# Patient Record
Sex: MALE | Race: WHITE | NOT HISPANIC OR LATINO | Employment: UNEMPLOYED | URBAN - METROPOLITAN AREA
[De-identification: names, ages, dates, MRNs, and addresses within clinical notes are randomized per-mention and may not be internally consistent; named-entity substitution may affect disease eponyms.]

---

## 2017-12-27 ENCOUNTER — RECORDS - HEALTHEAST (OUTPATIENT)
Dept: LAB | Facility: CLINIC | Age: 37
End: 2017-12-27

## 2017-12-27 LAB
CHOLEST SERPL-MCNC: 292 MG/DL
FASTING STATUS PATIENT QL REPORTED: NO
HDLC SERPL-MCNC: 48 MG/DL
LDLC SERPL CALC-MCNC: 207 MG/DL
TRIGL SERPL-MCNC: 187 MG/DL

## 2020-06-25 ENCOUNTER — RECORDS - HEALTHEAST (OUTPATIENT)
Dept: LAB | Facility: CLINIC | Age: 40
End: 2020-06-25

## 2020-06-25 LAB
ALBUMIN SERPL-MCNC: 4.2 G/DL (ref 3.5–5)
ALP SERPL-CCNC: 59 U/L (ref 45–120)
ALT SERPL W P-5'-P-CCNC: 133 U/L (ref 0–45)
ANION GAP SERPL CALCULATED.3IONS-SCNC: 12 MMOL/L (ref 5–18)
AST SERPL W P-5'-P-CCNC: 91 U/L (ref 0–40)
BILIRUB SERPL-MCNC: 0.4 MG/DL (ref 0–1)
BUN SERPL-MCNC: 12 MG/DL (ref 8–22)
CALCIUM SERPL-MCNC: 10.1 MG/DL (ref 8.5–10.5)
CHLORIDE BLD-SCNC: 102 MMOL/L (ref 98–107)
CHOLEST SERPL-MCNC: 281 MG/DL
CO2 SERPL-SCNC: 24 MMOL/L (ref 22–31)
CREAT SERPL-MCNC: 1.23 MG/DL (ref 0.7–1.3)
FASTING STATUS PATIENT QL REPORTED: ABNORMAL
GFR SERPL CREATININE-BSD FRML MDRD: >60 ML/MIN/1.73M2
GLUCOSE BLD-MCNC: 104 MG/DL (ref 70–125)
HDLC SERPL-MCNC: 54 MG/DL
LDLC SERPL CALC-MCNC: 164 MG/DL
POTASSIUM BLD-SCNC: 4.4 MMOL/L (ref 3.5–5)
PROT SERPL-MCNC: 7.6 G/DL (ref 6–8)
SODIUM SERPL-SCNC: 138 MMOL/L (ref 136–145)
TRIGL SERPL-MCNC: 313 MG/DL

## 2020-08-27 ENCOUNTER — RECORDS - HEALTHEAST (OUTPATIENT)
Dept: LAB | Facility: CLINIC | Age: 40
End: 2020-08-27

## 2020-08-27 LAB
ALBUMIN SERPL-MCNC: 4.4 G/DL (ref 3.5–5)
ALP SERPL-CCNC: 54 U/L (ref 45–120)
ALT SERPL W P-5'-P-CCNC: 122 U/L (ref 0–45)
ANION GAP SERPL CALCULATED.3IONS-SCNC: 14 MMOL/L (ref 5–18)
AST SERPL W P-5'-P-CCNC: 83 U/L (ref 0–40)
BILIRUB SERPL-MCNC: 0.6 MG/DL (ref 0–1)
BUN SERPL-MCNC: 10 MG/DL (ref 8–22)
CALCIUM SERPL-MCNC: 9.6 MG/DL (ref 8.5–10.5)
CHLORIDE BLD-SCNC: 103 MMOL/L (ref 98–107)
CO2 SERPL-SCNC: 23 MMOL/L (ref 22–31)
CREAT SERPL-MCNC: 1.11 MG/DL (ref 0.7–1.3)
GFR SERPL CREATININE-BSD FRML MDRD: >60 ML/MIN/1.73M2
GLUCOSE BLD-MCNC: 89 MG/DL (ref 70–125)
POTASSIUM BLD-SCNC: 4.5 MMOL/L (ref 3.5–5)
PROT SERPL-MCNC: 7.7 G/DL (ref 6–8)
SODIUM SERPL-SCNC: 140 MMOL/L (ref 136–145)

## 2020-08-28 ENCOUNTER — RECORDS - HEALTHEAST (OUTPATIENT)
Dept: ADMINISTRATIVE | Facility: OTHER | Age: 40
End: 2020-08-28

## 2020-09-08 ENCOUNTER — HOSPITAL ENCOUNTER (OUTPATIENT)
Dept: CARDIOLOGY | Facility: HOSPITAL | Age: 40
Discharge: HOME | End: 2020-09-08
Attending: FAMILY MEDICINE

## 2020-09-08 DIAGNOSIS — R06.02 SOB (SHORTNESS OF BREATH): ICD-10-CM

## 2020-09-08 LAB
CV STRESS CURRENT BP HE: NORMAL
CV STRESS CURRENT HR HE: 103
CV STRESS CURRENT HR HE: 106
CV STRESS CURRENT HR HE: 114
CV STRESS CURRENT HR HE: 119
CV STRESS CURRENT HR HE: 120
CV STRESS CURRENT HR HE: 121
CV STRESS CURRENT HR HE: 121
CV STRESS CURRENT HR HE: 122
CV STRESS CURRENT HR HE: 123
CV STRESS CURRENT HR HE: 124
CV STRESS CURRENT HR HE: 128
CV STRESS CURRENT HR HE: 128
CV STRESS CURRENT HR HE: 129
CV STRESS CURRENT HR HE: 134
CV STRESS CURRENT HR HE: 135
CV STRESS CURRENT HR HE: 137
CV STRESS CURRENT HR HE: 139
CV STRESS CURRENT HR HE: 143
CV STRESS CURRENT HR HE: 147
CV STRESS CURRENT HR HE: 152
CV STRESS DEVIATION TIME HE: NORMAL
CV STRESS ECHO PERCENT HR HE: NORMAL
CV STRESS EXERCISE STAGE HE: NORMAL
CV STRESS FINAL RESTING BP HE: NORMAL
CV STRESS FINAL RESTING HR HE: 121
CV STRESS MAX HR HE: 153
CV STRESS MAX TREADMILL GRADE HE: 14
CV STRESS MAX TREADMILL SPEED HE: 3.4
CV STRESS PEAK DIA BP HE: NORMAL
CV STRESS PEAK SYS BP HE: NORMAL
CV STRESS PHASE HE: NORMAL
CV STRESS PROTOCOL HE: NORMAL
CV STRESS RESTING PT POSITION HE: NORMAL
CV STRESS RESTING PT POSITION HE: NORMAL
CV STRESS ST DEVIATION AMOUNT HE: NORMAL
CV STRESS ST DEVIATION ELEVATION HE: NORMAL
CV STRESS ST EVELATION AMOUNT HE: NORMAL
CV STRESS TEST TYPE HE: NORMAL
CV STRESS TOTAL STAGE TIME MIN 1 HE: NORMAL
RATE PRESSURE PRODUCT: NORMAL
STRESS ECHO BASELINE DIASTOLIC HE: 94
STRESS ECHO BASELINE HR: 104
STRESS ECHO BASELINE SYSTOLIC BP: 142
STRESS ECHO CALCULATED PERCENT HR: 85 %
STRESS ECHO LAST STRESS DIASTOLIC BP: 96
STRESS ECHO LAST STRESS HR: 152
STRESS ECHO LAST STRESS SYSTOLIC BP: 190
STRESS ECHO POST ESTIMATED WORKLOAD: 9.1
STRESS ECHO POST EXERCISE DUR MIN: 7
STRESS ECHO POST EXERCISE DUR SEC: 30
STRESS ECHO TARGET HR: 180

## 2020-09-08 RX ORDER — PANTOPRAZOLE SODIUM 40 MG/1
40 TABLET, DELAYED RELEASE ORAL DAILY
Status: ON HOLD | COMMUNITY
Start: 2020-09-08 | End: 2023-08-28

## 2020-09-08 RX ORDER — ATORVASTATIN CALCIUM 20 MG/1
20 TABLET, FILM COATED ORAL AT BEDTIME
Status: SHIPPED | COMMUNITY
Start: 2020-09-08 | End: 2023-08-21

## 2021-09-15 ENCOUNTER — LAB REQUISITION (OUTPATIENT)
Dept: LAB | Facility: CLINIC | Age: 41
End: 2021-09-15
Payer: COMMERCIAL

## 2021-09-15 DIAGNOSIS — E78.5 HYPERLIPIDEMIA, UNSPECIFIED: ICD-10-CM

## 2021-09-15 DIAGNOSIS — I10 ESSENTIAL (PRIMARY) HYPERTENSION: ICD-10-CM

## 2021-09-15 LAB
ALBUMIN SERPL-MCNC: 4.2 G/DL (ref 3.5–5)
ALP SERPL-CCNC: 58 U/L (ref 45–120)
ALT SERPL W P-5'-P-CCNC: 128 U/L (ref 0–45)
ANION GAP SERPL CALCULATED.3IONS-SCNC: 14 MMOL/L (ref 5–18)
AST SERPL W P-5'-P-CCNC: 90 U/L (ref 0–40)
BILIRUB SERPL-MCNC: 1.2 MG/DL (ref 0–1)
BUN SERPL-MCNC: 11 MG/DL (ref 8–22)
CALCIUM SERPL-MCNC: 9.8 MG/DL (ref 8.5–10.5)
CHLORIDE BLD-SCNC: 102 MMOL/L (ref 98–107)
CHOLEST SERPL-MCNC: 287 MG/DL
CO2 SERPL-SCNC: 21 MMOL/L (ref 22–31)
CREAT SERPL-MCNC: 1.02 MG/DL (ref 0.7–1.3)
GFR SERPL CREATININE-BSD FRML MDRD: >90 ML/MIN/1.73M2
GLUCOSE BLD-MCNC: 104 MG/DL (ref 70–125)
HDLC SERPL-MCNC: 61 MG/DL
LDLC SERPL CALC-MCNC: 206 MG/DL
POTASSIUM BLD-SCNC: 4.3 MMOL/L (ref 3.5–5)
PROT SERPL-MCNC: 7.6 G/DL (ref 6–8)
SODIUM SERPL-SCNC: 137 MMOL/L (ref 136–145)
TRIGL SERPL-MCNC: 99 MG/DL

## 2021-09-15 PROCEDURE — 80053 COMPREHEN METABOLIC PANEL: CPT | Mod: ORL | Performed by: PHYSICIAN ASSISTANT

## 2021-09-15 PROCEDURE — 80061 LIPID PANEL: CPT | Mod: ORL | Performed by: PHYSICIAN ASSISTANT

## 2022-06-07 ENCOUNTER — LAB REQUISITION (OUTPATIENT)
Dept: LAB | Facility: CLINIC | Age: 42
End: 2022-06-07
Payer: COMMERCIAL

## 2022-06-07 DIAGNOSIS — I10 ESSENTIAL (PRIMARY) HYPERTENSION: ICD-10-CM

## 2022-06-07 DIAGNOSIS — R53.83 OTHER FATIGUE: ICD-10-CM

## 2022-06-07 DIAGNOSIS — R10.84 GENERALIZED ABDOMINAL PAIN: ICD-10-CM

## 2022-06-07 DIAGNOSIS — E78.49 OTHER HYPERLIPIDEMIA: ICD-10-CM

## 2022-06-07 LAB
ALBUMIN SERPL-MCNC: 4.3 G/DL (ref 3.5–5)
ALP SERPL-CCNC: 76 U/L (ref 45–120)
ALT SERPL W P-5'-P-CCNC: 126 U/L (ref 0–45)
ANION GAP SERPL CALCULATED.3IONS-SCNC: 13 MMOL/L (ref 5–18)
AST SERPL W P-5'-P-CCNC: 111 U/L (ref 0–40)
BILIRUB SERPL-MCNC: 1.8 MG/DL (ref 0–1)
BUN SERPL-MCNC: 11 MG/DL (ref 8–22)
CALCIUM SERPL-MCNC: 10.5 MG/DL (ref 8.5–10.5)
CHLORIDE BLD-SCNC: 97 MMOL/L (ref 98–107)
CHOLEST SERPL-MCNC: 251 MG/DL
CO2 SERPL-SCNC: 24 MMOL/L (ref 22–31)
CREAT SERPL-MCNC: 1.28 MG/DL (ref 0.7–1.3)
GFR SERPL CREATININE-BSD FRML MDRD: 72 ML/MIN/1.73M2
GLUCOSE BLD-MCNC: 97 MG/DL (ref 70–125)
HDLC SERPL-MCNC: 79 MG/DL
LDLC SERPL CALC-MCNC: 151 MG/DL
LIPASE SERPL-CCNC: 51 U/L (ref 0–52)
POTASSIUM BLD-SCNC: 4.1 MMOL/L (ref 3.5–5)
PROT SERPL-MCNC: 8 G/DL (ref 6–8)
SODIUM SERPL-SCNC: 134 MMOL/L (ref 136–145)
TRIGL SERPL-MCNC: 106 MG/DL
TSH SERPL DL<=0.005 MIU/L-ACNC: 1.31 UIU/ML (ref 0.3–5)

## 2022-06-07 PROCEDURE — 80061 LIPID PANEL: CPT | Mod: ORL | Performed by: PHYSICIAN ASSISTANT

## 2022-06-07 PROCEDURE — 84443 ASSAY THYROID STIM HORMONE: CPT | Mod: ORL | Performed by: PHYSICIAN ASSISTANT

## 2022-06-07 PROCEDURE — 83690 ASSAY OF LIPASE: CPT | Mod: ORL | Performed by: PHYSICIAN ASSISTANT

## 2022-06-07 PROCEDURE — 80053 COMPREHEN METABOLIC PANEL: CPT | Mod: ORL | Performed by: PHYSICIAN ASSISTANT

## 2022-06-20 ENCOUNTER — HOSPITAL ENCOUNTER (OUTPATIENT)
Dept: NUCLEAR MEDICINE | Facility: HOSPITAL | Age: 42
Discharge: HOME | End: 2022-06-20
Attending: PHYSICIAN ASSISTANT | Admitting: PHYSICIAN ASSISTANT
Payer: COMMERCIAL

## 2022-06-20 VITALS — WEIGHT: 245 LBS | BODY MASS INDEX: 36.18 KG/M2

## 2022-06-20 DIAGNOSIS — R10.84 GENERALIZED ABDOMINAL PAIN: ICD-10-CM

## 2022-06-20 PROCEDURE — A9537 TC99M MEBROFENIN: HCPCS | Performed by: PHYSICIAN ASSISTANT

## 2022-06-20 PROCEDURE — 78227 HEPATOBIL SYST IMAGE W/DRUG: CPT

## 2022-06-20 PROCEDURE — 250N000011 HC RX IP 250 OP 636: Performed by: PHYSICIAN ASSISTANT

## 2022-06-20 PROCEDURE — 343N000001 HC RX 343: Performed by: PHYSICIAN ASSISTANT

## 2022-06-20 RX ORDER — KIT FOR THE PREPARATION OF TECHNETIUM TC 99M MEBROFENIN 45 MG/10ML
3-10 INJECTION, POWDER, LYOPHILIZED, FOR SOLUTION INTRAVENOUS ONCE
Status: COMPLETED | OUTPATIENT
Start: 2022-06-20 | End: 2022-06-20

## 2022-06-20 RX ORDER — SINCALIDE 5 UG/5ML
0.02 INJECTION, POWDER, LYOPHILIZED, FOR SOLUTION INTRAVENOUS ONCE
Status: COMPLETED | OUTPATIENT
Start: 2022-06-20 | End: 2022-06-20

## 2022-06-20 RX ADMIN — SINCALIDE 2.2 MCG: 5 INJECTION, POWDER, LYOPHILIZED, FOR SOLUTION INTRAVENOUS at 11:59

## 2022-06-20 RX ADMIN — MEBROFENIN 8.6 MILLICURIE: 45 INJECTION, POWDER, LYOPHILIZED, FOR SOLUTION INTRAVENOUS at 11:07

## 2022-09-01 ENCOUNTER — LAB REQUISITION (OUTPATIENT)
Dept: LAB | Facility: CLINIC | Age: 42
End: 2022-09-01
Payer: COMMERCIAL

## 2022-09-01 DIAGNOSIS — R11.2 NAUSEA WITH VOMITING, UNSPECIFIED: ICD-10-CM

## 2022-09-01 LAB
ALBUMIN SERPL BCG-MCNC: 4.6 G/DL (ref 3.5–5.2)
ALP SERPL-CCNC: 126 U/L (ref 40–129)
ALT SERPL W P-5'-P-CCNC: 185 U/L (ref 10–50)
ANION GAP SERPL CALCULATED.3IONS-SCNC: 19 MMOL/L (ref 7–15)
AST SERPL W P-5'-P-CCNC: 250 U/L (ref 10–50)
BILIRUB SERPL-MCNC: 3.2 MG/DL
BUN SERPL-MCNC: 10.2 MG/DL (ref 6–20)
CALCIUM SERPL-MCNC: 9.9 MG/DL (ref 8.6–10)
CHLORIDE SERPL-SCNC: 88 MMOL/L (ref 98–107)
CREAT SERPL-MCNC: 0.98 MG/DL (ref 0.67–1.17)
DEPRECATED HCO3 PLAS-SCNC: 24 MMOL/L (ref 22–29)
GFR SERPL CREATININE-BSD FRML MDRD: >90 ML/MIN/1.73M2
GLUCOSE SERPL-MCNC: 108 MG/DL (ref 70–99)
LIPASE SERPL-CCNC: 145 U/L (ref 13–60)
POTASSIUM SERPL-SCNC: 4.3 MMOL/L (ref 3.4–5.3)
PROT SERPL-MCNC: 7.6 G/DL (ref 6.4–8.3)
SODIUM SERPL-SCNC: 131 MMOL/L (ref 136–145)

## 2022-09-01 PROCEDURE — 80053 COMPREHEN METABOLIC PANEL: CPT | Mod: ORL | Performed by: PHYSICIAN ASSISTANT

## 2022-09-01 PROCEDURE — 83690 ASSAY OF LIPASE: CPT | Mod: ORL | Performed by: PHYSICIAN ASSISTANT

## 2022-10-06 ENCOUNTER — LAB REQUISITION (OUTPATIENT)
Dept: LAB | Facility: CLINIC | Age: 42
End: 2022-10-06
Payer: COMMERCIAL

## 2022-10-06 DIAGNOSIS — D75.89 OTHER SPECIFIED DISEASES OF BLOOD AND BLOOD-FORMING ORGANS: ICD-10-CM

## 2022-10-06 DIAGNOSIS — R74.8 ABNORMAL LEVELS OF OTHER SERUM ENZYMES: ICD-10-CM

## 2022-10-06 DIAGNOSIS — K76.0 FATTY (CHANGE OF) LIVER, NOT ELSEWHERE CLASSIFIED: ICD-10-CM

## 2022-10-06 LAB
ALBUMIN SERPL BCG-MCNC: 4 G/DL (ref 3.5–5.2)
ALP SERPL-CCNC: 198 U/L (ref 40–129)
ALT SERPL W P-5'-P-CCNC: 111 U/L (ref 10–50)
ANION GAP SERPL CALCULATED.3IONS-SCNC: 16 MMOL/L (ref 7–15)
AST SERPL W P-5'-P-CCNC: 250 U/L (ref 10–50)
BILIRUB SERPL-MCNC: 3.7 MG/DL
BUN SERPL-MCNC: 5.6 MG/DL (ref 6–20)
CALCIUM SERPL-MCNC: 9.2 MG/DL (ref 8.6–10)
CHLORIDE SERPL-SCNC: 96 MMOL/L (ref 98–107)
CREAT SERPL-MCNC: 0.77 MG/DL (ref 0.67–1.17)
DEPRECATED HCO3 PLAS-SCNC: 25 MMOL/L (ref 22–29)
GFR SERPL CREATININE-BSD FRML MDRD: >90 ML/MIN/1.73M2
GLUCOSE SERPL-MCNC: 144 MG/DL (ref 70–99)
LIPASE SERPL-CCNC: 273 U/L (ref 13–60)
POTASSIUM SERPL-SCNC: 3.1 MMOL/L (ref 3.4–5.3)
PROT SERPL-MCNC: 7.2 G/DL (ref 6.4–8.3)
SODIUM SERPL-SCNC: 137 MMOL/L (ref 136–145)
VIT B12 SERPL-MCNC: 1781 PG/ML (ref 232–1245)

## 2022-10-06 PROCEDURE — 82607 VITAMIN B-12: CPT | Mod: ORL | Performed by: PHYSICIAN ASSISTANT

## 2022-10-06 PROCEDURE — 80053 COMPREHEN METABOLIC PANEL: CPT | Mod: ORL | Performed by: PHYSICIAN ASSISTANT

## 2022-10-06 PROCEDURE — 83690 ASSAY OF LIPASE: CPT | Mod: ORL | Performed by: PHYSICIAN ASSISTANT

## 2022-10-14 ENCOUNTER — LAB REQUISITION (OUTPATIENT)
Dept: LAB | Facility: CLINIC | Age: 42
End: 2022-10-14
Payer: COMMERCIAL

## 2022-10-14 DIAGNOSIS — K70.10 ALCOHOLIC HEPATITIS WITHOUT ASCITES (H): ICD-10-CM

## 2022-10-14 LAB
ALBUMIN SERPL BCG-MCNC: 3.6 G/DL (ref 3.5–5.2)
ALP SERPL-CCNC: 207 U/L (ref 40–129)
ALT SERPL W P-5'-P-CCNC: 85 U/L (ref 10–50)
ANION GAP SERPL CALCULATED.3IONS-SCNC: 13 MMOL/L (ref 7–15)
AST SERPL W P-5'-P-CCNC: 207 U/L (ref 10–50)
BILIRUB SERPL-MCNC: 9.8 MG/DL
BUN SERPL-MCNC: 7.2 MG/DL (ref 6–20)
CALCIUM SERPL-MCNC: 9.7 MG/DL (ref 8.6–10)
CHLORIDE SERPL-SCNC: 91 MMOL/L (ref 98–107)
CREAT SERPL-MCNC: 0.8 MG/DL (ref 0.67–1.17)
DEPRECATED HCO3 PLAS-SCNC: 26 MMOL/L (ref 22–29)
GFR SERPL CREATININE-BSD FRML MDRD: >90 ML/MIN/1.73M2
GLUCOSE SERPL-MCNC: 95 MG/DL (ref 70–99)
POTASSIUM SERPL-SCNC: 4 MMOL/L (ref 3.4–5.3)
PROT SERPL-MCNC: 6.8 G/DL (ref 6.4–8.3)
SODIUM SERPL-SCNC: 130 MMOL/L (ref 136–145)

## 2022-10-14 PROCEDURE — 80053 COMPREHEN METABOLIC PANEL: CPT | Mod: ORL | Performed by: PHYSICIAN ASSISTANT

## 2022-10-20 ENCOUNTER — HOSPITAL ENCOUNTER (OUTPATIENT)
Dept: ULTRASOUND IMAGING | Facility: HOSPITAL | Age: 42
Discharge: HOME | End: 2022-10-20
Attending: PHYSICIAN ASSISTANT | Admitting: PHYSICIAN ASSISTANT
Payer: COMMERCIAL

## 2022-10-20 DIAGNOSIS — R17 ELEVATED BILIRUBIN: ICD-10-CM

## 2022-10-20 PROCEDURE — 76705 ECHO EXAM OF ABDOMEN: CPT

## 2023-03-15 ENCOUNTER — LAB REQUISITION (OUTPATIENT)
Dept: LAB | Facility: CLINIC | Age: 43
End: 2023-03-15
Payer: COMMERCIAL

## 2023-03-15 DIAGNOSIS — I10 ESSENTIAL (PRIMARY) HYPERTENSION: ICD-10-CM

## 2023-03-15 DIAGNOSIS — E78.5 HYPERLIPIDEMIA, UNSPECIFIED: ICD-10-CM

## 2023-03-15 LAB
ALBUMIN SERPL BCG-MCNC: 3.6 G/DL (ref 3.5–5.2)
ALP SERPL-CCNC: 93 U/L (ref 40–129)
ALT SERPL W P-5'-P-CCNC: 30 U/L (ref 10–50)
ANION GAP SERPL CALCULATED.3IONS-SCNC: 10 MMOL/L (ref 7–15)
AST SERPL W P-5'-P-CCNC: 62 U/L (ref 10–50)
BILIRUB SERPL-MCNC: 1 MG/DL
BUN SERPL-MCNC: 6.2 MG/DL (ref 6–20)
CALCIUM SERPL-MCNC: 9.2 MG/DL (ref 8.6–10)
CHLORIDE SERPL-SCNC: 100 MMOL/L (ref 98–107)
CHOLEST SERPL-MCNC: 238 MG/DL
CREAT SERPL-MCNC: 0.96 MG/DL (ref 0.67–1.17)
DEPRECATED HCO3 PLAS-SCNC: 26 MMOL/L (ref 22–29)
GFR SERPL CREATININE-BSD FRML MDRD: >90 ML/MIN/1.73M2
GLUCOSE SERPL-MCNC: 139 MG/DL (ref 70–99)
HDLC SERPL-MCNC: 50 MG/DL
LDLC SERPL CALC-MCNC: 142 MG/DL
NONHDLC SERPL-MCNC: 188 MG/DL
POTASSIUM SERPL-SCNC: 4.6 MMOL/L (ref 3.4–5.3)
PROT SERPL-MCNC: 7.3 G/DL (ref 6.4–8.3)
SODIUM SERPL-SCNC: 136 MMOL/L (ref 136–145)
TRIGL SERPL-MCNC: 232 MG/DL

## 2023-03-15 PROCEDURE — 80061 LIPID PANEL: CPT | Mod: ORL | Performed by: PHYSICIAN ASSISTANT

## 2023-03-15 PROCEDURE — 80053 COMPREHEN METABOLIC PANEL: CPT | Mod: ORL | Performed by: PHYSICIAN ASSISTANT

## 2023-03-20 ENCOUNTER — LAB REQUISITION (OUTPATIENT)
Dept: LAB | Facility: CLINIC | Age: 43
End: 2023-03-20
Payer: COMMERCIAL

## 2023-03-20 DIAGNOSIS — R73.09 OTHER ABNORMAL GLUCOSE: ICD-10-CM

## 2023-03-20 LAB — HBA1C MFR BLD: 6.1 %

## 2023-03-20 PROCEDURE — 83036 HEMOGLOBIN GLYCOSYLATED A1C: CPT | Mod: ORL | Performed by: PHYSICIAN ASSISTANT

## 2023-06-23 ENCOUNTER — LAB REQUISITION (OUTPATIENT)
Dept: LAB | Facility: CLINIC | Age: 43
End: 2023-06-23
Payer: COMMERCIAL

## 2023-06-23 DIAGNOSIS — I10 ESSENTIAL (PRIMARY) HYPERTENSION: ICD-10-CM

## 2023-06-23 DIAGNOSIS — E78.5 HYPERLIPIDEMIA, UNSPECIFIED: ICD-10-CM

## 2023-06-23 DIAGNOSIS — R73.09 OTHER ABNORMAL GLUCOSE: ICD-10-CM

## 2023-06-23 LAB
ALBUMIN SERPL BCG-MCNC: 3.5 G/DL (ref 3.5–5.2)
ALP SERPL-CCNC: 142 U/L (ref 40–129)
ALT SERPL W P-5'-P-CCNC: 64 U/L (ref 0–70)
ANION GAP SERPL CALCULATED.3IONS-SCNC: 11 MMOL/L (ref 7–15)
AST SERPL W P-5'-P-CCNC: 166 U/L (ref 0–45)
BILIRUB SERPL-MCNC: 1.9 MG/DL
BUN SERPL-MCNC: 9.1 MG/DL (ref 6–20)
CALCIUM SERPL-MCNC: 9 MG/DL (ref 8.6–10)
CHLORIDE SERPL-SCNC: 101 MMOL/L (ref 98–107)
CHOLEST SERPL-MCNC: 252 MG/DL
CREAT SERPL-MCNC: 1.04 MG/DL (ref 0.67–1.17)
DEPRECATED HCO3 PLAS-SCNC: 26 MMOL/L (ref 22–29)
GFR SERPL CREATININE-BSD FRML MDRD: >90 ML/MIN/1.73M2
GLUCOSE SERPL-MCNC: 133 MG/DL (ref 70–99)
HBA1C MFR BLD: 5.7 %
HDLC SERPL-MCNC: 55 MG/DL
LDLC SERPL CALC-MCNC: 169 MG/DL
NONHDLC SERPL-MCNC: 197 MG/DL
POTASSIUM SERPL-SCNC: 5.1 MMOL/L (ref 3.4–5.3)
PROT SERPL-MCNC: 8.1 G/DL (ref 6.4–8.3)
SODIUM SERPL-SCNC: 138 MMOL/L (ref 136–145)
TRIGL SERPL-MCNC: 138 MG/DL

## 2023-06-23 PROCEDURE — 83036 HEMOGLOBIN GLYCOSYLATED A1C: CPT | Mod: ORL | Performed by: PHYSICIAN ASSISTANT

## 2023-06-23 PROCEDURE — 80061 LIPID PANEL: CPT | Mod: ORL | Performed by: PHYSICIAN ASSISTANT

## 2023-06-23 PROCEDURE — 80053 COMPREHEN METABOLIC PANEL: CPT | Mod: ORL | Performed by: PHYSICIAN ASSISTANT

## 2023-08-11 ENCOUNTER — LAB REQUISITION (OUTPATIENT)
Dept: LAB | Facility: CLINIC | Age: 43
End: 2023-08-11
Payer: COMMERCIAL

## 2023-08-11 DIAGNOSIS — I10 ESSENTIAL (PRIMARY) HYPERTENSION: ICD-10-CM

## 2023-08-11 DIAGNOSIS — R19.7 DIARRHEA, UNSPECIFIED: ICD-10-CM

## 2023-08-11 LAB
ALBUMIN SERPL BCG-MCNC: 3.1 G/DL (ref 3.5–5.2)
ALP SERPL-CCNC: 213 U/L (ref 40–129)
ALT SERPL W P-5'-P-CCNC: 109 U/L (ref 0–70)
ANION GAP SERPL CALCULATED.3IONS-SCNC: 13 MMOL/L (ref 7–15)
AST SERPL W P-5'-P-CCNC: 428 U/L (ref 0–45)
BILIRUB SERPL-MCNC: 12.6 MG/DL
BUN SERPL-MCNC: 7.8 MG/DL (ref 6–20)
C DIFF GDH STL QL IA: POSITIVE
C DIFF TOX A+B STL QL IA: NEGATIVE
C DIFF TOX B STL QL: POSITIVE
CALCIUM SERPL-MCNC: 8.5 MG/DL (ref 8.6–10)
CHLORIDE SERPL-SCNC: 93 MMOL/L (ref 98–107)
CREAT SERPL-MCNC: 0.91 MG/DL (ref 0.67–1.17)
CRP SERPL-MCNC: 40.9 MG/L
DEPRECATED HCO3 PLAS-SCNC: 26 MMOL/L (ref 22–29)
ERYTHROCYTE [SEDIMENTATION RATE] IN BLOOD BY WESTERGREN METHOD: 39 MM/HR (ref 0–15)
GFR SERPL CREATININE-BSD FRML MDRD: >90 ML/MIN/1.73M2
GLUCOSE SERPL-MCNC: 108 MG/DL (ref 70–99)
POTASSIUM SERPL-SCNC: 3.9 MMOL/L (ref 3.4–5.3)
PROT SERPL-MCNC: 7.6 G/DL (ref 6.4–8.3)
SODIUM SERPL-SCNC: 132 MMOL/L (ref 136–145)

## 2023-08-11 PROCEDURE — 87209 SMEAR COMPLEX STAIN: CPT | Mod: ORL | Performed by: PHYSICIAN ASSISTANT

## 2023-08-11 PROCEDURE — 87329 GIARDIA AG IA: CPT | Mod: ORL | Performed by: PHYSICIAN ASSISTANT

## 2023-08-11 PROCEDURE — 87507 IADNA-DNA/RNA PROBE TQ 12-25: CPT | Mod: ORL | Performed by: PHYSICIAN ASSISTANT

## 2023-08-11 PROCEDURE — 87324 CLOSTRIDIUM AG IA: CPT | Mod: ORL,XU | Performed by: PHYSICIAN ASSISTANT

## 2023-08-11 PROCEDURE — 80053 COMPREHEN METABOLIC PANEL: CPT | Mod: ORL | Performed by: PHYSICIAN ASSISTANT

## 2023-08-11 PROCEDURE — 87328 CRYPTOSPORIDIUM AG IA: CPT | Mod: ORL | Performed by: PHYSICIAN ASSISTANT

## 2023-08-11 PROCEDURE — 85652 RBC SED RATE AUTOMATED: CPT | Mod: ORL | Performed by: PHYSICIAN ASSISTANT

## 2023-08-11 PROCEDURE — 86140 C-REACTIVE PROTEIN: CPT | Mod: ORL | Performed by: PHYSICIAN ASSISTANT

## 2023-08-11 PROCEDURE — 87493 C DIFF AMPLIFIED PROBE: CPT | Mod: ORL | Performed by: PHYSICIAN ASSISTANT

## 2023-08-12 LAB

## 2023-08-14 LAB
C PARVUM AG STL QL IA: NEGATIVE
G LAMBLIA AG STL QL IA: NEGATIVE
O+P STL MICRO: NEGATIVE

## 2023-08-21 ENCOUNTER — APPOINTMENT (OUTPATIENT)
Dept: GENERAL RADIOLOGY | Facility: CLINIC | Age: 43
DRG: 432 | End: 2023-08-21
Attending: INTERNAL MEDICINE
Payer: COMMERCIAL

## 2023-08-21 ENCOUNTER — HOSPITAL ENCOUNTER (INPATIENT)
Facility: CLINIC | Age: 43
LOS: 9 days | Discharge: NURSING, REHAB OR LONG-TERM CARE | DRG: 432 | End: 2023-08-30
Attending: EMERGENCY MEDICINE | Admitting: INTERNAL MEDICINE
Payer: COMMERCIAL

## 2023-08-21 ENCOUNTER — APPOINTMENT (OUTPATIENT)
Dept: ULTRASOUND IMAGING | Facility: CLINIC | Age: 43
DRG: 432 | End: 2023-08-21
Attending: EMERGENCY MEDICINE
Payer: COMMERCIAL

## 2023-08-21 DIAGNOSIS — K70.31 ALCOHOLIC CIRRHOSIS OF LIVER WITH ASCITES (H): Primary | ICD-10-CM

## 2023-08-21 DIAGNOSIS — N17.9 AKI (ACUTE KIDNEY INJURY) (H): ICD-10-CM

## 2023-08-21 DIAGNOSIS — F10.20 ALCOHOL USE DISORDER, SEVERE, DEPENDENCE (H): ICD-10-CM

## 2023-08-21 PROBLEM — K74.60 CIRRHOSIS (H): Status: ACTIVE | Noted: 2023-08-21

## 2023-08-21 LAB
ALBUMIN SERPL BCG-MCNC: 2.7 G/DL (ref 3.5–5.2)
ALBUMIN UR-MCNC: 100 MG/DL
ALCOHOL BREATH TEST: 0 (ref 0–0.01)
ALP SERPL-CCNC: 161 U/L (ref 40–129)
ALT SERPL W P-5'-P-CCNC: 81 U/L (ref 0–70)
AMPHETAMINES UR QL SCN: ABNORMAL
ANION GAP SERPL CALCULATED.3IONS-SCNC: 15 MMOL/L (ref 7–15)
APPEARANCE UR: ABNORMAL
AST SERPL W P-5'-P-CCNC: 240 U/L (ref 0–45)
BACTERIA #/AREA URNS HPF: ABNORMAL /HPF
BARBITURATES UR QL SCN: ABNORMAL
BASOPHILS # BLD AUTO: 0.1 10E3/UL (ref 0–0.2)
BASOPHILS NFR BLD AUTO: 1 %
BENZODIAZ UR QL SCN: ABNORMAL
BILIRUB DIRECT SERPL-MCNC: 30.2 MG/DL (ref 0–0.3)
BILIRUB SERPL-MCNC: 30.2 MG/DL
BILIRUB UR QL STRIP: ABNORMAL
BUN SERPL-MCNC: 26.8 MG/DL (ref 6–20)
BZE UR QL SCN: ABNORMAL
CALCIUM SERPL-MCNC: 9.4 MG/DL (ref 8.6–10)
CANNABINOIDS UR QL SCN: ABNORMAL
CHLORIDE SERPL-SCNC: 95 MMOL/L (ref 98–107)
CK SERPL-CCNC: 159 U/L (ref 39–308)
COLOR UR AUTO: ABNORMAL
CREAT SERPL-MCNC: 4.76 MG/DL (ref 0.67–1.17)
DEPRECATED HCO3 PLAS-SCNC: 20 MMOL/L (ref 22–29)
EOSINOPHIL # BLD AUTO: 0 10E3/UL (ref 0–0.7)
EOSINOPHIL NFR BLD AUTO: 0 %
ERYTHROCYTE [DISTWIDTH] IN BLOOD BY AUTOMATED COUNT: 21.2 % (ref 10–15)
GFR SERPL CREATININE-BSD FRML MDRD: 15 ML/MIN/1.73M2
GLUCOSE SERPL-MCNC: 98 MG/DL (ref 70–99)
GLUCOSE UR STRIP-MCNC: ABNORMAL MG/DL
HCT VFR BLD AUTO: 33.4 % (ref 40–53)
HGB BLD-MCNC: 12.3 G/DL (ref 13.3–17.7)
HGB UR QL STRIP: ABNORMAL
HOLD SPECIMEN: NORMAL
HOLD SPECIMEN: NORMAL
HYALINE CASTS: 12 /LPF
IMM GRANULOCYTES # BLD: 0.3 10E3/UL
IMM GRANULOCYTES NFR BLD: 4 %
INR PPP: 1.43 (ref 0.85–1.15)
IRON BINDING CAPACITY (ROCHE): 130 UG/DL (ref 240–430)
IRON SATN MFR SERPL: 55 % (ref 15–46)
IRON SERPL-MCNC: 71 UG/DL (ref 61–157)
KETONES UR STRIP-MCNC: 15 MG/DL
LEUKOCYTE ESTERASE UR QL STRIP: ABNORMAL
LIPASE SERPL-CCNC: 398 U/L (ref 13–60)
LYMPHOCYTES # BLD AUTO: 0.7 10E3/UL (ref 0.8–5.3)
LYMPHOCYTES NFR BLD AUTO: 8 %
MCH RBC QN AUTO: 33.9 PG (ref 26.5–33)
MCHC RBC AUTO-ENTMCNC: 36.8 G/DL (ref 31.5–36.5)
MCV RBC AUTO: 92 FL (ref 78–100)
MONOCYTES # BLD AUTO: 1.3 10E3/UL (ref 0–1.3)
MONOCYTES NFR BLD AUTO: 15 %
MUCOUS THREADS #/AREA URNS LPF: PRESENT /LPF
NEUTROPHILS # BLD AUTO: 6.5 10E3/UL (ref 1.6–8.3)
NEUTROPHILS NFR BLD AUTO: 72 %
NITRATE UR QL: ABNORMAL
NRBC # BLD AUTO: 0 10E3/UL
NRBC BLD AUTO-RTO: 0 /100
OPIATES UR QL SCN: ABNORMAL
OSMOLALITY UR: 310 MMOL/KG (ref 100–1200)
PH UR STRIP: ABNORMAL [PH]
PLATELET # BLD AUTO: 193 10E3/UL (ref 150–450)
POTASSIUM SERPL-SCNC: 4 MMOL/L (ref 3.4–5.3)
PROT SERPL-MCNC: ABNORMAL G/DL
RBC # BLD AUTO: 3.63 10E6/UL (ref 4.4–5.9)
RBC CAST: 39 /LPF
RBC URINE: 2 /HPF
RETICS # AUTO: 0.14 10E6/UL (ref 0.03–0.1)
RETICS/RBC NFR AUTO: 3.8 % (ref 0.5–2)
SODIUM SERPL-SCNC: 130 MMOL/L (ref 136–145)
SODIUM UR-SCNC: 27 MMOL/L
SP GR UR STRIP: 1.01 (ref 1–1.03)
SQUAMOUS EPITHELIAL: 2 /HPF
UROBILINOGEN UR STRIP-MCNC: ABNORMAL MG/DL
WBC # BLD AUTO: 9 10E3/UL (ref 4–11)
WBC URINE: 13 /HPF

## 2023-08-21 PROCEDURE — 99285 EMERGENCY DEPT VISIT HI MDM: CPT | Performed by: EMERGENCY MEDICINE

## 2023-08-21 PROCEDURE — 84156 ASSAY OF PROTEIN URINE: CPT | Performed by: CLINICAL NURSE SPECIALIST

## 2023-08-21 PROCEDURE — 258N000003 HC RX IP 258 OP 636: Performed by: INTERNAL MEDICINE

## 2023-08-21 PROCEDURE — 99223 1ST HOSP IP/OBS HIGH 75: CPT | Performed by: INTERNAL MEDICINE

## 2023-08-21 PROCEDURE — 84460 ALANINE AMINO (ALT) (SGPT): CPT | Performed by: EMERGENCY MEDICINE

## 2023-08-21 PROCEDURE — 82248 BILIRUBIN DIRECT: CPT | Performed by: INTERNAL MEDICINE

## 2023-08-21 PROCEDURE — 76705 ECHO EXAM OF ABDOMEN: CPT

## 2023-08-21 PROCEDURE — 83935 ASSAY OF URINE OSMOLALITY: CPT | Performed by: INTERNAL MEDICINE

## 2023-08-21 PROCEDURE — 86803 HEPATITIS C AB TEST: CPT | Performed by: INTERNAL MEDICINE

## 2023-08-21 PROCEDURE — 96360 HYDRATION IV INFUSION INIT: CPT | Performed by: EMERGENCY MEDICINE

## 2023-08-21 PROCEDURE — 82550 ASSAY OF CK (CPK): CPT | Performed by: INTERNAL MEDICINE

## 2023-08-21 PROCEDURE — 86709 HEPATITIS A IGM ANTIBODY: CPT | Performed by: INTERNAL MEDICINE

## 2023-08-21 PROCEDURE — 87086 URINE CULTURE/COLONY COUNT: CPT | Performed by: EMERGENCY MEDICINE

## 2023-08-21 PROCEDURE — 86708 HEPATITIS A ANTIBODY: CPT | Performed by: INTERNAL MEDICINE

## 2023-08-21 PROCEDURE — 86704 HEP B CORE ANTIBODY TOTAL: CPT | Performed by: INTERNAL MEDICINE

## 2023-08-21 PROCEDURE — 36415 COLL VENOUS BLD VENIPUNCTURE: CPT | Performed by: INTERNAL MEDICINE

## 2023-08-21 PROCEDURE — 85045 AUTOMATED RETICULOCYTE COUNT: CPT | Performed by: INTERNAL MEDICINE

## 2023-08-21 PROCEDURE — 83550 IRON BINDING TEST: CPT | Performed by: INTERNAL MEDICINE

## 2023-08-21 PROCEDURE — 99418 PROLNG IP/OBS E/M EA 15 MIN: CPT | Performed by: INTERNAL MEDICINE

## 2023-08-21 PROCEDURE — 258N000003 HC RX IP 258 OP 636: Performed by: EMERGENCY MEDICINE

## 2023-08-21 PROCEDURE — 250N000011 HC RX IP 250 OP 636: Performed by: INTERNAL MEDICINE

## 2023-08-21 PROCEDURE — 83690 ASSAY OF LIPASE: CPT | Performed by: EMERGENCY MEDICINE

## 2023-08-21 PROCEDURE — 36415 COLL VENOUS BLD VENIPUNCTURE: CPT | Performed by: EMERGENCY MEDICINE

## 2023-08-21 PROCEDURE — 84300 ASSAY OF URINE SODIUM: CPT | Performed by: INTERNAL MEDICINE

## 2023-08-21 PROCEDURE — 82075 ASSAY OF BREATH ETHANOL: CPT | Performed by: EMERGENCY MEDICINE

## 2023-08-21 PROCEDURE — 71046 X-RAY EXAM CHEST 2 VIEWS: CPT

## 2023-08-21 PROCEDURE — 87040 BLOOD CULTURE FOR BACTERIA: CPT | Performed by: INTERNAL MEDICINE

## 2023-08-21 PROCEDURE — 81003 URINALYSIS AUTO W/O SCOPE: CPT | Performed by: EMERGENCY MEDICINE

## 2023-08-21 PROCEDURE — 80307 DRUG TEST PRSMV CHEM ANLYZR: CPT | Performed by: EMERGENCY MEDICINE

## 2023-08-21 PROCEDURE — 120N000002 HC R&B MED SURG/OB UMMC

## 2023-08-21 PROCEDURE — P9047 ALBUMIN (HUMAN), 25%, 50ML: HCPCS | Performed by: INTERNAL MEDICINE

## 2023-08-21 PROCEDURE — 85610 PROTHROMBIN TIME: CPT | Performed by: INTERNAL MEDICINE

## 2023-08-21 PROCEDURE — 86706 HEP B SURFACE ANTIBODY: CPT | Performed by: INTERNAL MEDICINE

## 2023-08-21 PROCEDURE — 85025 COMPLETE CBC W/AUTO DIFF WBC: CPT | Performed by: EMERGENCY MEDICINE

## 2023-08-21 PROCEDURE — 99285 EMERGENCY DEPT VISIT HI MDM: CPT | Mod: 25 | Performed by: EMERGENCY MEDICINE

## 2023-08-21 PROCEDURE — 87340 HEPATITIS B SURFACE AG IA: CPT | Performed by: INTERNAL MEDICINE

## 2023-08-21 PROCEDURE — 83735 ASSAY OF MAGNESIUM: CPT | Performed by: INTERNAL MEDICINE

## 2023-08-21 PROCEDURE — 250N000013 HC RX MED GY IP 250 OP 250 PS 637: Performed by: EMERGENCY MEDICINE

## 2023-08-21 RX ORDER — FOLIC ACID 1 MG/1
1 TABLET ORAL DAILY
Status: DISCONTINUED | OUTPATIENT
Start: 2023-08-21 | End: 2023-08-21

## 2023-08-21 RX ORDER — LISINOPRIL 40 MG/1
40 TABLET ORAL DAILY
Status: ON HOLD | COMMUNITY
End: 2023-08-28

## 2023-08-21 RX ORDER — LANOLIN ALCOHOL/MO/W.PET/CERES
400 CREAM (GRAM) TOPICAL DAILY
Status: ON HOLD | COMMUNITY
End: 2023-08-28

## 2023-08-21 RX ORDER — ONDANSETRON 2 MG/ML
4 INJECTION INTRAMUSCULAR; INTRAVENOUS EVERY 6 HOURS PRN
Status: DISCONTINUED | OUTPATIENT
Start: 2023-08-21 | End: 2023-08-30 | Stop reason: HOSPADM

## 2023-08-21 RX ORDER — LORAZEPAM 1 MG/1
1-2 TABLET ORAL EVERY 30 MIN PRN
Status: DISCONTINUED | OUTPATIENT
Start: 2023-08-21 | End: 2023-08-24

## 2023-08-21 RX ORDER — ALBUMIN (HUMAN) 12.5 G/50ML
100 SOLUTION INTRAVENOUS EVERY 24 HOURS
Status: COMPLETED | OUTPATIENT
Start: 2023-08-21 | End: 2023-08-23

## 2023-08-21 RX ORDER — MULTIPLE VITAMINS W/ MINERALS TAB 9MG-400MCG
1 TAB ORAL ONCE
Status: COMPLETED | OUTPATIENT
Start: 2023-08-21 | End: 2023-08-21

## 2023-08-21 RX ORDER — CALCIUM CARBONATE 500 MG/1
500 TABLET, CHEWABLE ORAL 4 TIMES DAILY PRN
Status: DISCONTINUED | OUTPATIENT
Start: 2023-08-21 | End: 2023-08-30 | Stop reason: HOSPADM

## 2023-08-21 RX ORDER — FLUMAZENIL 0.1 MG/ML
0.2 INJECTION, SOLUTION INTRAVENOUS
Status: DISCONTINUED | OUTPATIENT
Start: 2023-08-21 | End: 2023-08-24

## 2023-08-21 RX ORDER — LIDOCAINE 40 MG/G
CREAM TOPICAL
Status: DISCONTINUED | OUTPATIENT
Start: 2023-08-21 | End: 2023-08-30 | Stop reason: HOSPADM

## 2023-08-21 RX ORDER — ROSUVASTATIN CALCIUM 10 MG/1
10 TABLET, COATED ORAL DAILY
Status: ON HOLD | COMMUNITY
End: 2023-08-30

## 2023-08-21 RX ORDER — PANTOPRAZOLE SODIUM 40 MG/1
40 TABLET, DELAYED RELEASE ORAL DAILY
Status: DISCONTINUED | OUTPATIENT
Start: 2023-08-22 | End: 2023-08-22

## 2023-08-21 RX ORDER — LORAZEPAM 2 MG/ML
1-2 INJECTION INTRAMUSCULAR EVERY 30 MIN PRN
Status: DISCONTINUED | OUTPATIENT
Start: 2023-08-21 | End: 2023-08-24

## 2023-08-21 RX ORDER — ONDANSETRON 4 MG/1
4 TABLET, ORALLY DISINTEGRATING ORAL EVERY 6 HOURS PRN
Status: DISCONTINUED | OUTPATIENT
Start: 2023-08-21 | End: 2023-08-30 | Stop reason: HOSPADM

## 2023-08-21 RX ORDER — MULTIPLE VITAMINS W/ MINERALS TAB 9MG-400MCG
1 TAB ORAL DAILY
Status: DISCONTINUED | OUTPATIENT
Start: 2023-08-22 | End: 2023-08-30 | Stop reason: HOSPADM

## 2023-08-21 RX ORDER — POLYETHYLENE GLYCOL 3350 17 G/17G
17 POWDER, FOR SOLUTION ORAL DAILY PRN
Status: DISCONTINUED | OUTPATIENT
Start: 2023-08-21 | End: 2023-08-27

## 2023-08-21 RX ORDER — FOLIC ACID 1 MG/1
1 TABLET ORAL DAILY
Status: DISCONTINUED | OUTPATIENT
Start: 2023-08-22 | End: 2023-08-30 | Stop reason: HOSPADM

## 2023-08-21 RX ORDER — SODIUM CHLORIDE, SODIUM LACTATE, POTASSIUM CHLORIDE, CALCIUM CHLORIDE 600; 310; 30; 20 MG/100ML; MG/100ML; MG/100ML; MG/100ML
INJECTION, SOLUTION INTRAVENOUS CONTINUOUS
Status: DISCONTINUED | OUTPATIENT
Start: 2023-08-21 | End: 2023-08-22

## 2023-08-21 RX ORDER — LANOLIN ALCOHOL/MO/W.PET/CERES
3 CREAM (GRAM) TOPICAL
Status: DISCONTINUED | OUTPATIENT
Start: 2023-08-21 | End: 2023-08-30 | Stop reason: HOSPADM

## 2023-08-21 RX ADMIN — MULTIPLE VITAMINS W/ MINERALS TAB 1 TABLET: TAB at 11:37

## 2023-08-21 RX ADMIN — SODIUM CHLORIDE 1000 ML: 9 INJECTION, SOLUTION INTRAVENOUS at 11:37

## 2023-08-21 RX ADMIN — FOLIC ACID 1 MG: 1 TABLET ORAL at 11:38

## 2023-08-21 RX ADMIN — ALBUMIN HUMAN 100 G: 0.25 SOLUTION INTRAVENOUS at 23:12

## 2023-08-21 RX ADMIN — THIAMINE HCL TAB 100 MG 100 MG: 100 TAB at 11:38

## 2023-08-21 RX ADMIN — SODIUM CHLORIDE, POTASSIUM CHLORIDE, SODIUM LACTATE AND CALCIUM CHLORIDE: 600; 310; 30; 20 INJECTION, SOLUTION INTRAVENOUS at 23:11

## 2023-08-21 ASSESSMENT — ACTIVITIES OF DAILY LIVING (ADL)
ADLS_ACUITY_SCORE: 35

## 2023-08-21 NOTE — PHARMACY-ADMISSION MEDICATION HISTORY
Pharmacy Intern Admission Medication History    Admission medication history is complete. The information provided in this note is only as accurate as the sources available at the time of the update.    Medication reconciliation/reorder completed by provider prior to medication history? Yes    Information Source(s): Patient and CareEverywhere/SureScripts via in-person    Pertinent Information: Patient was an accurate historian of his medications. Per patient, the only medications that he has taken recently are Folic Acid, Lisinopril, and Pantoprazole. The patient stated that he thinks he was given Vancomycin during his last hospital stay but is not currently taking it at home. The last fill date for his Vancomycin was 7/17/23 for a 10 day supply.          Changes made to PTA medication list:  Added:  Lisinopril 40 mg tablets  Rosuvastatin 10 mg tablets   Deleted:   Atorvastatin 20 mg tablets: the patient's most recent fill was for Rosuvastatin.  Lisinopril 10 mg tablets: the patient's most recent fill was for Lisinopril 40 mg tablets.   Omeprazole 20 mg capsules: the patient is taking pantoprazole instead.    Changed: None    Allergies reviewed with patient and updates made in EHR: yes    Medication History Completed By: Isiah Page, Pharmacy Intern 8/21/2023 6:15 PM      Prior to Admission medications    Medication Sig Last Dose Taking? Auth Provider Long Term End Date   aspirin 81 MG EC tablet [ASPIRIN 81 MG EC TABLET] Take 81 mg by mouth daily. More than a month Yes Provider, Historical     folic acid (FOLVITE) 400 MCG tablet Take 400 mcg by mouth daily 8/21/2023 at am Yes Reported, Patient     lisinopril (ZESTRIL) 40 MG tablet Take 40 mg by mouth daily 8/21/2023 at am Yes Unknown, Entered By History     pantoprazole (PROTONIX) 40 MG tablet [PANTOPRAZOLE (PROTONIX) 40 MG TABLET] Take 40 mg by mouth daily. 8/21/2023 at am Yes Provider, Historical     rosuvastatin (CRESTOR) 10 MG tablet Take 10 mg by mouth daily  More than a month Yes Unknown, Entered By History Yes

## 2023-08-21 NOTE — ED PROVIDER NOTES
"    [facility] - [place], [place] EMERGENCY DEPARTMENT ([facility])    8/21/23      ED PROVIDER NOTE   Bimal FAHEEM 10:44 AM   History     Chief Complaint   Patient presents with    Alcohol Problem     Detox from alcohol. Last drink last night at 10 pm, \"I am heading to treatment and  need to be observed,\"  has a bed in [facility] today.     The history is provided by the patient and medical records.     Elroy Morel Sr. is a 43 year old male with history of alcohol use disorder, alcohol withdrawal seizures, alcoholic liver cirrhosis who presents to the emergency department who had initially presented for entry to [facility].  Per care everywhere records, he has had a fairly recent course of illness, this is summarized in a couple of paragraphs below.  In short, he was hospitalized for 5 days at [facility] after presenting with complaints of blood in his emesis and stools as well as nausea and jaundice.  He underwent EGD and underwent banding for esophageal varices.  He was also detoxed when he was on the unit, and plan was made for patient to enter [facility].  He was discharged to home 3 days ago.    The patient did drink again while he was at home, last drank last night at 10 PM.  He presented to triage for entry to [facility] and behavioral intake was expecting his arrival.  He presented to triage reporting increased abdominal distention over the past day.  He also has had marked jaundice with yellow sclera.  He states that he started developing scleral icterus 2-3 weeks ago, has had it in the past but has never been this jaundiced before.  He has some shortness of breath when he bends over because his abdomen is so distended.  He was diagnosed with ascites during his last hospitalization but the pocket of fluid was too small to tap. No history of paracentesis. No abdominal pain, abdominal tenderness, fevers, chills, chest pain or other symptoms. He wants to go to [facility]. Is on folic acid, pantoprazole, lisinopril. " "     CAREEVERYWHERE RECENT MEDICAL TIMELINE:   7/17/2023 - [place] ED for vomiting, diarrhea, with epistaxis after vomiting episodes.  Felt to be in early withdrawal. Multiple metabolic abnormalities on labs.  Found to have lipase of 120, sodium of 127, magnesium of 1.7.  Treated with Valium, lisinopril, IV fluids, Zofran and IV magnesium.  He was discharged home and encouraged to quit drinking.   After he was discharged C. difficile labs returned noting presence of C. difficile without toxin.  Discharged on 10-day course of vancomycin (07/17/2023 - 07/27/2023)     8/8/2023 - [facility] for multiple complaints including diarrhea for 5 weeks despite recent vancomycin course.  During that visit he reporting increased jaundice with scleral icterus, tremors.  He was noted to have mild icterus to his eyes bilaterally.    Repeat C. difficile testing showed no active C. difficile disease though positive C. difficile CDH antigen.  Despite patient report of increased jaundice and physical exam noted scleral icterus, they did not perform any metabolic labs.     8/13/2023 - [facility] for increased jaundice, royer blood per rectum, hematemesis, bloody diarrhea, bloody emesis, nausea, jaundice, abdominal distention and fatigue.  He reported worsening jaundice.  This was in setting of attempting  \"olive oil and lemon juice cleanse.\" He was admitted from 8/13-8/18/2023 alcoholic hepatitis with ascites, abdominal distention, hyponatremia, hypomagnesemia and thrombocytopenia.  Underwent upper GI with grade 2 varices noted, had banding x3 with deflation of the varices.     Component      Latest Ref Rng 3/15/2023  8:10 AM 6/23/2023  1:35 PM 8/11/2023  2:12 PM   Sodium      136 - 145 mmol/L 136  138  132 (L)    Anion Gap      7 - 15 mmol/L 10  11  13    Creatinine      0.67 - 1.17 mg/dL 0.96  1.04  0.91    GFR Estimate      >60 mL/min/1.73m2 >90  >90  >90    Bilirubin Total      <=1.2 mg/dL 1.0  1.9 (H)  12.6 (H)    Calcium      8.6 - " 10.0 mg/dL 9.2  9.0  8.5 (L)    Protein Total 7.3  8.1  7.6    Alkaline Phosphatase      40 - 129 U/L 93  142 (H)  213 (H)    AST      0 - 45 U/L 62 (H)  166 (H)  428 (H)    ALT      0 - 70 U/L 30  64  109 (H)    Potassium      3.4 - 5.3 mmol/L 4.6  5.1  3.9    Urea Nitrogen      6.0 - 20.0 mg/dL 6.2  9.1  7.8    Chloride      98 - 107 mmol/L 100  101  93 (L)    Carbon Dioxide (CO2)      22 - 29 mmol/L 26  26  26    Glucose      70 - 99 mg/dL 139 (H)  133 (H)  108 (H)    Albumin      3.5 - 5.2 g/dL 3.6  3.5  3.1 (L)        UPPER GI ENDOSCOPY 8/14/2023   Findings:       Grade II varices were found in the lower third of the esophagus. Three        bands were successfully placed with complete eradication, resulting in        deflation of varices. There was no bleeding during the procedure.        Moderate portal hypertensive gastropathy was found in the stomach.        The examined duodenum was normal.     ABDOMINAL ULTRASOUND 8/15/2023  Impression  1.  Coarsened hepatic echotexture with increased hepatic parenchymal echogenicity suggesting a combination of fibrosis and steatosis.  2.  Splenomegaly and small volume ascites suggesting portal hypertension.  3.  Cholelithiasis or gallbladder sludge without sonographic evidence of acute cholecystitis.      Past Medical History  History reviewed. No pertinent past medical history.  History reviewed. No pertinent surgical history.  aspirin 81 MG EC tablet  atorvastatin (LIPITOR) 20 MG tablet  folic acid (FOLVITE) 400 MCG tablet  lisinopril (PRINIVIL,ZESTRIL) 10 MG tablet  omeprazole (PRILOSEC) 20 MG capsule  pantoprazole (PROTONIX) 40 MG tablet      No Known Allergies  Family History  No family history on file.  Social History          A medically appropriate review of systems was performed with pertinent positives and negatives noted in the HPI, and all other systems negative.    Physical Exam   BP: 98/65  Pulse: 99  Temp: 98.7  F (37.1  C)  Resp: 20  SpO2: 98  %      Physical Exam  Constitutional:       General: He is not in acute distress.     Appearance: Normal appearance. He is not toxic-appearing.   HENT:      Head: Atraumatic.   Eyes:      General: Scleral icterus present.      Extraocular Movements: Extraocular movements intact.      Conjunctiva/sclera: Conjunctivae normal.      Pupils: Pupils are equal, round, and reactive to light.   Cardiovascular:      Rate and Rhythm: Normal rate and regular rhythm.      Heart sounds: Normal heart sounds.   Pulmonary:      Effort: Pulmonary effort is normal. No respiratory distress.      Breath sounds: Normal breath sounds.   Abdominal:      General: Bowel sounds are normal. There is distension.      Palpations: Abdomen is soft.      Tenderness: There is no abdominal tenderness. There is no guarding or rebound.   Musculoskeletal:         General: No deformity.      Cervical back: Neck supple.      Right lower leg: No edema.      Left lower leg: No edema.   Skin:     General: Skin is warm.      Findings: No erythema or rash.   Neurological:      General: No focal deficit present.      Mental Status: He is alert and oriented to person, place, and time.      Sensory: No sensory deficit.      Motor: No weakness.   Psychiatric:         Mood and Affect: Mood normal.         Behavior: Behavior normal.       ED Course, Procedures, & Data      Procedures             Results for orders placed or performed during the hospital encounter of 08/21/23   US Abdomen Limited w Abd/Pelvis Duplex Complete     Status: None    Narrative    US ABDOMEN LIMITED WITH DOPPLER COMPLETE 8/21/2023 1:47 PM    CLINICAL HISTORY: Total bili 30.2, r/o gall bladder disease  TECHNIQUE: Limited abdominal ultrasound. Color flow with spectral  Doppler and waveform analysis performed.    COMPARISON: Ultrasound 10/20/2022, CT 11/23/2015     FINDINGS:    GALLBLADDER: Internal sludge without pathologic luminal distention or  pericholecystic fluid. Mild wall thickening  noted. Negative  sonographic Domingo's sign.     BILE DUCTS: No intrahepatic biliary dilatation. The common duct  measures 2 mm.    LIVER: Increased echogenicity with heterogeneous echotexture and  nodular contour.     RIGHT KIDNEY: No hydronephrosis.     PANCREAS: The pancreas is largely obscured by overlying gas.    All to moderate volume ascites.    ABDOMINAL DUPLEX: The visualized portions of the main and intrahepatic  portal veins are patent but reversed. The hepatic artery, hepatic  veins, IVC, and splenic vein are patent with flow in the normal  direction.  Patent paraumbilical vein noted.      Impression    IMPRESSION:  1.  Cirrhosis with evidence of portal hypertension and small to  moderate volume ascites.  2.  Gallbladder sludge without definite evidence of acute  cholecystitis or biliary obstruction.  3.  Reversal of flow within the portal venous system, likely related  to #1.    FATOUMATA LLAMAS MD         SYSTEM ID:  [id number]   Comprehensive metabolic panel     Status: Abnormal   Result Value Ref Range    Sodium 130 (L) 136 - 145 mmol/L    Potassium 4.0 3.4 - 5.3 mmol/L    Chloride 95 (L) 98 - 107 mmol/L    Carbon Dioxide (CO2) 20 (L) 22 - 29 mmol/L    Anion Gap 15 7 - 15 mmol/L    Urea Nitrogen 26.8 (H) 6.0 - 20.0 mg/dL    Creatinine 4.76 (H) 0.67 - 1.17 mg/dL    Calcium 9.4 8.6 - 10.0 mg/dL    Glucose 98 70 - 99 mg/dL    Alkaline Phosphatase 161 (H) 40 - 129 U/L     (H) 0 - 45 U/L    ALT 81 (H) 0 - 70 U/L    Protein Total      Albumin 2.7 (L) 3.5 - 5.2 g/dL    Bilirubin Total 30.2 (HH) <=1.2 mg/dL    GFR Estimate 15 (L) >60 mL/min/1.73m2   Lipase     Status: Abnormal   Result Value Ref Range    Lipase 398 (H) 13 - 60 U/L   CBC with platelets and differential     Status: Abnormal   Result Value Ref Range    WBC Count 9.0 4.0 - 11.0 10e3/uL    RBC Count 3.63 (L) 4.40 - 5.90 10e6/uL    Hemoglobin 12.3 (L) 13.3 - 17.7 g/dL    Hematocrit 33.4 (L) 40.0 - 53.0 %    MCV 92 78 - 100 fL    MCH 33.9 (H)  26.5 - 33.0 pg    MCHC 36.8 (H) 31.5 - 36.5 g/dL    RDW 21.2 (H) 10.0 - 15.0 %    Platelet Count 193 150 - 450 10e3/uL    % Neutrophils 72 %    % Lymphocytes 8 %    % Monocytes 15 %    % Eosinophils 0 %    % Basophils 1 %    % Immature Granulocytes 4 %    NRBCs per 100 WBC 0 <1 /100    Absolute Neutrophils 6.5 1.6 - 8.3 10e3/uL    Absolute Lymphocytes 0.7 (L) 0.8 - 5.3 10e3/uL    Absolute Monocytes 1.3 0.0 - 1.3 10e3/uL    Absolute Eosinophils 0.0 0.0 - 0.7 10e3/uL    Absolute Basophils 0.1 0.0 - 0.2 10e3/uL    Absolute Immature Granulocytes 0.3 <=0.4 10e3/uL    Absolute NRBCs 0.0 10e3/uL   Drug abuse screen 1 urine (ED)     Status: Abnormal   Result Value Ref Range    Amphetamines Urine Screen Negative Screen Negative    Barbituates Urine Screen Negative Screen Negative    Benzodiazepine Urine Screen Positive (A) Screen Negative    Cannabinoids Urine Screen Negative Screen Negative    Cocaine Urine Screen Negative Screen Negative    Opiates Urine Screen Negative Screen Negative   UA with Microscopic reflex to Culture     Status: Abnormal    Specimen: Urine, Clean Catch   Result Value Ref Range    Color Urine Alanna (A) Colorless, Straw, Light Yellow, Yellow    Appearance Urine Cloudy (A) Clear    Glucose Urine      Bilirubin Urine      Ketones Urine 15 (A) Negative mg/dL    Specific Gravity Urine 1.015 1.003 - 1.035    Blood Urine      pH Urine      Protein Albumin Urine 100 (A) Negative mg/dL    Urobilinogen Urine      Nitrite Urine      Leukocyte Esterase Urine      Bacteria Urine Few (A) None Seen /HPF    Mucus Urine Present (A) None Seen /LPF    RBC Urine 2 <=2 /HPF    WBC Urine 13 (H) <=5 /HPF    Squamous Epithelials Urine 2 (H) <=1 /HPF    Hyaline Casts Urine 12 (H) <=2 /LPF    RBC Casts Urine 39 (H) None Seen /LPF    Narrative    Urine Culture ordered based on laboratory criteria   Iron and iron binding capacity     Status: Abnormal   Result Value Ref Range    Iron 71 61 - 157 ug/dL    Iron Binding Capacity  130 (L) 240 - 430 ug/dL    Iron Sat Index 55 (H) 15 - 46 %   Extra Tube     Status: None (In process)    Narrative    The following orders were created for panel order Extra Tube.  Procedure                               Abnormality         Status                     ---------                               -----------         ------                     Extra Purple Top Tube[[id number]]                            In process                   Please view results for these tests on the individual orders.   Alcohol breath test POCT     Status: Normal   Result Value Ref Range    Alcohol Breath Test 0.00 0.00 - 0.01   CBC with platelets differential     Status: Abnormal    Narrative    The following orders were created for panel order CBC with platelets differential.  Procedure                               Abnormality         Status                     ---------                               -----------         ------                     CBC with platelets and d...[[id number]]  Abnormal            Final result                 Please view results for these tests on the individual orders.   Urine Drugs of Abuse Screen     Status: Abnormal    Narrative    The following orders were created for panel order Urine Drugs of Abuse Screen.  Procedure                               Abnormality         Status                     ---------                               -----------         ------                     Drug abuse screen 1 urin...[[id number]]  Abnormal            Final result                 Please view results for these tests on the individual orders.     Medications   folic acid (FOLVITE) tablet 1 mg (1 mg Oral $Given 8/21/23 1138)   0.9% sodium chloride BOLUS (0 mLs Intravenous Stopped 8/21/23 1237)   multivitamin w/minerals (THERA-VIT-M) tablet 1 tablet (1 tablet Oral $Given 8/21/23 1137)   thiamine (B-1) tablet 100 mg (100 mg Oral $Given 8/21/23 1138)     Labs Ordered and Resulted from Time of ED Arrival to Time of ED  Departure   COMPREHENSIVE METABOLIC PANEL - Abnormal       Result Value    Sodium 130 (*)     Potassium 4.0      Chloride 95 (*)     Carbon Dioxide (CO2) 20 (*)     Anion Gap 15      Urea Nitrogen 26.8 (*)     Creatinine 4.76 (*)     Calcium 9.4      Glucose 98      Alkaline Phosphatase 161 (*)      (*)     ALT 81 (*)     Protein Total        Albumin 2.7 (*)     Bilirubin Total 30.2 (*)     GFR Estimate 15 (*)    LIPASE - Abnormal    Lipase 398 (*)    CBC WITH PLATELETS AND DIFFERENTIAL - Abnormal    WBC Count 9.0      RBC Count 3.63 (*)     Hemoglobin 12.3 (*)     Hematocrit 33.4 (*)     MCV 92      MCH 33.9 (*)     MCHC 36.8 (*)     RDW 21.2 (*)     Platelet Count 193      % Neutrophils 72      % Lymphocytes 8      % Monocytes 15      % Eosinophils 0      % Basophils 1      % Immature Granulocytes 4      NRBCs per 100 WBC 0      Absolute Neutrophils 6.5      Absolute Lymphocytes 0.7 (*)     Absolute Monocytes 1.3      Absolute Eosinophils 0.0      Absolute Basophils 0.1      Absolute Immature Granulocytes 0.3      Absolute NRBCs 0.0     DRUG ABUSE SCREEN 1 URINE (ED) - Abnormal    Amphetamines Urine Screen Negative      Barbituates Urine Screen Negative      Benzodiazepine Urine Screen Positive (*)     Cannabinoids Urine Screen Negative      Cocaine Urine Screen Negative      Opiates Urine Screen Negative     ROUTINE UA WITH MICROSCOPIC REFLEX TO CULTURE - Abnormal    Color Urine Alanna (*)     Appearance Urine Cloudy (*)     Glucose Urine        Bilirubin Urine        Ketones Urine 15 (*)     Specific Gravity Urine 1.015      Blood Urine        pH Urine        Protein Albumin Urine 100 (*)     Urobilinogen Urine        Nitrite Urine        Leukocyte Esterase Urine        Bacteria Urine Few (*)     Mucus Urine Present (*)     RBC Urine 2      WBC Urine 13 (*)     Squamous Epithelials Urine 2 (*)     Hyaline Casts Urine 12 (*)     RBC Casts Urine 39 (*)    IRON AND IRON BINDING CAPACITY - Abnormal    Iron 71       Iron Binding Capacity 130 (*)     Iron Sat Index 55 (*)    ALCOHOL BREATH TEST POCT - Normal    Alcohol Breath Test 0.00     PROTEIN FLUID   ALBUMIN FLUID   HEPATITIS C SCREEN REFLEX TO HCV RNA QUANT AND GENOTYPE   HEPATITIS B CORE ANTIBODY   HEPATITIS B SURFACE ANTIGEN   HEPATITIS B SURFACE ANTIBODY   HEPATITIS A ANTIBODY IGG   SODIUM RANDOM URINE   BILIRUBIN DIRECT   RETICULOCYTE COUNT   INR   CK TOTAL   HEPATITIS A ANTIBODY IGM   OSMOLALITY, RANDOM URINE   NON-GYNECOLOGIC CYTOLOGY   URINE CULTURE   AEROBIC BACTERIAL CULTURE ROUTINE   GRAM STAIN   BLOOD CULTURE   BLOOD CULTURE   CELL COUNT WITH DIFFERENTIAL FLUID     US Abdomen Limited w Abd/Pelvis Duplex Complete   Final Result   IMPRESSION:   1.  Cirrhosis with evidence of portal hypertension and small to   moderate volume ascites.   2.  Gallbladder sludge without definite evidence of acute   cholecystitis or biliary obstruction.   3.  Reversal of flow within the portal venous system, likely related   to #1.      FATOUMATA LLAMAS MD            SYSTEM ID:  [id number]      XR Chest 2 Views    (Results Pending)          Critical care was not performed.     Medical Decision Making  The patient's presentation was of high complexity (a chronic illness severe exacerbation, progression, or side effect of treatment).    The patient's evaluation involved:  ordering and/or review of 3+ test(s) in this encounter (see separate area of note for details)  review of 3+ test result(s) ordered prior to this encounter (see separate area of note for details)    The patient's management necessitated high risk (a decision regarding hospitalization).    Assessment & Plan    44 yo male here seeking detox from alcohol. Upon arrival he has scleral icterus so a workup was ordered.    Labs are consistent with worsening cirrhosis and new renal failure. I discussed the case with GI and they evaluated the pt and made recommendations. I also discussed the case with nephrology with the  concern for the possible need for dialysis. He will be admitted to the [facility] due to the possible need for dialysis.    I have reviewed the nursing notes. I have reviewed the findings, diagnosis, plan and need for follow up with the patient.    New Prescriptions    No medications on file       Final diagnoses:   Alcoholic cirrhosis of liver with ascites (H)     I, Alise Fernandez, am serving as a trained medical scribe to document services personally performed by Gilbert Claudio MD based on the provider's statements to me on August 21, 2023.  This document has been checked and approved by the attending provider.    I, Gilbert Claudio MD, was physically present and have reviewed and verified the accuracy of this note documented by Alise Fernandez, medical scribe.      Gilbert Claudio MD     [facility] [place] EMERGENCY DEPARTMENT  8/21/2023     Gilbert Claudio MD  08/21/23 3722

## 2023-08-21 NOTE — CONSULTS
HEPATOLOGY CONSULTATION      Date of Admission:  8/21/2023          ASSESSMENT AND RECOMMENDATIONS:     43 year old male with a history of alcohol use disorder, decompensated cirrhosis (variceal bleeding, ascites), alcohol withdrawal seizures who presents to [facility] ER due to significantly elevated bilirubin, referred from [facility]. GI Hepatology consulted for evaluation of abnormal LFTs.    #Decompensated cirrhosis  #Severe alcohol-related hepatitis  #Ascites  #History of variceal bleeding  #JOCELYN  #Alcohol use disorder    Primary Hepatologist: Follows at [facility] -> transitioned to [facility] with Dr. Hernandez.   Etiology: Alcohol  MELD-Na: 37  Maddrey's DF: 50  Ascites: Present. No previous paracentesis performed  SBP history: No   Hepatic encephalopathy: None   EV: EGD 08/13/2023 -> Grade II esophageal varices banded, PHG.   TIPS: Not a candidate due to significantly elevated MELD.     At this time, presents with a clinical presentation of severe alcohol-related hepatitis in the setting of decompensated cirrhosis. Reports ongoing alcohol use. Would require SBP rule out (along with rest of infectious work-up) given the significant infectious risk associated with alcohol related hepatitis and cirrhosis. No evidence of encephalopathy or overt signs of symptoms of bleeding. Not a candidate for liver transplant at this time given ongoing alcohol use. Will require admission for management of JOCELYN and severe alcohol hepatitis. Unclear etiology of his JOCELYN - although patient was on ACEi + NSAIDs prior to admission; would benefit from a complete workup. Would also possibly benefit from steroid treatment for severe alcoholic hepatitis, which could be considered after infectious work-up is unremarkable and there are no other contraindications.    RECOMMENDATIONS    - Recommend diagnostic paracentesis (limit to < 2L given JOCELYN) with the following ascites fluid studies - cell count w/differential, culture, total  protein, albumin, and cytology (labs ordered for you).   - Nephrology consult for evaluation of JOCELYN.    * Urine studies + urine Na   * Albumin challenge x 3 days (1g/kg aka 100g daily)   * Hold NSAIDs + lisinopril    - Recommend Nutrition consult for high protein diet.   - Recommend Albumin challenge with 1 g/kg -> 100 g daily for 3 days.   - Limit tylenol use to < 2g per 24 hours. Recommend against using NSAIDs PRN pain.   - Recommend infectious work-up with CXR, UA/urine culture, blood cultures to rule out occult infection.   - Recommend infectious hepatitis panel -> HbsAg, HbsAb, HCV RNA, Hepatitis A IgG (ordered for you).   - Recommend CIWA protocol.   - Given significant elevation of Maddrey DF and MELD score, patient would be indicated for steroid treatment for severe alcoholic hepatitis. Consider tomorrow, if infectious work-up by that time is negative.      Gastroenterology follow up recommendations: TBD    Thank you for involving us in this patient's care. Please do not hesitate to contact the GI service with any questions or concerns.     Patient care plan discussed with Dr. Diego, GI staff physician.    Sage Ayers MD  Gastroenterology Fellow  Pager             Chief Complaint:   We were asked by [facility] ER service to evaluate this patient with severe alcohol-related hepatitis.     History is obtained from the patient and the medical record.          History of Present Illness:   Elroy Morel Sr. is a 43 year old male with a history of alcohol use disorder, decompensated cirrhosis (variceal bleeding, ascites), alcohol withdrawal seizures who presents to [facility] ER due to significantly elevated bilirubin, referred from [facility]. GI Hepatology consulted for evaluation of abnormal LFTs.    Patient presented earlier today to [facility] for entry for alcohol detoxification. Was found to have significant jaundice and advised to go to the ER instead for further  evaluation.    Recently, patient was hospitalized at [facility] for hematemesis, admitted to the ICU, underwent urgent EGD and found to have grade II varices. These were banded successfully and eradicated. Patient was then managed supportively for management of alcoholic hepatitis, was encouraged to quit drinking and eventually discharged home, although his bilirubin continued to rise.     Patient recently also hospitalized for c.diff infection (treated with 10-day course of Vancomycin), but also Norovirus infection.     Patient seen this afternoon in the ER. Reports again having a drink yesterday, last drink around 10 PM. Also reports increased abdominal distention the past few days. Has never had paracentesis previously. Denies fever, chills, abdominal pain, nausea/vomiting. Denies taking diuretics at home, reports decreased urine output lately.             Past Medical History:   Reviewed and edited as appropriate  History reviewed. No pertinent past medical history.         Past Surgical History:   Reviewed and edited as appropriate   History reviewed. No pertinent surgical history.         Previous Endoscopy:   No results found for this or any previous visit.         Social History:   Reviewed and edited as appropriate  Social History     Socioeconomic History     Marital status:      Spouse name: Not on file     Number of children: Not on file     Years of education: Not on file     Highest education level: Not on file   Occupational History     Not on file   Tobacco Use     Smoking status: Not on file     Smokeless tobacco: Not on file   Substance and Sexual Activity     Alcohol use: Not on file     Drug use: Not on file     Sexual activity: Not on file   Other Topics Concern     Not on file   Social History Narrative     Not on file     Social Determinants of Health     Financial Resource Strain: Not on file   Food Insecurity: Not on file   Transportation Needs: Not on file   Physical Activity:  Not on file   Stress: Not on file   Social Connections: Not on file   Intimate Partner Violence: Not on file   Housing Stability: Not on file            Family History:   Reviewed and edited as appropriate  No known history of gastrointestinal/liver disease or  gastrointestinal malignancies       Allergies:   Reviewed and edited as appropriate   No Known Allergies         Medications:     Current Facility-Administered Medications   Medication     folic acid (FOLVITE) tablet 1 mg     Current Outpatient Medications   Medication Sig     aspirin 81 MG EC tablet [ASPIRIN 81 MG EC TABLET] Take 81 mg by mouth daily.     atorvastatin (LIPITOR) 20 MG tablet [ATORVASTATIN (LIPITOR) 20 MG TABLET] Take 20 mg by mouth at bedtime.     folic acid (FOLVITE) 400 MCG tablet Take 400 mcg by mouth daily     lisinopril (PRINIVIL,ZESTRIL) 10 MG tablet [LISINOPRIL (PRINIVIL,ZESTRIL) 10 MG TABLET]      omeprazole (PRILOSEC) 20 MG capsule [OMEPRAZOLE (PRILOSEC) 20 MG CAPSULE]      pantoprazole (PROTONIX) 40 MG tablet [PANTOPRAZOLE (PROTONIX) 40 MG TABLET] Take 40 mg by mouth daily.             Review of Systems:     A complete review of systems was performed and is negative except as noted in the HPI           Physical Exam:   BP 98/65   Pulse 99   Temp 98.7  F (37.1  C) (Oral)   Resp 20   SpO2 98%   Wt:   Wt Readings from Last 2 Encounters:   06/20/22 111.1 kg (245 lb)      Constitutional: cooperative, pleasant, not dyspneic/diaphoretic, no acute distress  Eyes: Sclera icteric  Ears/nose/mouth/throat: Normal oropharynx without ulcers or exudate, mucus membranes moist  Neck: supple  CV: RRR, No edema  Respiratory: Unlabored breathing  Abdomen: Distended, +bs, nontender, no peritoneal signs  Skin: warm, perfused, + jaundice  Neuro: AAO x 3, No asterixis  Psych: Normal affect  MSK: Normal gait         Data:   Labs and imaging below were independently reviewed and interpreted    BMP  Recent Labs   Lab 08/21/23  1130   *   POTASSIUM  4.0   CHLORIDE 95*   ENEDINA 9.4   CO2 20*   BUN 26.8*   CR 4.76*   GLC 98     CBC  Recent Labs   Lab 08/21/23  1130   WBC 9.0   RBC 3.63*   HGB 12.3*   HCT 33.4*   MCV 92   MCH 33.9*   MCHC 36.8*   RDW 21.2*        INRNo lab results found in last 7 days.  LFTs  Recent Labs   Lab 08/21/23  1130   ALKPHOS 161*   *   ALT 81*   BILITOTAL 30.2*   ALBUMIN 2.7*      PANC  Recent Labs   Lab 08/21/23  1130   LIPASE 398*       Imaging:    US abdomen with Doppler 08/21/2023    IMPRESSION:  1.  Cirrhosis with evidence of portal hypertension and small to  moderate volume ascites.  2.  Gallbladder sludge without definite evidence of acute  cholecystitis or biliary obstruction.  3.  Reversal of flow within the portal venous system, likely related  to #1.     ----   The plan as above was formulated in conjunction with the general gastroenterology fellow. I did not see the patient in person, but consulted with the fellow over the phone and formulated a plan in conjunction with him. No billing.     Nimco Diego MD  Transplant Hepatology Attending

## 2023-08-22 ENCOUNTER — APPOINTMENT (OUTPATIENT)
Dept: ULTRASOUND IMAGING | Facility: CLINIC | Age: 43
DRG: 432 | End: 2023-08-22
Attending: STUDENT IN AN ORGANIZED HEALTH CARE EDUCATION/TRAINING PROGRAM
Payer: COMMERCIAL

## 2023-08-22 LAB
ALBUMIN MFR UR ELPH: 104.5 MG/DL
ALBUMIN SERPL BCG-MCNC: 3.2 G/DL (ref 3.5–5.2)
ALP SERPL-CCNC: 123 U/L (ref 40–129)
ALT SERPL W P-5'-P-CCNC: 62 U/L (ref 0–70)
ANION GAP SERPL CALCULATED.3IONS-SCNC: 11 MMOL/L (ref 7–15)
ANION GAP SERPL CALCULATED.3IONS-SCNC: 15 MMOL/L (ref 7–15)
AST SERPL W P-5'-P-CCNC: 176 U/L (ref 0–45)
B-OH-BUTYR SERPL-SCNC: NORMAL MMOL/L
BASOPHILS # BLD AUTO: 0.1 10E3/UL (ref 0–0.2)
BASOPHILS NFR BLD AUTO: 1 %
BILIRUB SERPL-MCNC: 29.4 MG/DL
BUN SERPL-MCNC: 33.4 MG/DL (ref 6–20)
BUN SERPL-MCNC: 34.3 MG/DL (ref 6–20)
CALCIUM SERPL-MCNC: 8.6 MG/DL (ref 8.6–10)
CALCIUM SERPL-MCNC: 9 MG/DL (ref 8.6–10)
CHLORIDE SERPL-SCNC: 97 MMOL/L (ref 98–107)
CHLORIDE SERPL-SCNC: 97 MMOL/L (ref 98–107)
CREAT SERPL-MCNC: 5.59 MG/DL (ref 0.67–1.17)
CREAT SERPL-MCNC: 5.89 MG/DL (ref 0.67–1.17)
CREAT UR-MCNC: 196 MG/DL
CREAT UR-MCNC: 295.8 MG/DL
CREAT UR-MCNC: 479.7 MG/DL
DEPRECATED HCO3 PLAS-SCNC: 19 MMOL/L (ref 22–29)
DEPRECATED HCO3 PLAS-SCNC: 20 MMOL/L (ref 22–29)
EOSINOPHIL # BLD AUTO: 0.1 10E3/UL (ref 0–0.7)
EOSINOPHIL NFR BLD AUTO: 1 %
ERYTHROCYTE [DISTWIDTH] IN BLOOD BY AUTOMATED COUNT: 21.1 % (ref 10–15)
FRACT EXCRET NA UR+SERPL-RTO: 0.6 %
GFR SERPL CREATININE-BSD FRML MDRD: 11 ML/MIN/1.73M2
GFR SERPL CREATININE-BSD FRML MDRD: 12 ML/MIN/1.73M2
GLUCOSE SERPL-MCNC: 107 MG/DL (ref 70–99)
GLUCOSE SERPL-MCNC: 92 MG/DL (ref 70–99)
HAV IGG SER QL IA: REACTIVE
HAV IGM SERPL QL IA: NONREACTIVE
HBV CORE AB SERPL QL IA: NONREACTIVE
HBV SURFACE AB SERPL IA-ACNC: 162.85 M[IU]/ML
HBV SURFACE AB SERPL IA-ACNC: REACTIVE M[IU]/ML
HBV SURFACE AG SERPL QL IA: NONREACTIVE
HCT VFR BLD AUTO: 28.1 % (ref 40–53)
HCV AB SERPL QL IA: NONREACTIVE
HGB BLD-MCNC: 10.4 G/DL (ref 13.3–17.7)
HOLD SPECIMEN: NORMAL
IMM GRANULOCYTES # BLD: 0.2 10E3/UL
IMM GRANULOCYTES NFR BLD: 2 %
INR PPP: 1.52 (ref 0.85–1.15)
LYMPHOCYTES # BLD AUTO: 0.8 10E3/UL (ref 0.8–5.3)
LYMPHOCYTES NFR BLD AUTO: 10 %
MAGNESIUM SERPL-MCNC: 2 MG/DL (ref 1.7–2.3)
MCH RBC QN AUTO: 34 PG (ref 26.5–33)
MCHC RBC AUTO-ENTMCNC: 37 G/DL (ref 31.5–36.5)
MCV RBC AUTO: 92 FL (ref 78–100)
MONOCYTES # BLD AUTO: 1.3 10E3/UL (ref 0–1.3)
MONOCYTES NFR BLD AUTO: 17 %
NEUTROPHILS # BLD AUTO: 5.5 10E3/UL (ref 1.6–8.3)
NEUTROPHILS NFR BLD AUTO: 69 %
NRBC # BLD AUTO: 0.1 10E3/UL
NRBC BLD AUTO-RTO: 1 /100
PHOSPHATE SERPL-MCNC: 4.8 MG/DL (ref 2.5–4.5)
PLATELET # BLD AUTO: 162 10E3/UL (ref 150–450)
POTASSIUM SERPL-SCNC: 3.8 MMOL/L (ref 3.4–5.3)
POTASSIUM SERPL-SCNC: 3.8 MMOL/L (ref 3.4–5.3)
PROT SERPL-MCNC: ABNORMAL G/DL
PROT/CREAT 24H UR: 0.22 MG/MG CR (ref 0–0.2)
RBC # BLD AUTO: 3.06 10E6/UL (ref 4.4–5.9)
SODIUM SERPL-SCNC: 128 MMOL/L (ref 136–145)
SODIUM SERPL-SCNC: 131 MMOL/L (ref 136–145)
SODIUM UR-SCNC: 37 MMOL/L
UUN UR-MCNC: 250 MG/DL (ref 801–1666)
WBC # BLD AUTO: 7.9 10E3/UL (ref 4–11)

## 2023-08-22 PROCEDURE — 36415 COLL VENOUS BLD VENIPUNCTURE: CPT | Performed by: INTERNAL MEDICINE

## 2023-08-22 PROCEDURE — 84300 ASSAY OF URINE SODIUM: CPT | Performed by: INTERNAL MEDICINE

## 2023-08-22 PROCEDURE — 84450 TRANSFERASE (AST) (SGOT): CPT | Performed by: INTERNAL MEDICINE

## 2023-08-22 PROCEDURE — 84540 ASSAY OF URINE/UREA-N: CPT | Performed by: STUDENT IN AN ORGANIZED HEALTH CARE EDUCATION/TRAINING PROGRAM

## 2023-08-22 PROCEDURE — 99223 1ST HOSP IP/OBS HIGH 75: CPT | Performed by: CLINICAL NURSE SPECIALIST

## 2023-08-22 PROCEDURE — 82010 KETONE BODYS QUAN: CPT | Performed by: CLINICAL NURSE SPECIALIST

## 2023-08-22 PROCEDURE — 250N000011 HC RX IP 250 OP 636: Performed by: INTERNAL MEDICINE

## 2023-08-22 PROCEDURE — 250N000013 HC RX MED GY IP 250 OP 250 PS 637: Performed by: INTERNAL MEDICINE

## 2023-08-22 PROCEDURE — 85004 AUTOMATED DIFF WBC COUNT: CPT | Performed by: INTERNAL MEDICINE

## 2023-08-22 PROCEDURE — 99233 SBSQ HOSP IP/OBS HIGH 50: CPT | Performed by: INTERNAL MEDICINE

## 2023-08-22 PROCEDURE — 99233 SBSQ HOSP IP/OBS HIGH 50: CPT | Mod: GC | Performed by: INTERNAL MEDICINE

## 2023-08-22 PROCEDURE — 76770 US EXAM ABDO BACK WALL COMP: CPT

## 2023-08-22 PROCEDURE — P9047 ALBUMIN (HUMAN), 25%, 50ML: HCPCS | Performed by: INTERNAL MEDICINE

## 2023-08-22 PROCEDURE — 120N000002 HC R&B MED SURG/OB UMMC

## 2023-08-22 PROCEDURE — 85610 PROTHROMBIN TIME: CPT | Performed by: INTERNAL MEDICINE

## 2023-08-22 PROCEDURE — 82570 ASSAY OF URINE CREATININE: CPT | Performed by: STUDENT IN AN ORGANIZED HEALTH CARE EDUCATION/TRAINING PROGRAM

## 2023-08-22 PROCEDURE — 76770 US EXAM ABDO BACK WALL COMP: CPT | Mod: 26 | Performed by: RADIOLOGY

## 2023-08-22 RX ORDER — PANTOPRAZOLE SODIUM 40 MG/1
40 TABLET, DELAYED RELEASE ORAL
Status: DISCONTINUED | OUTPATIENT
Start: 2023-08-22 | End: 2023-08-27

## 2023-08-22 RX ORDER — POLYETHYLENE GLYCOL 3350 17 G/17G
17 POWDER, FOR SOLUTION ORAL DAILY
Status: DISCONTINUED | OUTPATIENT
Start: 2023-08-22 | End: 2023-08-30 | Stop reason: HOSPADM

## 2023-08-22 RX ORDER — ALBUMIN (HUMAN) 12.5 G/50ML
100 SOLUTION INTRAVENOUS DAILY
Status: DISCONTINUED | OUTPATIENT
Start: 2023-08-22 | End: 2023-08-22

## 2023-08-22 RX ADMIN — THIAMINE HCL TAB 100 MG 100 MG: 100 TAB at 07:39

## 2023-08-22 RX ADMIN — PANTOPRAZOLE SODIUM 40 MG: 40 TABLET, DELAYED RELEASE ORAL at 18:30

## 2023-08-22 RX ADMIN — PANTOPRAZOLE SODIUM 40 MG: 40 TABLET, DELAYED RELEASE ORAL at 07:39

## 2023-08-22 RX ADMIN — ALBUMIN HUMAN 100 G: 0.25 SOLUTION INTRAVENOUS at 21:31

## 2023-08-22 RX ADMIN — FOLIC ACID 1 MG: 1 TABLET ORAL at 07:39

## 2023-08-22 RX ADMIN — Medication 1 TABLET: at 07:39

## 2023-08-22 ASSESSMENT — ACTIVITIES OF DAILY LIVING (ADL)
ADLS_ACUITY_SCORE: 35
ADLS_ACUITY_SCORE: 35
ADLS_ACUITY_SCORE: 18
ADLS_ACUITY_SCORE: 18
ADLS_ACUITY_SCORE: 35
ADLS_ACUITY_SCORE: 18
ADLS_ACUITY_SCORE: 35
ADLS_ACUITY_SCORE: 18
ADLS_ACUITY_SCORE: 35
ADLS_ACUITY_SCORE: 35
ADLS_ACUITY_SCORE: 18
ADLS_ACUITY_SCORE: 35
DEPENDENT_IADLS:: INDEPENDENT

## 2023-08-22 NOTE — CONSULTS
Care Management Initial Consult    General Information  Assessment completed with: Patient, Family, Spouse or significant other,    Type of CM/SW Visit: Initial Assessment  Primary Care Provider verified and updated as needed: Yes   Readmission within the last 30 days: no previous admission in last 30 days   Reason for Consult: discharge planning, substance use concerns  Advance Care Planning, Reviewed: no concerns identified        Communication Assessment  Patient's communication style: spoken language (English or Bilingual)    Hearing Difficulty or Deaf: no   Wear Glasses or Blind: no    Cognitive  Cognitive/Neuro/Behavioral: WDL    Level of Consciousness: alert    Arousal Level: opens eyes spontaneously    Orientation: oriented x 4       Best Language: 0 - No aphasia  Speech: clear, spontaneous, logical    Living Environment:   People in home: child(gabriela), adult, spouse     Current living Arrangements: house      Able to return to prior arrangements: direct admit to/from [facility]     Family/Social Support:  Care provided by: self  Provides care for: no one  Marital Status:   Support System: Wife, Sibling(s)          Description of Support System: Supportive, Involved       Current Resources:   Patient receiving home care services: No  Community Resources: Other (see comment) (admit from [facility] intake eval)  Equipment currently used at home:  n/a  Supplies currently used at home: None    Employment/Financial:  Employment Status: employed full-time     Financial Concerns: No concerns identified   Referral to Financial Worker: No  Does the patient's insurance plan have a 3 day qualifying hospital stay waiver?  No    Lifestyle & Psychosocial Needs:  Social Determinants of Health     Tobacco Use: Not on file   Alcohol Use: Not on file   Financial Resource Strain: Not on file   Food Insecurity: Not on file   Transportation Needs: Not on file   Physical Activity: Not on file   Stress: Not on file   Social  Connections: Not on file   Intimate Partner Violence: Not on file   Depression: Not on file   Housing Stability: Not on file     Functional Status:  Prior to admission patient needed assistance:   Dependent ADLs:: Independent  Dependent IADLs:: Independent     Mental Health Status:  Mental Health Status: Current Concern    Mental Health Management: PTA, in-process for [facility] intake    Chemical Dependency Status:  Chemical Dependency Status: Current Concern   Chemical Dependency Management: PTA, in-process for [facility] intake; bed hold at [facility]        Values/Beliefs:  Spiritual, Cultural Beliefs, Gnosticist Practices, Values that affect care: no             Additional Information:  CMA completed at bedside with pt, spouse, and sister; Listed address and payer source confirmed. PCP updated as Latosha Beach PA-C @ [facility] Pt had no PTA home services or DME, reported independence in all ADLs. Confirmed BH and [facility] concerns, as seen in admission Dx, POC, and pertinent Hx. Pt reportedly was admitted directly from [facility] facility, where he was pursuing an IP program for EtOH; pt reports a 5-day bed-hold and a recent Chem-Dep assessment. Pt's spouse confirmed having active and current ACP docs, including HC POA and Living Will, RNCC requested copy when convenient for family. RNCC explained likely involvement of Addiction Medicine MD and SW team,SW given brief verbal report. CM team to continue to follow and support safe discharge planning.       Cal Fitzgerald RN BSN  7D RNCC  Phone: [phone]  Pager: [phone]    For Weekend & Holiday on call RN Care Coordinator:  (Tasks: Home care, home infusion, medical equipment, transportation, IMM & MICHAEL forms, etc.)  [place] (0800 - 1630) Saturday and Sunday    Units: 5A, 5B, & 5C: [phone]    Units: 6B, 6C, 6D: [phone]    Units: 7A, 7B, 7C, 7D: [phone]    Units: 6A/ICU : [phone]    [facility] [place][phone]) Saturday and  Sunday    Units: 6 Med/Surg, 8A, 10 ICU, & Pediatric Units: [phone]    Units: 5 Ortho, 5Med/Surg & WB ED: [phone]

## 2023-08-22 NOTE — PROGRESS NOTES
Discussed paracentesis with transfer hospitalist and team would like to do diagnostic and therapeutic paracentesis in the AM. Will get fibrinogen in the AM and follow platelets. Plan for early morning paracentesis.

## 2023-08-22 NOTE — ED NOTES
Report given to EMS. Pt stable on RA. A&Ox x4. All belongings returned to the pt for transfer. Pt transferred off the unit with EMS.

## 2023-08-22 NOTE — PROGRESS NOTES
HEPATOLOGY Follow up Note         Assessment and Plan:   Elroy Morel Sr. is a 43 year old male with a history of alcohol use disorder, decompensated cirrhosis (variceal bleeding, ascites), alcohol withdrawal seizures who presented with abdominal distension + jaundice referred from [facility]. GI Hepatology consulted for evaluation of abnormal LFTs.     #Decompensated alcohol related cirrhosis  #Severe alcohol-related hepatitis  #Ascites  #History of variceal bleeding  #JOCELYN  #Alcohol use disorder  MELD 3.0: 37    Presents with a clinical presentation of severe alcohol-related hepatitis in the setting of decompensated cirrhosis. Reports ongoing alcohol use with last use prior to admission.     Blood cultures and urine cultures since admission without any growth. CXR without evidence of infection. Pending diagnostic paracentesis to rule out SBP. Patient with recent history of norovirus infection with improving form of stools without hematochezia. Recent C. Diff infection s/p treatment with vancomycin. No evidence of encephalopathy or overt signs of symptoms of bleeding.     Not a candidate for liver transplant at this time given ongoing alcohol use.     Patient with severe acute kidney injury - uptrending. Nephrology consulted. Urine studies with hyaline casts + 0.2g/g UP/Cr ratio with concern for bilirubin cast nephropathy + prior to admission was on lisinopril + aspirin. Renal US: Bilateral kidneys with mildly increased echogenicity suggestive of renal parenchymal disease. No hydronephrosis. Currently receiving albumin challenge.          Recommendations:     - Recommend diagnostic paracentesis with the following ascites fluid studies - cell count w/differential, culture, total protein, albumin, and cytology (labs ordered for you).    - Appreciate Nephrology input.   - Continue albumin challenge for total of 3 days (1g/kg aka 100g daily)   - Continue CIWA protocol.   - Nutrition consult for high protein + low  sodium diet  - Would benefit from ongoing addiction support given AUD.     It has been a pleasure to participate in the care of this patient.  Patient discussed with GI staff, Dr. Diego.  Please do not hesitate to contact the GI service with any questions or concerns.     Sage Ayers MD  Gastroenterology Fellow  Pager [phone]         Subjective/Objective:   - No acute events overnight  Patient seen this morning in the ER, primary team and Nephrology were also on bedside. No signs of alcohol withdrawal at this time. Continues to complain about his abdominal distention. Otherwise denies other symptoms.        Medications:     Current Facility-Administered Medications   Medication     albumin human 25 % injection 100 g     calcium carbonate (TUMS) chewable tablet 500 mg     flumazenil (ROMAZICON) injection 0.2 mg     folic acid (FOLVITE) tablet 1 mg     lidocaine (LMX4) cream     lidocaine 1 % 0.1-1 mL     LORazepam (ATIVAN) tablet 1-2 mg    Or     LORazepam (ATIVAN) injection 1-2 mg     melatonin tablet 3 mg     multivitamin w/minerals (THERA-VIT-M) tablet 1 tablet     ondansetron (ZOFRAN ODT) ODT tab 4 mg    Or     ondansetron (ZOFRAN) injection 4 mg     pantoprazole (PROTONIX) EC tablet 40 mg     polyethylene glycol (MIRALAX) Packet 17 g     polyethylene glycol (MIRALAX) Packet 17 g     sodium chloride (PF) 0.9% PF flush 3 mL     sodium chloride (PF) 0.9% PF flush 3 mL     thiamine (B-1) tablet 100 mg            Physical Exam:   VS:  /66   Pulse 70   Temp 98.2  F (36.8  C) (Oral)   Resp 18   SpO2 100%     Wt:   Wt Readings from Last 2 Encounters:   06/20/22 111.1 kg (245 lb)      Constitutional: cooperative, pleasant, not dyspneic/diaphoretic, no acute distress  Eyes: Sclera icteric  Ears/nose/mouth/throat: Normal oropharynx without ulcers or exudate, mucus membranes moist  Neck: supple  CV: RRR, No edema  Respiratory: Unlabored breathing  Abdomen: Distended, +bs, nontender, no peritoneal  signs  Skin: warm, perfused, + jaundice  Neuro: AAO x 3, No asterixis  Psych: Normal affect  MSK: Normal gait         Laboratory:   BMP  Recent Labs   Lab 08/22/23  0559 08/21/23  2316 08/21/23  1130   * 128* 130*   POTASSIUM 3.8 3.8 4.0   CHLORIDE 97* 97* 95*   ENEDINA 9.0 8.6 9.4   CO2 19* 20* 20*   BUN 34.3* 33.4* 26.8*   CR 5.89* 5.59* 4.76*   GLC 92 107* 98     CBC  Recent Labs   Lab 08/22/23  0559 08/21/23  1130   WBC 7.9 9.0   RBC 3.06* 3.63*   HGB 10.4* 12.3*   HCT 28.1* 33.4*   MCV 92 92   MCH 34.0* 33.9*   MCHC 37.0* 36.8*   RDW 21.1* 21.2*    193     INR  Recent Labs   Lab 08/22/23  0559 08/21/23  1630   INR 1.52* 1.43*     LFTs  Recent Labs   Lab 08/22/23  0559 08/21/23  1130   ALKPHOS 123 161*   * 240*   ALT 62 81*   BILITOTAL 29.4* 30.2*   ALBUMIN 3.2* 2.7*      PANC  Recent Labs   Lab 08/21/23  1130   LIPASE 398*            Imaging/Procedures/Studies:    No new imaging or procedures in the last 24 hours      The patient was seen and examined with the resident/fellow physician or CHANDRIKA.  We have discussed the patient in detail and I agree with the findings, assessment, and plan as documented when this note was cosigned on this day. I have evaluated all laboratory values and imaging studies for the past 24 hours    Nimco Diego M.D.   of Medicine  [facility]  Advanced/Transplant Hepatology

## 2023-08-22 NOTE — CONSULTS
Interventional Radiology Consult Note    IR consult placed for therapeutic and diagnostic paracentesis.  Patient added to the IR schedule and when called for the nursing staff in the emergency department informed us that the patient was imminently being picked up for transfer to the [facility].    ED staff did not want to delay transport to the [facility] and were not willing to send the patient to IR for paracentesis.    When patient arrives to the [facility] please consult the inpatient procedural service for bedside paracentesis.      Interventional radiology is not the primary service for paracentesis at the [facility].        Stefan Vines PA-C  Interventional Radiology  Phone: [phone]  Pager: [phone]

## 2023-08-22 NOTE — ED NOTES
"Pt gave urine sample, dark brown in color. This urine color \"started this Sunday\" Pt denies any dysuria.  Pt denied any nausea/vomiting, denies any diarrhea \" nothing like that but just a lot of stool\" Did not verbalize any blood in stool  Pt continues to deny any CP, SOB. Abdomen distended due to ascites.  Pt denies any LH/dizziness when ambulating. Independent with ADL's and ambulation.  PIV LR infusing at 125 ml/hr L AC.  CMS intact. Neuro's WDL. Continues to be A/O x 4 very pleasant and cooperative.  MSSA 3. VSS. Denies any pain  "

## 2023-08-22 NOTE — H&P
[facility]    History and Physical - Hospitalist Service, GOLD TEAM        Date of Admission:  8/21/2023    Assessment & Plan      Elroy Morel Sr. is a 43 year old male admitted on 8/21/2023.     43 year old male with a history of severe alcohol use disorder, decompensated cirrhosis (variceal bleeding, ascites), alcohol withdrawal seizures who presents to [facility] ER due to significantly elevated bilirubin total bili 20.2, referred from [facility]. GI Hepatology consulted in ED for evaluation of abnormal LFTs.  Also found to have JOCELYN with creatinine 4.76, sodium 130, CO2 20.     #Decompensated cirrhosis  #Severe alcohol-related hepatitis  #Ascites  #Rule out SBP.  #History of variceal bleeding  #JOCELYN-suspect hypovolemia, versus hepatorenal.   #Alcohol use disorder, severe    - Appreciate GI input.  - Primary Hepatologist: [facility] -> transitioned to [facility] with Dr. Hernandez.     MELD 3.0: 37 at 8/21/2023  4:30 PM  MELD-Na: 37 at 8/21/2023  4:30 PM  Calculated from:  Serum Creatinine: 4.76 mg/dL (Using max of 3 mg/dL) at 8/21/2023 11:30 AM  Serum Sodium: 130 mmol/L at 8/21/2023 11:30 AM  Total Bilirubin: 30.2 mg/dL at 8/21/2023 11:30 AM  Serum Albumin: 2.7 g/dL at 8/21/2023 11:30 AM  INR(ratio): 1.43 at 8/21/2023  4:30 PM  Age at listing (hypothetical): 43 years  Sex: Male at 8/21/2023  4:30 PM     Maddrey's DF: 50  Ascites: Present. No previous paracentesis performed, no window to tap. Patient recently reports decreased urine output.   SBP history: No   Hepatic encephalopathy: None   EV: EGD 08/13/2023 -> Grade II esophageal varices banded, PHG.   TIPS: Not a candidate due to significantly elevated MELD.   HCC: No liver lesions on US 08/2023      Ultrasound abdomen: 8/21:Cirrhosis with evidence of portal hypertension and small to moderate volume ascites. Gallbladder sludge without definite evidence of acute cholecystitis or biliary obstruction      Per GI:   At this time, presents with a clinical presentation of severe alcohol-related hepatitis in the setting of decompensated cirrhosis. Reports ongoing alcohol use. Would require SBP rule out (along with rest of infectious work-up) given the significant JOCELYN. No evidence of liver failure. Not a candidate for liver transplant at this time. Will require admission for management of JOCELYN and severe alcohol hepatitis. Unclear if JOCELYN is 2/2 to hepatorenal syndrome, would require work-up to exclude other etiologies first. Would also possibly benefit from steroid treatment for alcoholic hepatitis, which could be considered after infectious work-up is unremarkable.  GI recommendations reviewed.     Plan:     - Diagnostic paracentesis (limit to < 2L given JOCELYN) with the following ascites fluid studies - cell count w/differential, culture, total protein, albumin, and cytology (labs ordered)- will need IR consultation if [place] [facility] [place]  -IV albumin challenge with 1 g/kg -> 100 g daily for 3 days.   -UA, urine sodium, fena for JOCELYN  -IV fluid challenge: Status post normal saline bolus, 2 L in ED.  Continue lactated Ringer's 125 mL/h.  Follow-up BMP now and in AM.  -Strict  monitor of urine output   -Nephrology consultation  - Avoid NSAIDs.  - Hold home Lisinopril.   - Continue PTA pantoprazole 40 mg daily.  - Nutrition consult for high protein diet.   - Limit/avoid tylenol use to < 2g per 24 hours. Recommend against using NSAIDs PRN pain.   - Trend MELD labs daily.   - Infectious work-up with CXR, UA/urine culture, blood cultures to rule out occult infection.   - infectious hepatitis panel -> HbsAg, HbsAb, HCV RNA, Hepatitis A IgG (ordered by GI).   - Given significant elevation of Maddrey DF and MELD score, patient would be indicated for steroid treatment for severe alcoholic hepatitis. Consider tomorrow, if infectious work-up by that time is negative.  Follow-up with GI.       #Alcohol use  disorder, severe  #Alcohol withdrawal  #History of alcohol withdrawal seizure  -Monitor and treat alcohol withdrawal using CIWA with lorazepam.  -Multivitamin, thiamine, folic acid daily  -Alcohol abstinence  - consultation  -Fall, seizure precautions    #History of hypertension: Hold PTA lisinopril.         Diet: Renal Diet (non-dialysis)  DVT Prophylaxis: Pneumatic Compression Devices  Carroll Catheter: Not present  Lines: None     Cardiac Monitoring: None  Code Status: Full Code    Clinically Significant Risk Factors Present on Admission           # Hypercalcemia: corrected calcium is >10.1, will monitor as appropriate    # Hypoalbuminemia: Lowest albumin = 2.7 g/dL at 8/21/2023 11:30 AM, will monitor as appropriate    # Coagulation Defect: INR = 1.43 (Ref range: 0.85 - 1.15) and/or PTT = N/A, will monitor for bleeding    # Drug Induced Platelet Defect: home medication list includes an antiplatelet medication    # Acute Kidney Injury, unspecified: based on a >150% or 0.3 mg/dL increase in last creatinine compared to past 90 day average, will monitor renal function    # Hypertension: Home medication list includes antihypertensive(s)                 Disposition Plan      Expected Discharge Date:           To be determined.  Anticipate more than 5 to 7 days of hospital stay,   Pending clinical course, hepatology, nephrology evaluation, medical optimization safe discharge planning finalization.     Paul Hernandez MD  Hospitalist Service, [facility]  Securely message with [employer] (more info)  Text page via [facility] Paging/Directory   See signed in provider for up to date coverage information    ______________________________________________________________________    Chief Complaint     Abdominal distention  Generalized weakness  Jaundice      History is obtained from the patient, electronic health record, and emergency department physician    History  of Present Illness       Elroy Morel Sr. is a 43 year old male with history of severe alcohol use disorder, alcohol withdrawal seizures, decompensated alcoholic cirrhosis (variceal bleeding, ascites) , who presents to [facility] due to significantly elevated bilirubin, referred from [facility].     Patient presented earlier today to [facility] for entry for alcohol detoxification. Was found to have significant jaundice and advised to go to the ER instead for further evaluation.     Recently, patient was hospitalized at [facility] for hematemesis, admitted to the ICU, underwent urgent EGD and found to have grade II varices. These were banded successfully and eradicated. Patient was then managed supportively for management of alcoholic hepatitis, was encouraged to quit drinking and eventually discharged home, although his bilirubin continued to rise.      Patient recently also hospitalized for c.diff infection (treated with 10-day course of Vancomycin), but also Norovirus infection.     During my evaluation, endorses generalized weakness, increased abdominal distention, cramps, jaundice.  Reports having a small drink on Saturday, 2 days ago.  Denies active alcohol withdrawal symptoms.  Poor p.o. intake.  Reduced urine output, dark yellow , with 3-4 times/24 hours.  Denies dysuria.  Denies any fever, chills.  No cough or chest pain or shortness of breath.  No pain abdomen.  No nausea vomiting diarrhea.  Endorses taking his high lisinopril, last dose this morning.  Denies taking Tylenol or any other illicit drugs.  Denies any fall or trauma.  No focal neurological deficit.  Patient tired and bit annoyed as he had to sit up for long hours as no bed available in ED.  No other concerns reported.      UPPER GI ENDOSCOPY 8/14/2023   Findings:       Grade II varices were found in the lower third of the esophagus. Three        bands were successfully placed with complete eradication, resulting in        deflation  of varices. There was no bleeding during the procedure.        Moderate portal hypertensive gastropathy was found in the stomach.        The examined duodenum was normal.      ABDOMINAL ULTRASOUND 8/15/2023  Impression  1.  Coarsened hepatic echotexture with increased hepatic parenchymal echogenicity suggesting a combination of fibrosis and steatosis.  2.  Splenomegaly and small volume ascites suggesting portal hypertension.  3.  Cholelithiasis or gallbladder sludge without sonographic evidence of acute cholecystitis.        Past Medical History      Hematemesis and hematochezia in setting of known alcohol hepatis with cirrhosis, esophageal varices and ascites   Alcohol Use Disorder Severe   Alcohol hepatitis  History of alcohol withdrawal seizure.   Chronic hyponatremia   Hypertension.    Past Surgical History   History reviewed. No pertinent surgical history.    Prior to Admission Medications   Prior to Admission Medications   Prescriptions Last Dose Informant Patient Reported? Taking?   aspirin 81 MG EC tablet More than a month  Yes Yes   Sig: [ASPIRIN 81 MG EC TABLET] Take 81 mg by mouth daily.   folic acid (FOLVITE) 400 MCG tablet 8/21/2023 at am  Yes Yes   Sig: Take 400 mcg by mouth daily   lisinopril (ZESTRIL) 40 MG tablet 8/21/2023 at am  Yes Yes   Sig: Take 40 mg by mouth daily   pantoprazole (PROTONIX) 40 MG tablet 8/21/2023 at am  Yes Yes   Sig: [PANTOPRAZOLE (PROTONIX) 40 MG TABLET] Take 40 mg by mouth daily.   rosuvastatin (CRESTOR) 10 MG tablet More than a month  Yes Yes   Sig: Take 10 mg by mouth daily      Facility-Administered Medications: None        Review of Systems    The 10 point Review of Systems is negative other than noted in the HPI or here.  Limited as patient not very cooperative.    Social History   I have reviewed this patient's social history and updated it with pertinent information if needed.       Family History   Reviewed.  No pertinent family history reported.    Allergies   No  Known Allergies     Physical Exam   Vital Signs: Temp: 98.2  F (36.8  C) Temp src: Oral BP: 112/64 Pulse: 87   Resp: 16 SpO2: 98 % O2 Device: None (Room air)    Weight: 0 lbs 0 oz    General Appearance: Awake, interactive, lying on bed, tired, jaundiced, NAD  HEENT: AT/NC, icteric, Moist MM  Neck: Supple.   Respiratory: Normal work of breathing. CTA BL.   Cardiovascular: S1 S2 Regular.  GI/Abd: Soft. NT.  Diffuse distention.  No guarding or rigidity.  No peritoneal signs.  Extremities: Distally wwp. No pedal edema.  Neuro: AO x 4, Grossly non focal.  No asterixis.  Skin: No acute rash on exposed areas.   Psychiatry: Stable mood.     Medical Decision Making       90 MINUTES SPENT BY ME on the date of service doing chart review, history, exam, documentation & further activities per the note.      Data     I have personally reviewed the following data over the past 24 hrs:    9.0  \   12.3 (L)   / 193     130 (L) 95 (L) 26.8 (H) /  98   4.0 20 (L) 4.76 (H) \     ALT: 81 (H) AST: 240 (H) AP: 161 (H) TBILI: 30.2 (HH)   ALB: 2.7 (L) TOT PROTEIN: N/A LIPASE: 398 (H)     INR:  1.43 (H) PTT:  N/A   D-dimer:  N/A Fibrinogen:  N/A     Imaging results reviewed over the past 24 hrs:   Recent Results (from the past 24 hour(s))   US Abdomen Limited w Abd/Pelvis Duplex Complete    Narrative    US ABDOMEN LIMITED WITH DOPPLER COMPLETE 8/21/2023 1:47 PM    CLINICAL HISTORY: Total bili 30.2, r/o gall bladder disease  TECHNIQUE: Limited abdominal ultrasound. Color flow with spectral  Doppler and waveform analysis performed.    COMPARISON: Ultrasound 10/20/2022, CT 11/23/2015     FINDINGS:    GALLBLADDER: Internal sludge without pathologic luminal distention or  pericholecystic fluid. Mild wall thickening noted. Negative  sonographic Domingo's sign.     BILE DUCTS: No intrahepatic biliary dilatation. The common duct  measures 2 mm.    LIVER: Increased echogenicity with heterogeneous echotexture and  nodular contour.     RIGHT KIDNEY: No  hydronephrosis.     PANCREAS: The pancreas is largely obscured by overlying gas.    All to moderate volume ascites.    ABDOMINAL DUPLEX: The visualized portions of the main and intrahepatic  portal veins are patent but reversed. The hepatic artery, hepatic  veins, IVC, and splenic vein are patent with flow in the normal  direction.  Patent paraumbilical vein noted.      Impression    IMPRESSION:  1.  Cirrhosis with evidence of portal hypertension and small to  moderate volume ascites.  2.  Gallbladder sludge without definite evidence of acute  cholecystitis or biliary obstruction.  3.  Reversal of flow within the portal venous system, likely related  to #1.    FATOUMATA LLAMAS MD         SYSTEM ID:  [id number]   XR Chest 2 Views    Narrative    EXAM: XR CHEST 2 VIEWS  LOCATION: [facility]  DATE: 8/21/2023    INDICATION: Infectious workup in the setting of alcohol related hepatitis  COMPARISON: 12/14/2020      Impression    IMPRESSION: Shallow inspiration. Lungs are clear. No pleural effusion. Heart size and pulmonary vascularity within normal limits.     Recent Labs   Lab 08/21/23  1630 08/21/23  1130   WBC  --  9.0   HGB  --  12.3*   MCV  --  92   PLT  --  193   INR 1.43*  --    NA  --  130*   POTASSIUM  --  4.0   CHLORIDE  --  95*   CO2  --  20*   BUN  --  26.8*   CR  --  4.76*   ANIONGAP  --  15   ENEDINA  --  9.4   GLC  --  98   ALBUMIN  --  2.7*   BILITOTAL  --  30.2*   ALKPHOS  --  161*   ALT  --  81*   AST  --  240*   LIPASE  --  398*

## 2023-08-22 NOTE — CONSULTS
Nephrology Initial Consult  August 22, 2023      Elroy Morel Sr. MRN:[id number] YOB: 1980  Date of Admission:8/21/2023  Primary care provider: Attila Hernandez  Requesting physician: Paul Hernandez MD    ASSESSMENT AND RECOMMENDATIONS:   JOCELYN-Dramatic rise in Cr the past 11 days from 0.9=>5.9 correlating with bili rising from 12 to 30.  Bette borderline at 27, UA with hyaline casts, UPCR 0.2g/g consistent with tubular range.  DDx included bili cast nephropathy, congestion with severe ascites and Type I HRS although BP's are better than one would expect for HRS typically.  Giving 100g albumin and working on para to treat possible congestive nephropathy. No NSAID's but is on lisinopril at baseline which is being held currently.  I did discuss that he is likely to need dialysis in the coming days and that the mortality in his current situation is very high.  He appears to understand, is a bit withdrawn on my interview.    -JOCELYN, DDx Bili cast, congestion, HRS.    -Agree with albumin 100g daily x3 days (2nd day today)  -Planning para to try to treat congestion.       Volume-No peripheral edema but massive ascites with taut abdomen, consulting IR for possible tap.  Giving 100g albumin daily x3 days although BP's are reasonable (136/87 on my visit) so unclear how much albumin will help.      Electrolytes-K 3.8, bicarb 19, Na 131.     Hepatology-ETOH cirrhosis, MELD 41 but not a candidate for liver as he is actively drinking.      BMD-Ca 9.0, Mg 2.0, Phos 4.8, no acute issues.     Anemia-Hgb 10.4, down from 12.3 on admission.      Nutrition-Appetite ok per his report.     Time spent: 40 minutes on this date of encounter for chart review, physical exam, medical decision making and co-ordination of care.     Recommendations were communicated to primary team via verbal communication.       Ronald Bloom, TAHMINA CNS  Clinical Nurse Specialist  [phone]    REASON FOR CONSULT: Requested to evaluate 43 yom for  management of JOCELYN in setting of acute on chronic advanced liver disease.      HISTORY OF PRESENT ILLNESS:  Elroy Morel Sr. is a 43 yom with hx of ETOH cirrhosis c/b EV admitted with worsening jaundice and JOCELYN.  Baseline Cr ~1.0 as recently as 8/11/2023 now up suddenly to 4.8 on admission 8/21/2023 correlating with bili rising from 1.9 (6/2023)=>12.6 (8/11/2023) to 29 on admission.  Still actively drinking.  Nephrology consulted for management of JOCELYN.      PAST MEDICAL HISTORY:  ETOH Cirrhosis   -EV   -Ascites  Hx of withdrawal seizures.        MEDICATIONS:  PTA Meds  Prior to Admission medications    Medication Sig Last Dose Taking? Auth Provider Long Term End Date   aspirin 81 MG EC tablet [ASPIRIN 81 MG EC TABLET] Take 81 mg by mouth daily. More than a month Yes Provider, Historical     folic acid (FOLVITE) 400 MCG tablet Take 400 mcg by mouth daily 8/21/2023 at am Yes Reported, Patient     lisinopril (ZESTRIL) 40 MG tablet Take 40 mg by mouth daily 8/21/2023 at am Yes Unknown, Entered By History     pantoprazole (PROTONIX) 40 MG tablet [PANTOPRAZOLE (PROTONIX) 40 MG TABLET] Take 40 mg by mouth daily. 8/21/2023 at am Yes Provider, Historical     rosuvastatin (CRESTOR) 10 MG tablet Take 10 mg by mouth daily More than a month Yes Unknown, Entered By History Yes       Current Meds   albumin human  100 g Intravenous Q24H    folic acid  1 mg Oral Daily    multivitamin w/minerals  1 tablet Oral Daily    pantoprazole  40 mg Oral BID AC    polyethylene glycol  17 g Oral Daily    sodium chloride (PF)  3 mL Intracatheter Q8H    thiamine  100 mg Oral Daily     Infusion Meds      ALLERGIES:    Allergies   Allergen Reactions    Metronidazole Other (See Comments), Dizziness and Unknown    Omeprazole Other (See Comments) and Unknown     Irritability (tolerates Protonix well)       REVIEW OF SYSTEMS:  A 10 point review of systems was negative except as noted above.    SOCIAL HISTORY:   Social History     Socioeconomic History     Marital status:      Spouse name: Not on file    Number of children: Not on file    Years of education: Not on file    Highest education level: Not on file   Occupational History    Not on file   Tobacco Use    Smoking status: Not on file    Smokeless tobacco: Not on file   Substance and Sexual Activity    Alcohol use: Not on file    Drug use: Not on file    Sexual activity: Not on file   Other Topics Concern    Not on file   Social History Narrative    Not on file     Social Determinants of Health     Financial Resource Strain: Not on file   Food Insecurity: Not on file   Transportation Needs: Not on file   Physical Activity: Not on file   Stress: Not on file   Social Connections: Not on file   Intimate Partner Violence: Not on file   Housing Stability: Not on file     Reviewed with patient, noncontributory.       FAMILY MEDICAL HISTORY:   Reviewed with pt, noncontributory.      PHYSICAL EXAM:   Temp  Av.4  F (36.9  C)  Min: 98.1  F (36.7  C)  Max: 98.7  F (37.1  C)      Pulse  Av.3  Min: 70  Max: 99 Resp  Av.3  Min: 16  Max: 20  SpO2  Av.8 %  Min: 98 %  Max: 100 %       /66   Pulse 70   Temp 98.2  F (36.8  C) (Oral)   Resp 18   SpO2 100%       Admit       GENERAL APPEARANCE: alert and no distress, jaundiced  EYES: + scleral icterus  Pulmonary: lungs Breathing comfortably on RA  CV: Regular rhythm, normal rate, no rub   - Edema None in LE, +++abd distension.    GI: taut, nontender, no fluid wave on percussion.    MS: no evidence of inflammation in joints, no muscle tenderness  : No Carroll  SKIN: no rash, warm, dry  NEURO: mentation intact and speech normal    LABS:   CMP  Recent Labs   Lab 23  0559 23  2316 23  1130   * 128* 130*   POTASSIUM 3.8 3.8 4.0   CHLORIDE 97* 97* 95*   CO2 19* 20* 20*   ANIONGAP 15 11 15   GLC 92 107* 98   BUN 34.3* 33.4* 26.8*   CR 5.89* 5.59* 4.76*   GFRESTIMATED 11* 12* 15*   ENEDINA 9.0 8.6 9.4   MAG  --  2.0  --    PHOS  --   4.8*  --    ALBUMIN 3.2*  --  2.7*   BILITOTAL 29.4*  --  30.2*   ALKPHOS 123  --  161*   *  --  240*   ALT 62  --  81*     CBC  Recent Labs   Lab 08/22/23  0559 08/21/23  1130   HGB 10.4* 12.3*   WBC 7.9 9.0   RBC 3.06* 3.63*   HCT 28.1* 33.4*   MCV 92 92   MCH 34.0* 33.9*   MCHC 37.0* 36.8*   RDW 21.1* 21.2*    193     INR  Recent Labs   Lab 08/22/23  0559 08/21/23  1630   INR 1.52* 1.43*     ABGNo lab results found in last 7 days.   URINE STUDIES  Recent Labs   Lab Test 08/21/23  1253   COLOR Alanna*   APPEARANCE Cloudy*   URINEKETONE 15*   SG 1.015   PROTEIN 100*   RBCU 2   WBCU 13*     No lab results found.  PTH  No lab results found.  IRON STUDIES  Recent Labs   Lab Test 08/21/23  1130   IRON 71   *   IRONSAT 55*

## 2023-08-22 NOTE — ED NOTES
Pt continues to be A/ x 4, very pleasant.  MSSA 4. VSS. Denies any pain.  Pt continues to be independent with ADL's and ambulation.  Voiding spontaneously.   PIV infusing at 125 ml/hr.   Pt is jaundiced, abdomen distended due to ascites, but pt has not c/o of any SOB. Denies any CP either.  Educated on use of incentive spirometer.  Albumin given as ordered.  Continues to be placed on enteric precaution.  Denies any SI/HI.

## 2023-08-22 NOTE — PROGRESS NOTES
Admit to 7D from [facility] ER at 1100 with cirrhosis, worsening kidney function. A&Ox4, denies pain. Complains of abdominal discomfort due to ascites. Paracentesis planned for tomorrow. Vitally stable. Having loose stool, (x2 so far), minimal dark brown urine output. Appetite is fair. CIWA score 0. Family at bedside. Continue with I/O, monitor for any changes.

## 2023-08-22 NOTE — SIGNIFICANT EVENT
Significant Event Note    Time of event: 12:31 PM August 22, 2023    Description of event:  Pt just arrived from [facility], been admitted for etoh related cirrhosis and now with worsening jocelyn concerning for hepatorenal syndrome       Plan:  JOCELYN   Ordered albumin challenge daily 100 gram for 3 days  Ordered renal usd and urine lytes with new consult for renal team on the [facility]     [facility]   Consulted hepatology regarding patients care     Patient was added to gold night list with plans for distribution tomorrow morning   Discussed with: on-call team,     Alberto Wyatt MD

## 2023-08-22 NOTE — SIGNIFICANT EVENT
"Significant Event Note    Time of event: 7:38 PM August 21, 2023    Description of event: Transfer from [facility] ER to [facility] when bed available for alcoholic cirrhosis and hepatorenal syndrome    Etoh cirrhosis and just at [facility] discharged few days ago; kept drinking and on way to [facility] but \"needed to detox again\" in [facility] ER and awaits transfer to [facility] for nephrology and hepatology   Creat 4.76 now was 1 last week at [facility] and bilirubin now 30  Discussed with Dr Hernandez who will see patient on [facility] prior to transfer    Plan:      Discussed with: supervising physician, Dr. Noland in the emergency room by Dr. Rebolledo and briefly with the swing doctor on the [facility]   Likely transfer tomorrow Tue 8/22    Attila Torres MD    "

## 2023-08-22 NOTE — PROGRESS NOTES
[facility]    Medicine Progress Note - Hospitalist Service, GOLD TEAM 18    Date of Admission:  8/21/2023    Assessment & Plan     Elroy Morel Sr. is a 43 year old male admitted on 8/21/2023.      43 year old male with a history of severe alcohol use disorder, decompensated cirrhosis (variceal bleeding, ascites), alcohol withdrawal seizures who presents to [facility] ER due to significantly elevated bilirubin total bili 20.2, referred from [facility]. GI Hepatology consulted in ED for evaluation of abnormal LFTs.  Also found to have JOCELYN with creatinine 4.76, sodium 130, CO2 20.    Today's changes: 8/22/2023  Overall doing well. Per history oliguric. Increased abd distention. Vss. Hgb dropped- suspect dilutional 2.2 vol overload, jocelyn. No other concern. No obvious bleeding.   Dw GI and renal team bedside.   Changes:   - discontinue ivf  - plan to do diag and therapeutic paracentesis this am (consult procedure team in the [place]). Studies ordered  - Ct to monitor renal function, lytes, hgb closely  - Increase protonix to 40 mg bid  - hold steroid per GI for now  - HD per nephrology, no urgent indication at current.   - clear liquid diet for now  - Tf to [facility] when bed becomes available    Dw rn, patient.      #Decompensated cirrhosis  #Severe alcohol-related hepatitis  #Ascites  #Rule out SBP.  #History of variceal bleeding  #JOCELYN-suspect hypovolemia, versus hepatorenal.   #Alcohol use disorder, severe     - Appreciate GI input.  - Primary Hepatologist: Follows at [facility] -> transitioned to [facility] with Dr. Hernandez.      MELD 3.0: 37 at 8/22/2023  5:59 AM  MELD-Na: 38 at 8/22/2023  5:59 AM  Calculated from:  Serum Creatinine: 5.89 mg/dL (Using max of 3 mg/dL) at 8/22/2023  5:59 AM  Serum Sodium: 131 mmol/L at 8/22/2023  5:59 AM  Total Bilirubin: 29.4 mg/dL at 8/22/2023  5:59 AM  Serum Albumin: 3.2 g/dL at 8/22/2023  5:59 AM  INR(ratio): 1.52 at 8/22/2023  5:59  AM  Age at listing (hypothetical): 43 years  Sex: Male at 8/22/2023  5:59 AM      Maddrey's DF: 50  Ascites: Present. No previous paracentesis performed, no window to tap. Patient recently reports decreased urine output.   SBP history: No   Hepatic encephalopathy: None   EV: EGD 08/13/2023 -> Grade II esophageal varices banded, PHG.   TIPS: Not a candidate due to significantly elevated MELD.   HCC: No liver lesions on US 08/2023        Ultrasound abdomen: 8/21:Cirrhosis with evidence of portal hypertension and small to moderate volume ascites. Gallbladder sludge without definite evidence of acute cholecystitis or biliary obstruction      Per GI:   At this time, presents with a clinical presentation of severe alcohol-related hepatitis in the setting of decompensated cirrhosis. Reports ongoing alcohol use. Would require SBP rule out (along with rest of infectious work-up) given the significant JOCELYN. No evidence of liver failure. Not a candidate for liver transplant at this time. Will require admission for management of JOCELYN and severe alcohol hepatitis. Unclear if JOCELYN is 2/2 to hepatorenal syndrome, would require work-up to exclude other etiologies first. Would also possibly benefit from steroid treatment for alcoholic hepatitis, which could be considered after infectious work-up is unremarkable.  GI recommendations reviewed.     Plan:     - Diagnostic paracentesis (limit to < 2L given JOCELYN) with the following ascites fluid studies - cell count w/differential, culture, total protein, albumin, and cytology (labs ordered)- will need IR consultation if still here in [facility] in the morning  -IV albumin challenge with 1 g/kg -> 100 g daily for 3 days.   -UA, urine sodium, fena for JOCELYN  -IV fluid challenge: Status post normal saline bolus, 2 L in ED.  Continue lactated Ringer's 125 mL/h.  discontinue 8/22/2023  -Strict monitor of urine output   -Nephrology consultation- appreciate input. Recs reviewed.   - Avoid NSAIDs.  -  Hold home Lisinopril.   - Continue PTA pantoprazole 40 mg daily.  - Nutrition consult for high protein diet.   - Limit/avoid tylenol use to < 2g per 24 hours. Recommend against using NSAIDs PRN pain.   - Trend MELD labs daily.   - Infectious work-up with CXR, UA/urine culture, blood cultures to rule out occult infection.   - infectious hepatitis panel -> HbsAg, HbsAb, HCV RNA, Hepatitis A IgG (ordered by GI).   - Given significant elevation of Maddrey DF and MELD score, patient would be indicated for steroid treatment for severe alcoholic hepatitis. Consider tomorrow, if infectious work-up by that time is negative.  Follow-up with GI.        #Alcohol use disorder, severe  #Alcohol withdrawal  #History of alcohol withdrawal seizure  -Monitor and treat alcohol withdrawal using CIWA with lorazepam.  -Multivitamin, thiamine, folic acid daily  -Alcohol abstinence  - consultation  -Fall, seizure precautions  - addiction med consult.     No e.o active withdrawal at current.      #History of hypertension: Hold PTA lisinopril.              Diet: Advance Diet as Tolerated: Clear Liquid Diet; Renal Diet (non-dialysis)    DVT Prophylaxis: Pneumatic Compression Devices  Carroll Catheter: Not present  Lines: None     Cardiac Monitoring: None  Code Status: Full Code      Clinically Significant Risk Factors Present on Admission         # Hyponatremia: Lowest Na = 128 mmol/L in last 2 days, will monitor as appropriate   # Hypercalcemia: corrected calcium is >10.1, will monitor as appropriate    # Hypoalbuminemia: Lowest albumin = 2.7 g/dL at 8/21/2023 11:30 AM, will monitor as appropriate  # Coagulation Defect: INR = 1.52 (Ref range: 0.85 - 1.15) and/or PTT = N/A, will monitor for bleeding  # Drug Induced Platelet Defect: home medication list includes an antiplatelet medication  # Acute Kidney Injury, unspecified: based on a >150% or 0.3 mg/dL increase in last creatinine compared to past 90 day average, will monitor renal  function  # Hypertension: Home medication list includes antihypertensive(s)                 Disposition Plan      Expected Discharge Date: 08/23/2023                  Paul Hernandez MD  Hospitalist Service, GOLD TEAM 18  [facility]  Securely message with [employer] (more info)  Text page via [employer] Paging/Directory   See signed in provider for up to date coverage information  ______________________________________________________________________    Interval History   See above.   Interval events reviewed.   Doing okay  Oliguric  Denies fever or chills.   No cough or cp or sob.   No LH or dizziness.   No NV or pain abdomen.   Abd distention. Flatus+  No new sensory or motor complaint.   Mood stable.     No other new or acute medical concern     Physical Exam   Vital Signs: Temp: 98.1  F (36.7  C) Temp src: Oral BP: 136/87 Pulse: 90   Resp: 18 SpO2: 98 % O2 Device: None (Room air)    Weight: 0 lbs 0 oz      General Appearance:  Awake, interactive, lying on bed, tired, jaundiced, NAD  HEENT: AT/NC, icteric, Moist MM  Neck: Supple.   Respiratory: Normal work of breathing. CTA BL.   Cardiovascular: S1 S2 Regular.  GI/Abd: Soft. NT.  Diffuse distention.  No guarding or rigidity.  No peritoneal signs.  Extremities: Distally wwp. No pedal edema.  Neuro: AO x 4, Grossly non focal.  No asterixis.  Skin: No acute rash on exposed areas.   Psychiatry: Stable mood.      Medical Decision Making       55 MINUTES SPENT BY ME on the date of service doing chart review, history, exam, documentation & further activities per the note.      Data     I have personally reviewed the following data over the past 24 hrs:    7.9  \   10.4 (L)   / 162     131 (L) 97 (L) 34.3 (H) /  92   3.8 19 (L) 5.89 (H) \     ALT: 62 AST: 176 (H) AP: 123 TBILI: 29.4 (HH)   ALB: 3.2 (L) TOT PROTEIN: N/A LIPASE: N/A     INR:  1.52 (H) PTT:  N/A   D-dimer:  N/A Fibrinogen:  N/A     Ferritin:  N/A % Retic:  N/A LDH:  N/A        Imaging results reviewed over the past 24 hrs:   Recent Results (from the past 24 hour(s))   XR Chest 2 Views    Narrative    EXAM: XR CHEST 2 VIEWS  LOCATION: [facility]  DATE: 8/21/2023    INDICATION: Infectious workup in the setting of alcohol related hepatitis  COMPARISON: 12/14/2020      Impression    IMPRESSION: Shallow inspiration. Lungs are clear. No pleural effusion. Heart size and pulmonary vascularity within normal limits.   US Renal Complete Non-Vascular    Narrative    EXAMINATION: US RENAL COMPLETE NON-VASCULAR 8/22/2023 2:18 PM     COMPARISON: Limited abdominal ultrasound 8/21/2023.    HISTORY: New JOCELYN in cirrhotic patient.    TECHNIQUE: The kidneys and bladder were scanned in the standard  fashion with specialized ultrasound transducer(s) using both gray  scale and limited color/spectral Doppler techniques.    FINDINGS:    Right kidney: Measures 12.3 cm in length. Parenchyma is of normal  thickness with mildly increased echogenicity. No focal mass. No  hydronephrosis.    Left kidney: Measures 12.2 cm in length. Parenchyma is of normal  thickness with mildly increased echogenicity. No focal mass. No  hydronephrosis.     Bladder: Unremarkable.    Increased echogenicity, coarse echotexture, and nodular borders of the  liver. Large volume ascites.        Impression    IMPRESSION:  1.  Bilateral kidneys with mildly increased echogenicity suggestive of  renal parenchymal disease. No hydronephrosis.  2.  Increased echogenicity of the liver with nodular borders  consistent with known history of cirrhosis.  3.  Large volume ascites.    I have personally reviewed the examination and initial interpretation  and I agree with the findings.    BELIA HEARD MD         SYSTEM ID:  [id number]     Recent Labs   Lab 08/22/23  0559 08/21/23  2316 08/21/23  1630 08/21/23  1130   WBC 7.9  --   --  9.0   HGB 10.4*  --   --  12.3*   MCV 92  --   --  92     --   --   193   INR 1.52*  --  1.43*  --    * 128*  --  130*   POTASSIUM 3.8 3.8  --  4.0   CHLORIDE 97* 97*  --  95*   CO2 19* 20*  --  20*   BUN 34.3* 33.4*  --  26.8*   CR 5.89* 5.59*  --  4.76*   ANIONGAP 15 11  --  15   ENEDINA 9.0 8.6  --  9.4   GLC 92 107*  --  98   ALBUMIN 3.2*  --   --  2.7*   BILITOTAL 29.4*  --   --  30.2*   ALKPHOS 123  --   --  161*   ALT 62  --   --  81*   *  --   --  240*   LIPASE  --   --   --  398*

## 2023-08-23 ENCOUNTER — ANCILLARY PROCEDURE (OUTPATIENT)
Dept: ULTRASOUND IMAGING | Facility: CLINIC | Age: 43
End: 2023-08-23
Attending: STUDENT IN AN ORGANIZED HEALTH CARE EDUCATION/TRAINING PROGRAM
Payer: COMMERCIAL

## 2023-08-23 LAB
ABSOLUTE NEUTROPHILS, BODY FLUID: 93.7 /UL
ALBUMIN BODY FLUID SOURCE: NORMAL
ALBUMIN FLD-MCNC: 0.7 G/DL
ALBUMIN SERPL BCG-MCNC: 3.6 G/DL (ref 3.5–5.2)
ALP SERPL-CCNC: 120 U/L (ref 40–129)
ALT SERPL W P-5'-P-CCNC: 46 U/L (ref 0–70)
ANION GAP SERPL CALCULATED.3IONS-SCNC: 18 MMOL/L (ref 7–15)
AST SERPL W P-5'-P-CCNC: 149 U/L (ref 0–45)
BACTERIA UR CULT: NO GROWTH
BILIRUB SERPL-MCNC: 28.6 MG/DL
BUN SERPL-MCNC: 43.1 MG/DL (ref 6–20)
CALCIUM SERPL-MCNC: 9 MG/DL (ref 8.6–10)
CHLORIDE SERPL-SCNC: 98 MMOL/L (ref 98–107)
CREAT SERPL-MCNC: 6.24 MG/DL (ref 0.67–1.17)
DEPRECATED HCO3 PLAS-SCNC: 15 MMOL/L (ref 22–29)
FIBRINOGEN PPP-MCNC: 187 MG/DL (ref 170–490)
GFR SERPL CREATININE-BSD FRML MDRD: 11 ML/MIN/1.73M2
GLUCOSE SERPL-MCNC: 84 MG/DL (ref 70–99)
GRAM STAIN RESULT: NORMAL
GRAM STAIN RESULT: NORMAL
INR PPP: 1.66 (ref 0.85–1.15)
LYMPHOCYTES NFR FLD MANUAL: 5 %
MONOS+MACROS NFR FLD MANUAL: 59 %
NEUTS BAND NFR FLD MANUAL: 33 %
OTHER CELLS FLD MANUAL: 3 %
POTASSIUM SERPL-SCNC: 3.7 MMOL/L (ref 3.4–5.3)
PROT FLD-MCNC: 1.5 G/DL
PROT SERPL-MCNC: 6.2 G/DL (ref 6.4–8.3)
PROTEIN BODY FLUID SOURCE: NORMAL
SODIUM SERPL-SCNC: 131 MMOL/L (ref 136–145)

## 2023-08-23 PROCEDURE — 250N000011 HC RX IP 250 OP 636: Performed by: INTERNAL MEDICINE

## 2023-08-23 PROCEDURE — 99233 SBSQ HOSP IP/OBS HIGH 50: CPT | Mod: FS | Performed by: CLINICAL NURSE SPECIALIST

## 2023-08-23 PROCEDURE — 85610 PROTHROMBIN TIME: CPT | Performed by: INTERNAL MEDICINE

## 2023-08-23 PROCEDURE — 99232 SBSQ HOSP IP/OBS MODERATE 35: CPT | Mod: GC | Performed by: INTERNAL MEDICINE

## 2023-08-23 PROCEDURE — 87015 SPECIMEN INFECT AGNT CONCNTJ: CPT | Performed by: INTERNAL MEDICINE

## 2023-08-23 PROCEDURE — 250N000013 HC RX MED GY IP 250 OP 250 PS 637: Performed by: INTERNAL MEDICINE

## 2023-08-23 PROCEDURE — 82042 OTHER SOURCE ALBUMIN QUAN EA: CPT | Performed by: INTERNAL MEDICINE

## 2023-08-23 PROCEDURE — 0W9G3ZX DRAINAGE OF PERITONEAL CAVITY, PERCUTANEOUS APPROACH, DIAGNOSTIC: ICD-10-PCS | Performed by: STUDENT IN AN ORGANIZED HEALTH CARE EDUCATION/TRAINING PROGRAM

## 2023-08-23 PROCEDURE — 87205 SMEAR GRAM STAIN: CPT | Performed by: INTERNAL MEDICINE

## 2023-08-23 PROCEDURE — 87070 CULTURE OTHR SPECIMN AEROBIC: CPT | Performed by: INTERNAL MEDICINE

## 2023-08-23 PROCEDURE — P9047 ALBUMIN (HUMAN), 25%, 50ML: HCPCS | Performed by: INTERNAL MEDICINE

## 2023-08-23 PROCEDURE — 49083 ABD PARACENTESIS W/IMAGING: CPT | Mod: GC | Performed by: STUDENT IN AN ORGANIZED HEALTH CARE EDUCATION/TRAINING PROGRAM

## 2023-08-23 PROCEDURE — 999N000128 HC STATISTIC PERIPHERAL IV START W/O US GUIDANCE

## 2023-08-23 PROCEDURE — 87075 CULTR BACTERIA EXCEPT BLOOD: CPT

## 2023-08-23 PROCEDURE — 99207 PR APP CREDIT; MD BILLING SHARED VISIT: CPT | Performed by: ORTHOPAEDIC SURGERY

## 2023-08-23 PROCEDURE — 89051 BODY FLUID CELL COUNT: CPT | Performed by: INTERNAL MEDICINE

## 2023-08-23 PROCEDURE — 84157 ASSAY OF PROTEIN OTHER: CPT | Performed by: INTERNAL MEDICINE

## 2023-08-23 PROCEDURE — 85384 FIBRINOGEN ACTIVITY: CPT

## 2023-08-23 PROCEDURE — 89050 BODY FLUID CELL COUNT: CPT | Performed by: INTERNAL MEDICINE

## 2023-08-23 PROCEDURE — 87389 HIV-1 AG W/HIV-1&-2 AB AG IA: CPT | Performed by: ORTHOPAEDIC SURGERY

## 2023-08-23 PROCEDURE — 80053 COMPREHEN METABOLIC PANEL: CPT | Performed by: INTERNAL MEDICINE

## 2023-08-23 PROCEDURE — 120N000002 HC R&B MED SURG/OB UMMC

## 2023-08-23 PROCEDURE — 99233 SBSQ HOSP IP/OBS HIGH 50: CPT | Mod: 25 | Performed by: INTERNAL MEDICINE

## 2023-08-23 PROCEDURE — 36415 COLL VENOUS BLD VENIPUNCTURE: CPT | Performed by: INTERNAL MEDICINE

## 2023-08-23 RX ORDER — SIMETHICONE 80 MG
80 TABLET,CHEWABLE ORAL EVERY 6 HOURS PRN
Status: DISCONTINUED | OUTPATIENT
Start: 2023-08-23 | End: 2023-08-30 | Stop reason: HOSPADM

## 2023-08-23 RX ORDER — CEFTRIAXONE 2 G/1
2 INJECTION, POWDER, FOR SOLUTION INTRAMUSCULAR; INTRAVENOUS EVERY 24 HOURS
Status: DISCONTINUED | OUTPATIENT
Start: 2023-08-23 | End: 2023-08-23

## 2023-08-23 RX ADMIN — PANTOPRAZOLE SODIUM 40 MG: 40 TABLET, DELAYED RELEASE ORAL at 15:46

## 2023-08-23 RX ADMIN — Medication 1 TABLET: at 08:19

## 2023-08-23 RX ADMIN — PANTOPRAZOLE SODIUM 40 MG: 40 TABLET, DELAYED RELEASE ORAL at 08:19

## 2023-08-23 RX ADMIN — FOLIC ACID 1 MG: 1 TABLET ORAL at 08:19

## 2023-08-23 RX ADMIN — THIAMINE HCL TAB 100 MG 100 MG: 100 TAB at 08:19

## 2023-08-23 RX ADMIN — ALBUMIN HUMAN 100 G: 0.25 SOLUTION INTRAVENOUS at 21:14

## 2023-08-23 ASSESSMENT — ACTIVITIES OF DAILY LIVING (ADL)
ADLS_ACUITY_SCORE: 18
CHANGE_IN_FUNCTIONAL_STATUS_SINCE_ONSET_OF_CURRENT_ILLNESS/INJURY: NO
ADLS_ACUITY_SCORE: 18

## 2023-08-23 NOTE — PROCEDURES
[facility]  CAPS PROCEDURE NOTE  Date of Admission:  8/20/2023  Consult Requested by: Medicine  Reason for Consult: diagnostic evaluation of ascites and management of symptomatic ascites    Indication/HPI: decompensated cirrhosis    Pre-Procedure Diagnosis: Ascites    Post-Procedure Diagnosis: Ascites    Risk Assessment: Average risk, Technically straightforward; patient's anticoagulation has been held according to guidelines based on the agent and platelets and coags are within guidelines    Procedure Outcome:  Diagnostic paracentesis performed and Therapeutic paracentesis performed with 4L liters of ascites removed.  See additional procedure details below.    The primary covering service should follow up and address any lab results as appropriate.    Attending, Dr. Rodriguez was present for the full procedure. Primary team updated post-procedure.     Indira Garcias MD  [facility]  Securely message with Voc[employer] (more info)  Text page via [employer] Paging/Directory   See signed in provider for up to date coverage information      [facility]    Paracentesis    Date/Time: 8/23/2023 12:07 PM    Performed by: Indira Garcias MD  Authorized by: Indira Garcias MD    PRE-PROCEDURE DETAILS  Initial or subsequent exam: initial  Procedure purpose: diagnostic and therapeutic  Indications: abdominal discomfort secondary to ascites        UNIVERSAL PROTOCOL   Site Marked: Yes  Prior Images Obtained and Reviewed:  Yes  Required items: Required blood products, implants, devices and special equipment available    Patient identity confirmed:  Verbally with patient, arm band, provided demographic data and hospital-assigned identification number  Patient was reevaluated immediately before administering moderate or deep sedation or anesthesia  Confirmation Checklist:  Patient's identity using two  indicators, relevant allergies, procedure was appropriate and matched the consent or emergent situation and correct equipment/implants were available  Time out: Immediately prior to the procedure a time out was called    Universal Protocol: the Joint Commission Universal Protocol was followed    Preparation: Patient was prepped and draped in usual sterile fashion    ESBL (mL):  1     ANESTHESIA    Local Anesthetic:  Lidocaine 1% without epinephrine  Anesthetic Total (mL):  10      SEDATION    Patient Sedated: No    POST PROCEDURE DETAILS  Needle gauge: 19.  Ultrasound guidance: yes  Puncture site: right lower quadrant  Fluid removed: 4000(ml)  Fluid appearance: serous  Dressing: Sterile Bandage.        PROCEDURE    Patient Tolerance:  Patient tolerated the procedure well with no immediate complications  Length of time physician/provider present for 1:1 monitoring during sedation: 0        POC US GUIDE FOR PARACENTESIS     Impression  US Indication: decompensated liver disease and abdominal distension    Limited abdominal ultrasound was performed and demonstrated an adequate fluid collection on the right side of the abdomen.    Doppler of the skin demonstrated an area at this site without significant vasculature.  A paracentesis at this site was subsequently performed.    Inidra Garcias MD

## 2023-08-23 NOTE — PROGRESS NOTES
Care Management Follow Up    Length of Stay (days): 2    Expected Discharge Date: 08/25/23     Concerns to be Addressed: substance/tobacco abuse/use, Discharge Planning     Patient plan of care discussed at interdisciplinary rounds: Yes    Anticipated Discharge Disposition: Inpatient Chemical Dependency Treatment (IP CD TX)     Anticipated Discharge Services: IP CD TX    Anticipated Discharge DME: None    Patient/family educated on Medicare website which has current facility and service quality ratings: N/A     Education Provided on the Discharge Plan: Yes     Patient/Family in Agreement with the Plan: Yes    Referrals Placed by CM/SW: None     Private pay costs discussed: Not applicable    Additional Information:    Writer spoke with MD and [facility] to touch base on patient's status and update on plan. Per MD, patient is not going to be medically stable for discharge until Friday or Saturday. [facility] would be able to admit patient either Friday or Monday, but not over the weekend. Writer to continue to follow for support and discharge planning needs that arise.    [facility]  [address] [place] [address]   [phone]    ADDENDUM: Was informed by MD that patient is interested in discharging to [facility] Inpatient [facility] Dependency Treatment instead of [facility]. Writer called [facility] and left message. Writer to work on getting patient into his preferred treatment facility, but if not an option, he will discharge to [facility], which he is open to.    KATE Bueno, MSW  Adult Acute Care Float   Pager [phone]

## 2023-08-23 NOTE — PROGRESS NOTES
"CLINICAL NUTRITION SERVICES - ASSESSMENT NOTE     Nutrition Prescription    RECOMMENDATIONS FOR MDs/PROVIDERS TO ORDER:  -  Liberalize diet order, recommend high kcal high protein w/ 2g sodium and fluid restriction   - If phos labs continues to be elevated, consider phos binders TID with meals   - If unable to meet at least 50% of estimated nutrition needs (1215 kcal daily, 50 g pro daily), recommend consideration of short term nutrition support.     Malnutrition Status:    Patient does not meet two of the established criteria necessary for diagnosing malnutrition but is at risk for malnutrition    Recommendations already ordered by Registered Dietitian (RD):  - Calorie count to quantify PO intake (8/24 - 8/26)   - Ensure Enlive daily (chocolate)      Future/Additional Recommendations:  If patient requires FT placement for enteral nutrition support, see recs below:  Use dosing weight 81 kg  Regimen: TwoCal HN @ 35 mL/hr (840 mL/day) to provide 1680 kcals, 588 mL H2O, 184 g CHO and 4 g Fiber daily. Would provide ~70% of estimated kcal and protein needs.   - Initiate @ 10 ml/hr and advance by 10 ml q8hr pending pt's tolerance.  - Do not advance TF rate unless Mg++ >1.5, K+ is >3, and phos >1.9  - Recommend 30 ml q4hr fluid flushes for tube patency. Additional fluids and/or adjustments per MD.    - Order multivitamin/minerals to help ensure micronutrient needs being met with suspected hypermetabolic demands and potential interruptions to TF infusions.   - Continue additional 100 mg Thiamine x 5-7 days to prevent lyte depletion if at risk for refeeding syndrome   - If gastric enteral access: HOB > 30 degrees or Reverse Trendelenburg >10-25 degrees.              -Send a nutrition consult for \"Registered Dietitian to Order TF per Medical Nutrition Therapy Guidelines\" if desire RD to order TFs.      Monitor nutrition-related findings and follow pt per protocol     REASON FOR ASSESSMENT  Elroy Morel Sr. is a/an 43 year " old male assessed by the dietitian for Provider Order - Cirrhosis; high protein low Na diet     Patient admitted for decompensated cirrhosis, r/o SBP, JOCELYN    MEDICAL HISTORY  PMH of severe alcohol use disorder, decompensated cirrhosis (variceal bleeding, ascites), alcohol withdrawal seizures, chronic hyponatremia, HTN.      NUTRITION HISTORY  Pt reports decreased intakes over the past several weeks, feels he was eating about 50% typical intake (full meals TID at baseline, smaller portion with reduced appetite). Is feeling very hungry now, looking forward to diet change from Renal diet order. Agreeable to ONS for increased protein needs.     CURRENT NUTRITION ORDERS  Diet:  Renal (non - dialysis)  Intake/Tolerance:No intakes recorded since admission. Did order lunch and dinner yesterday.      GI  Distended abdomen, + ascites. Pt reports abdominal discomfort.    S/p paracentesis this AM, ~4L removed     LABS  Electrolytes  Potassium (mmol/L)   Date Value   08/23/2023 3.7   08/22/2023 3.8   08/21/2023 3.8   06/07/2022 4.1   09/15/2021 4.3   08/27/2020 4.5     Phosphorus (mg/dL)   Date Value   08/21/2023 4.8 (H)    Blood Glucose  Glucose (mg/dL)   Date Value   08/23/2023 84   08/22/2023 92   08/21/2023 107 (H)   08/21/2023 98   08/11/2023 108 (H)   06/07/2022 97   09/15/2021 104   08/27/2020 89   06/25/2020 104     Hemoglobin A1C (%)   Date Value   06/23/2023 5.7 (H)   03/20/2023 6.1 (H)    Inflammatory Markers  WBC Count (10e3/uL)   Date Value   08/22/2023 7.9   08/21/2023 9.0     Albumin (g/dL)   Date Value   08/23/2023 3.6   08/22/2023 3.2 (L)   08/21/2023 2.7 (L)   06/07/2022 4.3   09/15/2021 4.2   08/27/2020 4.4      Magnesium (mg/dL)   Date Value   08/21/2023 2.0     Sodium (mmol/L)   Date Value   08/23/2023 131 (L)   08/22/2023 131 (L)   08/21/2023 128 (L)    Renal  Urea Nitrogen (mg/dL)   Date Value   08/23/2023 43.1 (H)   08/22/2023 34.3 (H)   08/21/2023 33.4 (H)   06/07/2022 11   09/15/2021 11   08/27/2020 10  "    Creatinine (mg/dL)   Date Value   08/23/2023 6.24 (H)   08/22/2023 5.89 (H)   08/21/2023 5.59 (H)     Additional  Triglycerides (mg/dL)   Date Value   06/23/2023 138   03/15/2023 232 (H)   06/07/2022 106     Ketones Urine (mg/dL)   Date Value   08/21/2023 15 (A)        MEDICATIONS  Folic acid 1 mg/d, MVI-M, protonix, miralax, vit B1 100 mg daily     SKIN  No documented pressure injuries at this time.      ANTHROPOMETRICS  Height: 172.7 cm (5' 8\")   Most Recent Weight:   (care everywhere)   115.8 kg (255 lb 4.7 oz) 08/14/2023      IBW: 70 kg   166%IBW   There is no height or weight on file to calculate BMI. 38.9 BMI Category: Obesity Grade II BMI 35-39.9    Weight History:   Wt Readings from Last 15 Encounters:   06/20/22 111.1 kg (245 lb)   Care everywhere:   115.8 kg (255 lb 4.7 oz) 08/14/2023     99.8 kg (220 lb) 12/28/2022       ASSESSED NUTRITION NEEDS  Dosing Weight: 81 kg (Adjusted BW)   Estimated Energy Needs: 2430 - 2835 kcals/day (30 - 35 kcals/kg )  Justification: Increased needs  Estimated Protein Needs: 97 - 122 grams protein/day (1.2 - 1.5 grams of pro/kg)  Justification: Increased needs  Estimated Fluid Needs: 2025 - 2430 mL/day (25 - 30 mL/kg)   Justification: Maintenance     PHYSICAL FINDINGS  See malnutrition section below.  Ascites, jaundice      MALNUTRITION  % Intake: < 75% for > 7 days (moderate)  % Weight Loss: None noted -- likely confounded by fluid status  Subcutaneous Fat Loss: None observed   Muscle Loss: None observed  Fluid Accumulation/Edema: None noted   Malnutrition Diagnosis: Patient does not meet two of the established criteria necessary for diagnosing malnutrition but is at risk for malnutrition    NUTRITION DIAGNOSIS  Inadequate protein intake related to reduced appetite, increased needs as evidenced by pt diet history      INTERVENTIONS  Implementation  Nutrition Education: will be provided if nutrition education needs arise.    Medical food supplement therapy "     Goals  Patient to consume % of nutritionally adequate meal trays TID, or the equivalent with supplements/snacks.        Monitoring/Evaluation  Progress toward goals will be monitored and evaluated per protocol.    Dipti Smith, MPH, RDN, LD  7D/7C [phone])  Pager [phone]

## 2023-08-23 NOTE — PROGRESS NOTES
[facility]    Medicine Progress Note - Hospitalist Service, GOLD TEAM 9    Date of Admission:  8/21/2023    Assessment & Plan     Elroy Morel Sr. is a 43 year old male admitted on 8/21/2023.      43 year old male with a history of severe alcohol use disorder, decompensated cirrhosis (variceal bleeding, ascites), alcohol withdrawal seizures who presents to [facility] ER due to significantly elevated bilirubin total bili 20.2, referred from [facility]. GI Hepatology consulted in ED for evaluation of abnormal LFTs.  Also found to have JOCELYN with creatinine 4.76, sodium 130, CO2 20.     #Decompensated ETOH-related cirrhosis  #Severe alcohol-related hepatitis w/o evidence of liver failure, improving LFTs  # Direct Hyperbilirubinemia  # Coagulopathy w/ elevated INR  #Ascites  #Rule out SBP  #History of variceal bleeding  #Alcohol use disorder, severe  Primary Hepatologist: Follows at [facility] -> transitioned to [facility] with Dr. Hernandez.  On admission MELD 37, Maddrey DF 50. Abd US 8/21: Cirrhosis with evidence of portal hypertension and small to moderate volume ascites. Gallbladder sludge without definite evidence of acute cholecystitis or biliary obstruction   - GI consulted  - trend daily MELD score  - hepatitis labs: HbsAg negative, HbsAb negative, HCV RNA negative, Hepatitis A IgG+ w/ IgM negative  - Infxn w/up: BCX NGTD, UCX negative. CXR w/ clear lungs, diagnostic paracentesis 8/23 not consistent for SBP  - Ascites: + ascites on exam, no hx of SBP. S/p CAP team paracentesis 8/23  - Given significant elevation of Maddrey DF and MELD score, GI considered steroid treatment however held give severity of JOCELYN  - HE: no  hx of HE, no clinical concerns at this time  - EV: EGD 08/13/2023 -> Grade II esophageal varices banded, PHG.    - HCC: No liver lesions on US 08/2023  - Not a candidate for liver transplant at this time  - Limit/avoid tylenol use to < 2g per 24  hours.     Bloody diarrhea  Recent Hx of C.diff  - continue PO vanc ppx  - repeat C.diff testing for repeat infxn  - per GI, no need to repeat EGD at this time      #JOCELYN, uptrending  # Mild Hyponatremia  - Nephrology consulted, DDx included bili cast nephropathy, congestion with severe ascites and Type I HRS although BP's are better than one would expect for HRS typically. FeNa suggestive of prerenal etiology   - trend chem daily  - s/p fluid challenge in ED (end 8/22)  - IV albumin challenge with 1 g/kg -> 100 g daily for 3 days.   - Renal US: mild renal parenchymal disease w/o hydronephrosis  - Infxn w/up as above  - hold PTA Lisinopril  - monitor I/Os  - renally dose medications, avoid nephrotoxic medications (including NSAIDS)    #GERD  - Continue PTA pantoprazole 40 mg daily.     #Alcohol use disorder, severe  # Monitor for Alcohol withdrawal  # History of alcohol withdrawal seizure  Last ETOH consumption 8/19 per H&P  - Monitor and treat alcohol withdrawal using CIWA with lorazepam.  - Nutrition consulted, Multivitamin, thiamine, folic acid daily  -  consultation, encouraged alcohol abstinence  - addiction med consult, appreciate assistance  [ ] Dispo: patient interested in long-term inpatient detox program if able     Normocytic Anemia w/ elevated retic count  BRBPR as above  - trend CBC    #History of hypertension:   - Hold PTA lisinopril given JOCELYN and normotensive BPs          Diet: Renal Diet (non-dialysis)    DVT Prophylaxis: Pneumatic Compression Devices  Carroll Catheter: Not present  Lines: None     Cardiac Monitoring: None  Code Status: Full Code      Disposition Plan     Expected Discharge Date: 08/23/2023      Destination: [facility]  Discharge Comments: Dispo: TBD  Plan: diagnostic paracentesis; potential steroid Tx r/t severe EtOH hepatitis  Progress: albumin challenge (Day2/3); CIWA protocol; MELD 41, active drinker  - Transfer from [facility] r/t JOCELYN, concerning for hepatorenal syndrome           Jennifer Fletcher MD  Hospitalist Service, GOLD TEAM 9  [facility]  Securely message with [employer] (more info)  Text page via [employer] Paging/Directory   See signed in provider for up to date coverage information  ______________________________________________________________________    Interval History   No acute events overnight. No Ativan use for CIWA scoring/alcohol withdrawal. Renal function continues to increase, appreciated nephrology assistance. FENA prerenal. Renal US w/o obstruction. Continue albumin challenge and trending labs. Patient reporting bloody stools, discussed w/ GI and will repeat C.diff testing but no plan for scope at this time. Trend CBC. Paracentesis today w/o concerns for SBP on cell count. GI recommending against steroid use at this time for tx of ETOH hepatitis given elevated Cr. Viral hepatitis panel reassuring. Patient met w/ addiction medicine, appreciate their assistance w/ patient. Patient would like to discharge to inpatient ETOH rehab facility, update CM/SW for assistance when medically ready.      Physical Exam   Vital Signs: Temp: 98.6  F (37  C) Temp src: Oral BP: 91/52 Pulse: 87   Resp: 18 SpO2: 97 % O2 Device: None (Room air)    Weight: 0 lbs 0 oz    General Appearance:  Awake, interactive, lying on bed, severely jaundiced w/ +scleral icterus, NAD, pleasant  Respiratory: Normal work of breathing. CTA BL. On RA   Cardiovascular: S1 S2 Regular, no murmur appreciated.  GI/Abd: Soft. NT.  Diffuse distention (even after paracentesis).  No guarding or rigidity.  No peritoneal signs.  Extremities: Distally wwp. No pedal edema.  Neuro: AO x 4, Grossly non focal.  No asterixis.  Skin: No acute rash on exposed areas.       Medical Decision Making       50 MINUTES SPENT BY ME on the date of service doing chart review, history, exam, documentation & further activities per the note.  MANAGEMENT DISCUSSED with the following over the past 24  hours: GI, Nephrology, CAP       Data     I have personally reviewed the following data over the past 24 hrs:    N/A  \   N/A   / N/A     131 (L) 98 43.1 (H) /  84   3.7 15 (L) 6.24 (H) \     ALT: 46 AST: 149 (H) AP: 120 TBILI: 28.6 (HH)   ALB: 3.6 TOT PROTEIN: 6.2 (L) LIPASE: N/A     INR:  1.66 (H) PTT:  N/A   D-dimer:  N/A Fibrinogen:  187         MELD 3.0: 38 at 8/23/2023  5:42 AM  MELD-Na: 38 at 8/23/2023  5:42 AM  Calculated from:  Serum Creatinine: 6.24 mg/dL (Using max of 3 mg/dL) at 8/23/2023  5:42 AM  Serum Sodium: 131 mmol/L at 8/23/2023  5:42 AM  Total Bilirubin: 28.6 mg/dL at 8/23/2023  5:42 AM  Serum Albumin: 3.6 g/dL (Using max of 3.5 g/dL) at 8/23/2023  5:42 AM  INR(ratio): 1.66 at 8/23/2023  5:42 AM  Age at listing (hypothetical): 43 years  Sex: Male at 8/23/2023  5:42 AM    Imaging results reviewed over the past 24 hrs:   Recent Results (from the past 24 hour(s))   US Renal Complete Non-Vascular    Narrative    EXAMINATION: US RENAL COMPLETE NON-VASCULAR 8/22/2023 2:18 PM     COMPARISON: Limited abdominal ultrasound 8/21/2023.    HISTORY: New JOCELYN in cirrhotic patient.    TECHNIQUE: The kidneys and bladder were scanned in the standard  fashion with specialized ultrasound transducer(s) using both gray  scale and limited color/spectral Doppler techniques.    FINDINGS:    Right kidney: Measures 12.3 cm in length. Parenchyma is of normal  thickness with mildly increased echogenicity. No focal mass. No  hydronephrosis.    Left kidney: Measures 12.2 cm in length. Parenchyma is of normal  thickness with mildly increased echogenicity. No focal mass. No  hydronephrosis.     Bladder: Unremarkable.    Increased echogenicity, coarse echotexture, and nodular borders of the  liver. Large volume ascites.        Impression    IMPRESSION:  1.  Bilateral kidneys with mildly increased echogenicity suggestive of  renal parenchymal disease. No hydronephrosis.  2.  Increased echogenicity of the liver with nodular  borders  consistent with known history of cirrhosis.  3.  Large volume ascites.    I have personally reviewed the examination and initial interpretation  and I agree with the findings.    BELIA HEARD MD         SYSTEM ID:  [id number]     Recent Labs   Lab 08/23/23  0542 08/22/23  0559 08/21/23  2316 08/21/23  1630 08/21/23  1130   WBC  --  7.9  --   --  9.0   HGB  --  10.4*  --   --  12.3*   MCV  --  92  --   --  92   PLT  --  162  --   --  193   INR 1.66* 1.52*  --  1.43*  --    * 131* 128*  --  130*   POTASSIUM 3.7 3.8 3.8  --  4.0   CHLORIDE 98 97* 97*  --  95*   CO2 15* 19* 20*  --  20*   BUN 43.1* 34.3* 33.4*  --  26.8*   CR 6.24* 5.89* 5.59*  --  4.76*   ANIONGAP 18* 15 11  --  15   ENEDINA 9.0 9.0 8.6  --  9.4   GLC 84 92 107*  --  98   ALBUMIN 3.6 3.2*  --   --  2.7*   PROTTOTAL 6.2*  --   --   --   --    BILITOTAL 28.6* 29.4*  --   --  30.2*   ALKPHOS 120 123  --   --  161*   ALT 46 62  --   --  81*   * 176*  --   --  240*   LIPASE  --   --   --   --  398*

## 2023-08-23 NOTE — PLAN OF CARE
Hospitalist follow up note    Patient seen and examined. Reports subjective dyspnea secondary to abdominal distention  Denies abdominal pain or significant withdrawal  Continue to monitor renal parameters. Differential includes Biliary Cast Nephropathy, HRS  Continue CIWA for AUD    Emil Moser MD

## 2023-08-23 NOTE — PLAN OF CARE
Goal Outcome Evaluation:      Plan of Care Reviewed With: patient    Overall Patient Progress: no changeOverall Patient Progress: no change    Outcome Evaluation: Starting ONS, diet liberalized, navdeep counts to start. Watch phos labs for need to add phos binders. See RD note.    Dipti Smith, MPH, RDN, LD  7D/7C ([phone])  Pager [phone]

## 2023-08-23 NOTE — PLAN OF CARE
Assumed Cares: [id number]  Vitals: VSS on RA  Pain: Pt did not endorse pain but did c/o abd discomfort  Neuro: A&Ox4  Cardiac: WDL  Respiratory: WDL  GI/: Voiding spontaneously. No BM this shift   Skin: Jaundiced otherwise WDL  IV/Drains: L PIV SL  Activity: Ind  Events: Paracentesis today     Plan of Care: Continue with POC. Call light within reach. Able to make needs known.

## 2023-08-23 NOTE — CONSULTS
[facility]   Consult Note - Addiction Service     Date of Admission:  8/21/2023    Consult Requested by: Internal Medicine  Reason for Consult: Alcohol use disorder in the context of decompensated alcohol-related cirrhosis with JOCELYN    Assessment & Plan   Mr Morel is a 44 yo gentleman with decompensated alcoholhic cirrhosis admitted for worsening alcoholic hepatitis and  severe acute kidney injury    # Alcohol Use Disorder, severe  # Decompensated alcoholic cirrhosis  # Alcoholic hepatitis, severe  # Hx of variceal hemmorhage  # Acute kidney injury  Patient reports last drink was on Saturday 8/19/23. He has no current symptoms of withdrawal. Prev episodes of WD seizure x 1. He has expressed interest in treatment and was planning for chalo but became to medically complex. He was planing for treatment at [facility] but was sent to ED due to severe jaundice. His declining kidney function is concerning and dialysis may be needed soon. His alcohol use has been ongoing and quite recent. Hepatology has recommended against an expedited eval for liver transplantation. He is certainly a good candidate for medication assisted treatment for alcohol use disorder but gven the concurrent severity of his medical condition and guarded prognosis, would recommend against initiating anything today. We will often start with a trial of low dose naltrexone even in advanced liver disease and continue if well tolerated.  - Will consider starting naltrexone in the comine days pending clinical course and/or development of cravings and impulses to drink  - Depending on clinical course, we may be able to consider certain outpatient treatment options. There are some limitations if medical complexity is very high, especially if he ends up transitioning onto dialysis  - We will continue to follow  - We may consider chem dep for completion of comprehensive assessment    # Methamphetamine use  disorder, severe, in sustained remission  He was inovled with chem dep treatment for meth use. Was quite a heavy user (smoking) for a number of years but without use since 2003 after successful treatment     # Peer Support:   -Our peer  will meet the patient if agreeable and still hospitalized on Thursday, to provide additional outpatient resources  -To contact Patricia Santos  from [facility]): call or text: [phone]    # Addiction Social Worker:   Our addiction social worker Ben Rubio can be contacted on her pager [phone] or texted/called at [phone]    The patient's care was discussed with the Patient and Primary team.    I spent 90 minutes on the unit/floor managing the care of Elroy Morel . Over 50% of my time was spent on the following:   Significant education and counseling spent on: how substance use disorders and dependence occur, and how it can become a chronic relapsing and remitting medical condition.  In addition, the pharmacology of medical treatments including naltrexone, acamprosate and the importance of follow up, and the principles of Harm Reduction/pt centered goal-oriented treatment      Nba Patel MD  [facility]   Contact information available via [facility] Paging/Directory  Please see sign in/sign out for up to date coverage information    ChAT team (Addiction Consult Team): Coverage Monday-Friday 8-4pm     ______________________________________________________________________    Chief Complaint   Alcohol use disorder    History is obtained from the patient    History of Present Illness   Elroy Morel SrJuan José is a 43 year old male with history of severe alcohol use disorder, alcohol withdrawal seizures, decompensated alcoholic cirrhosis (variceal bleeding, ascites) who presented to the [facility] ER on 8/21 due to increasing jaundice noted by staff at [facility]. He had presented to [facility] for entry for alcohol detoxification.      He was recently hospitalized at [facility] variceal bleeding. He underwent urgent EGD and was noted to have grade II varices. These were banded successfully and eradicated. Patient was then managed supportively for concurrent alcoholic hepatitis and eventally discharged home. On review with patient and family, he felt very vulnerable following this discharge and despite his intentions not to drink, he was unable to maintain sobriety. His last drink was 4 days ago.      He also has a recently treated for c.diff infection in July (treated with 10-day course of Vancomycin), but also Norovirus infection.      In regards to his alcohol use --> He has been a chronic daily dirnking for at least 18 years. His use was heavy but much more 'out of control' over the last 5 years. Reports that during this time, he was aware of alcohoplic damage to liver but was unsussful in attempts to cut back. Drink of choice was vodka and the transitioning to [employer]. He has not specifcally looked for any alcohol treatment prior to his health issues starting in July. He is currently quite anxious and worried    Drug/Substance of Choice:  Alcohol    Withdrawal history: Yes, shales and anxiety. One episode of alc withdrawal with sezire in Oct 2022      Other substances used: remote hx of heavy methamphetamine use now in sustained remission since 2003    Employed: Yes  Housing status/insecurity: No  Where patient lives/what town:[facility]  Transportation status/insecurity: NO  Telephone status/insecurity: No  HIV status: Unknown --> 4th gen ag/ab ordered  Hep C status: Ab negative Aug 2023  Hep A status per [facility] or Hep A IgG:Immune by titer  Hep B status per [facility] or serologies: Immune by titer  Sexually active: Yes, with wife      Review of Systems   The 10 point Review of Systems is negative other than noted in the HPI or here.    Past Medical History:   Diagnosis Date    Alcohol use  disorder, severe, dependence (H)     Alcohol withdrawal seizure (H)     Alcoholic cirrhosis of liver with ascites (H)     Esophageal varices (H)     Essential hypertension     Gastroesophageal reflux disease without esophagitis     History of methamphetamine use     Sustained remission since 2003    Hypercholesterolemia     Tobacco abuse        Patient Active Problem List   Diagnosis    Cirrhosis (H)        History reviewed. No pertinent surgical history.    Social History   Social History     Socioeconomic History    Marital status:      Spouse name: Not on file    Number of children: Not on file    Years of education: Not on file    Highest education level: Not on file   Occupational History    Not on file   Tobacco Use    Smoking status: Former     Types: Cigarettes    Smokeless tobacco: Former   Substance and Sexual Activity    Alcohol use: Yes     Comment: Heavy daily use p to 1 liter vodka daily    Drug use: Not Currently     Types: Amphetamines     Comment: Sustained remission    Sexual activity: Not on file   Other Topics Concern    Not on file   Social History Narrative    Pt is , 2 children. Employed.      Social Determinants of Health     Financial Resource Strain: Not on file   Food Insecurity: Not on file   Transportation Needs: Not on file   Physical Activity: Not on file   Stress: Not on file   Social Connections: Not on file   Intimate Partner Violence: Not on file   Housing Stability: Not on file       Family History     No significant family history, including no history      Medications   Prior to Admission medications    Medication Sig Start Date End Date Taking? Authorizing Provider   aspirin 81 MG EC tablet [ASPIRIN 81 MG EC TABLET] Take 81 mg by mouth daily. 9/8/20  Yes Provider, Historical   folic acid (FOLVITE) 400 MCG tablet Take 400 mcg by mouth daily   Yes Reported, Patient   lisinopril (ZESTRIL) 40 MG tablet Take 40 mg by mouth daily   Yes Unknown, Entered By History    pantoprazole (PROTONIX) 40 MG tablet [PANTOPRAZOLE (PROTONIX) 40 MG TABLET] Take 40 mg by mouth daily. 9/8/20  Yes Provider, Historical   rosuvastatin (CRESTOR) 10 MG tablet Take 10 mg by mouth daily   Yes Unknown, Entered By History        Allergies   No Known Allergies    Physical Exam   BP 91/52 (BP Location: Left arm)   Pulse 87   Temp 98.6  F (37  C) (Oral)   Resp 18   SpO2 97%    Physical Exam  Constitutional:       General: He is not in acute distress.     Appearance: He is ill-appearing.   HENT:      Head: Normocephalic.   Eyes:      General: Scleral icterus present.   Cardiovascular:      Rate and Rhythm: Normal rate.      Pulses: Normal pulses.      Heart sounds: No murmur heard.  Pulmonary:      Effort: Pulmonary effort is normal. No respiratory distress.   Abdominal:      General: There is distension.      Palpations: There is no mass.      Tenderness: There is no abdominal tenderness. There is no rebound.   Musculoskeletal:         General: No swelling. Normal range of motion.      Cervical back: Normal range of motion.   Skin:     Capillary Refill: Capillary refill takes less than 2 seconds.      Coloration: Skin is jaundiced.   Neurological:      General: No focal deficit present.      Mental Status: He is alert.          Due to regulation of Title 42 of the Code of Federal Regulations (CFR) Part 2: Confidentiality laws apply to this note and the information wherein.  Thus, this note cannot be copy and pasted into any other health care staff's note nor can it be included in general medical records sent to ANY outside agency without the patient's written consent.

## 2023-08-23 NOTE — PLAN OF CARE
BP 90/51 (BP Location: Right arm)   Pulse 88   Temp 98.6  F (37  C) (Oral)   Resp 18   SpO2 97%    Time: 8445-5995   Reason for admission: Cirrhosis.  Activity: Independent.   Pain: denied pain or discomfort.   Neuro: alert and oriented.   Cardiac: WDL  Resp: denied SOB, lung sounds clear bilaterally.   GI/: renal diet, bowel sounds present x four quadrants.   Lines: L PIV.   Skin: WDL X jaundiced.   Labs/Imaging: no lab or imaging this shift.   New changes to shift: no change.   Plan: Paracentesis today.

## 2023-08-23 NOTE — PROGRESS NOTES
Nephrology Progress Note  08/23/2023       Elroy Morel Sr. is a 43 yom with hx of ETOH cirrhosis c/b EV admitted with worsening jaundice and JOCELYN.  Baseline Cr ~1.0 as recently as 8/11/2023 now up suddenly to 4.8 on admission 8/21/2023 correlating with bili rising from 1.9 (6/2023)=>12.6 (8/11/2023) to 29 on admission.  Still actively drinking.  Nephrology consulted for management of JOCELYN.       Interval History :   Mr Morel's Cr is a bit up today but with increasing UOP his renal fx may be stabilizing.  No issue requiring consideration of RRT in chemistries and volume is not an issue with regards to edema or pulm status.  Will continue to monitor, if renal fx does not show encouraging signs tomorrow we would consider terlipressin after discussion with GI.  Of note, urine microscopy showed no casts which is most consistent with HRS (particularly given his reasonable BP's with liver disease).      Assessment & Recommendations:   JOCELYN-Dramatic rise in Cr the past 11 days from 0.9=>5.9 correlating with bili rising from 12 to 30.  Bette borderline at 27, UA with hyaline casts, UPCR 0.2g/g consistent with tubular range.  DDx included bili cast nephropathy, congestion with severe ascites and Type I HRS although BP's are better than one would expect for HRS typically.  Giving 100g albumin and working on para to treat possible congestive nephropathy. No NSAID's but is on lisinopril at baseline which is being held currently.  I did discuss that it is possible he will need dialysis in the coming days and that the mortality in his current situation is very high.  He appears to understand, is a bit withdrawn on my interview.  Dr Gomez and I spun his urine today which showed no granular or hyaline casts.    -JOCELYN, DDx Bili cast, congestion, HRS.    -Agree with albumin 100g daily x3 days (3rd day today)  -Para 4L today, will follow renal response.    -No issues in chemistries or volume requiring RRT, hopeful that renal fx is starting  to recover with increased UOP.       Volume-No peripheral edema but massive ascites with taut abdomen, improved a bit after 4L para.  Making more UOP with ~600cc this am, resp status stable.      Electrolytes-K 3.7, bicarb 15, Na 131.      Hepatology-ETOH cirrhosis, MELD 41 but not a candidate for liver as he is actively drinking.       BMD-Ca 9.0, Mg 2.0, Phos 4.8 last check, no acute issues.      Anemia-Hgb 10.4, down from 12.3 on admission.       Nutrition-Appetite ok per his report.      Time spent: 40 minutes on this date of encounter for chart review, physical exam, medical decision making and co-ordination of care.      Recommendations were communicated to primary team via verbal communication.      TAHMINA Xiong CNS  Clinical Nurse Specialist  [phone]      Review of Systems:   I reviewed the following systems:  Gen: No fevers or chills  CV: No CP at rest  Resp: No SOB at rest  GI: No N/V    Physical Exam:   I/O last 3 completed shifts:  In: -   Out: 75 [Urine:75]   /58 (BP Location: Right arm)   Pulse 83   Temp 98.2  F (36.8  C) (Oral)   Resp 16   Wt 119.9 kg (264 lb 6.4 oz)   SpO2 99%   BMI 39.05 kg/m       GENERAL APPEARANCE: alert and no distress, jaundiced  EYES: + scleral icterus  Pulmonary: lungs Breathing comfortably on RA  CV: Regular rhythm, normal rate, no rub   - Edema None in LE, +++abd distension.    GI: taut, nontender, no fluid wave on percussion.    MS: no evidence of inflammation in joints, no muscle tenderness  : No Carroll  SKIN: no rash, warm, dry  NEURO: mentation intact and speech normal    Labs:   All labs reviewed by me  Electrolytes/Renal -   Recent Labs   Lab Test 08/23/23  0542 08/22/23  0559 08/21/23  2316   * 131* 128*   POTASSIUM 3.7 3.8 3.8   CHLORIDE 98 97* 97*   CO2 15* 19* 20*   BUN 43.1* 34.3* 33.4*   CR 6.24* 5.89* 5.59*   GLC 84 92 107*   ENEDINA 9.0 9.0 8.6   MAG  --   --  2.0   PHOS  --   --  4.8*       CBC -   Recent Labs   Lab Test  08/22/23  0559 08/21/23  1130   WBC 7.9 9.0   HGB 10.4* 12.3*    193       LFTs -   Recent Labs   Lab Test 08/23/23  0542 08/22/23  0559 08/21/23  1130 08/11/23  1412 06/23/23  1335   ALKPHOS 120 123 161* 213* 142*   BILITOTAL 28.6* 29.4* 30.2* 12.6* 1.9*   ALT 46 62 81* 109* 64   * 176* 240* 428* 166*   PROTTOTAL 6.2*  --   --  7.6 8.1   ALBUMIN 3.6 3.2* 2.7* 3.1* 3.5       Iron Panel -   Recent Labs   Lab Test 08/21/23  1130   IRON 71   IRONSAT 55*           Current Medications:   albumin human  100 g Intravenous Q24H    folic acid  1 mg Oral Daily    multivitamin w/minerals  1 tablet Oral Daily    pantoprazole  40 mg Oral BID AC    polyethylene glycol  17 g Oral Daily    sodium chloride (PF)  3 mL Intracatheter Q8H    thiamine  100 mg Oral Daily

## 2023-08-23 NOTE — PROGRESS NOTES
HEPATOLOGY PROGRESS NOTE    ASSESSMENT:  Elroy Morel Sr. is a 43 year old male with a history of alcohol use disorder, decompensated cirrhosis (variceal bleeding, ascites), alcohol withdrawal seizures who presented with abdominal distension + jaundice referred from [facility]. GI Hepatology consulted for evaluation of abnormal LFTs.     #Decompensated alcohol related cirrhosis  #Severe alcohol-related hepatitis  #Ascites  #History of variceal bleeding  #JOCELYN  #Alcohol use disorder  MELD 3.0: 38    He is not a liver transplant candidate at this time do to ongoing alcohol use with his last EtOH use just prior to admission.     Blood cultures and urine cultures since admission without any growth. CXR without evidence of infection. Diagnostic para was negative for SBP. Patient with norovirus and worsening diarrhea. Recent C. Diff infection s/p treatment with vancomycin, and current colonization. No evidence of encephalopathy or overt signs of symptoms of bleeding.      Patient with severe acute kidney injury - uptrending. Nephrology consulted. Urine studies with hyaline casts + 0.2g/g UP/Cr ratio with concern for bilirubin cast nephropathy + prior to admission was on lisinopril + aspirin. Renal US: Bilateral kidneys with mildly increased echogenicity suggestive of renal parenchymal disease. No hydronephrosis. Currently receiving albumin challenge.     RECOMMENDATIONS:  - Given ongoing diarrhea and creatinine > 2.5, we cannot treat his alcoholic hepatitis with steroids  - No indication for procedure at this time with lower GI bleeding, but will re-test C diff (ordered for  you)  - Noted that tonight will be the 3rd day of his albumin challenge  - Daily LFTs and CBC  - Appreciate nephrology input  - Continue [facility] protocol  - High protein low sodium (< 2 g) diet  - Monitor stools for blood  - Transfuse for hemoglobin < 7     The patient was discussed and plan agreed upon with GI staff.    Adry Martin MD PhD    Gastroenterology Fellow    _______________________________________________________________  S: Pt noting some bloody stools again. Diarrhea is worsening. Afebrile overnight. Denies fevers or chills.     O:  Blood pressure 104/54, pulse 85, temperature 98.1  F (36.7  C), temperature source Oral, resp. rate 16, SpO2 99 %.    Gen: Lying in bed, NAD  HEENT: MMM, icteric conjunctivae  Lungs: breathing comfortably on RA  Abd: soft, nontender, distended with ascites  Skin: no visible rashes, + jaundice  Card: no LE swelling  Neuro: no focal deficits, no asterixis, A&O x3  Psych: normal affect      LABS:  BMP  Recent Labs   Lab 08/23/23  0542 08/22/23  0559 08/21/23  2316 08/21/23  1130   * 131* 128* 130*   POTASSIUM 3.7 3.8 3.8 4.0   CHLORIDE 98 97* 97* 95*   ENEDINA 9.0 9.0 8.6 9.4   CO2 15* 19* 20* 20*   BUN 43.1* 34.3* 33.4* 26.8*   CR 6.24* 5.89* 5.59* 4.76*   GLC 84 92 107* 98     CBC  Recent Labs   Lab 08/22/23  0559 08/21/23  1130   WBC 7.9 9.0   RBC 3.06* 3.63*   HGB 10.4* 12.3*   HCT 28.1* 33.4*   MCV 92 92   MCH 34.0* 33.9*   MCHC 37.0* 36.8*   RDW 21.1* 21.2*    193     INR  Recent Labs   Lab 08/23/23  0542 08/22/23  0559 08/21/23  1630   INR 1.66* 1.52* 1.43*     LFTs  Recent Labs   Lab 08/23/23  0542 08/22/23  0559 08/21/23  1130   ALKPHOS 120 123 161*   * 176* 240*   ALT 46 62 81*   BILITOTAL 28.6* 29.4* 30.2*   PROTTOTAL 6.2*  --   --    ALBUMIN 3.6 3.2* 2.7*      PANC  Recent Labs   Lab 08/21/23  1130   LIPASE 398*

## 2023-08-23 NOTE — PLAN OF CARE
Goal Outcome Evaluation:  /58 (BP Location: Right arm)   Pulse 83   Temp 98.2  F (36.8  C) (Oral)   Resp 16   Wt 119.9 kg (264 lb 6.4 oz)   SpO2 99%   BMI 39.05 kg/m       [id number]    Neuro: A&Ox4.   Cardiac: SR. VSS.   Respiratory: Sating 99% on RA.  GI/: No bm. Adequate urine output.   Diet/appetite: Tolerating current diet. Eating well.  Activity: Stand by assist  up to chair.  Pain: At acceptable level on current regimen.   Skin: No new deficits noted.  LDA's:PIV saline locked.  Plan: Continue with POC. Notify primary team with changes.

## 2023-08-24 LAB
ALBUMIN SERPL BCG-MCNC: 3.6 G/DL (ref 3.5–5.2)
ALP SERPL-CCNC: 91 U/L (ref 40–129)
ALT SERPL W P-5'-P-CCNC: 38 U/L (ref 0–70)
ANION GAP SERPL CALCULATED.3IONS-SCNC: 16 MMOL/L (ref 7–15)
APTT PPP: 37 SECONDS (ref 22–38)
AST SERPL W P-5'-P-CCNC: 133 U/L (ref 0–45)
BASOPHILS # BLD AUTO: 0 10E3/UL (ref 0–0.2)
BASOPHILS NFR BLD AUTO: 1 %
BILIRUB SERPL-MCNC: 27.1 MG/DL
BUN SERPL-MCNC: 46.4 MG/DL (ref 6–20)
C DIFF GDH STL QL IA: POSITIVE
C DIFF TOX A+B STL QL IA: NEGATIVE
C DIFF TOX B STL QL: POSITIVE
CALCIUM SERPL-MCNC: 9 MG/DL (ref 8.6–10)
CHLORIDE SERPL-SCNC: 100 MMOL/L (ref 98–107)
CREAT SERPL-MCNC: 6.2 MG/DL (ref 0.67–1.17)
DEPRECATED HCO3 PLAS-SCNC: 16 MMOL/L (ref 22–29)
EOSINOPHIL # BLD AUTO: 0 10E3/UL (ref 0–0.7)
EOSINOPHIL NFR BLD AUTO: 1 %
ERYTHROCYTE [DISTWIDTH] IN BLOOD BY AUTOMATED COUNT: 21.1 % (ref 10–15)
GFR SERPL CREATININE-BSD FRML MDRD: 11 ML/MIN/1.73M2
GLUCOSE SERPL-MCNC: 81 MG/DL (ref 70–99)
HCT VFR BLD AUTO: 26.4 % (ref 40–53)
HGB BLD-MCNC: 9.6 G/DL (ref 13.3–17.7)
HIV 1+2 AB+HIV1 P24 AG SERPL QL IA: NONREACTIVE
IMM GRANULOCYTES # BLD: 0.1 10E3/UL
IMM GRANULOCYTES NFR BLD: 1 %
INR PPP: 1.72 (ref 0.85–1.15)
LYMPHOCYTES # BLD AUTO: 0.8 10E3/UL (ref 0.8–5.3)
LYMPHOCYTES NFR BLD AUTO: 12 %
MAGNESIUM SERPL-MCNC: 1.9 MG/DL (ref 1.7–2.3)
MCH RBC QN AUTO: 34.2 PG (ref 26.5–33)
MCHC RBC AUTO-ENTMCNC: 36.4 G/DL (ref 31.5–36.5)
MCV RBC AUTO: 94 FL (ref 78–100)
MONOCYTES # BLD AUTO: 1 10E3/UL (ref 0–1.3)
MONOCYTES NFR BLD AUTO: 15 %
NEUTROPHILS # BLD AUTO: 4.4 10E3/UL (ref 1.6–8.3)
NEUTROPHILS NFR BLD AUTO: 70 %
NRBC # BLD AUTO: 0 10E3/UL
NRBC BLD AUTO-RTO: 0 /100
PHOSPHATE SERPL-MCNC: 4.1 MG/DL (ref 2.5–4.5)
PLATELET # BLD AUTO: 129 10E3/UL (ref 150–450)
POTASSIUM SERPL-SCNC: 3.7 MMOL/L (ref 3.4–5.3)
PROT SERPL-MCNC: ABNORMAL G/DL
RBC # BLD AUTO: 2.81 10E6/UL (ref 4.4–5.9)
SODIUM SERPL-SCNC: 132 MMOL/L (ref 136–145)
WBC # BLD AUTO: 6.3 10E3/UL (ref 4–11)

## 2023-08-24 PROCEDURE — 87324 CLOSTRIDIUM AG IA: CPT | Performed by: STUDENT IN AN ORGANIZED HEALTH CARE EDUCATION/TRAINING PROGRAM

## 2023-08-24 PROCEDURE — 84450 TRANSFERASE (AST) (SGOT): CPT | Performed by: INTERNAL MEDICINE

## 2023-08-24 PROCEDURE — 250N000013 HC RX MED GY IP 250 OP 250 PS 637: Performed by: INTERNAL MEDICINE

## 2023-08-24 PROCEDURE — 87493 C DIFF AMPLIFIED PROBE: CPT | Performed by: STUDENT IN AN ORGANIZED HEALTH CARE EDUCATION/TRAINING PROGRAM

## 2023-08-24 PROCEDURE — 99207 PR APP CREDIT; MD BILLING SHARED VISIT: CPT | Performed by: ORTHOPAEDIC SURGERY

## 2023-08-24 PROCEDURE — 85610 PROTHROMBIN TIME: CPT | Performed by: INTERNAL MEDICINE

## 2023-08-24 PROCEDURE — 99232 SBSQ HOSP IP/OBS MODERATE 35: CPT | Mod: FS | Performed by: CLINICAL NURSE SPECIALIST

## 2023-08-24 PROCEDURE — 99233 SBSQ HOSP IP/OBS HIGH 50: CPT | Mod: GC | Performed by: INTERNAL MEDICINE

## 2023-08-24 PROCEDURE — 83735 ASSAY OF MAGNESIUM: CPT | Performed by: INTERNAL MEDICINE

## 2023-08-24 PROCEDURE — 36415 COLL VENOUS BLD VENIPUNCTURE: CPT | Performed by: INTERNAL MEDICINE

## 2023-08-24 PROCEDURE — 99233 SBSQ HOSP IP/OBS HIGH 50: CPT | Performed by: INTERNAL MEDICINE

## 2023-08-24 PROCEDURE — 85730 THROMBOPLASTIN TIME PARTIAL: CPT | Performed by: INTERNAL MEDICINE

## 2023-08-24 PROCEDURE — 85004 AUTOMATED DIFF WBC COUNT: CPT | Performed by: INTERNAL MEDICINE

## 2023-08-24 PROCEDURE — 84100 ASSAY OF PHOSPHORUS: CPT | Performed by: INTERNAL MEDICINE

## 2023-08-24 PROCEDURE — 120N000002 HC R&B MED SURG/OB UMMC

## 2023-08-24 RX ADMIN — Medication 1 TABLET: at 09:33

## 2023-08-24 RX ADMIN — PANTOPRAZOLE SODIUM 40 MG: 40 TABLET, DELAYED RELEASE ORAL at 09:33

## 2023-08-24 RX ADMIN — PANTOPRAZOLE SODIUM 40 MG: 40 TABLET, DELAYED RELEASE ORAL at 17:55

## 2023-08-24 RX ADMIN — THIAMINE HCL TAB 100 MG 100 MG: 100 TAB at 09:33

## 2023-08-24 RX ADMIN — FOLIC ACID 1 MG: 1 TABLET ORAL at 09:33

## 2023-08-24 ASSESSMENT — ACTIVITIES OF DAILY LIVING (ADL)
ADLS_ACUITY_SCORE: 18

## 2023-08-24 NOTE — PROGRESS NOTES
GASTROENTEROLOGY PROGRESS NOTE    ASSESSMENT:  Elroy Morel Sr. is a 43 year old male with a history of alcohol use disorder, decompensated cirrhosis (variceal bleeding, ascites), alcohol withdrawal seizures who presented with abdominal distension + jaundice referred from [facility]. GI Hepatology consulted for evaluation of abnormal LFTs.     #Decompensated alcohol related cirrhosis  #Severe alcohol-related hepatitis  #Ascites  #History of variceal bleeding  #JOCELYN, unresponsive to albumin challenge  #Alcohol use disorder  MELD 3.0: 38     He is not a liver transplant candidate at this time do to ongoing alcohol use with his last EtOH use just prior to admission.      Blood cultures and urine cultures since admission without any growth. CXR without evidence of infection. Diagnostic para was negative for SBP. Patient with norovirus and worsening diarrhea. Recent C. Diff infection s/p treatment with vancomycin, and current colonization. No evidence of encephalopathy or overt signs of symptoms of bleeding.      Patient with severe acute kidney injury - uptrending. Nephrology consulted. Urine studies with hyaline casts + 0.2g/g UP/Cr ratio with concern for bilirubin cast nephropathy + prior to admission was on lisinopril + aspirin. Renal US: Bilateral kidneys with mildly increased echogenicity suggestive of renal parenchymal disease. No hydronephrosis. Received 3 days of albumin challenge without improvement in his Cr.      RECOMMENDATIONS:  - Pt has had 3 days of albumin challenge without improvement and low blood pressures, consistent with HRS. We would support terlipressin use if nephrology also supports this.   - Given ongoing diarrhea and creatinine > 2.5, we cannot treat his alcoholic hepatitis with steroids  - No indication for procedure at this time with lower GI bleeding, but will re-test C diff (ordered for  you)  - Daily LFTs and CBC  - Appreciate nephrology input  - Continue CIWA protocol  - High protein  low sodium (< 2 g) diet  - Monitor stools for blood  - Transfuse for hemoglobin < 7     The patient was discussed and plan agreed upon with GI staff.    Adry Martin MD PhD   Gastroenterology Fellow    ATTENDING NOTE HEPATOLOGY    I saw and discussed this patient with the fellow and participated in the decision making. I agree with the fellow's note. Alyssa Tsai MD      _______________________________________________________________  S: NAEON, afebrile, albumin challenge did not help Cr. Pt still having some blood with BM. Bright red. Having more solid stools.     O:  Blood pressure 101/60, pulse 86, temperature 98.6  F (37  C), temperature source Oral, resp. rate 20, weight 119.9 kg (264 lb 6.4 oz), SpO2 97 %.    Gen: Lying in bed, NAD  HEENT: MMM, icteric conjunctivae  Lungs: breathing comfortably on RA  Abd: soft, nontender, distended with ascites  Skin: no visible rashes, + jaundice  Card: no LE swelling  Neuro: no focal deficits, no asterixis, A&O x3  Psych: normal affect      LABS:  BMP  Recent Labs   Lab 08/24/23  0618 08/23/23  0542 08/22/23  0559 08/21/23  2316   * 131* 131* 128*   POTASSIUM 3.7 3.7 3.8 3.8   CHLORIDE 100 98 97* 97*   ENEDINA 9.0 9.0 9.0 8.6   CO2 16* 15* 19* 20*   BUN 46.4* 43.1* 34.3* 33.4*   CR 6.20* 6.24* 5.89* 5.59*   GLC 81 84 92 107*     CBC  Recent Labs   Lab 08/24/23  0618 08/22/23  0559 08/21/23  1130   WBC 6.3 7.9 9.0   RBC 2.81* 3.06* 3.63*   HGB 9.6* 10.4* 12.3*   HCT 26.4* 28.1* 33.4*   MCV 94 92 92   MCH 34.2* 34.0* 33.9*   MCHC 36.4 37.0* 36.8*   RDW 21.1* 21.1* 21.2*   * 162 193     INR  Recent Labs   Lab 08/24/23 0618 08/23/23  0542 08/22/23  0559 08/21/23  1630   INR 1.72* 1.66* 1.52* 1.43*     LFTs  Recent Labs   Lab 08/24/23 0618 08/23/23 0542 08/22/23  0559 08/21/23  1130   ALKPHOS 91 120 123 161*   * 149* 176* 240*   ALT 38 46 62 81*   BILITOTAL 27.1* 28.6* 29.4* 30.2*   PROTTOTAL  --  6.2*  --   --    ALBUMIN 3.6 3.6 3.2* 2.7*       PANC  Recent Labs   Lab 08/21/23  1130   LIPASE 398*       No new imaging.

## 2023-08-24 NOTE — PROGRESS NOTES
[facility]    Medicine Progress Note - Hospitalist Service, GOLD TEAM 9    Date of Admission:  8/21/2023    Assessment & Plan     Elroy Morel Sr. is a 43 year old male with a history of severe alcohol use disorder, decompensated cirrhosis (variceal bleeding, ascites), alcohol withdrawal seizures who presents to [facility] ER due to significantly elevated bilirubin total bili 20.2, referred from [facility]. GI Hepatology consulted in ED for evaluation of abnormal LFTs.  Also found to have JOCELYN with creatinine 4.76, sodium 130, CO2 20. JOCELYN suspected to be HRS based on albumin challenge. LFTs slowly downtrending.     #Decompensated ETOH-related cirrhosis  #Severe alcohol-related hepatitis w/o evidence of liver failure (improving LFTs)  # Direct Hyperbilirubinemia (improving)  # Coagulopathy w/ elevated INR  #Ascites  #Rule out SBP  #History of variceal bleeding  #Alcohol use disorder, severe  Primary Hepatologist: Follows at [facility] -> transitioned to [facility] with Dr. Hernandez.  On admission MELD 37, Maddrey DF 50. Abd US 8/21: Cirrhosis with evidence of portal hypertension and small to moderate volume ascites. Gallbladder sludge without definite evidence of acute cholecystitis or biliary obstruction   - GI consulted  - trend daily MELD score  - hepatitis labs: HbsAg negative, HbsAb negative, HCV RNA negative, Hepatitis A IgG+ w/ IgM negative, HIV negative  - Infxn w/up: BCX NGTD, UCX negative. CXR w/ clear lungs, diagnostic paracentesis 8/23 not consistent for SBP  - Ascites: + ascites on exam, no hx of SBP. S/p CAP team paracentesis 8/23 w/ 4L removal  - Given significant elevation of Maddrey DF and MELD score, GI considered steroid treatment however held give severity of JOCELYN  - HE: no hx of HE, no clinical concerns at this time  - EV: EGD 08/13/2023 -> Grade II esophageal varices banded, PHG.    - HCC: No liver lesions on US 08/2023  - Not a candidate for  liver transplant at this time  - Limit/avoid tylenol use to < 2g per 24 hours.     Bloody diarrhea  Recent Hx of C.diff  Normocytic Anemia  - continue PO vanc ppx  - trend CBC  - repeat C.diff testing for eval repeat infxn  - per GI, no need to repeat EGD at this time      #JOCELYN suspect 2/2 HRS, plateauing  # Mild Hyponatremia  - Nephrology consulted, DDx included bili cast nephropathy, congestion with severe ascites and Type I HRS although BP's are better than one would expect for HRS typically. FeNa suggestive of prerenal etiology   - trend chem daily  - s/p fluid challenge in ED (end 8/22)  - s/p IV albumin challenge with 1 g/kg -> 100 g daily for 3 days (end 8/23) w/o improvement in CR consistent w/ HRS    - will consider possible trial terlipressin if JOCELYN worsens  - Renal US: mild renal parenchymal disease w/o hydronephrosis  - Infxn w/up as above  - hold PTA Lisinopril  - monitor I/Os  - renally dose medications, avoid nephrotoxic medications (including NSAIDS)    #GERD  - Continue PTA pantoprazole 40 mg daily.     #Alcohol use disorder, severe  # Monitor for Alcohol withdrawal  # History of alcohol withdrawal seizure  Last ETOH consumption 8/19 per H&P  - discontinue CIWA monitoring and prn lorazepam as patient has shown no signs of withdrawal since admission (8/23)  - Nutrition consulted, Multivitamin, thiamine, folic acid daily  -  consultation, encouraged alcohol abstinence  - addiction med consult, appreciate assistance  [ ] Dispo: patient interested in long-term inpatient detox program if able. Plan to discharge to [facility] when medically ready (do not accept patients on weekend)     Normocytic Anemia w/ elevated retic count  BRBPR as above  - trend CBC    #History of hypertension:   - Hold PTA lisinopril given JOCELYN and normotensive BPs          Diet: Fluid restriction 2000 ML FLUID  Combination Diet Regular Diet; High Kcal/High Protein Diet, ADULT; 2 gm NA Diet  Calorie  Counts  Snacks/Supplements Adult: Ensure Enlive; With Meals    DVT Prophylaxis: Pneumatic Compression Devices  Carroll Catheter: Not present  Lines: None     Cardiac Monitoring: None  Code Status: Full Code      Disposition Plan     Expected Discharge Date: 08/25/2023      Destination: [facility]  Discharge Comments: Dispo: TBD  Plan: diagnostic paracentesis; potential steroid Tx r/t severe EtOH hepatitis  Progress: albumin challenge (Day2/3); CIWA protocol; MELD 41, active drinker  - Transfer from [facility] r/t JOCELYN, concerning for hepatorenal syndrome          Jennifer Fletcher MD  Hospitalist Service, GOLD TEAM 9  [facility]  Securely message with [address] (more info)  Text page via [facility] Paging/Directory   See signed in provider for up to date coverage information  ______________________________________________________________________    Interval History   No acute events overnight. No signs of withdrawal, patient denies symptoms of withdrawal and all CIWA checks scored 0 x3d, will discontinue monitoring. Cr appears to have stabilized, will discuss results w/ Nephrology/GI team. Patient reporting UOP has increased a lot over the past 1-2d which is reassuring. LFTs/Bili labs additionally improving. Reports good appetite. Continued blood stools (reports intermittently), will monitor H/H and send C.diff panel when able to collect sample. CM/SW reporting that [facility] but would be able to discharge to [facility] when medically stable.     Physical Exam   Vital Signs: Temp: 98.7  F (37.1  C) Temp src: Oral BP: 120/67 Pulse: 89   Resp: 20 SpO2: 98 % O2 Device: None (Room air)    Weight: 264 lbs 6.4 oz    General Appearance:  Awake, interactive, lying on bed, severely jaundiced w/ +scleral icterus, NAD, pleasant  Respiratory: Normal work of breathing. CTA BL. On RA   Cardiovascular: S1 S2 Regular, no murmur appreciated.  GI/Abd: Soft. NT.  Diffuse  distention (even after paracentesis).  No guarding or rigidity.  No peritoneal signs.  Extremities: Distally wwp. No pedal edema.  Neuro: AO x 4, Grossly non focal.  No asterixis.  Skin: No acute rash on exposed areas.       Medical Decision Making       50 MINUTES SPENT BY ME on the date of service doing chart review, history, exam, documentation & further activities per the note.  MANAGEMENT DISCUSSED with the following over the past 24 hours: GI, Nephrology       Data     I have personally reviewed the following data over the past 24 hrs:    6.3  \   9.6 (L)   / 129 (L)     N/A N/A N/A /  N/A   N/A N/A N/A \     INR:  1.72 (H) PTT:  37   D-dimer:  N/A Fibrinogen:  N/A         MELD 3.0: 39 at 8/24/2023  6:18 AM  MELD-Na: 38 at 8/24/2023  6:18 AM  Calculated from:  Serum Creatinine: 6.24 mg/dL (Using max of 3 mg/dL) at 8/23/2023  5:42 AM  Serum Sodium: 131 mmol/L at 8/23/2023  5:42 AM  Total Bilirubin: 28.6 mg/dL at 8/23/2023  5:42 AM  Serum Albumin: 3.6 g/dL (Using max of 3.5 g/dL) at 8/23/2023  5:42 AM  INR(ratio): 1.72 at 8/24/2023  6:18 AM  Age at listing (hypothetical): 43 years  Sex: Male at 8/24/2023  6:18 AM    Imaging results reviewed over the past 24 hrs:   Recent Results (from the past 24 hour(s))   POC US GUIDE FOR PARACENTESIS    Impression    US Indication: decompensated liver disease and abdominal distension    Limited abdominal ultrasound was performed and demonstrated an adequate fluid collection on the right side of the abdomen.     Doppler of the skin demonstrated an area at this site without significant vasculature.  A paracentesis at this site was subsequently performed.    Indira Garcias MD       Recent Labs   Lab 08/24/23  0618 08/23/23  0542 08/22/23  0559 08/21/23  2316 08/21/23  1630 08/21/23  1130   WBC 6.3  --  7.9  --   --  9.0   HGB 9.6*  --  10.4*  --   --  12.3*   MCV 94  --  92  --   --  92   *  --  162  --   --  193   INR 1.72* 1.66* 1.52*  --    < >  --    NA  --  131*  131* 128*  --  130*   POTASSIUM  --  3.7 3.8 3.8  --  4.0   CHLORIDE  --  98 97* 97*  --  95*   CO2  --  15* 19* 20*  --  20*   BUN  --  43.1* 34.3* 33.4*  --  26.8*   CR  --  6.24* 5.89* 5.59*  --  4.76*   ANIONGAP  --  18* 15 11  --  15   ENEDINA  --  9.0 9.0 8.6  --  9.4   GLC  --  84 92 107*  --  98   ALBUMIN  --  3.6 3.2*  --   --  2.7*   PROTTOTAL  --  6.2*  --   --   --   --    BILITOTAL  --  28.6* 29.4*  --   --  30.2*   ALKPHOS  --  120 123  --   --  161*   ALT  --  46 62  --   --  81*   AST  --  149* 176*  --   --  240*   LIPASE  --   --   --   --   --  398*    < > = values in this interval not displayed.

## 2023-08-24 NOTE — PLAN OF CARE
/67 (BP Location: Right arm)   Pulse 82   Temp 98.5  F (36.9  C) (Oral)   Resp 16   Wt 119.9 kg (264 lb 6.4 oz)   SpO2 98%   BMI 39.05 kg/m     Time: 1790-5950  Reason for admission: Cirrhosis  Activity: independent.   Pain: denied pain or discomfort.   Neuro: alert and oriented.   Cardiac: WDL  Resp: denied SOB.  GI/: regular high Kcal/protein, voids without difficulties, bowel sounds present x four quadrants.   Lines: PIV, albumin infusing   Skin: WDL X jaundiced  Labs/Imaging: no lab or imaging this shift.   New changes to shift: no change.   Plan: continue with current plan of cares.

## 2023-08-24 NOTE — PROGRESS NOTES
[facility]     Addiction Progress Note - Addiction Service        Date of Admission:  8/21/2023  Assessment & Plan       Mr Morel is a 42 yo gentleman with decompensated alcoholhic cirrhosis admitted for worsening alcoholic hepatitis and  severe acute kidney injury     # Alcohol Use Disorder, severe  # Decompensated alcoholic cirrhosis  # Alcoholic hepatitis, severe  # Hx of variceal hemmorhage  # Acute kidney injury  Patient reports last drink was on Saturday 8/19/23. He has no current symptoms of withdrawal. Prev episodes of WD seizure x 1. He has expressed interest in treatment and was planning for chalo but became to medically complex. He was planing for treatment at [facility] but was sent to ED due to severe jaundice. His declining kidney function is concerning and dialysis may be needed soon. His alcohol use has been ongoing and quite recent. Hepatology has recommended against an expedited eval for liver transplantation. He is certainly a good candidate for medication assisted treatment for alcohol use disorder but gven the concurrent severity of his medical condition and guarded prognosis, would recommend against initiating anything today. We will often start with a trial of low dose naltrexone even in advanced liver disease and continue if well tolerated.   - A comprehensive assessment was completed by [facility]. Hopefully, he avoids dialysis and we can network to discharge back to this location for treatment.   - Will consider starting naltrexone in the comine days pending clinical course and/or development of cravings and impulses to drink. Will continue to hold for now  - We will continue to follow     # Methamphetamine use disorder, severe, in sustained remission  He was inovled with chem dep treatment for meth use. Was quite a heavy user (smoking) for a number of years but without use since 2003 after successful treatment         # Peer Support:  "  -Our peer  will meet the patient if agreeable and still hospitalized on Thursday, to provide additional outpatient resources  -To contact Danielle Peer  from [facility] ([facility]): call or text: [phone]    # Addiction Social Worker:   Our addiction social worker Ben Ramiro can be contacted on her pager [phone] or texted/called at [phone]    The patient's care was discussed with the Patient.    Time Spent on this Encounter   I spent more than 40 minutes on the unit/floor managing the care of Elroy Morel . Over 50% of my time was spent on the following:   - Counseling the patient and/or family regarding: diagnostic results, prognosis, and risks and benefits of treatment options  - Coordination of care with the: care coordinator/    Nba Patel MD on 8/24/2023 at 8:41 AM   Addiction Service   [facility]     Contact information available via [facility] Paging/Directory under \"addiction medicine\"        ______________________________________________________________________    Interval History   Feeling about the same today. Ma demore urine overnight. No confusion or sleepines. He is happy to see plateau in renal function and his bili is slighty down. Remains hopeful, no clear plans for dialysis at this time    ROS:  CV, Pulm, GI and  assessed. Pertinent positives as above, otherwise negative.     Data reviewed today: I reviewed all medications, new labs and imaging results over the last 24 hours.     Physical Exam   /67 (BP Location: Right arm)   Pulse 89   Temp 98.7  F (37.1  C) (Oral)   Resp 20   Wt 119.9 kg (264 lb 6.4 oz)   SpO2 98%   BMI 39.05 kg/m    Weight: 185 lbs 6.51 oz  Physical Exam  Constitutional:       Comments: Very jaundiced, NAD. No confusion. Speech clear and coherant   Cardiovascular:      Rate and Rhythm: Tachycardia present.      Pulses: Normal pulses.      Heart sounds: No " murmur heard.  Pulmonary:      Effort: Pulmonary effort is normal. No respiratory distress.   Abdominal:      Comments: Very protuberant, NT to palpation. Remains soft   Musculoskeletal:         General: No swelling. Normal range of motion.   Skin:     General: Skin is warm.      Capillary Refill: Capillary refill takes less than 2 seconds.      Coloration: Skin is jaundiced.          Due to regulation of Title 42 of the Code of Federal Regulations (CFR) Part 2: Confidentiality laws apply to this note and the information wherein.  Thus, this note cannot be copy and pasted into any other health care staff's note nor can it be included in general medical records sent to ANY outside agency without the patient's written consent.

## 2023-08-24 NOTE — PLAN OF CARE
Goal Outcome Evaluation:      Plan of Care Reviewed With: patient    Overall Patient Progress: no changeOverall Patient Progress: no change    Outcome Evaluation: encourage PO intake, monitor bloody stools    Assumed cares [id number]. A&O and able to make needs known. VSS on RA. Denies having any pain. CIWA score 0 and discontinued by provider. Pt had 2x very small royer red bloody stools. C.diff resulted positive. On calorie counts. Continue with plan of care.

## 2023-08-24 NOTE — PLAN OF CARE
Time of care: 2300-0730    43 y.o. male admitted with cirrhosis and JOCELYN. Full code, gold 9 is primary. VS q8, no BG, high navdeep high protein diet. Denies pain, VSS on RA, LPIV. Pt is alert and oriented and able to make needs known. Continue to monitor for changes.       Goal Outcome Evaluation: Ongoing, progressing    Plan of Care Reviewed With: patient    Overall Patient Progress: no change

## 2023-08-24 NOTE — PROGRESS NOTES
"Care Management Follow Up    Length of Stay (days): 3    Expected Discharge Date: 08/25/2023     Concerns to be Addressed: Discharge Planning    Patient plan of care discussed at interdisciplinary rounds: Yes    Anticipated Discharge Disposition: [facility] vs [facility]     Anticipated Discharge Services: IP [facility] [place]    Anticipated Discharge DME: None    Patient/family educated on Medicare website which has current facility and service quality ratings: N/A    Education Provided on the Discharge Plan: Yes     Patient/Family in Agreement with the Plan: Yes    Referrals Placed by CM/SW: [facility]     Private pay costs discussed: Not applicable    Additional Information:    Patient prefers to discharge to LT[facility] as opposed to [facility] if able. Writer sent requested information to [facility] for review. If [facility] not [facility] or doesn't have availability, patient is open to discharge to [facility] who are able to admit on Monday 8/28.      ADDENDUM: Patient was declined by [facility], stating \"He is not medically appropriate for our facility.\" Plan will be for patient to discharge to [facility] when medically stable.    KATE Bueno, MSW  Adult Acute Care Float   Pager [phone]      "

## 2023-08-24 NOTE — PROGRESS NOTES
Nephrology Progress Note  08/24/2023       Elroy Morel Sr. is a 43 yom with hx of ETOH cirrhosis c/b EV admitted with worsening jaundice and JOCELYN.  Baseline Cr ~1.0 as recently as 8/11/2023 now up suddenly to 4.8 on admission 8/21/2023 correlating with bili rising from 1.9 (6/2023)=>12.6 (8/11/2023) to 29 on admission.  Still actively drinking.  Nephrology consulted for management of JOCELYN.       Interval History :   Mr Morel's Cr is stable today, UOP not being entirely captured but is on the rise and he is feeling well.  I suspect he is in early recovery from JOCELYN, has done well with albumin x3 days which is now completed.  If Cr down tomorrow we can likely discharge with close follow up of labs.      Assessment & Recommendations:   JOCELYN-Dramatic rise in Cr the past 11 days from 0.9=>5.9 correlating with bili rising from 12 to 30.  Bette borderline at 27, UA with hyaline casts, UPCR 0.2g/g consistent with tubular range.  DDx included bili cast nephropathy, congestion with severe ascites and Type I HRS although BP's are better than one would expect for HRS typically.  Giving 100g albumin and working on para to treat possible congestive nephropathy. No NSAID's but is on lisinopril at baseline which is being held currently.  I did discuss that it is possible he will need dialysis in the coming days and that the mortality in his current situation is very high.  He appears to understand, is a bit withdrawn on my interview.  Dr Gomez and I spun his urine 8/23 which showed no granular or hyaline casts.    -JOCELYN, DDx Bili cast, congestion, HRS.    -Albumin loading x3 days completed  -Para 4L 8/23, Cr stable.     -No issues in chemistries or volume requiring RRT, hopeful that renal fx is starting to recover with increased UOP.       Volume-No peripheral edema but massive ascites with taut abdomen, improved a bit after 4L para.  Making more UOP with ~600cc this am, resp status stable.      Electrolytes-K 3.7, bicarb 16, Na 132.       Hepatology-ETOH cirrhosis, MELD 41 but not a candidate for liver as he is actively drinking.       BMD-Ca 9.0, Mg 1.9, Phos 4.1 last check, no acute issues.      Anemia-Hgb 9.6, down from 12.3 on admission.       Nutrition-Appetite ok per his report.      Time spent: 40 minutes on this date of encounter for chart review, physical exam, medical decision making and co-ordination of care.     Seen and discussed with Dr Gomez     Recommendations were communicated to primary team via verbal communication.      TAHMINA Xiong CNS  Clinical Nurse Specialist  [phone]      Review of Systems:   I reviewed the following systems:  Gen: No fevers or chills  CV: No CP at rest  Resp: No SOB at rest  GI: No N/V    Physical Exam:   I/O last 3 completed shifts:  In: 300 [P.O.:300]  Out: 1550 [Urine:1550]   BP 95/50 (BP Location: Right arm)   Pulse 87   Temp 98.7  F (37.1  C) (Oral)   Resp 18   Wt 119.9 kg (264 lb 6.4 oz)   SpO2 100%   BMI 39.05 kg/m       GENERAL APPEARANCE: alert and no distress, jaundiced  EYES: + scleral icterus  Pulmonary: lungs Breathing comfortably on RA  CV: Regular rhythm, normal rate, no rub   - Edema None in LE, +++abd distension.    GI: taut, nontender, no fluid wave on percussion.    MS: no evidence of inflammation in joints, no muscle tenderness  : No Carroll  SKIN: no rash, warm, dry  NEURO: mentation intact and speech normal    Labs:   All labs reviewed by me  Electrolytes/Renal -   Recent Labs   Lab Test 08/24/23  0618 08/23/23  0542 08/22/23  0559 08/21/23  2316   * 131* 131* 128*   POTASSIUM 3.7 3.7 3.8 3.8   CHLORIDE 100 98 97* 97*   CO2 16* 15* 19* 20*   BUN 46.4* 43.1* 34.3* 33.4*   CR 6.20* 6.24* 5.89* 5.59*   GLC 81 84 92 107*   ENEDINA 9.0 9.0 9.0 8.6   MAG 1.9  --   --  2.0   PHOS 4.1  --   --  4.8*         CBC -   Recent Labs   Lab Test 08/24/23  0618 08/22/23  0559 08/21/23  1130   WBC 6.3 7.9 9.0   HGB 9.6* 10.4* 12.3*   * 162 193         LFTs -   Recent Labs    Lab Test 08/24/23  0618 08/23/23  0542 08/22/23  0559 08/21/23  1130 08/11/23  1412 06/23/23  1335   ALKPHOS 91 120 123   < > 213* 142*   BILITOTAL 27.1* 28.6* 29.4*   < > 12.6* 1.9*   ALT 38 46 62   < > 109* 64   * 149* 176*   < > 428* 166*   PROTTOTAL  --  6.2*  --   --  7.6 8.1   ALBUMIN 3.6 3.6 3.2*   < > 3.1* 3.5    < > = values in this interval not displayed.         Iron Panel -   Recent Labs   Lab Test 08/21/23  1130   IRON 71   IRONSAT 55*             Current Medications:   folic acid  1 mg Oral Daily    multivitamin w/minerals  1 tablet Oral Daily    pantoprazole  40 mg Oral BID AC    polyethylene glycol  17 g Oral Daily    sodium chloride (PF)  3 mL Intracatheter Q8H    thiamine  100 mg Oral Daily

## 2023-08-24 NOTE — PLAN OF CARE
Assumed Cares: [id number]  Vitals: VSS on RA  Pain: Denies  Neuro: A&Ox4  Cardiac: WDL  Respiratory: WDL  GI/: Pt having intermittent bloody stools, MD aware. Voiding spontaneously  Skin: Jaundiced otherwise WDL  IV/Drains: L PIV SL  Activity: Ind  Events: Stool sample sent for cdiff rule out. Pt rested between cares.    Plan of Care: Continue with POC. Call light within reach. Able to make needs known.

## 2023-08-25 ENCOUNTER — TELEPHONE (OUTPATIENT)
Dept: GASTROENTEROLOGY | Facility: CLINIC | Age: 43
End: 2023-08-25
Payer: COMMERCIAL

## 2023-08-25 DIAGNOSIS — K70.31 ALCOHOLIC CIRRHOSIS OF LIVER WITH ASCITES (H): Primary | ICD-10-CM

## 2023-08-25 LAB
ALBUMIN SERPL BCG-MCNC: 3.6 G/DL (ref 3.5–5.2)
ALP SERPL-CCNC: 103 U/L (ref 40–129)
ALT SERPL W P-5'-P-CCNC: 44 U/L (ref 0–70)
ANION GAP SERPL CALCULATED.3IONS-SCNC: 13 MMOL/L (ref 7–15)
AST SERPL W P-5'-P-CCNC: 155 U/L (ref 0–45)
BASOPHILS # BLD AUTO: 0.1 10E3/UL (ref 0–0.2)
BASOPHILS NFR BLD AUTO: 1 %
BILIRUB SERPL-MCNC: 30.3 MG/DL
BUN SERPL-MCNC: 47.5 MG/DL (ref 6–20)
CALCIUM SERPL-MCNC: 9.2 MG/DL (ref 8.6–10)
CHLORIDE SERPL-SCNC: 101 MMOL/L (ref 98–107)
CREAT SERPL-MCNC: 5.4 MG/DL (ref 0.67–1.17)
DEPRECATED HCO3 PLAS-SCNC: 17 MMOL/L (ref 22–29)
EOSINOPHIL # BLD AUTO: 0 10E3/UL (ref 0–0.7)
EOSINOPHIL NFR BLD AUTO: 1 %
ERYTHROCYTE [DISTWIDTH] IN BLOOD BY AUTOMATED COUNT: 21.3 % (ref 10–15)
GFR SERPL CREATININE-BSD FRML MDRD: 13 ML/MIN/1.73M2
GLUCOSE SERPL-MCNC: 89 MG/DL (ref 70–99)
HCT VFR BLD AUTO: 28.3 % (ref 40–53)
HGB BLD-MCNC: 10.3 G/DL (ref 13.3–17.7)
IMM GRANULOCYTES # BLD: 0.1 10E3/UL
IMM GRANULOCYTES NFR BLD: 2 %
LYMPHOCYTES # BLD AUTO: 0.8 10E3/UL (ref 0.8–5.3)
LYMPHOCYTES NFR BLD AUTO: 11 %
MAGNESIUM SERPL-MCNC: 2 MG/DL (ref 1.7–2.3)
MCH RBC QN AUTO: 33.9 PG (ref 26.5–33)
MCHC RBC AUTO-ENTMCNC: 36.4 G/DL (ref 31.5–36.5)
MCV RBC AUTO: 93 FL (ref 78–100)
MONOCYTES # BLD AUTO: 1 10E3/UL (ref 0–1.3)
MONOCYTES NFR BLD AUTO: 14 %
NEUTROPHILS # BLD AUTO: 5.2 10E3/UL (ref 1.6–8.3)
NEUTROPHILS NFR BLD AUTO: 71 %
NRBC # BLD AUTO: 0 10E3/UL
NRBC BLD AUTO-RTO: 0 /100
PHOSPHATE SERPL-MCNC: 3.9 MG/DL (ref 2.5–4.5)
PLATELET # BLD AUTO: 144 10E3/UL (ref 150–450)
POTASSIUM SERPL-SCNC: 3.8 MMOL/L (ref 3.4–5.3)
PROT SERPL-MCNC: ABNORMAL G/DL
RBC # BLD AUTO: 3.04 10E6/UL (ref 4.4–5.9)
SODIUM SERPL-SCNC: 131 MMOL/L (ref 136–145)
WBC # BLD AUTO: 7.2 10E3/UL (ref 4–11)

## 2023-08-25 PROCEDURE — 85025 COMPLETE CBC W/AUTO DIFF WBC: CPT | Performed by: INTERNAL MEDICINE

## 2023-08-25 PROCEDURE — 83735 ASSAY OF MAGNESIUM: CPT | Performed by: INTERNAL MEDICINE

## 2023-08-25 PROCEDURE — 120N000002 HC R&B MED SURG/OB UMMC

## 2023-08-25 PROCEDURE — 99232 SBSQ HOSP IP/OBS MODERATE 35: CPT | Mod: FS | Performed by: CLINICAL NURSE SPECIALIST

## 2023-08-25 PROCEDURE — 250N000013 HC RX MED GY IP 250 OP 250 PS 637: Performed by: INTERNAL MEDICINE

## 2023-08-25 PROCEDURE — 99233 SBSQ HOSP IP/OBS HIGH 50: CPT | Performed by: INTERNAL MEDICINE

## 2023-08-25 PROCEDURE — 84100 ASSAY OF PHOSPHORUS: CPT | Performed by: INTERNAL MEDICINE

## 2023-08-25 PROCEDURE — 84450 TRANSFERASE (AST) (SGOT): CPT | Performed by: INTERNAL MEDICINE

## 2023-08-25 PROCEDURE — 36415 COLL VENOUS BLD VENIPUNCTURE: CPT | Performed by: INTERNAL MEDICINE

## 2023-08-25 RX ADMIN — THIAMINE HCL TAB 100 MG 100 MG: 100 TAB at 07:49

## 2023-08-25 RX ADMIN — PANTOPRAZOLE SODIUM 40 MG: 40 TABLET, DELAYED RELEASE ORAL at 16:41

## 2023-08-25 RX ADMIN — FOLIC ACID 1 MG: 1 TABLET ORAL at 07:49

## 2023-08-25 RX ADMIN — PANTOPRAZOLE SODIUM 40 MG: 40 TABLET, DELAYED RELEASE ORAL at 07:49

## 2023-08-25 RX ADMIN — Medication 1 TABLET: at 07:49

## 2023-08-25 ASSESSMENT — ACTIVITIES OF DAILY LIVING (ADL)
ADLS_ACUITY_SCORE: 18

## 2023-08-25 NOTE — PROGRESS NOTES
Calorie Count  Intake recorded for: 8/22  Total Kcals: 305 Total Protein: 9g  Kcals from Hospital Food: 305   Protein: 9g  Kcals from Outside Food (average):0 Protein: 0g  # Meals Ordered from Kitchen: 2  # Meals Recorded: 1 (Meal 1: 100% 1 8oz orange juice, 25% cheese pizza w/ pepperoni)  # Supplements Recorded: 0

## 2023-08-25 NOTE — PROGRESS NOTES
Goal Outcome Evaluation:       Plan of Care Reviewed With: patient     Overall Patient Progress: no changeOverall Patient Progress: no change     Outcome Evaluation: Patient had 3 small soft Bms, no blood in the stool, voiding adequately, denies any pain or discomfort. Bps soft, OVSS, denies chest pain or SOB. Tolerating regular diet. Plan for D/c to [facility] once medically stable

## 2023-08-25 NOTE — TELEPHONE ENCOUNTER
Lab orders entered. Will send to clinic scheduler for appointment with Dr. Diego.    Mandy DURÁN LPN  Hepatology Clinic       ----- Message from Adry Martin MD sent at 8/25/2023  3:41 PM CDT -----  Regarding: set up follow up and outpatient labs  Dear Team,    Can we please schedule hepatic panel, INR, BMP, and CBC for Mr Morel in 1 week and also follow up with Terri Diego in 3 weeks in her clinic, or next soonest as able?    Thank you,  Adry

## 2023-08-25 NOTE — PROGRESS NOTES
Nephrology Progress Note  08/25/2023       Elryo Morel Sr. is a 43 yom with hx of ETOH cirrhosis c/b EV admitted with worsening jaundice and JOCELYN.  Baseline Cr ~1.0 as recently as 8/11/2023 now up suddenly to 4.8 on admission 8/21/2023 correlating with bili rising from 1.9 (6/2023)=>12.6 (8/11/2023) to 29 on admission.  Still actively drinking.  Nephrology consulted for management of JOCELYN.       Interval History :   Mr Morel's Cr is much improved today from 6.2=>5.4 along with increase in UOP to 2L yesterday.  In renal recovery, trending away from needing RRT.  OK to discharge from neph standpoint, will schedule in CKD clinic.  Would hold lisinopril at least until we see where his baseline settles out.      Assessment & Recommendations:   JOCELYN-Dramatic rise in Cr the past 11 days from 0.9=>5.9 correlating with bili rising from 12 to 30.  Bette borderline at 27, UA with hyaline casts, UPCR 0.2g/g consistent with tubular range.  DDx included bili cast nephropathy, congestion with severe ascites and Type I HRS although BP's are better than one would expect for HRS typically.  Giving 100g albumin and working on para to treat possible congestive nephropathy. No NSAID's but is on lisinopril at baseline which is being held currently.  He appears to understand, is a bit withdrawn on my interview.  Dr Gomez and I spun his urine 8/23 which showed no granular or hyaline casts.    -JOCELYN, DDx Bili cast, congestion, HRS.    -Albumin loading x3 days completed  -Para 4L 8/23, Cr on the downtrend.     -No issues in chemistries or volume requiring RRT, hopeful that renal fx is starting to recover with increased UOP.       Volume-No peripheral edema but massive ascites with taut abdomen, improved a bit after 4L para 8/23.  UOP picking up with ~2L yesterday.       Electrolytes-K 3.8, bicarb 17, Na 131.      Hepatology-ETOH cirrhosis, MELD 41 but not a candidate for liver as he is actively drinking.       BMD-Ca 9.2, Mg 2.0, Phos 3.9 last  check, no acute issues.      Anemia-Hgb 10.3, down from 12.3 on admission.       Nutrition-Appetite ok per his report.      Time spent: 40 minutes on this date of encounter for chart review, physical exam, medical decision making and co-ordination of care.     Seen and discussed with Dr Gomez     Recommendations were communicated to primary team via verbal communication.      TAHMINA Xiong CNS  Clinical Nurse Specialist  [phone]      Review of Systems:   I reviewed the following systems:  Gen: No fevers or chills  CV: No CP at rest  Resp: No SOB at rest  GI: No N/V    Physical Exam:   I/O last 3 completed shifts:  In: 240 [P.O.:240]  Out: 1500 [Urine:1500]   BP 92/55 (BP Location: Right arm)   Pulse 86   Temp 98.5  F (36.9  C) (Oral)   Resp 19   Wt 119.9 kg (264 lb 6.4 oz)   SpO2 99%   BMI 39.05 kg/m       GENERAL APPEARANCE: alert and no distress, jaundiced  EYES: + scleral icterus  Pulmonary: lungs Breathing comfortably on RA  CV: Regular rhythm, normal rate, no rub   - Edema None in LE, +++abd distension.    GI: taut, nontender, no fluid wave on percussion.    MS: no evidence of inflammation in joints, no muscle tenderness  : No Carroll  SKIN: no rash, warm, dry  NEURO: mentation intact and speech normal    Labs:   All labs reviewed by me  Electrolytes/Renal -   Recent Labs   Lab Test 08/25/23  0620 08/24/23  0618 08/23/23  0542 08/22/23  0559 08/21/23  2316   * 132* 131*   < > 128*   POTASSIUM 3.8 3.7 3.7   < > 3.8   CHLORIDE 101 100 98   < > 97*   CO2 17* 16* 15*   < > 20*   BUN 47.5* 46.4* 43.1*   < > 33.4*   CR 5.40* 6.20* 6.24*   < > 5.59*   GLC 89 81 84   < > 107*   ENEDINA 9.2 9.0 9.0   < > 8.6   MAG 2.0 1.9  --   --  2.0   PHOS 3.9 4.1  --   --  4.8*    < > = values in this interval not displayed.         CBC -   Recent Labs   Lab Test 08/25/23  0620 08/24/23  0618 08/22/23  0559   WBC 7.2 6.3 7.9   HGB 10.3* 9.6* 10.4*   * 129* 162         LFTs -   Recent Labs   Lab Test  08/25/23  0620 08/24/23  0618 08/23/23  0542 08/21/23  1130 08/11/23  1412 06/23/23  1335   ALKPHOS 103 91 120   < > 213* 142*   BILITOTAL 30.3* 27.1* 28.6*   < > 12.6* 1.9*   ALT 44 38 46   < > 109* 64   * 133* 149*   < > 428* 166*   PROTTOTAL  --   --  6.2*  --  7.6 8.1   ALBUMIN 3.6 3.6 3.6   < > 3.1* 3.5    < > = values in this interval not displayed.         Iron Panel -   Recent Labs   Lab Test 08/21/23  1130   IRON 71   IRONSAT 55*             Current Medications:   folic acid  1 mg Oral Daily    multivitamin w/minerals  1 tablet Oral Daily    pantoprazole  40 mg Oral BID AC    polyethylene glycol  17 g Oral Daily    sodium chloride (PF)  3 mL Intracatheter Q8H    thiamine  100 mg Oral Daily

## 2023-08-25 NOTE — PROGRESS NOTES
[facility]    Medicine Progress Note - Hospitalist Service, GOLD TEAM 9    Date of Admission:  8/21/2023    Assessment & Plan     Elroy Morel Sr. is a 43 year old male with a history of severe alcohol use disorder, decompensated cirrhosis (variceal bleeding, ascites), alcohol withdrawal seizures who presents to [facility] ER due to significantly elevated bilirubin total bili 20.2, referred from [facility]. GI Hepatology consulted in ED for evaluation of abnormal LFTs.  Also found to have JOCELYN with creatinine 4.76, sodium 130, CO2 20. Hepatology and Nephrology teams consulted while inpatient. Broad infxn w/up unremarkable, including repeat C.diff testing (+ab but neg toxin). Acute hepatitis panel negative. Patient not a candidate for steroids for ETOH hepatitis due to JOCELYN and diarrhea symptoms. JOCELYN suspected to be HRS based on albumin challenge. LFTs and bilirubin slowly downtrending. During hospital stay, patient showed no signs of alcohol withdrawal, no prn Ativan administered. On 8/25, Cr began downtrending.      #Decompensated ETOH-related cirrhosis  #Severe alcohol-related hepatitis w/o evidence of liver failure (improving LFTs)  # Direct Hyperbilirubinemia (improving)  # Coagulopathy w/ elevated INR  #Ascites  #Rule out SBP  #History of variceal bleeding  #Alcohol use disorder, severe  Primary Hepatologist: [facility] -> transitioned to [facility] with Dr. Hernandez.  On admission MELD 37, Maddrey DF 50. Abd US 8/21: Cirrhosis with evidence of portal hypertension and small to moderate volume ascites. Gallbladder sludge without definite evidence of acute cholecystitis or biliary obstruction   - GI consulted  - trend daily MELD score  - hepatitis labs: HbsAg negative, HbsAb negative, HCV RNA negative, Hepatitis A IgG+ w/ IgM negative, HIV negative  - Infxn w/up: BCX NGTD, UCX negative. CXR w/ clear lungs, diagnostic paracentesis 8/23 not consistent for  SBP  - Ascites: + ascites on exam, no hx of SBP. S/p CAP team paracentesis 8/23 w/ 4L removal   - possible need for paracentesis in next few days prior to discharge  - Given significant elevation of Maddrey DF and MELD score, GI considered steroid treatment however held give severity of JOCELYN and diarrhea  - HE: no hx of HE, no clinical concerns at this time  - EV: EGD 08/13/2023 -> Grade II esophageal varices banded, PHG.    - HCC: No liver lesions on US 08/2023  - Not a candidate for liver transplant at this time  - Limit/avoid tylenol use to < 2g per 24 hours.   [ ] Dispo: Follows w/ GI team Dr. Muller outpatient. GI team arranging outpatient paracenteses    Bloody diarrhea (improving)  Recent Hx of C.diff  Normocytic Anemia  - continue PO vanc ppx  - trend CBC  - repeat C.diff testing negative (+ab, negative toxin)  - per GI, no need to repeat EGD at this time      #JOCELYN suspect 2/2 HRS, downtrending  # Mild Hyponatremia  - Nephrology consulted, no indication for dialysis (signed off)  - trend chem daily  - s/p fluid challenge in ED (end 8/22)  - s/p IV albumin challenge with 1 g/kg -> 100 g daily for 3 days (end 8/23) w/o improvement in CR consistent w/ HRS   - Renal US: mild renal parenchymal disease w/o hydronephrosis  - Infxn w/up as above  - hold PTA Lisinopril  - monitor I/Os, renally dose medications, avoid nephrotoxic medications (including NSAIDS)    #GERD  - Continue PTA pantoprazole 40 mg daily.     #Alcohol use disorder, severe  # Monitor for Alcohol withdrawal  # History of alcohol withdrawal seizure  Last ETOH consumption 8/19 per H&P  - discontinue CIWA monitoring and prn lorazepam as patient has shown no signs of withdrawal since admission (8/23)  - Nutrition consulted, Multivitamin, thiamine, folic acid daily  -  consultation, encouraged alcohol abstinence  - addiction med consult, appreciate assistance  [ ] Dispo: patient interested in long-term inpatient detox program if able. Plan  to discharge to [facility] when medically ready (do not accept patients on weekend)     Normocytic Anemia w/ elevated retic count  BRBPR as above  - trend CBC    #History of hypertension:   - Hold PTA lisinopril given JOCELYN and normotensive BPs          Diet: Combination Diet Regular Diet; High Kcal/High Protein Diet, ADULT; 2 gm NA Diet  Calorie Counts  Snacks/Supplements Adult: Ensure Enlive; With Meals    DVT Prophylaxis: Pneumatic Compression Devices  Carroll Catheter: Not present  Lines: None     Cardiac Monitoring: None  Code Status: Full Code      Disposition Plan     Expected Discharge Date: 08/25/2023      Destination: [facility]  Discharge Comments: Dispo: TBD  Plan: diagnostic paracentesis; potential steroid Tx r/t severe EtOH hepatitis  Progress: albumin challenge (Day2/3); CIWA protocol; MELD 41, active drinker  - Transfer from [facility] r/t JOCELYN, concerning for hepatorenal syndrome          Jennifer Fletcher MD  Hospitalist Service, GOLD TEAM 9  [facility]  Securely message with Swirl (more info)  Text page via [employer] Paging/Directory   See signed in provider for up to date coverage information  ______________________________________________________________________    Interval History   No acute events overnight. Patient reports doing well. Cr downtrending today, patient reporting continued good UOP. LFTs/Bili slightly uptrended. CBC improved, patient reporting that his bloody diarrhea is improving. Does endorse continued loose stools. Patient reports abdomen does appear to be becoming more distended again, feels like he may need another para tmr. Discussing w/ CM/SW regarding placement/discharge, expect patient to be ready for discharge likely Monday ([facility], do not accept patients over the weekend). Will also try to arrange outpatient paracenteses, appreciate GI teams help in arranging.     Physical Exam   Vital Signs: Temp: 98.4  F (36.9  C) Temp src: Oral  BP: 92/52 Pulse: 83   Resp: 20 SpO2: 98 % O2 Device: None (Room air)    Weight: 264 lbs 6.4 oz    General Appearance:  Awake, interactive, lying on bed, severely jaundiced (slightly improving) w/ +scleral icterus, NAD, pleasant  Respiratory: Normal work of breathing. CTA BL. On RA   Cardiovascular: S1 S2 Regular, no murmur appreciated.  GI/Abd: Soft. NT.  Diffuse distention (slightly worsening). No guarding or rigidity.  No peritoneal signs.  Extremities: Distally wwp. No pedal edema.  Neuro: AO x 4, Grossly non focal. No asterixis.  Skin: severe jaundice, No acute rash on exposed areas, no reported pruritis/excoriations.       Medical Decision Making       50 MINUTES SPENT BY ME on the date of service doing chart review, history, exam, documentation & further activities per the note.  MANAGEMENT DISCUSSED with the following over the past 24 hours: GI, Nephrology, addiction medicine, CM/SW       Data     I have personally reviewed the following data over the past 24 hrs:    7.2  \   10.3 (L)   / 144 (L)     N/A N/A N/A /  N/A   N/A N/A N/A \     ALT: N/A AST: N/A AP: N/A TBILI: N/A   ALB: N/A TOT PROTEIN: N/A LIPASE: N/A     INR:  N/A PTT:  N/A   D-dimer:  N/A Fibrinogen:  N/A         MELD 3.0: 38 at 8/24/2023  6:18 AM  MELD-Na: 38 at 8/24/2023  6:18 AM  Calculated from:  Serum Creatinine: 6.20 mg/dL (Using max of 3 mg/dL) at 8/24/2023  6:18 AM  Serum Sodium: 132 mmol/L at 8/24/2023  6:18 AM  Total Bilirubin: 27.1 mg/dL at 8/24/2023  6:18 AM  Serum Albumin: 3.6 g/dL (Using max of 3.5 g/dL) at 8/24/2023  6:18 AM  INR(ratio): 1.72 at 8/24/2023  6:18 AM  Age at listing (hypothetical): 43 years  Sex: Male at 8/24/2023  6:18 AM    Imaging results reviewed over the past 24 hrs:   No results found for this or any previous visit (from the past 24 hour(s)).    Recent Labs   Lab 08/25/23  0620 08/24/23  0618 08/23/23  0542 08/22/23  0559 08/21/23  1630 08/21/23  1130   WBC 7.2 6.3  --  7.9  --  9.0   HGB 10.3* 9.6*  --  10.4*   --  12.3*   MCV 93 94  --  92  --  92   * 129*  --  162  --  193   INR  --  1.72* 1.66* 1.52*   < >  --    NA  --  132* 131* 131*   < > 130*   POTASSIUM  --  3.7 3.7 3.8   < > 4.0   CHLORIDE  --  100 98 97*   < > 95*   CO2  --  16* 15* 19*   < > 20*   BUN  --  46.4* 43.1* 34.3*   < > 26.8*   CR  --  6.20* 6.24* 5.89*   < > 4.76*   ANIONGAP  --  16* 18* 15   < > 15   ENEDINA  --  9.0 9.0 9.0   < > 9.4   GLC  --  81 84 92   < > 98   ALBUMIN  --  3.6 3.6 3.2*  --  2.7*   PROTTOTAL  --   --  6.2*  --   --   --    BILITOTAL  --  27.1* 28.6* 29.4*  --  30.2*   ALKPHOS  --  91 120 123  --  161*   ALT  --  38 46 62  --  81*   AST  --  133* 149* 176*  --  240*   LIPASE  --   --   --   --   --  398*    < > = values in this interval not displayed.

## 2023-08-25 NOTE — PROGRESS NOTES
**Brief Hepatology Note**    Chart Reviewed  Vitals and Labs reviewed    Plan:  -hepatology is ok with discharge once primary team is  -will need outpatient labs when discharging-please let us know when this is so we can help set them up  -otherwise we will continue to follow  -no new recommendations for today    Discussed with staff.    Adry Martin MD PhD   Gastroenterology Fellow    ATTENDING NOTE HEPATOLOGY    I discussed this patient with the fellow and participated in the decision making. I agree with the fellow's note. Alyssa Tsai MD

## 2023-08-25 NOTE — PROGRESS NOTES
Care Management Discharge Note    Discharge Date: 08/28/2023     Discharge Disposition: Inpatient Chemical Dependency Treatment (IP CD TX)    [facility]  [address] [place] [address]    Discharge Services: IP CD TX    Discharge DME: None    Discharge Transportation: 10 am (either family or cab)     Private pay costs discussed: transportation costs    Does the patient's insurance plan have a 3 day qualifying hospital stay waiver?  No    PAS Confirmation Code: N/A     Patient/family educated on Medicare website which has current facility and service quality ratings: N/A     Education Provided on the Discharge Plan: Yes     Persons Notified of Discharge Plans: Charge Nurse, Bedside Nurse, MD, Patient, LP Staff    Patient/Family in Agreement with the Plan: Yes    Handoff Referral Completed: Will be completed on day of discharge    Additional Information:    IMM not needed. Patient to transfer to [facility] IP CD TX on Monday, 8/28 at 10 am. Patient to take cab or have family to provide transportation. SW to check in with patient over the weekend to confirm. Hand off to patient's primary care physician to be completed on day of discharge.    KATE Bueno, MSW  Adult Acute Care Float   Pager [phone]

## 2023-08-25 NOTE — CONSULTS
**Brief Hepatology Note**    Pt may discharge over the weekend.    I have set up outpatient labs and follow up with his hepatologist Dr Diego.    Adry Martin MD PhD   Gastroenterology Fellow    ATTENDING NOTE HEPATOLOGY    I discussed this patient with the fellow and participated in the decision making. I agree with the fellow's note. Alyssa Tsai MD

## 2023-08-25 NOTE — PLAN OF CARE
Goal Outcome Evaluation:      Plan of Care Reviewed With: patient    Overall Patient Progress: no changeOverall Patient Progress: no change    Outcome Evaluation: Monitring I&O's. Ino pain. Monitoring Bloody stools. Monitoring labs. Plan for D[facility] once medically stable.    ASSUMED CARES: [id number]  STATUS: Pt admitted 8/21 for ETOH Cirrhosis. +Esoph varices. Distended Abd. +Cdiff. Meth use. W/d seizures. Last ETOH use 8/19.   NEURO: A/o x 4- Flat Affect.   VS: VSS on RA.   ACTIVITY: Up ad grazyna.   PAIN: Denies  CARDIAC: Denies Cp  RESP: Denies SOB  GI/: Voiding spontaneously- Tea colored urine. No BM this shift- Pt states previous BM- bloody.   DIET: Regular. High Abdelrahman/protein. 2L FR.   SKIN: No adjustments made.   LDA'S: R PIV SL.   LABS: Hgb 9.6. Bili 27.1. Creat 6.20  CHANGES THIS SHIFT: No changes noted.   POC: Cont with POC. Plan for discharge to [facility] once medically stable. Call light within reach.       Does pt still need Q4H VS? Pt upset about getting woke up at night.

## 2023-08-26 ENCOUNTER — ANCILLARY PROCEDURE (OUTPATIENT)
Dept: ULTRASOUND IMAGING | Facility: CLINIC | Age: 43
End: 2023-08-26
Attending: INTERNAL MEDICINE
Payer: COMMERCIAL

## 2023-08-26 LAB
ALBUMIN SERPL BCG-MCNC: 3.5 G/DL (ref 3.5–5.2)
ALP SERPL-CCNC: 115 U/L (ref 40–129)
ALT SERPL W P-5'-P-CCNC: 53 U/L (ref 0–70)
ANION GAP SERPL CALCULATED.3IONS-SCNC: 15 MMOL/L (ref 7–15)
AST SERPL W P-5'-P-CCNC: 186 U/L (ref 0–45)
BACTERIA BLD CULT: NO GROWTH
BACTERIA BLD CULT: NO GROWTH
BASOPHILS # BLD AUTO: 0.1 10E3/UL (ref 0–0.2)
BASOPHILS NFR BLD AUTO: 1 %
BILIRUB SERPL-MCNC: 33.7 MG/DL
BUN SERPL-MCNC: 47.7 MG/DL (ref 6–20)
CALCIUM SERPL-MCNC: 9.4 MG/DL (ref 8.6–10)
CHLORIDE SERPL-SCNC: 100 MMOL/L (ref 98–107)
CREAT SERPL-MCNC: 4.63 MG/DL (ref 0.67–1.17)
DEPRECATED HCO3 PLAS-SCNC: 17 MMOL/L (ref 22–29)
EOSINOPHIL # BLD AUTO: 0 10E3/UL (ref 0–0.7)
EOSINOPHIL NFR BLD AUTO: 0 %
ERYTHROCYTE [DISTWIDTH] IN BLOOD BY AUTOMATED COUNT: 21.2 % (ref 10–15)
GFR SERPL CREATININE-BSD FRML MDRD: 15 ML/MIN/1.73M2
GLUCOSE SERPL-MCNC: 92 MG/DL (ref 70–99)
HCT VFR BLD AUTO: 29.2 % (ref 40–53)
HGB BLD-MCNC: 10.5 G/DL (ref 13.3–17.7)
IMM GRANULOCYTES # BLD: 0.2 10E3/UL
IMM GRANULOCYTES NFR BLD: 2 %
LYMPHOCYTES # BLD AUTO: 0.8 10E3/UL (ref 0.8–5.3)
LYMPHOCYTES NFR BLD AUTO: 9 %
MAGNESIUM SERPL-MCNC: 1.9 MG/DL (ref 1.7–2.3)
MCH RBC QN AUTO: 33.5 PG (ref 26.5–33)
MCHC RBC AUTO-ENTMCNC: 36 G/DL (ref 31.5–36.5)
MCV RBC AUTO: 93 FL (ref 78–100)
MONOCYTES # BLD AUTO: 1.3 10E3/UL (ref 0–1.3)
MONOCYTES NFR BLD AUTO: 14 %
NEUTROPHILS # BLD AUTO: 7.1 10E3/UL (ref 1.6–8.3)
NEUTROPHILS NFR BLD AUTO: 74 %
NRBC # BLD AUTO: 0 10E3/UL
NRBC BLD AUTO-RTO: 0 /100
PHOSPHATE SERPL-MCNC: NORMAL MG/DL
PLATELET # BLD AUTO: 176 10E3/UL (ref 150–450)
POTASSIUM SERPL-SCNC: 4.2 MMOL/L (ref 3.4–5.3)
PROT SERPL-MCNC: ABNORMAL G/DL
RBC # BLD AUTO: 3.13 10E6/UL (ref 4.4–5.9)
SODIUM SERPL-SCNC: 132 MMOL/L (ref 136–145)
WBC # BLD AUTO: 9.6 10E3/UL (ref 4–11)

## 2023-08-26 PROCEDURE — 36415 COLL VENOUS BLD VENIPUNCTURE: CPT | Performed by: INTERNAL MEDICINE

## 2023-08-26 PROCEDURE — 85025 COMPLETE CBC W/AUTO DIFF WBC: CPT | Performed by: INTERNAL MEDICINE

## 2023-08-26 PROCEDURE — 83735 ASSAY OF MAGNESIUM: CPT | Performed by: INTERNAL MEDICINE

## 2023-08-26 PROCEDURE — 82040 ASSAY OF SERUM ALBUMIN: CPT | Performed by: INTERNAL MEDICINE

## 2023-08-26 PROCEDURE — 99207 PR APP CREDIT; MD BILLING SHARED VISIT: CPT | Performed by: ORTHOPAEDIC SURGERY

## 2023-08-26 PROCEDURE — 84100 ASSAY OF PHOSPHORUS: CPT | Performed by: INTERNAL MEDICINE

## 2023-08-26 PROCEDURE — 99233 SBSQ HOSP IP/OBS HIGH 50: CPT | Performed by: INTERNAL MEDICINE

## 2023-08-26 PROCEDURE — 250N000013 HC RX MED GY IP 250 OP 250 PS 637: Performed by: INTERNAL MEDICINE

## 2023-08-26 PROCEDURE — 120N000002 HC R&B MED SURG/OB UMMC

## 2023-08-26 RX ORDER — GABAPENTIN 100 MG/1
100 CAPSULE ORAL AT BEDTIME
Status: DISCONTINUED | OUTPATIENT
Start: 2023-08-26 | End: 2023-08-30 | Stop reason: HOSPADM

## 2023-08-26 RX ORDER — ALBUMIN (HUMAN) 12.5 G/50ML
25-50 SOLUTION INTRAVENOUS ONCE
Status: DISCONTINUED | OUTPATIENT
Start: 2023-08-26 | End: 2023-08-27

## 2023-08-26 RX ADMIN — PANTOPRAZOLE SODIUM 40 MG: 40 TABLET, DELAYED RELEASE ORAL at 15:42

## 2023-08-26 RX ADMIN — Medication 1 TABLET: at 07:57

## 2023-08-26 RX ADMIN — THIAMINE HCL TAB 100 MG 100 MG: 100 TAB at 07:57

## 2023-08-26 RX ADMIN — FOLIC ACID 1 MG: 1 TABLET ORAL at 07:57

## 2023-08-26 RX ADMIN — PANTOPRAZOLE SODIUM 40 MG: 40 TABLET, DELAYED RELEASE ORAL at 07:57

## 2023-08-26 ASSESSMENT — ACTIVITIES OF DAILY LIVING (ADL)
ADLS_ACUITY_SCORE: 18

## 2023-08-26 NOTE — PLAN OF CARE
Goal Outcome Evaluation:      Plan of Care Reviewed With: patient    Overall Patient Progress: no changeOverall Patient Progress: no change    Outcome Evaluation: Still jaundiced, scratches on abdominal area noted, no pain no nausea.    Plan for Paracentesis tomorrow.

## 2023-08-26 NOTE — PLAN OF CARE
/71 (BP Location: Left arm)   Pulse 89   Temp 98  F (36.7  C) (Oral)   Resp 18   Wt 118.6 kg (261 lb 6.4 oz)   SpO2 100%   BMI 38.60 kg/m       43 year old male I with history of severe alcohol use disorder, decompensated cirrhosis (variceal bleeding, ascites), portal hyperension, alcohol withdrawal seizures ,severe  alcoholic hepatitis,methamphetamine use disorder, severe, in sustained remission who was admitted with increased jaundice and JOCELYN.   Patient currently actively drinking. With last drink 8-19. Not a current liver transplant candidate due to continued drinking.     Alert/ox4.  No signs of  HE.  CIWA 0. Tachycardic. Flat affect.  Abdomen taught, distended. Jaundice.  Independent in room.  Endorses  no nausea or dyspnea with activity.  Combination Diet Regular Diet; High Kcal/High Protein Diet, ADULT; 2 gm NA Dietr diet with navdeep counts. Patient encouraged that he has not needed dialysis with his JOCELYN.  Large volume tea colored urine.  Recent history of C dif.       Goal Outcome Evaluation:  Overall Patient Progress: no change    Outcome Evaluation: large volume tea colored urine 1100 ml .  Endorses feeling more comfortable since paracentesis. Reports pruritis decreased when 4L  fluid taken off.      Plan:  NPO  overnight. GI consult orderered and labs.  Paracentesis for today as well. Anticipate discharge to [facility] when  medically  stable..

## 2023-08-26 NOTE — CARE PLAN
AVSS on RA.  Alert and oriented x4.  CIWA score 0.  Denies pain and nausea.  No BM this shift.  Voiding adequate amount.  Tolerating regular diet.  PIV SL.  Up independently.  Continue plan of care.

## 2023-08-26 NOTE — PROGRESS NOTES
Calorie Count  Intake recorded for: 08/25  Total Kcals: 446 Total Protein: 28g  Kcals from Hospital Food: 446   Protein: 28g  Kcals from Outside Food (average): 0  Protein: 0g  # Meals Ordered from Kitchen: 3 meals  # Meals Recorded: 1 meal (100% scrambled eggs with cheddar cheese, strawberry greek yogurt, 8 oz orange juice)  # Supplements Recorded: No intake recorded

## 2023-08-26 NOTE — PROGRESS NOTES
[facility]     Addiction Progress Note - Addiction Service        Date of Admission:  8/21/2023  Assessment & Plan       Mr Morel is a 44 yo gentleman with decompensated alcoholhic cirrhosis admitted for worsening alcoholic hepatitis and severe acute kidney injury that now appears to be improving with stable and nml UOP for the last 48 hours     # Alcohol Use Disorder, severe  # Decompensated alcoholic cirrhosis  # Alcoholic hepatitis, severe  # Hx of variceal hemmorhage  # Acute kidney injury, improving  Patient reports last drink was on Saturday 8/19/23. He has no current symptoms of withdrawal. Prev episodes of WD seizure x 1. He has expressed interest in treatment and was planning for chalo but became to medically complex. He was planing for treatment at [facility] but was sent to ED due to severe jaundice. His severe JOCELYN appears to be improving with good UOP and improving renal function daily for 2 days now.  Hepatology has recommended against an expedited eval for liver transplantation. He is certainly a good candidate for medication assisted treatment for alcohol use disorder. While we Will sometimes consider starting naltrexone in some patients with severe hepatic dysfunction, his combined severe JOCELYN preclude this presently (even more build-up of metabolites can occur with higher risk of adverse effects). Acamprosate requires GFR of > 30. Dicsussed gabapentin with patient. We can dose adjust for his renal function and target dose would be 300 mg daily. I would recommend starting at bedtime today but at lower doses  - Wourl recommend starting gabapentin at 100 mg at bedtime   - If remains medically stable, support return to [facility] for treatment of AUD  - We will continue to follow     # Methamphetamine use disorder, severe, in sustained remission  He was inovled with chem dep treatment for meth use. Was quite a heavy user (smoking) for a number of years  "but without use since 2003 after successful treatment         # Peer Support:   -Our peer  will meet the patient if agreeable and still hospitalized on Thursday, to provide additional outpatient resources  -To contact Danielle Peer  from [facility] ([facility]): call or text: [phone]    # Addiction Social Worker:   Our addiction social worker Ben Ramiro can be contacted on her pager [phone] or texted/called at [phone]    The patient's care was discussed with the Patient.    Time Spent on this Encounter   I spent more than 50 minutes on the unit/floor managing the care of Elroy Morel Sr.. Over 50% of my time was spent on the following:   - Counseling the patient and/or family regarding: diagnostic results and risks and benefits of treatment options  - Coordination of care with the: coordination of care not performed today    Nba Patel MD   Addiction Service   [facility]     Contact information available via [facility] Paging/Directory under \"addiction medicine\"        ______________________________________________________________________    Interval History   Lying in bed, NAD. Some increasing abn distension and mild pain today, planing fo rrepeat paracentesis in AM. Anxious about discharge, already anticipating he will need more than the planned one mos treatment at [facility]. He is agreable tomeds today. Reivewed horace and he is agreeable to starting.     ROS:  CV, Pulm, GI and  assessed. Pertinent positives as above, otherwise negative.     Data reviewed today: I reviewed all medications, new labs and imaging results over the last 24 hours.     Physical Exam   /71 (BP Location: Left arm)   Pulse 89   Temp 98  F (36.7  C) (Oral)   Resp 18   Wt 118.6 kg (261 lb 6.4 oz)   SpO2 100%   BMI 38.60 kg/m    Weight: 185 lbs 6.51 oz  Physical Exam  Constitutional:       Comments: Very jaundiced, NAD. No " confusion. Speech clear and coherant   Cardiovascular:      Rate and Rhythm: Tachycardia present.      Pulses: Normal pulses.      Heart sounds: No murmur heard.  Pulmonary:      Effort: Pulmonary effort is normal. No respiratory distress.   Abdominal:      Comments: Very protuberant, NT to palpation. Remains soft   Musculoskeletal:         General: No swelling. Normal range of motion.   Skin:     General: Skin is warm.      Capillary Refill: Capillary refill takes less than 2 seconds.      Coloration: Skin is jaundiced.          Due to regulation of Title 42 of the Code of Federal Regulations (CFR) Part 2: Confidentiality laws apply to this note and the information wherein.  Thus, this note cannot be copy and pasted into any other health care staff's note nor can it be included in general medical records sent to ANY outside agency without the patient's written consent.

## 2023-08-26 NOTE — PROGRESS NOTES
[facility]    Medicine Progress Note - Hospitalist Service, GOLD TEAM 9    Date of Admission:  8/21/2023    Assessment & Plan     Elroy Morel Sr. is a 43 year old male with a history of severe alcohol use disorder, decompensated cirrhosis (variceal bleeding, ascites), alcohol withdrawal seizures who presents to [facility] due to significantly elevated bilirubin total bili 20.2, referred from [facility]. GI Hepatology consulted in ED for evaluation of abnormal LFTs.  Also found to have JOCELYN with creatinine 4.76, sodium 130, CO2 20. Hepatology and Nephrology teams consulted while inpatient. Broad infxn w/up unremarkable, including repeat C.diff testing (+ab but neg toxin). Acute hepatitis panel negative. Patient not a candidate for steroids for ETOH hepatitis due to JOCELYN and diarrhea symptoms. JOCELYN suspected to be HRS based on albumin challenge. LFTs and bilirubin slowly downtrending. During hospital stay, patient showed no signs of alcohol withdrawal, no prn Ativan administered. On 8/25, Cr began downtrending.      #Decompensated ETOH-related cirrhosis  #Severe alcohol-related hepatitis w/o evidence of liver failure (improving LFTs)  # Direct Hyperbilirubinemia (increasing, to be expected per GI)  # Coagulopathy w/ elevated INR  #Ascites  #Rule out SBP  #History of variceal bleeding  #Alcohol use disorder, severe  Primary Hepatologist: Follows at [facility] -> transitioned to [facility] with Dr. Hernandez.  On admission MELD 37, Maddrey DF 50. Abd US 8/21: Cirrhosis with evidence of portal hypertension and small to moderate volume ascites. Gallbladder sludge without definite evidence of acute cholecystitis or biliary obstruction   - GI consulted  - trend daily MELD score  - hepatitis labs: HbsAg negative, HbsAb negative, HCV RNA negative, Hepatitis A IgG+ w/ IgM negative, HIV negative  - Infxn w/up: BCX NGTD, UCX negative. CXR w/ clear lungs, diagnostic paracentesis  8/23 not consistent for SBP  - Ascites: + ascites on exam, no hx of SBP. S/p CAP team paracentesis 8/23 w/ 4L removal   - Plan for paracentesis tmr prior to discharge planned for Monday AM  - Given significant elevation of Maddrey DF and MELD score, GI considered steroid treatment however held give severity of JOCELYN and diarrhea  - HE: no hx of HE, no clinical concerns at this time  - EV: EGD 08/13/2023 -> Grade II esophageal varices banded, PHG    - HCC: No liver lesions on US 08/2023  - Not a candidate for liver transplant at this time  - Limit/avoid tylenol use to < 2g per 24 hours.   [ ] Dispo: Follows w/ GI team Dr. Muller outpatient. GI team arranging outpatient paracenteses    Bloody diarrhea (improving)  Recent Hx of C.diff  Normocytic Anemia  - continue PO vanc ppx  - trend CBC  - repeat C.diff testing negative (+ab, negative toxin)  - per GI, no need to repeat EGD at this time      #JOCELYN suspect 2/2 HRS, downtrending  # Mild Hyponatremia  - Nephrology consulted, no indication for dialysis (signed off)  - trend chem daily  - s/p fluid challenge in ED (end 8/22)  - s/p IV albumin challenge with 1 g/kg -> 100 g daily for 3 days (end 8/23) w/o improvement in CR consistent w/ HRS   - Renal US: mild renal parenchymal disease w/o hydronephrosis  - Infxn w/up as above  - hold PTA Lisinopril  - monitor I/Os, renally dose medications, avoid nephrotoxic medications (including NSAIDS)  [ ] Dispo: Referral placed for CKD clinic follow up. Hold lisinopril til Cr baseline settles    #GERD  - Continue PTA pantoprazole 40 mg daily.     #Alcohol use disorder, severe  # Monitor for Alcohol withdrawal  # History of alcohol withdrawal seizure  Last ETOH consumption 8/19 per H&P  - discontinue CIWA monitoring and prn lorazepam as patient has shown no signs of withdrawal since admission (8/23)  - Nutrition consulted, Multivitamin, thiamine, folic acid daily  -  consultation, encouraged alcohol abstinence  - addiction  med consult, appreciate assistance   - start gabapentin 100mg qHS  [ ] Dispo: patient interested in long-term inpatient detox program if able. Plan to discharge to [facility] when medically ready (do not accept patients on weekend)     Normocytic Anemia w/ elevated retic count  BRBPR as above  - trend CBC    #History of hypertension:   - Hold PTA lisinopril given JOCELYN and normotensive Bps  [ ] Dispo: plan to continue to hold til follow up outpatient labs show Cr settles, to be f/up by outpatient GI/PCP/Nephrology teams.           Diet: Combination Diet Regular Diet; High Kcal/High Protein Diet, ADULT; 2 gm NA Diet  Calorie Counts  Snacks/Supplements Adult: Ensure Enlive; With Meals    DVT Prophylaxis: Pneumatic Compression Devices  Carroll Catheter: Not present  Lines: None     Cardiac Monitoring: None  Code Status: Full Code      Disposition Plan      Expected Discharge Date: 08/28/2023      Destination: [facility]  Discharge Comments: Dispo: Inpatient Detox          Jennifer Fletcher MD  Hospitalist Service, GOLD TEAM 9  [facility]  Securely message with [employer] (more info)  Text page via [facility] Paging/Directory   See signed in provider for up to date coverage information  ______________________________________________________________________    Interval History   No acute events overnight. Patient reports doing well. Cr downtrending today, patient reporting continued good UOP. LFTs/Bili continued uptrending, discussed w/ GI team and unfortunately this is to be expected- the only thing that may have helped this would have been the steroids but unfortunately patient didn't qualify for this treatment based on his JOCELYN and diarrhea. CBC continues to improve, patient reporting that his bloody diarrhea almost gone. Patient reports abdomen distention increasing but wanting to wait for paracentesis til tmr to be closer to discharge date on Monday. CAPS team aware of need for  paracentesis tmr. Discussed w/ addiction medicine, recommend start gabapentin 100mg at bedtime, due to renal function patient doesn't qualify for any other medications.     Physical Exam   Vital Signs: Temp: 98  F (36.7  C) Temp src: Oral BP: 128/71 Pulse: 89   Resp: 18 SpO2: 100 % O2 Device: None (Room air)    Weight: 261 lbs 6.4 oz    General Appearance:  Awake, interactive, lying on bed, severely jaundiced (slightly improving) w/ +scleral icterus, NAD, pleasant  Respiratory: Normal work of breathing. CTA BL. On RA   Cardiovascular: S1 S2 Regular, no murmur appreciated.  GI/Abd: Soft. NT.  Diffuse distention (slightly worsening). No guarding or rigidity.  No peritoneal signs.  Extremities: Distally wwp. No pedal edema.  Neuro: AO x 4, Grossly non focal. No asterixis.  Skin: severe jaundice, No acute rash on exposed areas, no reported pruritis/excoriations.       Medical Decision Making       50 MINUTES SPENT BY ME on the date of service doing chart review, history, exam, documentation & further activities per the note.  MANAGEMENT DISCUSSED with the following over the past 24 hours: GI, addiction medicine, CAPS       Data     I have personally reviewed the following data over the past 24 hrs:    9.6  \   10.5 (L)   / 176     N/A N/A N/A /  N/A   N/A N/A N/A \     ALT: N/A AST: N/A AP: N/A TBILI: N/A   ALB: N/A TOT PROTEIN: N/A LIPASE: N/A         MELD 3.0: 39 at 8/25/2023  6:20 AM  MELD-Na: 39 at 8/25/2023  6:20 AM  Calculated from:  Serum Creatinine: 5.40 mg/dL (Using max of 3 mg/dL) at 8/25/2023  6:20 AM  Serum Sodium: 131 mmol/L at 8/25/2023  6:20 AM  Total Bilirubin: 30.3 mg/dL at 8/25/2023  6:20 AM  Serum Albumin: 3.6 g/dL (Using max of 3.5 g/dL) at 8/25/2023  6:20 AM  INR(ratio): 1.72 at 8/24/2023  6:18 AM  Age at listing (hypothetical): 43 years  Sex: Male at 8/25/2023  6:20 AM    Imaging results reviewed over the past 24 hrs:   No results found for this or any previous visit (from the past 24  hour(s)).    Recent Labs   Lab 08/26/23  0653 08/25/23  0620 08/24/23  0618 08/23/23  0542 08/22/23  0559 08/21/23  1630 08/21/23  1130   WBC 9.6 7.2 6.3  --  7.9  --  9.0   HGB 10.5* 10.3* 9.6*  --  10.4*  --  12.3*   MCV 93 93 94  --  92  --  92    144* 129*  --  162  --  193   INR  --   --  1.72* 1.66* 1.52*   < >  --    NA  --  131* 132* 131* 131*   < > 130*   POTASSIUM  --  3.8 3.7 3.7 3.8   < > 4.0   CHLORIDE  --  101 100 98 97*   < > 95*   CO2  --  17* 16* 15* 19*   < > 20*   BUN  --  47.5* 46.4* 43.1* 34.3*   < > 26.8*   CR  --  5.40* 6.20* 6.24* 5.89*   < > 4.76*   ANIONGAP  --  13 16* 18* 15   < > 15   ENEDINA  --  9.2 9.0 9.0 9.0   < > 9.4   GLC  --  89 81 84 92   < > 98   ALBUMIN  --  3.6 3.6 3.6 3.2*  --  2.7*   PROTTOTAL  --   --   --  6.2*  --   --   --    BILITOTAL  --  30.3* 27.1* 28.6* 29.4*  --  30.2*   ALKPHOS  --  103 91 120 123  --  161*   ALT  --  44 38 46 62  --  81*   AST  --  155* 133* 149* 176*  --  240*   LIPASE  --   --   --   --   --   --  398*    < > = values in this interval not displayed.

## 2023-08-27 ENCOUNTER — ANCILLARY PROCEDURE (OUTPATIENT)
Dept: ULTRASOUND IMAGING | Facility: CLINIC | Age: 43
End: 2023-08-27
Attending: STUDENT IN AN ORGANIZED HEALTH CARE EDUCATION/TRAINING PROGRAM
Payer: COMMERCIAL

## 2023-08-27 LAB
ALBUMIN SERPL BCG-MCNC: 3.4 G/DL (ref 3.5–5.2)
ALP SERPL-CCNC: 118 U/L (ref 40–129)
ALT SERPL W P-5'-P-CCNC: 55 U/L (ref 0–70)
ANION GAP SERPL CALCULATED.3IONS-SCNC: 15 MMOL/L (ref 7–15)
AST SERPL W P-5'-P-CCNC: 186 U/L (ref 0–45)
BASOPHILS # BLD AUTO: 0.1 10E3/UL (ref 0–0.2)
BASOPHILS NFR BLD AUTO: 1 %
BILIRUB SERPL-MCNC: 33.9 MG/DL
BUN SERPL-MCNC: 47.5 MG/DL (ref 6–20)
CALCIUM SERPL-MCNC: 9.5 MG/DL (ref 8.6–10)
CHLORIDE SERPL-SCNC: 98 MMOL/L (ref 98–107)
CREAT SERPL-MCNC: 4.49 MG/DL (ref 0.67–1.17)
DEPRECATED HCO3 PLAS-SCNC: 16 MMOL/L (ref 22–29)
EOSINOPHIL # BLD AUTO: 0.1 10E3/UL (ref 0–0.7)
EOSINOPHIL NFR BLD AUTO: 1 %
ERYTHROCYTE [DISTWIDTH] IN BLOOD BY AUTOMATED COUNT: 21.5 % (ref 10–15)
FIBRINOGEN PPP-MCNC: 209 MG/DL (ref 170–490)
GFR SERPL CREATININE-BSD FRML MDRD: 16 ML/MIN/1.73M2
GLUCOSE SERPL-MCNC: 112 MG/DL (ref 70–99)
HCT VFR BLD AUTO: 29.9 % (ref 40–53)
HGB BLD-MCNC: 10.2 G/DL (ref 13.3–17.7)
HGB BLD-MCNC: 10.4 G/DL (ref 13.3–17.7)
HGB BLD-MCNC: 10.4 G/DL (ref 13.3–17.7)
HOLD SPECIMEN: NORMAL
HOLD SPECIMEN: NORMAL
IMM GRANULOCYTES # BLD: 0.3 10E3/UL
IMM GRANULOCYTES NFR BLD: 3 %
INR PPP: 1.49 (ref 0.85–1.15)
LYMPHOCYTES # BLD AUTO: 0.8 10E3/UL (ref 0.8–5.3)
LYMPHOCYTES NFR BLD AUTO: 9 %
MAGNESIUM SERPL-MCNC: 1.9 MG/DL (ref 1.7–2.3)
MCH RBC QN AUTO: 33.1 PG (ref 26.5–33)
MCHC RBC AUTO-ENTMCNC: 34.8 G/DL (ref 31.5–36.5)
MCV RBC AUTO: 95 FL (ref 78–100)
MONOCYTES # BLD AUTO: 1.2 10E3/UL (ref 0–1.3)
MONOCYTES NFR BLD AUTO: 13 %
NEUTROPHILS # BLD AUTO: 6.9 10E3/UL (ref 1.6–8.3)
NEUTROPHILS NFR BLD AUTO: 73 %
NRBC # BLD AUTO: 0 10E3/UL
NRBC BLD AUTO-RTO: 0 /100
PHOSPHATE SERPL-MCNC: NORMAL MG/DL
PLATELET # BLD AUTO: 160 10E3/UL (ref 150–450)
POTASSIUM SERPL-SCNC: 4.3 MMOL/L (ref 3.4–5.3)
PROT SERPL-MCNC: ABNORMAL G/DL
RBC # BLD AUTO: 3.14 10E6/UL (ref 4.4–5.9)
SODIUM SERPL-SCNC: 129 MMOL/L (ref 136–145)
WBC # BLD AUTO: 9.3 10E3/UL (ref 4–11)

## 2023-08-27 PROCEDURE — 0W9G3ZZ DRAINAGE OF PERITONEAL CAVITY, PERCUTANEOUS APPROACH: ICD-10-PCS | Performed by: STUDENT IN AN ORGANIZED HEALTH CARE EDUCATION/TRAINING PROGRAM

## 2023-08-27 PROCEDURE — 36415 COLL VENOUS BLD VENIPUNCTURE: CPT | Performed by: HOSPITALIST

## 2023-08-27 PROCEDURE — 83735 ASSAY OF MAGNESIUM: CPT | Performed by: INTERNAL MEDICINE

## 2023-08-27 PROCEDURE — 250N000011 HC RX IP 250 OP 636: Performed by: INTERNAL MEDICINE

## 2023-08-27 PROCEDURE — 36415 COLL VENOUS BLD VENIPUNCTURE: CPT | Performed by: STUDENT IN AN ORGANIZED HEALTH CARE EDUCATION/TRAINING PROGRAM

## 2023-08-27 PROCEDURE — 120N000002 HC R&B MED SURG/OB UMMC

## 2023-08-27 PROCEDURE — 36415 COLL VENOUS BLD VENIPUNCTURE: CPT | Performed by: INTERNAL MEDICINE

## 2023-08-27 PROCEDURE — 85610 PROTHROMBIN TIME: CPT | Performed by: INTERNAL MEDICINE

## 2023-08-27 PROCEDURE — 80069 RENAL FUNCTION PANEL: CPT | Performed by: INTERNAL MEDICINE

## 2023-08-27 PROCEDURE — 99233 SBSQ HOSP IP/OBS HIGH 50: CPT | Mod: 25 | Performed by: INTERNAL MEDICINE

## 2023-08-27 PROCEDURE — 250N000011 HC RX IP 250 OP 636: Mod: JA | Performed by: HOSPITALIST

## 2023-08-27 PROCEDURE — 85025 COMPLETE CBC W/AUTO DIFF WBC: CPT | Performed by: HOSPITALIST

## 2023-08-27 PROCEDURE — 85018 HEMOGLOBIN: CPT | Performed by: HOSPITALIST

## 2023-08-27 PROCEDURE — 250N000013 HC RX MED GY IP 250 OP 250 PS 637: Performed by: INTERNAL MEDICINE

## 2023-08-27 PROCEDURE — 49083 ABD PARACENTESIS W/IMAGING: CPT | Performed by: STUDENT IN AN ORGANIZED HEALTH CARE EDUCATION/TRAINING PROGRAM

## 2023-08-27 PROCEDURE — 999N000248 HC STATISTIC IV INSERT WITH US BY RN

## 2023-08-27 PROCEDURE — 258N000003 HC RX IP 258 OP 636: Performed by: HOSPITALIST

## 2023-08-27 PROCEDURE — 84075 ASSAY ALKALINE PHOSPHATASE: CPT | Performed by: INTERNAL MEDICINE

## 2023-08-27 PROCEDURE — C9113 INJ PANTOPRAZOLE SODIUM, VIA: HCPCS | Mod: JZ | Performed by: HOSPITALIST

## 2023-08-27 PROCEDURE — 85384 FIBRINOGEN ACTIVITY: CPT | Performed by: STUDENT IN AN ORGANIZED HEALTH CARE EDUCATION/TRAINING PROGRAM

## 2023-08-27 PROCEDURE — P9047 ALBUMIN (HUMAN), 25%, 50ML: HCPCS | Performed by: INTERNAL MEDICINE

## 2023-08-27 PROCEDURE — 99207 PR APP CREDIT; MD BILLING SHARED VISIT: CPT | Performed by: ORTHOPAEDIC SURGERY

## 2023-08-27 RX ORDER — POLYETHYLENE GLYCOL 3350 17 G/17G
238 POWDER, FOR SOLUTION ORAL ONCE
Status: COMPLETED | OUTPATIENT
Start: 2023-08-27 | End: 2023-08-27

## 2023-08-27 RX ORDER — CEFTRIAXONE 1 G/1
1 INJECTION, POWDER, FOR SOLUTION INTRAMUSCULAR; INTRAVENOUS EVERY 24 HOURS
Status: DISCONTINUED | OUTPATIENT
Start: 2023-08-27 | End: 2023-08-27

## 2023-08-27 RX ORDER — OCTREOTIDE ACETATE 50 UG/ML
50 INJECTION, SOLUTION INTRAVENOUS; SUBCUTANEOUS ONCE
Status: COMPLETED | OUTPATIENT
Start: 2023-08-27 | End: 2023-08-27

## 2023-08-27 RX ORDER — POLYETHYLENE GLYCOL 3350 17 G/17G
238 POWDER, FOR SOLUTION ORAL
Status: DISCONTINUED | OUTPATIENT
Start: 2023-08-28 | End: 2023-08-30 | Stop reason: HOSPADM

## 2023-08-27 RX ORDER — POLYETHYLENE GLYCOL 3350 17 G/17G
238 POWDER, FOR SOLUTION ORAL ONCE
Status: COMPLETED | OUTPATIENT
Start: 2023-08-28 | End: 2023-08-28

## 2023-08-27 RX ORDER — POLYETHYLENE GLYCOL 3350 17 G/17G
238 POWDER, FOR SOLUTION ORAL ONCE
Status: DISCONTINUED | OUTPATIENT
Start: 2023-08-27 | End: 2023-08-27

## 2023-08-27 RX ORDER — PANTOPRAZOLE SODIUM 40 MG/1
40 TABLET, DELAYED RELEASE ORAL
Status: DISCONTINUED | OUTPATIENT
Start: 2023-08-27 | End: 2023-08-30 | Stop reason: HOSPADM

## 2023-08-27 RX ORDER — ALBUMIN (HUMAN) 12.5 G/50ML
12.5 SOLUTION INTRAVENOUS ONCE
Status: COMPLETED | OUTPATIENT
Start: 2023-08-27 | End: 2023-08-27

## 2023-08-27 RX ADMIN — ALBUMIN HUMAN 12.5 G: 0.25 SOLUTION INTRAVENOUS at 11:57

## 2023-08-27 RX ADMIN — PANTOPRAZOLE SODIUM 40 MG: 40 INJECTION, POWDER, FOR SOLUTION INTRAVENOUS at 01:01

## 2023-08-27 RX ADMIN — CEFTRIAXONE SODIUM 1 G: 1 INJECTION, POWDER, FOR SOLUTION INTRAMUSCULAR; INTRAVENOUS at 02:48

## 2023-08-27 RX ADMIN — OCTREOTIDE ACETATE 50 MCG: 50 INJECTION, SOLUTION INTRAVENOUS; SUBCUTANEOUS at 01:08

## 2023-08-27 RX ADMIN — OCTREOTIDE ACETATE 50 MCG/HR: 200 INJECTION, SOLUTION INTRAVENOUS; SUBCUTANEOUS at 01:02

## 2023-08-27 RX ADMIN — Medication 1 TABLET: at 07:58

## 2023-08-27 RX ADMIN — POLYETHYLENE GLYCOL 3350 238 G: 17 POWDER, FOR SOLUTION ORAL at 18:11

## 2023-08-27 RX ADMIN — PANTOPRAZOLE SODIUM 40 MG: 40 INJECTION, POWDER, FOR SOLUTION INTRAVENOUS at 07:58

## 2023-08-27 RX ADMIN — FOLIC ACID 1 MG: 1 TABLET ORAL at 07:58

## 2023-08-27 ASSESSMENT — ACTIVITIES OF DAILY LIVING (ADL)
ADLS_ACUITY_SCORE: 18

## 2023-08-27 NOTE — PROGRESS NOTES
[facility]    Medicine Progress Note - Hospitalist Service, GOLD TEAM 9    Date of Admission:  8/21/2023    Assessment & Plan     Elroy Morel Sr. is a 43 year old male with a history of severe alcohol use disorder, decompensated cirrhosis (variceal bleeding, ascites), alcohol withdrawal seizures who presents to [facility] ER due to significantly elevated bilirubin total bili 20.2, referred from [facility]. GI Hepatology consulted in ED for evaluation of abnormal LFTs.  Also found to have JOCELYN with creatinine 4.76, sodium 130, CO2 20. Hepatology and Nephrology teams consulted while inpatient. Broad infxn w/up unremarkable, including repeat C.diff testing (+ab but neg toxin). Acute hepatitis panel negative. Patient not a candidate for steroids for ETOH hepatitis due to JOCELYN and diarrhea symptoms. JOCELYN suspected to be HRS based on albumin challenge. LFTs and bilirubin slowly downtrending. During hospital stay, patient showed no signs of alcohol withdrawal, no prn Ativan administered. On 8/25, Cr began downtrending.      #Decompensated ETOH-related cirrhosis  #Severe alcohol-related hepatitis w/o evidence of liver failure (improving LFTs)  # Direct Hyperbilirubinemia (increasing, to be expected per GI)  # Coagulopathy w/ elevated INR  #Ascites  #Rule out SBP  #History of variceal bleeding  #Alcohol use disorder, severe  Primary Hepatologist: Follows at [facility] -> transitioned to [facility] with Dr. Hernandez.  On admission MELD 37, Maddrey DF 50. Abd US 8/21: Cirrhosis with evidence of portal hypertension and small to moderate volume ascites. Gallbladder sludge without definite evidence of acute cholecystitis or biliary obstruction   - GI consulted  - trend daily MELD score  - hepatitis labs: HbsAg negative, HbsAb negative, HCV RNA negative, Hepatitis A IgG+ w/ IgM negative, HIV negative  - Infxn w/up: BCX NGTD, UCX negative. CXR w/ clear lungs, diagnostic paracentesis  8/23 not consistent for SBP  - Ascites: + ascites on exam, no hx of SBP. S/p CAP team paracentesis 8/23 w/ 4L removal   - S/p paracentesis 8/27 4L w/ albumin replacement  - Given significant elevation of Maddrey DF and MELD score, GI considered steroid treatment however held give severity of JOCELYN and diarrhea  - HE: no hx of HE, no clinical concerns at this time  - EV: EGD 08/13/2023 -> Grade II esophageal varices banded, PHG    - HCC: No liver lesions on US 08/2023  - Not a candidate for liver transplant at this time  - Limit/avoid tylenol use to < 2g per 24 hours.   [ ] Dispo: Follows w/ GI team Dr. Muller outpatient. GI team arranging outpatient paracenteses    Bloody diarrhea (worsening)  Recent Hx of C.diff  Normocytic Anemia  - GI consult   - plan for colonoscopy 8/28 (bowel prep ordered), deferring endoscopy as pt HDS   - clear liquid diet, NPO md   - overnight bowel prep per GI note: Golytely 4p, Golytely 3AM, Golytely 5A (prn if not clear)  - continue PO vanc ppx (repeat C.diff testing negative for active infxn w/ neg toxin)  - H/H q8h      #JOCELYN suspect 2/2 HRS, downtrending  # Mild Hyponatremia  - Nephrology consulted, no indication for dialysis (signed off)  - trend chem daily  - s/p fluid challenge in ED (end 8/22)  - s/p IV albumin challenge with 1 g/kg -> 100 g daily for 3 days (end 8/23) w/o improvement in CR consistent w/ HRS   - Renal US: mild renal parenchymal disease w/o hydronephrosis  - Infxn w/up as above  - hold PTA Lisinopril  - monitor I/Os, renally dose medications, avoid nephrotoxic medications (including NSAIDS)  [ ] Dispo: Referral placed for CKD clinic follow up. Hold lisinopril til Cr baseline settles    #GERD  - Continue PTA pantoprazole 40 mg daily.     #Alcohol use disorder, severe  # Monitor for Alcohol withdrawal  # History of alcohol withdrawal seizure  # Methamphetamine use disorder, severe, in sustained remission   Last ETOH consumption 8/19 per H&P  - discontinue CIWA  monitoring and prn lorazepam as patient has shown no signs of withdrawal since admission (8/23)  - Nutrition consulted, Multivitamin, thiamine, folic acid daily  -  consultation, encouraged alcohol abstinence  - addiction med consult, appreciate assistance   - start gabapentin 100mg qHS  [ ] Dispo: patient interested in long-term inpatient detox program if able. Plan to discharge to [facility] when medically ready (do not accept patients on weekend)    #History of hypertension:   - Hold PTA lisinopril given JOCELYN and normotensive Bps  [ ] Dispo: plan to continue to hold til follow up outpatient labs show Cr settles, to be f/up by outpatient GI/PCP/Nephrology teams.           Diet: NPO per Anesthesia Guidelines for Procedure/Surgery Except for: Meds  Clear Liquid Diet    DVT Prophylaxis: Pneumatic Compression Devices  Carroll Catheter: Not present  Lines: None     Cardiac Monitoring: None  Code Status: Full Code      Disposition Plan      Expected Discharge Date: 08/28/2023      Destination: [facility]  Discharge Comments: Dispo: Inpatient Detox          Jennifer Fletcher MD  Hospitalist Service, GOLD TEAM [address] [place] [address]  Securely message with [employer] (more info)  Text page via [facility] Paging/Directory   See signed in provider for up to date coverage information  ______________________________________________________________________    Interval History   Overnight, patient w/ increased bloody stools. GI re-eval, will plan for bowel prep tonight and colonoscopy in AM. Holding on EGD as patient HDS and Hgb stable. Trending H/H closely. Patient NPO midnight. CAPs team performed paracentesis today, 4L removed w/ albumin after. Cr downtrending, continues to have good UOP. Hold on dispo plans tmr given colonoscopy plan.     Physical Exam   Vital Signs: Temp: 98.2  F (36.8  C) Temp src: Axillary BP: (!) 147/84 Pulse: 85   Resp: 16 SpO2: 100 % O2 Device: None (Room air)     Weight: 262 lbs 9.6 oz    General Appearance:  Awake, interactive, lying on bed, severely jaundiced (slightly improving) w/ +scleral icterus, NAD, pleasant  Respiratory: Normal work of breathing. CTA BL. On RA   Cardiovascular: S1 S2 Regular, no murmur appreciated. 1+ pitting edema blt  GI/Abd: Soft. NT.  Diffuse distention (increased/tight prior to paracentesis). No guarding or rigidity.  No peritoneal signs.  Extremities: Distally wwp. No pedal edema.  Neuro: AO x 4, Grossly non focal. No asterixis.  Skin: severe jaundice, No acute rash on exposed areas, no reported pruritis/excoriations.     Medical Decision Making       50 MINUTES SPENT BY ME on the date of service doing chart review, history, exam, documentation & further activities per the note.  MANAGEMENT DISCUSSED with the following over the past 24 hours: GI, addiction medicine, CAPS       Data     I have personally reviewed the following data over the past 24 hrs:    9.3  \   10.4 (L)   / 160     129 (L) 98 47.5 (H) /  112 (H)   4.3 16 (L) 4.49 (H) \     ALT: 55 AST: 186 (H) AP: 118 TBILI: 33.9 (HH)   ALB: 3.4 (L) TOT PROTEIN: N/A LIPASE: N/A     INR:  1.49 (H) PTT:  N/A   D-dimer:  N/A Fibrinogen:  209         MELD 3.0: 38 at 8/27/2023  7:45 AM  MELD-Na: 39 at 8/27/2023  7:45 AM  Calculated from:  Serum Creatinine: 4.49 mg/dL (Using max of 3 mg/dL) at 8/27/2023  2:15 AM  Serum Sodium: 129 mmol/L at 8/27/2023  2:15 AM  Total Bilirubin: 33.9 mg/dL at 8/27/2023  2:15 AM  Serum Albumin: 3.4 g/dL at 8/27/2023  2:15 AM  INR(ratio): 1.49 at 8/27/2023  7:45 AM  Age at listing (hypothetical): 43 years  Sex: Male at 8/27/2023  7:45 AM    Imaging results reviewed over the past 24 hrs:   Recent Results (from the past 24 hour(s))   POC US Guide for Paracentesis    Impression    US Indication: abdominal distension    Limited abdominal ultrasound was performed and demonstrated an adequate fluid collection on the right side of the abdomen.     Doppler of the skin  demonstrated an area at this site without significant vasculature.  A paracentesis at this site was subsequently performed.    Edmar Rodriguez MD      Recent Labs   Lab 08/27/23  1408 08/27/23  0745 08/27/23  0215 08/26/23  0653 08/25/23  0620 08/24/23  0618 08/23/23  0542 08/21/23  1630 08/21/23  1130   WBC  --   --  9.3 9.6 7.2 6.3  --    < > 9.0   HGB 10.4*  --  10.4* 10.5* 10.3* 9.6*  --    < > 12.3*   MCV  --   --  95 93 93 94  --    < > 92   PLT  --   --  160 176 144* 129*  --    < > 193   INR  --  1.49*  --   --   --  1.72* 1.66*   < >  --    NA  --   --  129* 132* 131* 132* 131*   < > 130*   POTASSIUM  --   --  4.3 4.2 3.8 3.7 3.7   < > 4.0   CHLORIDE  --   --  98 100 101 100 98   < > 95*   CO2  --   --  16* 17* 17* 16* 15*   < > 20*   BUN  --   --  47.5* 47.7* 47.5* 46.4* 43.1*   < > 26.8*   CR  --   --  4.49* 4.63* 5.40* 6.20* 6.24*   < > 4.76*   ANIONGAP  --   --  15 15 13 16* 18*   < > 15   ENEDINA  --   --  9.5 9.4 9.2 9.0 9.0   < > 9.4   GLC  --   --  112* 92 89 81 84   < > 98   ALBUMIN  --   --  3.4* 3.5 3.6 3.6 3.6   < > 2.7*   PROTTOTAL  --   --   --   --   --   --  6.2*  --   --    BILITOTAL  --   --  33.9* 33.7* 30.3* 27.1* 28.6*   < > 30.2*   ALKPHOS  --   --  118 115 103 91 120   < > 161*   ALT  --   --  55 53 44 38 46   < > 81*   AST  --   --  186* 186* 155* 133* 149*   < > 240*   LIPASE  --   --   --   --   --   --   --   --  398*    < > = values in this interval not displayed.

## 2023-08-27 NOTE — PLAN OF CARE
Goal Outcome Evaluation:      Plan of Care Reviewed With: patient    Overall Patient Progress: no changeOverall Patient Progress: no change    Outcome Evaluation: Had a couple of episodes of bloody bright red stools and providers are aware of it, but aside from that he feels the same as yesterday.    Will be NPO @ midnight. On clear liquids for now. IV octreotide discontinued. GI witnessed the bloody stool. Continue with POC.   Consent (Near Eyelid Margin)/Introductory Paragraph: The rationale for Mohs was explained to the patient and consent was obtained. The risks, benefits and alternatives to therapy were discussed in detail. Specifically, the risks of ectropion or eyelid deformity, infection, scarring, bleeding, prolonged wound healing, incomplete removal, allergy to anesthesia, nerve injury and recurrence were addressed. Prior to the procedure, the treatment site was clearly identified and confirmed by the patient. All components of Universal Protocol/PAUSE Rule completed.

## 2023-08-27 NOTE — PLAN OF CARE
"    43 year old male I with history of severe alcohol use disorder, decompensated cirrhosis (variceal bleeding, ascites), portal hyperension, alcohol withdrawal seizures ,severe  alcoholic hepatitis,methamphetamine use disorder, severe, in sustained remission who was admitted with increased jaundice and JOCELYN.   Patient currently actively drinking. With last drink 8-19. Not a current liver transplant candidate due to continued drinking.       Alert/ox4.  Tachycardic. Flat affect.  Abdomen taught, distended. Jaundice.  Independent in room.  Endorses  no nausea or dyspnea with activity.   Combination Diet Regular Diet; High Kcal/High Protein Diet, ADULT; 2 gm NA Dietr diet with navdeep counts. Patient encouraged that he has not needed dialysis with his JOCELYN.  Large volume tea colored urine.  Recent history of C dif.      Goal Outcome Evaluation:  Educated patient on gabapentin  use with alcohol withdrawal  as well as sleep hygiene; however,  patient declined medication stating  he \" just wanted to lay down and sleep\" after having had several  episodes of hematochezia overnight.  Initially patient had stated that he was certain \" the blood was from his 2 internal hemorrhoids..Provider notified and orders initiated  and patient started on Octreotide gtt  (sandoSTATIN) 1,250 mcg in D5W 250 mL  @ 10ml/hr.  Patient received IV protonix 40 mg, IV Ceftriaxone 1g, 50 mcg  Octreotide   subcutaneous.  Hgb  10.5    Patient  got a second IV for administration of continuous gtt.  Patient slept between cares and  feeling poorly.  Updated day shift RN.     Problem: Gastrointestinal Bleeding  Goal: Optimal Coping with Acute Illness  Outcome: Not Progressing       Plan:  NPO overnight since 0100.  Continue to follow POC and update provider with changes.  "

## 2023-08-27 NOTE — PROCEDURES
[facility]  CAPS PROCEDURE NOTE  Date of Admission:  8/21/2023  Consult Requested by: Medicine  Reason for Consult: management of symptomatic ascites    Indication/HPI: Ascites    Pre-Procedure Diagnosis: Ascites    Post-Procedure Diagnosis: Ascites    Risk Assessment: Average risk, Technically straightforward; patient's anticoagulation has been held according to guidelines based on the agent and platelets and coags are within guidelines    Procedure Outcome:  Therapeutic paracentesis performed with 4 liters of ascites removed. Discussed with primary prior to procedure. Notified primary post procedure.  See additional procedure details below.    The primary covering service should follow up and address any lab results as appropriate.    Edmar Rodriguez MD  [facility]  Securely message with Vocera (more info)  Text page via [employer] Paging/Directory   See signed in provider for up to date coverage information      [facility]    Paracentesis    Date/Time: 8/27/2023 1:12 PM    Performed by: Edmar Rodriguez MD  Authorized by: Edmar Rodriguez MD    PRE-PROCEDURE DETAILS  Initial or subsequent exam: subsequent  Procedure purpose: therapeutic  Indications: abdominal discomfort secondary to ascites        UNIVERSAL PROTOCOL   Site Marked: Yes  Prior Images Obtained and Reviewed:  Yes  Required items: Required blood products, implants, devices and special equipment available    Patient identity confirmed:  Verbally with patient, arm band, provided demographic data and hospital-assigned identification number  Patient was reevaluated immediately before administering moderate or deep sedation or anesthesia  Confirmation Checklist:  Patient's identity using two indicators, relevant allergies, procedure was appropriate and matched the consent or emergent situation and correct equipment/implants  were available  Time out: Immediately prior to the procedure a time out was called    Universal Protocol: the Joint Commission Universal Protocol was followed    Preparation: Patient was prepped and draped in usual sterile fashion    ESBL (mL):  1     ANESTHESIA    Anesthesia:  Local infiltration  Local Anesthetic:  Lidocaine 1% without epinephrine  Anesthetic Total (mL):  9      SEDATION    Patient Sedated: No    POST PROCEDURE DETAILS  Needle gauge: 19.  Ultrasound guidance: yes  Puncture site: right lower quadrant  Fluid removed: 4000(ml)  Fluid appearance: serous  Dressing: Sterile Surgical Glue and Sterile Bandage.        PROCEDURE    Patient Tolerance:  Patient tolerated the procedure well with no immediate complications  Length of time physician/provider present for 1:1 monitoring during sedation: 0        POC US Guide for Paracentesis     Impression  US Indication: abdominal distension    Limited abdominal ultrasound was performed and demonstrated an adequate fluid collection on the right side of the abdomen.    Doppler of the skin demonstrated an area at this site without significant vasculature.  A paracentesis at this site was subsequently performed.    Edmar Rodriguez MD

## 2023-08-27 NOTE — PROGRESS NOTES
"Care Management Follow Up    Length of Stay (days): 6    Expected Discharge Date: 08/28/2023     Concerns to be Addressed: mental health, substance/tobacco abuse/use     Patient plan of care discussed at interdisciplinary rounds: No    Anticipated Discharge Disposition: Inpatient Mental Health, Inpatient Chemical Dependency     Anticipated Discharge Services: Chemical Dependency Resources  Anticipated Discharge DME: None    Patient/family educated on Medicare website which has current facility and service quality ratings:    Education Provided on the Discharge Plan:    Patient/Family in Agreement with the Plan: yes    Referrals Placed by CM/SW:    Private pay costs discussed: Not applicable    Additional Information:     Checked in with patient on discharge planning and his ride for tomorrow. Patient said \" honestly I don't know because I'm going to have a colonoscopy tomorrow morning, so I don't know. Let's see what happens tomorrow.\".    Writer mentioned we will follow up with him tomorrow morning , he said ok.     CC will continue to follow up with patient.    Donna Kitchen RN, PHN, BSN   Float Nurse Care Coordinator  Covering for Unit 7C/7D  Phone [phone]  "

## 2023-08-27 NOTE — PROGRESS NOTES
Calorie Count  Intake recorded for: 8/26  Total Kcals: 0 Total Protein: 0g  Kcals from Hospital Food: 0   Protein: 0g  Kcals from Outside Food (average):0 Protein: 0g  # Meals Ordered from Kitchen: 2 meals ordered   # Meals Recorded: no food intake recorded   # Supplements Recorded: no intake recorded

## 2023-08-27 NOTE — PROVIDER NOTIFICATION
"Paged Gold 9, re:  \"High, 7D, Gigi CC, J.O. Rm [phone] Hematochezia with hx decom cirrhosis w/ hx variceal  bleed. Please advise?  Taya khan\"      Provider returned call with GI bleed orders:  IV protonix, IV octreotide gtt, subcutaneous  octreotide, and ceftriaxone IV.  NPO,  AM labs: hgb q8, ,CMP, Mg, Phos,,CBC with platelets and differentials  and GI consult  "

## 2023-08-27 NOTE — CONSULTS
GASTROENTEROLOGY CONSULTATION      Date of Admission:  8/21/2023          ASSESSMENT AND RECOMMENDATIONS:     43 year old male with a history of alcohol use disorder, decompensated cirrhosis (variceal bleeding, ascites), alcohol withdrawal seizures who initially presented to [facility] with significant elevation of bilirubin, then transferred to [place] in setting of worsening JOCELYN. Now GI Luminal service consulted for hematochezia.     #Hematochezia  #History of variceal bleeding    Has had intermittent episodes of hematochezia that was also witnessed by me this morning. Reports colonoscopies years ago (unclear why in a young age), says they found some polyps but doesn't recall other findings. Had grade II varices that were banded a few weeks ago at OSH.     At this time, lower GI bleeding seems more likely than UGIB/variceal bleeding given stable vital signs and overall stable hemoglobin. Differential includes diverticular bleeding, internal hemorrhoids, AVMs, rectal ulcer, less likely malignancy or ischemic/infectious colitis.     Off note, patient with norovirus and worsening diarrhea. Recent C. Diff infection s/p treatment with vancomycin, and current colonization.     Would proceed with colonoscopy to assess LGIB, also pair with EGD to assess varices post-banding.     #Decompensated cirrhosis  #Severe alcohol-related hepatitis  #Ascites  #Hepatorenal syndrome     Primary Hepatologist: Follows at [facility] -> transitioned to [facility] with Dr. Hernandez.   Etiology: Alcohol  MELD-Na: 39  Ascites: Present.   SBP history: No   Hepatic encephalopathy: None   EV: EGD 08/13/2023 -> Grade II esophageal varices banded, PHG.   TIPS: Not a candidate due to significantly elevated MELD.     Not a liver transplant candidate at this time due to ongoing EtOH use. Did not receive steroids due to worsening JOCELYN and diarrhea.       RECOMMENDATIONS    - Plan for EGD/Colonoscopy tomorrow 08/28 with Hepatology service.   -  NPO after midnight, clear liquids today  Colonoscopy prep  - Give 2L Go-Lytely today at 1600  - Give 2L Go-Lytely tomorrow at 0300  - If not clear by 0500, please give an additional 2L Go-Lytely  - Transfuse if Hg<7  - Hold anticoagulants, NSAIDs      Gastroenterology follow up recommendations: Follows with [facility]     Thank you for involving us in this patient's care. Please do not hesitate to contact the GI service with any questions or concerns.     Patient care plan discussed with Dr. Horner, GI staff physician.    Sage Ayers MD  Gastroenterology Fellow  Pager             Chief Complaint:   We were asked by Medicine service to evaluate this patient with hematochezia    History is obtained from the patient and the medical record.          History of Present Illness:   Elroy Morel Sr. is a 43 year old male with a history of alcohol use disorder, decompensated cirrhosis (variceal bleeding, ascites), alcohol withdrawal seizures who initially presented to [facility] with significant elevation of bilirubin, then transferred to [place] in setting of worsening JOCELYN. Now GI Luminal service consulted for hematochezia.     Hepatology was taking care of patient's liver disease, but now has been having hematochezia the past few days. Says he noticed a lot of blood around 2 days ago, but yesterday was brown, then since last night back to being bloody again. Says he has had these episodes before. Reports multiple colonoscopies in the past, but has been years since that, says they were done at [facility] (?), no records found. Doesn't recall why he had them. Says he thinks he has hemorrhoids. Feels well at this time, no hematemesis.       Patient seen this morning, had an episode of hematochezia that I was able to witness with bright red blood on the toilet bowl, no clots. Otherwise feels well, denies any symptoms. Denies NSAIDs use. Says this bleeding is not new, has been happening intermittently for a  while. Denies melena. Denies hematemesis.         Past Medical History:   Reviewed and edited as appropriate  Past Medical History:   Diagnosis Date    Alcohol use disorder, severe, dependence (H)     Alcohol withdrawal seizure (H)     Alcoholic cirrhosis of liver with ascites (H)     Esophageal varices (H)     Essential hypertension     Gastroesophageal reflux disease without esophagitis     History of methamphetamine use     Sustained remission since 2003    Hypercholesterolemia     Tobacco abuse             Past Surgical History:   Reviewed and edited as appropriate   History reviewed. No pertinent surgical history.         Previous Endoscopy:   No results found for this or any previous visit.         Social History:   Reviewed and edited as appropriate  Social History     Socioeconomic History    Marital status:      Spouse name: Not on file    Number of children: Not on file    Years of education: Not on file    Highest education level: Not on file   Occupational History    Not on file   Tobacco Use    Smoking status: Former     Types: Cigarettes    Smokeless tobacco: Former   Substance and Sexual Activity    Alcohol use: Yes     Comment: Heavy daily use p to 1 liter vodka daily    Drug use: Not Currently     Types: Amphetamines     Comment: Sustained remission    Sexual activity: Not on file   Other Topics Concern    Not on file   Social History Narrative    Pt is , 2 children. Employed.      Social Determinants of Health     Financial Resource Strain: Not on file   Food Insecurity: Not on file   Transportation Needs: Not on file   Physical Activity: Not on file   Stress: Not on file   Social Connections: Not on file   Intimate Partner Violence: Not on file   Housing Stability: Not on file            Family History:   Reviewed and edited as appropriate  No known history of gastrointestinal/liver disease or  gastrointestinal malignancies       Allergies:   Reviewed and edited as appropriate      Allergies   Allergen Reactions    Metronidazole Other (See Comments), Dizziness and Unknown    Omeprazole Other (See Comments) and Unknown     Irritability (tolerates Protonix well)            Medications:     Current Facility-Administered Medications   Medication    albumin human 25 % injection 25-50 g    calcium carbonate (TUMS) chewable tablet 500 mg    cefTRIAXone (ROCEPHIN) 1 g vial to attach to  mL bag for ADULTS or NS 50 mL bag for PEDS    folic acid (FOLVITE) tablet 1 mg    gabapentin (NEURONTIN) capsule 100 mg    lidocaine (LMX4) cream    lidocaine 1 % 0.1-1 mL    melatonin tablet 3 mg    multivitamin w/minerals (THERA-VIT-M) tablet 1 tablet    octreotide (sandoSTATIN) 1,250 mcg in D5W 250 mL    ondansetron (ZOFRAN ODT) ODT tab 4 mg    Or    ondansetron (ZOFRAN) injection 4 mg    pantoprazole (PROTONIX) IV push injection 40 mg    polyethylene glycol (MIRALAX) Packet 17 g    polyethylene glycol (MIRALAX) Packet 17 g    simethicone (MYLICON) chewable tablet 80 mg    sodium chloride (PF) 0.9% PF flush 3 mL    sodium chloride (PF) 0.9% PF flush 3 mL             Review of Systems:     A complete review of systems was performed and is negative except as noted in the HPI           Physical Exam:   BP 97/49 (BP Location: Left arm)   Pulse 89   Temp 98.3  F (36.8  C) (Oral)   Resp 16   Wt 119.1 kg (262 lb 9.6 oz)   SpO2 98%   BMI 38.78 kg/m    Wt:   Wt Readings from Last 2 Encounters:   08/26/23 119.1 kg (262 lb 9.6 oz)   06/20/22 111.1 kg (245 lb)      Constitutional: cooperative, pleasant, not dyspneic/diaphoretic, no acute distress  Eyes: Sclera icteric  Ears/nose/mouth/throat: Normal oropharynx without ulcers or exudate, mucus membranes moist  Neck: supple  CV: RRR, No edema  Respiratory: Unlabored breathing  Abdomen: No scars, + distended, +bs, no hepatosplenomegaly, nontender, no peritoneal signs  Skin: warm, perfused, no jaundice  Neuro: AAO x 3, No asterixis  Psych: Normal affect  MSK: Normal  gait         Data:   Labs and imaging below were independently reviewed and interpreted    BMP  Recent Labs   Lab 08/27/23  0215 08/26/23  0653 08/25/23  0620 08/24/23  0618   * 132* 131* 132*   POTASSIUM 4.3 4.2 3.8 3.7   CHLORIDE 98 100 101 100   ENEDINA 9.5 9.4 9.2 9.0   CO2 16* 17* 17* 16*   BUN 47.5* 47.7* 47.5* 46.4*   CR 4.49* 4.63* 5.40* 6.20*   * 92 89 81     CBC  Recent Labs   Lab 08/27/23 0215 08/26/23  0653 08/25/23  0620 08/24/23  0618   WBC 9.3 9.6 7.2 6.3   RBC 3.14* 3.13* 3.04* 2.81*   HGB 10.4* 10.5* 10.3* 9.6*   HCT 29.9* 29.2* 28.3* 26.4*   MCV 95 93 93 94   MCH 33.1* 33.5* 33.9* 34.2*   MCHC 34.8 36.0 36.4 36.4   RDW 21.5* 21.2* 21.3* 21.1*    176 144* 129*     INR  Recent Labs   Lab 08/27/23  0745 08/24/23  0618 08/23/23  0542 08/22/23  0559   INR 1.49* 1.72* 1.66* 1.52*     LFTs  Recent Labs   Lab 08/27/23 0215 08/26/23  0653 08/25/23  0620 08/24/23  0618 08/23/23  0542   ALKPHOS 118 115 103 91 120   * 186* 155* 133* 149*   ALT 55 53 44 38 46   BILITOTAL 33.9* 33.7* 30.3* 27.1* 28.6*   PROTTOTAL  --   --   --   --  6.2*   ALBUMIN 3.4* 3.5 3.6 3.6 3.6      PANC  Recent Labs   Lab 08/21/23  1130   LIPASE 398*       Imaging:

## 2023-08-28 LAB
ALBUMIN SERPL BCG-MCNC: 3.4 G/DL (ref 3.5–5.2)
ALP SERPL-CCNC: 110 U/L (ref 40–129)
ALT SERPL W P-5'-P-CCNC: 57 U/L (ref 0–70)
ANION GAP SERPL CALCULATED.3IONS-SCNC: 14 MMOL/L (ref 7–15)
AST SERPL W P-5'-P-CCNC: 179 U/L (ref 0–45)
BACTERIA FLD CULT: NO GROWTH
BASOPHILS # BLD AUTO: 0.1 10E3/UL (ref 0–0.2)
BASOPHILS NFR BLD AUTO: 1 %
BILIRUB SERPL-MCNC: 34.9 MG/DL
BUN SERPL-MCNC: 41.4 MG/DL (ref 6–20)
CALCIUM SERPL-MCNC: 9.1 MG/DL (ref 8.6–10)
CHLORIDE SERPL-SCNC: 100 MMOL/L (ref 98–107)
COLONOSCOPY: NORMAL
CREAT SERPL-MCNC: 3.72 MG/DL (ref 0.67–1.17)
DEPRECATED HCO3 PLAS-SCNC: 16 MMOL/L (ref 22–29)
EOSINOPHIL # BLD AUTO: 0.1 10E3/UL (ref 0–0.7)
EOSINOPHIL NFR BLD AUTO: 1 %
ERYTHROCYTE [DISTWIDTH] IN BLOOD BY AUTOMATED COUNT: 21.7 % (ref 10–15)
GFR SERPL CREATININE-BSD FRML MDRD: 20 ML/MIN/1.73M2
GLUCOSE SERPL-MCNC: 115 MG/DL (ref 70–99)
HCT VFR BLD AUTO: 28.1 % (ref 40–53)
HGB BLD-MCNC: 10.1 G/DL (ref 13.3–17.7)
IMM GRANULOCYTES # BLD: 0.4 10E3/UL
IMM GRANULOCYTES NFR BLD: 4 %
LYMPHOCYTES # BLD AUTO: 0.8 10E3/UL (ref 0.8–5.3)
LYMPHOCYTES NFR BLD AUTO: 8 %
MAGNESIUM SERPL-MCNC: 1.9 MG/DL (ref 1.7–2.3)
MCH RBC QN AUTO: 34.2 PG (ref 26.5–33)
MCHC RBC AUTO-ENTMCNC: 35.9 G/DL (ref 31.5–36.5)
MCV RBC AUTO: 95 FL (ref 78–100)
MONOCYTES # BLD AUTO: 1.3 10E3/UL (ref 0–1.3)
MONOCYTES NFR BLD AUTO: 12 %
NEUTROPHILS # BLD AUTO: 7.6 10E3/UL (ref 1.6–8.3)
NEUTROPHILS NFR BLD AUTO: 74 %
NRBC # BLD AUTO: 0 10E3/UL
NRBC BLD AUTO-RTO: 0 /100
PHOSPHATE SERPL-MCNC: 3.4 MG/DL (ref 2.5–4.5)
PLATELET # BLD AUTO: 156 10E3/UL (ref 150–450)
POTASSIUM SERPL-SCNC: 4.1 MMOL/L (ref 3.4–5.3)
PROT SERPL-MCNC: ABNORMAL G/DL
RBC # BLD AUTO: 2.95 10E6/UL (ref 4.4–5.9)
SODIUM SERPL-SCNC: 130 MMOL/L (ref 136–145)
UPPER GI ENDOSCOPY: NORMAL
WBC # BLD AUTO: 10.3 10E3/UL (ref 4–11)

## 2023-08-28 PROCEDURE — 99153 MOD SED SAME PHYS/QHP EA: CPT | Performed by: INTERNAL MEDICINE

## 2023-08-28 PROCEDURE — 80069 RENAL FUNCTION PANEL: CPT | Performed by: INTERNAL MEDICINE

## 2023-08-28 PROCEDURE — 99233 SBSQ HOSP IP/OBS HIGH 50: CPT | Performed by: INTERNAL MEDICINE

## 2023-08-28 PROCEDURE — 88305 TISSUE EXAM BY PATHOLOGIST: CPT | Mod: 26 | Performed by: PATHOLOGY

## 2023-08-28 PROCEDURE — 88305 TISSUE EXAM BY PATHOLOGIST: CPT | Mod: TC | Performed by: INTERNAL MEDICINE

## 2023-08-28 PROCEDURE — 84075 ASSAY ALKALINE PHOSPHATASE: CPT | Performed by: INTERNAL MEDICINE

## 2023-08-28 PROCEDURE — 06L38CZ OCCLUSION OF ESOPHAGEAL VEIN WITH EXTRALUMINAL DEVICE, VIA NATURAL OR ARTIFICIAL OPENING ENDOSCOPIC: ICD-10-PCS | Performed by: INTERNAL MEDICINE

## 2023-08-28 PROCEDURE — 45380 COLONOSCOPY AND BIOPSY: CPT | Performed by: INTERNAL MEDICINE

## 2023-08-28 PROCEDURE — 250N000013 HC RX MED GY IP 250 OP 250 PS 637: Performed by: INTERNAL MEDICINE

## 2023-08-28 PROCEDURE — 43244 EGD VARICES LIGATION: CPT | Performed by: INTERNAL MEDICINE

## 2023-08-28 PROCEDURE — 85025 COMPLETE CBC W/AUTO DIFF WBC: CPT | Performed by: INTERNAL MEDICINE

## 2023-08-28 PROCEDURE — 83735 ASSAY OF MAGNESIUM: CPT | Performed by: INTERNAL MEDICINE

## 2023-08-28 PROCEDURE — 120N000002 HC R&B MED SURG/OB UMMC

## 2023-08-28 PROCEDURE — 36415 COLL VENOUS BLD VENIPUNCTURE: CPT | Performed by: INTERNAL MEDICINE

## 2023-08-28 PROCEDURE — 250N000011 HC RX IP 250 OP 636: Mod: JZ | Performed by: INTERNAL MEDICINE

## 2023-08-28 PROCEDURE — G0500 MOD SEDAT ENDO SERVICE >5YRS: HCPCS | Performed by: INTERNAL MEDICINE

## 2023-08-28 PROCEDURE — 0DBN8ZX EXCISION OF SIGMOID COLON, VIA NATURAL OR ARTIFICIAL OPENING ENDOSCOPIC, DIAGNOSTIC: ICD-10-PCS | Performed by: INTERNAL MEDICINE

## 2023-08-28 PROCEDURE — 250N000011 HC RX IP 250 OP 636: Performed by: INTERNAL MEDICINE

## 2023-08-28 PROCEDURE — C9113 INJ PANTOPRAZOLE SODIUM, VIA: HCPCS | Mod: JZ | Performed by: INTERNAL MEDICINE

## 2023-08-28 RX ORDER — MULTIPLE VITAMINS W/ MINERALS TAB 9MG-400MCG
1 TAB ORAL DAILY
Qty: 90 TABLET | Refills: 0 | Status: SHIPPED | OUTPATIENT
Start: 2023-08-28 | End: 2023-08-30

## 2023-08-28 RX ORDER — FOLIC ACID 1 MG/1
1 TABLET ORAL DAILY
Qty: 90 TABLET | Refills: 0 | Status: SHIPPED | OUTPATIENT
Start: 2023-08-28 | End: 2023-08-30

## 2023-08-28 RX ORDER — CALCIUM CARBONATE 500 MG/1
2 TABLET, CHEWABLE ORAL 4 TIMES DAILY PRN
COMMUNITY
Start: 2023-08-28 | End: 2023-08-30

## 2023-08-28 RX ORDER — FENTANYL CITRATE 50 UG/ML
INJECTION, SOLUTION INTRAMUSCULAR; INTRAVENOUS PRN
Status: DISCONTINUED | OUTPATIENT
Start: 2023-08-28 | End: 2023-08-30 | Stop reason: HOSPADM

## 2023-08-28 RX ORDER — ONDANSETRON 4 MG/1
4 TABLET, ORALLY DISINTEGRATING ORAL EVERY 8 HOURS PRN
Qty: 20 TABLET | Refills: 0 | Status: SHIPPED | OUTPATIENT
Start: 2023-08-28 | End: 2023-08-30

## 2023-08-28 RX ORDER — GABAPENTIN 100 MG/1
100 CAPSULE ORAL AT BEDTIME
Qty: 90 CAPSULE | Refills: 0 | Status: SHIPPED | OUTPATIENT
Start: 2023-08-28 | End: 2023-08-30

## 2023-08-28 RX ORDER — PANTOPRAZOLE SODIUM 40 MG/1
40 TABLET, DELAYED RELEASE ORAL DAILY
Qty: 90 TABLET | Refills: 0 | Status: ON HOLD | OUTPATIENT
Start: 2023-08-28 | End: 2023-10-15

## 2023-08-28 RX ADMIN — POLYETHYLENE GLYCOL 3350 238 G: 17 POWDER, FOR SOLUTION ORAL at 03:35

## 2023-08-28 RX ADMIN — Medication 1 TABLET: at 13:21

## 2023-08-28 RX ADMIN — FOLIC ACID 1 MG: 1 TABLET ORAL at 13:21

## 2023-08-28 RX ADMIN — PANTOPRAZOLE SODIUM 40 MG: 40 INJECTION, POWDER, FOR SOLUTION INTRAVENOUS at 13:21

## 2023-08-28 ASSESSMENT — ACTIVITIES OF DAILY LIVING (ADL)
ADLS_ACUITY_SCORE: 18

## 2023-08-28 NOTE — PLAN OF CARE
[id number]: Pt alert and oriented x4. Denies pain. Up independently. Continues to be very jaundiced. First 2L of bowel prep started around 1800 (per pt request, was supposed to be at 1600). Patient able to complete prep within 30 minutes. Had a couple of soft BMs shortly after. No blood noted. Still not clear. NPO after midnight.To have another 2L of bowel prep at 0300. Pt aware and agreeable to plan. Will continue to monitor BM and administer another 2L of bowel prep if stool still not clear in the morning.       Goal Outcome Evaluation:      Plan of Care Reviewed With: patient    Overall Patient Progress: no changeOverall Patient Progress: no change    Outcome Evaluation: Denies pain, bowel prep started, NPO after midnight for Colonoscopy tomorrow

## 2023-08-28 NOTE — PLAN OF CARE
"  /57 (BP Location: Left arm)   Pulse 82   Temp 98.4  F (36.9  C) (Oral)   Resp 16   Wt 114.8 kg (253 lb 1.6 oz)   SpO2 100%   BMI 37.38 kg/m     Situation:  43 year old male I with history of severe alcohol use disorder, decompensated cirrhosis (variceal bleeding, ascites), portal hyperension, alcohol withdrawal seizures ,severe  alcoholic hepatitis,methamphetamine use disorder, severe, in sustained remission who was admitted with increased jaundice and JOCELYN.   Patient currently actively drinking. With last drink 8-19. Not a current liver transplant candidate due to continued drinking.       Neuro: Alert/ox4. CIWA 0.   Therapeutic paracentesis yesterday with 4 liters of ascites removed. Abdomen taught, distended.   Skin: Jaundice.  Reports less itching after paracentesis.   GI:  Denies nausea or emesis  Activity:  Up independently in room to BR frequently with bowel prep. Endorses  no nausea or dyspnea with activity.    Diet: NPO after 0000 otherwise on a  Combination Diet Regular Diet; High Kcal/High Protein Diet, ADULT; 2 gm NA Dietr diet with navdeep counts.   Respiratory:Denies CANAS or SOB.  VSS on RA  :  tea icteric urine output  GI:  Frequent watery stools  becoming more clear as shift went on     Goal Outcome Evaluation:  Patient more guarded this night stating he just wants to sleep.He knows the plans.  As done previous night,  continued to educated patient on gabapentin  use with alcohol withdrawal  as well as sleep hygiene; however,  patient declined medication stating  he \" just wanted to lay down and sleep\" after having had several  episodes of hematochezia overnight wile clearing bowels with bowel prep.  Initially patient had stated that he was certain \" the blood was from his 2 internal hemorrhoids.. Patient slept between cares and  feeling poorly.  Updated day shift RN.     Problem: Gastrointestinal Bleeding  Goal: Optimal Coping with Acute Illness  Outcome: Not Progressing       Plan:  NPO " overnight since 0100.  Continue to follow POC and update provider with changes.      Goal Outcome Evaluation:  Plan of Care Reviewed With: patient  Overall Patient Progress: no change    Outcome Evaluation: [place] bowel prep initiated @ 0300. NPO after 0000. Anticipate colonoscopy this afternoon.

## 2023-08-28 NOTE — PROGRESS NOTES
[facility]    Medicine Progress Note - Hospitalist Service, GOLD TEAM 9    Date of Admission:  8/21/2023    Assessment & Plan     Elroy Morel Sr. is a 43 year old male with a history of severe alcohol use disorder, decompensated cirrhosis (variceal bleeding, ascites), alcohol withdrawal seizures who presents to [facility] ER due to significantly elevated bilirubin total bili 20.2, referred from [facility]. GI Hepatology consulted in ED for evaluation of abnormal LFTs.  Also found to have JOCELYN with creatinine 4.76, sodium 130, CO2 20. Hepatology and Nephrology teams consulted while inpatient. Broad infxn w/up unremarkable, including repeat C.diff testing (+ab but neg toxin). Acute hepatitis panel negative. Patient not a candidate for steroids for ETOH hepatitis due to JOCELYN and diarrhea symptoms. JOCELYN suspected to be HRS based on albumin challenge. LFTs and bilirubin slowly downtrending. During hospital stay, patient showed no signs of alcohol withdrawal, no prn Ativan administered. On 8/25, Cr began downtrending. Initially bloody stools improving but then acutely worsened (Hgb stable, HDS), GI EGD/colonoscopy 8/28 which showed Grade II EV s/p banding, portal hypertensive gastropathy, esophageal ulcer at prior variceal treatment site, 2 polyps in sigmoid colon s/p removal, rectal varicies, erythematous mucosa in sigmoid colon/rectum c/w portal colopathy.      # Acute on Chronic Hematochezia  # New Grade II EV s/p Banding (8/28)  # Portal Hypertensive Gastropathy (8/28)  # Normocytic Anemia  # History of variceal bleeding(8/13/2023) w/ new esophageal ulcer at area (8/28)  - GI consult   - s/p EGD 8/28: Grade II esophageal varices, s/p Banding x2. Completely eradicated. Portal hypertensive gastropathy. Esophageal ulcers at site of prior variceal treatment.   - s/p colonoscopy 8/28: Two diminutive polyps in the sigmoid colon, removed with a cold biopsy forceps. Resected and  retrieved. Rectal varices. Erythematous mucosa in the sigmoid colon and rectum with some erythema, consistent with portal colopathy. This was the likely cause of hematochezia. No blood was seen.   - continue IV PPI for now, plan transition to oral in AM  - CBC in AM    #Recent Hx of C.diff  - continue PO vanc ppx (repeat C.diff testing negative for active infxn w/ neg toxin)    #Decompensated ETOH-related cirrhosis  #Severe alcohol-related hepatitis w/o evidence of liver failure (improving LFTs)  # Direct Hyperbilirubinemia (increasing, to be expected per GI)  # Coagulopathy w/ elevated INR  #Ascites  #Rule out SBP  #History of variceal bleeding  #Alcohol use disorder, severe  Primary Hepatologist: Follows at [facility] -> transitioned to [facility] with Dr. Hernandez.  On admission MELD 37, Maddrey DF 50. Abd US 8/21: Cirrhosis with evidence of portal hypertension and small to moderate volume ascites. Gallbladder sludge without definite evidence of acute cholecystitis or biliary obstruction   - GI consulted  - trend daily MELD score  - hepatitis labs: HbsAg negative, HbsAb negative, HCV RNA negative, Hepatitis A IgG+ w/ IgM negative, HIV negative  - Infxn w/up: BCX NGTD, UCX negative. CXR w/ clear lungs, diagnostic paracentesis 8/23 not consistent for SBP  - Ascites: + ascites on exam, no hx of SBP. S/p CAP team paracentesis 8/23 w/ 4L removal   - S/p paracentesis 8/27 4L w/ albumin replacement  - Given significant elevation of Maddrey DF and MELD score, GI considered steroid treatment however held give severity of JOCELYN and diarrhea  - HE: no hx of HE, no clinical concerns at this time  - EV: EGD 08/13/2023 -> Grade II esophageal varices banded, PHG     - see above for new EGD results  - HCC: No liver lesions on US 08/2023  - Not a candidate for liver transplant at this time  - Limit/avoid tylenol use to < 2g per 24 hours.   [ ] Dispo: Follows w/ GI team Dr. Muller outpatient. GI team arranging outpatient  paracenteses and outpatient labs and help w/ follow up appointments.       #JOCELYN suspect 2/2 HRS, downtrending  # Mild Hyponatremia  - Nephrology consulted, no indication for dialysis (signed off)  - trend chem daily  - s/p fluid challenge in ED (end 8/22)  - s/p IV albumin challenge with 1 g/kg -> 100 g daily for 3 days (end 8/23) w/o improvement in CR consistent w/ HRS   - Renal US: mild renal parenchymal disease w/o hydronephrosis  - Infxn w/up as above  - hold PTA Lisinopril  - monitor I/Os, renally dose medications, avoid nephrotoxic medications (including NSAIDS)  [ ] Dispo: Referral placed for CKD clinic follow up. Hold lisinopril til Cr baseline settles    #GERD  - Continue PTA pantoprazole 40 mg daily.     #Alcohol use disorder, severe  # Monitor for Alcohol withdrawal  # History of alcohol withdrawal seizure  # Methamphetamine use disorder, severe, in sustained remission   Last ETOH consumption 8/19 per H&P  - discontinue CIWA monitoring and prn lorazepam as patient has shown no signs of withdrawal since admission (8/23)  - Nutrition consulted, Multivitamin, thiamine, folic acid daily  -  consultation, encouraged alcohol abstinence  - addiction med consult, appreciate assistance   - start gabapentin 100mg qHS  [ ] Dispo: patient interested in long-term inpatient detox program if able. Plan to discharge to [facility] when medically ready (do not accept patients on weekend)    #History of hypertension:   - Hold PTA lisinopril given JOCELYN and normotensive Bps  [ ] Dispo: plan to continue to hold til follow up outpatient labs show Cr settles, to be f/up by outpatient GI/PCP/Nephrology teams.           Diet: Diet  Clear Liquid Diet    DVT Prophylaxis: Pneumatic Compression Devices  Carroll Catheter: Not present  Lines: None     Cardiac Monitoring: None  Code Status: Full Code      Disposition Plan     Expected Discharge Date: 08/28/2023      Destination: [facility]  Discharge Comments: 8/28:  plan for colonoscopy per GI for bloody stools    Dispo: Inpatient Detox          Jennifer Fletcher MD  Hospitalist Service, GOLD TEAM 9  [facility]  Securely message with [employer] (more info)  Text page via [employer] Paging/Directory   See signed in provider for up to date coverage information  ______________________________________________________________________    Interval History   No acute events overnight. Remained HDS on RA, hgb slowly downtrending 10.4-->10.2--> 10.1. Completed Bowel prep overnight. GI EGD/colonoscopy 8/28 which showed Grade II EV s/p banding, portal hypertensive gastropathy, esophageal ulcer at prior variceal treatment site, 2 polyps in sigmoid colon s/p removal, rectal varicies, erythematous mucosa in sigmoid colon/rectum c/w portal colopathy. No complications w/ procedure. Will repeat Hgb in AM. S/p procedure will keep on liquid diet, advance later today/tmr.     Physical Exam   Vital Signs: Temp: 98  F (36.7  C) Temp src: Oral BP: 104/60 Pulse: 84   Resp: 18 SpO2: 100 % O2 Device: None (Room air) Oxygen Delivery: 2 LPM  Weight: 253 lbs 1.6 oz    General Appearance:  Awake, interactive, lying on bed, severely jaundiced (slightly improving) w/ +scleral icterus, NAD, pleasant  Respiratory: Normal work of breathing. CTA BL. On RA   Cardiovascular: S1 S2 Regular, no murmur appreciated. 1+ pitting edema blt  GI/Abd: Soft. NT.  Diffuse distention (improved s/p paracentesis). No guarding or rigidity.  No peritoneal signs.  Extremities: Distally wwp. No pedal edema.  Neuro: AO x 4, Grossly non focal. No asterixis.  Skin: severe jaundice, No acute rash on exposed areas, no reported pruritis/excoriations.     Medical Decision Making       50 MINUTES SPENT BY ME on the date of service doing chart review, history, exam, documentation & further activities per the note.  MANAGEMENT DISCUSSED with the following over the past 24 hours: GI, addiction medicine, CM/SW        Data     I have personally reviewed the following data over the past 24 hrs:    10.3  \   10.1 (L)   / 156     130 (L) 100 41.4 (H) /  115 (H)   4.1 16 (L) 3.72 (H) \     ALT: 57 AST: 179 (H) AP: 110 TBILI: 34.9 (HH)   ALB: 3.4 (L) TOT PROTEIN: N/A LIPASE: N/A         MELD 3.0: 38 at 8/28/2023  6:45 AM  MELD-Na: 38 at 8/28/2023  6:45 AM  Calculated from:  Serum Creatinine: 3.72 mg/dL (Using max of 3 mg/dL) at 8/28/2023  6:45 AM  Serum Sodium: 130 mmol/L at 8/28/2023  6:45 AM  Total Bilirubin: 34.9 mg/dL at 8/28/2023  6:45 AM  Serum Albumin: 3.4 g/dL at 8/28/2023  6:45 AM  INR(ratio): 1.49 at 8/27/2023  7:45 AM  Age at listing (hypothetical): 43 years  Sex: Male at 8/28/2023  6:45 AM    Imaging results reviewed over the past 24 hrs:   No results found for this or any previous visit (from the past 24 hour(s)).    Recent Labs   Lab 08/28/23  0645 08/27/23  2202 08/27/23  1408 08/27/23  0745 08/27/23  0215 08/26/23  0653 08/25/23  0620 08/24/23  0618 08/23/23  0542   WBC 10.3  --   --   --  9.3 9.6   < > 6.3  --    HGB 10.1* 10.2* 10.4*  --  10.4* 10.5*   < > 9.6*  --    MCV 95  --   --   --  95 93   < > 94  --      --   --   --  160 176   < > 129*  --    INR  --   --   --  1.49*  --   --   --  1.72* 1.66*   *  --   --   --  129* 132*   < > 132* 131*   POTASSIUM 4.1  --   --   --  4.3 4.2   < > 3.7 3.7   CHLORIDE 100  --   --   --  98 100   < > 100 98   CO2 16*  --   --   --  16* 17*   < > 16* 15*   BUN 41.4*  --   --   --  47.5* 47.7*   < > 46.4* 43.1*   CR 3.72*  --   --   --  4.49* 4.63*   < > 6.20* 6.24*   ANIONGAP 14  --   --   --  15 15   < > 16* 18*   ENEDINA 9.1  --   --   --  9.5 9.4   < > 9.0 9.0   *  --   --   --  112* 92   < > 81 84   ALBUMIN 3.4*  --   --   --  3.4* 3.5   < > 3.6 3.6   PROTTOTAL  --   --   --   --   --   --   --   --  6.2*   BILITOTAL 34.9*  --   --   --  33.9* 33.7*   < > 27.1* 28.6*   ALKPHOS 110  --   --   --  118 115   < > 91 120   ALT 57  --   --   --  55 53   < >  38 46   *  --   --   --  186* 186*   < > 133* 149*    < > = values in this interval not displayed.

## 2023-08-28 NOTE — OR NURSING
EGD with 2 ligation bands placed on varices and colonoscopy with 2 polyps removed with biopsy forceps and pt tolerated well with moderate sedation and on 2L nc oxygen.  Report will be called to floor nurse and transport here to take pt back to dept.     Acitretin Pregnancy And Lactation Text: This medication is Pregnancy Category X and should not be given to women who are pregnant or may become pregnant in the future. This medication is excreted in breast milk.

## 2023-08-28 NOTE — PROGRESS NOTES
Care Management Follow Up    Length of Stay (days): 7    Expected Discharge Date: 08/28/2023     Concerns to be Addressed: discharge planning     Patient plan of care discussed at interdisciplinary rounds: Yes    Anticipated Discharge Disposition:     [facility]  [address] [place] [address]  [facility] Admissions: [phone]      Anticipated Discharge Services: [facility]  Anticipated Discharge DME: None    Patient/family educated on Medicare website which has current facility and service quality ratings:  N/A  Education Provided on the Discharge Plan:  Yes  Patient/Family in Agreement with the Plan: yes    Referrals Placed by CM/SW:    Private pay costs discussed: Not applicable    Additional Information:  Per discussion with Gold 9, pt discharge will be delayed today d/t having a colonoscopy at 1300 today. Possible discharge tomorrow vs Wednesday pending medical course.     CAMILLE updated Gabrielle with Admissions at [facility] that pt is not medically ready today. Gabrielle requested to be kept update to coordinate pt discharge once med ready.     ADDENDUM 1515: Per discussion with Gold 9, anticipating that pt may be med ready tomorrow afternoon pending medical course.     CAMILLE updated Gabrielle with [facility] and asked if they would be able to admit pt tomorrow and if there was a certain admit time that pt would need to be at [facility] by. Gabrielle noted that [facility]'s Medical Director did have some medical questions but noted that Gabrielle would touch base with SW tomorrow morning after their team reviews pt for admit.     SW to touch base with [facility] Admissions tomorrow to discuss any questions and potential discharge tomorrow.     Olga Blair, MARILUZ  [facility]   [facility]   Covering 7D   7D  Ph: [phone]

## 2023-08-28 NOTE — PROVIDER NOTIFICATION
Provider notified (name): Jennifer Fletcehr MD   Reason for notification: @0823: Pt bowel movements not et clear and the extra dose of bowel prep was missed at 0500 - Last BM was green and cloudy. Pt on the schedule for EGD/colonoscopy at 0930. Do you want me to give the extra 2L of bowel prep now?    @0830: The pt just had another BM that ws more clear than th previous one 20 minutes ago. BM was still slightly cloudy but was able to see the bottom of the hat.     Response from provider: GI recommends another miralax prep and then plan on procedure at 1p.

## 2023-08-28 NOTE — PLAN OF CARE
RN assumed cares at [phone]     Vitals: VSS on room air.   Pain: denies  IV/Drains: 2 L PIV saline locked.   Neuro: A&Ox4; calm and cooperative.   Respiratory: Lung sounds clear and equal bilaterally. Stable on room air. Pt denies SOB/CANAS. No respiratory distress noted this shift.  Cardiac: WDL.  GI/: Continent of bowel & bladder. Voiding spontaneously. Multiple BMs this shift due to bowel prep.  Skin: Skin check completed without any new concerns. Jaundiced skin.  Activity: up independently in room  Nutrition: Soft and bite sized diet  Other: takes pills via PO    Events/plan: Pt had EGD and colonoscopy today. Pt may be med ready tomorrow afternoon pending medical course to [facility].    No acute events this shift. Q1hr rounding completed. Call light within reach and is able to make needs known.     Goal Outcome Evaluation:      Plan of Care Reviewed With: patient    Overall Patient Progress: no changeOverall Patient Progress: no change    Outcome Evaluation: EGD/colonscopy completed today - now on soft and bite sized diet.

## 2023-08-28 NOTE — PROVIDER NOTIFICATION
Provider notified (name): Jennifer Fletcher MD  Reason for notification: Pt mentioned having a conversation with a MD about advancing his diet to soft and bite sized diet. Is this accurate? If so, can you modify the order?    Response from provider: order changed to soft and bite sized diet

## 2023-08-29 DIAGNOSIS — K70.31 ALCOHOLIC CIRRHOSIS OF LIVER WITH ASCITES (H): Primary | ICD-10-CM

## 2023-08-29 LAB
ALBUMIN SERPL BCG-MCNC: 3.3 G/DL (ref 3.5–5.2)
ALP SERPL-CCNC: 122 U/L (ref 40–129)
ALT SERPL W P-5'-P-CCNC: 57 U/L (ref 0–70)
ANION GAP SERPL CALCULATED.3IONS-SCNC: 13 MMOL/L (ref 7–15)
APPEARANCE FLD: ABNORMAL
AST SERPL W P-5'-P-CCNC: 196 U/L (ref 0–45)
BASOPHILS # BLD AUTO: 0.1 10E3/UL (ref 0–0.2)
BASOPHILS NFR BLD AUTO: 1 %
BILIRUB SERPL-MCNC: 34.3 MG/DL
BUN SERPL-MCNC: 40 MG/DL (ref 6–20)
CALCIUM SERPL-MCNC: 8.9 MG/DL (ref 8.6–10)
CELL COUNT BODY FLUID SOURCE: ABNORMAL
CHLORIDE SERPL-SCNC: 98 MMOL/L (ref 98–107)
COLOR FLD: YELLOW
CREAT SERPL-MCNC: 3.44 MG/DL (ref 0.67–1.17)
DEPRECATED HCO3 PLAS-SCNC: 17 MMOL/L (ref 22–29)
EOSINOPHIL # BLD AUTO: 0.1 10E3/UL (ref 0–0.7)
EOSINOPHIL NFR BLD AUTO: 1 %
ERYTHROCYTE [DISTWIDTH] IN BLOOD BY AUTOMATED COUNT: 21.2 % (ref 10–15)
GFR SERPL CREATININE-BSD FRML MDRD: 22 ML/MIN/1.73M2
GLUCOSE SERPL-MCNC: 87 MG/DL (ref 70–99)
HCT VFR BLD AUTO: 29.2 % (ref 40–53)
HGB BLD-MCNC: 10.3 G/DL (ref 13.3–17.7)
IMM GRANULOCYTES # BLD: 0.4 10E3/UL
IMM GRANULOCYTES NFR BLD: 3 %
LYMPHOCYTES # BLD AUTO: 0.9 10E3/UL (ref 0.8–5.3)
LYMPHOCYTES NFR BLD AUTO: 7 %
MAGNESIUM SERPL-MCNC: 1.8 MG/DL (ref 1.7–2.3)
MCH RBC QN AUTO: 33.4 PG (ref 26.5–33)
MCHC RBC AUTO-ENTMCNC: 35.3 G/DL (ref 31.5–36.5)
MCV RBC AUTO: 95 FL (ref 78–100)
MONOCYTES # BLD AUTO: 1.4 10E3/UL (ref 0–1.3)
MONOCYTES NFR BLD AUTO: 12 %
NEUTROPHILS # BLD AUTO: 9.1 10E3/UL (ref 1.6–8.3)
NEUTROPHILS NFR BLD AUTO: 76 %
NRBC # BLD AUTO: 0 10E3/UL
NRBC BLD AUTO-RTO: 0 /100
PATH REPORT.COMMENTS IMP SPEC: NORMAL
PATH REPORT.COMMENTS IMP SPEC: NORMAL
PATH REPORT.FINAL DX SPEC: NORMAL
PATH REPORT.GROSS SPEC: NORMAL
PATH REPORT.MICROSCOPIC SPEC OTHER STN: NORMAL
PATH REPORT.RELEVANT HX SPEC: NORMAL
PHOSPHATE SERPL-MCNC: 3.5 MG/DL (ref 2.5–4.5)
PHOTO IMAGE: NORMAL
PLATELET # BLD AUTO: 170 10E3/UL (ref 150–450)
POTASSIUM SERPL-SCNC: 4.2 MMOL/L (ref 3.4–5.3)
PROT SERPL-MCNC: ABNORMAL G/DL
RBC # BLD AUTO: 3.08 10E6/UL (ref 4.4–5.9)
SODIUM SERPL-SCNC: 128 MMOL/L (ref 136–145)
WBC # BLD AUTO: 11.9 10E3/UL (ref 4–11)
WBC # FLD AUTO: 284 /UL

## 2023-08-29 PROCEDURE — 84450 TRANSFERASE (AST) (SGOT): CPT | Performed by: INTERNAL MEDICINE

## 2023-08-29 PROCEDURE — 120N000002 HC R&B MED SURG/OB UMMC

## 2023-08-29 PROCEDURE — 250N000011 HC RX IP 250 OP 636: Mod: JZ | Performed by: INTERNAL MEDICINE

## 2023-08-29 PROCEDURE — 99232 SBSQ HOSP IP/OBS MODERATE 35: CPT | Performed by: HOSPITALIST

## 2023-08-29 PROCEDURE — C9113 INJ PANTOPRAZOLE SODIUM, VIA: HCPCS | Mod: JZ | Performed by: INTERNAL MEDICINE

## 2023-08-29 PROCEDURE — 250N000013 HC RX MED GY IP 250 OP 250 PS 637: Performed by: INTERNAL MEDICINE

## 2023-08-29 PROCEDURE — 85025 COMPLETE CBC W/AUTO DIFF WBC: CPT | Performed by: INTERNAL MEDICINE

## 2023-08-29 PROCEDURE — 83735 ASSAY OF MAGNESIUM: CPT | Performed by: INTERNAL MEDICINE

## 2023-08-29 PROCEDURE — 36415 COLL VENOUS BLD VENIPUNCTURE: CPT | Performed by: INTERNAL MEDICINE

## 2023-08-29 RX ADMIN — SIMETHICONE 80 MG: 80 TABLET, CHEWABLE ORAL at 13:40

## 2023-08-29 RX ADMIN — FOLIC ACID 1 MG: 1 TABLET ORAL at 08:31

## 2023-08-29 RX ADMIN — SIMETHICONE 80 MG: 80 TABLET, CHEWABLE ORAL at 21:32

## 2023-08-29 RX ADMIN — CALCIUM CARBONATE (ANTACID) CHEW TAB 500 MG 500 MG: 500 CHEW TAB at 21:32

## 2023-08-29 RX ADMIN — CALCIUM CARBONATE (ANTACID) CHEW TAB 500 MG 500 MG: 500 CHEW TAB at 13:40

## 2023-08-29 RX ADMIN — PANTOPRAZOLE SODIUM 40 MG: 40 INJECTION, POWDER, FOR SOLUTION INTRAVENOUS at 08:32

## 2023-08-29 RX ADMIN — CALCIUM CARBONATE (ANTACID) CHEW TAB 500 MG 500 MG: 500 CHEW TAB at 16:22

## 2023-08-29 RX ADMIN — Medication 1 TABLET: at 08:31

## 2023-08-29 ASSESSMENT — ACTIVITIES OF DAILY LIVING (ADL)
ADLS_ACUITY_SCORE: 18

## 2023-08-29 NOTE — PLAN OF CARE
RN [id number]:    Vital signs: /73 (BP Location: Left arm)   Pulse 84   Temp 98.3  F (36.8  C) (Oral)   Resp 17   Wt 114.8 kg (253 lb 1.6 oz)   SpO2 99%   BMI 37.38 kg/m      Status: 8/21 admit for increased bilirubin and JOCELYN  Neuro: AOX4  Pain/Nausea: denies pain and nausea  Cardiac: WNL  Respiratory: WNL  Mobility: independent  Diet: soft and bite sized diet  Labs: reviewed, bilirubin still critically elevated, team aware  LDAs: PIV x 2 SL  Skin/incisions: no new deficits found  GI/: continent of bowel and bladder  Plan: continue with plan of care

## 2023-08-29 NOTE — PROGRESS NOTES
Care Management Follow Up    Length of Stay (days): 8    Expected Discharge Date: 08/28/2023     Concerns to be Addressed: discharge planning     Patient plan of care discussed at interdisciplinary rounds: Yes    Anticipated Discharge Disposition:     [facility]  [address] [place] [address]  [facility] Admissions: [phone]      Anticipated Discharge Services: [facility]; transportation resources?  Anticipated Discharge DME: None    Patient/family educated on Medicare website which has current facility and service quality ratings:    Education Provided on the Discharge Plan:    Patient/Family in Agreement with the Plan: yes    Referrals Placed by CM/SW:    Private pay costs discussed: Not applicable    Additional Information:  CAMILLE reached out to [facility]missions (Gabrielle) who reported that pt has not yet been medically cleared by their RN team. Gabrielle noted that their team was working on reviewing pt. CAMILLE relayed information to Gold 9 who reported that pt is medically ready for discharge. CAMILLE relayed pt med readiness to Gabrielle and requested that she let SW know if there were any questions re: pt plan of care or tasks SW could do to facilitate discharge.     ADDENDUM 1537: CAMILLE still had not heard back from Gabrielle in [facility] Admissions. SW to continue to follow up with [facility] tomorrow to coordinate discharge.     MARILUZ Parson  [facility]   [facility]   7D  Ph: [phone]

## 2023-08-29 NOTE — PROGRESS NOTES
Care Management Discharge Note    Discharge Date: 08/30/2023 at 1130       Discharge Disposition:     [facility]  [address] [place] [address]  [facility] Admissions: [phone]   RN to RN Ph: [phone]  *Pt has intake meeting at  at 1200 and will need to discharge with 30 days of medications.     Discharge Services: [facility]    Discharge DME: None    Discharge Transportation: Pt to check with family and friends this evening and reported that he could order an [employer]. SW to check in with pt tomorrow morning to confirm that he has ride scheduled and does not need assistance    Private pay costs discussed: Not applicable    Does the patient's insurance plan have a 3 day qualifying hospital stay waiver?  No    PAS Confirmation Code:  N/A  Patient/family educated on Medicare website which has current facility and service quality ratings:  N/A    Education Provided on the Discharge Plan:  Yes  Persons Notified of Discharge Plans: Pt, Bedside RN, Gold 9  Patient/Family in Agreement with the Plan: yes    Handoff Referral Completed: No    Additional Information:  SW received phone call from  at [facility] reporting that they could take pt tomorrow with intake meeting at 1200. SW heard from Gabrielle in [facility]missions who requested that pt be sent with 30 day supply of medications and RN to RN report needed an hour prior to pt admit.     SW met with pt at bedside to update him on [facility] admission tomorrow. Pt agreeable to discharge plan. Pt reported that he would call family or friends to see if they could provide transport. Pt noted that he would also see if Uber could take him too. SW noted that SW team can assist with transport if needed. SW to check in with pt in the morning to see if pt needs assistance with transportation. Pt Bedside RN also present at bedside and SW updated her as well.     Olga Blair, MARILUZ Ann   [facility]   7D CAMILLE Ph;  [phone]

## 2023-08-29 NOTE — PLAN OF CARE
Time of care: 1900-0730    43 y.o. female admitted with increased bilirubin and JOCELYN. Full code, gold 9 is primary. VS q4, no BG, soft-bite sized diet, denies pain, VSS on RA, independent, 2 L PIV's saline locked. Patient is alert and oriented and able to make needs known. Continue to monitor for changes.       Goal Outcome Evaluation: Ongoing, progressing    Plan of Care Reviewed With: patient

## 2023-08-29 NOTE — PROGRESS NOTES
[facility]    Medicine Progress Note - Hospitalist Service, GOLD TEAM 9    Date of Admission:  8/21/2023    Assessment & Plan     Elroy Morel Sr. is a 43 year old male with a history of severe alcohol use disorder, decompensated cirrhosis (variceal bleeding, ascites), alcohol withdrawal seizures who presents to [facility] ER due to significantly elevated bilirubin total bili 20.2, referred from [facility]. GI Hepatology consulted in ED for evaluation of abnormal LFTs.  Also found to have JOCELYN with creatinine 4.76, sodium 130, CO2 20. Hepatology and Nephrology teams consulted while inpatient. Broad infxn w/up unremarkable, including repeat C.diff testing (+ab but neg toxin). Acute hepatitis panel negative. Patient not a candidate for steroids for ETOH hepatitis due to JOCELYN and diarrhea symptoms. JOCELYN suspected to be HRS based on albumin challenge. LFTs and bilirubin slowly downtrending. During hospital stay, patient showed no signs of alcohol withdrawal, no prn Ativan administered. On 8/25, Cr began downtrending. Initially bloody stools improving but then acutely worsened (Hgb stable, HDS), GI EGD/colonoscopy 8/28 which showed Grade II EV s/p banding, portal hypertensive gastropathy, esophageal ulcer at prior variceal treatment site, 2 polyps in sigmoid colon s/p removal, rectal varicies, erythematous mucosa in sigmoid colon/rectum c/w portal colopathy.      # Acute on Chronic Hematochezia  # New Grade II EV s/p Banding (8/28)  # Portal Hypertensive Gastropathy (8/28)  # Normocytic Anemia, acute on chronic anemia secondary to GI bleed  # History of variceal bleeding(8/13/2023) w/ new esophageal ulcer at area (8/28)  - GI consult   - s/p EGD 8/28: Grade II esophageal varices, s/p Banding x2. Completely eradicated. Portal hypertensive gastropathy. Esophageal ulcers at site of prior variceal treatment.   - s/p colonoscopy 8/28: Two diminutive polyps in the sigmoid colon,  removed with a cold biopsy forceps. Resected and retrieved. Rectal varices. Erythematous mucosa in the sigmoid colon and rectum with some erythema, consistent with portal colopathy. This was the likely cause of hematochezia. No blood was seen.   - continue IV PPI for now, plan transition to oral in AM  - CBC in AM    #Recent Hx of C.diff  - continue PO vanc ppx (repeat C.diff testing negative for active infxn w/ neg toxin)    #Decompensated ETOH-related cirrhosis with ascites  #Severe alcohol-related hepatitis w/o evidence of liver failure (improving LFTs)  # Coagulopathy w/ elevated INR  #History of variceal bleeding  #Alcohol use disorder, severe  Primary Hepatologist: Follows at [facility] -> transitioned to [facility] with Dr. Hernandez.  On admission MELD 37, Maddrey DF 50. Abd US 8/21: Cirrhosis with evidence of portal hypertension and small to moderate volume ascites. Gallbladder sludge without definite evidence of acute cholecystitis or biliary obstruction   - GI consulted  - trend daily MELD score  - hepatitis labs: HbsAg negative, HbsAb negative, HCV RNA negative, Hepatitis A IgG+ w/ IgM negative, HIV negative  - Infxn w/up: BCX NGTD, UCX negative. CXR w/ clear lungs, diagnostic paracentesis 8/23 not consistent for SBP  - Ascites: + ascites on exam, no hx of SBP. S/p CAP team paracentesis 8/23 w/ 4L removal   - S/p paracentesis 8/27 4L w/ albumin replacement  - Given significant elevation of Maddrey DF and MELD score, GI considered steroid treatment however held give severity of JOCELYN and diarrhea  - HE: no hx of HE, no clinical concerns at this time  - EV: EGD 08/13/2023 -> Grade II esophageal varices banded, PHG     - see above for new EGD results  - HCC: No liver lesions on US 08/2023  - Not a candidate for liver transplant at this time  - Limit/avoid tylenol use to < 2g per 24 hours.   [ ] Dispo: Follows w/ GI team Dr. Muller outpatient. GI team arranging outpatient paracenteses and outpatient labs  and help w/ follow up appointments.       #JOCELYN suspect 2/2 HRS, downtrending  # Mild Hyponatremia  - Nephrology consulted, no indication for dialysis (signed off)  - trend chem daily  - s/p fluid challenge in ED (end 8/22)  - s/p IV albumin challenge with 1 g/kg -> 100 g daily for 3 days (end 8/23) w/o improvement in CR consistent w/ HRS   - Renal US: mild renal parenchymal disease w/o hydronephrosis  - Infxn w/up as above  - hold PTA Lisinopril  - monitor I/Os, renally dose medications, avoid nephrotoxic medications (including NSAIDS)  [ ] Dispo: Referral placed for CKD clinic follow up. Hold lisinopril til Cr baseline settles    #GERD  - Continue PTA pantoprazole 40 mg daily.     #Alcohol use disorder, severe  # Monitor for Alcohol withdrawal  # History of alcohol withdrawal seizure  # Methamphetamine use disorder, severe, in sustained remission   Last ETOH consumption 8/19 per H&P  - discontinue CIWA monitoring and prn lorazepam as patient has shown no signs of withdrawal since admission (8/23)  - Nutrition consulted, Multivitamin, thiamine, folic acid daily  -  consultation, encouraged alcohol abstinence  - addiction med consult, appreciate assistance   - start gabapentin 100mg qHS  [ ] Dispo: patient interested in long-term inpatient detox program if able. Plan to discharge to [facility]. PATIENT IS MEDICALLY STABLE TO BE DISCHARGED HOME/REHAB.     #History of hypertension:   - Hold PTA lisinopril given JOCELYN and normotensive Bps  [ ] Dispo: plan to continue to hold til follow up outpatient labs show Cr settles, to be f/up by outpatient GI/PCP/Nephrology teams.         Diet: Diet  Soft & Bite Sized Diet (level 6) Thin Liquids (level 0)    DVT Prophylaxis: Pneumatic Compression Devices  Carroll Catheter: Not present  Lines: None     Cardiac Monitoring: None  Code Status: Full Code      Disposition Plan      Expected Discharge Date: 08/30/2023      Destination: [facility]  Discharge Comments:  8/28: plan for colonoscopy per GI for bloody stools    Dispo: Inpatient Detox          Emil Moser MD  Hospitalist Service, GOLD TEAM 9  [facility]  Securely message with [employer] (more info)  Text page via [employer] Paging/Directory   See signed in provider for up to date coverage information  ______________________________________________________________________    Interval History   No new complaints. Eager to leave to go to the detox facility. Reports good urine output and stable abdominal distention    Physical Exam   Vital Signs: Temp: 98.3  F (36.8  C) Temp src: Oral BP: 125/73 Pulse: 84   Resp: 17 SpO2: 99 % O2 Device: None (Room air)    Weight: 253 lbs 1.6 oz    General Appearance:  Awake, interactive, lying on bed, severely jaundiced (slightly improving) w/ +scleral icterus, NAD, pleasant  Respiratory: Normal work of breathing. CTA BL. On RA   Cardiovascular: S1 S2 Regular, no murmur appreciated. 1+ pitting edema blt  GI/Abd: Soft. NT.  Diffuse distention (improved s/p paracentesis). No guarding or rigidity.  No peritoneal signs.  Extremities: Distally wwp. No pedal edema.  Neuro: AO x 4, Grossly non focal. No asterixis.  Skin: severe jaundice, No acute rash on exposed areas, no reported pruritis/excoriations.     Medical Decision Making       30 MINUTES SPENT BY ME on the date of service doing chart review, history, exam, documentation & further activities per the note.  MANAGEMENT DISCUSSED with the following over the past 24 hours: GI, addiction medicine, CM/SW       Data     I have personally reviewed the following data over the past 24 hrs:    11.9 (H)  \   10.3 (L)   / 170     128 (L) 98 40.0 (H) /  87   4.2 17 (L) 3.44 (H) \     ALT: 57 AST: 196 (H) AP: 122 TBILI: 34.3 (HH)   ALB: 3.3 (L) TOT PROTEIN: N/A LIPASE: N/A         MELD 3.0: 38 at 8/29/2023  6:06 AM  MELD-Na: 37 at 8/29/2023  6:06 AM  Calculated from:  Serum Creatinine: 3.44 mg/dL (Using max of  3 mg/dL) at 8/29/2023  6:06 AM  Serum Sodium: 128 mmol/L at 8/29/2023  6:06 AM  Total Bilirubin: 34.3 mg/dL at 8/29/2023  6:06 AM  Serum Albumin: 3.3 g/dL at 8/29/2023  6:06 AM  INR(ratio): 1.49 at 8/27/2023  7:45 AM  Age at listing (hypothetical): 43 years  Sex: Male at 8/29/2023  6:06 AM    Imaging results reviewed over the past 24 hrs:   No results found for this or any previous visit (from the past 24 hour(s)).    Recent Labs   Lab 08/29/23  0606 08/28/23  0645 08/27/23  2202 08/27/23  1408 08/27/23  0745 08/27/23  0215 08/25/23  0620 08/24/23  0618 08/23/23  0542   WBC 11.9* 10.3  --   --   --  9.3   < > 6.3  --    HGB 10.3* 10.1* 10.2*   < >  --  10.4*   < > 9.6*  --    MCV 95 95  --   --   --  95   < > 94  --     156  --   --   --  160   < > 129*  --    INR  --   --   --   --  1.49*  --   --  1.72* 1.66*   * 130*  --   --   --  129*   < > 132* 131*   POTASSIUM 4.2 4.1  --   --   --  4.3   < > 3.7 3.7   CHLORIDE 98 100  --   --   --  98   < > 100 98   CO2 17* 16*  --   --   --  16*   < > 16* 15*   BUN 40.0* 41.4*  --   --   --  47.5*   < > 46.4* 43.1*   CR 3.44* 3.72*  --   --   --  4.49*   < > 6.20* 6.24*   ANIONGAP 13 14  --   --   --  15   < > 16* 18*   ENEDINA 8.9 9.1  --   --   --  9.5   < > 9.0 9.0   GLC 87 115*  --   --   --  112*   < > 81 84   ALBUMIN 3.3* 3.4*  --   --   --  3.4*   < > 3.6 3.6   PROTTOTAL  --   --   --   --   --   --   --   --  6.2*   BILITOTAL 34.3* 34.9*  --   --   --  33.9*   < > 27.1* 28.6*   ALKPHOS 122 110  --   --   --  118   < > 91 120   ALT 57 57  --   --   --  55   < > 38 46   * 179*  --   --   --  186*   < > 133* 149*    < > = values in this interval not displayed.

## 2023-08-30 ENCOUNTER — TRANSCRIBE ORDERS (OUTPATIENT)
Dept: OTHER | Age: 43
End: 2023-08-30

## 2023-08-30 ENCOUNTER — HOSPITAL ENCOUNTER (OUTPATIENT)
Dept: BEHAVIORAL HEALTH | Facility: CLINIC | Age: 43
Discharge: HOME | End: 2023-08-30
Attending: FAMILY MEDICINE
Payer: COMMERCIAL

## 2023-08-30 ENCOUNTER — TRANSFERRED RECORDS (OUTPATIENT)
Dept: HEALTH INFORMATION MANAGEMENT | Facility: CLINIC | Age: 43
End: 2023-08-30
Payer: COMMERCIAL

## 2023-08-30 ENCOUNTER — BEH TREATMENT PLAN (OUTPATIENT)
Dept: BEHAVIORAL HEALTH | Facility: CLINIC | Age: 43
End: 2023-08-30
Attending: FAMILY MEDICINE
Payer: COMMERCIAL

## 2023-08-30 VITALS — WEIGHT: 264 LBS | HEIGHT: 69 IN | BODY MASS INDEX: 39.1 KG/M2

## 2023-08-30 VITALS
WEIGHT: 253.1 LBS | RESPIRATION RATE: 18 BRPM | TEMPERATURE: 98.6 F | OXYGEN SATURATION: 99 % | HEART RATE: 85 BPM | DIASTOLIC BLOOD PRESSURE: 66 MMHG | BODY MASS INDEX: 37.38 KG/M2 | SYSTOLIC BLOOD PRESSURE: 117 MMHG

## 2023-08-30 DIAGNOSIS — F10.20 ALCOHOL USE DISORDER, SEVERE, DEPENDENCE (H): ICD-10-CM

## 2023-08-30 DIAGNOSIS — I10 ESSENTIAL HYPERTENSION: ICD-10-CM

## 2023-08-30 DIAGNOSIS — K74.60 CIRRHOSIS OF LIVER (H): Primary | ICD-10-CM

## 2023-08-30 DIAGNOSIS — I85.00 ESOPHAGEAL VARICES (H): ICD-10-CM

## 2023-08-30 DIAGNOSIS — F10.20 ALCOHOL USE DISORDER, SEVERE, DEPENDENCE (H): Primary | ICD-10-CM

## 2023-08-30 PROBLEM — N17.9 AKI (ACUTE KIDNEY INJURY) (H): Status: ACTIVE | Noted: 2023-08-30

## 2023-08-30 PROBLEM — F19.20 CHEMICAL DEPENDENCY (H): Status: ACTIVE | Noted: 2023-08-30

## 2023-08-30 LAB
ALBUMIN SERPL BCG-MCNC: 3 G/DL (ref 3.5–5.2)
ALP SERPL-CCNC: 125 U/L (ref 40–129)
ALT SERPL W P-5'-P-CCNC: 52 U/L (ref 0–70)
ANION GAP SERPL CALCULATED.3IONS-SCNC: 13 MMOL/L (ref 7–15)
AST SERPL W P-5'-P-CCNC: 184 U/L (ref 0–45)
BACTERIA FLD CULT: NORMAL
BASOPHILS # BLD AUTO: 0.1 10E3/UL (ref 0–0.2)
BASOPHILS NFR BLD AUTO: 1 %
BILIRUB SERPL-MCNC: 33.6 MG/DL
BUN SERPL-MCNC: 45.5 MG/DL (ref 6–20)
CALCIUM SERPL-MCNC: 9 MG/DL (ref 8.6–10)
CHLORIDE SERPL-SCNC: 96 MMOL/L (ref 98–107)
CREAT SERPL-MCNC: 4.34 MG/DL (ref 0.67–1.17)
DEPRECATED HCO3 PLAS-SCNC: 16 MMOL/L (ref 22–29)
EOSINOPHIL # BLD AUTO: 0.1 10E3/UL (ref 0–0.7)
EOSINOPHIL NFR BLD AUTO: 1 %
ERYTHROCYTE [DISTWIDTH] IN BLOOD BY AUTOMATED COUNT: 21.2 % (ref 10–15)
GFR SERPL CREATININE-BSD FRML MDRD: 16 ML/MIN/1.73M2
GLUCOSE SERPL-MCNC: 85 MG/DL (ref 70–99)
HCT VFR BLD AUTO: 28.5 % (ref 40–53)
HGB BLD-MCNC: 10.5 G/DL (ref 13.3–17.7)
IMM GRANULOCYTES # BLD: 0.4 10E3/UL
IMM GRANULOCYTES NFR BLD: 4 %
LYMPHOCYTES # BLD AUTO: 0.9 10E3/UL (ref 0.8–5.3)
LYMPHOCYTES NFR BLD AUTO: 8 %
MAGNESIUM SERPL-MCNC: 1.8 MG/DL (ref 1.7–2.3)
MCH RBC QN AUTO: 34.5 PG (ref 26.5–33)
MCHC RBC AUTO-ENTMCNC: 36.8 G/DL (ref 31.5–36.5)
MCV RBC AUTO: 94 FL (ref 78–100)
MONOCYTES # BLD AUTO: 1.4 10E3/UL (ref 0–1.3)
MONOCYTES NFR BLD AUTO: 12 %
NEUTROPHILS # BLD AUTO: 8.4 10E3/UL (ref 1.6–8.3)
NEUTROPHILS NFR BLD AUTO: 74 %
NRBC # BLD AUTO: 0 10E3/UL
NRBC BLD AUTO-RTO: 0 /100
PHOSPHATE SERPL-MCNC: NORMAL MG/DL
PLATELET # BLD AUTO: 155 10E3/UL (ref 150–450)
POTASSIUM SERPL-SCNC: 4.2 MMOL/L (ref 3.4–5.3)
PROT SERPL-MCNC: ABNORMAL G/DL
RBC # BLD AUTO: 3.04 10E6/UL (ref 4.4–5.9)
SODIUM SERPL-SCNC: 125 MMOL/L (ref 136–145)
WBC # BLD AUTO: 11.2 10E3/UL (ref 4–11)

## 2023-08-30 PROCEDURE — 36415 COLL VENOUS BLD VENIPUNCTURE: CPT | Performed by: INTERNAL MEDICINE

## 2023-08-30 PROCEDURE — H2035 A/D TX PROGRAM, PER HOUR: HCPCS

## 2023-08-30 PROCEDURE — C9113 INJ PANTOPRAZOLE SODIUM, VIA: HCPCS | Mod: JZ | Performed by: INTERNAL MEDICINE

## 2023-08-30 PROCEDURE — 83735 ASSAY OF MAGNESIUM: CPT | Performed by: INTERNAL MEDICINE

## 2023-08-30 PROCEDURE — 250N000013 HC RX MED GY IP 250 OP 250 PS 637: Performed by: INTERNAL MEDICINE

## 2023-08-30 PROCEDURE — 99239 HOSP IP/OBS DSCHRG MGMT >30: CPT | Performed by: HOSPITALIST

## 2023-08-30 PROCEDURE — 84100 ASSAY OF PHOSPHORUS: CPT | Performed by: INTERNAL MEDICINE

## 2023-08-30 PROCEDURE — 85025 COMPLETE CBC W/AUTO DIFF WBC: CPT | Performed by: INTERNAL MEDICINE

## 2023-08-30 PROCEDURE — 1002N00001 HC LODGING PLUS FACILITY CHARGE ADULT

## 2023-08-30 PROCEDURE — 250N000011 HC RX IP 250 OP 636: Mod: JZ | Performed by: INTERNAL MEDICINE

## 2023-08-30 PROCEDURE — 82040 ASSAY OF SERUM ALBUMIN: CPT | Performed by: INTERNAL MEDICINE

## 2023-08-30 RX ORDER — ROSUVASTATIN CALCIUM 10 MG/1
10 TABLET, COATED ORAL DAILY
Qty: 30 TABLET | Refills: 0 | Status: SHIPPED | OUTPATIENT
Start: 2023-08-30 | End: 2023-08-30

## 2023-08-30 RX ORDER — ASPIRIN 81 MG/1
81 TABLET ORAL DAILY
Qty: 30 TABLET | Refills: 3 | Status: ON HOLD | OUTPATIENT
Start: 2023-08-30 | End: 2023-10-05

## 2023-08-30 RX ORDER — ALBUTEROL SULFATE 0.83 MG/ML
2.5 SOLUTION RESPIRATORY (INHALATION)
Status: CANCELLED | OUTPATIENT
Start: 2023-09-02

## 2023-08-30 RX ORDER — GUAIFENESIN 100 MG/5ML
200 SOLUTION ORAL EVERY 4 HOURS PRN
Status: ON HOLD | COMMUNITY
End: 2023-09-05

## 2023-08-30 RX ORDER — MULTIPLE VITAMINS W/ MINERALS TAB 9MG-400MCG
1 TAB ORAL DAILY
Qty: 30 TABLET | Refills: 0 | Status: SHIPPED | OUTPATIENT
Start: 2023-08-30 | End: 2023-09-29

## 2023-08-30 RX ORDER — IBUPROFEN 200 MG
400 TABLET ORAL EVERY 6 HOURS PRN
COMMUNITY
End: 2023-08-30

## 2023-08-30 RX ORDER — MAGNESIUM HYDROXIDE/ALUMINUM HYDROXICE/SIMETHICONE 120; 1200; 1200 MG/30ML; MG/30ML; MG/30ML
30 SUSPENSION ORAL EVERY 6 HOURS PRN
COMMUNITY
End: 2023-08-30

## 2023-08-30 RX ORDER — ALBUTEROL SULFATE 90 UG/1
1-2 AEROSOL, METERED RESPIRATORY (INHALATION)
Status: CANCELLED
Start: 2023-09-02

## 2023-08-30 RX ORDER — MEPERIDINE HYDROCHLORIDE 25 MG/ML
25 INJECTION INTRAMUSCULAR; INTRAVENOUS; SUBCUTANEOUS EVERY 30 MIN PRN
Status: CANCELLED | OUTPATIENT
Start: 2023-09-02

## 2023-08-30 RX ORDER — ACETAMINOPHEN 325 MG/1
325-650 TABLET ORAL EVERY 4 HOURS PRN
Status: ON HOLD | COMMUNITY
End: 2023-09-05

## 2023-08-30 RX ORDER — LIDOCAINE HYDROCHLORIDE 10 MG/ML
20 INJECTION, SOLUTION EPIDURAL; INFILTRATION; INTRACAUDAL; PERINEURAL ONCE
Status: CANCELLED | OUTPATIENT
Start: 2023-09-02 | End: 2023-09-02

## 2023-08-30 RX ORDER — CALCIUM CARBONATE 500 MG/1
2 TABLET, CHEWABLE ORAL 4 TIMES DAILY PRN
Qty: 120 TABLET | Refills: 0 | Status: SHIPPED | OUTPATIENT
Start: 2023-08-30 | End: 2023-09-29

## 2023-08-30 RX ORDER — LORATADINE 10 MG/1
10 TABLET ORAL DAILY PRN
Status: ON HOLD | COMMUNITY
End: 2023-09-05

## 2023-08-30 RX ORDER — AMOXICILLIN 250 MG
2 CAPSULE ORAL DAILY PRN
Status: ON HOLD | COMMUNITY
End: 2023-09-05

## 2023-08-30 RX ORDER — HEPARIN SODIUM,PORCINE 10 UNIT/ML
5-20 VIAL (ML) INTRAVENOUS DAILY PRN
Status: CANCELLED | OUTPATIENT
Start: 2023-09-02

## 2023-08-30 RX ORDER — GABAPENTIN 100 MG/1
100 CAPSULE ORAL AT BEDTIME
Qty: 30 CAPSULE | Refills: 0 | Status: SHIPPED | OUTPATIENT
Start: 2023-08-30 | End: 2023-10-13

## 2023-08-30 RX ORDER — HEPARIN SODIUM (PORCINE) LOCK FLUSH IV SOLN 100 UNIT/ML 100 UNIT/ML
5 SOLUTION INTRAVENOUS
Status: CANCELLED | OUTPATIENT
Start: 2023-09-02

## 2023-08-30 RX ORDER — FOLIC ACID 1 MG/1
1 TABLET ORAL DAILY
Qty: 30 TABLET | Refills: 0 | Status: SHIPPED | OUTPATIENT
Start: 2023-08-30 | End: 2023-09-29

## 2023-08-30 RX ORDER — ONDANSETRON 4 MG/1
4 TABLET, ORALLY DISINTEGRATING ORAL EVERY 8 HOURS PRN
Qty: 20 TABLET | Refills: 0 | Status: SHIPPED | OUTPATIENT
Start: 2023-08-30 | End: 2023-09-29

## 2023-08-30 RX ORDER — LANOLIN ALCOHOL/MO/W.PET/CERES
3 CREAM (GRAM) TOPICAL
COMMUNITY
End: 2023-11-28

## 2023-08-30 RX ORDER — METHYLPREDNISOLONE SODIUM SUCCINATE 125 MG/2ML
125 INJECTION, POWDER, LYOPHILIZED, FOR SOLUTION INTRAMUSCULAR; INTRAVENOUS
Status: CANCELLED
Start: 2023-09-02

## 2023-08-30 RX ORDER — DIPHENHYDRAMINE HYDROCHLORIDE 50 MG/ML
50 INJECTION INTRAMUSCULAR; INTRAVENOUS
Status: CANCELLED
Start: 2023-09-02

## 2023-08-30 RX ORDER — ALBUMIN (HUMAN) 12.5 G/50ML
12.5 SOLUTION INTRAVENOUS
Status: CANCELLED | OUTPATIENT
Start: 2023-09-02

## 2023-08-30 RX ORDER — EPINEPHRINE 1 MG/ML
0.3 INJECTION, SOLUTION, CONCENTRATE INTRAVENOUS EVERY 5 MIN PRN
Status: CANCELLED | OUTPATIENT
Start: 2023-09-02

## 2023-08-30 RX ADMIN — FOLIC ACID 1 MG: 1 TABLET ORAL at 08:15

## 2023-08-30 RX ADMIN — PANTOPRAZOLE SODIUM 40 MG: 40 INJECTION, POWDER, FOR SOLUTION INTRAVENOUS at 08:15

## 2023-08-30 RX ADMIN — Medication 1 TABLET: at 08:15

## 2023-08-30 ASSESSMENT — COLUMBIA-SUICIDE SEVERITY RATING SCALE - C-SSRS
2. HAVE YOU ACTUALLY HAD ANY THOUGHTS OF KILLING YOURSELF LIFETIME?: YES
1. IN THE PAST MONTH, HAVE YOU WISHED YOU WERE DEAD OR WISHED YOU COULD GO TO SLEEP AND NOT WAKE UP?: NO
4. HAVE YOU HAD THESE THOUGHTS AND HAD SOME INTENTION OF ACTING ON THEM?: NO
1. IN THE PAST MONTH, HAVE YOU WISHED YOU WERE DEAD OR WISHED YOU COULD GO TO SLEEP AND NOT WAKE UP?: NO
6. HAVE YOU EVER DONE ANYTHING, STARTED TO DO ANYTHING, OR PREPARED TO DO ANYTHING TO END YOUR LIFE?: NO
2. HAVE YOU ACTUALLY HAD ANY THOUGHTS OF KILLING YOURSELF IN THE PAST MONTH?: NO
3. HAVE YOU BEEN THINKING ABOUT HOW YOU MIGHT KILL YOURSELF?: NO
5. HAVE YOU STARTED TO WORK OUT OR WORKED OUT THE DETAILS OF HOW TO KILL YOURSELF? DO YOU INTEND TO CARRY OUT THIS PLAN?: NO

## 2023-08-30 ASSESSMENT — ANXIETY QUESTIONNAIRES
5. BEING SO RESTLESS THAT IT IS HARD TO SIT STILL: SEVERAL DAYS
3. WORRYING TOO MUCH ABOUT DIFFERENT THINGS: SEVERAL DAYS
GAD7 TOTAL SCORE: 6
7. FEELING AFRAID AS IF SOMETHING AWFUL MIGHT HAPPEN: NOT AT ALL
IF YOU CHECKED OFF ANY PROBLEMS ON THIS QUESTIONNAIRE, HOW DIFFICULT HAVE THESE PROBLEMS MADE IT FOR YOU TO DO YOUR WORK, TAKE CARE OF THINGS AT HOME, OR GET ALONG WITH OTHER PEOPLE: SOMEWHAT DIFFICULT
2. NOT BEING ABLE TO STOP OR CONTROL WORRYING: SEVERAL DAYS
1. FEELING NERVOUS, ANXIOUS, OR ON EDGE: SEVERAL DAYS
6. BECOMING EASILY ANNOYED OR IRRITABLE: SEVERAL DAYS
4. TROUBLE RELAXING: SEVERAL DAYS
GAD7 TOTAL SCORE: 6

## 2023-08-30 ASSESSMENT — PATIENT HEALTH QUESTIONNAIRE - PHQ9: SUM OF ALL RESPONSES TO PHQ QUESTIONS 1-9: 8

## 2023-08-30 ASSESSMENT — ACTIVITIES OF DAILY LIVING (ADL)
ADLS_ACUITY_SCORE: 18

## 2023-08-30 NOTE — PROGRESS NOTES
Nurse reviewed pt medications.  Rosuvastatin (Crestor ) is missing.  Writer called [facility]/Unit and asked if she could text provider and order medication to [facility].      Will await for med to be delivered.  Pt aware    12:25pm - [facility] did return call and stated that Dr Moser did not want the pt to take this medication currently and the pt should talk to GI prescriber when he goes to appt.  Crestor will be HELD and placed in pt storage    Yamile Ortega RN    [facility]  Nurse Liaison / CD [facility] ([facility])  [address]  O:[phone]  F:[phone]  RN [place] [id number]  LP After hours(UC):[phone]  LP admin counselor:[phone]  CD assess:[phone]

## 2023-08-30 NOTE — PROGRESS NOTES
"[facility] Nursing Health Assessment      Vital signs:     Ht 1.753 m (5' 9\")   Wt 119.7 kg (264 lb)   BMI 38.99 kg/m        Transfer from [address] [place] [address] Unit    Counselor: Priscila  Drug of Choice: alcohol  Last use: 7/19/21  Home clinic/MD:   Primary Care Provider     Latosha Baires  NPI: [id number]  [phone]  [address] [place] [id number]        Organization   [facility]  [phone]  [address] [id number]     Psychiatrist/therapist: none    Medical history/current conditions:      Date of Admission:  8/21/2023  Date of Discharge:  8/30/2023  Discharging Provider: Emil Moser MD  Discharge Service: Hospitalist Service, [facility] TEAM 9     Chief Complaint/Reason for admission:  Cirrhosis (H)      Present on Admission:   Cirrhosis (H)   Esophageal varices (H)   Essential hypertension   Alcohol use disorder, severe, dependence (H)   JOCELYN (acute kidney injury) (H)      Discharge Diagnoses  Principal Problem:    Cirrhosis (H)  Active Problems:    Esophageal varices (H)    Essential hypertension    Alcohol use disorder, severe, dependence (H)    JOCELYN (acute kidney injury) (H)    H&P Screen:  H&P within the last 90 days: Yes.  Date: 8/30/23 Location: [facility] diagnosis: pt denies  Medication compliant?:pt is not taking MH meds  Recent sucidal thoughts? no     When? na  Current thought of self-harm? no    Plan? na    Pain assessment:   Pt. Experiencing pain at this time?  No    LP Falls assessment    Has patient had a fall(s) in the last 6 months?  No  Does patient have gait dysfunctions? (limping, dragging of toes, shuffling feet, unsteadiness, difficulty standing /walking)  No  Does patient appear to have deconditioning/muscle loss/malnutrition/fatigue? (due to inactivity, bedrest (long hospitalization), medical condition(s) No  Does patient experience confusion, dizziness or " vertigo? No  Is patient visually impaired? No   Does patient have any adaptive equipment (hearing aids, wheelchair, walker, prosthesis, crutches, cane, etc..) No  Is patient taking 2 or more of the following medications?  anticonvulsants, anti-hypertensives, diuretics, laxatives, sedatives, and psychotropics (single-select) No  Is patient taking medication (s) that would cause urinary or bowel urgency (ex: lactulose/Furosemide)? No  Does patient have a medical condition (ex: Diabetes, Liver Disease, Respiratory Diseases, Chronic Pain, Heart Disease, Neuropathy, Seizure like Disorder, etc...) that could affect balance/gait or risk for falls? Yes.      Cirrhosis (H)  Active Problems:    Esophageal varices (H)    Essential hypertension    Alcohol use disorder, severe, dependence (H)    JOCELYN (acute kidney injury) (H)    If yes to any of the above educate patient on fall prevention while at [facility].           Floors clutter/obstacle free           Proper footwear/[place] shoes          Adjust walking speed accordingly          Recommend caution going on longer walks. Try shorter walks and see how you do first.  Use elevators when appropriate.          Notify staff if you are experiencing fatigue, weakness, drowsiness/ dizziness or have had a fall.           Use adaptive equipment as recommended          Wheelchairs must be managed and propelled independently without staff assistance           Expectation of complete independence with all cares and compliance.  Report to staff if having difficulty     LP patient who poses a potential falls risk will be advised of the following:  Please follow the recommendations as listed above or it may affect your treatment plan.  Your treatment team would have to evaluate if this level of care is appropriate for you.    Patient acknowledges and verbalizes understanding of the above criteria? Yes  Support staff / counselors notified of pt Fall Screen and plan of prevention. LPRN to  re-assess as appropriate. White board and SBAR updated Yes     Community Medical Screen for COVID-19    ______________________________________________________________________________________________________________________  Do you have any of the following NEW or worsening symptoms NOT attributed to pre-existing conditions?    No    Fever of 100.0  F (37.8 C) or over  Chills  Cough  Shortness of Breath  Loss of taste or smell  Generalized body aches  Persistent headache  Sore throat (or trouble eating or drinking in young children?)  Nausea, Vomiting, or diarrhea (loose stools)    Did you test positive for COVID-19 in the last 10 days or are you waiting on the test results due to an exposure or symptoms?  No    Has anyone told you to self-quarantine due to exposure to someone with COVID-19?  No    If pt responds   YES to any of the symptoms or  Positive COVID-19 result in the last 10 days with or without symptoms or YES to symptoms with pending results ptwill need to leave unit immediately and can return in 11 days from discharge date.    ______________________________________________________________________________________________________________________  If you are admitting directly from the community you will be required to stay in your room until COVID lab results confirm negative. If COVID results are positive, You will have to exit the LP program, quarantine as recommended per CDC and then may return for CD treatment after symptoms have resolved.  What is your exit plan should you be positive for COVID today or anytime during your stay? home    COVID-19 Test completed by LPRN ?  Negative  on 8/21/23    COVID-19 - Pt informed of the following while at LP:    1) Practice good hand washing hygiene and avoid touching face    2) If pt has any of the symptoms below, notify staff immediately.    Fever   Cough   Shortness of breath or difficulty breathing   Chills   Repeated shaking with chills  Muscle pain     3)  COVID-19 testing may be initiated more than once during your stay.  If COVID results are at anytime positive, the pt will follow exit plan as listed above,  quarantine as recommended per CDC and then may return for CD treatment after symptoms have resolved.     RN Assessment of Patient's Ability to Safely Manage and Self-Administer Respiratory Treatments    Has experience in the management of Respiratory (If NA, indicate and move to Integrative Therapies):  NA    Integrative Therapies: Essential Oils    Patient requesting essential oil inhaler to manage (Mood/Mental Health/Physical/Spiritual symptoms).     Discussed appropriate use of essential oil inhalers and instructed patient not to leave labeled product out on unit.     Patient was screened for kidney disease, asthma/reactive airway disease and rashes and wounds or 1st trimester of pregnancy    List Essential Oils requested by pt none    Patient verbalized and demonstrated understanding of how to use essential oil inhaler correctly and will notify LP RN with any concerns or side effects. Patient agrees not to share their essential oil inhaler with other clients.  Continue to support the patient in safely utilizing integrative therapies as able to manage symptoms during treatment.     Patient tobacco use:    Do you use tobacco? no     Nutritional Assessment:    Have you ever purged, binged or restricted yourself as a way to control your weight?   No     Are you on a special diet?   No     Do you have any concerns regarding your nutritional status?   No     Have you had any appetite changes in the last 3 months?   No   Have you had weight loss or weight gain of more than 10 lbs in the last 3 months?   If patient gained or lost more than 10 lbs, then refer to program RN / attending Physician for assessment.   No   Was the patient informed of BMI?    Above,  Referral to primary care physician and General nutrition education   Yes   Have you engaged in any  risk-taking behavior that would put you at risk for exposure to blood-borne or sexually transmitted diseases?   No   Do you have any dental problems?   No       LPRN reviewed with pt the following information:  Yes  Pt informed if leaving AMA, they will be directed to take medications with them.  Should pt's choose to leave medications at LP, ALL medications will be packaged and delivered to inpatient pharmacy for temporary storage.  Inpt pharmacy will follow protocol to reach out to pt.  If pharmacy unable to reach pt and/or pt does not retrieve medications, they will be destroyed per inpt pharmacy protocol.   In regards to 'medical concerns/medication refill expectations' while at LP:  LP has no rounding/managing provider to assess medical issues or to refill your medications  Please make virtual/phone appt/s with your community provider/s and notify LPRN of date and time  You may not leave LP to attend any medical appt's.   You are responsible for having a plan to refill medications if necessary.  Please allow time to complete this.    Pt verbalizes understanding of the above criteria yes      Nursing Assessment Summary:  As above +  Pt is jaundice. Has visible Ascites   Pt stated his wife is going to make GI appt with [facility] near his home within 3-4 weeks as ordered by discharge provider  Writer will set up paracentesis appts (standing order in place) while at LP and PRN.  Pt may need to go to assessment to ED as well during his stay at LP  Hepatology wants the following labs drawn in 2 weeks: (LPRN, please remind pt) standing order in EPIC. Pt to go to outpt lab on 9/13/23      All of the above passed on to LPRN colleagues in report    On-going nursing intervention required?   No    Acute care visit recommended: no

## 2023-08-30 NOTE — PROGRESS NOTES
Quick Note:     Patient reports family member is picking up and he does not need assistance with transportation.    See discharge note from Olga LOVE from 8/29 for further details.      DORIE Willard, [facility]   Covering 7D SW  Ph: [phone]

## 2023-08-30 NOTE — PLAN OF CARE
/66 (BP Location: Left arm, Patient Position: Supine, Cuff Size: Adult Regular)   Pulse 85   Temp 98.6  F (37  C) (Oral)   Resp 18   Wt 114.8 kg (253 lb 1.6 oz)   SpO2 99%   BMI 37.38 kg/m      VSS on room air. [facility], was accompanied by family, and left with personal belongings. Pt. received complete discharge paperwork and medications as filled by discharge pharmacy. Pt. was given times of last dose for all discharge medications in writing on discharge medication sheets. Discharge teaching included medication, pain management, activity restrictions, dressing changes, and signs and symptoms of infection. Dressing supplies sent home. Pt. had no further questions at the time of discharge and no unmet needs were identified.

## 2023-08-30 NOTE — DOWNTIME EVENT NOTE
The EMR was down for 2 hours on 8/30/2023.    Kellen Daniel RN was responsible for completing the paper charting during this time period.     The following information was re-entered into the system by Kellen Daniel RN: N/A    The following information will remain in the paper chart: N/A    Kellen Daniel RN  8/30/2023
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
## 2023-08-30 NOTE — PLAN OF CARE
Hours of Care: 0834-2317    Neuro: A&Ox4. Can make needs known.  Cardiac: HR 80's. -120's. VSS. Afebrile.  Respiratory: Sating >95% on RA.  GI/: Adequate urine output. Icteric colored urine.  No BM during cares. PRN simethicone & tums provided for gas and abdominal discomfort.  Diet/appetite: Tolerating soft & bite sized diet.   Activity:  Independent  Pain: At acceptable level on current regimen. Denies during cares.   Skin: No new deficits noted. Jaundiced  LDA's: PIVx2    Plan: Discharge tomorrow to [facility] and has an intake meeting at 1200. Will continue to monitor and follow POC. Notify primary team with changes.

## 2023-08-30 NOTE — PROGRESS NOTES
Initial Services Plan    Before your first treatment group, please do the following    Immediate health & safety concerns: Look for sober housing and a supportive social network.  Look for a support network (such as AA, NA, DBT group, a Synagogue group, etc.)  Other: medical concerns, paracentisis    Suggestions for client during the time between intake & completion of treatment plan:  Tour your treatment center (unit or outpatient clinic).  Introduce yourself to the treatment group.  Spend time getting to know your peers.  Complete the problem list for your treatment plan.  Start drug and alcohol use history.  Review your patient or client handbook.    Client issues to be addressed in the first treatment sessions:  Identify motivations(s) for coming to treatment, i.e. legal, family, job, self  Identify concerns about coming into treatment, i.e. fear of failing again, sharing a room in treatment  Identify concerns about going to group, i.e. fear of talking in group  Identify outside concerns that may interfere with treatment, i.e. bills not getting paid, homesick for children    Gabrielle Viera, [facility]  8/30/2023

## 2023-08-30 NOTE — DISCHARGE SUMMARY
[facility]  Hospitalist Discharge Summary      Date of Admission:  8/21/2023  Date of Discharge:  8/30/2023  Discharging Provider: Emil Moser MD  Discharge Service: Hospitalist Service, GOLD TEAM 9    Chief Complaint/Reason for admission:  Cirrhosis (H)       Present on Admission:   Cirrhosis (H)   Esophageal varices (H)   Essential hypertension   Alcohol use disorder, severe, dependence (H)   JOCELYN (acute kidney injury) (H)      Discharge Diagnoses  Principal Problem:    Cirrhosis (H)  Active Problems:    Esophageal varices (H)    Essential hypertension    Alcohol use disorder, severe, dependence (H)    JOCELYN (acute kidney injury) (H)        Clinically Significant Risk Factors          Follow-ups Needed After Discharge   Follow-up Appointments     Follow Up and recommended labs and tests      Follow up with Gastroenterology service in 3-4 weeks time            Unresulted Labs Ordered in the Past 30 Days of this Admission       No orders found from 7/22/2023 to 8/22/2023.        These results will be followed up by     Discharge Disposition   Discharged to rehabilitation facility  Condition at discharge: Stable    Hospital Course   Elroy Morel Sr. is a 43 year old male with a history of severe alcohol use disorder, decompensated cirrhosis (variceal bleeding, ascites), alcohol withdrawal seizures who presents to [facility] due to significantly elevated bilirubin total bili 20.2, referred from [facility]. GI Hepatology consulted in ED for evaluation of abnormal LFTs.  Also found to have JOCELYN with creatinine 4.76, sodium 130, CO2 20. Hepatology and Nephrology teams consulted while inpatient. Broad infxn w/up unremarkable, including repeat C.diff testing (+ab but neg toxin). Acute hepatitis panel negative. Patient not a candidate for steroids for ETOH hepatitis due to JOCELYN and diarrhea symptoms. JOCELYN suspected to be HRS based on albumin challenge. LFTs and bilirubin  slowly downtrending. During hospital stay, patient showed no signs of alcohol withdrawal, no prn Ativan administered. On 8/25, Cr began downtrending. Initially bloody stools improving but then acutely worsened (Hgb stable, HDS), GI EGD/colonoscopy 8/28 which showed Grade II EV s/p banding, portal hypertensive gastropathy, esophageal ulcer at prior variceal treatment site, 2 polyps in sigmoid colon s/p removal, rectal varicies, erythematous mucosa in sigmoid colon/rectum c/w portal colopathy.      # Acute on Chronic Hematochezia  # New Grade II EV s/p Banding (8/28)  # Portal Hypertensive Gastropathy (8/28)  # Normocytic Anemia, acute on chronic anemia secondary to GI bleed  # History of variceal bleeding(8/13/2023) w/ new esophageal ulcer at area (8/28)  - GI consulted at admission   - s/p EGD 8/28: Grade II esophageal varices, s/p Banding x2. Completely eradicated. Portal hypertensive gastropathy. Esophageal ulcers at site of prior variceal treatment.   - s/p colonoscopy 8/28: Two diminutive polyps in the sigmoid colon, removed with a cold biopsy forceps. Resected and retrieved. Rectal varices. Erythematous mucosa in the sigmoid colon and rectum with some erythema, consistent with portal colopathy. This was the likely cause of hematochezia. No blood was seen.   - continue IV PPI and switched to PO and will continue at discharge  - CBC in AM    #Recent Hx of C.diff  - continued PO vanc ppx (repeat C.diff testing negative for active infxn w/ neg toxin) and finished a course. No recurrence    #Decompensated ETOH-related cirrhosis with ascites  #Severe alcohol-related hepatitis w/o evidence of liver failure (improving LFTs)  # Coagulopathy w/ elevated INR  #History of variceal bleeding  #Alcohol use disorder, severe  Primary Hepatologist: [facility] -> transitioned to [facility] with Dr. Hernandez.  On admission MELD 37, Maddrey DF 50. Abd US 8/21: Cirrhosis with evidence of portal hypertension and small  to moderate volume ascites. Gallbladder sludge without definite evidence of acute cholecystitis or biliary obstruction   - GI consulted. trended daily MELD score  - hepatitis labs: HbsAg negative, HbsAb negative, HCV RNA negative, Hepatitis A IgG+ w/ IgM negative, HIV negative  - Infxn w/up: BCX NGTD, UCX negative. CXR w/ clear lungs, diagnostic paracentesis 8/23 not consistent for SBP  - Ascites: + ascites on exam, no hx of SBP. S/p CAP team paracentesis 8/23 w/ 4L removal   - S/p paracentesis 8/27 4L w/ albumin replacement  - Given significant elevation of Maddrey DF and MELD score, GI considered steroid treatment however held give severity of JOCELYN and diarrhea  - HE: no hx of HE, no clinical concerns at this time  - EV: EGD 08/13/2023 -> Grade II esophageal varices banded, PHG     - see above for new EGD results  - HCC: No liver lesions on US 08/2023  - Not a candidate for liver transplant at this time  - Limit/avoid tylenol use to < 2g per 24 hours.   [ ] Dispo: Follows w/ GI team Dr. Muller outpatient. GI team arranging outpatient paracenteses and outpatient labs and help w/ follow up appointments.       #JOCELYN suspect 2/2 HRS, downtrending  # Mild Hyponatremia  - Nephrology consulted, no indication for dialysis (signed off)  - trend chem daily  - s/p fluid challenge in ED (end 8/22)  - s/p IV albumin challenge with 1 g/kg -> 100 g daily for 3 days (end 8/23) w/o improvement in CR consistent w/ HRS   - Renal US: mild renal parenchymal disease w/o hydronephrosis  - Infxn w/up as above  - hold PTA Lisinopril  - monitor I/Os, renally dose medications, avoid nephrotoxic medications (including NSAIDS)  [ ] Dispo: Referral placed for CKD clinic follow up. Hold lisinopril til Cr baseline settles    #GERD  - Continued PTA pantoprazole 40 mg daily.     #Alcohol use disorder, severe  # Monitor for Alcohol withdrawal  # History of alcohol withdrawal seizure  # Methamphetamine use disorder, severe, in sustained remission    Last ETOH consumption 8/19 per H&P  - discontinue CIWA monitoring and prn lorazepam as patient has shown no signs of withdrawal since admission (8/23)  - Nutrition consulted, Multivitamin, thiamine, folic acid daily  -  consultation, encouraged alcohol abstinence  - addiction med consult, appreciate assistance   - start gabapentin 100mg qHS  [ ] Dispo: patient interested in long-term inpatient detox program if able. Plan to discharge to [facility]. PATIENT IS MEDICALLY STABLE TO BE DISCHARGED HOME/REHAB.     #History of hypertension:   - Hold PTA lisinopril given JOCELYN and normotensive Bps  [ ] Dispo: plan to continue to hold til follow up outpatient labs show Cr settles, to be f/up by outpatient GI/PCP/Nephrology teams.        Consultations This Hospital Stay   GI HEPATOLOGY ADULT IP CONSULT  NUTRITION SERVICES ADULT IP CONSULT  CARE MANAGEMENT / SOCIAL WORK IP CONSULT  NEPHROLOGY GENERAL ADULT IP CONSULT  INTERVENTIONAL RADIOLOGY ADULT/PEDS IP CONSULT  NEPHROLOGY GENERAL ADULT IP CONSULT  GI HEPATOLOGY ADULT IP CONSULT  INTERNAL MEDICINE PROCEDURE TEAM ADULT IP CONSULT [facility] - THORACENTESIS  ADDICTION SERVICE ADULT IP CONSULT FOR [facility]  NURSING TO CONSULT FOR VASCULAR ACCESS CARE IP CONSULT  INTERNAL MEDICINE PROCEDURE TEAM ADULT IP CONSULT [facility] - PARACENTESIS  GI LUMINAL ADULT IP CONSULT  NURSING TO CONSULT FOR VASCULAR ACCESS CARE IP CONSULT    Code Status   Full Code    Time Spent on this Encounter   I, Emil Moser MD, personally saw the patient today and spent greater than 30 minutes discharging this patient.       Emil Moser MD  [facility] 7D MED SURG  [address] [address]  Phone: [phone]  ______________________________________________________________________    Physical Exam   Vital Signs: Temp: 98.6  F (37  C) Temp src: Oral BP: 117/66 Pulse: 85   Resp: 18 SpO2: 99 % O2 Device: None (Room air)    Weight: 253 lbs 1.6 oz      General  Appearance:  Awake, interactive, lying on bed, severely jaundiced (slightly improving) w/ +scleral icterus, NAD, pleasant  Respiratory: Normal work of breathing. CTA BL. On RA   Cardiovascular: S1 S2 Regular, no murmur appreciated. 1+ pitting edema blt  GI/Abd: Soft. NT.  Diffuse distention (improved s/p paracentesis). No guarding or rigidity.  No peritoneal signs.  Extremities: Distally wwp. No pedal edema.  Neuro: AO x 4, Grossly non focal. No asterixis.  Skin: severe jaundice, No acute rash on exposed areas, no reported pruritis/excoriations.             Primary Care Physician   Latosha Baires    Discharge Orders      Adult GI  Referral - Consult Only      Reason for your hospital stay    #Decompensated ETOH-related cirrhosis  #Severe alcohol-related hepatitis w/o evidence of liver failure (improving LFTs)  # Direct Hyperbilirubinemia (increasing, to be expected per GI)  # Coagulopathy w/ elevated INR  #Ascites  #Rule out SBP  #History of variceal bleeding  #Alcohol use disorder, severe  #JOCELYN suspect 2/2 HRS, downtrending  # Mild Hyponatremia  # Bloody diarrhea (worsening)  # Recent Hx of C.diff  # Normocytic Anemia  #Alcohol use disorder, severe  # Monitor for Alcohol withdrawal  # History of alcohol withdrawal seizure  # Methamphetamine use disorder, severe, in sustained remission     Activity    Your activity upon discharge: activity as tolerated     Follow Up and recommended labs and tests    Follow up with Gastroenterology service in 3-4 weeks time     Reason for your hospital stay    You were admitted for decompensated cirrhosis and kidney failure     Activity - Up ad grazyna     Full Code     Diet    Follow this diet upon discharge:          Regular Diet     Diet    Follow this diet upon discharge: 2g salt       Significant Results and Procedures   Most Recent 3 CBC's:  Recent Labs   Lab Test 08/30/23  0533 08/29/23  0606 08/28/23  0645   WBC 11.2* 11.9* 10.3   HGB 10.5* 10.3* 10.1*   MCV 94 95 95     170 156     Most Recent 3 BMP's:  Recent Labs   Lab Test 08/30/23  0533 08/29/23  0606 08/28/23  0645   * 128* 130*   POTASSIUM 4.2 4.2 4.1   CHLORIDE 96* 98 100   CO2 16* 17* 16*   BUN 45.5* 40.0* 41.4*   CR 4.34* 3.44* 3.72*   ANIONGAP 13 13 14   ENEDINA 9.0 8.9 9.1   GLC 85 87 115*     Most Recent 2 LFT's:  Recent Labs   Lab Test 08/30/23  0533 08/29/23  0606   * 196*   ALT 52 57   ALKPHOS 125 122   BILITOTAL 33.6* 34.3*     Most Recent 3 INR's:  Recent Labs   Lab Test 08/27/23  0745 08/24/23  0618 08/23/23  0542   INR 1.49* 1.72* 1.66*       Discharge Medications   Current Discharge Medication List        START taking these medications    Details   calcium carbonate (TUMS) 500 MG chewable tablet Take 2 tablets (1,000 mg) by mouth 4 times daily as needed for heartburn  Qty: 120 tablet, Refills: 0    Associated Diagnoses: Alcoholic cirrhosis of liver with ascites (H)      gabapentin (NEURONTIN) 100 MG capsule Take 1 capsule (100 mg) by mouth At Bedtime for 30 days  Qty: 30 capsule, Refills: 0    Associated Diagnoses: Alcoholic cirrhosis of liver with ascites (H)      multivitamin w/minerals (THERA-VIT-M) tablet Take 1 tablet by mouth daily for 30 days  Qty: 30 tablet, Refills: 0    Associated Diagnoses: Alcoholic cirrhosis of liver with ascites (H)      ondansetron (ZOFRAN ODT) 4 MG ODT tab Take 1 tablet (4 mg) by mouth every 8 hours as needed for nausea or vomiting  Qty: 20 tablet, Refills: 0    Associated Diagnoses: Alcoholic cirrhosis of liver with ascites (H)           CONTINUE these medications which have CHANGED    Details   aspirin 81 MG EC tablet Take 1 tablet (81 mg) by mouth daily for 120 days  Qty: 30 tablet, Refills: 3    Associated Diagnoses: Alcoholic cirrhosis of liver with ascites (H)      folic acid (FOLVITE) 1 MG tablet Take 1 tablet (1 mg) by mouth daily for 30 days  Qty: 30 tablet, Refills: 0    Associated Diagnoses: Alcoholic cirrhosis of liver with ascites (H)       pantoprazole (PROTONIX) 40 MG EC tablet Take 1 tablet (40 mg) by mouth daily  Qty: 90 tablet, Refills: 0    Associated Diagnoses: Alcoholic cirrhosis of liver with ascites (H)           STOP taking these medications       lisinopril (ZESTRIL) 40 MG tablet Comments:   Reason for Stopping:         rosuvastatin (CRESTOR) 10 MG tablet Comments:   Reason for Stopping:             Allergies   Allergies   Allergen Reactions    Metronidazole Other (See Comments), Dizziness and Unknown    Omeprazole Other (See Comments) and Unknown     Irritability (tolerates Protonix well)

## 2023-08-30 NOTE — PROGRESS NOTES
Name: Elroy Morel Sr.  Date: 8/30/2023  Medical Record: [id number]    Envelope Number: [id number]    List of Contents (List each item separately in new row):   Folic acid 400 mcg/Pantoprazole 40 mg/ Lisinopril 40 mg  Admission:  I am responsible for any personal items that are not sent to the safe or pharmacy.  [facility] is not responsible for loss, theft or damage of any property in my possession.    Patient Signature:  ___________________________________________       Date/Time:__________________________    Staff Signature: __________________________________       Date/Time:__________________________    [facility] Staff person, if patient is unable/unwilling to sign:      __________________________________________________________       Date/Time: __________________________    **All medications are packaged by [facility] staff and securely stored on RNDOMN plus. Medications left by patients at discharge will be packaged by [facility] staff and transported by [facility] staff to inpatient pharmacy for storage.**    Discharge:  [facility] has returned all of my personal belongings:    Patient Signature: ________________________________________     Date/Time: ____________________________________    Staff Signature: ______________________________________     Date/Time:_____________________________________

## 2023-08-31 ENCOUNTER — HOSPITAL ENCOUNTER (OUTPATIENT)
Facility: CLINIC | Age: 43
End: 2023-08-31
Payer: COMMERCIAL

## 2023-08-31 ENCOUNTER — HOSPITAL ENCOUNTER (OUTPATIENT)
Dept: BEHAVIORAL HEALTH | Facility: CLINIC | Age: 43
Discharge: HOME | End: 2023-08-31
Attending: FAMILY MEDICINE
Payer: COMMERCIAL

## 2023-08-31 ENCOUNTER — HOSPITAL ENCOUNTER (OUTPATIENT)
Dept: INTERVENTIONAL RADIOLOGY/VASCULAR | Facility: CLINIC | Age: 43
Discharge: HOME | End: 2023-08-31
Attending: PHYSICIAN ASSISTANT
Payer: COMMERCIAL

## 2023-08-31 ENCOUNTER — TELEPHONE (OUTPATIENT)
Dept: BEHAVIORAL HEALTH | Facility: CLINIC | Age: 43
End: 2023-08-31

## 2023-08-31 ENCOUNTER — HOSPITAL ENCOUNTER (OUTPATIENT)
Facility: CLINIC | Age: 43
Discharge: HOME | End: 2023-08-31
Attending: RADIOLOGY | Admitting: RADIOLOGY
Payer: COMMERCIAL

## 2023-08-31 VITALS
RESPIRATION RATE: 18 BRPM | DIASTOLIC BLOOD PRESSURE: 62 MMHG | HEART RATE: 81 BPM | OXYGEN SATURATION: 100 % | SYSTOLIC BLOOD PRESSURE: 109 MMHG

## 2023-08-31 DIAGNOSIS — K70.31 ALCOHOLIC CIRRHOSIS OF LIVER WITH ASCITES (H): Primary | ICD-10-CM

## 2023-08-31 PROCEDURE — 272N000500 HC NEEDLE CR2

## 2023-08-31 PROCEDURE — H2035 A/D TX PROGRAM, PER HOUR: HCPCS | Mod: HQ

## 2023-08-31 PROCEDURE — 49083 ABD PARACENTESIS W/IMAGING: CPT | Performed by: PHYSICIAN ASSISTANT

## 2023-08-31 PROCEDURE — 1002N00001 HC LODGING PLUS FACILITY CHARGE ADULT

## 2023-08-31 PROCEDURE — 49083 ABD PARACENTESIS W/IMAGING: CPT

## 2023-08-31 PROCEDURE — 250N000009 HC RX 250: Performed by: PHYSICIAN ASSISTANT

## 2023-08-31 RX ADMIN — LIDOCAINE HYDROCHLORIDE 5 ML: 10 INJECTION, SOLUTION EPIDURAL; INFILTRATION; INTRACAUDAL; PERINEURAL at 10:29

## 2023-08-31 ASSESSMENT — ACTIVITIES OF DAILY LIVING (ADL)
ADLS_ACUITY_SCORE: 35

## 2023-08-31 NOTE — GROUP NOTE
Group Therapy Documentation    PATIENT'S NAME: Elroy Morel Sr.  MRN:   [id number]  :   1980  ACCT. NUMBER: [id number]  DATE OF SERVICE: 23  START TIME: 12:30 PM  END TIME:  2:30 PM  FACILITATOR(S): Deniz Hardin LADC; Naveen Diez LADC; Gilbert Ann LADC  TOPIC: BEH Group Therapy  Number of patients attending the group:  6  Group Length:  2 Hours    Group Therapy Type: Recovery strategies and Health and wellbeing     Summary of Group / Topics Discussed:    Balanced lifestyle, Co-occurring illnesses symptom management, and Emotions/expression      Group Attendance:  Attended group session    Patient's response to the group topic/interactions:  cooperative with task and listened actively    Patient appeared to be Attentive.        Client specific details:  Elroy checked in to primary group by describing current emotions, stating an affirmation and a gratitude. Several patients processed current challenges they are experiencing. Facilitator led group discussion on healthy emotional expression. Patient gained knowledge on various resources available to support their recovery goals. Patient discussed needs and resources that will benefit their recovery journey.

## 2023-08-31 NOTE — PROCEDURES
Interventional Radiology Brief Post Procedure Note    Procedure: IR PARACENTESIS    Proceduralist: Stefan Vines PA-C    Assistant: None    Time Out: Prior to the start of the procedure and with procedural staff participation, I verbally confirmed the patient s identity using two indicators, relevant allergies, that the procedure was appropriate and matched the consent or emergent situation, and that the correct equipment/implants were available. Immediately prior to starting the procedure I conducted the Time Out with the procedural staff and re-confirmed the patient s name, procedure, and site/side. (The Joint Commission universal protocol was followed.)  Yes    Medications   Medication Event Details Admin User Admin Time       Sedation: None. Local Anesthestic used    Findings: Completed image guided therapeutic paracentesis a total of 3200 ml of dark yellow fluid was removed without difficulty. Patient tolerated procedure well.     Estimated Blood Loss: Minimal    SPECIMENS: None    Complications: 1. None     Condition: Stable    Plan: Follow-up per primary team.     Comments: See dictated procedure note for full details.    Stefan Vines PA-C

## 2023-08-31 NOTE — PROGRESS NOTES
"Comprehensive Assessment Summary     Based on client interview, review of previous assessments and   comprehensive assessment interview the following diagnosis and recommendations are:     Patient: Elroy Morel Sr.  MRN; [id number]   : 1980  Age: 43 year old Sex: male       Client meets criteria for:  Alcohol Use Disorder Severe (F10.20)    Dimension One: Acute Intoxication/Withdrawal Potential     Ratin     (Consider the client's ability to cope with withdrawal symptoms and current state of intoxication)   No indication of intoxication or withdrawal. Pt was a direct transfer from [facility]. Pt reports last use as 2023.     Dimension Two: Biomedical Condition and Complications    Ratin   (Consider the degree to which any physical disorder would interfere with treatment for substance abuse, and the client's ability to tolerate any related discomfort; determine the impact of continued chemical use on the unborn child if the client is pregnant) Pt reports no physical limitations or medical conditions identified which would interfere with full program participation other than his scheduled medical appointments. Pt reports hx of hypertension, alcohol abuse, withdrawal seizure, and alcoholic cirrhosis. See chart for full medical hx.       Dimension Three: Emotional/Behavioral/Cognitive Conditions & Complications  Ratin  (Determine the degree to which any condition or complications are likely to interfere with treatment for substance abuse or with functioning in significant life areas and the likelihood of risk of harm to self or others) Pt reports no prior MH diagnoses. Pt reported no grief and loss concerns. Pt reported guilt and shame concerns as: \"everything that's going on right now. I brought this on myself. I should be at work and supporting my family.\" Pt reported that he would \"maybe\" be interested in individual therapy while in [facility] Pt reports his strengths to " "be: \"always shows up if he can, good work ethic, and caring.\"    Dimension Four: Treatment Acceptance/Resistance     Ratin  (Consider the amount of support and encouragement necessary to keep the client involved in treatment) Pt expresses verbal motivation to make lifestyle changes. However, his supportive behaviors have lacked both ambition and commitment. Pt continues to use despite serious consequences in major life areas. He presents as in the contemplative stage. Pt reports his motivation for tx is that \"there's still so much to do, see, and be.\" Pt also reports that his physical health is his main motivation for tx.       Dimension Five: Continued Use/Relaspe Prevention     Ratin   (Consider the degree to which the client's recognizes relapse issues and has the skills to prevent relapse of either substance use or mental health problems) Pt presents as a high risk for relapse. He has limited insight into his personal relapse cues and effective prevention strategies. Pt denies current legal issues. He states that he has attended and completed one previous tx in  at [facility], which he liked. At that time he admitted to tx for meth. Pt reports that he has not touched meth since. Pt reports that his longest period of sobriety was one year (7974-5585) after he and his wife purchased their house. Pt identified his triggers to use as: \"everybody around me using, being alone, boredom, and at this point I really don't need a trigger.\" Pt also stated that he \"doesn't even get drunk anymore. Drinking just let's time flow.\"       Dimension Six: Recovery Environment     Ratin    (Consider the degree to which key areas of the client's life are supportive of or antagonistic to treatment participation and recovery) Pt lacks a sober support network. Pt reports that he works at [employer] as a  and is currently on FMLA which he is grateful for. Pt reports being  to his wife " "and having two adult boys (ages 19 Eduard and 21 Elroy). Pt stated that his wife is scared for him due to his health but \"has not given up on him.\" Pt reports living with his wife and kids. Pt stated that he would ask his wife this weekend during visiting if she is interested in attending a one hour conference call with himself and counselors. Pt reports minimal 12-Step involvement. However, he states that he thinks he would enjoy AA more than NA due to his past experiences with attending those meetings. Pt reports that his brother-in-law has 30 years sobriety and \"could be his temporary sponsor until he finds his own.\" Pt and counselors have not discussed aftercare options or sober living options as of yet.        I have reviewed the information on the assessment, psychosocial and medical history and checklist:        it is current  "

## 2023-08-31 NOTE — PROGRESS NOTES
Name: Elroy Morel Sr.  Date: 8/31/2023  Medical Record: [id number]    Envelope Number: [id number]    List of Contents (List each item separately in new row):   (1) Cellphone   (1) Phone      Admission:  I am responsible for any personal items that are not sent to the safe or pharmacy.  [facility] is not responsible for loss, theft or damage of any property in my possession.      Patient Signature:  ___________________________________________       Date/Time:__________________________    Staff Signature: __________________________________       Date/Time:__________________________    [facility] Staff person, if patient is unable/unwilling to sign:      __________________________________________________________       Date/Time: __________________________      **All medications are packaged by [facility] staff and securely stored on [facility]. Medications left by patients at discharge will be packaged by [facility] staff and transported by [facility] staff to inpatient pharmacy for storage.**        Discharge:  [facility] has returned all of my personal belongings:    Patient Signature: ________________________________________     Date/Time: ____________________________________    Staff Signature: ______________________________________     Date/Time:_____________________________________

## 2023-08-31 NOTE — GROUP NOTE
"Group Therapy Documentation    PATIENT'S NAME: Elroy Morel Sr.  MRN:   [id number]  :   1980  ACCT. NUMBER: [id number]  DATE OF SERVICE: 23  START TIME:  9:00 AM  END TIME: 11:00 AM  FACILITATOR(S): Naveen Diez LADC  TOPIC: BEH Group Therapy  Number of patients attending the group:  8  Group Length:  2 Hours    Group Therapy Type: Recovery strategies and Emotion processing    Summary of Group / Topics Discussed:    Balanced lifestyle, Disease of addiction, and Emotions/expression    Group began with check-ins, followed by reflection reading regarding growth and finding one's own path. Group then included a reading regarding \"growth mindset\". Feedback was provided by participants throughout group session.        Group Attendance:  Attended group session    Patient's response to the group topic/interactions:  cooperative with task    Patient appeared to be Actively participating, Attentive, and Engaged.        Client specific details: Patient actively engaged in group discussion.    "

## 2023-08-31 NOTE — PROGRESS NOTES
Comprehensive Assessment UPDATE         Comprehensive Summary Update and Review  Counselor met with patient on 8/31/2023 and reviewed the Comprehensive Assessment.       The following updates have been made: None

## 2023-08-31 NOTE — TELEPHONE ENCOUNTER
Writer contacting the below provider to inform that pt had recent hospitalization and is currently in tx for AUD.    Secondly, pt is requesting rescue inhaler for general SOB.    Writer left VM for below provider nurse Teresa RN as well as faxed over request with JULIANNA.     Provider informed to send any meds to [facility]    Awaiting response from:  Writer contacting the below provider to inform that pt had recent hospitalization and is currently in tx for AUD.    Secondly, pt is requesting rescue inhaler for general SOB.    Writer left VM for below provider nurse Teresa RN as well as faxed over request with JULIANNA.     Provider informed to send any meds to [facility]    Awaiting response from:        Yamile Ortega RN    [facility]  Nurse Liaison / CD [facility]  [address] [place] [address]  O:[phone]  F:[phone]  RN [place] [id number]  [facility] After hours([facility]):[phone]  [facility] admin counselor:[phone]  CD assess:[phone]

## 2023-08-31 NOTE — GROUP NOTE
Psychoeducation Group Documentation    PATIENT'S NAME: Elroy Morel Sr.  MRN:   [id number]  :   1980  ACCT. NUMBER: [id number]  DATE OF SERVICE: 23  START TIME:  3:00 PM  END TIME:  4:00 PM  FACILITATOR(S): Gilbert Ann LADC; Deniz Hardin LADC  TOPIC: BEH Pyschoeducation  Number of patients attending the group:  5  Group Length:  1 Hours    Skills Group Therapy Type: Healthy behaviors development    Summary of Group / Topics Discussed:    Symptom management skills        Group Attendance:  Attended group session    Patient's response to the group topic/interactions:  cooperative with task and listened actively    Patient appeared to be Attentive.         Client specific details:  Elroy, participant in skills lecture  on  Stress and Anxiety . Patients were able to ask questions and have a discussion with this lecture.

## 2023-08-31 NOTE — DISCHARGE INSTRUCTIONS
Invasive Radiology Procedures Discharge Instructions         _____________________________________________________________________    Patient Name:  Elroy Morel Sr.  Today's Date:  August 31, 2023    The doctor who did your procedure today was Stefan Vines PA-C.    Procedure:  Paracentesis: After a Paracentesis Procedure:  Fluid may leak from the puncture site. Change the bandage and keep the site dry.   If the fluid leaks for more than 48 hours, call your doctor.    If you have not received your results after 5 days, please call the doctor who ordered your test.  Diet and medicines  Go back to your normal diet.  You may start taking your normal medicines again (including Coumadin, or warfarin), as shown on your medicine sheet.  If you take aspirin, Plavix or other anti-platelet drugs: Start taking it tomorrow.  If take Coumadin (warfarin): Ask your doctor when to have your INR checked.  For minor pain, you may take Tylenol (acetaminophen) or Advil (ibuprofen).    Activity and puncture site   You may go back to normal activity in 24 hours. Wait 48 hours before lifting,   straining, exercise or other strenuous activity.  For the next day or two, check your puncture site often while you are awake.  Change the Band-Aid or bandage tomorrow.  You may shower tomorrow morning. No bathing or swimming until your   puncture site has fully healed.    If you received IV medicine to sedate you:  You were given: No Medication  You may feel drowsy, forgetful or unsteady. For the next 24 hours, do not drive, drink alcohol or make any important decisions.    Know when to call for help  Call your doctor if you have:  A fever greater over 101 F (38.3 C), taken under the tongue.  A lot of bleeding or swelling at your puncture site.  Pain that is getting worse.   Shortness of breath.  Call 911 or go the emergency room if you have:  Severe chest pain or trouble breathing.  A tube that falls out.   Increased blood in your sputum  (phlegm).  Bleeding that you cannot control.   Important phone numbers:  [facility] at [phone]

## 2023-09-01 ENCOUNTER — HOSPITAL ENCOUNTER (OUTPATIENT)
Dept: BEHAVIORAL HEALTH | Facility: CLINIC | Age: 43
Discharge: HOME | End: 2023-09-01
Attending: FAMILY MEDICINE
Payer: COMMERCIAL

## 2023-09-01 PROCEDURE — H2035 A/D TX PROGRAM, PER HOUR: HCPCS | Mod: HQ

## 2023-09-01 PROCEDURE — 1002N00001 HC LODGING PLUS FACILITY CHARGE ADULT

## 2023-09-01 ASSESSMENT — ACTIVITIES OF DAILY LIVING (ADL)
ADLS_ACUITY_SCORE: 35

## 2023-09-01 NOTE — GROUP NOTE
"Group Therapy Documentation    PATIENT'S NAME: Elroy Morel Sr.  MRN:   [id number]  :   1980  ACCT. NUMBER: [id number]  DATE OF SERVICE: 23  START TIME: 12:30 PM  END TIME:  2:30 PM  FACILITATOR(S): Nikki Madera LADC; Naveen Diez LADC; Candelaria Jimenez LADC  TOPIC: BEH Group Therapy  Number of patients attending the group:  15  Group Length:  2 Hours    Group Therapy Type: Recovery strategies    Summary of Group / Topics Discussed:    Recovery Principles, Disease of addiction, and Leisure explorations/use of leisure time    Patients viewed an addiction related film, \"He Won't Get Far on Foot.\" This film addressed: addiction as a disease, relationships and addiction, engagement in AA, and evaluating priorities when getting sober.   Patients engaged in a thorough discussion about the film, and shared personal experiences, and how the film helped them process their own life events. Each patient had an opportunity to process, ask questions of the facilitators, and provide constructive feedback to peers who shared.      Group Attendance:  Attended group session    Patient's response to the group topic/interactions:  cooperative with task    Patient appeared to be Actively participating, Attentive, and Engaged.        Client specific details:  Patient actively engaged in group activity and discussion, providing appropriate feedback, and sharing how they related to the film's theme of addiction, consequences of use, and recovery.    "

## 2023-09-01 NOTE — GROUP NOTE
Group Therapy Documentation    PATIENT'S NAME: Elroy Morel Sr.  MRN:   [id number]  :   1980  ACCT. NUMBER: [id number]  DATE OF SERVICE: 23  START TIME:  9:00 AM  END TIME: 11:00 AM  FACILITATOR(S): Candelaria Jimenez LADC  TOPIC: BEH Group Therapy  Number of patients attending the group:  8  Group Length:  2 Hours    Group Therapy Type: Recovery strategies    Summary of Group / Topics Discussed:    Sober coping skills, Cognitive behavioral therapy skills, and Co-occurring illnesses symptom management    Patients checked in to primary group by describing current mood, and stating a positive affirmation. Facilitator led group discussion on core beliefs. Patients obtained knowledge on what core beliefs are, and how unhealthy core beliefs form. Patients also gained insight into what their unhealthy core beliefs are and how these beliefs contribute to distorted thinking and maladaptive behavior, including substance use. Patients practiced reframing distorted perceptions about themselves using cognitive behavior reframing techniques. A new patient introduced himself to his peers. A patient presented one of his treatment assignments. Each patient had an opportunity to process, and provide constructive feedback to peers who shared.      Group Attendance:  Attended group session    Patient's response to the group topic/interactions:  cooperative with task    Patient appeared to be Actively participating, Attentive, and Engaged.        Client specific details:  Patient fully engaged in group session, providing appropriate feedback, and sharing personal insights. Patient processed emotions of anxiety and fear regarding his physical health, soliciting feedback from peers.

## 2023-09-01 NOTE — PROGRESS NOTES
[facility]  Adult Substance Use Disorder Program  Initial Treatment Plan        These services are provided by the facility for each patient/client according to the individual's treatment plan:  Individual and group counseling  Education  Transition services  Services to address any co-occurring mental illness  Service coordination    Criteria for discharge:   Patients/clients are discharged from the program following completion of the entire program including all phases of treatment or acceptance of other post-treatment referrals such as sober supportive living, or aftercare at other facilities.  Patients/clients may also be discharged for inappropriate behavior or substance use.    Favorable Discharge - Patients/clients have completed agreed upon treatment goals, understand their diagnosis and appear motivated for continued recovery.  Guarded Discharge - Patients/clients have demonstrated some understanding of their diagnosis and recovery process and have completed some of their treatment goals.  This prognosis also includes patients/clients who have completed some treatment goals but have not made commitment to community support or follow through with referrals.  Unfavorable Discharge - Patients/clients have not completed agreed upon treatment goals due to their own choice, have limited understanding of their diagnosis, and have shown minimal or inconsistent behavior conducive to recovery.  Those patients/clients discharged due to behavioral problems will also be unfavorable discharges.       Adult [facility] Treatment Plan                                Patient Name: Elroy Morel .  MRN: [id number]  : 1980  43 year old   Identified Gender: [unfilled]  Admit Date: 2023  Current Treatment Phase: Primary  Last Updated: Initial  Date Assigned: 2023    SUBSTANCE USE DISORDER(S): 303.90 (F10.20) Alcohol Use Disorder Severe      Dimension 1: Acute Intoxication/Withdrawal  "Potential, Risk Level: 0    Needs identified from Comprehensive Assessment Summary: Patient was a direct transfer from [facility]. Patient reports last use as 8/19/2023. Patient have a history of severe alcohol use disorder, decompensated cirrhosis (variceal bleeding, and ascites.    Patient currently receiving medication assisted therapy (MAT): No  Current or recent withdrawal symptoms: None reported  Focus area: Patient reported drug of choice is:Alcohol. Patient reported last date of use: 08/19/2023  Clinical Goal: Develop effective strategies to maintain sobriety,manage mild to moderate withdrawal/PAWS symptoms, and confirm sobriety with random drug screenings.   Patient's Goal: \"I will remain in recovery from all mood-altering chemicals.\"  Goal needs to be completed prior to discharge? [x]    Methods/Strategies Target Date  Frequency Effective Completion Date   Advise counselors/staff of any changes in medications throughout the course of treatment.     Report any alcohol or drug use to counselor, report any increase in withdrawal symptoms to nurse.     Submit random UA/BA when requested by staff.     Ongoing      Ongoing        Ongoing     Assignments: \"Patient (I) will follow all medical recommendations\" Ongoing     NONE            Dimension 2: Biomedical Conditions/Complications, Risk Level: 1    Needs identified from Comprehensive Assessment Summary:   Focus area: Patient reports history of hypertension, alcohol abuse, withdrawal seizure, and alcoholic cirrhosis.   Clinical Goal: Patient will follow all medical recommendations and take meds as prescribed.   Patient's Goal: Patient verbalized and is agreeable to:\"I will remain in good health\" and attend all medical appointments as needed, and maintain medication compliance.\"   Goal needs to be completed prior to discharge? [x]    Methods/Strategies Target Date  Frequency Effective Completion Date     Report any changes to physical " "health to counselor/RN    Attend all scheduled doctors appts and follow up visits, and follow all recommendations.     Remain medication Compliant    Comply with all COVID-19 screening and have vitals completed daily.          Ongoing    Ongoing      Ongoing    Ongoing           Assignments:: Patient will complete \"PAWS\" assignment and present in group\".     \"I will complete all above assignments and present in group\".  09/05/23     NONE          Dimension 3: Emotional/Behavioral/Cognitive, Risk Level: 0    Needs identified from Comprehensive Assessment Summary: Patient reported guilt and shame concerns do to alcohol use.    Focus area: Patient reports wanting to address history of abuse.   Clinical Goal: Begin to stabilize mental health. Practice identifying, appropriately expressing, and regulating emotions.   Patient's Goal: \"I want to Address abuse as a child. \"Patient interested in weekly therapy while at lodging plus.   Goal needs to be completed prior to discharge? [x]  Methods/Strategies (must include amount and frequency):     Strategies Target Date  Frequency Effective Completion Date     Complete weekly assessment for Current Changes.    Complete a Patient Safety Plan and reviewed with primary counselor.    Attend weekly therapy in house or with primary MHP.     Complete  based assignments, share with peers in group.    Counselor and patient will meet each week to assess emotional/mental health and risk rating.           Ongoing      Once      Weekly      Once      Weekly           KTA         09/01/2023   Assignments:         Depression and Substance Use    Shame and Guilt    Loneliness       09/07/2023 09/11/2023 09/18/2023       Focus area: Patient participated in a suicide risk screening and was rated as being at     risk for suicide. \"Low Risk\"  Clinical Goal:  Maintain and Monitor C-SSRS Risk  Patient's Goal: \"I want to be aware of my risk for suicide and self harm.\"     Strategies    " "  Continue l be aware of warning signs for self-harm and report any new symptoms or increase in depression/anxiety ASAP.     Continue to be monitored and reassessed if change in risk rating is indicated.       Ongoing        Ongoing     Assignment: Patient (I) will meet weekly with his counselor to discuss any concerns or changes. \"I will complete all above assignments and present in group\".   None Ongoing         Dimension 4: Readiness to Change, Risk Level: 1    Needs identified from Comprehensive Assessment Summary:   Focus area: Patient continued to have consequences to self and others due to their use, and has continued to use despite these consequences health problems, and relationships.   Clinical Goal: Acknowledge negative consequences to gain an understanding of the impact of addiction and increase internal motivation for change.   Patient's Goal: \"I want to successfully complete treatment and increase my motivation to remain in recovery.\"  Goal needs to be completed prior to discharge? [x]    Methods/Strategies Target Date  Frequency Effective Completion Date      Will attend all programming         Thirteen 2-hour small group therapy sessions          Four 1-hour skills/psycho-ed groups per week.          Daily Evening Lecture/AA Meetings    Will participate in once weekly spirituality group with [facility] .     Yoga or Acupuncture when provided      Ongoing        Ongoing      Ongoing     Assignment:         First Step    Alcohol/Drug History    Identifying Relapse Triggers     09/21/2023 09/13/2023 09/25/2023           Dimension 5: Relapse/Continued Use/Continued Problem Potential, Risk Level: 4    Needs identified from Comprehensive Assessment Summary: Patient presents as a high risk for relapse. He has limited insight into his personal relapse cues and effective prevention strategies.     Focus area: Patient have limited insight into his personal relapse process " "(triggers, warning signs) & lacks coping skills. He reports guilt & shame for past use and harmful behaviors to self and others.   Clinical Goal: Gain insight about personal relapse and develop coping skills to prevent relapse. Begin to develop an understanding on how shame can contribute to chemical dependency and/or relapse, and the process of forgiving self for past. Continue to develop own meaning of spirituality/higher power and apply it to recovery.   Patient's Goal:  \"I want to identify triggers and get coping skills\"   Goal needs to be completed prior to discharge? [x]     Methods/ Strategies Target Date  Frequency Effective Completion Date   Will share during each group therapy sessions check in any urges and addictive thinking to better understanding their pattern of use and to prevent return to use.     Will attend relapse prevention workshops on alternating weekends and complete activities    Met individually with Carrie -If Desired.      Ongoing        Ongoing      TBD     Assignment:         Resentments     Relapse Prevention Plan     TBD    09/26/2023         Dimension 6: Recovery Environment, Risk Level: 4    Needs identified from Comprehensive Assessment Summary:   Focus area:  Create transition planning to home community or clinically indicated setting. Solidify plans to have become strained due to use, and he currently does not have sober support network of in recovery.   Clinical Goal: Secure safe sober housing if needed. Patient, in collaboration with treatment team, will develop plan for continued support of recovery. Gain skills to strengthen relationships with concerned persons and develop a preliminary support network in recovery.   Patient's Goal:  \"figure out a plan for after treatment, so I can stay in recovery.\"   Goal needs to be completed prior to discharge? [x]    Methods/Strategies Target Date  Frequency Effective Completion Date   Will discuss Sober Living/IOP Options with " "Counselors    Will attend workshops on healthy relationships on alternate weekends and complete all activities.     Will spend free time with same-gender peers and attend @ least 3  21-Step Meetings/week offered between the hours of 6-8pm.     Discuss in group possible barriers in home enviropnement to stay sober.    Complete \"Planning for Aftercare\"    Ongoing      Ongoing        Ongoing        Ongoing      09/26/2023         All interventions that are designated as \"current\" will need to be completed in order to transition out of treatment with a favorable prognosis. The treatment plan is a fluid document and a work in progress. Interventions and goals may be added at any time to customize the plan to each individual's needs. Patients may work with counselors to change interventions as long as they pertain to the goals stipulated in the plan and/or are clinically driven.      Treatment amount, frequency and duration:    A total of 30 hours of therapeutic programming will be completed weekly.  A total of 7 hours of peer-led recovery group attendance will be completed weekly.  Treatment schedule to be followed weekly for duration of treatment:  2 sessions of 2-hour long group therapy each day Monday through Saturday for duration of treatment  1 session of 1-hour group therapy each Sunday for duration of treatment  3 1-hour sessions of skills development Tuesday, Wednesday and Thursday weekly for duration of treatment  1 2-hour session of skills development Sunday  1 half-hour individual session with [facility] Counselor 1 time weekly for duration of treatment.  1 hour of peer-led recovery meetings each night, Monday through Sunday, for duration of treatment.       Resources  Resources to which the patient is being referred for problems when they are to be addressed concurrently by another provider: [facility]: [phone], [place] [facility]: [phone], [facility] Scheduling: " [id number], and [facility] [phone]      Vulnerable Adult Review   [x] Review of the facility Abuse Prevention plan was reviewed with the patient   [] No individual abuse plan is necessary   [] In addition to the facility Abuse Prevention plan, an Individual Abuse Plan will be put in place     Elroy millardests their date of last use as 08/19/2023. Elroy higgins they have participated in the creation of this treatment plan. Elroy has been provided a copy of this treatment plan and is in agreement with how this plan is written and will be stored in the electronic record.       Patient Signature: _________________________________ Date: _________    Clinician Signature: ______________________________    Date: _________             HPI      ROS      Physical Exam

## 2023-09-02 ENCOUNTER — HOSPITAL ENCOUNTER (OUTPATIENT)
Dept: BEHAVIORAL HEALTH | Facility: CLINIC | Age: 43
Discharge: HOME | End: 2023-09-02
Attending: FAMILY MEDICINE
Payer: COMMERCIAL

## 2023-09-02 ENCOUNTER — HOSPITAL ENCOUNTER (INPATIENT)
Facility: CLINIC | Age: 43
LOS: 9 days | Discharge: HOME | DRG: 432 | End: 2023-09-11
Attending: EMERGENCY MEDICINE | Admitting: INTERNAL MEDICINE
Payer: COMMERCIAL

## 2023-09-02 DIAGNOSIS — K72.90 DECOMPENSATED HEPATIC CIRRHOSIS (H): ICD-10-CM

## 2023-09-02 DIAGNOSIS — K70.31 ALCOHOLIC CIRRHOSIS OF LIVER WITH ASCITES (H): ICD-10-CM

## 2023-09-02 DIAGNOSIS — N17.9 ACUTE RENAL FAILURE, UNSPECIFIED ACUTE RENAL FAILURE TYPE (H): Primary | ICD-10-CM

## 2023-09-02 DIAGNOSIS — K74.60 DECOMPENSATED HEPATIC CIRRHOSIS (H): ICD-10-CM

## 2023-09-02 DIAGNOSIS — N17.9 AKI (ACUTE KIDNEY INJURY) (H): ICD-10-CM

## 2023-09-02 LAB
ABO/RH(D): NORMAL
ALBUMIN SERPL BCG-MCNC: 3 G/DL (ref 3.5–5.2)
ALBUMIN UR-MCNC: 10 MG/DL
ALP SERPL-CCNC: 154 U/L (ref 40–129)
ALT SERPL W P-5'-P-CCNC: 60 U/L (ref 0–70)
AMMONIA PLAS-SCNC: 167 UMOL/L (ref 16–60)
ANION GAP SERPL CALCULATED.3IONS-SCNC: 17 MMOL/L (ref 7–15)
ANION GAP SERPL CALCULATED.3IONS-SCNC: 17 MMOL/L (ref 7–15)
ANTIBODY SCREEN: NEGATIVE
APPEARANCE UR: ABNORMAL
APTT PPP: 42 SECONDS (ref 22–38)
AST SERPL W P-5'-P-CCNC: ABNORMAL U/L
BACTERIA #/AREA URNS HPF: ABNORMAL /HPF
BASOPHILS # BLD MANUAL: 0.4 10E3/UL (ref 0–0.2)
BASOPHILS NFR BLD MANUAL: 2 %
BILIRUB SERPL-MCNC: 38 MG/DL
BILIRUB UR QL STRIP: ABNORMAL
BUN SERPL-MCNC: 81 MG/DL (ref 6–20)
BUN SERPL-MCNC: 83.2 MG/DL (ref 6–20)
BURR CELLS BLD QL SMEAR: SLIGHT
CALCIUM SERPL-MCNC: 9.1 MG/DL (ref 8.6–10)
CALCIUM SERPL-MCNC: 9.1 MG/DL (ref 8.6–10)
CHLORIDE SERPL-SCNC: 89 MMOL/L (ref 98–107)
CHLORIDE SERPL-SCNC: 90 MMOL/L (ref 98–107)
COLOR UR AUTO: ABNORMAL
CREAT SERPL-MCNC: 7.99 MG/DL (ref 0.67–1.17)
CREAT SERPL-MCNC: 8.17 MG/DL (ref 0.67–1.17)
DEPRECATED HCO3 PLAS-SCNC: 14 MMOL/L (ref 22–29)
DEPRECATED HCO3 PLAS-SCNC: 14 MMOL/L (ref 22–29)
EOSINOPHIL # BLD MANUAL: 0 10E3/UL (ref 0–0.7)
EOSINOPHIL NFR BLD MANUAL: 0 %
ERYTHROCYTE [DISTWIDTH] IN BLOOD BY AUTOMATED COUNT: 20.2 % (ref 10–15)
FRAGMENTS BLD QL SMEAR: SLIGHT
GFR SERPL CREATININE-BSD FRML MDRD: 8 ML/MIN/1.73M2
GFR SERPL CREATININE-BSD FRML MDRD: 8 ML/MIN/1.73M2
GLUCOSE SERPL-MCNC: 107 MG/DL (ref 70–99)
GLUCOSE SERPL-MCNC: 112 MG/DL (ref 70–99)
GLUCOSE UR STRIP-MCNC: NEGATIVE MG/DL
GRANULAR CAST: 1 /LPF
HCT VFR BLD AUTO: 27.5 % (ref 40–53)
HGB BLD-MCNC: 10.4 G/DL (ref 13.3–17.7)
HGB UR QL STRIP: NEGATIVE
HYALINE CASTS: 1 /LPF
INR PPP: 1.46 (ref 0.85–1.15)
KETONES UR STRIP-MCNC: NEGATIVE MG/DL
LACTATE SERPL-SCNC: 1.1 MMOL/L (ref 0.7–2)
LEUKOCYTE ESTERASE UR QL STRIP: NEGATIVE
LIPASE SERPL-CCNC: 435 U/L (ref 13–60)
LYMPHOCYTES # BLD MANUAL: 0.7 10E3/UL (ref 0.8–5.3)
LYMPHOCYTES NFR BLD MANUAL: 4 %
MCH RBC QN AUTO: 34.4 PG (ref 26.5–33)
MCHC RBC AUTO-ENTMCNC: 37.8 G/DL (ref 31.5–36.5)
MCV RBC AUTO: 91 FL (ref 78–100)
METAMYELOCYTES # BLD MANUAL: 0.4 10E3/UL
METAMYELOCYTES NFR BLD MANUAL: 2 %
MONOCYTES # BLD MANUAL: 1.3 10E3/UL (ref 0–1.3)
MONOCYTES NFR BLD MANUAL: 7 %
MUCOUS THREADS #/AREA URNS LPF: PRESENT /LPF
MYELOCYTES # BLD MANUAL: 0.5 10E3/UL
MYELOCYTES NFR BLD MANUAL: 3 %
NEUTROPHILS # BLD MANUAL: 15 10E3/UL (ref 1.6–8.3)
NEUTROPHILS NFR BLD MANUAL: 82 %
NITRATE UR QL: NEGATIVE
PH UR STRIP: 5.5 [PH] (ref 5–7)
PLAT MORPH BLD: ABNORMAL
PLATELET # BLD AUTO: 203 10E3/UL (ref 150–450)
POTASSIUM SERPL-SCNC: 4.7 MMOL/L (ref 3.4–5.3)
POTASSIUM SERPL-SCNC: 6 MMOL/L (ref 3.4–5.3)
PROT SERPL-MCNC: ABNORMAL G/DL
RBC # BLD AUTO: 3.02 10E6/UL (ref 4.4–5.9)
RBC MORPH BLD: ABNORMAL
RBC URINE: 1 /HPF
SODIUM SERPL-SCNC: 120 MMOL/L (ref 136–145)
SODIUM SERPL-SCNC: 121 MMOL/L (ref 136–145)
SP GR UR STRIP: 1.01 (ref 1–1.03)
SPECIMEN EXPIRATION DATE: NORMAL
SQUAMOUS EPITHELIAL: <1 /HPF
TARGETS BLD QL SMEAR: SLIGHT
TRANSITIONAL EPI: <1 /HPF
UROBILINOGEN UR STRIP-MCNC: NORMAL MG/DL
WBC # BLD AUTO: 18.7 10E3/UL (ref 4–11)
WBC URINE: 3 /HPF

## 2023-09-02 PROCEDURE — 86850 RBC ANTIBODY SCREEN: CPT | Performed by: EMERGENCY MEDICINE

## 2023-09-02 PROCEDURE — 85730 THROMBOPLASTIN TIME PARTIAL: CPT | Performed by: EMERGENCY MEDICINE

## 2023-09-02 PROCEDURE — 82140 ASSAY OF AMMONIA: CPT | Performed by: EMERGENCY MEDICINE

## 2023-09-02 PROCEDURE — 83605 ASSAY OF LACTIC ACID: CPT | Performed by: EMERGENCY MEDICINE

## 2023-09-02 PROCEDURE — 80053 COMPREHEN METABOLIC PANEL: CPT | Performed by: EMERGENCY MEDICINE

## 2023-09-02 PROCEDURE — 214N000001 HC R&B CCU UMMC

## 2023-09-02 PROCEDURE — 82570 ASSAY OF URINE CREATININE: CPT | Performed by: INTERNAL MEDICINE

## 2023-09-02 PROCEDURE — 81001 URINALYSIS AUTO W/SCOPE: CPT | Performed by: EMERGENCY MEDICINE

## 2023-09-02 PROCEDURE — 84075 ASSAY ALKALINE PHOSPHATASE: CPT | Performed by: EMERGENCY MEDICINE

## 2023-09-02 PROCEDURE — 258N000003 HC RX IP 258 OP 636: Performed by: EMERGENCY MEDICINE

## 2023-09-02 PROCEDURE — 84300 ASSAY OF URINE SODIUM: CPT | Performed by: INTERNAL MEDICINE

## 2023-09-02 PROCEDURE — H2035 A/D TX PROGRAM, PER HOUR: HCPCS | Mod: HQ

## 2023-09-02 PROCEDURE — 80048 BASIC METABOLIC PNL TOTAL CA: CPT | Performed by: EMERGENCY MEDICINE

## 2023-09-02 PROCEDURE — 85610 PROTHROMBIN TIME: CPT | Performed by: EMERGENCY MEDICINE

## 2023-09-02 PROCEDURE — 49083 ABD PARACENTESIS W/IMAGING: CPT | Performed by: EMERGENCY MEDICINE

## 2023-09-02 PROCEDURE — 83935 ASSAY OF URINE OSMOLALITY: CPT | Performed by: INTERNAL MEDICINE

## 2023-09-02 PROCEDURE — 83690 ASSAY OF LIPASE: CPT | Performed by: EMERGENCY MEDICINE

## 2023-09-02 PROCEDURE — 99284 EMERGENCY DEPT VISIT MOD MDM: CPT | Mod: 25 | Performed by: EMERGENCY MEDICINE

## 2023-09-02 PROCEDURE — 36415 COLL VENOUS BLD VENIPUNCTURE: CPT | Performed by: EMERGENCY MEDICINE

## 2023-09-02 PROCEDURE — 86901 BLOOD TYPING SEROLOGIC RH(D): CPT | Performed by: EMERGENCY MEDICINE

## 2023-09-02 PROCEDURE — 85027 COMPLETE CBC AUTOMATED: CPT | Performed by: EMERGENCY MEDICINE

## 2023-09-02 PROCEDURE — 0W9G3ZZ DRAINAGE OF PERITONEAL CAVITY, PERCUTANEOUS APPROACH: ICD-10-PCS | Performed by: EMERGENCY MEDICINE

## 2023-09-02 PROCEDURE — 85007 BL SMEAR W/DIFF WBC COUNT: CPT | Performed by: EMERGENCY MEDICINE

## 2023-09-02 PROCEDURE — 82040 ASSAY OF SERUM ALBUMIN: CPT | Performed by: EMERGENCY MEDICINE

## 2023-09-02 PROCEDURE — 99285 EMERGENCY DEPT VISIT HI MDM: CPT | Mod: 25 | Performed by: EMERGENCY MEDICINE

## 2023-09-02 RX ADMIN — SODIUM CHLORIDE 1000 ML: 9 INJECTION, SOLUTION INTRAVENOUS at 23:13

## 2023-09-02 ASSESSMENT — ACTIVITIES OF DAILY LIVING (ADL)
ADLS_ACUITY_SCORE: 35

## 2023-09-02 NOTE — GROUP NOTE
"Psychoeducation Group Documentation    PATIENT'S NAME: Elroy Morel Sr.  MRN:   [id number]  :   1980  ACCT. NUMBER: [id number]  DATE OF SERVICE: 23  START TIME: 12:30 PM  END TIME:  2:30 PM  FACILITATOR(S): Gilbert Ann LADC; Deniz Hardin LADC  TOPIC: BEH Pyschoeducation  Number of patients attending the group:16  Group Length:  2 Hours    Skills Group Therapy Type: Recovery skills and Relationship skills development    Summary of Group / Topics Discussed:    Relationship/social skills        Group Attendance:  Attended group session    Patient's response to the group topic/interactions:  cooperative with task and listened actively    Patient appeared to be Attentive.         Client specific details:  Elroy, attended Lecture on  Relationships  and \"Communications Styles\".This lecture was consistent with building skills and a foundation to help sustain recovery strategies, and goals. Patient engaged in Q&A after the lecture was presented.      "

## 2023-09-02 NOTE — PROGRESS NOTES
Acknowledgement of Current Treatment Plan - Initial Treatment Plan     INITIAL TREATMENT PLAN:     1. I have participated in creating my treatment plan with my therapist / counselor on 09/01/2023.  I agree with the plan as it is written in the electronic health record.    Name Signature/Date   Patient     Name of Therapist / Counselor   Signature/Date   Counselor      2. I have completed and reviewed my Safety Plan with my counselor and signed this on 09/01/2023. I have been given the hard copy of this plan.    Patient signature/date:      _________________________________________________________________________    3. Last Use Date: 08/19/2023    Patient signature/date:

## 2023-09-02 NOTE — GROUP NOTE
Psychoeducation Group Documentation    PATIENT'S NAME: Elroy Morel Sr.  MRN:   [id number]  :   1980  ACCT. NUMBER: [id number]  DATE OF SERVICE: 23  START TIME:  9:30 AM  END TIME: 11:00 AM  FACILITATOR(S): Deniz Hardin LADC; Gilbert Ann LADC; Tasha Balderrama LADC  TOPIC: BEH Pyschoeducation  Number of patients attending the group:  16  Group Length:  2 Hours    Skills Group Therapy Type: Relationship skills development    Summary of Group / Topics Discussed:    Relationship/social skills        Group Attendance:  Attended group session    Patient's response to the group topic/interactions:  cooperative with task    Patient appeared to be Actively participating.         Client specific details: Patient was appropriate and actively engaged during relationship group. Group content was focused on Individual Personal strength and how these strengths could be applied to accomplish recovery goals.

## 2023-09-03 ENCOUNTER — APPOINTMENT (OUTPATIENT)
Dept: GENERAL RADIOLOGY | Facility: CLINIC | Age: 43
DRG: 432 | End: 2023-09-03
Attending: INTERNAL MEDICINE
Payer: COMMERCIAL

## 2023-09-03 ENCOUNTER — APPOINTMENT (OUTPATIENT)
Dept: ULTRASOUND IMAGING | Facility: CLINIC | Age: 43
DRG: 432 | End: 2023-09-03
Attending: INTERNAL MEDICINE
Payer: COMMERCIAL

## 2023-09-03 LAB
% LINING CELLS, BODY FLUID: 12 %
ABSOLUTE NEUTROPHILS, BODY FLUID: 24.2 /UL
ADV 40+41 DNA STL QL NAA+NON-PROBE: NEGATIVE
ALBUMIN SERPL BCG-MCNC: 2.9 G/DL (ref 3.5–5.2)
ALBUMIN SERPL BCG-MCNC: 2.9 G/DL (ref 3.5–5.2)
ALBUMIN SERPL BCG-MCNC: 3 G/DL (ref 3.5–5.2)
ALP SERPL-CCNC: 144 U/L (ref 40–129)
ALP SERPL-CCNC: 151 U/L (ref 40–129)
ALT SERPL W P-5'-P-CCNC: 55 U/L (ref 0–70)
ALT SERPL W P-5'-P-CCNC: 59 U/L (ref 0–70)
AMYLASE BODY FLUID SOURCE: NORMAL
AMYLASE FLD-CCNC: 33 U/L
ANION GAP SERPL CALCULATED.3IONS-SCNC: 20 MMOL/L (ref 7–15)
ANION GAP SERPL CALCULATED.3IONS-SCNC: 21 MMOL/L (ref 7–15)
ANION GAP SERPL CALCULATED.3IONS-SCNC: 21 MMOL/L (ref 7–15)
APPEARANCE FLD: ABNORMAL
AST SERPL W P-5'-P-CCNC: 161 U/L (ref 0–45)
AST SERPL W P-5'-P-CCNC: 173 U/L (ref 0–45)
ASTRO TYP 1-8 RNA STL QL NAA+NON-PROBE: NEGATIVE
BASOPHILS NFR FLD MANUAL: 0 %
BILIRUB DIRECT SERPL-MCNC: 22.16 MG/DL (ref 0–0.3)
BILIRUB SERPL-MCNC: 36.7 MG/DL
BILIRUB SERPL-MCNC: 37.6 MG/DL
BUN SERPL-MCNC: 83.9 MG/DL (ref 6–20)
BUN SERPL-MCNC: 85.8 MG/DL (ref 6–20)
BUN SERPL-MCNC: 86.7 MG/DL (ref 6–20)
C CAYETANENSIS DNA STL QL NAA+NON-PROBE: NEGATIVE
C DIFF GDH STL QL IA: POSITIVE
C DIFF TOX A+B STL QL IA: NEGATIVE
C DIFF TOX B STL QL: POSITIVE
CALCIUM SERPL-MCNC: 9.2 MG/DL (ref 8.6–10)
CALCIUM SERPL-MCNC: 9.3 MG/DL (ref 8.6–10)
CALCIUM SERPL-MCNC: 9.4 MG/DL (ref 8.6–10)
CAMPYLOBACTER DNA SPEC NAA+PROBE: NEGATIVE
CELL COUNT BODY FLUID SOURCE: ABNORMAL
CHLORIDE SERPL-SCNC: 91 MMOL/L (ref 98–107)
CHLORIDE SERPL-SCNC: 92 MMOL/L (ref 98–107)
CHLORIDE SERPL-SCNC: 93 MMOL/L (ref 98–107)
COLOR FLD: YELLOW
CREAT SERPL-MCNC: 7.19 MG/DL (ref 0.67–1.17)
CREAT SERPL-MCNC: 7.21 MG/DL (ref 0.67–1.17)
CREAT SERPL-MCNC: 7.24 MG/DL (ref 0.67–1.17)
CREAT UR-MCNC: 187.9 MG/DL
CRYPTOSP DNA STL QL NAA+NON-PROBE: NEGATIVE
DEPRECATED HCO3 PLAS-SCNC: 12 MMOL/L (ref 22–29)
E COLI O157 DNA STL QL NAA+NON-PROBE: NORMAL
E HISTOLYT DNA STL QL NAA+NON-PROBE: NEGATIVE
EAEC ASTA GENE ISLT QL NAA+PROBE: NEGATIVE
EC STX1+STX2 GENES STL QL NAA+NON-PROBE: NEGATIVE
EOSINOPHIL NFR FLD MANUAL: 0 %
EPEC EAE GENE STL QL NAA+NON-PROBE: NEGATIVE
ERYTHROCYTE [DISTWIDTH] IN BLOOD BY AUTOMATED COUNT: 19.8 % (ref 10–15)
ETEC LTA+ST1A+ST1B TOX ST NAA+NON-PROBE: NEGATIVE
G LAMBLIA DNA STL QL NAA+NON-PROBE: NEGATIVE
GFR SERPL CREATININE-BSD FRML MDRD: 9 ML/MIN/1.73M2
GLUCOSE BODY FLUID SOURCE: NORMAL
GLUCOSE FLD-MCNC: 111 MG/DL
GLUCOSE SERPL-MCNC: 100 MG/DL (ref 70–99)
GLUCOSE SERPL-MCNC: 95 MG/DL (ref 70–99)
GLUCOSE SERPL-MCNC: 95 MG/DL (ref 70–99)
GRAM STAIN RESULT: NORMAL
GRAM STAIN RESULT: NORMAL
HCT VFR BLD AUTO: 26.6 % (ref 40–53)
HGB BLD-MCNC: 10 G/DL (ref 13.3–17.7)
HGB BLD-MCNC: 9.1 G/DL (ref 13.3–17.7)
HGB BLD-MCNC: 9.5 G/DL (ref 13.3–17.7)
HGB BLD-MCNC: 9.9 G/DL (ref 13.3–17.7)
HOLD SPECIMEN: NORMAL
INR PPP: 1.47 (ref 0.85–1.15)
LDH SERPL L TO P-CCNC: 274 U/L (ref 0–250)
LYMPHOCYTES NFR FLD MANUAL: 17 %
MCH RBC QN AUTO: 33.8 PG (ref 26.5–33)
MCHC RBC AUTO-ENTMCNC: 37.2 G/DL (ref 31.5–36.5)
MCV RBC AUTO: 91 FL (ref 78–100)
MONOS+MACROS NFR FLD MANUAL: 56 %
NEUTS BAND NFR FLD MANUAL: 15 %
NOROVIRUS GI+II RNA STL QL NAA+NON-PROBE: NEGATIVE
OSMOLALITY UR: 338 MMOL/KG (ref 100–1200)
P SHIGELLOIDES DNA STL QL NAA+NON-PROBE: NEGATIVE
PLATELET # BLD AUTO: 191 10E3/UL (ref 150–450)
POTASSIUM SERPL-SCNC: 5 MMOL/L (ref 3.4–5.3)
POTASSIUM SERPL-SCNC: 5.1 MMOL/L (ref 3.4–5.3)
POTASSIUM SERPL-SCNC: 5.1 MMOL/L (ref 3.4–5.3)
PROT SERPL-MCNC: 6.1 G/DL (ref 6.4–8.3)
PROT SERPL-MCNC: ABNORMAL G/DL
RBC # BLD AUTO: 2.93 10E6/UL (ref 4.4–5.9)
RVA RNA STL QL NAA+NON-PROBE: NEGATIVE
SALMONELLA SP RPOD STL QL NAA+PROBE: NEGATIVE
SAPO I+II+IV+V RNA STL QL NAA+NON-PROBE: NEGATIVE
SHIGELLA SP+EIEC IPAH ST NAA+NON-PROBE: NEGATIVE
SODIUM SERPL-SCNC: 124 MMOL/L (ref 136–145)
SODIUM SERPL-SCNC: 125 MMOL/L (ref 136–145)
SODIUM SERPL-SCNC: 125 MMOL/L (ref 136–145)
SODIUM SERPL-SCNC: 126 MMOL/L (ref 136–145)
SODIUM UR-SCNC: 20 MMOL/L
V CHOLERAE DNA SPEC QL NAA+PROBE: NEGATIVE
VIBRIO DNA SPEC NAA+PROBE: NEGATIVE
WBC # BLD AUTO: 20.3 10E3/UL (ref 4–11)
WBC # FLD AUTO: 161 /UL
Y ENTEROCOL DNA STL QL NAA+PROBE: NEGATIVE

## 2023-09-03 PROCEDURE — 87507 IADNA-DNA/RNA PROBE TQ 12-25: CPT | Performed by: INTERNAL MEDICINE

## 2023-09-03 PROCEDURE — 250N000013 HC RX MED GY IP 250 OP 250 PS 637: Performed by: INTERNAL MEDICINE

## 2023-09-03 PROCEDURE — 36415 COLL VENOUS BLD VENIPUNCTURE: CPT | Performed by: INTERNAL MEDICINE

## 2023-09-03 PROCEDURE — 999N000127 HC STATISTIC PERIPHERAL IV START W US GUIDANCE

## 2023-09-03 PROCEDURE — 87070 CULTURE OTHR SPECIMN AEROBIC: CPT | Performed by: EMERGENCY MEDICINE

## 2023-09-03 PROCEDURE — 214N000001 HC R&B CCU UMMC

## 2023-09-03 PROCEDURE — 250N000013 HC RX MED GY IP 250 OP 250 PS 637: Performed by: PHYSICIAN ASSISTANT

## 2023-09-03 PROCEDURE — 82945 GLUCOSE OTHER FLUID: CPT | Performed by: EMERGENCY MEDICINE

## 2023-09-03 PROCEDURE — 76770 US EXAM ABDO BACK WALL COMP: CPT

## 2023-09-03 PROCEDURE — 99223 1ST HOSP IP/OBS HIGH 75: CPT | Mod: GC | Performed by: INTERNAL MEDICINE

## 2023-09-03 PROCEDURE — 71045 X-RAY EXAM CHEST 1 VIEW: CPT

## 2023-09-03 PROCEDURE — 84075 ASSAY ALKALINE PHOSPHATASE: CPT | Performed by: INTERNAL MEDICINE

## 2023-09-03 PROCEDURE — 85018 HEMOGLOBIN: CPT | Performed by: INTERNAL MEDICINE

## 2023-09-03 PROCEDURE — 250N000009 HC RX 250: Performed by: EMERGENCY MEDICINE

## 2023-09-03 PROCEDURE — 80048 BASIC METABOLIC PNL TOTAL CA: CPT | Performed by: INTERNAL MEDICINE

## 2023-09-03 PROCEDURE — 250N000011 HC RX IP 250 OP 636: Mod: JA | Performed by: INTERNAL MEDICINE

## 2023-09-03 PROCEDURE — P9047 ALBUMIN (HUMAN), 25%, 50ML: HCPCS | Performed by: INTERNAL MEDICINE

## 2023-09-03 PROCEDURE — 83615 LACTATE (LD) (LDH) ENZYME: CPT | Performed by: EMERGENCY MEDICINE

## 2023-09-03 PROCEDURE — 89051 BODY FLUID CELL COUNT: CPT | Performed by: EMERGENCY MEDICINE

## 2023-09-03 PROCEDURE — C9113 INJ PANTOPRAZOLE SODIUM, VIA: HCPCS | Mod: JZ | Performed by: INTERNAL MEDICINE

## 2023-09-03 PROCEDURE — 999N000040 HC STATISTIC CONSULT NO CHARGE VASC ACCESS

## 2023-09-03 PROCEDURE — 82150 ASSAY OF AMYLASE: CPT | Performed by: EMERGENCY MEDICINE

## 2023-09-03 PROCEDURE — 99418 PROLNG IP/OBS E/M EA 15 MIN: CPT | Performed by: INTERNAL MEDICINE

## 2023-09-03 PROCEDURE — 82042 OTHER SOURCE ALBUMIN QUAN EA: CPT | Performed by: EMERGENCY MEDICINE

## 2023-09-03 PROCEDURE — 99207 PR NO BILLABLE SERVICE THIS VISIT: CPT | Performed by: PHYSICIAN ASSISTANT

## 2023-09-03 PROCEDURE — 84295 ASSAY OF SERUM SODIUM: CPT | Performed by: INTERNAL MEDICINE

## 2023-09-03 PROCEDURE — 99223 1ST HOSP IP/OBS HIGH 75: CPT | Mod: AI | Performed by: INTERNAL MEDICINE

## 2023-09-03 PROCEDURE — 85610 PROTHROMBIN TIME: CPT | Performed by: INTERNAL MEDICINE

## 2023-09-03 PROCEDURE — 99222 1ST HOSP IP/OBS MODERATE 55: CPT | Mod: GC | Performed by: STUDENT IN AN ORGANIZED HEALTH CARE EDUCATION/TRAINING PROGRAM

## 2023-09-03 PROCEDURE — 87205 SMEAR GRAM STAIN: CPT | Performed by: EMERGENCY MEDICINE

## 2023-09-03 PROCEDURE — 84157 ASSAY OF PROTEIN OTHER: CPT | Performed by: EMERGENCY MEDICINE

## 2023-09-03 PROCEDURE — 85027 COMPLETE CBC AUTOMATED: CPT | Performed by: INTERNAL MEDICINE

## 2023-09-03 PROCEDURE — 87040 BLOOD CULTURE FOR BACTERIA: CPT | Performed by: INTERNAL MEDICINE

## 2023-09-03 PROCEDURE — 87493 C DIFF AMPLIFIED PROBE: CPT | Performed by: INTERNAL MEDICINE

## 2023-09-03 PROCEDURE — 999N000007 HC SITE CHECK

## 2023-09-03 PROCEDURE — 250N000011 HC RX IP 250 OP 636: Performed by: INTERNAL MEDICINE

## 2023-09-03 PROCEDURE — 87324 CLOSTRIDIUM AG IA: CPT | Performed by: INTERNAL MEDICINE

## 2023-09-03 PROCEDURE — 258N000003 HC RX IP 258 OP 636: Performed by: INTERNAL MEDICINE

## 2023-09-03 RX ORDER — LIDOCAINE HYDROCHLORIDE AND EPINEPHRINE 10; 10 MG/ML; UG/ML
3 INJECTION, SOLUTION INFILTRATION; PERINEURAL ONCE
Status: COMPLETED | OUTPATIENT
Start: 2023-09-03 | End: 2023-09-03

## 2023-09-03 RX ORDER — ONDANSETRON 4 MG/1
4 TABLET, ORALLY DISINTEGRATING ORAL EVERY 6 HOURS PRN
Status: DISCONTINUED | OUTPATIENT
Start: 2023-09-03 | End: 2023-09-11 | Stop reason: HOSPADM

## 2023-09-03 RX ORDER — LACTULOSE 10 G/15ML
20 SOLUTION ORAL 3 TIMES DAILY
Status: DISCONTINUED | OUTPATIENT
Start: 2023-09-03 | End: 2023-09-06

## 2023-09-03 RX ORDER — LIDOCAINE 40 MG/G
CREAM TOPICAL
Status: DISCONTINUED | OUTPATIENT
Start: 2023-09-03 | End: 2023-09-11 | Stop reason: HOSPADM

## 2023-09-03 RX ORDER — CEFTRIAXONE 2 G/1
2 INJECTION, POWDER, FOR SOLUTION INTRAMUSCULAR; INTRAVENOUS EVERY 24 HOURS
Status: DISCONTINUED | OUTPATIENT
Start: 2023-09-03 | End: 2023-09-03

## 2023-09-03 RX ORDER — ALBUMIN (HUMAN) 12.5 G/50ML
100 SOLUTION INTRAVENOUS DAILY
Status: COMPLETED | OUTPATIENT
Start: 2023-09-04 | End: 2023-09-05

## 2023-09-03 RX ORDER — POLYETHYLENE GLYCOL 3350 17 G/17G
17 POWDER, FOR SOLUTION ORAL DAILY
Status: DISCONTINUED | OUTPATIENT
Start: 2023-09-03 | End: 2023-09-11 | Stop reason: HOSPADM

## 2023-09-03 RX ORDER — MIDODRINE HYDROCHLORIDE 5 MG/1
5 TABLET ORAL
Status: DISCONTINUED | OUTPATIENT
Start: 2023-09-03 | End: 2023-09-10

## 2023-09-03 RX ORDER — ONDANSETRON 4 MG/1
4 TABLET, ORALLY DISINTEGRATING ORAL EVERY 6 HOURS PRN
Status: DISCONTINUED | OUTPATIENT
Start: 2023-09-03 | End: 2023-09-03

## 2023-09-03 RX ORDER — FOLIC ACID 1 MG/1
1 TABLET ORAL DAILY
Status: DISCONTINUED | OUTPATIENT
Start: 2023-09-03 | End: 2023-09-11 | Stop reason: HOSPADM

## 2023-09-03 RX ORDER — ONDANSETRON 2 MG/ML
4 INJECTION INTRAMUSCULAR; INTRAVENOUS EVERY 6 HOURS PRN
Status: DISCONTINUED | OUTPATIENT
Start: 2023-09-03 | End: 2023-09-11 | Stop reason: HOSPADM

## 2023-09-03 RX ORDER — LACTULOSE 10 G/15ML
20 SOLUTION ORAL
Status: DISCONTINUED | OUTPATIENT
Start: 2023-09-03 | End: 2023-09-11 | Stop reason: HOSPADM

## 2023-09-03 RX ORDER — MULTIPLE VITAMINS W/ MINERALS TAB 9MG-400MCG
1 TAB ORAL DAILY
Status: DISCONTINUED | OUTPATIENT
Start: 2023-09-03 | End: 2023-09-11 | Stop reason: HOSPADM

## 2023-09-03 RX ORDER — ALBUMIN (HUMAN) 12.5 G/50ML
100 SOLUTION INTRAVENOUS ONCE
Status: COMPLETED | OUTPATIENT
Start: 2023-09-03 | End: 2023-09-03

## 2023-09-03 RX ORDER — CALCIUM CARBONATE 500 MG/1
1000 TABLET, CHEWABLE ORAL 4 TIMES DAILY PRN
Status: DISCONTINUED | OUTPATIENT
Start: 2023-09-03 | End: 2023-09-11 | Stop reason: HOSPADM

## 2023-09-03 RX ORDER — OCTREOTIDE ACETATE 200 UG/ML
50 INJECTION INTRAVENOUS ONCE
Status: COMPLETED | OUTPATIENT
Start: 2023-09-03 | End: 2023-09-03

## 2023-09-03 RX ORDER — ONDANSETRON 2 MG/ML
4 INJECTION INTRAMUSCULAR; INTRAVENOUS EVERY 6 HOURS PRN
Status: DISCONTINUED | OUTPATIENT
Start: 2023-09-03 | End: 2023-09-03

## 2023-09-03 RX ORDER — LACTULOSE 10 G/15ML
100 SOLUTION ORAL
Status: DISCONTINUED | OUTPATIENT
Start: 2023-09-03 | End: 2023-09-11 | Stop reason: HOSPADM

## 2023-09-03 RX ORDER — LIDOCAINE 40 MG/G
CREAM TOPICAL
Status: DISCONTINUED | OUTPATIENT
Start: 2023-09-03 | End: 2023-09-06

## 2023-09-03 RX ADMIN — Medication 1 TABLET: at 09:26

## 2023-09-03 RX ADMIN — LIDOCAINE HYDROCHLORIDE AND EPINEPHRINE 3 ML: 10; 10 INJECTION, SOLUTION INFILTRATION; PERINEURAL at 02:29

## 2023-09-03 RX ADMIN — RIFAXIMIN 550 MG: 550 TABLET ORAL at 22:07

## 2023-09-03 RX ADMIN — CEFTRIAXONE SODIUM 2 G: 2 INJECTION, POWDER, FOR SOLUTION INTRAMUSCULAR; INTRAVENOUS at 02:33

## 2023-09-03 RX ADMIN — MIDODRINE HYDROCHLORIDE 5 MG: 5 TABLET ORAL at 14:09

## 2023-09-03 RX ADMIN — LACTULOSE 20 G: 20 SOLUTION ORAL at 17:43

## 2023-09-03 RX ADMIN — PANTOPRAZOLE SODIUM 40 MG: 40 INJECTION, POWDER, FOR SOLUTION INTRAVENOUS at 13:00

## 2023-09-03 RX ADMIN — FOLIC ACID 1 MG: 1 TABLET ORAL at 09:25

## 2023-09-03 RX ADMIN — OCTREOTIDE ACETATE 50 MCG/HR: 200 INJECTION, SOLUTION INTRAVENOUS; SUBCUTANEOUS at 03:45

## 2023-09-03 RX ADMIN — PANTOPRAZOLE SODIUM 40 MG: 40 INJECTION, POWDER, FOR SOLUTION INTRAVENOUS at 01:27

## 2023-09-03 RX ADMIN — MIDODRINE HYDROCHLORIDE 5 MG: 5 TABLET ORAL at 17:58

## 2023-09-03 RX ADMIN — LACTULOSE 20 G: 20 SOLUTION ORAL at 22:08

## 2023-09-03 RX ADMIN — MIDODRINE HYDROCHLORIDE 5 MG: 5 TABLET ORAL at 09:45

## 2023-09-03 RX ADMIN — ALBUMIN HUMAN 100 G: 0.25 SOLUTION INTRAVENOUS at 10:31

## 2023-09-03 RX ADMIN — OCTREOTIDE ACETATE 50 MCG: 200 INJECTION, SOLUTION INTRAVENOUS; SUBCUTANEOUS at 03:44

## 2023-09-03 ASSESSMENT — ACTIVITIES OF DAILY LIVING (ADL)
ADLS_ACUITY_SCORE: 35

## 2023-09-03 NOTE — PROGRESS NOTES
Transfer Type: [facility]  Transfer Triage Note    [facility]    [facility] intended location for transfer: [facility] surg or [facility]     What services or anticipated services require transfer to the [place]? Hepatology (albumin challenge vs terlipressin challenge), Nephrology    Tele required:  No    Time of admission request: 1230 am    Anticipated to be boarding in ED for >4 hours? Yes    Was Hospitalist Team notified to complete H&P, admission orders, and assume care (sign into chart, collaborate with ED nurse, etc)? Yes    Patient added to Interhospital transfer list?  Yes    Brief case description: Hx of cirrhosis, now with elevated WBC, JOCELYN, increased bili. Will need to transfer to  for albumin vs terlipressin challenge. Neph says no dialysis for now. No diagnostic para done, so will start CTX.     TAQUERIA PELAYO MD

## 2023-09-03 NOTE — ED PROVIDER NOTES
ED Provider Note  [facility]      History     Chief Complaint   Patient presents with    Rectal Bleeding    Abdominal Pain     HPI  Elroy Morel Sr. is a 43 year old male with past medical history of cirrhosis, esophageal varices, essential hypertension, alcohol use disorder, JOCELYN presents to the emergency department for chief complaint of abdominal pain and bright red blood per rectum.  He states that he has been having bright red blood per rectum for greater than 1 month.  However it waxes and wanes.  It began again after he had his paracentesis on 8/31/2023.  He states that it is bright red blood streaked around his stool, with blood in the toilet bowl.  Unsure if there are large blood clots.  He denies being on any anticoagulation.  He denies any fevers, chills, abdominal pain.  He has chills.  He does have a history of alcohol withdrawal seizures.  However states that he has not had any alcohol in weeks.    He was recently admitted from 8/21 to 8/30/2023 for decompensated cirrhosis.  He had elevated bilirubin 20.2.  He also had an JOCELYN with creatinine of 4.76.  Infectious work-up unremarkable.  Acute hepatitis panel negative.  JOCELYN was suspected to be hepatorenal syndrome based on albumin challenge.  During the stay, LFTs, bilirubin and creatinine downtrending.  No alcohol withdrawal while inpatient.  EGD on 8/28 showed grade 2 esophageal varices status post banding, portal hypertensive gastropathy, esophageal ulcer prior variceal treatment site, 2 polyps in sigmoid colon status post removal, rectal varices, erythematous mucosa in the sigmoid colon/rectum consistent with portal colopathy.  Colonoscopy on 8/28 showed 2 sigmoid colon polyps removed with cold biopsy forceps.  Rectal varices.  They suspected that the cause of hematochezia was portal colopathy.  No blood was seen at that time.  He was continued on IV PPI and switched to p.o. challenge.        Past Medical History  Past  "Medical History:   Diagnosis Date    Alcohol use disorder, severe, dependence (H)     Alcohol withdrawal seizure (H)     Alcoholic cirrhosis of liver with ascites (H)     Esophageal varices (H)     Essential hypertension     Gastroesophageal reflux disease without esophagitis     History of methamphetamine use     Sustained remission since 2003    Hypercholesterolemia     Tobacco abuse      Past Surgical History:   Procedure Laterality Date    COLONOSCOPY N/A 8/28/2023    Procedure: COLONOSCOPY, WITH POLYPECTOMY via bx forceps;  Surgeon: Alyssa Tsai MD;  Location:  GI     acetaminophen (TYLENOL) 325 MG tablet  aspirin 81 MG EC tablet  benzocaine-menthol (CEPACOL) 15-3.6 MG lozenge  calcium carbonate (TUMS) 500 MG chewable tablet  folic acid (FOLVITE) 1 MG tablet  gabapentin (NEURONTIN) 100 MG capsule  guaiFENesin 200 MG/10ML LIQD  loratadine (CLARITIN) 10 MG tablet  melatonin 3 MG tablet  multivitamin w/minerals (THERA-VIT-M) tablet  ondansetron (ZOFRAN ODT) 4 MG ODT tab  pantoprazole (PROTONIX) 40 MG EC tablet  senna-docusate (SENOKOT-S/PERICOLACE) 8.6-50 MG tablet      Allergies   Allergen Reactions    Metronidazole Other (See Comments), Dizziness and Unknown    Omeprazole Other (See Comments) and Unknown     Irritability (tolerates Protonix well)     Family History  History reviewed. No pertinent family history.  Social History   Social History     Tobacco Use    Smoking status: Former     Types: Cigarettes    Smokeless tobacco: Former   Substance Use Topics    Alcohol use: Not Currently     Comment: last drink 8/19    Drug use: Not Currently     Types: Amphetamines     Comment: Sustained remission         A medically appropriate review of systems was performed with pertinent positives and negatives noted in the HPI, and all other systems negative.    Physical Exam   BP: 118/73  Pulse: 77  Temp: 98.3  F (36.8  C)  Resp: 16  Height: 170.2 cm (5' 7\")  Weight: 114.6 kg (252 lb 11.2 oz)  SpO2: 100 " %  Physical Exam  General: no acute distress.  Sleeping, easy to wake to voice  HENT: Normocephalic and atraumatic. Trachea midline.  Eyes: EOMI, scleral icterus.  Normal voice.  Cardiovascular:  Normal rate and regular rhythm.   No murmur heard.  Radial pulses 2+ bilaterally.  Pulmonary:  No respiratory distress. Normal breath sounds bilaterally.  Abdominal: Moderately distended.  No abdominal tenderness.  Musculoskeletal:    Moving all extremities spontaneously.  No cyanosis, clubbing,.  Trace lower extremity edema.  Skin: Warm, dry, and well perfused.  Jaundice  Neurological:  No focal deficit present.    Psychiatric:   The patient is awake, alert.  Calm, flat affect    ED Course, Procedures, & Data     ED Course as of 09/03/23 0142   Sat Sep 02, 2023   [facility]    -Paracentesis    Date/Time: 9/3/2023 1:44 AM    Performed by: Malissa Garcia MD  Authorized by: Malissa Garcia MD    Risks, benefits and alternatives discussed.      PRE-PROCEDURE DETAILS     Procedure purpose:  Diagnostic    ANESTHESIA (see MAR for exact dosages):     Anesthesia method:  Local infiltration    Local anesthetic:  Lidocaine 1% WITH epi    PROCEDURE DETAILS     Needle gauge:  18    Ultrasound guidance: yes      Puncture site:  R lower quadrant    Fluid appearance:  Serosanguinous and jackson    Dressing:  4x4 sterile gauze      PROCEDURE    Patient Tolerance:  Patient tolerated the procedure well with no immediate complications              Results for orders placed or performed during the hospital encounter of 09/02/23   CBC with Platelets & Differential     Status: None (In process)    Narrative    The following orders were created for panel order CBC with Platelets & Differential.  Procedure                               Abnormality         Status                     ---------                               -----------         ------                     CBC with platelets  and edin..[[id number]]                      In process                   Please view results for these tests on the individual orders.     Medications - No data to display  Labs Ordered and Resulted from Time of ED Arrival to Time of ED Departure - No data to display  No orders to display          Critical care was not performed.     Medical Decision Making  The patient's presentation was of high complexity (an acute health issue posing potential threat to life or bodily function).    The patient's evaluation involved:  review of external note(s) from 3+ sources (see separate area of note for details)  review of 3+ test result(s) ordered prior to this encounter (see separate area of note for details)  strong consideration of a test (see separate area of note for details) that was ultimately deferred    The patient's management necessitated high risk (a decision regarding hospitalization).    Assessment & Plan    Patient presents emergency department for complaint of rectal bleeding.  Chief complaint also states abdominal pain, however patient denies this on my exam.  Lactic acid 1.1.  Blood cultures obtained.  Initial CMP had hemolyzed.  Repeat revealed creatinine 7.99 (4.34 just a few days ago), potassium 4.7, bilirubin 36.7.  .  UA with bilirubin, no infection.  INR is 1.46.  Lipase 435.    Nephrology recommends Renal US.  No current need for emergent dialysis.  They we will reevaluate him in the morning.  Patient was able to urinate multiple times in the ED.  He did not have any hematochezia here.  Bedside paracentesis performed, ascites fluid sent to the lab, pending results.  Ceftriaxone 2 g started.  Patient was admitted to medicine service.    I have reviewed the nursing notes. I have reviewed the findings, diagnosis, plan and need for follow up with the patient.    New Prescriptions    No medications on file       Final diagnoses:   None     [facility] EMERGENCY DEPARTMENT  9/2/2023     Jose  MD Malissa  09/03/23 [id number]

## 2023-09-03 NOTE — H&P
[facility]    History and Physical - Hospitalist Service       Date of Admission:  9/2/2023    Assessment & Plan      Elroy Morel Sr. is a 43 year old male admitted on 9/2/2023. He has a hx of severe alcohol use disorder, decompensated cirrhosis (with varices and ascites) who had an admission to [facility] 8/21-8/30 for decompensated cirrhosis, JOCELYN, and acute on chronic hematochezia s/p EV banding who presented from [facility] to [facility] ED with abdominal distention and hematochezia found to have worsening JOCELYN Cre 8.    # Acute on Chronic Hematochezia  # recent banding of Grade II EV (8/28/23)  # Portal Hypertensive Gastropathy and colopathy  # Acute on chronic anemia secondary to GI bleed  # History of variceal bleeding(8/13/2023) w/ esophageal ulcer at area (8/28)  He underwent EGD and colonoscopy on 8/28 and underwent Grade II esophageal varices banding x2, [facility]. He also has rectal varices, portal colopathy and gastropathy. Post discharge, he started having hematochezia similar to recent admission. Denied dizziness. No hematemesis or worsening abdominal pain. VSS. Possible multiple potential site of bleed including variceal bleed, gastritis, recent polypectomy site.  - GI hepatology consulted -> spoke with on call fellow, likely no need for repeat encoscopy, obtain stool studies  - start PPI IV BID  - start octreotide gtt  - closely monitor Hb, type and screen obtained  - start ceftriaxone IV    # worsening JOCELYN suspect 2/2 HRS vs congestive nephropathy vs other  # severe Hyponatremia 120  Cre 0.9 on 8/11/23. noted rapid increase to 4.76 on 8/21 upon recent admission. After albumin challenge x 3 days, his Cre improved slightly to 3.4 on 8/29 but up to 4.4 on the day of discharge. Cre 8 on presentation on 9/2. UA no evidence of UTI. Renal US during recent admission showed mild renal parenchymal disease w/o hydronephrosis. Na is also  down trending from 130s to 120s likely due to underlying cirrhosis. He is not encephalopathic  - nephrology consulted  - check urine lytes and osm  - monitor lytes  - consider starting another albumin challenge in am: he received LR 1L in ED tonight  - renal US ordered  - strict I/O    # Leukocytosis  # Recent Hx of C.diff  Recently completed po vanc ppx for C diff. Recent WBC 10-11k but it increased to 18.7K this admission. No fever, diarrhea, abdominal pain, cough or SOB. Infectious work up include negative UA.  - blood cultures ordered  - stool studies ordered  - diagnostic para performed by ED provider, follow results  - obtain CXR  - as above, started on ceftriaxone IV given GIB     #Decompensated ETOH-related cirrhosis with ascites  #Severe alcohol-related hepatitis  # Coagulopathy w/ elevated INR  #Alcohol use disorder, severe  Follows at [facility] -> transitioned to [facility] with Dr. Hernandez.    MELD 3.0: 37 at 9/2/2023 11:01 PM  MELD-Na: 39 at 9/2/2023 11:01 PM  Calculated from:  Serum Creatinine: 7.99 mg/dL (Using max of 3 mg/dL) at 9/2/2023 11:01 PM  Serum Sodium: 121 mmol/L (Using min of 125 mmol/L) at 9/2/2023 11:01 PM  Total Bilirubin: 36.7 mg/dL at 9/2/2023 11:01 PM  Serum Albumin: 2.9 g/dL at 9/2/2023 11:01 PM  INR(ratio): 1.46 at 9/2/2023  9:11 PM  Age at listing (hypothetical): 43 years  Sex: Male at 9/2/2023 11:01 PM  Recent work up includes HbsAg negative, HbsAb negative, HCV RNA negative, Hepatitis A IgG+ w/ IgM negative, HIV negative. Most recent paracentesis was on 8/27 4L w/ albumin replacement. No hx of HE and not confused on admission. Ascites++ but not tense  - hepatology consulted  - likely need another therapeutic para soon. Procedure team not yet consulted    #Alcohol use disorder, severe  # Monitor for Alcohol withdrawal  # History of alcohol withdrawal seizure  # Methamphetamine use disorder, severe, in sustained remission   Per patient, did not consume any alcohol after his  "recent discharge on 8/30. He has been staying at [facility]. During his recent admission, he was monitored with CIWA but did not require any prn lorazepam. He was seen by addiction team during his recent admission and gabapentin 100mg HS was started.  - daily MELD labs  - Multivitamin, thiamine, folic acid daily  - hold gabapentin 100 hs for now      #GERD  PTA pantoprazole 40 mg daily.  - currently on PPI BID IV     #History of hypertension:   Lisinopril was held upon recent discharge due to JOCELYN. BP normotensive  - continue to hold lisinopril     Diet:  Na 2g  DVT Prophylaxis: Pneumatic Compression Devices  Carroll Catheter: Not present  Lines: None     Cardiac Monitoring: None  Code Status:  full    Clinically Significant Risk Factors Present on Admission        # Hyperkalemia: Highest K = 6 mmol/L in last 2 days, will monitor as appropriate  # Hyponatremia: Lowest Na = 120 mmol/L in last 2 days, will monitor as appropriate      # Hypoalbuminemia: Lowest albumin = 2.9 g/dL at 9/2/2023 11:01 PM, will monitor as appropriate  # Coagulation Defect: INR = 1.46 (Ref range: 0.85 - 1.15) and/or PTT = 42 Seconds (Ref range: 22 - 38 Seconds), will monitor for bleeding  # Drug Induced Platelet Defect: home medication list includes an antiplatelet medication  # Acute Kidney Injury, unspecified: based on a >150% or 0.3 mg/dL increase in last creatinine compared to past 90 day average, will monitor renal function  # Hypertension: Noted on problem list      # Obesity: Estimated body mass index is 39.58 kg/m  as calculated from the following:    Height as of this encounter: 1.702 m (5' 7\").    Weight as of this encounter: 114.6 kg (252 lb 11.2 oz).              Disposition Plan      Expected Discharge Date: 09/04/2023                  Radha Du MD  Hospitalist Service  [facility]  Securely message with [employer] (more info)  Text page via [facility] Paging/Directory "     ______________________________________________________________________    Chief Complaint   Bleeding from rectum, increasing abdominal distention    History is obtained from the patient    History of Present Illness   Elroy Morel Sr. is a 43 year old male who has hx of advanced decompensated cirrhosis, JOCELYN with recent admission with GIB s/p EV banding and polypectomies who presented from [facility] with worsenign abdominal distention and hematochezia. He denied any fever, diarrhea, hematemesis or abdominal pain. He denied alcohol use interim. He has been making urine. He states he has been tolerating po.       Past Medical History    Past Medical History:   Diagnosis Date    Alcohol use disorder, severe, dependence (H)     Alcohol withdrawal seizure (H)     Alcoholic cirrhosis of liver with ascites (H)     Esophageal varices (H)     Essential hypertension     Gastroesophageal reflux disease without esophagitis     History of methamphetamine use     Sustained remission since 2003    Hypercholesterolemia     Tobacco abuse        Past Surgical History   Past Surgical History:   Procedure Laterality Date    COLONOSCOPY N/A 8/28/2023    Procedure: COLONOSCOPY, WITH POLYPECTOMY via bx forceps;  Surgeon: Alyssa Tsai MD;  Location:  GI       Prior to Admission Medications   Prior to Admission Medications   Prescriptions Last Dose Informant Patient Reported? Taking?   acetaminophen (TYLENOL) 325 MG tablet   Yes No   Sig: Take 325-650 mg by mouth every 4 hours as needed for mild pain   aspirin 81 MG EC tablet   No No   Sig: Take 1 tablet (81 mg) by mouth daily for 120 days   benzocaine-menthol (CEPACOL) 15-3.6 MG lozenge   Yes No   Sig: Place 1 lozenge inside cheek every 2 hours as needed for sore throat   calcium carbonate (TUMS) 500 MG chewable tablet   No No   Sig: Take 2 tablets (1,000 mg) by mouth 4 times daily as needed for heartburn   folic acid (FOLVITE) 1 MG tablet   No No   Sig: Take 1  tablet (1 mg) by mouth daily for 30 days   gabapentin (NEURONTIN) 100 MG capsule   No No   Sig: Take 1 capsule (100 mg) by mouth At Bedtime for 30 days   guaiFENesin 200 MG/10ML LIQD   Yes No   Sig: Take 200 mg by mouth every 4 hours as needed for cough   loratadine (CLARITIN) 10 MG tablet   Yes No   Sig: Take 10 mg by mouth daily as needed for allergies   melatonin 3 MG tablet   Yes No   Sig: Take 3 mg by mouth nightly as needed for sleep   multivitamin w/minerals (THERA-VIT-M) tablet   No No   Sig: Take 1 tablet by mouth daily for 30 days   ondansetron (ZOFRAN ODT) 4 MG ODT tab   No No   Sig: Take 1 tablet (4 mg) by mouth every 8 hours as needed for nausea or vomiting   pantoprazole (PROTONIX) 40 MG EC tablet   No No   Sig: Take 1 tablet (40 mg) by mouth daily   senna-docusate (SENOKOT-S/PERICOLACE) 8.6-50 MG tablet   Yes No   Sig: Take 2 tablets by mouth daily as needed for constipation      Facility-Administered Medications: None        Review of Systems    The 10 point Review of Systems is negative other than noted in the HPI or here.      Physical Exam   Vital Signs: Temp: 98.3  F (36.8  C) Temp src: Oral BP: 118/73 Pulse: 77   Resp: 16 SpO2: 100 % O2 Device: None (Room air)    Weight: 252 lbs 11.2 oz    General Appearance: Heavily jaundiced, easily arousable, alert and oriented  Respiratory: clear bilaterally  Cardiovascular: RRR  GI: distended, not tense, non tender  Skin: jaundice+  Other: - asterexis 1+ edema in bilateral ankles     Medical Decision Making       75 MINUTES SPENT BY ME on the date of service doing chart review, history, exam, documentation & further activities per the note.      Data     I have personally reviewed the following data over the past 24 hrs:    18.7 (H)  \   10.4 (L)   / 203     121 (L) 90 (L) 83.2 (H) /  107 (H)   4.7 14 (L) 7.99 (H) \     ALT: 55 AST: 161 (H) AP: 144 (H) TBILI: 36.7 (HH)   ALB: 2.9 (L); 2.9 (L) TOT PROTEIN: 6.1 (L) LIPASE: 435 (H)     Procal: N/A CRP: N/A  Lactic Acid: 1.1       INR:  1.46 (H) PTT:  42 (H)   D-dimer:  N/A Fibrinogen:  N/A       Imaging results reviewed over the past 24 hrs:   No results found for this or any previous visit (from the past 24 hour(s)).

## 2023-09-03 NOTE — ED NOTES
Pt had paracentesis by Dr Garcia from [id number] approximately. Pt tolerated procedure well and only diagnostics were performed for sample collection. MD Dr Garcia injected Lido 1% with epi to R side pt lower abdomen. Then MD performed needle and pericentesis catheter insertion. Writer assisted with samples collection and sent to lab. Pt tolerated procedure well, afterwards, getting up and walking to the bathroom.

## 2023-09-03 NOTE — ED TRIAGE NOTES
Pt. noticed bright red blood in stools starting over a month ago.  Had intestinal banding approx 3 days ago now stools more bloody. Also having sl. abd. cramping   Triage Assessment       Row Name 09/02/23 1955       Triage Assessment (Adult)    Airway WDL WDL       Respiratory WDL    Respiratory WDL WDL       Skin Circulation/Temperature WDL    Skin Circulation/Temperature WDL X;all    Skin Circulation --  jaundice    Skin Temperature warm       Cardiac WDL    Cardiac WDL WDL       Peripheral/Neurovascular WDL    Peripheral Neurovascular WDL WDL       Cognitive/Neuro/Behavioral WDL    Cognitive/Neuro/Behavioral WDL WDL

## 2023-09-03 NOTE — ED NOTES
Pt resting calmly in bed at this time. Pt jaundiced++  Abdomen remains distended, pt ripped off paracentesis dressing.  Even chest rise and fall, though shallow  Pt does not have any extremity swelling. Multiple marks on abdomen from paracentesis draining?  Bloodwork done- pt appears agitated- constantly throwing sheets and belongings on floor. Pt again reminded to let RN know when having BM for collection.

## 2023-09-03 NOTE — ED NOTES
Pt walking around with IV pole. Looks slightly less jaundiced, ambulatory with steady gait at this time. Does not appears to be confused.

## 2023-09-03 NOTE — CONSULTS
Nephrology Initial Consult  September 3, 2023      Elroy Morel Sr. MRN:[id number] YOB: 1980  Date of Admission:9/2/2023  Primary care provider: Latosha Baires  Requesting physician: Radha Du,*    ASSESSMENT AND RECOMMENDATIONS:     Elroy Morel Sr. is a 43 year old male with history of severe AUD, withdrawal seizures, decompensated cirrhosis (c/b grade II EV, ascites, portal hypertensive gastropathy), HTN, and prior C.diff infection who was admitted to [facility] 8/21/23 with decompensated cirrhosis, JOCELYN, and acute on chronic GI bleed s/p EGD with variceal banding 8/28.  Discharged to [facility] for CD treatment on 8/30/23.  Returned to ED on 9/2/23 with abdominal pain, distension, and ongoing hematochezia.  Found to have worsening renal function concerning for HRS. Admitted by medicine overnight, now transferred to [facility] for specialty care.     #JOCELYN on CKD Dramatic rise in Cr from 0.9=> 5.9> 8 correlating with bili rising from 12 to 30.   DDx included bili cast nephropathy, congestion with severe ascites and Type I HRS although BP's are better than one would expect for HRS typically.    -Agree with Giving 100g albumin for 3 days   -Continue midodrine 5mg TID   -octreotide, protonix  per GI and primary team   -No issues in chemistries or volume requiring RRT, hopeful that renal fx is starting to recover with increased UOP.    -consent for RRT obtained and placed in chart   -would start octreotide (for his HRS, not for his esophageal varices) as 100 mcg TID subcutaneously          # Hypervolemic Hyponatremia  - Secondary to cirrhosis and worsening renal function  - Na 120->125      volume-No peripheral edema but massive ascites with taut abdomen  Paracentesis per primary team and GI        BMD-Ca 9.2, Mg 2.0, Phos 3.9 last check, no acute issues.      Anemia-Hgb 10 2/2 GIB      #Decompensated ETOH-related cirrhosis with ascites  #Severe alcohol-related hepatitis w/o evidence  of liver failure (improving LFTs)  # Coagulopathy w/ elevated INR  #History of variceal bleeding  #Alcohol use disorder, severe  Paracentesis 8/23, 27, and 9/2    Recommendations were communicated to primary team via note    Seen and discussed with Dr. Karey Harvey MD  Division of Renal Disease and Hypertension  [facility]  angelmail  Vocera Web Console        REASON FOR CONSULT: JOCELYN iso HRS    HISTORY OF PRESENT ILLNESS:    Elroy Morel Sr. is a 43 year old male who has hx of advanced decompensated cirrhosis, JOCELNY with recent admission with GIB s/p EV banding and polypectomies who presented from [facility] with worsenign abdominal distention and hematochezia. He denied any fever, diarrhea, hematemesis or abdominal pain. He denied alcohol use interim. He has been making urine. He states he has been tolerating po.     Of note Was admitted recently here 8/21 for significantly elevated bilirubin total bili 20.2  due to etoH liver failure, was not a candidate for steroids per hepatology back then due to diarrhea and JOCELYN. In that admission no etoh withdrawal sxs. spun his urine 8/23 which showed no granular or hyaline casts.    JOCELYN was suspected to be hepatorenal syndrome based on albumin challenge.  During the stay, LFTs, bilirubin and creatinine downtrending.  No alcohol withdrawal while inpatient.  EGD on 8/28 showed grade 2 esophageal varices status post banding, portal hypertensive gastropathy, esophageal ulcer prior variceal treatment site, 2 polyps in sigmoid colon status post removal, rectal varices, erythematous mucosa in the sigmoid colon/rectum consistent with portal colopathy.  Colonoscopy on 8/28 showed 2 sigmoid colon polyps removed with cold biopsy forceps.  Rectal varices.  They suspected that the cause of hematochezia was portal colopathy.  No blood was seen at that time.  He was continued on IV PPI and switched to p.o. challenge.   5 days after admission cr downtrended and was having good  UOP, so renal signed off with an outpatient CKD follow-up and not resuming  his home lisinopril on discharge     PAST MEDICAL HISTORY:  Reviewed with patient on 09/03/2023     Past Medical History:   Diagnosis Date    Alcohol use disorder, severe, dependence (H)     Alcohol withdrawal seizure (H)     Alcoholic cirrhosis of liver with ascites (H)     Esophageal varices (H)     Essential hypertension     Gastroesophageal reflux disease without esophagitis     History of methamphetamine use     Sustained remission since 2003    Hypercholesterolemia     Tobacco abuse        Past Surgical History:   Procedure Laterality Date    COLONOSCOPY N/A 8/28/2023    Procedure: COLONOSCOPY, WITH POLYPECTOMY via bx forceps;  Surgeon: Alyssa Tsai MD;  Location:  GI        MEDICATIONS:  PTA Meds  Prior to Admission medications    Medication Sig Last Dose Taking? Auth Provider Long Term End Date   acetaminophen (TYLENOL) 325 MG tablet Take 325-650 mg by mouth every 4 hours as needed for mild pain   Reported, Patient     aspirin 81 MG EC tablet Take 1 tablet (81 mg) by mouth daily for 120 days   Emil Moser MD  12/28/23   benzocaine-menthol (CEPACOL) 15-3.6 MG lozenge Place 1 lozenge inside cheek every 2 hours as needed for sore throat   Reported, Patient     calcium carbonate (TUMS) 500 MG chewable tablet Take 2 tablets (1,000 mg) by mouth 4 times daily as needed for heartburn   Emil Moser MD  9/29/23   folic acid (FOLVITE) 1 MG tablet Take 1 tablet (1 mg) by mouth daily for 30 days   Emil Moser MD No 9/29/23   gabapentin (NEURONTIN) 100 MG capsule Take 1 capsule (100 mg) by mouth At Bedtime for 30 days   Emil Moser MD Yes 9/29/23   guaiFENesin 200 MG/10ML LIQD Take 200 mg by mouth every 4 hours as needed for cough   Reported, Patient     loratadine (CLARITIN) 10 MG tablet Take 10 mg by mouth daily as needed for allergies   Reported, Patient     melatonin 3 MG tablet Take 3 mg by mouth  nightly as needed for sleep   Reported, Patient     multivitamin w/minerals (THERA-VIT-M) tablet Take 1 tablet by mouth daily for 30 days   Emil Moser MD  9/29/23   ondansetron (ZOFRAN ODT) 4 MG ODT tab Take 1 tablet (4 mg) by mouth every 8 hours as needed for nausea or vomiting   Emil Moser MD  9/29/23   pantoprazole (PROTONIX) 40 MG EC tablet Take 1 tablet (40 mg) by mouth daily   Jennifer Fletcher MD     senna-docusate (SENOKOT-S/PERICOLACE) 8.6-50 MG tablet Take 2 tablets by mouth daily as needed for constipation   Reported, Patient        Current Meds   [START ON 9/4/2023] albumin human  100 g Intravenous Daily    folic acid  1 mg Oral Daily    lactulose  20 g Oral or NG Tube TID    midodrine  5 mg Oral TID w/meals    multivitamin w/minerals  1 tablet Oral Daily    [START ON 9/4/2023] pantoprazole  40 mg Intravenous Q24H    [Held by provider] polyethylene glycol  17 g Oral Daily    rifaximin  550 mg Oral or Feeding Tube BID    sodium chloride (PF)  3 mL Intracatheter Q8H    sodium chloride (PF)  3 mL Intracatheter Q8H     Infusion Meds   - MEDICATION INSTRUCTIONS -         ALLERGIES:    Allergies   Allergen Reactions    Metronidazole Other (See Comments), Dizziness and Unknown    Omeprazole Other (See Comments) and Unknown     Irritability (tolerates Protonix well)       REVIEW OF SYSTEMS:  A comprehensive of systems was negative except as noted above.    SOCIAL HISTORY:   Social History     Socioeconomic History    Marital status:      Spouse name: Not on file    Number of children: Not on file    Years of education: Not on file    Highest education level: Not on file   Occupational History    Not on file   Tobacco Use    Smoking status: Former     Types: Cigarettes    Smokeless tobacco: Former   Substance and Sexual Activity    Alcohol use: Not Currently     Comment: last drink 8/19    Drug use: Not Currently     Types: Amphetamines     Comment: Sustained remission    Sexual activity: Not on  "file   Other Topics Concern    Not on file   Social History Narrative    Pt is , 2 children. Employed.      Social Determinants of Health     Financial Resource Strain: Not on file   Food Insecurity: Not on file   Transportation Needs: Not on file   Physical Activity: Not on file   Stress: Not on file   Social Connections: Not on file   Intimate Partner Violence: Not on file   Housing Stability: Not on file     Reviewed with patient    accompanies Elroy Morel Sr. in hospital room    FAMILY MEDICAL HISTORY:   History reviewed. No pertinent family history.  Reviewed with patient     PHYSICAL EXAM:   Temp  Av.8  F (36.6  C)  Min: 97.5  F (36.4  C)  Max: 98.3  F (36.8  C)      Pulse  Av  Min: 77  Max: 88 Resp  Av.3  Min: 16  Max: 20  SpO2  Av.9 %  Min: 99 %  Max: 100 %       /73 (BP Location: Left arm)   Pulse 83   Temp 97.6  F (36.4  C) (Oral)   Resp 18   Ht 1.702 m (5' 7\")   Wt 113.9 kg (251 lb 1.7 oz)   SpO2 99%   BMI 39.33 kg/m     Date 23 0700 - 23 0659   Shift 8178-6407 [id number] [id number] 24 Hour Total   INTAKE   I.V. 10   10   Shift Total(mL/kg) 10(0.09)   10(0.09)   OUTPUT   Shift Total(mL/kg)       Weight (kg) 113.9 113.9 113.9 113.9      Admit Weight: 114.6 kg (252 lb 11.2 oz)     GENERAL APPEARANCE: jaundiced not in distress, AAOX3  EYES: icteric scleral icterus, pupils equal  Lymphatics: no cervical or supraclavicular LAD  Pulmonary: lungs clear to auscultation with equal breath sounds bilaterally, no clubbing  CV: regular rhythm, normal rate, no rub   - JVD normal    - Edema no LLE edema   GI: tense, nontender distended abdomen, tympanic on percussion    MS: no evidence of inflammation in joints, no muscle tenderness  : no kelley  SKIN: no rash, warm, dry, no cyanosis  NEURO: face symmetric, no asterixis     LABS:   CMP  Recent Labs   Lab 23  1252 23  1012 23  0644 23  2301 23  2031 23  0533 23  0803 " 08/29/23  0606 08/28/23  0645   * 125* 124* 121* 120* 125*  --  128* 130*   POTASSIUM 5.0 5.1 5.1 4.7 6.0* 4.2  --  4.2 4.1   CHLORIDE 92* 93* 91* 90* 89* 96*  --  98 100   CO2 12* 12* 12* 14* 14* 16*  --  17* 16*   ANIONGAP 21* 20* 21* 17* 17* 13  --  13 14   GLC 95 100* 95 107* 112* 85  --  87 115*   BUN 85.8* 86.7* 83.9* 83.2* 81.0* 45.5*  --  40.0* 41.4*   CR 7.19* 7.24* 7.21* 7.99* 8.17* 4.34*  --  3.44* 3.72*   GFRESTIMATED 9* 9* 9* 8* 8* 16*  --  22* 20*   ENEDINA 9.2 9.4 9.3 9.1 9.1 9.0  --  8.9 9.1   MAG  --   --   --   --   --  1.8  --  1.8 1.9   PHOS  --   --   --   --   --   --  3.5  --  3.4   PROTTOTAL  --   --   --  6.1*  --   --   --   --   --    ALBUMIN  --   --  3.0* 2.9*  2.9* 3.0* 3.0*  --  3.3* 3.4*   BILITOTAL  --   --  37.6* 36.7* 38.0* 33.6*  --  34.3* 34.9*   ALKPHOS  --   --  151* 144* 154* 125  --  122 110   AST  --   --  173* 161*  --  184*  --  196* 179*   ALT  --   --  59 55 60 52  --  57 57     CBC  Recent Labs   Lab 09/03/23  1020 09/03/23  0644 09/02/23 2031 08/30/23  0533 08/29/23  0606   HGB 10.0* 9.9* 10.4* 10.5* 10.3*   WBC  --  20.3* 18.7* 11.2* 11.9*   RBC  --  2.93* 3.02* 3.04* 3.08*   HCT  --  26.6* 27.5* 28.5* 29.2*   MCV  --  91 91 94 95   MCH  --  33.8* 34.4* 34.5* 33.4*   MCHC  --  37.2* 37.8* 36.8* 35.3   RDW  --  19.8* 20.2* 21.2* 21.2*   PLT  --  191 203 155 170     INR  Recent Labs   Lab 09/03/23  0644 09/02/23  2111   INR 1.47* 1.46*   PTT  --  42*     ABGNo lab results found in last 7 days.   URINE STUDIES  Recent Labs   Lab Test 09/02/23  2236 08/21/23  1253   COLOR Dark Yellow* Alanna*   APPEARANCE Slightly Cloudy* Cloudy*   URINEGLC Negative  --    URINEBILI Large*  --    URINEKETONE Negative 15*   SG 1.014 1.015   UBLD Negative  --    URINEPH 5.5  --    PROTEIN 10* 100*   NITRITE Negative  --    LEUKEST Negative  --    RBCU 1 2   WBCU 3 13*     No lab results found.  PTH  No lab results found.  IRON STUDIES  Recent Labs   Lab Test 08/21/23  1130   IRON 71    *   IRONSAT 55*         Cj Harvey MD

## 2023-09-03 NOTE — PLAN OF CARE
Transfer    Transferred from: [facility] ED  Via:bed  Reason for transfer: Pt appropriate for 6C-worsened patient condition  Family: Aware of transfer  Belongings: Received with pt  Chart: Received with pt  Medications: Meds received from old unit with pt  Code Status verified on armband: yes  2 RN Skin Assessment Completed By: Attlia AHUJA  Med rec completed: yes  Bed surface reassessed with algorithm and charted: yes  New bed surface ordered: yes  Suction/Ambu bag/Flowmeter at bedside: yes

## 2023-09-03 NOTE — ED NOTES
Pt is here for rectal bleeding. Pt has hx of alcoholic cirrhosis, recent paracentesis.  Alert and oriented x3,  Resps shallow  Abdomen firm, distended, round  Skin +jaundiced  Writer attempted PIV x2, unsuccessful but able to take BW

## 2023-09-03 NOTE — PROGRESS NOTES
Brief Medicine Note    Elroy Morel Sr. is a 43 year old male with history of severe AUD, withdrawal seizures, decompensated cirrhosis (c/b grade II EV, ascites, portal hypertensive gastropathy), HTN, and prior C.diff infection who was admitted to [facility] 8/21/23 with decompensated cirrhosis, JOCELYN, and acute on chronic GI bleed s/p EGD with variceal banding 8/28.  Discharged to [facility] for CD treatment on 8/30/23.  Returned to ED on 9/2/23 with abdominal pain, distension, and ongoing hematochezia.  Found to have worsening renal function concerning for HRS. Admitted by medicine overnight, now transferred to [facility] for specialty care.     Chart reviewed. Patient seen and examined. Case discussed with patient, family, hepatology, and nephrology. Consultant recommendations appreciated.  Please see admission H&P for full details.    # Worsening JOCELYN, concern for HRS:    - Recent admission for JOCELYN, up-trending Cr at time of discharge (4.34).   - Repeat Cr 8.17 on arrival. No urgent indication for HD per nephrology.  - ? HRS vs congestive nephropathy from ESLD  - Nephrology consulted, appreciate recs  - Continue Albumin challenege -- 100g 25% albumin ordered (1/3 completed)  - Continue midodrine 5mg TID    # Acute on chronic hematochezia, suspect hemorrhoidal bleed:    - Recent admission for melena/hematochezia, s/p EGD with banding of grade II EV on 8/28  - Ongoing hematochezia but stable Hgb  - GI suspect hemorrhoidal bleed, less likely variceal or upper source  - OK to stop octreotide, protonix, and prophylactic ceftriaxone per GI  - Monitor clinically  - Trend Hgb     # Decompensated alcoholic cirrhosis:   # Hepatic encephalopathy:    - Concern for worsening hepatic function and HRS, MELD 38  - Ammonia 167. More confused per family. Asterixis noted on exam.  - Start lactulose 20g TID + Rifampin 550mg BID  - [place] protocol ordered  - Hepatology consulted    # Hypervolemic Hyponatremia:    - Secondary to  cirrhosis and worsening renal function  - Na 120->125  - BMP in AM    # Anion gap metabolic acidosis:    - Secondary to JOCELYN  - BMP in AM    # Leukocytosis:    - Afebrile. Ascitic fluid not suggestive of SBP. No other clear source.   - Monitor  - No indication for ppx ceftraixone per GI    # Coagulopathy:    - secondary to ESLD  - monitor     # Hx HTN:    - Preivously on lisinopril, stopped d/t JOCELYN    # Hx C.diff infection:    - Completed tx with vancomycin  - Repeat PCR positive but unclear if d/t colonization    # Hx AUD:    - No recent ETOH use  - Hx withdrawal seizures, low risk for withdrawal symptoms at this point  - Was at CD treatment prior to arrival  - Will need CD consult when medically stable       Medical Decision Making       40 MINUTES SPENT BY ME on the date of service doing chart review, history, exam, documentation & further activities per the note.      Ancelmo Horn PA-C  Internal Medicine [facility] Hospitalist  [facility]  Pager [phone]

## 2023-09-03 NOTE — PROGRESS NOTES
Course reviewed with nursing staff, ICU staff, triage.    Vital signs have been stable  Patient has been spitting up blood  Follow-up hemoglobin stable at 10    Patient will be admitted to [facility] bed on the [facility], with need for GI consultation today, in addition to nephrology involvement.    Discussed at length with patient's wife in person in the ER

## 2023-09-03 NOTE — PLAN OF CARE
cARE PROVIDED FROM [id number]    Neuro: A&Ox4. Pupils equal, round, and reactive to light. Pt denies headache. Speech is clear and logical.  Cardiac: Sinus Rhythm. Vital signs stable. Afebrile. S1 and S2 audible on auscultation. Pt denies chest pain or palpitations.  Respiratory: Sating >95% on room air. Pt denies difficulty breathing. Pt verbalizes intermittent shortness of breath that is worsening as fluid is accumulating in abdomen.  GI/: Adequate urine output. Pt currently admitted for GI bleeding. Pt has had soft/loose stools that are dark black/green in appearance. Pt verbalizes having frequent stools hourly. BM X2. Pt is C. Dif positive per lab results.  Diet/appetite: 2 g Na diet. Pt states he has been NPO with exception of medications since last evening upon arrival to [facility] ED.   Activity: Independent. Pt was up to bathroom x2  Pain: Pt denies pain and rates 0/10  Skin: Jaundice, scabbing on feet bilaterally  LDA's: Left and Right peripheral IV (currently running Octreotide and Albumin)    Plan: Continue with POC. Notify primary team with changes.

## 2023-09-03 NOTE — CONSULTS
GASTROENTEROLOGY CONSULTATION      Date of Admission:  9/2/2023          ASSESSMENT AND RECOMMENDATIONS:   Assessment:  Elroy Morel Sr. is a 43 year old male with a past medical history of AUD, decompensated EtOH cirrhosis, presenting from [facility] for laboratory abnormalities and abdominal distension.     #Decompensated EtOH Cirrhosis  #Varices s/p banding  #Ascites  #Hx of Variceal Bleed  #JOCELYN, that did not respond to initial albumin challenge  #AUD, in remission since Aug 2023  #Hx of Severe EtOH-related hepatitis  #Hyponatremia    MELD 3.0: 38 at 9/3/2023  6:44 AM  MELD-Na: 39 at 9/3/2023  6:44 AM  Calculated from:  Serum Creatinine: 7.99 mg/dL (Using max of 3 mg/dL) at 9/2/2023 11:01 PM  Serum Sodium: 121 mmol/L (Using min of 125 mmol/L) at 9/2/2023 11:01 PM  Total Bilirubin: 36.7 mg/dL at 9/2/2023 11:01 PM  Serum Albumin: 2.9 g/dL at 9/2/2023 11:01 PM  INR(ratio): 1.47 at 9/3/2023  6:44 AM  Age at listing (hypothetical): 43 years  Sex: Male at 9/3/2023  6:44 AM    Pt recently has not been considered a transplant candidate, given he has consumed EtOH after being told by healthcare personnel to stop. He has recently quit drinking.     Recommendations:  -agree with albumin challenge (1g/kg BW) x1 one more day (given IVF he received on admission and 100g albumin already today) up to 100 g daily  -agree with nephrology consult  -agree with infectious workup (blood cultures, UA/Ucx, CXR, paracentesis)  -note enteric panel is negative  -does not have C diff infection, rather has colonization  -daily MELD labs  -continue lactulose and rifaximin  -hold any diuretics in the setting of JOCELYN  -no need for endoscopy at this time  -no need for IV PPI, octreotide, or ceftriaxone at this time (hgb is stable and he had recent endoscopy, and we think bleeding is hemorrhoidal)  -trend hgb  -transfuse for hgb < 7       Gastroenterology team will continue to follow with you.    Thank you for involving us in this patient's  care. Please do not hesitate to contact the GI service with any questions or concerns.     Pt care plan discussed with GI staff physician, Dr. Crews.    Adry Martin MD PhD   Gastroenterology Fellow      -------------------------------------------------------------------------------------------------------------------          Chief Complaint:   We were asked by Dr Du of medicine to evaluate this patient with cirrhosis.    History is obtained from the patient and the medical record.          History of Present Illness:   Patient reports he is not having abd pain, he denies fevers or chills. He does say he's having dark stools. He denies dysuria, chest heaviness, sore throat. He does not feel confused right now.     He endorses diarrhea and dark stools                  Past Medical History:   Reviewed and edited as appropriate  Past Medical History:   Diagnosis Date    Alcohol use disorder, severe, dependence (H)     Alcohol withdrawal seizure (H)     Alcoholic cirrhosis of liver with ascites (H)     Esophageal varices (H)     Essential hypertension     Gastroesophageal reflux disease without esophagitis     History of methamphetamine use     Sustained remission since 2003    Hypercholesterolemia     Tobacco abuse             Past Surgical History:   Reviewed and edited as appropriate   Past Surgical History:   Procedure Laterality Date    COLONOSCOPY N/A 8/28/2023    Procedure: COLONOSCOPY, WITH POLYPECTOMY via bx forceps;  Surgeon: Alyssa Tsai MD;  Location:  GI            Previous Endoscopy:     Results for orders placed or performed during the hospital encounter of 08/21/23   COLONOSCOPY   Result Value Ref Range    COLONOSCOPY       [facility]  [address] [place] [place] [address] [phone]     Endoscopy Department  _______________________________________________________________________________  Patient Name: Elroy Morel             Procedure  Date: 8/28/2023 11:09 AM  MRN: [id number]                       Account Number: [id number]  YOB: 1980              Admit Type: Inpatient  Age: 43                               Room: [facility]                      Gender: Male                          Note Status: Finalized  Attending MD: DARA GAN MD,   Total Sedation Time: 9 minutes  _______________________________________________________________________________     Procedure:             Colonoscopy  Indications:           Hematochezia  Providers:             DARA GAN MD, Leti Parra RN, Emmy Quarles RN  Referring MD:            Medicines:             Fentanyl 100 micrograms IV  Complications:          No immediate complications.  _______________________________________________________________________________  Procedure:             Pre-Anesthesia Assessment:                         - Prior to the procedure, a History and Physical was                          performed, and patient medications and allergies were                          reviewed. The patient is competent. The risks and                          benefits of the procedure and the sedation options and                          risks were discussed with the patient. All questions                          were answered and informed consent was obtained.                          Patient identification and proposed procedure were                          verified by the physician in the procedure room.                          Mental Status Examination: alert and oriented. Airway                          Examination: normal oropharyngeal airway and neck                          mobility. Respiratory Examination: clear to                           auscultation. CV Examination: normal. Prophylactic                          Antibiotics: The patient does not require prophylactic                          antibiotics. Prior  Anticoagulants: The patient has                          taken no anticoagulant or antiplatelet agents. ASA                          Grade Assessment: III - A patient with severe systemic                          disease. After reviewing the risks and benefits, the                          patient was deemed in satisfactory condition to                          undergo the procedure. The anesthesia plan was to use                          moderate sedation / analgesia (conscious sedation).                          Immediately prior to administration of medications,                          the patient was re-assessed for adequacy to receive                          sedatives. The heart rate, respiratory rate, oxygen                          saturations, blood pressure, adequacy of pulmonary                           ventilation, and response to care were monitored                          throughout the procedure. The physical status of the                          patient was re-assessed after the procedure.                         After obtaining informed consent, the colonoscope was                          passed under direct vision. Throughout the procedure,                          the patient's blood pressure, pulse, and oxygen                          saturations were monitored continuously. The                          Colonoscope was introduced through the anus and                          advanced to the terminal ileum. The colonoscopy was                          performed without difficulty. The patient tolerated                          the procedure well. The quality of the bowel                          preparation was good.                                                                                   Findings:       Two sessile polyps were found in the s igmoid colon. The polyps were        diminutive in size. These polyps were removed with a cold biopsy        forceps. Resection and retrieval  were complete.       Small, non-bleeding rectal varices were found.       A diffuse area of mildly erythematous mucosa was found in the sigmoid        colon.                                                                                   Impression:            - Two diminutive polyps in the sigmoid colon, removed                          with a cold biopsy forceps. Resected and retrieved.                         - Rectal varices.                         - Erythematous mucosa in the sigmoid colon and rectum                          with come erythem, consistent with portal colopathy.                          This was the likely cause of hematochezia.                         No blood was seen.                         Terminal ileum was normal.  Recommendation:        - Await pathology results.                                                                                      electronically signed by Dara Gan  ______________________________  DARA GAN MD  8/28/2023 12:41:13 PM  I was physically present for the entire viewing portion of the exam.  __________________________  Signature of teaching physician  B4zac/Isra GAN MD  Number of Addenda: 0    Note Initiated On: 8/28/2023 11:09 AM  Scope In: 12:14:28 PM  Scope Out: 12:25:25 PM              Social History:   Reviewed and edited as appropriate  Social History     Socioeconomic History    Marital status:      Spouse name: Not on file    Number of children: Not on file    Years of education: Not on file    Highest education level: Not on file   Occupational History    Not on file   Tobacco Use    Smoking status: Former     Types: Cigarettes    Smokeless tobacco: Former   Substance and Sexual Activity    Alcohol use: Not Currently     Comment: last drink 8/19    Drug use: Not Currently     Types: Amphetamines     Comment: Sustained remission    Sexual activity: Not on file   Other Topics Concern    Not on file   Social  History Narrative    Pt is , 2 children. Employed.      Social Determinants of Health     Financial Resource Strain: Not on file   Food Insecurity: Not on file   Transportation Needs: Not on file   Physical Activity: Not on file   Stress: Not on file   Social Connections: Not on file   Intimate Partner Violence: Not on file   Housing Stability: Not on file            Family History:   Reviewed and edited as appropriate  History reviewed. No pertinent family history.          Allergies:   Reviewed and edited as appropriate     Allergies   Allergen Reactions    Metronidazole Other (See Comments), Dizziness and Unknown    Omeprazole Other (See Comments) and Unknown     Irritability (tolerates Protonix well)            Medications:     Current Facility-Administered Medications   Medication    calcium carbonate (TUMS) chewable tablet 1,000 mg    cefTRIAXone (ROCEPHIN) 2 g vial to attach to  ml bag for ADULTS or NS 50 ml bag for PEDS    folic acid (FOLVITE) tablet 1 mg    lidocaine (LMX4) cream    lidocaine 1 % 0.1-1 mL    melatonin tablet 1 mg    multivitamin w/minerals (THERA-VIT-M) tablet 1 tablet    octreotide (sandoSTATIN) 1,250 mcg in D5W 250 mL    ondansetron (ZOFRAN ODT) ODT tab 4 mg    Or    ondansetron (ZOFRAN) injection 4 mg    pantoprazole (PROTONIX) IV push injection 40 mg    polyethylene glycol (MIRALAX) Packet 17 g    sodium chloride (PF) 0.9% PF flush 3 mL    sodium chloride (PF) 0.9% PF flush 3 mL     Current Outpatient Medications   Medication Sig    acetaminophen (TYLENOL) 325 MG tablet Take 325-650 mg by mouth every 4 hours as needed for mild pain    aspirin 81 MG EC tablet Take 1 tablet (81 mg) by mouth daily for 120 days    benzocaine-menthol (CEPACOL) 15-3.6 MG lozenge Place 1 lozenge inside cheek every 2 hours as needed for sore throat    calcium carbonate (TUMS) 500 MG chewable tablet Take 2 tablets (1,000 mg) by mouth 4 times daily as needed for heartburn    folic acid (FOLVITE) 1 MG  "tablet Take 1 tablet (1 mg) by mouth daily for 30 days    gabapentin (NEURONTIN) 100 MG capsule Take 1 capsule (100 mg) by mouth At Bedtime for 30 days    guaiFENesin 200 MG/10ML LIQD Take 200 mg by mouth every 4 hours as needed for cough    loratadine (CLARITIN) 10 MG tablet Take 10 mg by mouth daily as needed for allergies    melatonin 3 MG tablet Take 3 mg by mouth nightly as needed for sleep    multivitamin w/minerals (THERA-VIT-M) tablet Take 1 tablet by mouth daily for 30 days    ondansetron (ZOFRAN ODT) 4 MG ODT tab Take 1 tablet (4 mg) by mouth every 8 hours as needed for nausea or vomiting    pantoprazole (PROTONIX) 40 MG EC tablet Take 1 tablet (40 mg) by mouth daily    senna-docusate (SENOKOT-S/PERICOLACE) 8.6-50 MG tablet Take 2 tablets by mouth daily as needed for constipation     Facility-Administered Medications Ordered in Other Encounters   Medication    Self Administer Medications: Behavioral Services             Review of Systems:     A complete review of systems was performed and is negative except as noted in the HPI           Physical Exam:   /68   Pulse 77   Temp 97.7  F (36.5  C)   Resp 18   Ht 1.702 m (5' 7\")   Wt 114.6 kg (252 lb 11.2 oz)   SpO2 100%   BMI 39.58 kg/m    Wt:   Wt Readings from Last 2 Encounters:   09/02/23 114.6 kg (252 lb 11.2 oz)   08/27/23 114.8 kg (253 lb 1.6 oz)      Constitutional: cooperative, pleasant, NAD  Eyes: Sclera icteric  Ears/nose/mouth/throat: MMM  CV: no LE edema  Respiratory: Unlabored breathing on RA  Abd: distended, nontender on palpation, no rebound  Skin: warm, perfused, jaundiced  Neuro: Alert and oriented, asterixis is positive  Psych: Normal affect  Rectal: dark stool         Data:   Labs and imaging below were independently reviewed and interpreted    BMP  Recent Labs   Lab 09/02/23  2301 09/02/23  2031 08/30/23  0533 08/29/23  0606   * 120* 125* 128*   POTASSIUM 4.7 6.0* 4.2 4.2   CHLORIDE 90* 89* 96* 98   ENEDINA 9.1 9.1 9.0 8.9 "   CO2 14* 14* 16* 17*   BUN 83.2* 81.0* 45.5* 40.0*   CR 7.99* 8.17* 4.34* 3.44*   * 112* 85 87     CBC  Recent Labs   Lab 09/03/23  0644 09/02/23 2031 08/30/23  0533 08/29/23  0606   WBC 20.3* 18.7* 11.2* 11.9*   RBC 2.93* 3.02* 3.04* 3.08*   HGB 9.9* 10.4* 10.5* 10.3*   HCT 26.6* 27.5* 28.5* 29.2*   MCV 91 91 94 95   MCH 33.8* 34.4* 34.5* 33.4*   MCHC 37.2* 37.8* 36.8* 35.3   RDW 19.8* 20.2* 21.2* 21.2*    203 155 170     INR  Recent Labs   Lab 09/03/23 0644 09/02/23 2111   INR 1.47* 1.46*     LFTs  Recent Labs   Lab 09/02/23 2301 09/02/23 2031 08/30/23  0533 08/29/23  0606 08/28/23  0645   ALKPHOS 144* 154* 125 122 110   *  --  184* 196* 179*   ALT 55 60 52 57 57   BILITOTAL 36.7* 38.0* 33.6* 34.3* 34.9*   PROTTOTAL 6.1*  --   --   --   --    ALBUMIN 2.9*  2.9* 3.0* 3.0* 3.3* 3.4*      PANC  Recent Labs   Lab 09/02/23 2031   LIPASE 435*       Imaging:  CXR reviewed  Renal U/S reviewed

## 2023-09-03 NOTE — ED NOTES
Pt upto bathroom multiple times during shift. Pt states no diarrhea, just lots of stool. Writer reminded pt to let her know when he has his next BM as stool must be collected and sent off for evaluation

## 2023-09-04 ENCOUNTER — ANCILLARY PROCEDURE (OUTPATIENT)
Dept: ULTRASOUND IMAGING | Facility: CLINIC | Age: 43
End: 2023-09-04
Attending: STUDENT IN AN ORGANIZED HEALTH CARE EDUCATION/TRAINING PROGRAM
Payer: COMMERCIAL

## 2023-09-04 PROBLEM — N17.9 ACUTE KIDNEY FAILURE, UNSPECIFIED (H): Status: ACTIVE | Noted: 2023-09-04

## 2023-09-04 LAB
ALBUMIN BODY FLUID SOURCE: NORMAL
ALBUMIN FLD-MCNC: 0.9 G/DL
ALBUMIN SERPL BCG-MCNC: 3.6 G/DL (ref 3.5–5.2)
ALBUMIN UR-MCNC: 10 MG/DL
ALP SERPL-CCNC: 116 U/L (ref 40–129)
ALT SERPL W P-5'-P-CCNC: 45 U/L (ref 0–70)
ANION GAP SERPL CALCULATED.3IONS-SCNC: 19 MMOL/L (ref 7–15)
APPEARANCE UR: ABNORMAL
AST SERPL W P-5'-P-CCNC: 148 U/L (ref 0–45)
BILIRUB SERPL-MCNC: 35.6 MG/DL
BILIRUB UR QL STRIP: ABNORMAL
BUN SERPL-MCNC: 86.2 MG/DL (ref 6–20)
CALCIUM SERPL-MCNC: 9.3 MG/DL (ref 8.6–10)
CHLORIDE SERPL-SCNC: 96 MMOL/L (ref 98–107)
COLOR UR AUTO: ABNORMAL
CREAT SERPL-MCNC: 7.77 MG/DL (ref 0.67–1.17)
DEPRECATED HCO3 PLAS-SCNC: 12 MMOL/L (ref 22–29)
ERYTHROCYTE [DISTWIDTH] IN BLOOD BY AUTOMATED COUNT: 19.9 % (ref 10–15)
FIBRINOGEN PPP-MCNC: 212 MG/DL (ref 170–490)
GFR SERPL CREATININE-BSD FRML MDRD: 8 ML/MIN/1.73M2
GLUCOSE SERPL-MCNC: 101 MG/DL (ref 70–99)
GLUCOSE UR STRIP-MCNC: NEGATIVE MG/DL
HCT VFR BLD AUTO: 26.5 % (ref 40–53)
HGB BLD-MCNC: 9.4 G/DL (ref 13.3–17.7)
HGB BLD-MCNC: 9.5 G/DL (ref 13.3–17.7)
HGB BLD-MCNC: 9.5 G/DL (ref 13.3–17.7)
HGB UR QL STRIP: NEGATIVE
HOLD SPECIMEN: NORMAL
INR PPP: 1.56 (ref 0.85–1.15)
KETONES UR STRIP-MCNC: NEGATIVE MG/DL
LD BODY BODY FLUID SOURCE: NORMAL
LDH FLD L TO P-CCNC: 60 U/L
LEUKOCYTE ESTERASE UR QL STRIP: NEGATIVE
MCH RBC QN AUTO: 33.9 PG (ref 26.5–33)
MCHC RBC AUTO-ENTMCNC: 35.8 G/DL (ref 31.5–36.5)
MCV RBC AUTO: 95 FL (ref 78–100)
NITRATE UR QL: NEGATIVE
PH UR STRIP: 5 [PH] (ref 5–7)
PLATELET # BLD AUTO: 170 10E3/UL (ref 150–450)
POTASSIUM SERPL-SCNC: 4.4 MMOL/L (ref 3.4–5.3)
PROT FLD-MCNC: 1.2 G/DL
PROT SERPL-MCNC: ABNORMAL G/DL
PROTEIN BODY FLUID SOURCE: NORMAL
RBC # BLD AUTO: 2.8 10E6/UL (ref 4.4–5.9)
SODIUM SERPL-SCNC: 126 MMOL/L (ref 136–145)
SODIUM SERPL-SCNC: 127 MMOL/L (ref 136–145)
SODIUM UR-SCNC: <20 MMOL/L
SP GR UR STRIP: 1.01 (ref 1–1.03)
UROBILINOGEN UR STRIP-MCNC: NORMAL MG/DL
WBC # BLD AUTO: 16.7 10E3/UL (ref 4–11)

## 2023-09-04 PROCEDURE — 0W9G3ZZ DRAINAGE OF PERITONEAL CAVITY, PERCUTANEOUS APPROACH: ICD-10-PCS | Performed by: INTERNAL MEDICINE

## 2023-09-04 PROCEDURE — 85384 FIBRINOGEN ACTIVITY: CPT | Performed by: STUDENT IN AN ORGANIZED HEALTH CARE EDUCATION/TRAINING PROGRAM

## 2023-09-04 PROCEDURE — 81003 URINALYSIS AUTO W/O SCOPE: CPT | Performed by: STUDENT IN AN ORGANIZED HEALTH CARE EDUCATION/TRAINING PROGRAM

## 2023-09-04 PROCEDURE — 250N000013 HC RX MED GY IP 250 OP 250 PS 637: Performed by: INTERNAL MEDICINE

## 2023-09-04 PROCEDURE — 85610 PROTHROMBIN TIME: CPT | Performed by: INTERNAL MEDICINE

## 2023-09-04 PROCEDURE — 99233 SBSQ HOSP IP/OBS HIGH 50: CPT | Mod: 25 | Performed by: STUDENT IN AN ORGANIZED HEALTH CARE EDUCATION/TRAINING PROGRAM

## 2023-09-04 PROCEDURE — 80048 BASIC METABOLIC PNL TOTAL CA: CPT | Performed by: INTERNAL MEDICINE

## 2023-09-04 PROCEDURE — 36415 COLL VENOUS BLD VENIPUNCTURE: CPT | Performed by: INTERNAL MEDICINE

## 2023-09-04 PROCEDURE — P9047 ALBUMIN (HUMAN), 25%, 50ML: HCPCS | Performed by: PHYSICIAN ASSISTANT

## 2023-09-04 PROCEDURE — 49083 ABD PARACENTESIS W/IMAGING: CPT | Performed by: INTERNAL MEDICINE

## 2023-09-04 PROCEDURE — 214N000001 HC R&B CCU UMMC

## 2023-09-04 PROCEDURE — 85027 COMPLETE CBC AUTOMATED: CPT | Performed by: INTERNAL MEDICINE

## 2023-09-04 PROCEDURE — 85018 HEMOGLOBIN: CPT | Performed by: INTERNAL MEDICINE

## 2023-09-04 PROCEDURE — 82040 ASSAY OF SERUM ALBUMIN: CPT | Performed by: INTERNAL MEDICINE

## 2023-09-04 PROCEDURE — 250N000013 HC RX MED GY IP 250 OP 250 PS 637: Performed by: PHYSICIAN ASSISTANT

## 2023-09-04 PROCEDURE — 84300 ASSAY OF URINE SODIUM: CPT | Performed by: STUDENT IN AN ORGANIZED HEALTH CARE EDUCATION/TRAINING PROGRAM

## 2023-09-04 PROCEDURE — 250N000011 HC RX IP 250 OP 636: Mod: JZ,JB | Performed by: STUDENT IN AN ORGANIZED HEALTH CARE EDUCATION/TRAINING PROGRAM

## 2023-09-04 PROCEDURE — C9113 INJ PANTOPRAZOLE SODIUM, VIA: HCPCS | Mod: JZ | Performed by: PHYSICIAN ASSISTANT

## 2023-09-04 PROCEDURE — 250N000011 HC RX IP 250 OP 636: Mod: JZ | Performed by: PHYSICIAN ASSISTANT

## 2023-09-04 RX ORDER — OCTREOTIDE ACETATE 100 UG/ML
100 INJECTION, SOLUTION INTRAVENOUS; SUBCUTANEOUS 3 TIMES DAILY
Status: DISCONTINUED | OUTPATIENT
Start: 2023-09-04 | End: 2023-09-08

## 2023-09-04 RX ADMIN — LACTULOSE 20 G: 20 SOLUTION ORAL at 13:06

## 2023-09-04 RX ADMIN — Medication 1 TABLET: at 08:12

## 2023-09-04 RX ADMIN — ALBUMIN HUMAN 100 G: 0.25 SOLUTION INTRAVENOUS at 08:15

## 2023-09-04 RX ADMIN — MIDODRINE HYDROCHLORIDE 5 MG: 5 TABLET ORAL at 17:01

## 2023-09-04 RX ADMIN — LACTULOSE 20 G: 20 SOLUTION ORAL at 08:12

## 2023-09-04 RX ADMIN — PANTOPRAZOLE SODIUM 40 MG: 40 INJECTION, POWDER, FOR SOLUTION INTRAVENOUS at 13:07

## 2023-09-04 RX ADMIN — RIFAXIMIN 550 MG: 550 TABLET ORAL at 08:13

## 2023-09-04 RX ADMIN — OCTREOTIDE ACETATE 100 MCG: 100 INJECTION, SOLUTION INTRAVENOUS; SUBCUTANEOUS at 13:10

## 2023-09-04 RX ADMIN — OCTREOTIDE ACETATE 100 MCG: 100 INJECTION, SOLUTION INTRAVENOUS; SUBCUTANEOUS at 20:50

## 2023-09-04 RX ADMIN — FOLIC ACID 1 MG: 1 TABLET ORAL at 08:13

## 2023-09-04 RX ADMIN — MIDODRINE HYDROCHLORIDE 5 MG: 5 TABLET ORAL at 08:13

## 2023-09-04 RX ADMIN — RIFAXIMIN 550 MG: 550 TABLET ORAL at 20:49

## 2023-09-04 RX ADMIN — MIDODRINE HYDROCHLORIDE 5 MG: 5 TABLET ORAL at 13:07

## 2023-09-04 RX ADMIN — LACTULOSE 20 G: 20 SOLUTION ORAL at 20:49

## 2023-09-04 ASSESSMENT — ACTIVITIES OF DAILY LIVING (ADL)
ADLS_ACUITY_SCORE: 32
ADLS_ACUITY_SCORE: 32
ADLS_ACUITY_SCORE: 31
ADLS_ACUITY_SCORE: 32
ADLS_ACUITY_SCORE: 35
ADLS_ACUITY_SCORE: 32
ADLS_ACUITY_SCORE: 35
ADLS_ACUITY_SCORE: 32
ADLS_ACUITY_SCORE: 35

## 2023-09-04 NOTE — PLAN OF CARE
ICU End of Shift Summary. See flowsheets for vital signs and detailed assessment.    Changes this shift: A/Ox4, VSS on RA, SR no ectopy, frequent watery stools        Plan of Care Reviewed With: patient    Overall Patient Progress: no change  Outcome Evaluation: A/Ox4,VSS on RA, frequent stools

## 2023-09-04 NOTE — PROGRESS NOTES
[facility]    Medicine Progress Note - Hospitalist Service, GOLD TEAM 9    Date of Admission:  9/2/2023    Assessment & Plan   Elroy Morel Sr. is a 43 year old male with history of severe AUD, withdrawal seizures, decompensated cirrhosis (c/b grade II EV, ascites, portal hypertensive gastropathy), HTN, and prior C.diff infection who was admitted to [facility] 8/21/23 with decompensated cirrhosis, JOCELYN, and acute on chronic GI bleed s/p EGD with variceal banding 8/28.  Discharged to [facility] for CD treatment on 8/30/23.  Returned to ED on 9/2/23 with abdominal pain, distension, and ongoing hematochezia.  Found to have worsening renal function concerning for HRS thus transferred back to medicine.      # Worsening JOCELYN, concern for HRS:    Recent admission for JOCELYN, up-trending Cr at time of discharge (4.34). Repeat Cr 8.17 on arrival. No urgent indication for HD per nephrology.- ? HRS vs congestive nephropathy from ESLD  - Nephrology consulted, appreciate recs  - Continue Albumin challenege -- 100g 25% albumin ordered (9/3-5)   -start octreotide (for his HRS, not for his esophageal varices) as 100 mcg TID subcutaneously    - Continue midodrine 5mg TID     # Acute on chronic hematochezia, suspect hemorrhoidal bleed:     Recent admission for melena/hematochezia, s/p EGD with banding of grade II EV on 8/28. Ongoing hematochezia but stable Hgb. GI suspects hemorrhoidal bleed, less likely variceal or upper source.   - OK to stop octreotide, protonix, and prophylactic ceftriaxone per GI  - Monitor clinically  - Trend Hgb      # Decompensated alcoholic cirrhosis:   # Hepatic encephalopathy:    # recent banding of Grade II EV (8/28/23)  # Portal Hypertensive Gastropathy and colopathy  # Acute on chronic anemia secondary to GI bleed  # History of variceal bleeding(8/13/2023) w/ esophageal ulcer at area (8/28)  MELD 3.0: 38 at 9/4/2023  6:38 AM  MELD-Na: 39 at 9/4/2023  6:38  AM  Calculated from:  Serum Creatinine: 7.77 mg/dL (Using max of 3 mg/dL) at 9/4/2023  6:38 AM  Serum Sodium: 127 mmol/L at 9/4/2023  6:38 AM  Total Bilirubin: 35.6 mg/dL at 9/4/2023  6:38 AM  Serum Albumin: 3.6 g/dL (Using max of 3.5 g/dL) at 9/4/2023  6:38 AM  INR(ratio): 1.56 at 9/4/2023  6:38 AM  Age at listing (hypothetical): 43 years  Sex: Male at 9/4/2023  6:38 AM  Concern for worsening hepatic function and HRS. Ammonia 167. Hepatology has seen patient and relayed the information that patient is currently not  a transplant candidate (currently in active treatment for sobriety). He did receive a paracentesis on 9/4 (-2L).  Per chart review last on 8/23 (4L) 8/27 (4L) w/ albumin replacement, 8/31  (3L) and 9/3 (diagnostic only, not concerning for SBP). 9/4 (2L) removed via CAPs. Decreased the fluid amount as reportedly pt had blood pressure issues previously with larger volume removal.    - Start lactulose 20g TID + Rifampin 550mg BID  - [place] protocol ordered  - Hepatology consulted, appreciate recs.   - CAPs consult PRN, last para on 9/4 (-2L)     # Hypervolemic Hyponatremia:    Secondary to cirrhosis and worsening renal function. Na 120->125>127  - BMP in AM     # Anion gap metabolic acidosis:    Secondary to JOCELYN  - BMP in AM     # Leukocytosis:    Afebrile. Ascitic fluid not suggestive of SBP. No other clear source. CXR with no obvious focal consolidation. UA 9/4 negative.   - No indication for ppx ceftraixone per GI  - blood culture   9/3 - NGTD     # Coagulopathy:    Secondary to ESLD  - daily INR / fibrinogen     # Hx HTN:    - Previously on lisinopril, stopped d/t JOCELYN     # Hx C.diff infection:    Completed tx with vancomycin. Does not have C diff infection, rather has colonization      # Hx AUD:    No recent ETOH use. Hx withdrawal seizures, low risk for withdrawal symptoms at this point. Was at CD treatment prior to arrival  - Will need CD consult when medically stable          Diet: 2 Gram  "Sodium Diet    DVT Prophylaxis: Pneumatic Compression Devices  Carroll Catheter: Not present  Lines: None     Cardiac Monitoring: None  Code Status: Full Code      Clinically Significant Risk Factors        # Hyperkalemia: Highest K = 6 mmol/L in last 2 days, will monitor as appropriate  # Hyponatremia: Lowest Na = 120 mmol/L in last 2 days, will monitor as appropriate   # Hypercalcemia: corrected calcium is >10.1, will monitor as appropriate   # Anion Gap Metabolic Acidosis: Highest Anion Gap = 21 mmol/L in last 2 days, will monitor and treat as appropriate  # Hypoalbuminemia: Lowest albumin = 2.9 g/dL at 9/2/2023 11:01 PM, will monitor as appropriate  # Coagulation Defect: INR = 1.56 (Ref range: 0.85 - 1.15) and/or PTT = 42 Seconds (Ref range: 22 - 38 Seconds), will monitor for bleeding   # Acute Kidney Injury, unspecified: based on a >150% or 0.3 mg/dL increase in last creatinine compared to past 90 day average, will monitor renal function  # Hypertension: Noted on problem list        # Obesity: Estimated body mass index is 39.33 kg/m  as calculated from the following:    Height as of this encounter: 1.702 m (5' 7\").    Weight as of this encounter: 113.9 kg (251 lb 1.7 oz)., PRESENT ON ADMISSION            Disposition Plan     Expected Discharge Date: 09/04/2023                  Elo Garcia MD  Hospitalist Service, GOLD TEAM 9  [facility]  Securely message with [employer] (more info)  Text page via [facility] Paging/Directory   See signed in provider for up to date coverage information  ______________________________________________________________________    Interval History   Complaining of some abdominal discomfort, wants a paracentesis. Family at bedside. Upset as they were told pt is currently not a transplant candidate.     Physical Exam   Vital Signs: Temp: 97.8  F (36.6  C) Temp src: Oral BP: 130/73 Pulse: 88   Resp: 18 SpO2: 98 % O2 Device: None (Room air)    Weight: " 251 lbs 1.66 oz    Gen: no acute distress, alert, interactive, jaundiced appearance  HEENT: normal conjunctivae, EOMI  Nares: No discharge noted from nares bilaterally   CV: well perfused appearance  Pulm: breathing comfortably on RA  Abd: distended abdomen, soft     Medical Decision Making       50 MINUTES SPENT BY ME on the date of service doing chart review, history, exam, documentation & further activities per the note.      Data     I have personally reviewed the following data over the past 24 hrs:    16.7 (H)  \   9.4 (L)   / 170     127 (L) 96 (L) 86.2 (H) /  101 (H)   4.4 12 (L) 7.77 (H) \     ALT: 45 AST: 148 (H) AP: 116 TBILI: 35.6 (HH)   ALB: 3.6 TOT PROTEIN: N/A LIPASE: N/A     INR:  1.56 (H) PTT:  N/A   D-dimer:  N/A Fibrinogen:  212       Imaging results reviewed over the past 24 hrs:   No results found for this or any previous visit (from the past 24 hour(s)).

## 2023-09-04 NOTE — PROGRESS NOTES
General Appearance:  VSS A+O Jaundiced. Cooperative  Respiratory: CTA on RA  Cardiovascular: Sinus rhythm Now off tele. SBPs 120-130s  GI: Loose watery stools. Lactulose TID   Skin: Jaundiced   Other: 2 liters pulled during paracentesis

## 2023-09-04 NOTE — PLAN OF CARE
D/I/A: Neuro status intact and A+O x4; making needs known.  VSS,afebrile.  SR on monitor with no noted ectopy.  Calm, pleasant and cooperative with cares and treatments/interactions.  Spouse and family support at bedside for initial part of evening and discussed plan of care at that time; encouraged questions.  MD daily noted updated with pt's spouse cell phone number; wife([place]) encourage to call for updates or with questions as they arise.  Pt denies pain or sob.  Up ad grazyna in room.  Voiding in small amounts and dark green custard like stools x2; no blood noted.  Tolerating oral intake of food and fluids.  No nausea or emesis.  Denies GI upset.  P: Continue to monitor status.

## 2023-09-04 NOTE — PROCEDURES
[facility]  CAPS PROCEDURE NOTE  Date of Admission:  9/2/2023  Consult Requested by: Medicine  Reason for Consult: management of symptomatic ascites    Indication/HPI: tense ascites    Pre-Procedure Diagnosis: Ascites    Post-Procedure Diagnosis: Ascites    Risk Assessment: Average risk, Technically straightforward; patient's anticoagulation has been held according to guidelines based on the agent and platelets and coags are within guidelines    Procedure Outcome:  Therapeutic paracentesis performed with 2 liters of ascites removed. CORNEJO  See additional procedure details below.    The primary covering service should follow up and address any lab results as appropriate.    Abbe Mayes MD  [facility]  Securely message with [email or web address] (more info)  Text page via [email or web address] Paging/Directory   See signed in provider for up to date coverage information      [facility]    Paracentesis    Date/Time: 9/4/2023 1:56 PM    Performed by: Abbe Mayes MD  Authorized by: Abbe Mayes MD    PRE-PROCEDURE DETAILS  Initial or subsequent exam: initial  Procedure purpose: therapeutic  Indications: new onset ascites        UNIVERSAL PROTOCOL   Site Marked: Yes  Prior Images Obtained and Reviewed:  Yes  Required items: Required blood products, implants, devices and special equipment available    Patient identity confirmed:  Arm band, verbally with patient, provided demographic data and hospital-assigned identification number  NA - No sedation, light sedation, or local anesthesia  Confirmation Checklist:  Patient's identity using two indicators, relevant allergies, procedure was appropriate and matched the consent or emergent situation and correct equipment/implants were available  Time out: Immediately prior to the procedure a time out was called    Universal Protocol: the Joint Commission Universal  Protocol was followed    Preparation: Patient was prepped and draped in usual sterile fashion    ESBL (mL):  5     ANESTHESIA    Local Anesthetic:  Lidocaine 1% with epinephrine  Anesthetic Total (mL):  10      SEDATION  Patient Sedated: Yes    Vital signs: Vital signs monitored during sedation    POST PROCEDURE DETAILS  Needle gauge: 22  Ultrasound guidance: yes  Puncture site: left lower quadrant  Fluid removed: 2000(ml)  Fluid characteristics: tan.  Dressing: 4x4 sterile gauze (dermabond)        PROCEDURE  Describe Procedure: The risks and benefits of the procedure were explained to Elroy Morel who expressed understanding and opted to proceed.  Consent was obtained and placed in the chart.  A time out was performed.  An area of ascites was located and marked using ultrasound guidance in the left lower quadrant; the area was prepped and draped in the usual sterile fashion.  10 ml of 1% lidocaine was instilled and ascites located.  The  paracentesis catheter and needle were inserted under real-time guidance until ascites obtained then the needle removed and the catheter advanced.  The apparatus was connected to vacuum bottles and a total of 2000 ml of tan colored fluid removed.   A specimen was not sent for analysis. The catheter was withdrawn and the area dressed.  Patient tolerated the procedure well with no immediate complications.  Please contact the Consult and Procedure Service if any complications or concerns arise.             Patient Tolerance:  Patient tolerated the procedure well with no immediate complications  Length of time physician/provider present for 1:1 monitoring during sedation: 0        No results found for this or any previous visit from the past 1 day.

## 2023-09-04 NOTE — PROGRESS NOTES
Nephrology Initial Consult  September 3, 2023      Elroy Morel Sr. MRN:[id number] YOB: 1980  Date of Admission:9/2/2023  Primary care provider: Latosha Baires  Requesting physician: Radha Du,*    ASSESSMENT AND RECOMMENDATIONS:     Elroy Morel Sr. is a 43 year old male with history of severe AUD, withdrawal seizures, decompensated cirrhosis (c/b grade II EV, ascites, portal hypertensive gastropathy), HTN, and prior C.diff infection who was admitted to [facility] 8/21/23 with decompensated cirrhosis, JOCELYN, and acute on chronic GI bleed s/p EGD with variceal banding 8/28.  Discharged to [facility] for CD treatment on 8/30/23.  Returned to ED on 9/2/23 with abdominal pain, distension, and ongoing hematochezia.  Found to have worsening renal function concerning for HRS. Admitted by medicine overnight, now transferred to [facility] for specialty care.     #JOCELYN on CKD Dramatic rise in Cr from 0.9=> 5.9> 8 correlating with bili rising from 12 to 30.   DDx included bili cast nephropathy, congestion with severe ascites and Type I HRS although BP's are better than one would expect for HRS typically.    -Agree with Giving 100g albumin for 3 days   -Continue midodrine 5mg TID   -octreotide, protonix  per GI and primary team   -No issues in chemistries or volume requiring RRT, hopeful that renal fx is starting to recover with increased UOP.    -consent for RRT obtained and placed in chart   Paracentesis done 9/4 with 2 liters removed   Spun his urine showed bilirubin casts, mucous and epithelial cells which is consistent with an HRS picture   -c/w octreotide (for his HRS, not for his esophageal varices) as 100 mcg TID subcutaneously          # Hypervolemic Hyponatremia  - Secondary to cirrhosis and worsening renal function  - Na 120->125>127      volume-No peripheral edema but massive ascites with taut abdomen  Paracentesis per primary team and GI        BMD-Ca 9.2, Mg 2.0, Phos 3.9 last  check, no acute issues.      Anemia-Hgb 10 2/2 GIB      #Decompensated ETOH-related cirrhosis with ascites  #Severe alcohol-related hepatitis w/o evidence of liver failure (improving LFTs)  # Coagulopathy w/ elevated INR  #History of variceal bleeding  #Alcohol use disorder, severe  Paracentesis 8/23, 27, and 9/2    Recommendations were communicated to primary team via note    Seen and discussed with Dr. Stefanie Harvey MD  Division of Renal Disease and Hypertension  [facility]  [email or web address]  Vocera Web Console        REASON FOR CONSULT: JOCELYN iso HRS    HISTORY OF PRESENT ILLNESS:    Elroy Morel Sr. is a 43 year old male who has hx of advanced decompensated cirrhosis, JOCELYN with recent admission with GIB s/p EV banding and polypectomies who presented from [facility] with worsenign abdominal distention and hematochezia. He denied any fever, diarrhea, hematemesis or abdominal pain. He denied alcohol use interim. He has been making urine. He states he has been tolerating po.     Of note Was admitted recently here 8/21 for significantly elevated bilirubin total bili 20.2  due to etoH liver failure, was not a candidate for steroids per hepatology back then due to diarrhea and JOCELYN. In that admission no etoh withdrawal sxs. spun his urine 8/23 which showed no granular or hyaline casts.    JOCELYN was suspected to be hepatorenal syndrome based on albumin challenge.  During the stay, LFTs, bilirubin and creatinine downtrending.  No alcohol withdrawal while inpatient.  EGD on 8/28 showed grade 2 esophageal varices status post banding, portal hypertensive gastropathy, esophageal ulcer prior variceal treatment site, 2 polyps in sigmoid colon status post removal, rectal varices, erythematous mucosa in the sigmoid colon/rectum consistent with portal colopathy.  Colonoscopy on 8/28 showed 2 sigmoid colon polyps removed with cold biopsy forceps.  Rectal varices.  They suspected that the cause of hematochezia was portal colopathy.   No blood was seen at that time.  He was continued on IV PPI and switched to p.o. challenge.   5 days after admission cr downtrended and was having good UOP, so renal signed off with an outpatient CKD follow-up and not resuming  his home lisinopril on discharge     PAST MEDICAL HISTORY:  Reviewed with patient on 09/04/2023     Past Medical History:   Diagnosis Date    Alcohol use disorder, severe, dependence (H)     Alcohol withdrawal seizure (H)     Alcoholic cirrhosis of liver with ascites (H)     Esophageal varices (H)     Essential hypertension     Gastroesophageal reflux disease without esophagitis     History of methamphetamine use     Sustained remission since 2003    Hypercholesterolemia     Tobacco abuse        Past Surgical History:   Procedure Laterality Date    COLONOSCOPY N/A 8/28/2023    Procedure: COLONOSCOPY, WITH POLYPECTOMY via bx forceps;  Surgeon: Alyssa Tsai MD;  Location:  GI        MEDICATIONS:  PTA Meds  Prior to Admission medications    Medication Sig Last Dose Taking? Auth Provider Long Term End Date   acetaminophen (TYLENOL) 325 MG tablet Take 325-650 mg by mouth every 4 hours as needed for mild pain   Reported, Patient     aspirin 81 MG EC tablet Take 1 tablet (81 mg) by mouth daily for 120 days   Emil Moser MD  12/28/23   benzocaine-menthol (CEPACOL) 15-3.6 MG lozenge Place 1 lozenge inside cheek every 2 hours as needed for sore throat   Reported, Patient     calcium carbonate (TUMS) 500 MG chewable tablet Take 2 tablets (1,000 mg) by mouth 4 times daily as needed for heartburn   Emil Moser MD  9/29/23   folic acid (FOLVITE) 1 MG tablet Take 1 tablet (1 mg) by mouth daily for 30 days   Emil Moser MD No 9/29/23   gabapentin (NEURONTIN) 100 MG capsule Take 1 capsule (100 mg) by mouth At Bedtime for 30 days   Emil Moser MD Yes 9/29/23   guaiFENesin 200 MG/10ML LIQD Take 200 mg by mouth every 4 hours as needed for cough   Reported, Patient      loratadine (CLARITIN) 10 MG tablet Take 10 mg by mouth daily as needed for allergies   Reported, Patient     melatonin 3 MG tablet Take 3 mg by mouth nightly as needed for sleep   Reported, Patient     multivitamin w/minerals (THERA-VIT-M) tablet Take 1 tablet by mouth daily for 30 days   Emil Moser MD  9/29/23   ondansetron (ZOFRAN ODT) 4 MG ODT tab Take 1 tablet (4 mg) by mouth every 8 hours as needed for nausea or vomiting   Emil Moser MD  9/29/23   pantoprazole (PROTONIX) 40 MG EC tablet Take 1 tablet (40 mg) by mouth daily   Jennifer Fletcher MD     senna-docusate (SENOKOT-S/PERICOLACE) 8.6-50 MG tablet Take 2 tablets by mouth daily as needed for constipation   Reported, Patient        Current Meds   albumin human  100 g Intravenous Daily    folic acid  1 mg Oral Daily    lactulose  20 g Oral or NG Tube TID    midodrine  5 mg Oral TID w/meals    multivitamin w/minerals  1 tablet Oral Daily    octreotide  100 mcg Subcutaneous TID    [Held by provider] polyethylene glycol  17 g Oral Daily    rifaximin  550 mg Oral or Feeding Tube BID    sodium chloride (PF)  3 mL Intracatheter Q8H    sodium chloride (PF)  3 mL Intracatheter Q8H     Infusion Meds   - MEDICATION INSTRUCTIONS -         ALLERGIES:    Allergies   Allergen Reactions    Metronidazole Other (See Comments), Dizziness and Unknown    Omeprazole Other (See Comments) and Unknown     Irritability (tolerates Protonix well)       REVIEW OF SYSTEMS:  A comprehensive of systems was negative except as noted above.    SOCIAL HISTORY:   Social History     Socioeconomic History    Marital status:      Spouse name: Not on file    Number of children: Not on file    Years of education: Not on file    Highest education level: Not on file   Occupational History    Not on file   Tobacco Use    Smoking status: Former     Types: Cigarettes    Smokeless tobacco: Former   Substance and Sexual Activity    Alcohol use: Not Currently     Comment: last drink  "    Drug use: Not Currently     Types: Amphetamines     Comment: Sustained remission    Sexual activity: Not on file   Other Topics Concern    Not on file   Social History Narrative    Pt is , 2 children. Employed.      Social Determinants of Health     Financial Resource Strain: Not on file   Food Insecurity: Not on file   Transportation Needs: Not on file   Physical Activity: Not on file   Stress: Not on file   Social Connections: Not on file   Intimate Partner Violence: Not on file   Housing Stability: Not on file     Reviewed with patient    accompanies Elroy Morel Sr. in hospital room    FAMILY MEDICAL HISTORY:   History reviewed. No pertinent family history.  Reviewed with patient     PHYSICAL EXAM:   Temp  Av.8  F (36.6  C)  Min: 97.5  F (36.4  C)  Max: 98.3  F (36.8  C)      Pulse  Av  Min: 77  Max: 88 Resp  Av.3  Min: 16  Max: 20  SpO2  Av.9 %  Min: 99 %  Max: 100 %       /71 (BP Location: Left arm)   Pulse 85   Temp 98.5  F (36.9  C) (Oral)   Resp 20   Ht 1.702 m (5' 7\")   Wt 113.9 kg (251 lb 1.7 oz)   SpO2 96%   BMI 39.33 kg/m     Date 23 0700 - 23 0659   Shift 6140-7782 1763-0760 6862-0323 24 Hour Total   INTAKE   I.V. 10   10   Shift Total(mL/kg) 10(0.09)   10(0.09)   OUTPUT   Shift Total(mL/kg)       Weight (kg) 113.9 113.9 113.9 113.9      Admit Weight: 114.6 kg (252 lb 11.2 oz)     GENERAL APPEARANCE: jaundiced not in distress, AAOX3  EYES: icteric scleral icterus, pupils equal  Lymphatics: no cervical or supraclavicular LAD  Pulmonary: lungs clear to auscultation with equal breath sounds bilaterally, no clubbing  CV: regular rhythm, normal rate, no rub   - JVD normal    - Edema no LLE edema   GI: tense, nontender distended abdomen, tympanic on percussion    MS: no evidence of inflammation in joints, no muscle tenderness  : no kelley  SKIN: no rash, warm, dry, no cyanosis  NEURO: face symmetric, no asterixis     LABS:   CMP  Recent Labs   Lab " 09/04/23  0638 09/03/23  2337 09/03/23  1831 09/03/23  1252 09/03/23  1012 09/03/23  0644 09/02/23  2301 09/02/23  2031 08/30/23  0533 08/29/23  0803 08/29/23  0606   * 126* 126* 125* 125* 124* 121* 120* 125*  --  128*   POTASSIUM 4.4  --   --  5.0 5.1 5.1 4.7 6.0* 4.2  --  4.2   CHLORIDE 96*  --   --  92* 93* 91* 90* 89* 96*  --  98   CO2 12*  --   --  12* 12* 12* 14* 14* 16*  --  17*   ANIONGAP 19*  --   --  21* 20* 21* 17* 17* 13  --  13   *  --   --  95 100* 95 107* 112* 85  --  87   BUN 86.2*  --   --  85.8* 86.7* 83.9* 83.2* 81.0* 45.5*  --  40.0*   CR 7.77*  --   --  7.19* 7.24* 7.21* 7.99* 8.17* 4.34*  --  3.44*   GFRESTIMATED 8*  --   --  9* 9* 9* 8* 8* 16*  --  22*   ENEDINA 9.3  --   --  9.2 9.4 9.3 9.1 9.1 9.0  --  8.9   MAG  --   --   --   --   --   --   --   --  1.8  --  1.8   PHOS  --   --   --   --   --   --   --   --   --  3.5  --    PROTTOTAL  --   --   --   --   --   --  6.1*  --   --   --   --    ALBUMIN 3.6  --   --   --   --  3.0* 2.9*  2.9* 3.0* 3.0*  --  3.3*   BILITOTAL 35.6*  --   --   --   --  37.6* 36.7* 38.0* 33.6*  --  34.3*   ALKPHOS 116  --   --   --   --  151* 144* 154* 125  --  122   *  --   --   --   --  173* 161*  --  184*  --  196*   ALT 45  --   --   --   --  59 55 60 52  --  57     CBC  Recent Labs   Lab 09/04/23  0735 09/04/23 0638 09/03/23  2337 09/03/23  1640 09/03/23  1020 09/03/23  0644 09/02/23 2031 08/30/23  0533   HGB 9.4* 9.5*  9.5* 9.1* 9.5*   < > 9.9* 10.4* 10.5*   WBC  --  16.7*  --   --   --  20.3* 18.7* 11.2*   RBC  --  2.80*  --   --   --  2.93* 3.02* 3.04*   HCT  --  26.5*  --   --   --  26.6* 27.5* 28.5*   MCV  --  95  --   --   --  91 91 94   MCH  --  33.9*  --   --   --  33.8* 34.4* 34.5*   MCHC  --  35.8  --   --   --  37.2* 37.8* 36.8*   RDW  --  19.9*  --   --   --  19.8* 20.2* 21.2*   PLT  --  170  --   --   --  191 203 155    < > = values in this interval not displayed.     INR  Recent Labs   Lab 09/04/23 0638 09/03/23 0644  09/02/23  2111   INR 1.56* 1.47* 1.46*   PTT  --   --  42*     ABGNo lab results found in last 7 days.   URINE STUDIES  Recent Labs   Lab Test 09/04/23  1214 09/02/23  2236 08/21/23  1253   COLOR Dark Yellow* Dark Yellow* Alanna*   APPEARANCE Slightly Cloudy* Slightly Cloudy* Cloudy*   URINEGLC Negative Negative  --    URINEBILI Moderate* Large*  --    URINEKETONE Negative Negative 15*   SG 1.013 1.014 1.015   UBLD Negative Negative  --    URINEPH 5.0 5.5  --    PROTEIN 10* 10* 100*   NITRITE Negative Negative  --    LEUKEST Negative Negative  --    RBCU  --  1 2   WBCU  --  3 13*     No lab results found.  PTH  No lab results found.  IRON STUDIES  Recent Labs   Lab Test 08/21/23  1130   IRON 71   *   IRONSAT 55*         Cj Harvey MD

## 2023-09-05 LAB
ALBUMIN SERPL BCG-MCNC: 3.2 G/DL (ref 3.5–5.2)
ALP SERPL-CCNC: 118 U/L (ref 40–129)
ALT SERPL W P-5'-P-CCNC: 39 U/L (ref 0–70)
ANION GAP SERPL CALCULATED.3IONS-SCNC: 17 MMOL/L (ref 7–15)
AST SERPL W P-5'-P-CCNC: 140 U/L (ref 0–45)
BILIRUB SERPL-MCNC: 35 MG/DL
BUN SERPL-MCNC: 81.7 MG/DL (ref 6–20)
CALCIUM SERPL-MCNC: 8.9 MG/DL (ref 8.6–10)
CHLORIDE SERPL-SCNC: 98 MMOL/L (ref 98–107)
CREAT SERPL-MCNC: 7.02 MG/DL (ref 0.67–1.17)
DEPRECATED HCO3 PLAS-SCNC: 12 MMOL/L (ref 22–29)
ERYTHROCYTE [DISTWIDTH] IN BLOOD BY AUTOMATED COUNT: 20.1 % (ref 10–15)
GFR SERPL CREATININE-BSD FRML MDRD: 9 ML/MIN/1.73M2
GLUCOSE SERPL-MCNC: 110 MG/DL (ref 70–99)
HCT VFR BLD AUTO: 24.7 % (ref 40–53)
HGB BLD-MCNC: 8.8 G/DL (ref 13.3–17.7)
INR PPP: 1.57 (ref 0.85–1.15)
MCH RBC QN AUTO: 33.7 PG (ref 26.5–33)
MCHC RBC AUTO-ENTMCNC: 35.6 G/DL (ref 31.5–36.5)
MCV RBC AUTO: 95 FL (ref 78–100)
PLATELET # BLD AUTO: 158 10E3/UL (ref 150–450)
POTASSIUM SERPL-SCNC: 4 MMOL/L (ref 3.4–5.3)
PROT SERPL-MCNC: ABNORMAL G/DL
RBC # BLD AUTO: 2.61 10E6/UL (ref 4.4–5.9)
SODIUM SERPL-SCNC: 127 MMOL/L (ref 136–145)
WBC # BLD AUTO: 15.3 10E3/UL (ref 4–11)

## 2023-09-05 PROCEDURE — 85027 COMPLETE CBC AUTOMATED: CPT | Performed by: INTERNAL MEDICINE

## 2023-09-05 PROCEDURE — 99233 SBSQ HOSP IP/OBS HIGH 50: CPT | Mod: GC | Performed by: INTERNAL MEDICINE

## 2023-09-05 PROCEDURE — 250N000013 HC RX MED GY IP 250 OP 250 PS 637: Performed by: INTERNAL MEDICINE

## 2023-09-05 PROCEDURE — 85610 PROTHROMBIN TIME: CPT | Performed by: INTERNAL MEDICINE

## 2023-09-05 PROCEDURE — 99232 SBSQ HOSP IP/OBS MODERATE 35: CPT | Performed by: STUDENT IN AN ORGANIZED HEALTH CARE EDUCATION/TRAINING PROGRAM

## 2023-09-05 PROCEDURE — 214N000001 HC R&B CCU UMMC

## 2023-09-05 PROCEDURE — 250N000011 HC RX IP 250 OP 636: Mod: JZ,JB | Performed by: STUDENT IN AN ORGANIZED HEALTH CARE EDUCATION/TRAINING PROGRAM

## 2023-09-05 PROCEDURE — 36415 COLL VENOUS BLD VENIPUNCTURE: CPT | Performed by: INTERNAL MEDICINE

## 2023-09-05 PROCEDURE — 250N000013 HC RX MED GY IP 250 OP 250 PS 637: Performed by: PHYSICIAN ASSISTANT

## 2023-09-05 PROCEDURE — P9047 ALBUMIN (HUMAN), 25%, 50ML: HCPCS | Performed by: PHYSICIAN ASSISTANT

## 2023-09-05 PROCEDURE — 250N000011 HC RX IP 250 OP 636: Performed by: PHYSICIAN ASSISTANT

## 2023-09-05 PROCEDURE — 84450 TRANSFERASE (AST) (SGOT): CPT | Performed by: INTERNAL MEDICINE

## 2023-09-05 RX ADMIN — RIFAXIMIN 550 MG: 550 TABLET ORAL at 19:36

## 2023-09-05 RX ADMIN — LACTULOSE 20 G: 20 SOLUTION ORAL at 14:04

## 2023-09-05 RX ADMIN — MIDODRINE HYDROCHLORIDE 5 MG: 5 TABLET ORAL at 08:10

## 2023-09-05 RX ADMIN — OCTREOTIDE ACETATE 100 MCG: 100 INJECTION, SOLUTION INTRAVENOUS; SUBCUTANEOUS at 19:36

## 2023-09-05 RX ADMIN — MIDODRINE HYDROCHLORIDE 5 MG: 5 TABLET ORAL at 17:35

## 2023-09-05 RX ADMIN — LACTULOSE 20 G: 20 SOLUTION ORAL at 08:09

## 2023-09-05 RX ADMIN — RIFAXIMIN 550 MG: 550 TABLET ORAL at 08:10

## 2023-09-05 RX ADMIN — Medication 1 TABLET: at 08:10

## 2023-09-05 RX ADMIN — OCTREOTIDE ACETATE 100 MCG: 100 INJECTION, SOLUTION INTRAVENOUS; SUBCUTANEOUS at 08:15

## 2023-09-05 RX ADMIN — ALBUMIN HUMAN 100 G: 0.25 SOLUTION INTRAVENOUS at 08:03

## 2023-09-05 RX ADMIN — MIDODRINE HYDROCHLORIDE 5 MG: 5 TABLET ORAL at 11:31

## 2023-09-05 RX ADMIN — FOLIC ACID 1 MG: 1 TABLET ORAL at 08:10

## 2023-09-05 RX ADMIN — OCTREOTIDE ACETATE 100 MCG: 100 INJECTION, SOLUTION INTRAVENOUS; SUBCUTANEOUS at 14:04

## 2023-09-05 ASSESSMENT — ACTIVITIES OF DAILY LIVING (ADL)
ADLS_ACUITY_SCORE: 32

## 2023-09-05 NOTE — PROGRESS NOTES
Gastroenterology Follow up Note         Assessment and Plan:     Elroy Morel Sr. is a 43 year old male with PMH of alcohol use disorder, alcohol-related liver disease who is re-admitted due to worsening JOCELYN, ascites. Hepatology consulted for evaluation of alcohol-related liver disease and associated complications.     #Decompensated EtOH cirrhosis  #AUD  #Severe EtOH-related hepatitis  #Ascites  #JOCELYN    MELD 3.0: 38 at 9/5/2023  5:54 AM  MELD-Na: 39 at 9/5/2023  5:54 AM  Calculated from:  Serum Creatinine: 7.02 mg/dL (Using max of 3 mg/dL) at 9/5/2023  5:54 AM  Serum Sodium: 127 mmol/L at 9/5/2023  5:54 AM  Total Bilirubin: 35.0 mg/dL at 9/5/2023  5:54 AM  Serum Albumin: 3.2 g/dL at 9/5/2023  5:54 AM  INR(ratio): 1.57 at 9/5/2023  5:54 AM  Age at listing (hypothetical): 43 years  Sex: Male at 9/5/2023  5:54 AM    Patient currently not a transplant candidate. Was previously advised to stop EtOH drinking, but he continued to drink, and only recently quit drinking.    JOCELYN stable with albumin challenge, concern that he would not respond to Terlipressin either given significantly elevated Cr and BP not very low.     Underwent paracentesis yesterday with 2L removed. No evidence of infection at this time.          Recommendations:     - Appreciate nephology recommendations for JOCELYN.  - For future parcentesis - recommend 4 L max with 12.5 g per L given JOCELYN  - Daily MELD labs.  - Continue lactulose and rifaximin.  - Hold off any diuretics at this time.  - No need for IV PPI, octreotide, or ceftriaxone at this time     It has been a pleasure to participate in the care of this patient.  Patient discussed with GI staff, Dr. Crews.  Please do not hesitate to contact the GI service with any questions or concerns.     Sage Ayers MD  Gastroenterology Fellow  Pager [phone]    Physician Attestation   I agree with this note.    Jennifer Crews MD         Subjective/Objective:   - No acute events overnight  Denies abdominal  "pain, GI bleeding, fever or chills.          Medications:     Current Facility-Administered Medications   Medication    calcium carbonate (TUMS) chewable tablet 1,000 mg    Daily 2 GRAM acetaminophen limit, unless fulminent liver failure or transaminases greater than or equal to 300 - 400, then none    folic acid (FOLVITE) tablet 1 mg    lactulose (CHRONULAC) solution 20 g    Or    lactulose (CHRONULAC) solution for enema prep 100 g    lactulose (CHRONULAC) solution 20 g    lidocaine (LMX4) cream    lidocaine (LMX4) cream    lidocaine 1 % 0.1-1 mL    melatonin tablet 1 mg    midodrine (PROAMATINE) tablet 5 mg    multivitamin w/minerals (THERA-VIT-M) tablet 1 tablet    octreotide (sandoSTATIN) injection 100 mcg    ondansetron (ZOFRAN ODT) ODT tab 4 mg    Or    ondansetron (ZOFRAN) injection 4 mg    [Held by provider] polyethylene glycol (MIRALAX) Packet 17 g    rifaximin (XIFAXAN) tablet 550 mg    sodium chloride (PF) 0.9% PF flush 3 mL    sodium chloride (PF) 0.9% PF flush 3 mL    sodium chloride (PF) 0.9% PF flush 3 mL    sodium chloride (PF) 0.9% PF flush 3 mL            Physical Exam:   VS:  /68 (BP Location: Left arm)   Pulse 70   Temp 98.3  F (36.8  C) (Oral)   Resp 16   Ht 1.702 m (5' 7\")   Wt 113.9 kg (251 lb 1.7 oz)   SpO2 98%   BMI 39.33 kg/m      Wt:   Wt Readings from Last 2 Encounters:   09/03/23 113.9 kg (251 lb 1.7 oz)   08/27/23 114.8 kg (253 lb 1.6 oz)      Constitutional: cooperative, pleasant, no acute distress  Eyes: Conjunctiva anicteric  HEENT: Normal oropharynx without ulcers or exudate, mucus membranes moist  CV: RRR, no m/r/g  Respiratory: CTAB  Abd: + Distended, +bs, no hepatosplenomegaly, nontender, no rebound or guarding   Skin: warm, perfused, no jaundice  Neuro: AAO x 3, No asterixis         Laboratory:   [facility]  Recent Labs   Lab 09/05/23  0554 09/04/23  0638 09/03/23  2337 09/03/23  1831 09/03/23  1252 09/03/23  1012   * 127* 126* 126* 125* 125*   POTASSIUM 4.0 4.4  --  "  --  5.0 5.1   CHLORIDE 98 96*  --   --  92* 93*   ENEDINA 8.9 9.3  --   --  9.2 9.4   CO2 12* 12*  --   --  12* 12*   BUN 81.7* 86.2*  --   --  85.8* 86.7*   CR 7.02* 7.77*  --   --  7.19* 7.24*   * 101*  --   --  95 100*     CBC  Recent Labs   Lab 09/05/23  0554 09/04/23  0735 09/04/23 0638 09/03/23  2337 09/03/23  1020 09/03/23 0644 09/02/23  2031   WBC 15.3*  --  16.7*  --   --  20.3* 18.7*   RBC 2.61*  --  2.80*  --   --  2.93* 3.02*   HGB 8.8* 9.4* 9.5*  9.5* 9.1*   < > 9.9* 10.4*   HCT 24.7*  --  26.5*  --   --  26.6* 27.5*   MCV 95  --  95  --   --  91 91   MCH 33.7*  --  33.9*  --   --  33.8* 34.4*   MCHC 35.6  --  35.8  --   --  37.2* 37.8*   RDW 20.1*  --  19.9*  --   --  19.8* 20.2*     --  170  --   --  191 203    < > = values in this interval not displayed.     INR  Recent Labs   Lab 09/05/23 05 09/04/23 0638 09/03/23 0644 09/02/23  2111   INR 1.57* 1.56* 1.47* 1.46*     LFTs  Recent Labs   Lab 09/05/23 0554 09/04/23 0638 09/03/23 0644 09/02/23  2301   ALKPHOS 118 116 151* 144*   * 148* 173* 161*   ALT 39 45 59 55   BILITOTAL 35.0* 35.6* 37.6* 36.7*   PROTTOTAL  --   --   --  6.1*   ALBUMIN 3.2* 3.6 3.0* 2.9*  2.9*      PANC  Recent Labs   Lab 09/02/23  2031   LIPASE 435*            Imaging/Procedures/Studies:     Results for orders placed or performed during the hospital encounter of 08/21/23   COLONOSCOPY   Result Value Ref Range    COLONOSCOPY       [address] [place] [address] [phone]     Endoscopy Department  _______________________________________________________________________________  Patient Name: Elroy Morel             Procedure Date: 8/28/2023 11:09 AM  MRN: [id number]                       Account Number: [id number]  YOB: 1980              Admit Type: Inpatient  Age: 43                               Room: [facility]                      Gender: Male                          Note Status:  Finalized  Attending MD: DARA GAN MD,   Total Sedation Time: 9 minutes  _______________________________________________________________________________     Procedure:             Colonoscopy  Indications:           Hematochezia  Providers:             DARA GAN MD, Leti Parra RN, Emmy Quarles RN  Referring MD:            Medicines:             Fentanyl 100 micrograms IV  Complications:          No immediate complications.  _______________________________________________________________________________  Procedure:             Pre-Anesthesia Assessment:                         - Prior to the procedure, a History and Physical was                          performed, and patient medications and allergies were                          reviewed. The patient is competent. The risks and                          benefits of the procedure and the sedation options and                          risks were discussed with the patient. All questions                          were answered and informed consent was obtained.                          Patient identification and proposed procedure were                          verified by the physician in the procedure room.                          Mental Status Examination: alert and oriented. Airway                          Examination: normal oropharyngeal airway and neck                          mobility. Respiratory Examination: clear to                           auscultation. CV Examination: normal. Prophylactic                          Antibiotics: The patient does not require prophylactic                          antibiotics. Prior Anticoagulants: The patient has                          taken no anticoagulant or antiplatelet agents. ASA                          Grade Assessment: III - A patient with severe systemic                          disease. After reviewing the risks and benefits, the                           patient was deemed in satisfactory condition to                          undergo the procedure. The anesthesia plan was to use                          moderate sedation / analgesia (conscious sedation).                          Immediately prior to administration of medications,                          the patient was re-assessed for adequacy to receive                          sedatives. The heart rate, respiratory rate, oxygen                          saturations, blood pressure, adequacy of pulmonary                           ventilation, and response to care were monitored                          throughout the procedure. The physical status of the                          patient was re-assessed after the procedure.                         After obtaining informed consent, the colonoscope was                          passed under direct vision. Throughout the procedure,                          the patient's blood pressure, pulse, and oxygen                          saturations were monitored continuously. The                          Colonoscope was introduced through the anus and                          advanced to the terminal ileum. The colonoscopy was                          performed without difficulty. The patient tolerated                          the procedure well. The quality of the bowel                          preparation was good.                                                                                   Findings:       Two sessile polyps were found in the s igmoid colon. The polyps were        diminutive in size. These polyps were removed with a cold biopsy        forceps. Resection and retrieval were complete.       Small, non-bleeding rectal varices were found.       A diffuse area of mildly erythematous mucosa was found in the sigmoid        colon.                                                                                   Impression:            - Two  diminutive polyps in the sigmoid colon, removed                          with a cold biopsy forceps. Resected and retrieved.                         - Rectal varices.                         - Erythematous mucosa in the sigmoid colon and rectum                          with come erythem, consistent with portal colopathy.                          This was the likely cause of hematochezia.                         No blood was seen.                         Terminal ileum was normal.  Recommendation:        - Await pathology results.                                                                                      electronically signed by Dara Gan  ______________________________  DARA GAN MD  8/28/2023 12:41:13 PM  I was physically present for the entire viewing portion of the exam.  __________________________  Signature of teaching physician  [id number] GAN MD  Number of Addenda: 0    Note Initiated On: 8/28/2023 11:09 AM  Scope In: 12:14:28 PM  Scope Out: 12:25:25 PM

## 2023-09-05 NOTE — PLAN OF CARE
Assumed cares from: 6070 - 2891  Neuro: A&Ox4.   Cardiac: SR. VSS.   Respiratory: Sating >90% on RA.  GI/: Adequate urine output, dark jackson/josh colored. Pt continues with loose stools, on scheduled lactulose. Declined PRN.  Diet/appetite: Tolerating 2g Na diet.  Activity: Pt is up ad grazyna.  Pain: Pt declined of pain this shift.   Skin: No new deficits noted.  LDA's: PIV x2, R lower forearm and L upper arm, SL.    Plan: Pt continues with Albumin challenge for HRS .     Goal Outcome Evaluation:      Plan of Care Reviewed With: patient    Overall Patient Progress: no changeOverall Patient Progress: no change

## 2023-09-05 NOTE — PROGRESS NOTES
[facility]    Medicine Progress Note - Hospitalist Service, GOLD TEAM 9    Date of Admission:  9/2/2023    Assessment & Plan   Elroy Morel Sr. is a 43 year old male with history of severe AUD, withdrawal seizures, decompensated cirrhosis (c/b grade II EV, ascites, portal hypertensive gastropathy), HTN, and prior C.diff infection who was admitted to [facility] 8/21/23 with decompensated cirrhosis, JOCELYN, and acute on chronic GI bleed s/p EGD with variceal banding 8/28.  Discharged to [facility] for CD treatment on 8/30/23.  Returned to ED on 9/2/23 with abdominal pain, distension, and ongoing hematochezia.  Found to have worsening renal function concerning for HRS thus transferred back to medicine.      #Worsening JOCELYN, concern for HRS:    Recent admission for JOCELYN, up-trending Cr at time of discharge (4.34). Repeat Cr 8.17 on arrival. No urgent indication for HD per nephrology. Most likely HRS vs congestive nephropathy from ESLD  - Nephrology consulted, appreciate recs  - Continue Albumin challenge -- 100g 25% albumin ordered (9/3-9/5)   -Continue octreotide (for his HRS, not for his esophageal varices) as 100 mcg TID subcutaneously    - Continue midodrine 5mg TID     #Acute on chronic hematochezia, suspect hemorrhoidal bleed:     Recent admission for melena/hematochezia, s/p EGD with banding of grade II EV on 8/28. Ongoing hematochezia but stable Hgb. GI suspects hemorrhoidal bleed, less likely variceal or upper source.   - OK to stop octreotide, protonix, and prophylactic ceftriaxone per GI  - Monitor clinically  - Trend Hgb      # Decompensated alcoholic cirrhosis:   # Hepatic encephalopathy:    # recent banding of Grade II EV (8/28/23)  # Portal Hypertensive Gastropathy and colopathy  # Acute on chronic anemia secondary to GI bleed  # History of variceal bleeding(8/13/2023) w/ esophageal ulcer at area (8/28)  MELD 3.0: 38 at 9/5/2023  5:54 AM  MELD-Na: 39 at 9/5/2023  5:54  AM  Calculated from:  Serum Creatinine: 7.02 mg/dL (Using max of 3 mg/dL) at 9/5/2023  5:54 AM  Serum Sodium: 127 mmol/L at 9/5/2023  5:54 AM  Total Bilirubin: 35.0 mg/dL at 9/5/2023  5:54 AM  Serum Albumin: 3.2 g/dL at 9/5/2023  5:54 AM  INR(ratio): 1.57 at 9/5/2023  5:54 AM  Age at listing (hypothetical): 43 years  Sex: Male at 9/5/2023  5:54 AM  Concern for worsening hepatic function and HRS. Ammonia 167. Hepatology has seen patient and relayed the information that patient is currently not a transplant candidate (currently in active treatment for sobriety). He did receive a paracentesis on 9/4 (-2L).  Per chart review last on 8/23 (4L) 8/27 (4L) w/ albumin replacement, 8/31  (3L) and 9/3 (diagnostic only, not concerning for SBP). 9/4 (2L) removed via CAPs. Decreased the fluid amount as reportedly pt had blood pressure issues previously with larger volume removal.    - Start lactulose 20g TID + Rifampin 550mg BID  - Martin protocol ordered  - Hepatology consulted, appreciate recs.   - CAPs consult PRN, last para on 9/4 (-2L)     #Hypervolemic Hyponatremia:    Secondary to cirrhosis and worsening renal function. Na 120->125>127  - BMP in AM     #Anion gap metabolic acidosis:    Secondary to JOCELYN  - BMP and ABG in AM     #Leukocytosis, improving:  Afebrile. Ascitic fluid not suggestive of SBP. No other clear source. CXR with no obvious focal consolidation. UA 9/4 negative.   - No indication for ppx ceftraixone per GI  - blood culture   9/3 - NGTD     #Coagulopathy:    Secondary to ESLD  - daily INR / fibrinogen     # Hx HTN:    - Previously on lisinopril, stopped d/t JOCELYN     # Hx C.diff infection:    Completed tx with vancomycin. Does not have C diff infection, rather has colonization      # Hx AUD:    No recent ETOH use. Hx withdrawal seizures, low risk for withdrawal symptoms at this point. Was at CD treatment prior to arrival  - Will need CD consult when medically stable  - SW consult        Diet: 2 Gram  "Sodium Diet    DVT Prophylaxis: Pneumatic Compression Devices  Carroll Catheter: Not present  Lines: None     Cardiac Monitoring: None  Code Status: Full Code      Clinically Significant Risk Factors         # Hyponatremia: Lowest Na = 126 mmol/L in last 2 days, will monitor as appropriate     # Anion Gap Metabolic Acidosis: Highest Anion Gap = 19 mmol/L in last 2 days, will monitor and treat as appropriate  # Hypoalbuminemia: Lowest albumin = 2.9 g/dL at 9/2/2023 11:01 PM, will monitor as appropriate    # Coagulation Defect: INR = 1.57 (Ref range: 0.85 - 1.15) and/or PTT = 42 Seconds (Ref range: 22 - 38 Seconds), will monitor for bleeding     # Acute Kidney Injury, unspecified: based on a >150% or 0.3 mg/dL increase in last creatinine compared to past 90 day average, will monitor renal function    # Hypertension: Noted on problem list        # Obesity: Estimated body mass index is 39.33 kg/m  as calculated from the following:    Height as of this encounter: 1.702 m (5' 7\").    Weight as of this encounter: 113.9 kg (251 lb 1.7 oz)., PRESENT ON ADMISSION            Disposition Plan      Expected Discharge Date: 09/08/2023                  Elroy Rankin MD  Hospitalist Service, GOLD TEAM [address] [place] [address]  Securely message with [employer] (more info)  Text page via [employer] Paging/Directory   See signed in provider for up to date coverage information  ______________________________________________________________________    Interval History   Day 3 of 3 with albumin is today. Patient is hopeful of meaningful renal improvement. He continues to have good urine output. Stools are currently loose and regular with use of lactulose. Paracentesis performed 9/4 with 2L removed. Family at bedside this morning.    Physical Exam   Vital Signs: Temp: 98  F (36.7  C) Temp src: Oral BP: 122/76 Pulse: 75   Resp: 17 SpO2: 99 % O2 Device: None (Room air)    Weight: 251 lbs 1.66 oz    Gen: no acute " distress, alert, interactive, jaundiced appearance  HEENT: scleral icterus, EOMI  Cardiovascular: regular rate and rhythm, no murmurs, 2+ distal pulses equal bilaterally  Respiratory: no increased work of breathing, lungs clear to auscultation throughout, no wheezing or crackles  Abdomen: soft, +distended, non-tender throughout  Extremities: No appreciable edema  Skin: no concerning rashes or lesions  Neuro: A&O x4, responds appropriately to exam, no asterixis       Medical Decision Making       50 MINUTES SPENT BY ME on the date of service doing chart review, history, exam, documentation & further activities per the note.      Data     I have personally reviewed the following data over the past 24 hrs:    15.3 (H)  \   8.8 (L)   / 158     127 (L) 98 81.7 (H) /  110 (H)   4.0 12 (L) 7.02 (H) \     ALT: 39 AST: 140 (H) AP: 118 TBILI: 35.0 (HH)   ALB: 3.2 (L) TOT PROTEIN: N/A LIPASE: N/A     INR:  1.57 (H) PTT:  N/A   D-dimer:  N/A Fibrinogen:  N/A     Imaging results reviewed over the past 24 hrs:   No results found for this or any previous visit (from the past 24 hour(s)).

## 2023-09-05 NOTE — PROGRESS NOTES
General Appearance: Jaundiced, A+O VSS  Respiratory: CTA on RA   Cardiovascular: [id number]  GI: Lactulose TID and numerous loose watery stools. Good appetitie  Skin Jaundiced   Other: Visitors today. Stays in room. No CP-CRE swabs available.

## 2023-09-05 NOTE — PHARMACY-ADMISSION MEDICATION HISTORY
Pharmacist Admission Medication History    Admission medication history is complete. The information provided in this note is only as accurate as the sources available at the time of the update.    Medication reconciliation/reorder completed by provider prior to medication history? Yes    Information Source(s): Hospital records via in-person    Pertinent Information: Patient was discharged from [facility] and transferred over here. Medication list was done based on discharge summary and medication history that was completed on 8/21 during admission on [facility].     Changes made to PTA medication list:  Added: None  Deleted: None  Changed: None    Medication History Completed By: STAN GTZ RPH 9/5/2023 9:00 AM    Prior to Admission medications    Medication Sig Last Dose Taking? Auth Provider Long Term End Date   aspirin 81 MG EC tablet Take 1 tablet (81 mg) by mouth daily for 120 days   Emil Moser MD  12/28/23   calcium carbonate (TUMS) 500 MG chewable tablet Take 2 tablets (1,000 mg) by mouth 4 times daily as needed for heartburn   Emil Moser MD  9/29/23   folic acid (FOLVITE) 1 MG tablet Take 1 tablet (1 mg) by mouth daily for 30 days   Emil Moser MD No 9/29/23   gabapentin (NEURONTIN) 100 MG capsule Take 1 capsule (100 mg) by mouth At Bedtime for 30 days   Emil Moser MD Yes 9/29/23   melatonin 3 MG tablet Take 3 mg by mouth nightly as needed for sleep   Reported, Patient     multivitamin w/minerals (THERA-VIT-M) tablet Take 1 tablet by mouth daily for 30 days   Emil Moser MD  9/29/23   ondansetron (ZOFRAN ODT) 4 MG ODT tab Take 1 tablet (4 mg) by mouth every 8 hours as needed for nausea or vomiting   Emil Moser MD  9/29/23   pantoprazole (PROTONIX) 40 MG EC tablet Take 1 tablet (40 mg) by mouth daily   Jennifer Fletcher MD

## 2023-09-05 NOTE — PROGRESS NOTES
Nephrology Initial Consult  September 3, 2023      Elroy Morel Sr. MRN:[id number] YOB: 1980  Date of Admission:9/2/2023  Primary care provider: Latosha Baires  Requesting physician: Radha Du,*    ASSESSMENT AND RECOMMENDATIONS:     Elroy Morel Sr. is a 43 year old male with history of severe AUD, withdrawal seizures, decompensated cirrhosis (c/b grade II EV, ascites, portal hypertensive gastropathy), HTN, and prior C.diff infection who was admitted to [facility] 8/21/23 with decompensated cirrhosis, JOCELYN, and acute on chronic GI bleed s/p EGD with variceal banding 8/28.  Discharged to [facility] for CD treatment on 8/30/23.  Returned to ED on 9/2/23 with abdominal pain, distension, and ongoing hematochezia.  Found to have worsening renal function concerning for HRS. Admitted by medicine overnight, now transferred to [facility] for specialty care.     #JOCELYN on CKD Dramatic rise in Cr from 0.9=> 5.9> 8>7 correlating with bili rising from 12 to 30.   DDx included bili cast nephropathy, congestion with severe ascites and Type I HRS although BP's are better than one would expect for HRS typically.    -Agree with Giving 100g albumin for 3 days   -Continue midodrine 5mg TID   -No issues in chemistries or volume requiring RRT, hopeful that renal fx is starting to recover with increased UOP.    -consent for RRT obtained and placed in chart   Paracentesis done 9/4 with 2 liters removed   Spun his urine showed bilirubin casts, mucous and epithelial cells which is consistent with an HRS picture   -c/w octreotide (for his HRS, not for his esophageal varices) as 100 mcg TID subcutaneously          # Hypervolemic Hyponatremia  - Secondary to cirrhosis and worsening renal function  - Na 120->125>127      #HAGMA gap of 17 2/2 uremia and his severe JOCELYN   HCO3 of 12  Obtain an ABG with the next set of labs      BMD-Ca 9.2, Mg 2.0, Phos 3.9 last check, no acute issues.      Anemia-Hgb 8.8  2/2  GIB      #Decompensated ETOH-related cirrhosis with ascites  #Severe alcohol-related hepatitis w/o evidence of liver failure (improving LFTs)  # Coagulopathy w/ elevated INR  #History of variceal bleeding  #Alcohol use disorder, severe  Paracentesis 8/23, 27, and 9/2    Recommendations were communicated to primary team via note    Seen and discussed with Dr. Natalia Harvey MD  Division of Renal Disease and Hypertension  [facility]  myairmail  Vocera Web Console        REASON FOR CONSULT: JOCELYN iso HRS    HISTORY OF PRESENT ILLNESS:    Elroy Morel Sr. is a 43 year old male who has hx of advanced decompensated cirrhosis, JOCELYN with recent admission with GIB s/p EV banding and polypectomies who presented from [facility] with worsenign abdominal distention and hematochezia. He denied any fever, diarrhea, hematemesis or abdominal pain. He denied alcohol use interim. He has been making urine. He states he has been tolerating po.     Of note Was admitted recently here 8/21 for significantly elevated bilirubin total bili 20.2  due to etoH liver failure, was not a candidate for steroids per hepatology back then due to diarrhea and JOCELYN. In that admission no etoh withdrawal sxs. spun his urine 8/23 which showed no granular or hyaline casts.    JOCELYN was suspected to be hepatorenal syndrome based on albumin challenge.  During the stay, LFTs, bilirubin and creatinine downtrending.  No alcohol withdrawal while inpatient.  EGD on 8/28 showed grade 2 esophageal varices status post banding, portal hypertensive gastropathy, esophageal ulcer prior variceal treatment site, 2 polyps in sigmoid colon status post removal, rectal varices, erythematous mucosa in the sigmoid colon/rectum consistent with portal colopathy.  Colonoscopy on 8/28 showed 2 sigmoid colon polyps removed with cold biopsy forceps.  Rectal varices.  They suspected that the cause of hematochezia was portal colopathy.  No blood was seen at that time.  He was  continued on IV PPI and switched to p.o. challenge.   5 days after admission cr downtrended and was having good UOP, so renal signed off with an outpatient CKD follow-up and not resuming  his home lisinopril on discharge     PAST MEDICAL HISTORY:  Reviewed with patient on 09/05/2023     Past Medical History:   Diagnosis Date    Alcohol use disorder, severe, dependence (H)     Alcohol withdrawal seizure (H)     Alcoholic cirrhosis of liver with ascites (H)     Esophageal varices (H)     Essential hypertension     Gastroesophageal reflux disease without esophagitis     History of methamphetamine use     Sustained remission since 2003    Hypercholesterolemia     Tobacco abuse        Past Surgical History:   Procedure Laterality Date    COLONOSCOPY N/A 8/28/2023    Procedure: COLONOSCOPY, WITH POLYPECTOMY via bx forceps;  Surgeon: Alyssa Tsai MD;  Location:  GI        MEDICATIONS:  PTA Meds  Prior to Admission medications    Medication Sig Last Dose Taking? Auth Provider Long Term End Date   acetaminophen (TYLENOL) 325 MG tablet Take 325-650 mg by mouth every 4 hours as needed for mild pain   Reported, Patient     aspirin 81 MG EC tablet Take 1 tablet (81 mg) by mouth daily for 120 days   Emil Moser MD  12/28/23   benzocaine-menthol (CEPACOL) 15-3.6 MG lozenge Place 1 lozenge inside cheek every 2 hours as needed for sore throat   Reported, Patient     calcium carbonate (TUMS) 500 MG chewable tablet Take 2 tablets (1,000 mg) by mouth 4 times daily as needed for heartburn   Emil Moser MD  9/29/23   folic acid (FOLVITE) 1 MG tablet Take 1 tablet (1 mg) by mouth daily for 30 days   Emil Moser MD No 9/29/23   gabapentin (NEURONTIN) 100 MG capsule Take 1 capsule (100 mg) by mouth At Bedtime for 30 days   Emil Moser MD Yes 9/29/23   guaiFENesin 200 MG/10ML LIQD Take 200 mg by mouth every 4 hours as needed for cough   Reported, Patient     loratadine (CLARITIN) 10 MG tablet  Take 10 mg by mouth daily as needed for allergies   Reported, Patient     melatonin 3 MG tablet Take 3 mg by mouth nightly as needed for sleep   Reported, Patient     multivitamin w/minerals (THERA-VIT-M) tablet Take 1 tablet by mouth daily for 30 days   Emil Moser MD  9/29/23   ondansetron (ZOFRAN ODT) 4 MG ODT tab Take 1 tablet (4 mg) by mouth every 8 hours as needed for nausea or vomiting   Emil Moser MD  9/29/23   pantoprazole (PROTONIX) 40 MG EC tablet Take 1 tablet (40 mg) by mouth daily   Jennifer Fletcher MD     senna-docusate (SENOKOT-S/PERICOLACE) 8.6-50 MG tablet Take 2 tablets by mouth daily as needed for constipation   Reported, Patient        Current Meds   folic acid  1 mg Oral Daily    lactulose  20 g Oral or NG Tube TID    midodrine  5 mg Oral TID w/meals    multivitamin w/minerals  1 tablet Oral Daily    octreotide  100 mcg Subcutaneous TID    [Held by provider] polyethylene glycol  17 g Oral Daily    rifaximin  550 mg Oral or Feeding Tube BID    sodium chloride (PF)  3 mL Intracatheter Q8H    sodium chloride (PF)  3 mL Intracatheter Q8H     Infusion Meds   - MEDICATION INSTRUCTIONS -         ALLERGIES:    Allergies   Allergen Reactions    Metronidazole Other (See Comments), Dizziness and Unknown    Omeprazole Other (See Comments) and Unknown     Irritability (tolerates Protonix well)       REVIEW OF SYSTEMS:  A comprehensive of systems was negative except as noted above.    SOCIAL HISTORY:   Social History     Socioeconomic History    Marital status:      Spouse name: Not on file    Number of children: Not on file    Years of education: Not on file    Highest education level: Not on file   Occupational History    Not on file   Tobacco Use    Smoking status: Former     Types: Cigarettes    Smokeless tobacco: Former   Substance and Sexual Activity    Alcohol use: Not Currently     Comment: last drink 8/19    Drug use: Not Currently     Types: Amphetamines     Comment: Sustained  "remission    Sexual activity: Not on file   Other Topics Concern    Not on file   Social History Narrative    Pt is , 2 children. Employed.      Social Determinants of Health     Financial Resource Strain: Not on file   Food Insecurity: Not on file   Transportation Needs: Not on file   Physical Activity: Not on file   Stress: Not on file   Social Connections: Not on file   Intimate Partner Violence: Not on file   Housing Stability: Not on file     Reviewed with patient    accompanies Elroy Morel Sr. in hospital room    FAMILY MEDICAL HISTORY:   History reviewed. No pertinent family history.  Reviewed with patient     PHYSICAL EXAM:   Temp  Av.8  F (36.6  C)  Min: 97.5  F (36.4  C)  Max: 98.3  F (36.8  C)      Pulse  Av  Min: 77  Max: 88 Resp  Av.3  Min: 16  Max: 20  SpO2  Av.9 %  Min: 99 %  Max: 100 %       /76 (BP Location: Left arm)   Pulse 75   Temp 98  F (36.7  C) (Oral)   Resp 17   Ht 1.702 m (5' 7\")   Wt 113.9 kg (251 lb 1.7 oz)   SpO2 99%   BMI 39.33 kg/m     Date 23 0700 - 23 0659   Shift 7614-5610 7302-9486 7841-9171 24 Hour Total   INTAKE   I.V. 10   10   Shift Total(mL/kg) 10(0.09)   10(0.09)   OUTPUT   Shift Total(mL/kg)       Weight (kg) 113.9 113.9 113.9 113.9      Admit Weight: 114.6 kg (252 lb 11.2 oz)     GENERAL APPEARANCE: jaundiced not in distress, AAOX3  EYES: icteric scleral icterus, pupils equal  Lymphatics: no cervical or supraclavicular LAD  Pulmonary: lungs clear to auscultation with equal breath sounds bilaterally, no clubbing  CV: regular rhythm, normal rate, no rub   - JVD normal    - Edema no LLE edema   GI: tense, nontender distended abdomen, tympanic on percussion    MS: no evidence of inflammation in joints, no muscle tenderness  : no kelley  SKIN: no rash, warm, dry, no cyanosis  NEURO: face symmetric, no asterixis     LABS:   CMP  Recent Labs   Lab 23  0554 23  0638 23  2337 23  1831 23  1252 " 09/03/23  1012 09/03/23  0644 09/02/23  2301 09/02/23 2031 08/30/23  0533   * 127* 126* 126* 125* 125* 124* 121*   < > 125*   POTASSIUM 4.0 4.4  --   --  5.0 5.1 5.1 4.7   < > 4.2   CHLORIDE 98 96*  --   --  92* 93* 91* 90*   < > 96*   CO2 12* 12*  --   --  12* 12* 12* 14*   < > 16*   ANIONGAP 17* 19*  --   --  21* 20* 21* 17*   < > 13   * 101*  --   --  95 100* 95 107*   < > 85   BUN 81.7* 86.2*  --   --  85.8* 86.7* 83.9* 83.2*   < > 45.5*   CR 7.02* 7.77*  --   --  7.19* 7.24* 7.21* 7.99*   < > 4.34*   GFRESTIMATED 9* 8*  --   --  9* 9* 9* 8*   < > 16*   ENEDINA 8.9 9.3  --   --  9.2 9.4 9.3 9.1   < > 9.0   MAG  --   --   --   --   --   --   --   --   --  1.8   PROTTOTAL  --   --   --   --   --   --   --  6.1*  --   --    ALBUMIN 3.2* 3.6  --   --   --   --  3.0* 2.9*  2.9*   < > 3.0*   BILITOTAL 35.0* 35.6*  --   --   --   --  37.6* 36.7*   < > 33.6*   ALKPHOS 118 116  --   --   --   --  151* 144*   < > 125   * 148*  --   --   --   --  173* 161*  --  184*   ALT 39 45  --   --   --   --  59 55   < > 52    < > = values in this interval not displayed.     CBC  Recent Labs   Lab 09/05/23  0554 09/04/23  0735 09/04/23  0638 09/03/23  2337 09/03/23  1020 09/03/23  0644 09/02/23 2031   HGB 8.8* 9.4* 9.5*  9.5* 9.1*   < > 9.9* 10.4*   WBC 15.3*  --  16.7*  --   --  20.3* 18.7*   RBC 2.61*  --  2.80*  --   --  2.93* 3.02*   HCT 24.7*  --  26.5*  --   --  26.6* 27.5*   MCV 95  --  95  --   --  91 91   MCH 33.7*  --  33.9*  --   --  33.8* 34.4*   MCHC 35.6  --  35.8  --   --  37.2* 37.8*   RDW 20.1*  --  19.9*  --   --  19.8* 20.2*     --  170  --   --  191 203    < > = values in this interval not displayed.     INR  Recent Labs   Lab 09/05/23  0554 09/04/23  0638 09/03/23  0644 09/02/23  2111   INR 1.57* 1.56* 1.47* 1.46*   PTT  --   --   --  42*     ABGNo lab results found in last 7 days.   URINE STUDIES  Recent Labs   Lab Test 09/04/23  1214 09/02/23 2236 08/21/23  1253   COLOR Dark Yellow*  Dark Yellow* Alanna*   APPEARANCE Slightly Cloudy* Slightly Cloudy* Cloudy*   URINEGLC Negative Negative  --    URINEBILI Moderate* Large*  --    URINEKETONE Negative Negative 15*   SG 1.013 1.014 1.015   UBLD Negative Negative  --    URINEPH 5.0 5.5  --    PROTEIN 10* 10* 100*   NITRITE Negative Negative  --    LEUKEST Negative Negative  --    RBCU  --  1 2   WBCU  --  3 13*     No lab results found.  PTH  No lab results found.  IRON STUDIES  Recent Labs   Lab Test 08/21/23  1130   IRON 71   *   IRONSAT 55*         Cj Harvey MD

## 2023-09-06 ENCOUNTER — ANCILLARY PROCEDURE (OUTPATIENT)
Dept: ULTRASOUND IMAGING | Facility: CLINIC | Age: 43
End: 2023-09-06
Attending: INTERNAL MEDICINE
Payer: COMMERCIAL

## 2023-09-06 LAB
ABSOLUTE NEUTROPHILS, BODY FLUID: 20.7 /UL
ALBUMIN SERPL BCG-MCNC: 3.9 G/DL (ref 3.5–5.2)
ALP SERPL-CCNC: 113 U/L (ref 40–129)
ALT SERPL W P-5'-P-CCNC: 37 U/L (ref 0–70)
ANION GAP SERPL CALCULATED.3IONS-SCNC: 17 MMOL/L (ref 7–15)
APPEARANCE FLD: CLEAR
AST SERPL W P-5'-P-CCNC: 133 U/L (ref 0–45)
BASE EXCESS BLDV CALC-SCNC: -9 MMOL/L (ref -7.7–1.9)
BILIRUB SERPL-MCNC: 37.1 MG/DL
BLAIMP ISLT/SPM QL: NOT DETECTED
BLAKPC ISLT/SPM QL: NOT DETECTED
BLAOXA-48 ISLT/SPM QL: NOT DETECTED
BLAVIM ISLT/SPM QL: NOT DETECTED
BUN SERPL-MCNC: 77.5 MG/DL (ref 6–20)
CALCIUM SERPL-MCNC: 9.4 MG/DL (ref 8.6–10)
CELL COUNT BODY FLUID SOURCE: NORMAL
CHLORIDE SERPL-SCNC: 100 MMOL/L (ref 98–107)
COLOR FLD: YELLOW
CREAT SERPL-MCNC: 5.87 MG/DL (ref 0.67–1.17)
DEPRECATED HCO3 PLAS-SCNC: 15 MMOL/L (ref 22–29)
ERYTHROCYTE [DISTWIDTH] IN BLOOD BY AUTOMATED COUNT: 19.7 % (ref 10–15)
GFR SERPL CREATININE-BSD FRML MDRD: 11 ML/MIN/1.73M2
GLUCOSE SERPL-MCNC: 99 MG/DL (ref 70–99)
HCO3 BLDV-SCNC: 16 MMOL/L (ref 21–28)
HCT VFR BLD AUTO: 25.8 % (ref 40–53)
HGB BLD-MCNC: 9.2 G/DL (ref 13.3–17.7)
HOLD SPECIMEN: NORMAL
INR PPP: 1.6 (ref 0.85–1.15)
LYMPHOCYTES NFR FLD MANUAL: 21 %
MCH RBC QN AUTO: 34.3 PG (ref 26.5–33)
MCHC RBC AUTO-ENTMCNC: 35.7 G/DL (ref 31.5–36.5)
MCV RBC AUTO: 96 FL (ref 78–100)
MONOS+MACROS NFR FLD MANUAL: NORMAL %
NDM TARGET DNA: NOT DETECTED
NEUTS BAND NFR FLD MANUAL: 8 %
O2/TOTAL GAS SETTING VFR VENT: 21 %
OTHER CELLS FLD MANUAL: 72 %
PCO2 BLDV: 28 MM HG (ref 40–50)
PH BLDV: 7.35 [PH] (ref 7.32–7.43)
PLATELET # BLD AUTO: 144 10E3/UL (ref 150–450)
PO2 BLDV: 67 MM HG (ref 25–47)
POTASSIUM SERPL-SCNC: 4.3 MMOL/L (ref 3.4–5.3)
PROT SERPL-MCNC: ABNORMAL G/DL
RBC # BLD AUTO: 2.68 10E6/UL (ref 4.4–5.9)
SODIUM SERPL-SCNC: 132 MMOL/L (ref 136–145)
WBC # BLD AUTO: 14.8 10E3/UL (ref 4–11)
WBC # FLD AUTO: 276 /UL

## 2023-09-06 PROCEDURE — 250N000013 HC RX MED GY IP 250 OP 250 PS 637: Performed by: INTERNAL MEDICINE

## 2023-09-06 PROCEDURE — 89050 BODY FLUID CELL COUNT: CPT | Performed by: STUDENT IN AN ORGANIZED HEALTH CARE EDUCATION/TRAINING PROGRAM

## 2023-09-06 PROCEDURE — 214N000001 HC R&B CCU UMMC

## 2023-09-06 PROCEDURE — 250N000011 HC RX IP 250 OP 636: Mod: JZ,JB | Performed by: STUDENT IN AN ORGANIZED HEALTH CARE EDUCATION/TRAINING PROGRAM

## 2023-09-06 PROCEDURE — 84075 ASSAY ALKALINE PHOSPHATASE: CPT | Performed by: INTERNAL MEDICINE

## 2023-09-06 PROCEDURE — 49083 ABD PARACENTESIS W/IMAGING: CPT | Performed by: INTERNAL MEDICINE

## 2023-09-06 PROCEDURE — 87798 DETECT AGENT NOS DNA AMP: CPT | Performed by: INTERNAL MEDICINE

## 2023-09-06 PROCEDURE — 250N000013 HC RX MED GY IP 250 OP 250 PS 637: Performed by: PHYSICIAN ASSISTANT

## 2023-09-06 PROCEDURE — 89051 BODY FLUID CELL COUNT: CPT | Performed by: STUDENT IN AN ORGANIZED HEALTH CARE EDUCATION/TRAINING PROGRAM

## 2023-09-06 PROCEDURE — 250N000012 HC RX MED GY IP 250 OP 636 PS 637: Performed by: STUDENT IN AN ORGANIZED HEALTH CARE EDUCATION/TRAINING PROGRAM

## 2023-09-06 PROCEDURE — 36415 COLL VENOUS BLD VENIPUNCTURE: CPT | Performed by: STUDENT IN AN ORGANIZED HEALTH CARE EDUCATION/TRAINING PROGRAM

## 2023-09-06 PROCEDURE — 85027 COMPLETE CBC AUTOMATED: CPT | Performed by: INTERNAL MEDICINE

## 2023-09-06 PROCEDURE — 36415 COLL VENOUS BLD VENIPUNCTURE: CPT | Performed by: INTERNAL MEDICINE

## 2023-09-06 PROCEDURE — 82803 BLOOD GASES ANY COMBINATION: CPT | Performed by: STUDENT IN AN ORGANIZED HEALTH CARE EDUCATION/TRAINING PROGRAM

## 2023-09-06 PROCEDURE — 99233 SBSQ HOSP IP/OBS HIGH 50: CPT | Mod: 25 | Performed by: INTERNAL MEDICINE

## 2023-09-06 PROCEDURE — 99232 SBSQ HOSP IP/OBS MODERATE 35: CPT | Mod: 25 | Performed by: STUDENT IN AN ORGANIZED HEALTH CARE EDUCATION/TRAINING PROGRAM

## 2023-09-06 PROCEDURE — 85610 PROTHROMBIN TIME: CPT | Performed by: INTERNAL MEDICINE

## 2023-09-06 PROCEDURE — 99232 SBSQ HOSP IP/OBS MODERATE 35: CPT | Performed by: STUDENT IN AN ORGANIZED HEALTH CARE EDUCATION/TRAINING PROGRAM

## 2023-09-06 PROCEDURE — 0W9G3ZZ DRAINAGE OF PERITONEAL CAVITY, PERCUTANEOUS APPROACH: ICD-10-PCS | Performed by: INTERNAL MEDICINE

## 2023-09-06 PROCEDURE — 250N000011 HC RX IP 250 OP 636: Performed by: STUDENT IN AN ORGANIZED HEALTH CARE EDUCATION/TRAINING PROGRAM

## 2023-09-06 PROCEDURE — P9047 ALBUMIN (HUMAN), 25%, 50ML: HCPCS | Performed by: STUDENT IN AN ORGANIZED HEALTH CARE EDUCATION/TRAINING PROGRAM

## 2023-09-06 RX ORDER — PREDNISONE 20 MG/1
40 TABLET ORAL DAILY
Status: DISCONTINUED | OUTPATIENT
Start: 2023-09-06 | End: 2023-09-11 | Stop reason: HOSPADM

## 2023-09-06 RX ORDER — LACTULOSE 10 G/15ML
20 SOLUTION ORAL DAILY
Status: DISCONTINUED | OUTPATIENT
Start: 2023-09-07 | End: 2023-09-11 | Stop reason: HOSPADM

## 2023-09-06 RX ORDER — ALBUMIN (HUMAN) 12.5 G/50ML
37.5 SOLUTION INTRAVENOUS ONCE
Status: COMPLETED | OUTPATIENT
Start: 2023-09-06 | End: 2023-09-06

## 2023-09-06 RX ADMIN — OCTREOTIDE ACETATE 100 MCG: 100 INJECTION, SOLUTION INTRAVENOUS; SUBCUTANEOUS at 20:19

## 2023-09-06 RX ADMIN — OCTREOTIDE ACETATE 100 MCG: 100 INJECTION, SOLUTION INTRAVENOUS; SUBCUTANEOUS at 08:35

## 2023-09-06 RX ADMIN — PREDNISONE 40 MG: 20 TABLET ORAL at 20:19

## 2023-09-06 RX ADMIN — FOLIC ACID 1 MG: 1 TABLET ORAL at 08:23

## 2023-09-06 RX ADMIN — MIDODRINE HYDROCHLORIDE 5 MG: 5 TABLET ORAL at 18:23

## 2023-09-06 RX ADMIN — RIFAXIMIN 550 MG: 550 TABLET ORAL at 08:23

## 2023-09-06 RX ADMIN — RIFAXIMIN 550 MG: 550 TABLET ORAL at 20:19

## 2023-09-06 RX ADMIN — ALBUMIN HUMAN 37.5 G: 0.25 SOLUTION INTRAVENOUS at 18:21

## 2023-09-06 RX ADMIN — MIDODRINE HYDROCHLORIDE 5 MG: 5 TABLET ORAL at 08:23

## 2023-09-06 RX ADMIN — Medication 1 TABLET: at 08:24

## 2023-09-06 RX ADMIN — MIDODRINE HYDROCHLORIDE 5 MG: 5 TABLET ORAL at 13:32

## 2023-09-06 RX ADMIN — OCTREOTIDE ACETATE 100 MCG: 100 INJECTION, SOLUTION INTRAVENOUS; SUBCUTANEOUS at 14:32

## 2023-09-06 ASSESSMENT — ACTIVITIES OF DAILY LIVING (ADL)
ADLS_ACUITY_SCORE: 32
ADLS_ACUITY_SCORE: 32
ADLS_ACUITY_SCORE: 18
DEPENDENT_IADLS:: INDEPENDENT
ADLS_ACUITY_SCORE: 18
ADLS_ACUITY_SCORE: 18
ADLS_ACUITY_SCORE: 32
ADLS_ACUITY_SCORE: 18
ADLS_ACUITY_SCORE: 32
ADLS_ACUITY_SCORE: 18
ADLS_ACUITY_SCORE: 32
ADLS_ACUITY_SCORE: 18
ADLS_ACUITY_SCORE: 18

## 2023-09-06 NOTE — PROGRESS NOTES
[facility]    Medicine Progress Note - Hospitalist Service, GOLD TEAM 9    Date of Admission:  9/2/2023    Assessment & Plan   Elroy Morel Sr. is a 43 year old male with history of severe AUD, withdrawal seizures, decompensated cirrhosis (c/b grade II EV, ascites, portal hypertensive gastropathy), HTN, and prior C.diff infection who was admitted to [facility] 8/21/23 with decompensated cirrhosis, JOCELYN, and acute on chronic GI bleed s/p EGD with variceal banding 8/28.  Discharged to [facility] for CD treatment on 8/30/23.  Returned to ED on 9/2/23 with abdominal pain, distension, and ongoing hematochezia.  Found to have worsening renal function concerning for HRS thus transferred back to medicine.      #JOCELYN, concern for HRS:    Recent admission for JOCELYN, up-trending Cr at time of discharge (4.34). Repeat Cr 8.17 on arrival. No urgent indication for HD per nephrology. Most likely HRS vs congestive nephropathy from ESLD  - Nephrology consulted, appreciate recs  - Completed 3 days albumin - 100g 25% albumin ordered (9/3-9/5)  - Continue octreotide (for his HRS, not for his esophageal varices) as 100 mcg TID subcutaneously    - Continue midodrine 5mg TID  - Hold diuretics     #Acute on chronic hematochezia, suspect hemorrhoidal bleed:     Recent admission for melena/hematochezia, s/p EGD with banding of grade II EV on 8/28. Ongoing hematochezia but stable Hgb. GI suspects hemorrhoidal bleed, less likely variceal or upper source.   - OK to stop protonix, and prophylactic ceftriaxone per GI  - Monitor clinically  - Trend Hgb      # Decompensated alcoholic cirrhosis:   # Hepatic encephalopathy:    # recent banding of Grade II EV (8/28/23)  # Portal Hypertensive Gastropathy and colopathy  # Acute on chronic anemia secondary to GI bleed  # History of variceal bleeding(8/13/2023) w/ esophageal ulcer at area (8/28)  MELD 3.0: 39 at 9/6/2023  7:14 AM  MELD-Na: 39 at 9/6/2023  7:14  AM  Calculated from:  Serum Creatinine: 5.87 mg/dL (Using max of 3 mg/dL) at 9/6/2023  7:14 AM  Serum Sodium: 132 mmol/L at 9/6/2023  7:14 AM  Total Bilirubin: 37.1 mg/dL at 9/6/2023  7:14 AM  Serum Albumin: 3.9 g/dL (Using max of 3.5 g/dL) at 9/6/2023  7:14 AM  INR(ratio): 1.60 at 9/6/2023  7:14 AM  Age at listing (hypothetical): 43 years  Sex: Male at 9/6/2023  7:14 AM  Concern for worsening hepatic function and HRS. Ammonia 167. Hepatology has seen patient and relayed the information that patient is currently not a transplant candidate (currently in active treatment for sobriety). He did receive a paracentesis on 9/4 (-2L) and 9/6 (-3L).  Per chart review last on 8/23 (4L) 8/27 (4L) w/ albumin replacement, 8/31  (3L) and 9/3 (diagnostic only, not concerning for SBP). 9/4 (2L) removed via CAPs. Decreased the fluid amount as reportedly pt had blood pressure issues previously with larger volume removal.    - Lactulose 20g daily  - Rifaximin 550 mg BID  - Hepatology consulted and following  - Paracentesis on 9/6 with  (8% neutrophils), not consistent with SBP. Albumin given following fluid removal.  - Start prednisone 40 mg daily for alcoholic hepatitis (Maddrey's score positive based on bili alone)    #Hypervolemic Hyponatremia:    Secondary to cirrhosis and worsening renal function.   - BMP in AM     #Anion gap metabolic acidosis  Secondary to JOCELYN  - BMP and ABG in AM     #Leukocytosis, improving:  Afebrile. Ascitic fluid not suggestive of SBP. No other clear source. CXR with no obvious focal consolidation. UA 9/4 negative.   - No indication for ppx ceftraixone per GI  - blood culture   9/3 - NGTD     #Coagulopathy:    Secondary to ESLD  - daily INR     # Hx HTN:    - Previously on lisinopril, stopped d/t JOCELYN     # Hx C.diff infection:    Completed tx with vancomycin. Does not have C diff infection, rather has colonization      # Hx AUD:    No recent ETOH use. Hx withdrawal seizures, low risk for withdrawal  "symptoms at this point. Was at CD treatment prior to arrival  - Will need CD consult when medically stable  - SW consult        Diet: 2 Gram Sodium Diet    DVT Prophylaxis: Pneumatic Compression Devices  Carroll Catheter: Not present  Lines: None     Cardiac Monitoring: None  Code Status: Full Code      Clinically Significant Risk Factors         # Hyponatremia: Lowest Na = 127 mmol/L in last 2 days, will monitor as appropriate      # Hypoalbuminemia: Lowest albumin = 2.9 g/dL at 9/2/2023 11:01 PM, will monitor as appropriate    # Coagulation Defect: INR = 1.60 (Ref range: 0.85 - 1.15) and/or PTT = 42 Seconds (Ref range: 22 - 38 Seconds), will monitor for bleeding     # Acute Kidney Injury, unspecified: based on a >150% or 0.3 mg/dL increase in last creatinine compared to past 90 day average, will monitor renal function    # Hypertension: Noted on problem list        # Obesity: Estimated body mass index is 39 kg/m  as calculated from the following:    Height as of this encounter: 1.702 m (5' 7\").    Weight as of this encounter: 112.9 kg (249 lb)., PRESENT ON ADMISSION            Disposition Plan      Expected Discharge Date: 09/08/2023      Destination: home with family  Discharge Comments: 9/6: elevated creatnine          Elroy Rankin MD  Hospitalist Service, GOLD TEAM 9  [facility]  Securely message with [employer] (more info)  Text page via [facility] Paging/Directory   See signed in provider for up to date coverage information  ______________________________________________________________________    Interval History   Improvement in Cr from 7.0 to 5.8 this morning. Reports abdominal discomfort with ascites. Paracentesis performed today with 3L removed.    Physical Exam   Vital Signs: Temp: 98  F (36.7  C) Temp src: Oral BP: 129/70 Pulse: 76   Resp: 18 SpO2: 97 % O2 Device: None (Room air)    Weight: 249 lbs 0 oz    Gen: no acute distress, alert, interactive, jaundiced " appearance  HEENT: scleral icterus, EOMI  Cardiovascular: regular rate and rhythm, no murmurs, 2+ distal pulses equal bilaterally  Respiratory: no increased work of breathing, lungs clear to auscultation throughout, no wheezing or crackles  Abdomen: soft, +increasing distension, non-tender throughout  Extremities: No appreciable edema  Skin: no concerning rashes or lesions  Neuro: A&O x4, responds appropriately to exam, no asterixis       Medical Decision Making       50 MINUTES SPENT BY ME on the date of service doing chart review, history, exam, documentation & further activities per the note.      Data     I have personally reviewed the following data over the past 24 hrs:    14.8 (H)  \   9.2 (L)   / 144 (L)     132 (L) 100 77.5 (H) /  99   4.3 15 (L) 5.87 (H) \     ALT: 37 AST: 133 (H) AP: 113 TBILI: 37.1 (HH)   ALB: 3.9 TOT PROTEIN: N/A LIPASE: N/A     INR:  1.60 (H) PTT:  N/A   D-dimer:  N/A Fibrinogen:  N/A     Imaging results reviewed over the past 24 hrs:   Recent Results (from the past 24 hour(s))   POC US Guide for Paracentesis    Impression    US Indication: decompensated liver disease    Limited abdominal ultrasound was performed and demonstrated an adequate fluid collection on the right side of the abdomen.     Doppler of the skin demonstrated an area at this site without significant vasculature.  A paracentesis at this site was subsequently performed.    Katelyn Ramos MD

## 2023-09-06 NOTE — PLAN OF CARE
D: admitted 9/2 JOCELYN, abdominal distension, hematochezia   I/A: Pt is a/ox4 can get agitated. Afebrile. HR 70s. No tele order. BP normal. LS clear. 8pm lactulose not given; pt reported >6 BM. Voiding; urine orange/jackson. Pt denied abdominal discomfort this shift. 2g Na+ diet. 2 PIV. Up ad grazyna.     P: Continue octreotide, albumin, and midodrine for HRS per nephrology. Await AM ABG from lab     Goal Outcome Evaluation:      Plan of Care Reviewed With: patient    Overall Patient Progress: improvingOverall Patient Progress: improving    Outcome Evaluation: Pt reporting >6 stool daily. denies abdominal discomfort

## 2023-09-06 NOTE — CONSULTS
Care Management Initial Consult    General Information  Assessment completed with: Patient, Spouse or significant other,    Type of CM/SW Visit: Initial Assessment    Primary Care Provider verified and updated as needed: Yes   Readmission within the last 30 days: previous discharge plan unsuccessful      Reason for Consult: financial concerns, substance use concerns  Advance Care Planning: Advance Care Planning Reviewed: verified with patient (Wife has copy.)          Communication Assessment  Patient's communication style: spoken language (English or Bilingual)    Hearing Difficulty or Deaf: no   Wear Glasses or Blind: no    Cognitive  Cognitive/Neuro/Behavioral: WDL                      Living Environment:   People in home: child(gabriela), adult, spouse (Two children aged 18 and 20)     Current living Arrangements: house      Able to return to prior arrangements: yes       Family/Social Support:  Care provided by:    Provides care for: no one  Marital Status: Lives with Significant Other (Engaged, Plans to get  ASAP-[employer]over[employer] obtaining marriage certificate, hospital estrada consulted for ceremony in hospital.)  Wife, Children, Other (specify) (adult family members)          Description of Support System:           Current Resources:   Patient receiving home care services:       Community Resources:    Equipment currently used at home: none  Supplies currently used at home:      Employment/Financial:  Employment Status: disabled (short term disablity applying for long term. Does not anticipate return to work.)        Financial Concerns:             Does the patient's insurance plan have a 3 day qualifying hospital stay waiver?  No    Lifestyle & Psychosocial Needs:  Social Determinants of Health     Tobacco Use: Medium Risk (9/6/2023)    Patient History     Smoking Tobacco Use: Former     Smokeless Tobacco Use: Former     Passive Exposure: Not on file   Alcohol Use: Not on file   Financial Resource Strain: Not on  file   Food Insecurity: Not on file   Transportation Needs: Not on file   Physical Activity: Not on file   Stress: Not on file   Social Connections: Not on file   Intimate Partner Violence: Not on file   Depression: Not at risk (8/30/2023)    PHQ-2     PHQ-2 Score: 2   Housing Stability: Not on file       Functional Status:  Prior to admission patient needed assistance:   Dependent ADLs:: Independent  Dependent IADLs:: Independent       Mental Health Status:  Mental Health Status: No Current Concerns       Chemical Dependency Status:  Chemical Dependency Status: Current Concern (ETOH use.)  Chemical Dependency Management: Other (see comment) (Plans to attend OP ETOH TX virtually.)          Values/Beliefs:  Spiritual, Cultural Beliefs, Gnosticism Practices, Values that affect care: yes               Additional Information:  SW met with Pt and family at bedside and wife via Pt phone. Pt has been in Substance Use TX for ETOH use since last discharge and current admission. Pt identifies that he desires to discharge home and complete CD TX with virtual option from home. SW has paged TX team to place CHEM DEPTH IP CONSULT.  Pt identifies that he ha a ACP/HCD that his wife has. Pt identifies that he is currently on short term disability and does not plan to return to work at this time as he does not think he will be physically able to. Pt did not identify any home equipment needs or current use at this time. Pt identifies that his fiance(soon to be wife) and adult children as well as other family members are strong supports for him. Pt identifies that family/support are able to aid with transportation and if needed he can pay for a ride. Pt plans to apply for long term disability shortly. Pt identified that he is independent with ADL's and IADL's. Pt did not endorse any MH concerns. Pt identified that he wishes to complete OP EOTH TX virtually at home. Per Pt's spouse it will be helpful to explore Medical Assistance for Pt  as his current insurance is thru his employment which will soon be running out. SW placed Financial Counseling Referral and was informed that Pt's best step to obtain a refund from [facility] is to call their business office at Ph: [phone] or Yuridia Mercer: [phone]. [facility] will continue to follow Pt for safe discharge planning.     ________________    DORIE Grande, [facility] Hospital[facility]     [address]  [facility]  Phone: [phone]  Pager: [phone]  Fax: [phone]

## 2023-09-06 NOTE — PROGRESS NOTES
Gastroenterology Follow up Note         Assessment and Plan:     Elroy Morel Sr. is a 43 year old male with PMH of alcohol use disorder, alcohol-related liver disease who is re-admitted due to worsening JOCELYN, ascites. Hepatology consulted for evaluation of alcohol-related liver disease and associated complications.      #Decompensated EtOH cirrhosis  #AUD  #Severe EtOH-related hepatitis  #Ascites  #JOCELYN    MELD 3.0: 39 at 9/6/2023  7:14 AM  MELD-Na: 39 at 9/6/2023  7:14 AM  Calculated from:  Serum Creatinine: 5.87 mg/dL (Using max of 3 mg/dL) at 9/6/2023  7:14 AM  Serum Sodium: 132 mmol/L at 9/6/2023  7:14 AM  Total Bilirubin: 37.1 mg/dL at 9/6/2023  7:14 AM  Serum Albumin: 3.9 g/dL (Using max of 3.5 g/dL) at 9/6/2023  7:14 AM  INR(ratio): 1.60 at 9/6/2023  7:14 AM  Age at listing (hypothetical): 43 years  Sex: Male at 9/6/2023  7:14 AM    Patient currently not a transplant candidate. Was previously advised to stop EtOH drinking, but he continued to drink, and only recently quit drinking.     Not a candidate for Terlipressin at this time. JOCELYN improving. Currently on midodrine/octreotide. Completed albumin course.     Having significant stool output with current lactulose regimen.     Overall has poor prognosis, and the only option we have at this time is to treat his alcoholic hepatitis with steroids. We previously did not proceed with this plan due to his JOCELYN and infectious diarrhea. C. Dif testing suggestive of colonization, completed treatment course with vanco. Paracentesis 9/6 negative for SBP. Blood cx and UA negative, recommend started steroids for Alcohol related hepatitis. Discussed the risks of infection with the patient, and that not all patients respond.            Recommendations:     - Would decrease lactulose to 20 g once daily (goal of 2 bowel movements daily should be adequate).  - Continue rifaximin.   - Would recommend steroid therapy for alcoholic hepatitis given paracentesis negative for SBP.  Start prednisolone 40 mg daily, will see if he is responding on day 4 to see if steroids should continue  - MELD labs daily.  - Appreciate Nephrology input.  - Hold off diuretics at this time.   - Give albumin with paracentesis    It has been a pleasure to participate in the care of this patient.  Patient discussed with GI staff, Dr. Crews.  Please do not hesitate to contact the GI service with any questions or concerns.     Sage Ayers MD  Gastroenterology Fellow  Pager [phone]    Physician Attestation   I saw this patient with the resident and agree with the resident/fellow's findings and plan of care as documented in the note.      40 MINUTES SPENT BY ME on the date of service doing chart review, history, exam, documentation & further activities per the note.    I have personally reviewed the following data over the past 24 hrs:    14.8 (H)  \   9.2 (L)   / 144 (L)     132 (L) 100 77.5 (H) /  99   4.3 15 (L) 5.87 (H) \       ALT: 37 AST: 133 (H) AP: 113 TBILI: 37.1 (HH)   ALB: 3.9 TOT PROTEIN: N/A LIPASE: N/A       INR:  1.60 (H) PTT:  N/A   D-dimer:  N/A Fibrinogen:  N/A         Jennifer Crews MD  Date of Service (when I saw the patient): 09/06/23           Subjective/Objective:   - No acute events overnight  Reports his abdomen is getting distended again, asks for another paracentesis. Notes some streaks of blood in his stool. Denies fever/chills or nausea.          Medications:     Current Facility-Administered Medications   Medication     calcium carbonate (TUMS) chewable tablet 1,000 mg     Daily 2 GRAM acetaminophen limit, unless fulminent liver failure or transaminases greater than or equal to 300 - 400, then none     folic acid (FOLVITE) tablet 1 mg     [START ON 9/7/2023] lactulose (CHRONULAC) solution 20 g     lactulose (CHRONULAC) solution 20 g    Or     lactulose (CHRONULAC) solution for enema prep 100 g     lidocaine (LMX4) cream     lidocaine 1 % 0.1-1 mL     melatonin tablet 1 mg      "midodrine (PROAMATINE) tablet 5 mg     multivitamin w/minerals (THERA-VIT-M) tablet 1 tablet     octreotide (sandoSTATIN) injection 100 mcg     ondansetron (ZOFRAN ODT) ODT tab 4 mg    Or     ondansetron (ZOFRAN) injection 4 mg     [Held by provider] polyethylene glycol (MIRALAX) Packet 17 g     rifaximin (XIFAXAN) tablet 550 mg     sodium chloride (PF) 0.9% PF flush 3 mL     sodium chloride (PF) 0.9% PF flush 3 mL            Physical Exam:   VS:  /70 (BP Location: Left arm)   Pulse 75   Temp 98.1  F (36.7  C) (Oral)   Resp 18   Ht 1.702 m (5' 7\")   Wt 113.9 kg (251 lb 1.7 oz)   SpO2 98%   BMI 39.33 kg/m      Wt:   Wt Readings from Last 2 Encounters:   09/03/23 113.9 kg (251 lb 1.7 oz)   08/27/23 114.8 kg (253 lb 1.6 oz)      Constitutional: cooperative, pleasant, no acute distress  Eyes: Conjunctiva icteric  HEENT: Normal oropharynx without ulcers or exudate, mucus membranes moist  CV: RRR, no m/r/g  Respiratory: CTAB  Abd: + Distended, +bs, no hepatosplenomegaly, nontender, no rebound or guarding   Skin: warm, perfused, + jaundice  Neuro: AAO x 3, No asterixis         Laboratory:   BMP  Recent Labs   Lab 09/06/23  0714 09/05/23  0554 09/04/23  0638 09/03/23  2337 09/03/23  1831 09/03/23  1252   * 127* 127* 126*   < > 125*   POTASSIUM 4.3 4.0 4.4  --   --  5.0   CHLORIDE 100 98 96*  --   --  92*   ENEDINA 9.4 8.9 9.3  --   --  9.2   CO2 15* 12* 12*  --   --  12*   BUN 77.5* 81.7* 86.2*  --   --  85.8*   CR 5.87* 7.02* 7.77*  --   --  7.19*   GLC 99 110* 101*  --   --  95    < > = values in this interval not displayed.     CBC  Recent Labs   Lab 09/06/23  0714 09/05/23  0554 09/04/23  0735 09/04/23  0638 09/03/23  1020 09/03/23  0644   WBC 14.8* 15.3*  --  16.7*  --  20.3*   RBC 2.68* 2.61*  --  2.80*  --  2.93*   HGB 9.2* 8.8* 9.4* 9.5*  9.5*   < > 9.9*   HCT 25.8* 24.7*  --  26.5*  --  26.6*   MCV 96 95  --  95  --  91   MCH 34.3* 33.7*  --  33.9*  --  33.8*   MCHC 35.7 35.6  --  35.8  --  37.2* "   RDW 19.7* 20.1*  --  19.9*  --  19.8*   * 158  --  170  --  191    < > = values in this interval not displayed.     INR  Recent Labs   Lab 09/06/23 0714 09/05/23  0554 09/04/23  0638 09/03/23  0644   INR 1.60* 1.57* 1.56* 1.47*     LFTs  Recent Labs   Lab 09/06/23 0714 09/05/23  0554 09/04/23  0638 09/03/23  0644 09/02/23  2301   ALKPHOS 113 118 116 151* 144*   * 140* 148* 173* 161*   ALT 37 39 45 59 55   BILITOTAL 37.1* 35.0* 35.6* 37.6* 36.7*   PROTTOTAL  --   --   --   --  6.1*   ALBUMIN 3.9 3.2* 3.6 3.0* 2.9*  2.9*      PANC  Recent Labs   Lab 09/02/23  2031   LIPASE 435*            Imaging/Procedures/Studies:     Results for orders placed or performed during the hospital encounter of 08/21/23   COLONOSCOPY   Result Value Ref Range    COLONOSCOPY       [address] [place] [address] [phone]     Endoscopy Department  _______________________________________________________________________________  Patient Name: Elroy Morel             Procedure Date: 8/28/2023 11:09 AM  MRN: [id number]                       Account Number: [id number]  YOB: 1980              Admit Type: Inpatient  Age: 43                               Room: [facility]                      Gender: Male                          Note Status: Finalized  Attending MD: DARA GAN MD,   Total Sedation Time: 9 minutes  _______________________________________________________________________________     Procedure:             Colonoscopy  Indications:           Hematochezia  Providers:             DARA GAN MD, Leti Parra RN, Emmy Quarles RN  Referring MD:            Medicines:             Fentanyl 100 micrograms IV  Complications:          No immediate complications.  _______________________________________________________________________________  Procedure:             Pre-Anesthesia Assessment:                          - Prior to the procedure, a History and Physical was                          performed, and patient medications and allergies were                          reviewed. The patient is competent. The risks and                          benefits of the procedure and the sedation options and                          risks were discussed with the patient. All questions                          were answered and informed consent was obtained.                          Patient identification and proposed procedure were                          verified by the physician in the procedure room.                          Mental Status Examination: alert and oriented. Airway                          Examination: normal oropharyngeal airway and neck                          mobility. Respiratory Examination: clear to                           auscultation. CV Examination: normal. Prophylactic                          Antibiotics: The patient does not require prophylactic                          antibiotics. Prior Anticoagulants: The patient has                          taken no anticoagulant or antiplatelet agents. ASA                          Grade Assessment: III - A patient with severe systemic                          disease. After reviewing the risks and benefits, the                          patient was deemed in satisfactory condition to                          undergo the procedure. The anesthesia plan was to use                          moderate sedation / analgesia (conscious sedation).                          Immediately prior to administration of medications,                          the patient was re-assessed for adequacy to receive                          sedatives. The heart rate, respiratory rate, oxygen                          saturations, blood pressure, adequacy of pulmonary                           ventilation, and response to care were monitored                          throughout  the procedure. The physical status of the                          patient was re-assessed after the procedure.                         After obtaining informed consent, the colonoscope was                          passed under direct vision. Throughout the procedure,                          the patient's blood pressure, pulse, and oxygen                          saturations were monitored continuously. The                          Colonoscope was introduced through the anus and                          advanced to the terminal ileum. The colonoscopy was                          performed without difficulty. The patient tolerated                          the procedure well. The quality of the bowel                          preparation was good.                                                                                   Findings:       Two sessile polyps were found in the s igmoid colon. The polyps were        diminutive in size. These polyps were removed with a cold biopsy        forceps. Resection and retrieval were complete.       Small, non-bleeding rectal varices were found.       A diffuse area of mildly erythematous mucosa was found in the sigmoid        colon.                                                                                   Impression:            - Two diminutive polyps in the sigmoid colon, removed                          with a cold biopsy forceps. Resected and retrieved.                         - Rectal varices.                         - Erythematous mucosa in the sigmoid colon and rectum                          with come erythem, consistent with portal colopathy.                          This was the likely cause of hematochezia.                         No blood was seen.                         Terminal ileum was normal.  Recommendation:        - Await pathology results.                                                                                      electronically signed by  Dara Gan  ______________________________  DARA GAN MD  8/28/2023 12:41:13 PM  I was physically present for the entire viewing portion of the exam.  __________________________  Signature of teaching physician  [id number] GAN MD  Number of Addenda: 0    Note Initiated On: 8/28/2023 11:09 AM  Scope In: 12:14:28 PM  Scope Out: 12:25:25 PM        No new imaging or procedures in the last 24 hours

## 2023-09-06 NOTE — PROGRESS NOTES
Nephrology Initial Consult  September 3, 2023      Elroy Morel Sr. MRN:[id number] YOB: 1980  Date of Admission:9/2/2023  Primary care provider: Latosha Baires  Requesting physician: Radha Du,*    ASSESSMENT AND RECOMMENDATIONS:     Elroy Morel Sr. is a 43 year old male with history of severe AUD, withdrawal seizures, decompensated cirrhosis (c/b grade II EV, ascites, portal hypertensive gastropathy), HTN, and prior C.diff infection who was admitted to [facility] 8/21/23 with decompensated cirrhosis, JOCELYN, and acute on chronic GI bleed s/p EGD with variceal banding 8/28.  Discharged to [facility] for CD treatment on 8/30/23.  Returned to ED on 9/2/23 with abdominal pain, distension, and ongoing hematochezia.  Found to have worsening renal function concerning for HRS. Admitted by medicine overnight, now transferred to [facility] for specialty care.     #JOCELYN on CKD Dramatic rise in Cr from 0.9=> 5.9> 8>7>5.8 correlating with bili rising from 12 to 30.   DDx included bili cast nephropathy, congestion with severe ascites and Type I HRS although BP's are better than one would expect for HRS typically.    -Continue midodrine 5mg TID   -No issues in chemistries or volume requiring RRT, hopeful that renal fx is starting to recover with increased UOP.    -consent for RRT obtained and placed in chart   Paracentesis done 9/4 with 2 liters removed   Spun his urine showed bilirubin casts, mucous and epithelial cells which is consistent with an HRS picture   -c/w octreotide (for his HRS, not for his esophageal varices) as 100 mcg TID subcutaneously          # Hypervolemic Hyponatremia  - Secondary to cirrhosis and worsening renal function  - Na 120->125>127>132      #HAGMA gap of 17 2/2 uremia and his severe JOCELYN   HCO3 of 12>15  Obtain an vbg with the next set of labs      BMD-Ca 9.2, Mg 2.0, Phos 3.9 last check, no acute issues.      Anemia-Hgb 8.8  2/2 GIB      #Decompensated ETOH-related  cirrhosis with ascites  #Severe alcohol-related hepatitis w/o evidence of liver failure (improving LFTs)  # Coagulopathy w/ elevated INR  #History of variceal bleeding  #Alcohol use disorder, severe  Paracentesis 8/23, 27, and 9/2    Recommendations were communicated to primary team via note    Seen and discussed with Dr. Natalia Harvey MD  Division of Renal Disease and Hypertension  [facility]  myairmail  Vocera Web Console        REASON FOR CONSULT: JOCELYN iso HRS    HISTORY OF PRESENT ILLNESS:    Elroy Morel Sr. is a 43 year old male who has hx of advanced decompensated cirrhosis, JOCELYN with recent admission with GIB s/p EV banding and polypectomies who presented from [facility] with worsenign abdominal distention and hematochezia. He denied any fever, diarrhea, hematemesis or abdominal pain. He denied alcohol use interim. He has been making urine. He states he has been tolerating po.     Of note Was admitted recently here 8/21 for significantly elevated bilirubin total bili 20.2  due to etoH liver failure, was not a candidate for steroids per hepatology back then due to diarrhea and JOCELYN. In that admission no etoh withdrawal sxs. spun his urine 8/23 which showed no granular or hyaline casts.    JOCELYN was suspected to be hepatorenal syndrome based on albumin challenge.  During the stay, LFTs, bilirubin and creatinine downtrending.  No alcohol withdrawal while inpatient.  EGD on 8/28 showed grade 2 esophageal varices status post banding, portal hypertensive gastropathy, esophageal ulcer prior variceal treatment site, 2 polyps in sigmoid colon status post removal, rectal varices, erythematous mucosa in the sigmoid colon/rectum consistent with portal colopathy.  Colonoscopy on 8/28 showed 2 sigmoid colon polyps removed with cold biopsy forceps.  Rectal varices.  They suspected that the cause of hematochezia was portal colopathy.  No blood was seen at that time.  He was continued on IV PPI and switched to p.o.  challenge.   5 days after admission cr downtrended and was having good UOP, so renal signed off with an outpatient CKD follow-up and not resuming  his home lisinopril on discharge     PAST MEDICAL HISTORY:  Reviewed with patient on 09/06/2023     Past Medical History:   Diagnosis Date    Alcohol use disorder, severe, dependence (H)     Alcohol withdrawal seizure (H)     Alcoholic cirrhosis of liver with ascites (H)     Esophageal varices (H)     Essential hypertension     Gastroesophageal reflux disease without esophagitis     History of methamphetamine use     Sustained remission since 2003    Hypercholesterolemia     Tobacco abuse        Past Surgical History:   Procedure Laterality Date    COLONOSCOPY N/A 8/28/2023    Procedure: COLONOSCOPY, WITH POLYPECTOMY via bx forceps;  Surgeon: Alyssa Tsai MD;  Location:  GI        MEDICATIONS:  PTA Meds  Prior to Admission medications    Medication Sig Last Dose Taking? Auth Provider Long Term End Date   acetaminophen (TYLENOL) 325 MG tablet Take 325-650 mg by mouth every 4 hours as needed for mild pain   Reported, Patient     aspirin 81 MG EC tablet Take 1 tablet (81 mg) by mouth daily for 120 days   Emil Moser MD  12/28/23   benzocaine-menthol (CEPACOL) 15-3.6 MG lozenge Place 1 lozenge inside cheek every 2 hours as needed for sore throat   Reported, Patient     calcium carbonate (TUMS) 500 MG chewable tablet Take 2 tablets (1,000 mg) by mouth 4 times daily as needed for heartburn   Emil Moser MD  9/29/23   folic acid (FOLVITE) 1 MG tablet Take 1 tablet (1 mg) by mouth daily for 30 days   Emil Moser MD No 9/29/23   gabapentin (NEURONTIN) 100 MG capsule Take 1 capsule (100 mg) by mouth At Bedtime for 30 days   Emil Moser MD Yes 9/29/23   guaiFENesin 200 MG/10ML LIQD Take 200 mg by mouth every 4 hours as needed for cough   Reported, Patient     loratadine (CLARITIN) 10 MG tablet Take 10 mg by mouth daily as needed for  allergies   Reported, Patient     melatonin 3 MG tablet Take 3 mg by mouth nightly as needed for sleep   Reported, Patient     multivitamin w/minerals (THERA-VIT-M) tablet Take 1 tablet by mouth daily for 30 days   Emil Moser MD  9/29/23   ondansetron (ZOFRAN ODT) 4 MG ODT tab Take 1 tablet (4 mg) by mouth every 8 hours as needed for nausea or vomiting   Emil Moser MD  9/29/23   pantoprazole (PROTONIX) 40 MG EC tablet Take 1 tablet (40 mg) by mouth daily   Jennifer Fletcher MD     senna-docusate (SENOKOT-S/PERICOLACE) 8.6-50 MG tablet Take 2 tablets by mouth daily as needed for constipation   Reported, Patient        Current Meds   folic acid  1 mg Oral Daily    lactulose  20 g Oral or NG Tube TID    midodrine  5 mg Oral TID w/meals    multivitamin w/minerals  1 tablet Oral Daily    octreotide  100 mcg Subcutaneous TID    [Held by provider] polyethylene glycol  17 g Oral Daily    rifaximin  550 mg Oral or Feeding Tube BID    sodium chloride (PF)  3 mL Intracatheter Q8H    sodium chloride (PF)  3 mL Intracatheter Q8H     Infusion Meds   - MEDICATION INSTRUCTIONS -         ALLERGIES:    Allergies   Allergen Reactions    Metronidazole Other (See Comments), Dizziness and Unknown    Omeprazole Other (See Comments) and Unknown     Irritability (tolerates Protonix well)       REVIEW OF SYSTEMS:  A comprehensive of systems was negative except as noted above.    SOCIAL HISTORY:   Social History     Socioeconomic History    Marital status:      Spouse name: Not on file    Number of children: Not on file    Years of education: Not on file    Highest education level: Not on file   Occupational History    Not on file   Tobacco Use    Smoking status: Former     Types: Cigarettes    Smokeless tobacco: Former   Substance and Sexual Activity    Alcohol use: Not Currently     Comment: last drink 8/19    Drug use: Not Currently     Types: Amphetamines     Comment: Sustained remission    Sexual activity: Not on file  "  Other Topics Concern    Not on file   Social History Narrative    Pt is , 2 children. Employed.      Social Determinants of Health     Financial Resource Strain: Not on file   Food Insecurity: Not on file   Transportation Needs: Not on file   Physical Activity: Not on file   Stress: Not on file   Social Connections: Not on file   Intimate Partner Violence: Not on file   Housing Stability: Not on file     Reviewed with patient    accompanies Elroy Morel Sr. in hospital room    FAMILY MEDICAL HISTORY:   History reviewed. No pertinent family history.  Reviewed with patient     PHYSICAL EXAM:   Temp  Av.8  F (36.6  C)  Min: 97.5  F (36.4  C)  Max: 98.3  F (36.8  C)      Pulse  Av  Min: 77  Max: 88 Resp  Av.3  Min: 16  Max: 20  SpO2  Av.9 %  Min: 99 %  Max: 100 %       /70 (BP Location: Left arm)   Pulse 78   Temp 98.1  F (36.7  C) (Oral)   Resp 17   Ht 1.702 m (5' 7\")   Wt 113.9 kg (251 lb 1.7 oz)   SpO2 93%   BMI 39.33 kg/m     Date 23 07 - 23 0659   Shift 6837-9222 5772-0076 8010-3659 24 Hour Total   INTAKE   I.V. 10   10   Shift Total(mL/kg) 10(0.09)   10(0.09)   OUTPUT   Shift Total(mL/kg)       Weight (kg) 113.9 113.9 113.9 113.9      Admit Weight: 114.6 kg (252 lb 11.2 oz)     GENERAL APPEARANCE: jaundiced not in distress, AAOX3  EYES: icteric scleral icterus, pupils equal  Lymphatics: no cervical or supraclavicular LAD  Pulmonary: lungs clear to auscultation with equal breath sounds bilaterally, no clubbing  CV: regular rhythm, normal rate, no rub   - JVD normal    - Edema no LLE edema   GI: tense, nontender distended abdomen, tympanic on percussion    MS: no evidence of inflammation in joints, no muscle tenderness  : no kelley  SKIN: no rash, warm, dry, no cyanosis  NEURO: face symmetric, no asterixis     LABS:   CMP  Recent Labs   Lab 23  0714 23  0554 23  0638 23  6523 23  1831 23  1252 23  1012 23  0644 " 09/02/23  2301   * 127* 127* 126*   < > 125*   < > 124* 121*   POTASSIUM 4.3 4.0 4.4  --   --  5.0   < > 5.1 4.7   CHLORIDE 100 98 96*  --   --  92*   < > 91* 90*   CO2 15* 12* 12*  --   --  12*   < > 12* 14*   ANIONGAP 17* 17* 19*  --   --  21*   < > 21* 17*   GLC 99 110* 101*  --   --  95   < > 95 107*   BUN 77.5* 81.7* 86.2*  --   --  85.8*   < > 83.9* 83.2*   CR 5.87* 7.02* 7.77*  --   --  7.19*   < > 7.21* 7.99*   GFRESTIMATED 11* 9* 8*  --   --  9*   < > 9* 8*   ENEDINA 9.4 8.9 9.3  --   --  9.2   < > 9.3 9.1   PROTTOTAL  --   --   --   --   --   --   --   --  6.1*   ALBUMIN 3.9 3.2* 3.6  --   --   --   --  3.0* 2.9*  2.9*   BILITOTAL 37.1* 35.0* 35.6*  --   --   --   --  37.6* 36.7*   ALKPHOS 113 118 116  --   --   --   --  151* 144*   * 140* 148*  --   --   --   --  173* 161*   ALT 37 39 45  --   --   --   --  59 55    < > = values in this interval not displayed.     CBC  Recent Labs   Lab 09/06/23  0714 09/05/23  0554 09/04/23  0735 09/04/23  0638 09/03/23  1020 09/03/23  0644   HGB 9.2* 8.8* 9.4* 9.5*  9.5*   < > 9.9*   WBC 14.8* 15.3*  --  16.7*  --  20.3*   RBC 2.68* 2.61*  --  2.80*  --  2.93*   HCT 25.8* 24.7*  --  26.5*  --  26.6*   MCV 96 95  --  95  --  91   MCH 34.3* 33.7*  --  33.9*  --  33.8*   MCHC 35.7 35.6  --  35.8  --  37.2*   RDW 19.7* 20.1*  --  19.9*  --  19.8*   * 158  --  170  --  191    < > = values in this interval not displayed.     INR  Recent Labs   Lab 09/06/23  0714 09/05/23  0554 09/04/23  0638 09/03/23  0644 09/02/23  2111   INR 1.60* 1.57* 1.56* 1.47* 1.46*   PTT  --   --   --   --  42*     ABGNo lab results found in last 7 days.   URINE STUDIES  Recent Labs   Lab Test 09/04/23  1214 09/02/23  2236 08/21/23  1253   COLOR Dark Yellow* Dark Yellow* Alanna*   APPEARANCE Slightly Cloudy* Slightly Cloudy* Cloudy*   URINEGLC Negative Negative  --    URINEBILI Moderate* Large*  --    URINEKETONE Negative Negative 15*   SG 1.013 1.014 1.015   UBLD Negative Negative   --    URINEPH 5.0 5.5  --    PROTEIN 10* 10* 100*   NITRITE Negative Negative  --    LEUKEST Negative Negative  --    RBCU  --  1 2   WBCU  --  3 13*     No lab results found.  PTH  No lab results found.  IRON STUDIES  Recent Labs   Lab Test 08/21/23  1130   IRON 71   *   IRONSAT 55*         Cj Harvey MD

## 2023-09-06 NOTE — PROCEDURES
[facility]  CAPS PROCEDURE NOTE  Date of Admission:  9/2/2023  Consult Requested by: Medicine  Reason for Consult: management of symptomatic ascites    Indication/HPI: ascites    Pre-Procedure Diagnosis: Ascites    Post-Procedure Diagnosis: Ascites    Risk Assessment: Average risk, Technically straightforward; patient's anticoagulation has been held according to guidelines based on the agent and platelets and coags are within guidelines    Procedure Outcome:  Therapeutic paracentesis performed with 3 liters of ascites removed. Bright yellow, sample sent to lab  See additional procedure details below.    The primary covering service should follow up and address any lab results as appropriate.    Katelyn Ramos MD  [facility]  Securely message with Vocera (more info)  Text page via [employer] Paging/Directory   See signed in provider for up to date coverage information      [facility]    Paracentesis    Date/Time: 9/6/2023 3:30 PM    Performed by: Katelyn Ramos MD  Authorized by: Katelyn Ramos MD    PRE-PROCEDURE DETAILS  Initial or subsequent exam: subsequent  Procedure purpose: diagnostic and therapeutic  Indications: abdominal discomfort secondary to ascites        UNIVERSAL PROTOCOL   Site Marked: Yes  Prior Images Obtained and Reviewed:  Yes  Required items: Required blood products, implants, devices and special equipment available    Patient identity confirmed:  Verbally with patient  NA - No sedation, light sedation, or local anesthesia  Confirmation Checklist:  Patient's identity using two indicators  Time out: Immediately prior to the procedure a time out was called    Universal Protocol: the Joint Commission Universal Protocol was followed    Preparation: Patient was prepped and draped in usual sterile fashion       ANESTHESIA    Anesthesia:  Local  infiltration  Local Anesthetic:  Lidocaine 1% without epinephrine  Anesthetic Total (mL):  8      SEDATION    Patient Sedated: No    POST PROCEDURE DETAILS  Needle gauge: 22  Ultrasound guidance: yes  Puncture site: right lower quadrant  Fluid characteristics: bright yellow clear.  Dressing: glue and bandaid.        PROCEDURE  Describe Procedure: Sample was sent to the lab  Patient Tolerance:  Patient tolerated the procedure well with no immediate complications  Length of time physician/provider present for 1:1 monitoring during sedation: 0        POC US Guide for Paracentesis     Impression  US Indication: decompensated liver disease    Limited abdominal ultrasound was performed and demonstrated an adequate fluid collection on the right side of the abdomen.    Doppler of the skin demonstrated an area at this site without significant vasculature.  A paracentesis at this site was subsequently performed.    Katelyn Ramos MD

## 2023-09-07 LAB
ALBUMIN SERPL BCG-MCNC: 4 G/DL (ref 3.5–5.2)
ALP SERPL-CCNC: 123 U/L (ref 40–129)
ALT SERPL W P-5'-P-CCNC: 41 U/L (ref 0–70)
ANION GAP SERPL CALCULATED.3IONS-SCNC: 17 MMOL/L (ref 7–15)
AST SERPL W P-5'-P-CCNC: 134 U/L (ref 0–45)
BILIRUB SERPL-MCNC: 37.1 MG/DL
BUN SERPL-MCNC: 70.9 MG/DL (ref 6–20)
CALCIUM SERPL-MCNC: 9.7 MG/DL (ref 8.6–10)
CHLORIDE SERPL-SCNC: 98 MMOL/L (ref 98–107)
CREAT SERPL-MCNC: 4.71 MG/DL (ref 0.67–1.17)
DEPRECATED HCO3 PLAS-SCNC: 13 MMOL/L (ref 22–29)
EGFRCR SERPLBLD CKD-EPI 2021: 15 ML/MIN/1.73M2
ERYTHROCYTE [DISTWIDTH] IN BLOOD BY AUTOMATED COUNT: 19.5 % (ref 10–15)
GLUCOSE SERPL-MCNC: 168 MG/DL (ref 70–99)
HCT VFR BLD AUTO: 26.3 % (ref 40–53)
HGB BLD-MCNC: 9.3 G/DL (ref 13.3–17.7)
INR PPP: 1.73 (ref 0.85–1.15)
MCH RBC QN AUTO: 34.1 PG (ref 26.5–33)
MCHC RBC AUTO-ENTMCNC: 35.4 G/DL (ref 31.5–36.5)
MCV RBC AUTO: 96 FL (ref 78–100)
PLATELET # BLD AUTO: 151 10E3/UL (ref 150–450)
POTASSIUM SERPL-SCNC: 4.3 MMOL/L (ref 3.4–5.3)
PROT SERPL-MCNC: ABNORMAL G/DL
RBC # BLD AUTO: 2.73 10E6/UL (ref 4.4–5.9)
SODIUM SERPL-SCNC: 128 MMOL/L (ref 136–145)
WBC # BLD AUTO: 14.1 10E3/UL (ref 4–11)

## 2023-09-07 PROCEDURE — 250N000012 HC RX MED GY IP 250 OP 636 PS 637: Performed by: STUDENT IN AN ORGANIZED HEALTH CARE EDUCATION/TRAINING PROGRAM

## 2023-09-07 PROCEDURE — 82040 ASSAY OF SERUM ALBUMIN: CPT | Performed by: INTERNAL MEDICINE

## 2023-09-07 PROCEDURE — 250N000013 HC RX MED GY IP 250 OP 250 PS 637: Performed by: INTERNAL MEDICINE

## 2023-09-07 PROCEDURE — 85027 COMPLETE CBC AUTOMATED: CPT | Performed by: INTERNAL MEDICINE

## 2023-09-07 PROCEDURE — 214N000001 HC R&B CCU UMMC

## 2023-09-07 PROCEDURE — 85610 PROTHROMBIN TIME: CPT | Performed by: INTERNAL MEDICINE

## 2023-09-07 PROCEDURE — 99233 SBSQ HOSP IP/OBS HIGH 50: CPT | Mod: GC | Performed by: INTERNAL MEDICINE

## 2023-09-07 PROCEDURE — 36415 COLL VENOUS BLD VENIPUNCTURE: CPT | Performed by: INTERNAL MEDICINE

## 2023-09-07 PROCEDURE — 250N000011 HC RX IP 250 OP 636: Mod: JZ,JB | Performed by: STUDENT IN AN ORGANIZED HEALTH CARE EDUCATION/TRAINING PROGRAM

## 2023-09-07 PROCEDURE — 99232 SBSQ HOSP IP/OBS MODERATE 35: CPT | Performed by: STUDENT IN AN ORGANIZED HEALTH CARE EDUCATION/TRAINING PROGRAM

## 2023-09-07 PROCEDURE — C7900 HC HOPD MH 15-29 MINS: HCPCS | Performed by: COUNSELOR

## 2023-09-07 PROCEDURE — 250N000013 HC RX MED GY IP 250 OP 250 PS 637: Performed by: PHYSICIAN ASSISTANT

## 2023-09-07 PROCEDURE — 250N000013 HC RX MED GY IP 250 OP 250 PS 637: Performed by: STUDENT IN AN ORGANIZED HEALTH CARE EDUCATION/TRAINING PROGRAM

## 2023-09-07 RX ORDER — SODIUM BICARBONATE 325 MG/1
1300 TABLET ORAL 2 TIMES DAILY
Status: DISCONTINUED | OUTPATIENT
Start: 2023-09-07 | End: 2023-09-11 | Stop reason: HOSPADM

## 2023-09-07 RX ORDER — PANTOPRAZOLE SODIUM 40 MG/1
40 TABLET, DELAYED RELEASE ORAL
Status: DISCONTINUED | OUTPATIENT
Start: 2023-09-08 | End: 2023-09-11 | Stop reason: HOSPADM

## 2023-09-07 RX ADMIN — PREDNISONE 40 MG: 20 TABLET ORAL at 08:33

## 2023-09-07 RX ADMIN — OCTREOTIDE ACETATE 100 MCG: 100 INJECTION, SOLUTION INTRAVENOUS; SUBCUTANEOUS at 09:47

## 2023-09-07 RX ADMIN — OCTREOTIDE ACETATE 100 MCG: 100 INJECTION, SOLUTION INTRAVENOUS; SUBCUTANEOUS at 14:29

## 2023-09-07 RX ADMIN — SODIUM BICARBONATE 1300 MG: 325 TABLET ORAL at 20:09

## 2023-09-07 RX ADMIN — OCTREOTIDE ACETATE 100 MCG: 100 INJECTION, SOLUTION INTRAVENOUS; SUBCUTANEOUS at 20:09

## 2023-09-07 RX ADMIN — MIDODRINE HYDROCHLORIDE 5 MG: 5 TABLET ORAL at 11:44

## 2023-09-07 RX ADMIN — Medication 1 TABLET: at 08:33

## 2023-09-07 RX ADMIN — MIDODRINE HYDROCHLORIDE 5 MG: 5 TABLET ORAL at 08:33

## 2023-09-07 RX ADMIN — RIFAXIMIN 550 MG: 550 TABLET ORAL at 08:33

## 2023-09-07 RX ADMIN — FOLIC ACID 1 MG: 1 TABLET ORAL at 08:33

## 2023-09-07 RX ADMIN — RIFAXIMIN 550 MG: 550 TABLET ORAL at 20:09

## 2023-09-07 ASSESSMENT — ACTIVITIES OF DAILY LIVING (ADL)
ADLS_ACUITY_SCORE: 20
ADLS_ACUITY_SCORE: 20
ADLS_ACUITY_SCORE: 18
ADLS_ACUITY_SCORE: 20
ADLS_ACUITY_SCORE: 18
ADLS_ACUITY_SCORE: 18
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
## 2023-09-07 NOTE — PLAN OF CARE
"Patient admitted on 9/2 for abdominal pain, distension, and ongoing hematochezia with worsening JOCELYN    Neuro: A&O x4, denied pain  Respiratory: room air. No complaints of SOB or CANAS  Cardiac: Non- telemetry.   GI: Continues to have \"loose\" stools.  : adequate urine output; 700 ml dark orange  Skin: Jaundice  Mobility: independent    Plan: continue POC and notify team of changes  "

## 2023-09-07 NOTE — PROGRESS NOTES
Gastroenterology Follow up Note         Assessment and Plan:     Elroy Morel Sr. is a 43 year old male with PMH of alcohol use disorder, alcohol-related liver disease who is re-admitted due to worsening JOCELYN, ascites. Hepatology consulted for evaluation of alcohol-related liver disease and associated complications.      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    For his alcoholic hepatitis, prednisone was previously not considered due to JOCELYN and infectious diarrhea, but c.diff testing is suggestive of colonization and completed treatment with Vancomycin. Norovirus infection resolved. Paracentesis 9/6 negative for SBP. Blood cx and UA negative. No clear evidence that patients with JOCELYN should not receive steroid therapy. As a result, we decided to actually proceed with steroid therapy, which was started yesterday. Discussed the risks of infection with the patient, and that not all patients respond.      From JOCELYN standpoint, patient not a candidate for Terlipressin, currently on Midodrine/octreotide, Nephrology is on board. Did not respond to albumin challenge. Patient's JOCELYN now seems to be finally improving.          Recommendations:     - Continue daily lactulose 20 g for ~ 2 bowel movements daily. Continue rifaximin.   - Continue prednisolone 40 mg daily. Will assess response on day 4.   - MELD labs daily.   -  "Appreciate Nephrology input.  - Hold off diuretics at this time.   - Appreciate Chemical Dependency input.   - Continue PRN paracentesis. Give albumin with paracentesis (12.5 g for every L, limit to 4 L per paracentesis).     It has been a pleasure to participate in the care of this patient.  Patient discussed with GI staff, Dr. Chaidez.  Please do not hesitate to contact the GI service with any questions or concerns.     Sage Ayers MD  Gastroenterology Fellow  Pager [phone]         Subjective/Objective:   - No acute events overnight  Feels much better today thanks to yesterday's paracentesis, no SBP was found. Denies abdominal pain at this time. Denies fever or chills.          Medications:     Current Facility-Administered Medications   Medication    calcium carbonate (TUMS) chewable tablet 1,000 mg    Daily 2 GRAM acetaminophen limit, unless fulminent liver failure or transaminases greater than or equal to 300 - 400, then none    folic acid (FOLVITE) tablet 1 mg    lactulose (CHRONULAC) solution 20 g    lactulose (CHRONULAC) solution 20 g    Or    lactulose (CHRONULAC) solution for enema prep 100 g    lidocaine (LMX4) cream    lidocaine 1 % 0.1-1 mL    melatonin tablet 1 mg    midodrine (PROAMATINE) tablet 5 mg    multivitamin w/minerals (THERA-VIT-M) tablet 1 tablet    octreotide (sandoSTATIN) injection 100 mcg    ondansetron (ZOFRAN ODT) ODT tab 4 mg    Or    ondansetron (ZOFRAN) injection 4 mg    [Held by provider] polyethylene glycol (MIRALAX) Packet 17 g    predniSONE (DELTASONE) tablet 40 mg    rifaximin (XIFAXAN) tablet 550 mg    sodium chloride (PF) 0.9% PF flush 3 mL    sodium chloride (PF) 0.9% PF flush 3 mL            Physical Exam:   VS:  BP (!) 140/94 (BP Location: Left arm)   Pulse 72   Temp 98.2  F (36.8  C) (Oral)   Resp 16   Ht 1.702 m (5' 7\")   Wt 112.9 kg (249 lb)   SpO2 97%   BMI 39.00 kg/m      Wt:   Wt Readings from Last 2 Encounters:   09/06/23 112.9 kg (249 lb)   08/27/23 " 114.8 kg (253 lb 1.6 oz)      Constitutional: cooperative, pleasant, no acute distress  Eyes: Conjunctiva icteric  HEENT: Normal oropharynx without ulcers or exudate, mucus membranes moist  CV: RRR, no m/r/g  Respiratory: CTAB  Abd: + Distended, +bs, no hepatosplenomegaly, nontender, no rebound or guarding   Skin: warm, perfused, + jaundice  Neuro: AAO x 3, No asterixis         Laboratory:   BMP  Recent Labs   Lab 09/07/23  0706 09/06/23  0714 09/05/23  0554 09/04/23  0638   * 132* 127* 127*   POTASSIUM 4.3 4.3 4.0 4.4   CHLORIDE 98 100 98 96*   ENEDINA 9.7 9.4 8.9 9.3   CO2 13* 15* 12* 12*   BUN 70.9* 77.5* 81.7* 86.2*   CR 4.71* 5.87* 7.02* 7.77*   * 99 110* 101*     CBC  Recent Labs   Lab 09/07/23  0706 09/06/23 0714 09/05/23  0554 09/04/23  0735 09/04/23  0638   WBC 14.1* 14.8* 15.3*  --  16.7*   RBC 2.73* 2.68* 2.61*  --  2.80*   HGB 9.3* 9.2* 8.8* 9.4* 9.5*  9.5*   HCT 26.3* 25.8* 24.7*  --  26.5*   MCV 96 96 95  --  95   MCH 34.1* 34.3* 33.7*  --  33.9*   MCHC 35.4 35.7 35.6  --  35.8   RDW 19.5* 19.7* 20.1*  --  19.9*    144* 158  --  170     INR  Recent Labs   Lab 09/07/23 0706 09/06/23  0714 09/05/23  0554 09/04/23  0638   INR 1.73* 1.60* 1.57* 1.56*     LFTs  Recent Labs   Lab 09/07/23  0706 09/06/23  0714 09/05/23  0554 09/04/23  0638 09/03/23  0644 09/02/23  2301   ALKPHOS 123 113 118 116   < > 144*   * 133* 140* 148*   < > 161*   ALT 41 37 39 45   < > 55   BILITOTAL 37.1* 37.1* 35.0* 35.6*   < > 36.7*   PROTTOTAL  --   --   --   --   --  6.1*   ALBUMIN 4.0 3.9 3.2* 3.6   < > 2.9*  2.9*    < > = values in this interval not displayed.      PANC  Recent Labs   Lab 09/02/23  2031   LIPASE 435*            Imaging/Procedures/Studies:     Results for orders placed or performed during the hospital encounter of 08/21/23   COLONOSCOPY   Result Value Ref Range    COLONOSCOPY       [facility]  [address] [place] [id number] [phone]      Endoscopy Department  _______________________________________________________________________________  Patient Name: Elroy Morel             Procedure Date: 8/28/2023 11:09 AM  MRN: [id number]                       Account Number: [id number]  YOB: 1980              Admit Type: Inpatient  Age: 43                               Room: [facility]                      Gender: Male                          Note Status: Finalized  Attending MD: DARA GAN MD,   Total Sedation Time: 9 minutes  _______________________________________________________________________________     Procedure:             Colonoscopy  Indications:           Hematochezia  Providers:             DARA GAN MD, Leti Parra RN, Emmy Quarles RN  Referring MD:            Medicines:             Fentanyl 100 micrograms IV  Complications:          No immediate complications.  _______________________________________________________________________________  Procedure:             Pre-Anesthesia Assessment:                         - Prior to the procedure, a History and Physical was                          performed, and patient medications and allergies were                          reviewed. The patient is competent. The risks and                          benefits of the procedure and the sedation options and                          risks were discussed with the patient. All questions                          were answered and informed consent was obtained.                          Patient identification and proposed procedure were                          verified by the physician in the procedure room.                          Mental Status Examination: alert and oriented. Airway                          Examination: normal oropharyngeal airway and neck                          mobility. Respiratory Examination: clear to                           auscultation. CV  Examination: normal. Prophylactic                          Antibiotics: The patient does not require prophylactic                          antibiotics. Prior Anticoagulants: The patient has                          taken no anticoagulant or antiplatelet agents. ASA                          Grade Assessment: III - A patient with severe systemic                          disease. After reviewing the risks and benefits, the                          patient was deemed in satisfactory condition to                          undergo the procedure. The anesthesia plan was to use                          moderate sedation / analgesia (conscious sedation).                          Immediately prior to administration of medications,                          the patient was re-assessed for adequacy to receive                          sedatives. The heart rate, respiratory rate, oxygen                          saturations, blood pressure, adequacy of pulmonary                           ventilation, and response to care were monitored                          throughout the procedure. The physical status of the                          patient was re-assessed after the procedure.                         After obtaining informed consent, the colonoscope was                          passed under direct vision. Throughout the procedure,                          the patient's blood pressure, pulse, and oxygen                          saturations were monitored continuously. The                          Colonoscope was introduced through the anus and                          advanced to the terminal ileum. The colonoscopy was                          performed without difficulty. The patient tolerated                          the procedure well. The quality of the bowel                          preparation was good.                                                                                   Findings:       Two sessile polyps were  found in the s igmoid colon. The polyps were        diminutive in size. These polyps were removed with a cold biopsy        forceps. Resection and retrieval were complete.       Small, non-bleeding rectal varices were found.       A diffuse area of mildly erythematous mucosa was found in the sigmoid        colon.                                                                                   Impression:            - Two diminutive polyps in the sigmoid colon, removed                          with a cold biopsy forceps. Resected and retrieved.                         - Rectal varices.                         - Erythematous mucosa in the sigmoid colon and rectum                          with come erythem, consistent with portal colopathy.                          This was the likely cause of hematochezia.                         No blood was seen.                         Terminal ileum was normal.  Recommendation:        - Await pathology results.                                                                                      electronically signed by Dara Gan  ______________________________  DARA GAN MD  8/28/2023 12:41:13 PM  I was physically present for the entire viewing portion of the exam.  __________________________  Signature of teaching physician  B4c/Isra GAN MD  Number of Addenda: 0    Note Initiated On: 8/28/2023 11:09 AM  Scope In: 12:14:28 PM  Scope Out: 12:25:25 PM        No new imaging or procedures in the last 24 hours

## 2023-09-07 NOTE — PROGRESS NOTES
D-Admitted on 09/02/23 with worsening JOCELYN. PMH of alcohol use disorder and alcohol related liver disease.  See flow sheets for vs and assessments.  I-Paracentesis at bedside by Dr. Ramos. 3L removed. Received 37.5 g albumin post procedure. Prednisone initiated per order.  A-No signs of Etoh withdrawal.  P-Continue with current poc. Notify Gold 9 provider of any concerns/changes.

## 2023-09-07 NOTE — CONSULTS
9/7/2023  Pt completed his CLARA CA today. Due to his health, he needs an OP CLARA program that is virtual.  The JULIANNA for [facility] was emailed to the unit SW for pt to sign and his CLARA CA was securely emailed to [facility] today.    [facility] [id number]    Recommendations:   1)  Complete an Outpatient CD Treatment Program.  2)  Abstain from all mood-altering chemicals unless prescribed by a licensed provider.   3)  Attend, at minimum, 2 weekly support group meetings, such as 12 step based (AA/NA), SMART Recovery, Health Realizations, and/or Refuge Recovery meetings.     4)  Actively work with a male mentor/sponsor on a weekly basis.   5)  Follow all the recommendations of your treatment/medical providers.    Clinical Substantiation:    Pt has a long history of alcohol use. He has tried to cut down on his drinking over the past year, but has been unable to. Due to his health, he is only able to participate in a virtual OP program.    Referrals/ Alternatives:  [facility]  Main phone: [phone]  Direct Dial: [phone]   Admissions email: [email or web address]   CLARA fax: [phone]  [email or web address]     CLARA consult completed by: Nimco Mandel [facility].  Phone Number: [phone]  E-mail Address: [email or web address] Evaluation Department  [address] [place] [address]     *Due to regulation of Title 42 of the Code of Federal Regulations (CFR) Part 2: Confidentiality laws apply to this note and the information wherein.  Thus, this note cannot be copy and pasted into any other health care staff's note nor can it be included in general medical records sent to ANY outside agency without the patient's written consent.

## 2023-09-07 NOTE — PROGRESS NOTES
Nephrology Initial Consult  September 3, 2023      Elroy Morel Sr. MRN:[id number] YOB: 1980  Date of Admission:9/2/2023  Primary care provider: Latosha Baires  Requesting physician: Radha Du,*    ASSESSMENT AND RECOMMENDATIONS:     Elroy Morel Sr. is a 43 year old male with history of severe AUD, withdrawal seizures, decompensated cirrhosis (c/b grade II EV, ascites, portal hypertensive gastropathy), HTN, and prior C.diff infection who was admitted to [facility] 8/21/23 with decompensated cirrhosis, JOCELYN, and acute on chronic GI bleed s/p EGD with variceal banding 8/28.  Discharged to [facility] for CD treatment on 8/30/23.  Returned to ED on 9/2/23 with abdominal pain, distension, and ongoing hematochezia.  Found to have worsening renal function concerning for HRS. Admitted by medicine overnight, now transferred to [facility] for specialty care.     #JOCELYN on CKD Dramatic rise in Cr from 0.9=> 5.9> 8>7>5.8 correlating with bili rising from 12 to 30.   DDx included bili cast nephropathy, congestion with severe ascites and Type I HRS although BP's are better than one would expect for HRS typically.    -Continue midodrine 5mg TID   -No issues in chemistries or volume requiring RRT, hopeful that renal fx is starting to recover with increased UOP.    -consent for RRT obtained and placed in chart   Paracentesis done 9/4 with 2 liters removed   Spun his urine showed bilirubin casts, mucous and epithelial cells which is consistent with an HRS picture   -c/w octreotide (for his HRS, not for his esophageal varices) as 100 mcg TID subcutaneously          # Hypervolemic Hyponatremia  - Secondary to cirrhosis and worsening renal function  - Na 120->125>127>132      #HAGMA gap of 17 2/2 uremia and his severe JOCELYN   HCO3 of 12>15  Obtain an vbg with the next set of labs  -start sodium bicarb tabs 1300 BID         BMD-Ca 9.2, Mg 2.0, Phos 3.9 last check, no acute issues.      Anemia-Hgb 8.8  2/2  GIB      #Decompensated ETOH-related cirrhosis with ascites  #Severe alcohol-related hepatitis w/o evidence of liver failure (improving LFTs)  # Coagulopathy w/ elevated INR  #History of variceal bleeding  #Alcohol use disorder, severe  Paracentesis 8/23, 27, and 9/2    Recommendations were communicated to primary team via note    Seen and discussed with Dr. Natalia Harvey MD  Division of Renal Disease and Hypertension  [facility]  [email or web address]  Vocera Web Console        REASON FOR CONSULT: JOCELYN iso HRS    HISTORY OF PRESENT ILLNESS:    Elroy Morel Sr. is a 43 year old male who has hx of advanced decompensated cirrhosis, JOCELYN with recent admission with GIB s/p EV banding and polypectomies who presented from [facility] with worsenign abdominal distention and hematochezia. He denied any fever, diarrhea, hematemesis or abdominal pain. He denied alcohol use interim. He has been making urine. He states he has been tolerating po.     Of note Was admitted recently here 8/21 for significantly elevated bilirubin total bili 20.2  due to etoH liver failure, was not a candidate for steroids per hepatology back then due to diarrhea and JOCELYN. In that admission no etoh withdrawal sxs. spun his urine 8/23 which showed no granular or hyaline casts.    JOCELYN was suspected to be hepatorenal syndrome based on albumin challenge.  During the stay, LFTs, bilirubin and creatinine downtrending.  No alcohol withdrawal while inpatient.  EGD on 8/28 showed grade 2 esophageal varices status post banding, portal hypertensive gastropathy, esophageal ulcer prior variceal treatment site, 2 polyps in sigmoid colon status post removal, rectal varices, erythematous mucosa in the sigmoid colon/rectum consistent with portal colopathy.  Colonoscopy on 8/28 showed 2 sigmoid colon polyps removed with cold biopsy forceps.  Rectal varices.  They suspected that the cause of hematochezia was portal colopathy.  No blood was seen at that time.  He was  continued on IV PPI and switched to p.o. challenge.   5 days after admission cr downtrended and was having good UOP, so renal signed off with an outpatient CKD follow-up and not resuming  his home lisinopril on discharge     PAST MEDICAL HISTORY:  Reviewed with patient on 09/07/2023     Past Medical History:   Diagnosis Date    Alcohol use disorder, severe, dependence (H)     Alcohol withdrawal seizure (H)     Alcoholic cirrhosis of liver with ascites (H)     Esophageal varices (H)     Essential hypertension     Gastroesophageal reflux disease without esophagitis     History of methamphetamine use     Sustained remission since 2003    Hypercholesterolemia     Tobacco abuse        Past Surgical History:   Procedure Laterality Date    COLONOSCOPY N/A 8/28/2023    Procedure: COLONOSCOPY, WITH POLYPECTOMY via bx forceps;  Surgeon: Alyssa Tsai MD;  Location: [facility]    IR PARACENTESIS  8/31/2023        MEDICATIONS:  PTA Meds  Prior to Admission medications    Medication Sig Last Dose Taking? Auth Provider Long Term End Date   acetaminophen (TYLENOL) 325 MG tablet Take 325-650 mg by mouth every 4 hours as needed for mild pain   Reported, Patient     aspirin 81 MG EC tablet Take 1 tablet (81 mg) by mouth daily for 120 days   Emil Moser MD  12/28/23   benzocaine-menthol (CEPACOL) 15-3.6 MG lozenge Place 1 lozenge inside cheek every 2 hours as needed for sore throat   Reported, Patient     calcium carbonate (TUMS) 500 MG chewable tablet Take 2 tablets (1,000 mg) by mouth 4 times daily as needed for heartburn   Emil Moser MD  9/29/23   folic acid (FOLVITE) 1 MG tablet Take 1 tablet (1 mg) by mouth daily for 30 days   Emil Moser MD No 9/29/23   gabapentin (NEURONTIN) 100 MG capsule Take 1 capsule (100 mg) by mouth At Bedtime for 30 days   Emil Moser MD Yes 9/29/23   guaiFENesin 200 MG/10ML LIQD Take 200 mg by mouth every 4 hours as needed for cough   Reported, Patient      loratadine (CLARITIN) 10 MG tablet Take 10 mg by mouth daily as needed for allergies   Reported, Patient     melatonin 3 MG tablet Take 3 mg by mouth nightly as needed for sleep   Reported, Patient     multivitamin w/minerals (THERA-VIT-M) tablet Take 1 tablet by mouth daily for 30 days   Emil Moser MD  9/29/23   ondansetron (ZOFRAN ODT) 4 MG ODT tab Take 1 tablet (4 mg) by mouth every 8 hours as needed for nausea or vomiting   Emil Moser MD  9/29/23   pantoprazole (PROTONIX) 40 MG EC tablet Take 1 tablet (40 mg) by mouth daily   Jennifer Fletcher MD     senna-docusate (SENOKOT-S/PERICOLACE) 8.6-50 MG tablet Take 2 tablets by mouth daily as needed for constipation   Reported, Patient        Current Meds   folic acid  1 mg Oral Daily    lactulose  20 g Oral or NG Tube Daily    midodrine  5 mg Oral TID w/meals    multivitamin w/minerals  1 tablet Oral Daily    octreotide  100 mcg Subcutaneous TID    [Held by provider] polyethylene glycol  17 g Oral Daily    predniSONE  40 mg Oral Daily    rifaximin  550 mg Oral or Feeding Tube BID    sodium chloride (PF)  3 mL Intracatheter Q8H     Infusion Meds   - MEDICATION INSTRUCTIONS -         ALLERGIES:    Allergies   Allergen Reactions    Metronidazole Other (See Comments), Dizziness and Unknown    Omeprazole Other (See Comments) and Unknown     Irritability (tolerates Protonix well)       REVIEW OF SYSTEMS:  A comprehensive of systems was negative except as noted above.    SOCIAL HISTORY:   Social History     Socioeconomic History    Marital status:      Spouse name: Not on file    Number of children: Not on file    Years of education: Not on file    Highest education level: Not on file   Occupational History    Not on file   Tobacco Use    Smoking status: Former     Types: Cigarettes    Smokeless tobacco: Former   Substance and Sexual Activity    Alcohol use: Not Currently     Comment: last drink 8/19    Drug use: Not Currently     Types: Amphetamines      "Comment: Sustained remission    Sexual activity: Not on file   Other Topics Concern    Not on file   Social History Narrative    Pt is , 2 children. Employed.      Social Determinants of Health     Financial Resource Strain: Not on file   Food Insecurity: Not on file   Transportation Needs: Not on file   Physical Activity: Not on file   Stress: Not on file   Social Connections: Not on file   Intimate Partner Violence: Not on file   Housing Stability: Not on file     Reviewed with patient    accompanies Elroy Morel Sr. in hospital room    FAMILY MEDICAL HISTORY:   History reviewed. No pertinent family history.  Reviewed with patient     PHYSICAL EXAM:   Temp  Av.8  F (36.6  C)  Min: 97.5  F (36.4  C)  Max: 98.3  F (36.8  C)      Pulse  Av  Min: 77  Max: 88 Resp  Av.3  Min: 16  Max: 20  SpO2  Av.9 %  Min: 99 %  Max: 100 %       BP (!) 140/94 (BP Location: Left arm)   Pulse 72   Temp 98.2  F (36.8  C) (Oral)   Resp 16   Ht 1.702 m (5' 7\")   Wt 112.9 kg (249 lb)   SpO2 97%   BMI 39.00 kg/m     Date 23 0700 - 23 0659   Shift 5945-5034 7384-6140 1124-8273 24 Hour Total   INTAKE   I.V. 10   10   Shift Total(mL/kg) 10(0.09)   10(0.09)   OUTPUT   Shift Total(mL/kg)       Weight (kg) 113.9 113.9 113.9 113.9      Admit Weight: 114.6 kg (252 lb 11.2 oz)     GENERAL APPEARANCE: jaundiced not in distress, AAOX3  EYES: icteric scleral icterus, pupils equal  Lymphatics: no cervical or supraclavicular LAD  Pulmonary: lungs clear to auscultation with equal breath sounds bilaterally, no clubbing  CV: regular rhythm, normal rate, no rub   - JVD normal    - Edema no LLE edema   GI: tense, nontender distended abdomen, tympanic on percussion    MS: no evidence of inflammation in joints, no muscle tenderness  : no kelley  SKIN: no rash, warm, dry, no cyanosis  NEURO: face symmetric, no asterixis     LABS:   CMP  Recent Labs   Lab 23  0706 23  0714 23  0554 23  0638 " 09/03/23  0644 09/02/23  2301   * 132* 127* 127*   < > 121*   POTASSIUM 4.3 4.3 4.0 4.4   < > 4.7   CHLORIDE 98 100 98 96*   < > 90*   CO2 13* 15* 12* 12*   < > 14*   ANIONGAP 17* 17* 17* 19*   < > 17*   * 99 110* 101*   < > 107*   BUN 70.9* 77.5* 81.7* 86.2*   < > 83.2*   CR 4.71* 5.87* 7.02* 7.77*   < > 7.99*   GFRESTIMATED 15* 11* 9* 8*   < > 8*   ENEDINA 9.7 9.4 8.9 9.3   < > 9.1   PROTTOTAL  --   --   --   --   --  6.1*   ALBUMIN 4.0 3.9 3.2* 3.6   < > 2.9*  2.9*   BILITOTAL 37.1* 37.1* 35.0* 35.6*   < > 36.7*   ALKPHOS 123 113 118 116   < > 144*   * 133* 140* 148*   < > 161*   ALT 41 37 39 45   < > 55    < > = values in this interval not displayed.     CBC  Recent Labs   Lab 09/07/23  0706 09/06/23 0714 09/05/23  0554 09/04/23  0735 09/04/23  0638   HGB 9.3* 9.2* 8.8* 9.4* 9.5*  9.5*   WBC 14.1* 14.8* 15.3*  --  16.7*   RBC 2.73* 2.68* 2.61*  --  2.80*   HCT 26.3* 25.8* 24.7*  --  26.5*   MCV 96 96 95  --  95   MCH 34.1* 34.3* 33.7*  --  33.9*   MCHC 35.4 35.7 35.6  --  35.8   RDW 19.5* 19.7* 20.1*  --  19.9*    144* 158  --  170     INR  Recent Labs   Lab 09/07/23  0706 09/06/23  0714 09/05/23  0554 09/04/23  0638 09/03/23  0644 09/02/23  2111   INR 1.73* 1.60* 1.57* 1.56*   < > 1.46*   PTT  --   --   --   --   --  42*    < > = values in this interval not displayed.     ABG  Recent Labs   Lab 09/06/23  2045   O2PER 21      URINE STUDIES  Recent Labs   Lab Test 09/04/23  1214 09/02/23  2236 08/21/23  1253   COLOR Dark Yellow* Dark Yellow* Alanna*   APPEARANCE Slightly Cloudy* Slightly Cloudy* Cloudy*   URINEGLC Negative Negative  --    URINEBILI Moderate* Large*  --    URINEKETONE Negative Negative 15*   SG 1.013 1.014 1.015   UBLD Negative Negative  --    URINEPH 5.0 5.5  --    PROTEIN 10* 10* 100*   NITRITE Negative Negative  --    LEUKEST Negative Negative  --    RBCU  --  1 2   WBCU  --  3 13*     No lab results found.  PTH  No lab results found.  IRON STUDIES  Recent Labs   Lab  Test 08/21/23  1130   IRON 71   *   IRONSAT 55*         Cj Harvey MD

## 2023-09-07 NOTE — CARE PLAN
Patient admitted on 9/2 for abdominal pain, distension, and ongoing hematochezia, cirrhosis, with worsening JOCELYN     Neuro: A&O x4, denied pain. [place] 0. No extra lactulose needed. Sclera yellow.  Respiratory: room air. No complaints of SOB or CANAS  Cardiac: No  telemetry orders  GI: reported 3 formed brown/javier bowel movements today. On regular diet tolerating well.  : adequate urine output; dark orange  Skin: Jaundice  Mobility: independent     Plan: continue POC and notify gold 9 with changes

## 2023-09-07 NOTE — PROGRESS NOTES
[facility]    Medicine Progress Note - Hospitalist Service, GOLD TEAM 9    Date of Admission:  9/2/2023    Assessment & Plan   Elroy Morel Sr. is a 43 year old male with history of severe AUD, withdrawal seizures, decompensated cirrhosis (c/b grade II EV, ascites, portal hypertensive gastropathy), HTN, and prior C.diff infection who was admitted to [facility] 8/21/23 with decompensated cirrhosis, JOCELYN, and acute on chronic GI bleed s/p EGD with variceal banding 8/28.  Discharged to [facility] for CD treatment on 8/30/23. Returned to ED on 9/2/23 with abdominal pain, distension, and ongoing hematochezia.  Found to have worsening renal function concerning for HRS thus transferred back to medicine.      #JOCELYN, concern for HRS:    Recent admission for JOCELYN, up-trending Cr at time of discharge (4.34). Repeat Cr 8.17 on arrival. No urgent indication for HD per nephrology. Most likely HRS vs congestive bile cast nephropathy from ESLD  - Nephrology consulted, appreciate recs  - Completed 3 days albumin - 100g 25% albumin (9/3-9/5)  - Continue octreotide (for his HRS, not for his esophageal varices) 100 mcg TID subcutaneously    - Continue midodrine 5 mg TID, will likely need to be a long term med  - Hold diuretics     #Decompensated alcoholic cirrhosis  #Hepatic encephalopathy, resolved  #Recent banding of Grade II EV (8/28/23)  #Portal Hypertensive Gastropathy and colopathy  #Acute on chronic anemia secondary to GI bleed  #History of variceal bleeding(8/13/2023) w/ esophageal ulcer at area (8/28)  MELD 3.0: 39 at 9/7/2023  7:06 AM  MELD-Na: 39 at 9/7/2023  7:06 AM  Calculated from:  Serum Creatinine: 4.71 mg/dL (Using max of 3 mg/dL) at 9/7/2023  7:06 AM  Serum Sodium: 128 mmol/L at 9/7/2023  7:06 AM  Total Bilirubin: 37.1 mg/dL at 9/7/2023  7:06 AM  Serum Albumin: 4.0 g/dL (Using max of 3.5 g/dL) at 9/7/2023  7:06 AM  INR(ratio): 1.73 at 9/7/2023  7:06 AM  Age at listing  (hypothetical): 43 years  Sex: Male at 9/7/2023  7:06 AM  Concern for HRS. Recurrent ascites now off of diuretics. Hepatology reports patient is currently not a transplant candidate (currently in active treatment for sobriety). He received paracentesis on 9/4 (-2L) and 9/6 (-3L). Decreased the fluid amount as reportedly pt had blood pressure issues previously with larger volume removal.    - Hepatology consulted and following  - Lactulose 20 g daily (ok to hold if 3+ stools per day)  - Rifaximin 550 mg BID  - Paracentesis on 9/6 with  (8% neutrophils), not consistent with SBP. Albumin given following fluid removal.  - Start prednisone 40 mg daily for alcoholic hepatitis (Maddrey's score positive based on bili alone), evaluate response on day 4    #Alcohol use disorder  -SW following. No recent ETOH use. Hx withdrawal seizures, low risk for withdrawal symptoms at this point. Was at CD treatment prior to arrival.  -Chemical dependency consult placed, evaluating for virtual outpatient CLARA programs    #Hypervolemic Hyponatremia:    Secondary to cirrhosis and worsening renal function.   -Trend BMP     #Anion gap metabolic acidosis  Secondary to JOCELYN  -Trend BMP     #Leukocytosis, improving  Afebrile. Ascitic fluid not suggestive of SBP. No other clear source. CXR with no obvious focal consolidation. UA 9/4 negative.   -No indication for ppx ceftraixone per GI  -Blood culture 9/3 - NGTD     #Coagulopathy  Secondary to ESLD  -Daily INR    #Acute on chronic hematochezia, suspect hemorrhoidal bleed; resolved   Recent admission for melena/hematochezia, s/p EGD with banding of grade II EV on 8/28. GI suspected hemorrhoidal bleed, less likely variceal or upper source.   -Monitor clinically  -Trend Hgb      #HTN  -Previously on lisinopril, stopped d/t JOCELYN     #Recent C.diff infection  Completed tx with vancomycin. Does not have C diff infection, rather has colonization.        Diet: Regular Diet Adult    DVT Prophylaxis:  "Pneumatic Compression Devices  Carroll Catheter: Not present  Lines: None     Cardiac Monitoring: None  Code Status: Full Code      Clinically Significant Risk Factors         # Hyponatremia: Lowest Na = 128 mmol/L in last 2 days, will monitor as appropriate      # Hypoalbuminemia: Lowest albumin = 2.9 g/dL at 9/2/2023 11:01 PM, will monitor as appropriate    # Coagulation Defect: INR = 1.73 (Ref range: 0.85 - 1.15) and/or PTT = 42 Seconds (Ref range: 22 - 38 Seconds), will monitor for bleeding        # Hypertension: Noted on problem list        # Obesity: Estimated body mass index is 39 kg/m  as calculated from the following:    Height as of this encounter: 1.702 m (5' 7\").    Weight as of this encounter: 112.9 kg (249 lb).             Disposition Plan     Expected Discharge Date: 09/08/2023      Destination: home with family  Discharge Comments: 9/6: elevated creatnidanette Rankin MD  Hospitalist Service, GOLD TEAM 9  [facility]  Securely message with [employer] (more info)  Text page via [employer] Paging/Directory   See signed in provider for up to date coverage information  ______________________________________________________________________    Interval History   Improvement in Cr from 5.8 to 4.7 this morning. Reports improvement in abdominal discomfort with paracentesis yesterday. Patient would like to switch to regular diet today from sodium restricted.    Physical Exam   Vital Signs: Temp: 98.2  F (36.8  C) Temp src: Oral BP: 121/79 Pulse: 71   Resp: 18 SpO2: 99 % O2 Device: None (Room air)    Weight: 249 lbs 0 oz    Gen: no acute distress, alert, interactive, jaundiced appearance  HEENT: scleral icterus, EOMI  Cardiovascular: regular rate and rhythm, no murmurs, 2+ distal pulses equal bilaterally  Respiratory: no increased work of breathing, lungs clear to auscultation throughout, no wheezing or crackles  Abdomen: soft, improved abdominal distension, " non-tender throughout  Extremities: No appreciable edema  Skin: no concerning rashes or lesions  Neuro: A&O x4, responds appropriately to exam, no asterixis       Medical Decision Making       50 MINUTES SPENT BY ME on the date of service doing chart review, history, exam, documentation & further activities per the note.      Data     I have personally reviewed the following data over the past 24 hrs:    14.1 (H)  \   9.3 (L)   / 151     128 (L) 98 70.9 (H) /  168 (H)   4.3 13 (L) 4.71 (H) \     ALT: 41 AST: 134 (H) AP: 123 TBILI: 37.1 (HH)   ALB: 4.0 TOT PROTEIN: N/A LIPASE: N/A     INR:  1.73 (H) PTT:  N/A   D-dimer:  N/A Fibrinogen:  N/A     Imaging results reviewed over the past 24 hrs:   Recent Results (from the past 24 hour(s))   POC US Guide for Paracentesis    Impression    US Indication: decompensated liver disease    Limited abdominal ultrasound was performed and demonstrated an adequate fluid collection on the right side of the abdomen.     Doppler of the skin demonstrated an area at this site without significant vasculature.  A paracentesis at this site was subsequently performed.    Katelyn Ramos MD

## 2023-09-08 LAB
ALBUMIN SERPL BCG-MCNC: 3.4 G/DL (ref 3.5–5.2)
ALP SERPL-CCNC: 115 U/L (ref 40–129)
ALT SERPL W P-5'-P-CCNC: 41 U/L (ref 0–70)
ANION GAP SERPL CALCULATED.3IONS-SCNC: 15 MMOL/L (ref 7–15)
AST SERPL W P-5'-P-CCNC: 123 U/L (ref 0–45)
BACTERIA BLD CULT: NO GROWTH
BACTERIA BLD CULT: NO GROWTH
BACTERIA FLD CULT: NO GROWTH
BILIRUB SERPL-MCNC: 33.5 MG/DL
BUN SERPL-MCNC: 69.9 MG/DL (ref 6–20)
CALCIUM SERPL-MCNC: 9 MG/DL (ref 8.6–10)
CHLORIDE SERPL-SCNC: 100 MMOL/L (ref 98–107)
CREAT SERPL-MCNC: 4.07 MG/DL (ref 0.67–1.17)
DEPRECATED HCO3 PLAS-SCNC: 14 MMOL/L (ref 22–29)
EGFRCR SERPLBLD CKD-EPI 2021: 18 ML/MIN/1.73M2
ERYTHROCYTE [DISTWIDTH] IN BLOOD BY AUTOMATED COUNT: 19.5 % (ref 10–15)
GLUCOSE SERPL-MCNC: 137 MG/DL (ref 70–99)
HCT VFR BLD AUTO: 24.2 % (ref 40–53)
HGB BLD-MCNC: 8.8 G/DL (ref 13.3–17.7)
INR PPP: 1.75 (ref 0.85–1.15)
MCH RBC QN AUTO: 34.5 PG (ref 26.5–33)
MCHC RBC AUTO-ENTMCNC: 36.4 G/DL (ref 31.5–36.5)
MCV RBC AUTO: 95 FL (ref 78–100)
PLATELET # BLD AUTO: 165 10E3/UL (ref 150–450)
POTASSIUM SERPL-SCNC: 4.5 MMOL/L (ref 3.4–5.3)
PROT SERPL-MCNC: ABNORMAL G/DL
RBC # BLD AUTO: 2.55 10E6/UL (ref 4.4–5.9)
SODIUM SERPL-SCNC: 129 MMOL/L (ref 136–145)
WBC # BLD AUTO: 19.7 10E3/UL (ref 4–11)

## 2023-09-08 PROCEDURE — 84450 TRANSFERASE (AST) (SGOT): CPT | Performed by: INTERNAL MEDICINE

## 2023-09-08 PROCEDURE — 250N000011 HC RX IP 250 OP 636: Mod: JZ,JB | Performed by: STUDENT IN AN ORGANIZED HEALTH CARE EDUCATION/TRAINING PROGRAM

## 2023-09-08 PROCEDURE — 250N000012 HC RX MED GY IP 250 OP 636 PS 637: Performed by: STUDENT IN AN ORGANIZED HEALTH CARE EDUCATION/TRAINING PROGRAM

## 2023-09-08 PROCEDURE — 250N000013 HC RX MED GY IP 250 OP 250 PS 637: Performed by: INTERNAL MEDICINE

## 2023-09-08 PROCEDURE — 85027 COMPLETE CBC AUTOMATED: CPT | Performed by: INTERNAL MEDICINE

## 2023-09-08 PROCEDURE — 250N000013 HC RX MED GY IP 250 OP 250 PS 637: Performed by: STUDENT IN AN ORGANIZED HEALTH CARE EDUCATION/TRAINING PROGRAM

## 2023-09-08 PROCEDURE — 36415 COLL VENOUS BLD VENIPUNCTURE: CPT | Performed by: INTERNAL MEDICINE

## 2023-09-08 PROCEDURE — 214N000001 HC R&B CCU UMMC

## 2023-09-08 PROCEDURE — 99233 SBSQ HOSP IP/OBS HIGH 50: CPT | Mod: GC | Performed by: INTERNAL MEDICINE

## 2023-09-08 PROCEDURE — 99232 SBSQ HOSP IP/OBS MODERATE 35: CPT | Performed by: STUDENT IN AN ORGANIZED HEALTH CARE EDUCATION/TRAINING PROGRAM

## 2023-09-08 PROCEDURE — 250N000013 HC RX MED GY IP 250 OP 250 PS 637: Performed by: PHYSICIAN ASSISTANT

## 2023-09-08 PROCEDURE — 85610 PROTHROMBIN TIME: CPT | Performed by: INTERNAL MEDICINE

## 2023-09-08 RX ADMIN — OCTREOTIDE ACETATE 100 MCG: 100 INJECTION, SOLUTION INTRAVENOUS; SUBCUTANEOUS at 10:45

## 2023-09-08 RX ADMIN — SODIUM BICARBONATE 1300 MG: 325 TABLET ORAL at 20:56

## 2023-09-08 RX ADMIN — RIFAXIMIN 550 MG: 550 TABLET ORAL at 20:56

## 2023-09-08 RX ADMIN — PANTOPRAZOLE SODIUM 40 MG: 40 TABLET, DELAYED RELEASE ORAL at 07:49

## 2023-09-08 RX ADMIN — PREDNISONE 40 MG: 20 TABLET ORAL at 07:48

## 2023-09-08 RX ADMIN — MIDODRINE HYDROCHLORIDE 5 MG: 5 TABLET ORAL at 12:28

## 2023-09-08 RX ADMIN — RIFAXIMIN 550 MG: 550 TABLET ORAL at 07:48

## 2023-09-08 RX ADMIN — Medication 1 TABLET: at 07:48

## 2023-09-08 RX ADMIN — SODIUM BICARBONATE 1300 MG: 325 TABLET ORAL at 10:45

## 2023-09-08 RX ADMIN — FOLIC ACID 1 MG: 1 TABLET ORAL at 07:48

## 2023-09-08 ASSESSMENT — ACTIVITIES OF DAILY LIVING (ADL)
ADLS_ACUITY_SCORE: 18

## 2023-09-08 NOTE — PROGRESS NOTES
Neuro: Alert and oriented. Neuro's intact    Activity: Up walking in halls and in room.     Pertinent events: None today.     Plan: Continue with steroids and close monitoring. Activity as tolerated.

## 2023-09-08 NOTE — PLAN OF CARE
"Patient admitted on 9/2 for abdominal pain, distension, and ongoing hematochezia with worsening JOCELYN     Neuro: A&O x4, denied pain  Respiratory: room air. No complaints of SOB or CANAS  Cardiac: Non- telemetry.   GI: Continues to have \"loose\" stools.  : adequate urine output; dark red jackson  Skin: Jaundice  Mobility: independent     Plan: continue POC and notify team of changes           "

## 2023-09-08 NOTE — PROGRESS NOTES
[facility]    Medicine Progress Note - Hospitalist Service, GOLD TEAM 9    Date of Admission:  9/2/2023    Assessment & Plan   Elroy Morel Sr. is a 43 year old male with history of severe AUD, withdrawal seizures, decompensated cirrhosis (c/b grade II EV, ascites, portal hypertensive gastropathy), HTN, and prior C.diff infection who was admitted to [facility] 8/21/23 with decompensated cirrhosis, JOCELYN, and acute on chronic GI bleed s/p EGD with variceal banding 8/28.  Discharged to [facility] for CD treatment on 8/30/23. Returned to ED on 9/2/23 with abdominal pain, distension, and ongoing hematochezia.  Found to have worsening renal function concerning for HRS and transferred back to medicine.      #JOCELYN, concern for HRS; improving    Recent admission for JOCELYN, up-trending Cr at time of discharge (4.34). Repeat Cr 8.17 on arrival. No urgent indication for HD per nephrology. Most likely HRS vs congestive bile cast nephropathy from ESLD  - Nephrology consulted, appreciate recs  - Completed 3 days albumin - 100g 25% albumin (9/3-9/5)  - Continue octreotide (for his HRS, not for his esophageal varices) 100 mcg TID subcutaneously    - Continue midodrine 5 mg TID, will likely need to be a long term med  - Hold diuretics     #Decompensated alcoholic cirrhosis  #Hepatic encephalopathy, resolved  #Recent banding of Grade II EV (8/28/23)  #Portal Hypertensive Gastropathy and colopathy  #Acute on chronic anemia secondary to GI bleed  #History of variceal bleeding(8/13/2023) w/ esophageal ulcer at area (8/28)  MELD 3.0: 39 at 9/8/2023  5:42 AM  MELD-Na: 40 at 9/8/2023  5:42 AM  Calculated from:  Serum Creatinine: 4.07 mg/dL (Using max of 3 mg/dL) at 9/8/2023  5:42 AM  Serum Sodium: 129 mmol/L at 9/8/2023  5:42 AM  Total Bilirubin: 33.5 mg/dL at 9/8/2023  5:42 AM  Serum Albumin: 3.4 g/dL at 9/8/2023  5:42 AM  INR(ratio): 1.75 at 9/8/2023  5:42 AM  Age at listing (hypothetical): 43  years  Sex: Male at 9/8/2023  5:42 AM  Concern for HRS. Recurrent ascites now off of diuretics. Hepatology reports patient is currently not a transplant candidate (currently in active treatment for sobriety). He received paracentesis on 9/4 (-2L) and 9/6 (-3L). Decreased the fluid amount as reportedly pt had blood pressure issues previously with larger volume removal.    - Hepatology consulted and following  - Lactulose 20 g daily (ok to hold if 3+ stools per day)  - Rifaximin 550 mg BID  - Paracentesis on 9/6 with  (8% neutrophils), not consistent with SBP. Albumin given following fluid removal.  - Start prednisone 40 mg daily for alcoholic hepatitis (Maddrey's score positive based on bili alone), evaluate response on day 4    #Alcohol use disorder  -SW following. No recent ETOH use. Hx withdrawal seizures, low risk for withdrawal symptoms at this point. Was at CD treatment prior to arrival.  -Chemical dependency consult placed, evaluating for virtual outpatient [facility]ARA programs    #Hypervolemic Hyponatremia:    Secondary to cirrhosis and worsening renal function.   -Trend BMP     #Anion gap metabolic acidosis  Secondary to JOCELYN  -Na bicarb tabs 1300 mg BID  -Trend BMP     #Leukocytosis,  Afebrile. Ascitic fluid not suggestive of SBP. No other clear source. CXR with no obvious focal consolidation. UA 9/4 negative. WBC now increasing in response to prednisone.  -No indication for ppx ceftraixone per GI  -Blood culture 9/3 - NGTD     #Coagulopathy  Secondary to ESLD  -Daily INR    #Acute on chronic hematochezia, suspect hemorrhoidal bleed; resolved   Recent admission for melena/hematochezia, s/p EGD with banding of grade II EV on 8/28. GI suspected hemorrhoidal bleed, less likely variceal or upper source.   -Monitor clinically  -Trend Hgb      #HTN  -Previously on lisinopril, stopped d/t JOCELYN     #Recent C.diff infection  Completed tx with vancomycin. Does not have C diff infection, rather has colonization.       "  Diet: Regular Diet Adult    DVT Prophylaxis: Pneumatic Compression Devices  Carroll Catheter: Not present  Lines: None     Cardiac Monitoring: None  Code Status: Full Code      Clinically Significant Risk Factors         # Hyponatremia: Lowest Na = 128 mmol/L in last 2 days, will monitor as appropriate      # Hypoalbuminemia: Lowest albumin = 2.9 g/dL at 9/2/2023 11:01 PM, will monitor as appropriate    # Coagulation Defect: INR = 1.75 (Ref range: 0.85 - 1.15) and/or PTT = 42 Seconds (Ref range: 22 - 38 Seconds), will monitor for bleeding        # Hypertension: Noted on problem list        # Obesity: Estimated body mass index is 36.92 kg/m  as calculated from the following:    Height as of this encounter: 1.702 m (5' 7\").    Weight as of this encounter: 106.9 kg (235 lb 11.2 oz).             Disposition Plan      Expected Discharge Date: 09/11/2023      Destination: home with family  Discharge Comments: 9/6: elevated creatnine          Elroy Rankin MD  Hospitalist Service, GOLD TEAM 9  [employer]  Securely message with [employer] (more info)  Text page via [facility] Paging/Directory   See signed in provider for up to date coverage information  ______________________________________________________________________    Interval History   Improvement in Cr from 4.7 to 4.07 this morning. No significant abdominal pain today, does not feel as if it is filling up as fast. Feeling better overall and taking walks around the unit.    Physical Exam   Vital Signs: Temp: 98.4  F (36.9  C) Temp src: Oral BP: 121/65 Pulse: 80   Resp: 18 SpO2: 99 % O2 Device: None (Room air)    Weight: 235 lbs 11.2 oz    Gen: no acute distress, alert, interactive, jaundiced appearance  HEENT: scleral icterus (improving), EOMI  Cardiovascular: 2+ distal pulses equal bilaterally, good perfusion  Respiratory: no increased work of breathing  Abdomen: soft, improved abdominal distension, non-tender " throughout  Extremities: No appreciable edema  Skin: no concerning rashes or lesions, diffuse jaundice (improving  Neuro: A&O x4, responds appropriately to exam      Medical Decision Making       45 MINUTES SPENT BY ME on the date of service doing chart review, history, exam, documentation & further activities per the note.      Data     I have personally reviewed the following data over the past 24 hrs:    19.7 (H)  \   8.8 (L)   / 165     129 (L) 100 69.9 (H) /  137 (H)   4.5 14 (L) 4.07 (H) \     ALT: 41 AST: 123 (H) AP: 115 TBILI: 33.5 (HH)   ALB: 3.4 (L) TOT PROTEIN: N/A LIPASE: N/A     INR:  1.75 (H) PTT:  N/A   D-dimer:  N/A Fibrinogen:  N/A     Imaging results reviewed over the past 24 hrs:   No results found for this or any previous visit (from the past 24 hour(s)).

## 2023-09-08 NOTE — PROGRESS NOTES
Nephrology Initial Consult  September 3, 2023      Elroy Morel Sr. MRN:[id number] YOB: 1980  Date of Admission:9/2/2023  Primary care provider: Latosha Baires  Requesting physician: Radha Du,*    ASSESSMENT AND RECOMMENDATIONS:     Elroy Morel Sr. is a 43 year old male with history of severe AUD, withdrawal seizures, decompensated cirrhosis (c/b grade II EV, ascites, portal hypertensive gastropathy), HTN, and prior C.diff infection who was admitted to [facility] 8/21/23 with decompensated cirrhosis, JOCELYN, and acute on chronic GI bleed s/p EGD with variceal banding 8/28.  Discharged to [facility] for CD treatment on 8/30/23.  Returned to ED on 9/2/23 with abdominal pain, distension, and ongoing hematochezia.  Found to have worsening renal function concerning for HRS. Admitted by medicine overnight, now transferred to [facility] for specialty care.     #JOCELYN on CKD Dramatic rise in Cr from 0.9=> 5.9> 8>7>5.8>4 correlating with bili rising from 12 to 30s  DDx included bili cast nephropathy, congestion with severe ascites and Type I HRS although BP's are better than one would expect for HRS typically.    -Continue midodrine 5mg TID   -No issues in chemistries or volume requiring RRT, hopeful that renal fx is starting to recover with increased UOP.    -consent for RRT obtained and placed in chart   Paracentesis done 9/4 with 2 liters removed   Spun his urine showed bilirubin casts, mucous and epithelial cells which is consistent with an HRS picture   -discontinue octreotide   -c/w midodrine 5 tid        # Hypervolemic Hyponatremia  - Secondary to cirrhosis and worsening renal function  - Na 129  Seems to be his baseline , will monitor for now       #HAGMA gap of 17 2/2 uremia and his severe JOCELYN   HCO3 of 14  -started sodium bicarb tabs 1300 BID 9/7      BMD-Ca 9.2, Mg 2.0, Phos 3.9 last check, no acute issues.      Anemia-Hgb 8.8  2/2 GIB      #Decompensated ETOH-related cirrhosis with  ascites  #Severe alcohol-related hepatitis w/o evidence of liver failure (improving LFTs)  # Coagulopathy w/ elevated INR  #History of variceal bleeding  #Alcohol use disorder, severe  Paracentesis 8/23, 27, and 9/2    Recommendations were communicated to primary team via note    Seen and discussed with Dr. Natalia Harvey MD  Division of Renal Disease and Hypertension  [facility]  [facility]  V[employer] Web Console        REASON FOR CONSULT: JOCELYN iso HRS    HISTORY OF PRESENT ILLNESS:    Elroy Morel Sr. is a 43 year old male who has hx of advanced decompensated cirrhosis, JOCELYN with recent admission with GIB s/p EV banding and polypectomies who presented from [facility] with worsenign abdominal distention and hematochezia. He denied any fever, diarrhea, hematemesis or abdominal pain. He denied alcohol use interim. He has been making urine. He states he has been tolerating po.     Of note Was admitted recently here 8/21 for significantly elevated bilirubin total bili 20.2  due to etoH liver failure, was not a candidate for steroids per hepatology back then due to diarrhea and JOCELYN. In that admission no etoh withdrawal sxs. spun his urine 8/23 which showed no granular or hyaline casts.    JOCELYN was suspected to be hepatorenal syndrome based on albumin challenge.  During the stay, LFTs, bilirubin and creatinine downtrending.  No alcohol withdrawal while inpatient.  EGD on 8/28 showed grade 2 esophageal varices status post banding, portal hypertensive gastropathy, esophageal ulcer prior variceal treatment site, 2 polyps in sigmoid colon status post removal, rectal varices, erythematous mucosa in the sigmoid colon/rectum consistent with portal colopathy.  Colonoscopy on 8/28 showed 2 sigmoid colon polyps removed with cold biopsy forceps.  Rectal varices.  They suspected that the cause of hematochezia was portal colopathy.  No blood was seen at that time.  He was continued on IV PPI and switched to p.o. challenge.   5  days after admission cr downtrended and was having good UOP, so renal signed off with an outpatient CKD follow-up and not resuming  his home lisinopril on discharge     PAST MEDICAL HISTORY:  Reviewed with patient on 09/08/2023     Past Medical History:   Diagnosis Date    Alcohol use disorder, severe, dependence (H)     Alcohol withdrawal seizure (H)     Alcoholic cirrhosis of liver with ascites (H)     Esophageal varices (H)     Essential hypertension     Gastroesophageal reflux disease without esophagitis     History of methamphetamine use     Sustained remission since 2003    Hypercholesterolemia     Tobacco abuse        Past Surgical History:   Procedure Laterality Date    COLONOSCOPY N/A 8/28/2023    Procedure: COLONOSCOPY, WITH POLYPECTOMY via bx forceps;  Surgeon: Alyssa Tsai MD;  Location:  GI    IR PARACENTESIS  8/31/2023        MEDICATIONS:  PTA Meds  Prior to Admission medications    Medication Sig Last Dose Taking? Auth Provider Long Term End Date   acetaminophen (TYLENOL) 325 MG tablet Take 325-650 mg by mouth every 4 hours as needed for mild pain   Reported, Patient     aspirin 81 MG EC tablet Take 1 tablet (81 mg) by mouth daily for 120 days   Emil Moser MD  12/28/23   benzocaine-menthol (CEPACOL) 15-3.6 MG lozenge Place 1 lozenge inside cheek every 2 hours as needed for sore throat   Reported, Patient     calcium carbonate (TUMS) 500 MG chewable tablet Take 2 tablets (1,000 mg) by mouth 4 times daily as needed for heartburn   Emil Moser MD  9/29/23   folic acid (FOLVITE) 1 MG tablet Take 1 tablet (1 mg) by mouth daily for 30 days   Emil Moser MD No 9/29/23   gabapentin (NEURONTIN) 100 MG capsule Take 1 capsule (100 mg) by mouth At Bedtime for 30 days   Emil Moser MD Yes 9/29/23   guaiFENesin 200 MG/10ML LIQD Take 200 mg by mouth every 4 hours as needed for cough   Reported, Patient     loratadine (CLARITIN) 10 MG tablet Take 10 mg by mouth daily as  needed for allergies   Reported, Patient     melatonin 3 MG tablet Take 3 mg by mouth nightly as needed for sleep   Reported, Patient     multivitamin w/minerals (THERA-VIT-M) tablet Take 1 tablet by mouth daily for 30 days   Emil Moser MD  9/29/23   ondansetron (ZOFRAN ODT) 4 MG ODT tab Take 1 tablet (4 mg) by mouth every 8 hours as needed for nausea or vomiting   Emil Moser MD  9/29/23   pantoprazole (PROTONIX) 40 MG EC tablet Take 1 tablet (40 mg) by mouth daily   Jennifer Fletcher MD     senna-docusate (SENOKOT-S/PERICOLACE) 8.6-50 MG tablet Take 2 tablets by mouth daily as needed for constipation   Reported, Patient        Current Meds   folic acid  1 mg Oral Daily    lactulose  20 g Oral or NG Tube Daily    midodrine  5 mg Oral TID w/meals    multivitamin w/minerals  1 tablet Oral Daily    octreotide  100 mcg Subcutaneous TID    pantoprazole  40 mg Oral QAM AC    [Held by provider] polyethylene glycol  17 g Oral Daily    predniSONE  40 mg Oral Daily    rifaximin  550 mg Oral or Feeding Tube BID    sodium bicarbonate  1,300 mg Oral BID    sodium chloride (PF)  3 mL Intracatheter Q8H     Infusion Meds   - MEDICATION INSTRUCTIONS -         ALLERGIES:    Allergies   Allergen Reactions    Metronidazole Other (See Comments), Dizziness and Unknown    Omeprazole Other (See Comments) and Unknown     Irritability (tolerates Protonix well)       REVIEW OF SYSTEMS:  A comprehensive of systems was negative except as noted above.    SOCIAL HISTORY:   Social History     Socioeconomic History    Marital status:      Spouse name: Not on file    Number of children: Not on file    Years of education: Not on file    Highest education level: Not on file   Occupational History    Not on file   Tobacco Use    Smoking status: Former     Types: Cigarettes    Smokeless tobacco: Former   Substance and Sexual Activity    Alcohol use: Not Currently     Comment: last drink 8/19    Drug use: Not Currently     Types:  "Amphetamines     Comment: Sustained remission    Sexual activity: Not on file   Other Topics Concern    Not on file   Social History Narrative    Pt is , 2 children. Employed.      Social Determinants of Health     Financial Resource Strain: Not on file   Food Insecurity: Not on file   Transportation Needs: Not on file   Physical Activity: Not on file   Stress: Not on file   Social Connections: Not on file   Intimate Partner Violence: Not on file   Housing Stability: Not on file     Reviewed with patient    accompanies Elroy Morel Sr. in hospital room    FAMILY MEDICAL HISTORY:   History reviewed. No pertinent family history.  Reviewed with patient     PHYSICAL EXAM:   Temp  Av.8  F (36.6  C)  Min: 97.5  F (36.4  C)  Max: 98.3  F (36.8  C)      Pulse  Av  Min: 77  Max: 88 Resp  Av.3  Min: 16  Max: 20  SpO2  Av.9 %  Min: 99 %  Max: 100 %       BP (!) 140/82 (BP Location: Left arm)   Pulse 78   Temp 98.4  F (36.9  C) (Oral)   Resp 19   Ht 1.702 m (5' 7\")   Wt 106.9 kg (235 lb 11.2 oz)   SpO2 100%   BMI 36.92 kg/m     Date 23 0700 - 23 0659   Shift [id number] 6123-3464 24 Hour Total   INTAKE   I.V. 10   10   Shift Total(mL/kg) 10(0.09)   10(0.09)   OUTPUT   Shift Total(mL/kg)       Weight (kg) 113.9 113.9 113.9 113.9      Admit Weight: 114.6 kg (252 lb 11.2 oz)     GENERAL APPEARANCE: jaundiced not in distress, AAOX3  EYES: icteric scleral icterus, pupils equal  Lymphatics: no cervical or supraclavicular LAD  Pulmonary: lungs clear to auscultation with equal breath sounds bilaterally, no clubbing  CV: regular rhythm, normal rate, no rub   - JVD normal    - Edema no LLE edema   GI: tense, nontender distended abdomen, tympanic on percussion    MS: no evidence of inflammation in joints, no muscle tenderness  : no kelley  SKIN: no rash, warm, dry, no cyanosis  NEURO: face symmetric, no asterixis     LABS:   CMP  Recent Labs   Lab 23  0542 23  0706 " 09/06/23  0714 09/05/23  0554 09/03/23  0644 09/02/23  2301   * 128* 132* 127*   < > 121*   POTASSIUM 4.5 4.3 4.3 4.0   < > 4.7   CHLORIDE 100 98 100 98   < > 90*   CO2 14* 13* 15* 12*   < > 14*   ANIONGAP 15 17* 17* 17*   < > 17*   * 168* 99 110*   < > 107*   BUN 69.9* 70.9* 77.5* 81.7*   < > 83.2*   CR 4.07* 4.71* 5.87* 7.02*   < > 7.99*   GFRESTIMATED 18* 15* 11* 9*   < > 8*   ENEDINA 9.0 9.7 9.4 8.9   < > 9.1   PROTTOTAL  --   --   --   --   --  6.1*   ALBUMIN 3.4* 4.0 3.9 3.2*   < > 2.9*  2.9*   BILITOTAL 33.5* 37.1* 37.1* 35.0*   < > 36.7*   ALKPHOS 115 123 113 118   < > 144*   * 134* 133* 140*   < > 161*   ALT 41 41 37 39   < > 55    < > = values in this interval not displayed.     CBC  Recent Labs   Lab 09/08/23  0542 09/07/23  0706 09/06/23  0714 09/05/23  0554   HGB 8.8* 9.3* 9.2* 8.8*   WBC 19.7* 14.1* 14.8* 15.3*   RBC 2.55* 2.73* 2.68* 2.61*   HCT 24.2* 26.3* 25.8* 24.7*   MCV 95 96 96 95   MCH 34.5* 34.1* 34.3* 33.7*   MCHC 36.4 35.4 35.7 35.6   RDW 19.5* 19.5* 19.7* 20.1*    151 144* 158     INR  Recent Labs   Lab 09/08/23  0542 09/07/23  0706 09/06/23  0714 09/05/23  0554 09/03/23  0644 09/02/23  2111   INR 1.75* 1.73* 1.60* 1.57*   < > 1.46*   PTT  --   --   --   --   --  42*    < > = values in this interval not displayed.     ABG  Recent Labs   Lab 09/06/23  2045   O2PER 21      URINE STUDIES  Recent Labs   Lab Test 09/04/23  1214 09/02/23  2236 08/21/23  1253   COLOR Dark Yellow* Dark Yellow* Alanna*   APPEARANCE Slightly Cloudy* Slightly Cloudy* Cloudy*   URINEGLC Negative Negative  --    URINEBILI Moderate* Large*  --    URINEKETONE Negative Negative 15*   SG 1.013 1.014 1.015   UBLD Negative Negative  --    URINEPH 5.0 5.5  --    PROTEIN 10* 10* 100*   NITRITE Negative Negative  --    LEUKEST Negative Negative  --    RBCU  --  1 2   WBCU  --  3 13*     No lab results found.  PTH  No lab results found.  IRON STUDIES  Recent Labs   Lab Test 08/21/23  1130   IRON 71   FEB  130*   KAYLEEN 55*         Cj Harvey MD

## 2023-09-09 LAB
ALBUMIN SERPL BCG-MCNC: 3.6 G/DL (ref 3.5–5.2)
ALP SERPL-CCNC: 129 U/L (ref 40–129)
ALT SERPL W P-5'-P-CCNC: 55 U/L (ref 0–70)
ANION GAP SERPL CALCULATED.3IONS-SCNC: 16 MMOL/L (ref 7–15)
AST SERPL W P-5'-P-CCNC: 134 U/L (ref 0–45)
BILIRUB SERPL-MCNC: 34.1 MG/DL
BUN SERPL-MCNC: 68.8 MG/DL (ref 6–20)
CALCIUM SERPL-MCNC: 9.3 MG/DL (ref 8.6–10)
CHLORIDE SERPL-SCNC: 100 MMOL/L (ref 98–107)
CREAT SERPL-MCNC: 3.49 MG/DL (ref 0.67–1.17)
DEPRECATED HCO3 PLAS-SCNC: 16 MMOL/L (ref 22–29)
EGFRCR SERPLBLD CKD-EPI 2021: 21 ML/MIN/1.73M2
ERYTHROCYTE [DISTWIDTH] IN BLOOD BY AUTOMATED COUNT: 20 % (ref 10–15)
GLUCOSE SERPL-MCNC: 127 MG/DL (ref 70–99)
HCT VFR BLD AUTO: 25.1 % (ref 40–53)
HGB BLD-MCNC: 9.2 G/DL (ref 13.3–17.7)
INR PPP: 1.6 (ref 0.85–1.15)
MCH RBC QN AUTO: 34.3 PG (ref 26.5–33)
MCHC RBC AUTO-ENTMCNC: 36.7 G/DL (ref 31.5–36.5)
MCV RBC AUTO: 94 FL (ref 78–100)
PLATELET # BLD AUTO: 198 10E3/UL (ref 150–450)
POTASSIUM SERPL-SCNC: 4.2 MMOL/L (ref 3.4–5.3)
PROT SERPL-MCNC: ABNORMAL G/DL
RBC # BLD AUTO: 2.68 10E6/UL (ref 4.4–5.9)
SODIUM SERPL-SCNC: 132 MMOL/L (ref 136–145)
WBC # BLD AUTO: 26.8 10E3/UL (ref 4–11)

## 2023-09-09 PROCEDURE — 250N000013 HC RX MED GY IP 250 OP 250 PS 637: Performed by: INTERNAL MEDICINE

## 2023-09-09 PROCEDURE — 214N000001 HC R&B CCU UMMC

## 2023-09-09 PROCEDURE — 250N000012 HC RX MED GY IP 250 OP 636 PS 637: Performed by: STUDENT IN AN ORGANIZED HEALTH CARE EDUCATION/TRAINING PROGRAM

## 2023-09-09 PROCEDURE — 250N000013 HC RX MED GY IP 250 OP 250 PS 637: Performed by: STUDENT IN AN ORGANIZED HEALTH CARE EDUCATION/TRAINING PROGRAM

## 2023-09-09 PROCEDURE — 250N000013 HC RX MED GY IP 250 OP 250 PS 637: Performed by: PHYSICIAN ASSISTANT

## 2023-09-09 PROCEDURE — 36415 COLL VENOUS BLD VENIPUNCTURE: CPT | Performed by: STUDENT IN AN ORGANIZED HEALTH CARE EDUCATION/TRAINING PROGRAM

## 2023-09-09 PROCEDURE — 99233 SBSQ HOSP IP/OBS HIGH 50: CPT | Mod: GC | Performed by: INTERNAL MEDICINE

## 2023-09-09 PROCEDURE — 85610 PROTHROMBIN TIME: CPT | Performed by: STUDENT IN AN ORGANIZED HEALTH CARE EDUCATION/TRAINING PROGRAM

## 2023-09-09 PROCEDURE — 99233 SBSQ HOSP IP/OBS HIGH 50: CPT | Performed by: INTERNAL MEDICINE

## 2023-09-09 PROCEDURE — 85027 COMPLETE CBC AUTOMATED: CPT | Performed by: INTERNAL MEDICINE

## 2023-09-09 PROCEDURE — 84450 TRANSFERASE (AST) (SGOT): CPT | Performed by: STUDENT IN AN ORGANIZED HEALTH CARE EDUCATION/TRAINING PROGRAM

## 2023-09-09 PROCEDURE — 99232 SBSQ HOSP IP/OBS MODERATE 35: CPT | Performed by: STUDENT IN AN ORGANIZED HEALTH CARE EDUCATION/TRAINING PROGRAM

## 2023-09-09 RX ADMIN — Medication 1 TABLET: at 08:37

## 2023-09-09 RX ADMIN — MIDODRINE HYDROCHLORIDE 5 MG: 5 TABLET ORAL at 12:38

## 2023-09-09 RX ADMIN — SODIUM BICARBONATE 1300 MG: 325 TABLET ORAL at 08:37

## 2023-09-09 RX ADMIN — FOLIC ACID 1 MG: 1 TABLET ORAL at 08:37

## 2023-09-09 RX ADMIN — RIFAXIMIN 550 MG: 550 TABLET ORAL at 20:23

## 2023-09-09 RX ADMIN — PREDNISONE 40 MG: 20 TABLET ORAL at 08:37

## 2023-09-09 RX ADMIN — RIFAXIMIN 550 MG: 550 TABLET ORAL at 08:37

## 2023-09-09 RX ADMIN — PANTOPRAZOLE SODIUM 40 MG: 40 TABLET, DELAYED RELEASE ORAL at 08:38

## 2023-09-09 RX ADMIN — SODIUM BICARBONATE 1300 MG: 325 TABLET ORAL at 20:23

## 2023-09-09 ASSESSMENT — ACTIVITIES OF DAILY LIVING (ADL)
ADLS_ACUITY_SCORE: 18

## 2023-09-09 NOTE — PROGRESS NOTES
"[facility]    Hepatology Follow-up    CC: Hematochezia     Dx: Severe ETOH hepatitis   JOCELYN   HE    24 hour events: Nil     Subjective:  Doing fine  Mild abdominal distension but no pain  Ambulating independently around room  Appetite good and eating well  Passing plenty of urine  Patient denies fevers, sweats or chills.    Medications  Current Facility-Administered Medications   Medication Dose Route Frequency    folic acid  1 mg Oral Daily    lactulose  20 g Oral or NG Tube Daily    midodrine  5 mg Oral TID w/meals    multivitamin w/minerals  1 tablet Oral Daily    pantoprazole  40 mg Oral QAM AC    [Held by provider] polyethylene glycol  17 g Oral Daily    predniSONE  40 mg Oral Daily    rifaximin  550 mg Oral or Feeding Tube BID    sodium bicarbonate  1,300 mg Oral BID    sodium chloride (PF)  3 mL Intracatheter Q8H       Review of systems  A 10-point review of systems was negative.    Examination  /67 (BP Location: Left arm, Patient Position: Right side, Cuff Size: Adult Regular)   Pulse 80   Temp 98  F (36.7  C) (Oral)   Resp 20   Ht 1.702 m (5' 7\")   Wt 108.3 kg (238 lb 11.2 oz)   SpO2 98%   BMI 37.39 kg/m      Intake/Output Summary (Last 24 hours) at 9/9/2023 1509  Last data filed at 9/9/2023 1400  Gross per 24 hour   Intake 660 ml   Output 2200 ml   Net -1540 ml       Gen- well, NAD, A+Ox3, jaundiced  CVS- RRR  RS- CTA  Abd- mild distension, obese, SNT, tympanic to percuss, mild flank dullness to percuss, BS+  Extr- mild HARLEY   Neuro- no asterixis  Skin- jaundiced  Psych- normal mood    Laboratory  Lab Results   Component Value Date     09/09/2023    POTASSIUM 4.2 09/09/2023    POTASSIUM 4.1 06/07/2022    CHLORIDE 100 09/09/2023    CHLORIDE 97 06/07/2022    CO2 16 09/09/2023    CO2 24 06/07/2022    BUN 68.8 09/09/2023    BUN 11 06/07/2022    CR 3.49 09/09/2023       Lab Results   Component Value Date    BILITOTAL 34.1 09/09/2023    ALT 55 09/09/2023     " 09/09/2023    ALKPHOS 129 09/09/2023       Lab Results   Component Value Date    WBC 26.8 09/09/2023    HGB 9.2 09/09/2023    MCV 94 09/09/2023     09/09/2023       Lab Results   Component Value Date    INR 1.60 09/09/2023         Radiology  Nil recent    Assessment  43 year old male admitted to hospital with severe ETOH hepatitis complicated with JOCELYN.  Lille (day 4) score= 0.362 therefore will continue steroids for ETOH hepatitis.  No evidence of new infection.  Renal function improving.      Recommendations  Continue prednisone 40mg for total 4 weeks  Continue lactulose and rifaximin    Please page GI fellow on call with questions    Javi Chaidez MD  Hepatology staff

## 2023-09-09 NOTE — PROGRESS NOTES
Nephrology Progress Note  09/09/2023         Assessment & Recommendations:   Elroy Morel Sr. is a 43 year old male with history of severe AUD, withdrawal seizures, decompensated cirrhosis (c/b grade II EV, ascites, portal hypertensive gastropathy), HTN, and prior C.diff infection who was admitted to [facility] 8/21/23 with decompensated cirrhosis, JOCELYN, and acute on chronic GI bleed s/p EGD with variceal banding 8/28.  Discharged to [facility] for CD treatment on 8/30/23.  Returned to ED on 9/2/23 with abdominal pain, distension, and ongoing hematochezia.  Found to have worsening renal function concerning for HRS. Admitted by medicine overnight, now transferred to [facility] for specialty care.      #JOCELYN on CKD Dramatic rise in Cr from 0.9=> 5.9> 8>7>5.8>4 correlating with bili rising from 12 to 30s. DDx included bili cast nephropathy, congestion with severe ascites and Type I HRS although BP's are better than one would expect for HRS typically.    -Continue midodrine 5mg TID   -No issues in chemistries or volume requiring RRT, hopeful that renal fx is starting to recover with increased UOP.    -consent for RRT obtained and placed in chart   Paracentesis done 9/4 with 2 liters removed   Spun his urine showed bilirubin casts, mucous and epithelial cells   -discontinue octreotide   -c/w midodrine 5 tid   -repeat UA Monday      # Hypervolemic Hyponatremia  - Secondary to cirrhosis and worsening renal function  - Na 129  Seems to be his baseline , will monitor for now      #HAGMA gap of 17 2/2 uremia and his severe JOCELYN   HCO3 of 14  -started sodium bicarb tabs 1300 BID 9/7        BMD-Ca 9.2, Mg 2.0, Phos 3.9 last check, no acute issues.      Anemia-Hgb 8.8  2/2 GIB        #Decompensated ETOH-related cirrhosis with ascites  #Severe alcohol-related hepatitis w/o evidence of liver failure (improving LFTs)  # Coagulopathy w/ elevated INR  #History of variceal bleeding  #Alcohol use disorder, severe  Paracentesis 8/23,  "27, and 9/2    Recommendations were communicated to primary team via note    Seen and discussed with Dr. Josh Álvarez MD   [phone]    Interval History :   Nursing and provider notes from last 24 hours reviewed.  In the last 24 hours Elroy Morel Sr. Sleeping this am   No acute events overnight     Review of Systems:   I reviewed the following systems:  GI: good appetite. no nausea or vomiting or diarrhea.   Neuro:  no confusion  Constitutional:  no fever or chills  CV: no dyspnea or edema.  no chest pain.    Physical Exam:   I/O last 3 completed shifts:  In: 840 [P.O.:840]  Out: 2200 [Urine:2200]   /82 (BP Location: Left arm)   Pulse 80   Temp 98.2  F (36.8  C) (Oral)   Resp 20   Ht 1.702 m (5' 7\")   Wt 108.3 kg (238 lb 11.2 oz)   SpO2 96%   BMI 37.39 kg/m       GENERAL APPEARANCE: jaundiced not in distress, AAOX3  EYES: icteric scleral icterus, pupils equal  Lymphatics: no cervical or supraclavicular LAD  Pulmonary: lungs clear to auscultation with equal breath sounds bilaterally, no clubbing  CV: regular rhythm, normal rate, no rub   - JVD normal    - Edema no LLE edema   GI: tense, nontender distended abdomen, tympanic on percussion    MS: no evidence of inflammation in joints, no muscle tenderness  : no kelley  SKIN: no rash, warm, dry, no cyanosis  NEURO: face symmetric, no asterixis     Labs:   All labs reviewed by me  Electrolytes/Renal -   Recent Labs   Lab Test 09/09/23  0645 09/08/23  0542 09/07/23  0706 09/02/23  2031 08/30/23  0533 08/29/23  0803 08/29/23  0606 08/28/23  0645 08/26/23  0653 08/25/23  0620   * 129* 128*   < > 125*  --  128* 130*   < > 131*   POTASSIUM 4.2 4.5 4.3   < > 4.2  --  4.2 4.1   < > 3.8   CHLORIDE 100 100 98   < > 96*  --  98 100   < > 101   CO2 16* 14* 13*   < > 16*  --  17* 16*   < > 17*   BUN 68.8* 69.9* 70.9*   < > 45.5*  --  40.0* 41.4*   < > 47.5*   CR 3.49* 4.07* 4.71*   < > 4.34*  --  3.44* 3.72*   < > 5.40*   * 137* 168*   < " > 85  --  87 115*   < > 89   ENEDINA 9.3 9.0 9.7   < > 9.0  --  8.9 9.1   < > 9.2   MAG  --   --   --   --  1.8  --  1.8 1.9   < > 2.0   PHOS  --   --   --   --   --  3.5  --  3.4  --  3.9    < > = values in this interval not displayed.       CBC -   Recent Labs   Lab Test 09/09/23 0645 09/08/23  0542 09/07/23  0706   WBC 26.8* 19.7* 14.1*   HGB 9.2* 8.8* 9.3*    165 151       LFTs -   Recent Labs   Lab Test 09/09/23  0645 09/08/23  0542 09/07/23  0706 09/03/23  0644 09/02/23  2301 08/24/23  0618 08/23/23  0542 08/21/23  1130 08/11/23  1412   ALKPHOS 129 115 123   < > 144*   < > 120   < > 213*   BILITOTAL 34.1* 33.5* 37.1*   < > 36.7*   < > 28.6*   < > 12.6*   ALT 55 41 41   < > 55   < > 46   < > 109*   * 123* 134*   < > 161*   < > 149*   < > 428*   PROTTOTAL  --   --   --   --  6.1*  --  6.2*  --  7.6   ALBUMIN 3.6 3.4* 4.0   < > 2.9*  2.9*   < > 3.6   < > 3.1*    < > = values in this interval not displayed.       Iron Panel -   Recent Labs   Lab Test 08/21/23  1130   IRON 71   IRONSAT 55*         Imaging:  All imaging studies reviewed by me.     Current Medications:   folic acid  1 mg Oral Daily    lactulose  20 g Oral or NG Tube Daily    midodrine  5 mg Oral TID w/meals    multivitamin w/minerals  1 tablet Oral Daily    pantoprazole  40 mg Oral QAM AC    [Held by provider] polyethylene glycol  17 g Oral Daily    predniSONE  40 mg Oral Daily    rifaximin  550 mg Oral or Feeding Tube BID    sodium bicarbonate  1,300 mg Oral BID    sodium chloride (PF)  3 mL Intracatheter Q8H      - MEDICATION INSTRUCTIONS -       Bozena Álvarez MD

## 2023-09-09 NOTE — PLAN OF CARE
Neuro: A&Ox4. Responds to conversation appropriately.   Cardiac: off tele.   Respiratory: Sating high 90s on R.A. No SOB.   GI/: Adequate urine output. BM X4. Updated team on Bilirubin 34.1. Distended abdomen.   Diet/appetite: Tolerating diet. Eating well.  Activity:  Independent. Patient went down, he said he went for a walk outside around the block. Encourage to let the nurses/NST know when he go for a walk outside. Verbalized understanding.   Pain: Denies pain.   Skin: jaundiced  LDA's: Right PIV.     Plan: Continue with POC. Notify primary team with changes.

## 2023-09-09 NOTE — PROGRESS NOTES
[facility]    Medicine Progress Note - Hospitalist Service, GOLD TEAM 9    Date of Admission:  9/2/2023    Assessment & Plan   Elroy Morel Sr. is a 43 year old male with history of severe AUD, withdrawal seizures, decompensated cirrhosis (c/b grade II EV, ascites, portal hypertensive gastropathy), HTN, and prior C.diff infection who was admitted to [facility] 8/21/23 with decompensated cirrhosis, JOCELYN, and acute on chronic GI bleed s/p EGD with variceal banding 8/28.  Discharged to [facility] for CD treatment on 8/30/23. Returned to ED on 9/2/23 with abdominal pain, distension, and ongoing hematochezia.  Found to have worsening renal function concerning for HRS and transferred back to medicine.      #JOCELYN, concern for HRS; improving    Recent admission for JOCELYN, up-trending Cr at time of discharge (4.34). Repeat Cr 8.17 on arrival. No urgent indication for HD per nephrology. Most likely HRS vs congestive bile cast nephropathy from ESLD  - Nephrology consulted, appreciate recs  - Completed 3 days albumin - 100g 25% albumin (9/3-9/5)  - Received octreotide 100 mcg TID subcutaneously (9/3-9/8)  - Continue midodrine 5 mg TID, will likely need to be a long term med unless causing hypertension  - Hold diuretics     #Decompensated alcoholic cirrhosis  #Hepatic encephalopathy, resolved  #Recent banding of Grade II EV (8/28/23)  #Portal Hypertensive Gastropathy and colopathy  #Acute on chronic anemia secondary to GI bleed  #History of variceal bleeding(8/13/2023) w/ esophageal ulcer at area (8/28)  MELD 3.0: 38 at 9/9/2023  6:45 AM  MELD-Na: 37 at 9/9/2023  6:45 AM  Calculated from:  Serum Creatinine: 3.49 mg/dL (Using max of 3 mg/dL) at 9/9/2023  6:45 AM  Serum Sodium: 132 mmol/L at 9/9/2023  6:45 AM  Total Bilirubin: 34.1 mg/dL at 9/9/2023  6:45 AM  Serum Albumin: 3.6 g/dL (Using max of 3.5 g/dL) at 9/9/2023  6:45 AM  INR(ratio): 1.60 at 9/9/2023  6:45 AM  Age at listing  (hypothetical): 43 years  Sex: Male at 9/9/2023  6:45 AM  Concern for HRS. Recurrent ascites now off of diuretics. Hepatology reports patient is currently not a transplant candidate (currently in active treatment for sobriety). He received paracentesis on 9/4 (-2L) and 9/6 (-3L). Decreased the fluid amount as reportedly pt had blood pressure issues previously with larger volume removal.    - Hepatology consulted and following  - Lactulose 20 g daily (ok to hold if 3+ stools per day)  - Rifaximin 550 mg BID  - Paracentesis on 9/6 with  (8% neutrophils), not consistent with SBP. Albumin given following fluid removal.  - Continue prednisone 40 mg daily for alcoholic hepatitis (Maddrey's score positive based on bili alone), evaluate response on 9/10    #Alcohol use disorder  -SW following. No recent ETOH use. Hx withdrawal seizures, low risk for withdrawal symptoms at this point. Was at CD treatment prior to arrival.  -Chemical dependency consult placed, evaluating for virtual outpatient CLARA programs    #Hypervolemic Hyponatremia:    Secondary to cirrhosis and worsening renal function.   -Trend BMP     #Anion gap metabolic acidosis  Secondary to JOCELYN  -Na bicarb tabs 1300 mg BID  -Trend BMP     #Leukocytosis,  Afebrile. Ascitic fluid not suggestive of SBP. No other clear source. CXR with no obvious focal consolidation. UA 9/4 negative. WBC now increasing in response to prednisone.  -No indication for ppx ceftraixone per GI  -Blood culture 9/3 - NGTD     #Coagulopathy  Secondary to ESLD  -Daily INR    #Acute on chronic hematochezia, suspect hemorrhoidal bleed; resolved   Recent admission for melena/hematochezia, s/p EGD with banding of grade II EV on 8/28. GI suspected hemorrhoidal bleed, less likely variceal or upper source.   -Monitor clinically  -Trend Hgb      #HTN  -Previously on lisinopril, stopped d/t JOCELYN     #Recent C.diff infection  Completed tx with vancomycin. Does not have C diff infection, rather has  "colonization.        Diet: Regular Diet Adult    DVT Prophylaxis: Pneumatic Compression Devices  Carroll Catheter: Not present  Lines: None     Cardiac Monitoring: None  Code Status: Full Code      Clinically Significant Risk Factors         # Hyponatremia: Lowest Na = 129 mmol/L in last 2 days, will monitor as appropriate      # Hypoalbuminemia: Lowest albumin = 2.9 g/dL at 9/2/2023 11:01 PM, will monitor as appropriate    # Coagulation Defect: INR = 1.60 (Ref range: 0.85 - 1.15) and/or PTT = 42 Seconds (Ref range: 22 - 38 Seconds), will monitor for bleeding        # Hypertension: Noted on problem list        # Obesity: Estimated body mass index is 37.39 kg/m  as calculated from the following:    Height as of this encounter: 1.702 m (5' 7\").    Weight as of this encounter: 108.3 kg (238 lb 11.2 oz).             Disposition Plan     Expected Discharge Date: 09/11/2023      Destination: home with family  Discharge Comments: 9/6: elevated creatnidanette Rankin MD  Hospitalist Service, GOLD TEAM 9  [facility]  Securely message with [employer] (more info)  Text page via [facility] Paging/Directory   See signed in provider for up to date coverage information  ______________________________________________________________________    Interval History   Improvement in Cr from 4.07 to 3.49 this morning. No significant abdominal pain today. Trying to keep occupied while hospitalized.    Physical Exam   Vital Signs: Temp: 98  F (36.7  C) Temp src: Oral BP: 115/67 Pulse: 80   Resp: 20 SpO2: 98 % O2 Device: None (Room air)    Weight: 238 lbs 11.2 oz    Gen: no acute distress, alert, interactive, jaundiced appearance  HEENT: scleral icterus (improving), EOMI  Cardiovascular: 2+ distal pulses equal bilaterally, good perfusion  Respiratory: no increased work of breathing  Abdomen: soft, improved abdominal distension, non-tender throughout  Extremities: No appreciable edema  Skin: no " concerning rashes or lesions, diffuse jaundice (improving  Neuro: A&O x4, responds appropriately to exam      Medical Decision Making       40 MINUTES SPENT BY ME on the date of service doing chart review, history, exam, documentation & further activities per the note.      Data     I have personally reviewed the following data over the past 24 hrs:    26.8 (H)  \   9.2 (L)   / 198     132 (L) 100 68.8 (H) /  127 (H)   4.2 16 (L) 3.49 (H) \     ALT: 55 AST: 134 (H) AP: 129 TBILI: 34.1 (HH)   ALB: 3.6 TOT PROTEIN: N/A LIPASE: N/A     INR:  1.60 (H) PTT:  N/A   D-dimer:  N/A Fibrinogen:  N/A     Imaging results reviewed over the past 24 hrs:   No results found for this or any previous visit (from the past 24 hour(s)).

## 2023-09-10 ENCOUNTER — ANCILLARY PROCEDURE (OUTPATIENT)
Dept: ULTRASOUND IMAGING | Facility: CLINIC | Age: 43
End: 2023-09-10
Attending: INTERNAL MEDICINE
Payer: COMMERCIAL

## 2023-09-10 LAB
ABSOLUTE NEUTROPHILS, BODY FLUID: 55.2 /UL
ALBUMIN SERPL BCG-MCNC: 3.4 G/DL (ref 3.5–5.2)
ALP SERPL-CCNC: 141 U/L (ref 40–129)
ALT SERPL W P-5'-P-CCNC: 73 U/L (ref 0–70)
ANION GAP SERPL CALCULATED.3IONS-SCNC: 14 MMOL/L (ref 7–15)
APPEARANCE FLD: ABNORMAL
AST SERPL W P-5'-P-CCNC: ABNORMAL U/L
BILIRUB SERPL-MCNC: 30.7 MG/DL
BUN SERPL-MCNC: 60.9 MG/DL (ref 6–20)
CALCIUM SERPL-MCNC: 9 MG/DL (ref 8.6–10)
CELL COUNT BODY FLUID SOURCE: ABNORMAL
CHLORIDE SERPL-SCNC: 99 MMOL/L (ref 98–107)
COLOR FLD: YELLOW
CREAT SERPL-MCNC: 3.2 MG/DL (ref 0.67–1.17)
DEPRECATED HCO3 PLAS-SCNC: 17 MMOL/L (ref 22–29)
EGFRCR SERPLBLD CKD-EPI 2021: 24 ML/MIN/1.73M2
ERYTHROCYTE [DISTWIDTH] IN BLOOD BY AUTOMATED COUNT: 21.2 % (ref 10–15)
GLUCOSE SERPL-MCNC: 140 MG/DL (ref 70–99)
HCT VFR BLD AUTO: 24.1 % (ref 40–53)
HGB BLD-MCNC: 8.5 G/DL (ref 13.3–17.7)
INR PPP: 1.7 (ref 0.85–1.15)
LYMPHOCYTES NFR FLD MANUAL: 9 %
MCH RBC QN AUTO: 34.3 PG (ref 26.5–33)
MCHC RBC AUTO-ENTMCNC: 35.3 G/DL (ref 31.5–36.5)
MCV RBC AUTO: 97 FL (ref 78–100)
MONOS+MACROS NFR FLD MANUAL: NORMAL %
NEUTS BAND NFR FLD MANUAL: 20 %
OTHER CELLS FLD MANUAL: 71 %
PLATELET # BLD AUTO: 202 10E3/UL (ref 150–450)
POTASSIUM SERPL-SCNC: 5 MMOL/L (ref 3.4–5.3)
PROT SERPL-MCNC: ABNORMAL G/DL
RBC # BLD AUTO: 2.48 10E6/UL (ref 4.4–5.9)
SODIUM SERPL-SCNC: 130 MMOL/L (ref 136–145)
WBC # BLD AUTO: 29 10E3/UL (ref 4–11)
WBC # FLD AUTO: 276 /UL

## 2023-09-10 PROCEDURE — 89051 BODY FLUID CELL COUNT: CPT | Performed by: STUDENT IN AN ORGANIZED HEALTH CARE EDUCATION/TRAINING PROGRAM

## 2023-09-10 PROCEDURE — 49083 ABD PARACENTESIS W/IMAGING: CPT | Performed by: INTERNAL MEDICINE

## 2023-09-10 PROCEDURE — 36415 COLL VENOUS BLD VENIPUNCTURE: CPT | Performed by: INTERNAL MEDICINE

## 2023-09-10 PROCEDURE — 214N000001 HC R&B CCU UMMC

## 2023-09-10 PROCEDURE — 250N000011 HC RX IP 250 OP 636: Performed by: PHYSICIAN ASSISTANT

## 2023-09-10 PROCEDURE — 99233 SBSQ HOSP IP/OBS HIGH 50: CPT | Mod: 25 | Performed by: INTERNAL MEDICINE

## 2023-09-10 PROCEDURE — 99232 SBSQ HOSP IP/OBS MODERATE 35: CPT | Mod: 25 | Performed by: STUDENT IN AN ORGANIZED HEALTH CARE EDUCATION/TRAINING PROGRAM

## 2023-09-10 PROCEDURE — 250N000013 HC RX MED GY IP 250 OP 250 PS 637: Performed by: STUDENT IN AN ORGANIZED HEALTH CARE EDUCATION/TRAINING PROGRAM

## 2023-09-10 PROCEDURE — 0W9G3ZZ DRAINAGE OF PERITONEAL CAVITY, PERCUTANEOUS APPROACH: ICD-10-PCS | Performed by: INTERNAL MEDICINE

## 2023-09-10 PROCEDURE — 85610 PROTHROMBIN TIME: CPT | Performed by: INTERNAL MEDICINE

## 2023-09-10 PROCEDURE — 250N000013 HC RX MED GY IP 250 OP 250 PS 637: Performed by: INTERNAL MEDICINE

## 2023-09-10 PROCEDURE — 81001 URINALYSIS AUTO W/SCOPE: CPT | Performed by: STUDENT IN AN ORGANIZED HEALTH CARE EDUCATION/TRAINING PROGRAM

## 2023-09-10 PROCEDURE — P9047 ALBUMIN (HUMAN), 25%, 50ML: HCPCS | Performed by: PHYSICIAN ASSISTANT

## 2023-09-10 PROCEDURE — 84075 ASSAY ALKALINE PHOSPHATASE: CPT | Performed by: INTERNAL MEDICINE

## 2023-09-10 PROCEDURE — 85027 COMPLETE CBC AUTOMATED: CPT | Performed by: INTERNAL MEDICINE

## 2023-09-10 PROCEDURE — 250N000012 HC RX MED GY IP 250 OP 636 PS 637: Performed by: STUDENT IN AN ORGANIZED HEALTH CARE EDUCATION/TRAINING PROGRAM

## 2023-09-10 PROCEDURE — 250N000013 HC RX MED GY IP 250 OP 250 PS 637: Performed by: PHYSICIAN ASSISTANT

## 2023-09-10 RX ORDER — ALBUMIN (HUMAN) 12.5 G/50ML
37.5 SOLUTION INTRAVENOUS ONCE
Status: COMPLETED | OUTPATIENT
Start: 2023-09-10 | End: 2023-09-10

## 2023-09-10 RX ADMIN — PREDNISONE 40 MG: 20 TABLET ORAL at 08:05

## 2023-09-10 RX ADMIN — Medication 1 TABLET: at 08:06

## 2023-09-10 RX ADMIN — PANTOPRAZOLE SODIUM 40 MG: 40 TABLET, DELAYED RELEASE ORAL at 08:06

## 2023-09-10 RX ADMIN — ALBUMIN HUMAN 37.5 G: 0.25 SOLUTION INTRAVENOUS at 18:04

## 2023-09-10 RX ADMIN — RIFAXIMIN 550 MG: 550 TABLET ORAL at 19:57

## 2023-09-10 RX ADMIN — SODIUM BICARBONATE 1300 MG: 325 TABLET ORAL at 08:05

## 2023-09-10 RX ADMIN — FOLIC ACID 1 MG: 1 TABLET ORAL at 08:05

## 2023-09-10 RX ADMIN — RIFAXIMIN 550 MG: 550 TABLET ORAL at 08:06

## 2023-09-10 RX ADMIN — SODIUM BICARBONATE 1300 MG: 325 TABLET ORAL at 19:58

## 2023-09-10 ASSESSMENT — ACTIVITIES OF DAILY LIVING (ADL)
ADLS_ACUITY_SCORE: 18

## 2023-09-10 NOTE — PROCEDURES
[facility]  CAPS PROCEDURE NOTE  Date of Admission:  9/2/2023  Consult Requested by: Medicine  Reason for Consult: management of symptomatic ascites    Indication/HPI: ascites    Pre-Procedure Diagnosis: Ascites    Post-Procedure Diagnosis: Ascites    Risk Assessment: Average risk, Technically straightforward; patient's anticoagulation has been held according to guidelines based on the agent and platelets and coags are within guidelines    Procedure Outcome:  Therapeutic paracentesis performed with 3 liters of ascites removed. Bright yellow clear fluid- sent to lab  See additional procedure details below.    The primary covering service should follow up and address any lab results as appropriate.    Katleyn Ramos MD  [facility]  Securely message with [employer] (more info)  Text page via [employer] Paging/Directory   See signed in provider for up to date coverage information      [facility]    Paracentesis    Date/Time: 9/10/2023 3:56 PM    Performed by: Katelyn Ramos MD  Authorized by: Katelyn Ramos MD    PRE-PROCEDURE DETAILS  Initial or subsequent exam: subsequent  Procedure purpose: therapeutic  Indications: abdominal discomfort secondary to ascites        UNIVERSAL PROTOCOL   Site Marked: Yes  Prior Images Obtained and Reviewed:  Yes  Required items: Required blood products, implants, devices and special equipment available    Patient identity confirmed:  Verbally with patient  NA - No sedation, light sedation, or local anesthesia  Confirmation Checklist:  Patient's identity using two indicators  Time out: Immediately prior to the procedure a time out was called    Universal Protocol: the Joint Commission Universal Protocol was followed    Preparation: Patient was prepped and draped in usual sterile fashion       ANESTHESIA    Anesthesia:  Local infiltration  Local  Anesthetic:  Lidocaine 1% without epinephrine  Anesthetic Total (mL):  10      SEDATION    Patient Sedated: No    POST PROCEDURE DETAILS  Needle gauge: 22  Ultrasound guidance: yes  Puncture site: right lower quadrant  Fluid removed: 3000(ml)  Fluid characteristics: clear yellow bright.  Dressing: glue and bandaid.        PROCEDURE  Describe Procedure: Sample was sent to the lab  Patient Tolerance:  Patient tolerated the procedure well with no immediate complications  Length of time physician/provider present for 1:1 monitoring during sedation: 0        POC US Guide for Paracentesis     Impression  US Indication: decompensated liver disease    Limited abdominal ultrasound was performed and demonstrated an adequate fluid collection on the right side of the abdomen.    Doppler of the skin demonstrated an area at this site without significant vasculature.  A paracentesis at this site was subsequently performed.    Katelyn Ramos MD

## 2023-09-10 NOTE — PROGRESS NOTES
[facility]    Medicine Progress Note - Hospitalist Service, GOLD TEAM 9    Date of Admission:  9/2/2023    Assessment & Plan   Elroy Morel Sr. is a 43 year old male with history of severe AUD, withdrawal seizures, decompensated cirrhosis (c/b grade II EV, ascites, portal hypertensive gastropathy), HTN, and prior C.diff infection who was admitted to [facility] 8/21/23 with decompensated cirrhosis, JOCELYN, and acute on chronic GI bleed s/p EGD with variceal banding 8/28. Discharged to [facility] for CD treatment on 8/30/23. Returned to ED on 9/2/23 with abdominal pain, distension, and ongoing hematochezia. Found to have worsening renal function concerning for HRS and transferred back to medicine.      #JOCELYN, concern for HRS; improving    Recent admission for JOCELYN, up-trending Cr at time of discharge (4.34). Repeat Cr 8.17 on arrival. No urgent indication for HD per nephrology. Most likely HRS vs congestive bile cast nephropathy from ESLD  -Nephrology consulted, appreciate recs  -Completed 3 days albumin - 100g 25% albumin (9/3-9/5)  -Received octreotide 100 mcg TID subcutaneously (9/3-9/8)  -Discontinue midodrine  -Hold diuretics    #Decompensated alcoholic cirrhosis  #Hepatic encephalopathy, resolved  #Recent banding of Grade II EV (8/28/23)  #Portal Hypertensive Gastropathy and colopathy  #Acute on chronic anemia secondary to GI bleed  #History of variceal bleeding(8/13/2023) w/ esophageal ulcer at area (8/28)  MELD 3.0: 39 at 9/10/2023  7:59 AM  MELD-Na: 37 at 9/10/2023  7:59 AM  Calculated from:  Serum Creatinine: 3.20 mg/dL (Using max of 3 mg/dL) at 9/10/2023  7:59 AM  Serum Sodium: 130 mmol/L at 9/10/2023  7:59 AM  Total Bilirubin: 30.7 mg/dL at 9/10/2023  7:59 AM  Serum Albumin: 3.4 g/dL at 9/10/2023  7:59 AM  INR(ratio): 1.70 at 9/10/2023  7:59 AM  Age at listing (hypothetical): 43 years  Sex: Male at 9/10/2023  7:59 AM  Concern for HRS. Recurrent ascites now off of  diuretics. Hepatology reports patient is currently not a transplant candidate (currently in active treatment for sobriety). He received paracentesis on 9/4 (-2L) and 9/6 (-3L). Decreased the fluid amount as reportedly pt had blood pressure issues previously with larger volume removal.    -Hepatology consulted and following  -Lactulose 20 g daily (ok to hold if 3+ stools per day)  -Rifaximin 550 mg BID  -Evaluate for paracentesis today, send cell count, give albumin 12.5g per L removed  -Continue prednisone 40 mg daily for alcoholic hepatitis (Maddrey's score positive based on bili alone), continue for 4 weeks    #Alcohol use disorder  -SW following. No recent ETOH use. Hx withdrawal seizures, low risk for withdrawal symptoms at this point. Was at CD treatment prior to arrival.  -Chemical dependency consult placed, evaluating for virtual outpatient CLARA programs    #Hypervolemic Hyponatremia:    Secondary to cirrhosis and worsening renal function.   -Trend BMP     #Anion gap metabolic acidosis  Secondary to JOCELYN  -Na bicarb tabs 1300 mg BID  -Trend BMP     #Leukocytosis  Afebrile. Ascitic fluid not suggestive of SBP. No other clear source. CXR with no obvious focal consolidation. UA 9/4 negative. WBC now increasing in response to prednisone.  -No indication for ppx ceftraixone per GI  -Blood culture 9/3 - NGTD     #Coagulopathy  Secondary to ESLD  -Daily INR    #Acute on chronic hematochezia, suspect hemorrhoidal bleed; resolved  Recent admission for melena/hematochezia, s/p EGD with banding of grade II EV on 8/28. GI suspected hemorrhoidal bleed, less likely variceal or upper source.   -Monitor clinically  -Trend Hgb      #HTN  -Previously on lisinopril, stopped d/t JOCELYN     #Recent C.diff infection  Completed tx with vancomycin. Does not have C diff infection, rather has colonization.        Diet: Regular Diet Adult    DVT Prophylaxis: Pneumatic Compression Devices  Carroll Catheter: Not present  Lines: None     Cardiac  "Monitoring: None  Code Status: Full Code      Clinically Significant Risk Factors              # Hypoalbuminemia: Lowest albumin = 2.9 g/dL at 9/2/2023 11:01 PM, will monitor as appropriate    # Coagulation Defect: INR = 1.70 (Ref range: 0.85 - 1.15) and/or PTT = 42 Seconds (Ref range: 22 - 38 Seconds), will monitor for bleeding        # Hypertension: Noted on problem list        # Obesity: Estimated body mass index is 37.92 kg/m  as calculated from the following:    Height as of this encounter: 1.702 m (5' 7\").    Weight as of this encounter: 109.8 kg (242 lb 1.6 oz).             Disposition Plan      Expected Discharge Date: 09/11/2023      Destination: home with family  Discharge Comments: 9/6: elevated creatnidanette Rankin MD  Hospitalist Service, GOLD TEAM 9  [facility]  Securely message with [employer] (more info)  Text page via [facility] Paging/Directory   See signed in provider for up to date coverage information  ______________________________________________________________________    Interval History   Improvement in Cr from 3.49 to 3.2 this morning. Increase in abdominal discomfort and fullness, also feeling short of breath with walking, requesting paracentesis today. Hopeful to discharge home early this week.    Physical Exam   Vital Signs: Temp: 97.9  F (36.6  C) Temp src: Oral BP: 137/82 Pulse: 80   Resp: 16 SpO2: 98 % O2 Device: None (Room air)    Weight: 242 lbs 1.6 oz    Gen: no acute distress, alert, interactive, jaundiced appearance  HEENT: scleral icterus (improving)  Cardiovascular: 2+ distal pulses equal bilaterally, good perfusion  Respiratory: no increased work of breathing  Abdomen: soft, abdomen more full and distended today, somewhat tender throughout  Extremities: No appreciable edema  Skin: no concerning rashes or lesions, diffuse jaundice (improving)  Neuro: A&O x4, responds appropriately to exam    Medical Decision Making       40 " MINUTES SPENT BY ME on the date of service doing chart review, history, exam, documentation & further activities per the note.      Data     I have personally reviewed the following data over the past 24 hrs:    29.0 (H)  \   8.5 (L)   / 202     130 (L) 99 60.9 (H) /  140 (H)   5.0 17 (L) 3.20 (H) \     ALT: 73 (H) AST: N/A AP: 141 (H) TBILI: 30.7 (HH)   ALB: 3.4 (L) TOT PROTEIN: N/A LIPASE: N/A     INR:  1.70 (H) PTT:  N/A   D-dimer:  N/A Fibrinogen:  N/A     Imaging results reviewed over the past 24 hrs:   No results found for this or any previous visit (from the past 24 hour(s)).

## 2023-09-10 NOTE — PLAN OF CARE
Changed:   Running: N/A.  PRN:      Neuro: A&O X4. No dizziness, lightheadedness, or HA. No N/T.  Cardiac/Tele:  No tele orders. VSS, except BP elevated at times. Afebrile. No CP.    Respiratory: Sats >90% on RA. No SOB or CANAS.   GI/: Voids spontaneously, adequate UOP, dark jackson. Multiple BM this shift, loose stools per Pt. Distended abdomen.  Diet/Appetite: Regular diet. No nausea.   Endocrine/Labs: N/A.  Skin: No new deficits noted. Jaundice.   LDAs: R PIV x1.   Activity: Independent.   Pain: Denied.     Plan: Continue POC and notify team of changes.     Time cared for [id number]

## 2023-09-10 NOTE — PROGRESS NOTES
Nephrology Progress Note  09/10/2023         Assessment & Recommendations:   Elroy Morel Sr. is a 43 year old male with history of severe AUD, withdrawal seizures, decompensated cirrhosis (c/b grade II EV, ascites, portal hypertensive gastropathy), HTN, and prior C.diff infection who was admitted to [facility] 8/21/23 with decompensated cirrhosis, JOCELYN, and acute on chronic GI bleed s/p EGD with variceal banding 8/28.  Discharged to [facility] for CD treatment on 8/30/23.  Returned to ED on 9/2/23 with abdominal pain, distension, and ongoing hematochezia.  Found to have worsening renal function concerning for HRS. Admitted by medicine overnight, now transferred to [facility] for specialty care.      #JOCELYN on CKD Dramatic rise in Cr from 0.9=> 5.9> 8>7>5.8>4 correlating with bili rising from 12 to 30s. DDx included bili cast nephropathy, congestion with severe ascites and Type I HRS although BP's are better than one would expect for HRS typically. Creatinine is slowly improving, as well as UOP.   -No issues in chemistries or volume requiring RRT, hopeful that renal fx is starting to recover with increased UOP.    -consent for RRT obtained and placed in chart   Paracentesis done 9/4 with 2 liters removed   Spun his urine showed bilirubin casts, mucous and epithelial cells   -discontinue octreotide   -discontinue midodrine 5 tid      # Hypervolemic Hyponatremia  - Secondary to cirrhosis and worsening renal function. Improving. Today 130  - Seems to be his baseline , will monitor for now      #NAGMA gap of 17 2/2 uremia and his severe JOCELYN   HCO3 of 17  -started sodium bicarb tabs 1300 BID 9/7     BMD-Ca 9.0, Mg 2.0, Phos 3.9 last check, no acute issues.      Anemia-Hgb 8.5  2/2 GIB        #Decompensated ETOH-related cirrhosis with ascites  #Severe alcohol-related hepatitis w/o evidence of liver failure (improving LFTs)  # Coagulopathy w/ elevated INR  #History of variceal bleeding  #Alcohol use disorder,  "severe  Paracentesis 8/23, 27, and 9/2  Per GI will continue Prednisone 40 mg for total of 4 weeks plus lactulose and Rifaximin.    Recommendations were communicated to primary team via note    Seen and discussed with Dr. Josh Álvarez MD   [phone]    Interval History :   Nursing and provider notes from last 24 hours reviewed.  In the last 24 hours Elroy Morel Sr. Sleeping this am   UOP in [facility] last 24 h 2300 ml  Creat 3.20    Review of Systems:   I reviewed the following systems:  GI: good appetite. no nausea or vomiting or diarrhea.   Neuro:  no confusion  Constitutional:  no fever or chills  CV: no dyspnea or edema.  no chest pain.    Physical Exam:   I/O last 3 completed shifts:  In: 660 [P.O.:660]  Out: 2350 [Urine:2350]   /76 (BP Location: Left arm)   Pulse 81   Temp 98  F (36.7  C) (Oral)   Resp 16   Ht 1.702 m (5' 7\")   Wt 109.8 kg (242 lb 1.6 oz)   SpO2 98%   BMI 37.92 kg/m       GENERAL APPEARANCE: jaundiced not in distress, AAOX3  EYES: icteric scleral icterus, pupils equal  Lymphatics: no cervical or supraclavicular LAD  Pulmonary: lungs clear to auscultation with equal breath sounds bilaterally, no clubbing  CV: regular rhythm, normal rate, no rub   - JVD normal    - Edema no LLE edema   GI: tense, nontender distended abdomen, tympanic on percussion    MS: no evidence of inflammation in joints, no muscle tenderness  : no kelley  SKIN: no rash, warm, dry, no cyanosis  NEURO: face symmetric, no asterixis     Labs:   All labs reviewed by me  Electrolytes/Renal -   Recent Labs   Lab Test 09/10/23  0759 09/09/23  0645 09/08/23  0542 09/02/23  2031 08/30/23  0533 08/29/23  0803 08/29/23  0606 08/28/23  0645 08/26/23  0653 08/25/23  0620   * 132* 129*   < > 125*  --  128* 130*   < > 131*   POTASSIUM 5.0 4.2 4.5   < > 4.2  --  4.2 4.1   < > 3.8   CHLORIDE 99 100 100   < > 96*  --  98 100   < > 101   CO2 17* 16* 14*   < > 16*  --  17* 16*   < > 17*   BUN 60.9* 68.8* 69.9*  "  < > 45.5*  --  40.0* 41.4*   < > 47.5*   CR 3.20* 3.49* 4.07*   < > 4.34*  --  3.44* 3.72*   < > 5.40*   * 127* 137*   < > 85  --  87 115*   < > 89   ENEDINA 9.0 9.3 9.0   < > 9.0  --  8.9 9.1   < > 9.2   MAG  --   --   --   --  1.8  --  1.8 1.9   < > 2.0   PHOS  --   --   --   --   --  3.5  --  3.4  --  3.9    < > = values in this interval not displayed.       CBC -   Recent Labs   Lab Test 09/10/23  0759 09/09/23  0645 09/08/23  0542   WBC 29.0* 26.8* 19.7*   HGB 8.5* 9.2* 8.8*    198 165       LFTs -   Recent Labs   Lab Test 09/10/23  0759 09/09/23  0645 09/08/23  0542 09/07/23  0706 09/03/23  0644 09/02/23  2301 08/24/23  0618 08/23/23  0542 08/21/23  1130 08/11/23  1412   ALKPHOS 141* 129 115 123   < > 144*   < > 120   < > 213*   BILITOTAL 30.7* 34.1* 33.5* 37.1*   < > 36.7*   < > 28.6*   < > 12.6*   ALT 73* 55 41 41   < > 55   < > 46   < > 109*   AST  --  134* 123* 134*   < > 161*   < > 149*   < > 428*   PROTTOTAL  --   --   --   --   --  6.1*  --  6.2*  --  7.6   ALBUMIN 3.4* 3.6 3.4* 4.0   < > 2.9*  2.9*   < > 3.6   < > 3.1*    < > = values in this interval not displayed.       Iron Panel -   Recent Labs   Lab Test 08/21/23  1130   IRON 71   IRONSAT 55*         Imaging:  All imaging studies reviewed by me.     Current Medications:   folic acid  1 mg Oral Daily    lactulose  20 g Oral or NG Tube Daily    midodrine  5 mg Oral TID w/meals    multivitamin w/minerals  1 tablet Oral Daily    pantoprazole  40 mg Oral QAM AC    [Held by provider] polyethylene glycol  17 g Oral Daily    predniSONE  40 mg Oral Daily    rifaximin  550 mg Oral or Feeding Tube BID    sodium bicarbonate  1,300 mg Oral BID    sodium chloride (PF)  3 mL Intracatheter Q8H      - MEDICATION INSTRUCTIONS -       Bozena Álvarez MD

## 2023-09-10 NOTE — PROGRESS NOTES
Intensive Care Unit   Nursing Note            Neuro:  AAO x 4. PERRL.  Moves all extremities.  Follows commands.   Cardiovascular:  RRR.  Hemodynamically stable.  Pulmonary:  Room Air  GI/:  Multiple BM's. Urine output adequate.    Plan of Care:   3L off of paracentesis. Albumin given.   Possible discharge tomorrow.     AM labs noted; electrolytes replaced as indicated.  Family updated  Continue to monitor closely.    Recent Labs   Lab 09/06/23 2045   O2PER 21       No results found for: TROPI, TROPONIN, TROPR, TROPN    ROUTINE IP LABS (Last four results)  BMP  Recent Labs   Lab 09/10/23  0759 09/09/23  0645 09/08/23  0542 09/07/23  0706   * 132* 129* 128*   POTASSIUM 5.0 4.2 4.5 4.3   CHLORIDE 99 100 100 98   ENEDINA 9.0 9.3 9.0 9.7   CO2 17* 16* 14* 13*   BUN 60.9* 68.8* 69.9* 70.9*   CR 3.20* 3.49* 4.07* 4.71*   * 127* 137* 168*     CBC  Recent Labs   Lab 09/10/23  0759 09/09/23  0645 09/08/23  0542 09/07/23  0706   WBC 29.0* 26.8* 19.7* 14.1*   RBC 2.48* 2.68* 2.55* 2.73*   HGB 8.5* 9.2* 8.8* 9.3*   HCT 24.1* 25.1* 24.2* 26.3*   MCV 97 94 95 96   MCH 34.3* 34.3* 34.5* 34.1*   MCHC 35.3 36.7* 36.4 35.4   RDW 21.2* 20.0* 19.5* 19.5*    198 165 151     INR  Recent Labs   Lab 09/10/23  0759 09/09/23  0645 09/08/23  0542 09/07/23  0706   INR 1.70* 1.60* 1.75* 1.73*     LACTIC ACID  Lactic Acid (mmol/L)   Date Value   09/02/2023 1.1     Blood GlucoseNo results found for: BGM    Intake/Output Summary (Last 24 hours) at 9/10/2023 1857  Last data filed at 9/10/2023 1500  Gross per 24 hour   Intake 600 ml   Output 2150 ml   Net -1550 ml       Eloina Barbosa RN    Intensive Care Unit

## 2023-09-11 VITALS
BODY MASS INDEX: 37.49 KG/M2 | WEIGHT: 238.9 LBS | SYSTOLIC BLOOD PRESSURE: 101 MMHG | HEIGHT: 67 IN | TEMPERATURE: 98 F | RESPIRATION RATE: 17 BRPM | OXYGEN SATURATION: 99 % | HEART RATE: 80 BPM | DIASTOLIC BLOOD PRESSURE: 59 MMHG

## 2023-09-11 LAB
ALBUMIN SERPL BCG-MCNC: 3.6 G/DL (ref 3.5–5.2)
ALBUMIN UR-MCNC: NEGATIVE MG/DL
ALP SERPL-CCNC: 150 U/L (ref 40–129)
ALT SERPL W P-5'-P-CCNC: 90 U/L (ref 0–70)
ANION GAP SERPL CALCULATED.3IONS-SCNC: 17 MMOL/L (ref 7–15)
APPEARANCE UR: CLEAR
AST SERPL W P-5'-P-CCNC: 190 U/L (ref 0–45)
BACTERIA #/AREA URNS HPF: ABNORMAL /HPF
BILIRUB SERPL-MCNC: 31.8 MG/DL
BILIRUB UR QL STRIP: ABNORMAL
BUN SERPL-MCNC: 55.9 MG/DL (ref 6–20)
CALCIUM SERPL-MCNC: 9.1 MG/DL (ref 8.6–10)
CHLORIDE SERPL-SCNC: 98 MMOL/L (ref 98–107)
COLOR UR AUTO: ABNORMAL
CREAT SERPL-MCNC: 2.86 MG/DL (ref 0.67–1.17)
DEPRECATED HCO3 PLAS-SCNC: 17 MMOL/L (ref 22–29)
EGFRCR SERPLBLD CKD-EPI 2021: 27 ML/MIN/1.73M2
ERYTHROCYTE [DISTWIDTH] IN BLOOD BY AUTOMATED COUNT: 20.7 % (ref 10–15)
GLUCOSE SERPL-MCNC: 115 MG/DL (ref 70–99)
GLUCOSE UR STRIP-MCNC: NEGATIVE MG/DL
HCT VFR BLD AUTO: 24.3 % (ref 40–53)
HGB BLD-MCNC: 8.9 G/DL (ref 13.3–17.7)
HGB UR QL STRIP: NEGATIVE
HYALINE CASTS: 1 /LPF
INR PPP: 1.61 (ref 0.85–1.15)
KETONES UR STRIP-MCNC: NEGATIVE MG/DL
LEUKOCYTE ESTERASE UR QL STRIP: NEGATIVE
MCH RBC QN AUTO: 34.6 PG (ref 26.5–33)
MCHC RBC AUTO-ENTMCNC: 36.6 G/DL (ref 31.5–36.5)
MCV RBC AUTO: 95 FL (ref 78–100)
MUCOUS THREADS #/AREA URNS LPF: PRESENT /LPF
NITRATE UR QL: NEGATIVE
PH UR STRIP: 5.5 [PH] (ref 5–7)
PLATELET # BLD AUTO: 177 10E3/UL (ref 150–450)
POTASSIUM SERPL-SCNC: 3.7 MMOL/L (ref 3.4–5.3)
PROT SERPL-MCNC: ABNORMAL G/DL
RBC # BLD AUTO: 2.57 10E6/UL (ref 4.4–5.9)
RBC URINE: 0 /HPF
SODIUM SERPL-SCNC: 132 MMOL/L (ref 136–145)
SP GR UR STRIP: 1.01 (ref 1–1.03)
UROBILINOGEN UR STRIP-MCNC: NORMAL MG/DL
WBC # BLD AUTO: 32.3 10E3/UL (ref 4–11)
WBC URINE: 1 /HPF

## 2023-09-11 PROCEDURE — 250N000013 HC RX MED GY IP 250 OP 250 PS 637: Performed by: STUDENT IN AN ORGANIZED HEALTH CARE EDUCATION/TRAINING PROGRAM

## 2023-09-11 PROCEDURE — 250N000013 HC RX MED GY IP 250 OP 250 PS 637: Performed by: PHYSICIAN ASSISTANT

## 2023-09-11 PROCEDURE — 85610 PROTHROMBIN TIME: CPT | Performed by: INTERNAL MEDICINE

## 2023-09-11 PROCEDURE — 250N000013 HC RX MED GY IP 250 OP 250 PS 637: Performed by: INTERNAL MEDICINE

## 2023-09-11 PROCEDURE — 85027 COMPLETE CBC AUTOMATED: CPT | Performed by: INTERNAL MEDICINE

## 2023-09-11 PROCEDURE — 250N000013 HC RX MED GY IP 250 OP 250 PS 637: Performed by: HOSPITALIST

## 2023-09-11 PROCEDURE — 82040 ASSAY OF SERUM ALBUMIN: CPT | Performed by: INTERNAL MEDICINE

## 2023-09-11 PROCEDURE — 36415 COLL VENOUS BLD VENIPUNCTURE: CPT | Performed by: INTERNAL MEDICINE

## 2023-09-11 PROCEDURE — 99239 HOSP IP/OBS DSCHRG MGMT >30: CPT | Performed by: STUDENT IN AN ORGANIZED HEALTH CARE EDUCATION/TRAINING PROGRAM

## 2023-09-11 PROCEDURE — 250N000012 HC RX MED GY IP 250 OP 636 PS 637: Performed by: STUDENT IN AN ORGANIZED HEALTH CARE EDUCATION/TRAINING PROGRAM

## 2023-09-11 RX ORDER — DIPHENHYDRAMINE HCL 25 MG
25 CAPSULE ORAL EVERY 6 HOURS PRN
Status: DISCONTINUED | OUTPATIENT
Start: 2023-09-11 | End: 2023-09-11 | Stop reason: HOSPADM

## 2023-09-11 RX ORDER — SODIUM BICARBONATE 650 MG/1
1300 TABLET ORAL 2 TIMES DAILY
Qty: 120 TABLET | Refills: 3 | Status: ON HOLD | OUTPATIENT
Start: 2023-09-11 | End: 2023-10-05

## 2023-09-11 RX ORDER — PREDNISONE 20 MG/1
40 TABLET ORAL DAILY
Qty: 29 TABLET | Refills: 0 | Status: ON HOLD | OUTPATIENT
Start: 2023-09-12 | End: 2023-10-05

## 2023-09-11 RX ORDER — LACTULOSE 10 G/15ML
20 SOLUTION ORAL DAILY PRN
Qty: 237 ML | Refills: 3 | Status: ON HOLD | OUTPATIENT
Start: 2023-09-11 | End: 2023-10-05

## 2023-09-11 RX ORDER — DIPHENHYDRAMINE HCL 25 MG
25 CAPSULE ORAL EVERY 6 HOURS PRN
Qty: 60 CAPSULE | Refills: 3 | Status: SHIPPED | OUTPATIENT
Start: 2023-09-11 | End: 2023-11-28

## 2023-09-11 RX ADMIN — DIPHENHYDRAMINE HYDROCHLORIDE 25 MG: 25 CAPSULE ORAL at 06:17

## 2023-09-11 RX ADMIN — SODIUM BICARBONATE 1300 MG: 325 TABLET ORAL at 11:15

## 2023-09-11 RX ADMIN — RIFAXIMIN 550 MG: 550 TABLET ORAL at 08:54

## 2023-09-11 RX ADMIN — FOLIC ACID 1 MG: 1 TABLET ORAL at 08:54

## 2023-09-11 RX ADMIN — PANTOPRAZOLE SODIUM 40 MG: 40 TABLET, DELAYED RELEASE ORAL at 08:54

## 2023-09-11 RX ADMIN — Medication 1 TABLET: at 08:54

## 2023-09-11 RX ADMIN — PREDNISONE 40 MG: 20 TABLET ORAL at 08:54

## 2023-09-11 ASSESSMENT — ACTIVITIES OF DAILY LIVING (ADL)
ADLS_ACUITY_SCORE: 18

## 2023-09-11 NOTE — PLAN OF CARE
Changed: Benadryl PRN orders put in for itchiness.  Running: N/A.  PRN: Benadryl PO x1.     Neuro: A&O X4. No dizziness, lightheadedness, or HA. No N/T.  Cardiac/Tele:  No tele orders. VSS. Afebrile. No CP.    Respiratory: Sats >90% on RA. No SOB. Mild CANAS but improved since paracentesis yesterday.   GI/: Voids spontaneously, adequate UOP, dark jackson. Multiple BM this shift, loose stools per Pt. Distended abdomen.  Diet/Appetite: Regular diet. No nausea.   Endocrine/Labs: UA collected this shift, see results tab.  Skin: No new deficits noted. Jaundice.   -Pt did report itchy skin this AM, said it has been going on for days - overnight Gold team paged and prn benadryl order put in  LDAs: R PIV x1.   Activity: Independent.   Pain: Denied.     Plan: Continue POC and notify team of changes. Possible discharge today.      Time cared for [id number]

## 2023-09-11 NOTE — DISCHARGE SUMMARY
"[facility]  Hospitalist Discharge Summary      Date of Admission:  9/2/2023  Date of Discharge:  9/11/2023  3:40 PM  Discharging Provider: Elroy Rankin MD  Discharge Service: Hospitalist Service, GOLD TEAM 9    Discharge Diagnoses   Acute kidney injury  Decompensated alcoholic cirrhosis  Hepatic encephalopathy, resolved  Recent banding of Grade II EV (8/28/23)  Portal Hypertensive Gastropathy and colopathy  Acute on chronic anemia secondary to GI bleed  Alcohol use disorder  Hypervolemic Hyponatremia:    Anion gap metabolic acidosis  Leukocytosis   Coagulopathy  Acute on chronic hematochezia, suspect hemorrhoidal bleed  Hypertension    Clinically Significant Risk Factors     # Obesity: Estimated body mass index is 37.42 kg/m  as calculated from the following:    Height as of this encounter: 1.702 m (5' 7\").    Weight as of this encounter: 108.4 kg (238 lb 14.4 oz).       Follow-ups Needed After Discharge   Follow-up Appointments     Adult [facility] Follow-up and recommended labs and tests      Follow up with primary care provider, Latosha Baires, within 7 days for   hospital follow- up.     Follow up with GI as scheduled on 9/13. Recommend rechecking CMP lab.    Appointments on [place] and/or [facility] (with [facility] or [facility]   provider or service). Call [phone] if you haven't heard regarding   these appointments within 7 days of discharge.            Unresulted Labs Ordered in the Past 30 Days of this Admission       No orders found from 8/3/2023 to 9/3/2023.          Discharge Disposition   Discharged to home  Condition at discharge: Stable    Hospital Course   Elroy Morel Sr. is a 43 year old male with history of severe alcohol use disorder, withdrawal seizures, decompensated cirrhosis (c/b grade II EV, ascites, portal hypertensive gastropathy), HTN, and prior C.diff infection who was admitted to [facility] 8/21/23 with decompensated cirrhosis, JOCELYN, and acute on " chronic GI bleed s/p EGD with variceal banding 8/28. He was discharged to [facility] for chemical dependency treatment on 8/30/23. He returned to ED on 9/2/23 with abdominal pain, distension. Found to have worsening renal function concerning for hepatorenal syndrome.     JOCELYN, concern for hepatorenal syndrome; improving    Recent admission for JOCELYN, up-trending Cr at time of discharge (4.34). Repeat Cr 8.17 on arrival at this admission. No urgent indication for HD per nephrology. JOCELYN was most likely due to HRS (although BP's were better than one would expect for HRS typically) vs congestive bile cast nephropathy. Nephrology and hepatology were consulted and followed throughout admission. Therapy included 3 days of albumin - 100g 25% albumin (9/3-9/5), octreotide 100 mcg TID subcutaneously (9/3-9/8), and midodrine. These were discontinued prior to admission. Creatinine on day of discharge was 2.86 (previous baseline 0.9-1.0).  -Repeat CMP at follow-up    Decompensated alcoholic cirrhosis  Hepatic encephalopathy, resolved  Recent banding of Grade II EV (8/28/23)  Portal Hypertensive Gastropathy and colopathy  Acute on chronic anemia secondary to GI bleed  History of variceal bleeding(8/13/2023) w/ esophageal ulcer at area (8/28)  Concern for HRS. Recurrent ascites now off of diuretics. Hepatology reports patient is currently not a transplant candidate (currently in active treatment for sobriety). He received paracentesis on 9/4 (-2L), 9/6 (-3L), and 9/10 (-3L). No signs of SBP on cell counts. Patient received albumin following paracentesis.  -Continue Lactulose 20 g daily, Rifaximin 550 mg BID  -Continue prednisone 40 mg daily for alcoholic hepatitis, continue for 4 weeks  -Start sertraline 50 mg daily for pruritus    MELD 3.0: 38 at 9/11/2023  7:04 AM  MELD-Na: 36 at 9/11/2023  7:04 AM  Calculated from:  Serum Creatinine: 2.86 mg/dL at 9/11/2023  7:04 AM  Serum Sodium: 132 mmol/L at 9/11/2023  7:04 AM  Total  Bilirubin: 31.8 mg/dL at 9/11/2023  7:04 AM  Serum Albumin: 3.6 g/dL (Using max of 3.5 g/dL) at 9/11/2023  7:04 AM  INR(ratio): 1.61 at 9/11/2023  7:04 AM  Age at listing (hypothetical): 43 years  Sex: Male at 9/11/2023  7:04 AM    Alcohol use disorder  SW following. No recent ETOH use. Hx withdrawal seizures, low risk for withdrawal symptoms at this point. Was at CD treatment prior to arrival. Chemical dependency consult placed, evaluating for virtual outpatient [facility] programs.    Hypervolemic Hyponatremia:    Secondary to cirrhosis and worsening renal function.      Anion gap metabolic acidosis  Secondary to JOCELYN  -Continue Na bicarb tabs 1300 mg BID     Leukocytosis  Afebrile. Ascitic fluid not suggestive of SBP. No other clear source. CXR with no obvious focal consolidation. UA 9/4 negative. WBC now increasing in response to prednisone.     Coagulopathy  Secondary to ESLD    Acute on chronic hematochezia, suspect hemorrhoidal bleed; resolved  Recent admission for melena/hematochezia, s/p EGD with banding of grade II EV on 8/28. GI suspected hemorrhoidal bleed, less likely variceal or upper source.      Hypertension  Previously on lisinopril, stopped d/t JOCELYN       Consultations This Hospital Stay   GI HEPATOLOGY ADULT IP CONSULT  NEPHROLOGY GENERAL ADULT IP CONSULT  INTERNAL MEDICINE PROCEDURE TEAM ADULT IP CONSULT [facility] - [facility]  SOCIAL WORK IP CONSULT  CARE MANAGEMENT / SOCIAL WORK IP CONSULT  INTERNAL MEDICINE PROCEDURE TEAM ADULT IP CONSULT [facility] - [facility]  CHEMICAL DEPENDENCY IP CONSULT  INTERNAL MEDICINE PROCEDURE TEAM ADULT IP CONSULT [facility] - [facility]    Code Status   Full Code    Time Spent on this Encounter   I, Elroy Rankin MD, personally saw the patient today and spent greater than 30 minutes discharging this patient.       Elroy Rankin MD  [facility] [address]  [address] [facility] [address]  Phone:  [phone]  ______________________________________________________________________    Physical Exam   Vital Signs: Temp: 98  F (36.7  C) Temp src: Oral BP: 101/59 Pulse: 80   Resp: 17 SpO2: 99 % O2 Device: None (Room air)    Weight: 238 lbs 14.4 oz    Gen: no acute distress, alert, interactive, jaundiced appearance  HEENT: scleral icterus (improving)  Cardiovascular: 2+ distal pulses equal bilaterally, good perfusion  Respiratory: no increased work of breathing  Abdomen: soft, abdomen soft and nondistended, nontender to palpation  Extremities: No appreciable edema  Skin: no concerning rashes or lesions, diffuse jaundice (improving)  Neuro: A&O x4, responds appropriately to exam       Primary Care Physician   Latosha Baires    Discharge Orders      Adult Nephrology  Referral      Reason for your hospital stay    You were hospitalized for liver disease and kidney failure. Your kidney function is now improving. It is important to continue your medications as prescribed and follow-up with the liver team as scheduled on 9/13. Zoloft (sertraline) is a new medication added to help with your itching.     Activity    Your activity upon discharge: activity as tolerated     Adult [facility] Follow-up and recommended labs and tests    Follow up with primary care provider, Latosha Baires, within 7 days for hospital follow- up.     Follow up with GI as scheduled on 9/13. Recommend rechecking CMP lab.    Appointments on [place] and/or [facility] (with [facility] or [facility] provider or service). Call [phone] if you haven't heard regarding these appointments within 7 days of discharge.     Diet    Follow this diet upon discharge: low sodium diet       Significant Results and Procedures   Results for orders placed or performed during the hospital encounter of 09/02/23   US Renal Complete Non-Vascular    Narrative    EXAM: US RENAL COMPLETE NON-VASCULAR  LOCATION: [facility]  DATE: 9/3/2023    INDICATION: worsening JOCELYN  COMPARISON: 08/14/2023  TECHNIQUE: Routine Bilateral Renal and Bladder Ultrasound.    FINDINGS:    RIGHT KIDNEY: 11.8 cm. Normal without hydronephrosis or masses.     LEFT KIDNEY: 13.0 cm. Normal without hydronephrosis or masses.     BLADDER: Normal.    Ascites free fluid.      Impression    IMPRESSION:  1.  No acute or suspicious process.   XR Chest 1 View    Narrative    EXAM: XR CHEST 1 VIEW  LOCATION: [facility]  DATE: 9/3/2023    INDICATION: leukocytosis, advanced cirhosis, Infectious work up  COMPARISON: 08/21/2023      Impression    IMPRESSION: Heart size within normal limits. Low lung volumes with streaky opacities in the right mid and upper lung zone, though accentuated by shallow inspiration, could reflect an infectious process. No visible pneumothorax or pleural effusion.   POC US Guide for Paracentesis    Impression    US Indication: decompensated liver disease    Limited abdominal ultrasound was performed and demonstrated an adequate fluid collection on the right side of the abdomen.     Doppler of the skin demonstrated an area at this site without significant vasculature.  A paracentesis at this site was subsequently performed.    Katelyn Ramos MD    POC US Guide for Paracentesis    Impression    US Indication: decompensated liver disease    Limited abdominal ultrasound was performed and demonstrated an adequate fluid collection on the right side of the abdomen.     Doppler of the skin demonstrated an area at this site without significant vasculature.  A paracentesis at this site was subsequently performed.    Katelyn Ramos MD        Discharge Medications   Current Discharge Medication List        START taking these medications    Details   diphenhydrAMINE (BENADRYL) 25 MG capsule Take 1 capsule (25 mg) by mouth every 6 hours as needed for itching  Qty: 60 capsule, Refills: 3    Associated  Diagnoses: Decompensated hepatic cirrhosis (H)      lactulose (CHRONULAC) 10 GM/15ML solution 30 mLs (20 g) by Oral or NG Tube route daily as needed for constipation  Qty: 237 mL, Refills: 3    Associated Diagnoses: Decompensated hepatic cirrhosis (H)      predniSONE (DELTASONE) 20 MG tablet Take 2 tablets (40 mg) by mouth daily  Qty: 29 tablet, Refills: 0    Associated Diagnoses: Decompensated hepatic cirrhosis (H)      rifaximin (XIFAXAN) 550 MG TABS tablet 1 tablet (550 mg) by Oral or Feeding Tube route 2 times daily  Qty: 60 tablet, Refills: 3    Associated Diagnoses: Decompensated hepatic cirrhosis (H)      sertraline (ZOLOFT) 50 MG tablet Take 1 tablet (50 mg) by mouth daily  Qty: 60 tablet, Refills: 3    Associated Diagnoses: Alcoholic cirrhosis of liver with ascites (H)      sodium bicarbonate 650 MG tablet Take 2 tablets (1,300 mg) by mouth 2 times daily  Qty: 120 tablet, Refills: 3    Associated Diagnoses: Decompensated hepatic cirrhosis (H)           CONTINUE these medications which have NOT CHANGED    Details   aspirin 81 MG EC tablet Take 1 tablet (81 mg) by mouth daily for 120 days  Qty: 30 tablet, Refills: 3    Associated Diagnoses: Alcoholic cirrhosis of liver with ascites (H)      calcium carbonate (TUMS) 500 MG chewable tablet Take 2 tablets (1,000 mg) by mouth 4 times daily as needed for heartburn  Qty: 120 tablet, Refills: 0    Associated Diagnoses: Alcoholic cirrhosis of liver with ascites (H)      folic acid (FOLVITE) 1 MG tablet Take 1 tablet (1 mg) by mouth daily for 30 days  Qty: 30 tablet, Refills: 0    Associated Diagnoses: Alcoholic cirrhosis of liver with ascites (H)      gabapentin (NEURONTIN) 100 MG capsule Take 1 capsule (100 mg) by mouth At Bedtime for 30 days  Qty: 30 capsule, Refills: 0    Associated Diagnoses: Alcoholic cirrhosis of liver with ascites (H)      melatonin 3 MG tablet Take 3 mg by mouth nightly as needed for sleep      multivitamin w/minerals (THERA-VIT-M) tablet  Take 1 tablet by mouth daily for 30 days  Qty: 30 tablet, Refills: 0    Associated Diagnoses: Alcoholic cirrhosis of liver with ascites (H)      ondansetron (ZOFRAN ODT) 4 MG ODT tab Take 1 tablet (4 mg) by mouth every 8 hours as needed for nausea or vomiting  Qty: 20 tablet, Refills: 0    Associated Diagnoses: Alcoholic cirrhosis of liver with ascites (H)      pantoprazole (PROTONIX) 40 MG EC tablet Take 1 tablet (40 mg) by mouth daily  Qty: 90 tablet, Refills: 0    Associated Diagnoses: Alcoholic cirrhosis of liver with ascites (H)           Allergies   Allergies   Allergen Reactions    Metronidazole Other (See Comments), Dizziness and Unknown    Omeprazole Other (See Comments) and Unknown     Irritability (tolerates Protonix well)

## 2023-09-12 ENCOUNTER — HOSPITAL ENCOUNTER (EMERGENCY)
Facility: CLINIC | Age: 43
Discharge: HOME | End: 2023-09-12
Attending: EMERGENCY MEDICINE | Admitting: EMERGENCY MEDICINE
Payer: COMMERCIAL

## 2023-09-12 VITALS
RESPIRATION RATE: 18 BRPM | BODY MASS INDEX: 37.51 KG/M2 | HEIGHT: 67 IN | OXYGEN SATURATION: 100 % | WEIGHT: 238.98 LBS | HEART RATE: 80 BPM | TEMPERATURE: 98 F | SYSTOLIC BLOOD PRESSURE: 141 MMHG | DIASTOLIC BLOOD PRESSURE: 84 MMHG

## 2023-09-12 DIAGNOSIS — K70.31 ALCOHOLIC CIRRHOSIS OF LIVER WITH ASCITES (H): ICD-10-CM

## 2023-09-12 DIAGNOSIS — E72.20 HYPERAMMONEMIA (H): ICD-10-CM

## 2023-09-12 LAB
ALBUMIN SERPL BCG-MCNC: 3.4 G/DL (ref 3.5–5.2)
ALP SERPL-CCNC: 166 U/L (ref 40–129)
ALT SERPL W P-5'-P-CCNC: 105 U/L (ref 0–70)
AMMONIA PLAS-SCNC: 78 UMOL/L (ref 16–60)
ANION GAP SERPL CALCULATED.3IONS-SCNC: 16 MMOL/L (ref 7–15)
AST SERPL W P-5'-P-CCNC: 204 U/L (ref 0–45)
BASOPHILS # BLD MANUAL: 0 10E3/UL (ref 0–0.2)
BASOPHILS NFR BLD MANUAL: 0 %
BILIRUB SERPL-MCNC: 29.3 MG/DL
BUN SERPL-MCNC: 54.6 MG/DL (ref 6–20)
BURR CELLS BLD QL SMEAR: SLIGHT
CALCIUM SERPL-MCNC: 8.9 MG/DL (ref 8.6–10)
CHLORIDE SERPL-SCNC: 95 MMOL/L (ref 98–107)
CREAT SERPL-MCNC: 2.32 MG/DL (ref 0.67–1.17)
DEPRECATED HCO3 PLAS-SCNC: 17 MMOL/L (ref 22–29)
EGFRCR SERPLBLD CKD-EPI 2021: 35 ML/MIN/1.73M2
EOSINOPHIL # BLD MANUAL: 0.3 10E3/UL (ref 0–0.7)
EOSINOPHIL NFR BLD MANUAL: 1 %
ERYTHROCYTE [DISTWIDTH] IN BLOOD BY AUTOMATED COUNT: 22.2 % (ref 10–15)
FRAGMENTS BLD QL SMEAR: SLIGHT
GLUCOSE SERPL-MCNC: 212 MG/DL (ref 70–99)
HCT VFR BLD AUTO: 25.4 % (ref 40–53)
HGB BLD-MCNC: 9.3 G/DL (ref 13.3–17.7)
INR PPP: 1.53 (ref 0.85–1.15)
LYMPHOCYTES # BLD MANUAL: 0.3 10E3/UL (ref 0.8–5.3)
LYMPHOCYTES NFR BLD MANUAL: 1 %
MCH RBC QN AUTO: 34.6 PG (ref 26.5–33)
MCHC RBC AUTO-ENTMCNC: 36.6 G/DL (ref 31.5–36.5)
MCV RBC AUTO: 94 FL (ref 78–100)
MONOCYTES # BLD MANUAL: 1.3 10E3/UL (ref 0–1.3)
MONOCYTES NFR BLD MANUAL: 4 %
MYELOCYTES # BLD MANUAL: 0.7 10E3/UL
MYELOCYTES NFR BLD MANUAL: 2 %
NEUTROPHILS # BLD MANUAL: 30.5 10E3/UL (ref 1.6–8.3)
NEUTROPHILS NFR BLD MANUAL: 92 %
PLAT MORPH BLD: ABNORMAL
PLATELET # BLD AUTO: 161 10E3/UL (ref 150–450)
POLYCHROMASIA BLD QL SMEAR: SLIGHT
POTASSIUM SERPL-SCNC: 3.7 MMOL/L (ref 3.4–5.3)
PROT SERPL-MCNC: 6.2 G/DL (ref 6.4–8.3)
RBC # BLD AUTO: 2.69 10E6/UL (ref 4.4–5.9)
RBC MORPH BLD: ABNORMAL
SODIUM SERPL-SCNC: 128 MMOL/L (ref 136–145)
TARGETS BLD QL SMEAR: ABNORMAL
WBC # BLD AUTO: 33.1 10E3/UL (ref 4–11)

## 2023-09-12 PROCEDURE — 85007 BL SMEAR W/DIFF WBC COUNT: CPT | Performed by: EMERGENCY MEDICINE

## 2023-09-12 PROCEDURE — 99285 EMERGENCY DEPT VISIT HI MDM: CPT | Mod: 25 | Performed by: EMERGENCY MEDICINE

## 2023-09-12 PROCEDURE — 82140 ASSAY OF AMMONIA: CPT | Performed by: EMERGENCY MEDICINE

## 2023-09-12 PROCEDURE — 99283 EMERGENCY DEPT VISIT LOW MDM: CPT | Performed by: EMERGENCY MEDICINE

## 2023-09-12 PROCEDURE — 99284 EMERGENCY DEPT VISIT MOD MDM: CPT | Performed by: EMERGENCY MEDICINE

## 2023-09-12 PROCEDURE — 85027 COMPLETE CBC AUTOMATED: CPT | Performed by: EMERGENCY MEDICINE

## 2023-09-12 PROCEDURE — 120N000002 HC R&B MED SURG/OB UMMC

## 2023-09-12 PROCEDURE — 80053 COMPREHEN METABOLIC PANEL: CPT | Performed by: EMERGENCY MEDICINE

## 2023-09-12 PROCEDURE — 85610 PROTHROMBIN TIME: CPT | Performed by: EMERGENCY MEDICINE

## 2023-09-12 PROCEDURE — 250N000013 HC RX MED GY IP 250 OP 250 PS 637: Performed by: EMERGENCY MEDICINE

## 2023-09-12 PROCEDURE — 36415 COLL VENOUS BLD VENIPUNCTURE: CPT | Performed by: EMERGENCY MEDICINE

## 2023-09-12 RX ORDER — LACTULOSE 10 G/10G
20 SOLUTION ORAL 3 TIMES DAILY
Qty: 30 EACH | Refills: 0 | Status: ON HOLD | OUTPATIENT
Start: 2023-09-12 | End: 2023-10-05

## 2023-09-12 RX ORDER — LACTULOSE 10 G/15ML
20 SOLUTION ORAL 3 TIMES DAILY
Status: DISCONTINUED | OUTPATIENT
Start: 2023-09-12 | End: 2023-09-12 | Stop reason: HOSPADM

## 2023-09-12 RX ORDER — LACTULOSE 10 G/10G
20 SOLUTION ORAL 3 TIMES DAILY
Status: DISCONTINUED | OUTPATIENT
Start: 2023-09-12 | End: 2023-09-12

## 2023-09-12 RX ADMIN — LACTULOSE 20 G: 20 SOLUTION ORAL at 20:03

## 2023-09-12 ASSESSMENT — ACTIVITIES OF DAILY LIVING (ADL): ADLS_ACUITY_SCORE: 35

## 2023-09-12 NOTE — ED PROVIDER NOTES
ED Provider Note  [facility]      History     Chief Complaint   Patient presents with    Dizziness     HPI  Elroy Morel Sr. is a 43 year old male with a history of cirrhosis who presents with confusion, shakiness as well as increasing ascites.  Patient had a paracentesis as needed last was 2 days ago.  Patient was recently hospitalized and discharged yesterday.  Throughout the day today patient has noticed increasing confusion and shakiness.  He also notes an increase in ascites.  He previously took lactulose but has been told he does not need lactulose currently.  No other symptoms noted.    Past Medical History  Past Medical History:   Diagnosis Date    Alcohol use disorder, severe, dependence (H)     Alcohol withdrawal seizure (H)     Alcoholic cirrhosis of liver with ascites (H)     Esophageal varices (H)     Essential hypertension     Gastroesophageal reflux disease without esophagitis     History of methamphetamine use     Sustained remission since 2003    Hypercholesterolemia     Tobacco abuse      Past Surgical History:   Procedure Laterality Date    COLONOSCOPY N/A 8/28/2023    Procedure: COLONOSCOPY, WITH POLYPECTOMY via bx forceps;  Surgeon: Alyssa Tsai MD;  [facility]: [facility]    IR PARACENTESIS  8/31/2023     aspirin 81 MG EC tablet  calcium carbonate (TUMS) 500 MG chewable tablet  diphenhydrAMINE (BENADRYL) 25 MG capsule  folic acid (FOLVITE) 1 MG tablet  gabapentin (NEURONTIN) 100 MG capsule  lactulose (CHRONULAC) 10 GM/15ML solution  melatonin 3 MG tablet  multivitamin w/minerals (THERA-VIT-M) tablet  ondansetron (ZOFRAN ODT) 4 MG ODT tab  pantoprazole (PROTONIX) 40 MG EC tablet  predniSONE (DELTASONE) 20 MG tablet  rifaximin (XIFAXAN) 550 MG TABS tablet  sertraline (ZOLOFT) 50 MG tablet  sodium bicarbonate 650 MG tablet      Allergies   Allergen Reactions    Metronidazole Other (See Comments), Dizziness and Unknown    Omeprazole Other (See Comments) and  Unknown     Irritability (tolerates Protonix well)     Family History  No family history on file.  Social History   Social History     Tobacco Use    Smoking status: Former     Types: Cigarettes    Smokeless tobacco: Former   Substance Use Topics    Alcohol use: Not Currently     Comment: last drink 8/19    Drug use: Not Currently     Types: Amphetamines     Comment: Sustained remission      Past medical history, past surgical history, medications, allergies, family history, and social history were reviewed with the patient. No additional pertinent items.      A medically appropriate review of systems was performed with pertinent positives and negatives noted in the HPI, and all other systems negative.    Physical Exam   BP: 114/71  Pulse: 90  Temp: 98.1  F (36.7  C)  Resp: 18  SpO2: 99 %  Physical Exam  Vitals and nursing note reviewed.   Constitutional:       General: He is not in acute distress.     Appearance: He is well-developed. He is not diaphoretic.   HENT:      Head: Normocephalic and atraumatic.      Mouth/Throat:      Pharynx: No oropharyngeal exudate.   Eyes:      General: Scleral icterus present.         Right eye: No discharge.         Left eye: No discharge.      Pupils: Pupils are equal, round, and reactive to light.   Cardiovascular:      Rate and Rhythm: Normal rate and regular rhythm.      Heart sounds: Normal heart sounds. No murmur heard.     No friction rub. No gallop.   Pulmonary:      Effort: Pulmonary effort is normal. No respiratory distress.      Breath sounds: Normal breath sounds. No wheezing.   Chest:      Chest wall: No tenderness.   Abdominal:      General: Bowel sounds are normal. There is distension.      Palpations: Abdomen is soft.      Tenderness: There is no abdominal tenderness.   Musculoskeletal:         General: No tenderness or deformity. Normal range of motion.      Cervical back: Normal range of motion and neck supple.   Skin:     General: Skin is warm and dry.       Coloration: Skin is jaundiced. Skin is not pale.      Findings: No erythema or rash.   Neurological:      Mental Status: He is alert and oriented to person, place, and time.      Cranial Nerves: No cranial nerve deficit.      Motor: Tremor present.           ED Course, Procedures, & Data      Procedures                      Results for orders placed or performed during the hospital encounter of 09/12/23   CBC with platelets differential     Status: None ()    Narrative    The following orders were created for panel order CBC with platelets differential.  Procedure                               Abnormality         Status                     ---------                               -----------         ------                     CBC with platelets and d...[[id number]]                                                   Please view results for these tests on the individual orders.     Medications - No data to display  Labs Ordered and Resulted from Time of ED Arrival to Time of ED Departure - No data to display  No orders to display              Assessment & Plan    This is a 43-year-old male with cirrhosis neurosurgery who presents with confusion, tremor, and increasing abdominal distention.  Patient has regular paracenteses as needed.  His last was 2 days ago.  Throughout the day today patient has noticed feelings of confusion as well as changes in his.  He also notes an increase in abdominal swelling.  On exam patient is considerably jaundiced and slightly tremulous.  There is coagulation in the appearance of ascites, abdomen is nontender.  Lab work shows similar LFT, renal and CBC numbers as previous..  Ammonia is 78.  I discussed all this with patient and family.  Patient was given lactulose.  While in the emergency the apartment, patient dates he is feeling considerably improved.  He notes that he feels like he does not need paracentesis currently and that the lactulose has improved his confusion and shakiness.   Patient is requesting discharge home.  He is awake alert and I believe this is reasonable at this time.  Patient has an appointment tomorrow morning and encourage patient family to keep this.  Discussed that if symptoms occur to return to the Urgency Department.  We will start patient on regular lactulose.  We will discharge with return precautions.    I have reviewed the nursing notes. I have reviewed the findings, diagnosis, plan and need for follow up with the patient.    New Prescriptions    No medications on file       Final diagnoses:   Alcoholic cirrhosis of liver with ascites (H)       Gilbert Fatima DO  [facility] EMERGENCY DEPARTMENT  9/12/2023     Gilbert Fatima DO  09/13/23 0210

## 2023-09-12 NOTE — ED TRIAGE NOTES
Ambulatory to triage for worsening confusion, dizziness, N/V, SOB/CP since yesterday  Abdomen distended, last tap 2 days ago  Hx cirrhosis, discharged yesterday         Triage Assessment       Row Name 09/12/23 6668       Triage Assessment (Adult)    Airway WDL WDL       Respiratory WDL    Respiratory WDL X;rhythm/pattern    Rhythm/Pattern, Respiratory shortness of breath       Skin Circulation/Temperature WDL    Skin Circulation/Temperature WDL WDL       Cardiac WDL    Cardiac WDL X;chest pain       Chest Pain Assessment    Character aching       Peripheral/Neurovascular WDL    Peripheral Neurovascular WDL WDL       Cognitive/Neuro/Behavioral WDL    Cognitive/Neuro/Behavioral WDL WDL

## 2023-09-13 NOTE — ED NOTES
Pt is refusing to be admitted, states he does not understand why he would need to be admitted and he can take his lactulose at home.    Dr. Fatima notified via face to face conversation.

## 2023-09-13 NOTE — DISCHARGE INSTRUCTIONS
Continue with scheduled appointments. Return to the emergency department if symptoms get worse, there are any new symptoms or any cause for concern.

## 2023-09-15 ENCOUNTER — HOSPITAL ENCOUNTER (OUTPATIENT)
Dept: INTERVENTIONAL RADIOLOGY/VASCULAR | Facility: CLINIC | Age: 43
Discharge: HOME | End: 2023-09-15
Attending: PHYSICIAN ASSISTANT
Payer: COMMERCIAL

## 2023-09-15 ENCOUNTER — HOSPITAL ENCOUNTER (OUTPATIENT)
Facility: CLINIC | Age: 43
Discharge: HOME | End: 2023-09-15
Attending: RADIOLOGY | Admitting: RADIOLOGY
Payer: COMMERCIAL

## 2023-09-15 VITALS — DIASTOLIC BLOOD PRESSURE: 72 MMHG | OXYGEN SATURATION: 100 % | HEART RATE: 78 BPM | SYSTOLIC BLOOD PRESSURE: 117 MMHG

## 2023-09-15 PROCEDURE — 272N000500 HC NEEDLE CR2

## 2023-09-15 PROCEDURE — 49083 ABD PARACENTESIS W/IMAGING: CPT

## 2023-09-15 PROCEDURE — 250N000009 HC RX 250

## 2023-09-15 RX ADMIN — LIDOCAINE HYDROCHLORIDE 4 ML: 10 INJECTION, SOLUTION EPIDURAL; INFILTRATION; INTRACAUDAL; PERINEURAL at 08:55

## 2023-09-15 ASSESSMENT — ACTIVITIES OF DAILY LIVING (ADL)
ADLS_ACUITY_SCORE: 35

## 2023-09-15 NOTE — PROCEDURES
[facility]    Procedure: IR paracentesis    Date/Time: 9/15/2023 8:18 AM    Performed by: Ashley Horowitz PA-C  Authorized by: Ashley Horowitz PA-C      UNIVERSAL PROTOCOL   Site Marked: NA  Prior Images Obtained and Reviewed:  Yes  Required items: Required blood products, implants, devices and special equipment available    Patient identity confirmed:  Verbally with patient, arm band, provided demographic data and hospital-assigned identification number  Patient was reevaluated immediately before administering moderate or deep sedation or anesthesia  Confirmation Checklist:  Patient's identity using two indicators, relevant allergies, procedure was appropriate and matched the consent or emergent situation and correct equipment/implants were available  Time out: Immediately prior to the procedure a time out was called    Universal Protocol: the Joint Commission Universal Protocol was followed    Preparation: Patient was prepped and draped in usual sterile fashion    ESBL (mL):  0     ANESTHESIA    Anesthesia: Local infiltration  Local Anesthetic:  Lidocaine 1% without epinephrine  Anesthetic Total (mL):  4      SEDATION    Patient Sedated: No    See dictated procedure note for full details.  Findings: N/A    Specimens: none    Complications: None    Condition: Stable    Plan: Follow-up per primary team.      PROCEDURE  Describe Procedure: US-guided therapeutic paracentesis with return of clear yellow ascitic fluid.   Patient Tolerance:  Patient tolerated the procedure well with no immediate complications  Length of time physician/provider present for 1:1 monitoring during sedation: 0

## 2023-09-15 NOTE — IP AVS SNAPSHOT
[facility] Interventional Radiology  [address] [address]  Phone: [phone]                              After Visit Summary   9/15/2023    Elroy Morel Sr.   MRN: [id number]           After Visit Summary Signature Page    I have received my discharge instructions, and my questions have been answered. I have discussed any challenges I see with this plan with the nurse or doctor.    ..........................................................................................................................................  Patient/Patient Representative Signature      ..........................................................................................................................................  Patient Representative Print Name and Relationship to Patient    ..................................................               ................................................  Date                                   Time    ..........................................................................................................................................  Reviewed by Signature/Title    ...................................................              ..............................................  Date                                               Time    22EPIC Rev 08/18

## 2023-09-15 NOTE — DISCHARGE INSTRUCTIONS
Health Psychology                  Clinic    Department of Medicine  Gi Olivia, Ph.D., L.P. [phone]                          [facility] Fred Zabala, Ph.D.,  L.P. [phone]                 3rd Floor, Clinic [address]  [place] [address]   Amilcar Hudson, Ph.D., A.B.P.P., L.P. [phone]     [address] [facility]  [address] Roseann Lester, Ph.D., L.P. [phone]  [id number]  [place] [address]       Health Psychology Follow-Up Note    REQUESTING PHYSICIAN:  Parmjit Willis MD.       Ms. Shields is 70-year old  woman referred for psychological consultation to help her with anxiety and coping with chronic pain.  She is seen for supportive and problem-solving therapy..       Past Medical History:   Diagnosis Date     Abnormal Papanicolaou smear of vagina and vaginal HPV     LSIL 11/03, nl colp     Arrhythmia     SVT     Autoimmune disease (H)      Basal cell carcinoma     BCC nose, right forearm     Basal cell carcinoma      Breast cancer (H)     S/P lumpectomy, radiation and hormonal therapy     CKD (chronic kidney disease) stage 3, GFR 30-59 ml/min      Degeneration of lumbar or lumbosacral intervertebral disc (aka DDD)     S/P epidural steroid injections x 3     Dysphonia since 2015     Endometriosis, site unspecified 1981    JAJA/BSO; gyn Dr. Queen     Esophageal reflux     open GE sphincter     FIBROMYALGIA      History of radiation therapy 2003     Hoarseness since Fall 2016     Hyperlipidemia LDL goal < 130      Hypertension goal BP (blood pressure) < 140/90 dx 1997     IBS (irritable bowel syndrome)      IGT (impaired glucose tolerance)      Large colon polyp 1999    rt hemicolectomy, benign per pt     Left Breast Cancer 8/03    Left Infiltrating ductal CA; Dr Baxter, Dr. Hahn;  ER/NH +; arimidex     Leiomyoma of uterus, unspecified 1981    JAJA/BSO     Migraines      OSTEOPENIA 6/04    T -score of - 1.6    Invasive Radiology Procedures Discharge Instructions         _____________________________________________________________________    Patient Name:  Elroy Morel Sr.  Today's Date:  September 15, 2023    The doctor who did your procedure today was FANNY Horowitz PA-C.    Procedure:  Paracentesis: After a Paracentesis Procedure:  Fluid may leak from the puncture site. Change the bandage and keep the site dry.   If the fluid leaks for more than 48 hours, call your doctor.    If you have not received your results after 5 days, please call the doctor who ordered your test.  Diet and medicines  Go back to your normal diet.  You may start taking your normal medicines again (including Coumadin, or warfarin), as shown on your medicine sheet.  If you take aspirin, Plavix or other anti-platelet drugs: Start taking it tomorrow.  If take Coumadin (warfarin): Ask your doctor when to have your INR checked.  For minor pain, you may take Tylenol (acetaminophen) or Advil (ibuprofen).    Activity and puncture site   You may go back to normal activity in 24 hours. Wait 48 hours before lifting,   straining, exercise or other strenuous activity.  For the next day or two, check your puncture site often while you are awake.  Change the Band-Aid or bandage tomorrow.  You may shower tomorrow morning. No bathing or swimming until your   puncture site has fully healed.    If you received IV medicine to sedate you:  You were given: No Medication  You may feel drowsy, forgetful or unsteady. For the next 24 hours, do not drive, drink alcohol or make any important decisions.    Know when to call for help  Call your doctor if you have:  A fever greater over 101 F (38.3 C), taken under the tongue.  A lot of bleeding or swelling at your puncture site.  Pain that is getting worse.   Shortness of breath.  Call 911 or go the emergency room if you have:  Severe chest pain or trouble breathing.  A tube that falls out.   Increased blood in your sputum  (phlegm).  Bleeding that you cannot control.   Important phone numbers:  [facility] at [phone]   at the level of the lumbar spine DEXA 2.2014     PVC'S     card Dr Ibarra     Reduced vision 2017    cataracts     Sarcoidosis     with pulm nodules; dx 1999; mediastinosc and bronch done; Dr Caceres     Sensorineural hearing loss 2004     Sensorineural hearing loss, unspecified     worse on right, cochlear implant     SVT (supraventricular tachycardia) (H)     noted on Zio patch     Thyroid disease     goiter     Tinnitus 2003     Vestibular migraine      Past Surgical History:   Procedure Laterality Date     ADENOIDECTOMY  age 18     C DEXA, BONE DENSITY, AXIAL SKEL  2/7/06    osteopenia     C NONSPECIFIC PROCEDURE  12/04    colonoscopy: nl; rpt 12/09     C TOTAL ABDOM HYSTERECTOMY      For benign etiologies--uterine fibroids and endometriosis      GI SURGERY  1999    right hemicolectomy     HC EXCISION BREAST LESION, OPEN >=1  9/03    left breast lumpectomy, Dr. Baxter     HEAD & NECK SURGERY  2014, 2015    Cochlear Implants     IMPLANT COCHLEA WITH NERVE INTEGRITY MONITOR  6/18/2014    Procedure: IMPLANT COCHLEA WITH NERVE INTEGRITY MONITOR;  Surgeon: Bertha Patten MD;  [facility]     IMPLANT COCHLEA WITH NERVE INTEGRITY MONITOR Left 12/23/2015    Procedure: IMPLANT COCHLEA WITH NERVE INTEGRITY MONITOR;  Surgeon: Bertha Patten MD;  Location: [facility]     Partial Colectomy       PHACOEMULSIFICATION CLEAR CORNEA WITH DELUXE INTRAOCULAR LENS IMPLANT Right 1/15/2018    Procedure: PHACOEMULSIFICATION CLEAR CORNEA WITH DELUXE INTRAOCULAR LENS IMPLANT;  RIGHT EYE PHACOEMULSIFICATION CLEAR CORNEA WITH DELUXE MULTIFOCAL INTRAOCULAR LENS IMPLANT;  Surgeon: Brad Angeles MD;  Location:  [facility]     right cochlear implant       SURGICAL HISTORY OF -   1999    colonoscopy, right hemicolectomy, large polyp     SURGICAL HISTORY OF -   1981    JAJA  BSO  fibroids, endometriosis - unable to get path     SURGICAL HISTORY OF -       tonsillectomy     SURGICAL HISTORY OF -   1977,1990    R and L breast lumps  benign in past removed     SURGICAL HISTORY OF -   1999    mediastinoscopy, bronch for sarcoid     TONSILLECTOMY  age 18     Current Outpatient Prescriptions   Medication     ranitidine (ZANTAC) 150 MG tablet     UNABLE TO FIND     fluticasone (FLONASE) 50 MCG/ACT spray     SM GAS RELIEF EXTRA STRENGTH 125 MG CAPS     hydrochlorothiazide (HYDRODIURIL) 25 MG tablet     gabapentin 8 % GEL topical PLO cream     multivitamin, therapeutic (THERA-VIT) TABS tablet     carboxymethylcellulose (REFRESH PLUS) 0.5 % SOLN ophthalmic solution     potassium chloride SA (POTASSIUM CHLORIDE) 20 MEQ CR tablet     LORazepam (ATIVAN) 0.5 MG tablet     LACTOBACILLUS RHAMNOSUS, GG, PO     Cholecalciferol (VITAMIN D) 1000 UNITS capsule     No current facility-administered medications for this visit.      There have been several major problems since she was last seen. She has had pian in her right side of her face,  So there is a questin about TMJ.  She reported having some difficulty with the right cochlear implant.  She is posponing surgery on the other eye.    The main concern today is that [place], her , was diagnosed with prostate cancer yesterday.  They are both upset, trying to understand options, and feeling pressure to initiate treatment quickly.  We discussed her concerns at length about it.    She is upset with her sons who have not seen her to celebrate her birthday.   We discussed her expectations and thoughts re: family interations.  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    She participated fully and appeared to derive benefit.  Rapport was excellent.  Extended session due to complexity of case and length of interval.     Time in: 2:01  Time out:  3:00     DIAGNOSES: [Patient has requested these not be listed in her problem list]  Anxiety state (F41.1).     Bereavement (Z63.4).     Chronic pain (G89.3)  Sibling relational problem (Z62.891).   Parent-child relational problem (Z62.820)  Insomnia, unspecified  (G47.00)  Relational Problem (Z65.8)       PLAN:    Ms. Shields will return to clinic on 3/12  @ 3  to continue counseling, focusing on health, stress management, and family issues.       Tx plan due 6/16/18                Amilcar Hudson, PhD, A.B.P.P., L.P.     Director Health Psychology

## 2023-09-16 ASSESSMENT — ACTIVITIES OF DAILY LIVING (ADL)
ADLS_ACUITY_SCORE: 35

## 2023-09-17 ASSESSMENT — ACTIVITIES OF DAILY LIVING (ADL)
ADLS_ACUITY_SCORE: 35

## 2023-09-18 ASSESSMENT — ACTIVITIES OF DAILY LIVING (ADL)
ADLS_ACUITY_SCORE: 35

## 2023-09-19 ASSESSMENT — ACTIVITIES OF DAILY LIVING (ADL)
ADLS_ACUITY_SCORE: 35

## 2023-09-26 NOTE — CONFIDENTIAL NOTE
DIAGNOSIS:  cute renal failure, unspecified acute renal failure type    DATE RECEIVED: 12.15.2023    NOTES STATUS DETAILS   OFFICE NOTE from referring provider Internal 09.02.2023 Elroy Rankin MD    OFFICE NOTE from other specialist      *Only VASCULITIS or LUPUS gather office notes for the following     *PULMONARY       *ENT     *DERMATOLOGY     *RHEUMATOLOGY     DISCHARGE SUMMARY from hospital Internal 09.02.2023 Elroy Rankin MD    DISCHARGE REPORT from the ER     MEDICATION LIST Internal    IMAGING  (NEED IMAGES AND REPORTS)     KIDNEY CT SCAN     KIDNEY ULTRASOUND Internal 09.02.2023, 08.22.2023 US Renal Complete   MR ABDOMEN     NUCLEAR MEDICINE RENAL     LABS     CBC Internal 09.11.2023   CMP Internal 09.11.2023   BMP Internal 09.03.2023   UA Internal 09.10.2023   URINE PROTEIN Internal 09.10.2023   RENAL PANEL     BIOPSY     KIDNEY BIOPSY

## 2023-10-03 ENCOUNTER — APPOINTMENT (OUTPATIENT)
Dept: CT IMAGING | Facility: CLINIC | Age: 43
DRG: 442 | End: 2023-10-03
Attending: INTERNAL MEDICINE
Payer: COMMERCIAL

## 2023-10-03 ENCOUNTER — APPOINTMENT (OUTPATIENT)
Dept: GENERAL RADIOLOGY | Facility: CLINIC | Age: 43
DRG: 442 | End: 2023-10-03
Attending: INTERNAL MEDICINE
Payer: COMMERCIAL

## 2023-10-03 ENCOUNTER — HOSPITAL ENCOUNTER (INPATIENT)
Facility: CLINIC | Age: 43
LOS: 2 days | Discharge: HOME | DRG: 442 | End: 2023-10-05
Attending: EMERGENCY MEDICINE | Admitting: INTERNAL MEDICINE
Payer: COMMERCIAL

## 2023-10-03 DIAGNOSIS — K76.82 HEPATIC ENCEPHALOPATHY (H): ICD-10-CM

## 2023-10-03 DIAGNOSIS — K74.60 DECOMPENSATED HEPATIC CIRRHOSIS (H): ICD-10-CM

## 2023-10-03 DIAGNOSIS — K70.31 ALCOHOLIC CIRRHOSIS OF LIVER WITH ASCITES (H): ICD-10-CM

## 2023-10-03 DIAGNOSIS — K72.90 DECOMPENSATED HEPATIC CIRRHOSIS (H): ICD-10-CM

## 2023-10-03 DIAGNOSIS — E72.20 HYPERAMMONEMIA (H): ICD-10-CM

## 2023-10-03 LAB
ALBUMIN SERPL BCG-MCNC: 3.6 G/DL (ref 3.5–5.2)
ALBUMIN UR-MCNC: 10 MG/DL
ALP SERPL-CCNC: 194 U/L (ref 40–129)
ALT SERPL W P-5'-P-CCNC: 133 U/L (ref 0–70)
AMMONIA PLAS-SCNC: 89 UMOL/L (ref 16–60)
ANION GAP SERPL CALCULATED.3IONS-SCNC: 13 MMOL/L (ref 7–15)
APPEARANCE UR: CLEAR
AST SERPL W P-5'-P-CCNC: 151 U/L (ref 0–45)
BASO+EOS+MONOS # BLD AUTO: ABNORMAL 10*3/UL
BASO+EOS+MONOS NFR BLD AUTO: ABNORMAL %
BASOPHILS # BLD AUTO: 0 10E3/UL (ref 0–0.2)
BASOPHILS NFR BLD AUTO: 0 %
BILIRUB SERPL-MCNC: 20.6 MG/DL
BILIRUB UR QL STRIP: ABNORMAL
BUN SERPL-MCNC: 32 MG/DL (ref 6–20)
CALCIUM SERPL-MCNC: 9.4 MG/DL (ref 8.6–10)
CHLORIDE SERPL-SCNC: 98 MMOL/L (ref 98–107)
COLOR UR AUTO: ABNORMAL
CREAT SERPL-MCNC: 1.75 MG/DL (ref 0.67–1.17)
DEPRECATED HCO3 PLAS-SCNC: 23 MMOL/L (ref 22–29)
EGFRCR SERPLBLD CKD-EPI 2021: 49 ML/MIN/1.73M2
EOSINOPHIL # BLD AUTO: 0.2 10E3/UL (ref 0–0.7)
EOSINOPHIL NFR BLD AUTO: 1 %
ERYTHROCYTE [DISTWIDTH] IN BLOOD BY AUTOMATED COUNT: 19.2 % (ref 10–15)
GLUCOSE SERPL-MCNC: 124 MG/DL (ref 70–99)
GLUCOSE UR STRIP-MCNC: NEGATIVE MG/DL
HCT VFR BLD AUTO: 32.7 % (ref 40–53)
HGB BLD-MCNC: 11.1 G/DL (ref 13.3–17.7)
HGB UR QL STRIP: NEGATIVE
HOLD SPECIMEN: NORMAL
HYALINE CASTS: 3 /LPF
IMM GRANULOCYTES # BLD: 0.2 10E3/UL
IMM GRANULOCYTES NFR BLD: 2 %
INR PPP: 1.36 (ref 0.85–1.15)
KETONES UR STRIP-MCNC: NEGATIVE MG/DL
LACTATE SERPL-SCNC: 2 MMOL/L (ref 0.7–2)
LACTATE SERPL-SCNC: 2.3 MMOL/L (ref 0.7–2)
LEUKOCYTE ESTERASE UR QL STRIP: NEGATIVE
LIPASE SERPL-CCNC: 190 U/L (ref 13–60)
LYMPHOCYTES # BLD AUTO: 0.9 10E3/UL (ref 0.8–5.3)
LYMPHOCYTES NFR BLD AUTO: 6 %
MCH RBC QN AUTO: 35.4 PG (ref 26.5–33)
MCHC RBC AUTO-ENTMCNC: 33.9 G/DL (ref 31.5–36.5)
MCV RBC AUTO: 104 FL (ref 78–100)
MONOCYTES # BLD AUTO: 1 10E3/UL (ref 0–1.3)
MONOCYTES NFR BLD AUTO: 7 %
MUCOUS THREADS #/AREA URNS LPF: PRESENT /LPF
NEUTROPHILS # BLD AUTO: 12.4 10E3/UL (ref 1.6–8.3)
NEUTROPHILS NFR BLD AUTO: 84 %
NITRATE UR QL: NEGATIVE
NRBC # BLD AUTO: 0 10E3/UL
NRBC BLD AUTO-RTO: 0 /100
PH UR STRIP: 5.5 [PH] (ref 5–7)
PLATELET # BLD AUTO: 104 10E3/UL (ref 150–450)
POTASSIUM SERPL-SCNC: 3.5 MMOL/L (ref 3.4–5.3)
PROT SERPL-MCNC: ABNORMAL G/DL
RBC # BLD AUTO: 3.14 10E6/UL (ref 4.4–5.9)
RBC URINE: <1 /HPF
SODIUM SERPL-SCNC: 134 MMOL/L (ref 135–145)
SP GR UR STRIP: 1.02 (ref 1–1.03)
UROBILINOGEN UR STRIP-MCNC: NORMAL MG/DL
WBC # BLD AUTO: 14.7 10E3/UL (ref 4–11)
WBC URINE: 3 /HPF

## 2023-10-03 PROCEDURE — 250N000011 HC RX IP 250 OP 636: Mod: JZ | Performed by: INTERNAL MEDICINE

## 2023-10-03 PROCEDURE — 36415 COLL VENOUS BLD VENIPUNCTURE: CPT | Performed by: EMERGENCY MEDICINE

## 2023-10-03 PROCEDURE — 70450 CT HEAD/BRAIN W/O DYE: CPT | Mod: 26 | Performed by: STUDENT IN AN ORGANIZED HEALTH CARE EDUCATION/TRAINING PROGRAM

## 2023-10-03 PROCEDURE — 81001 URINALYSIS AUTO W/SCOPE: CPT | Performed by: EMERGENCY MEDICINE

## 2023-10-03 PROCEDURE — 83690 ASSAY OF LIPASE: CPT | Performed by: EMERGENCY MEDICINE

## 2023-10-03 PROCEDURE — 84075 ASSAY ALKALINE PHOSPHATASE: CPT | Performed by: EMERGENCY MEDICINE

## 2023-10-03 PROCEDURE — 70450 CT HEAD/BRAIN W/O DYE: CPT

## 2023-10-03 PROCEDURE — 85025 COMPLETE CBC W/AUTO DIFF WBC: CPT | Performed by: EMERGENCY MEDICINE

## 2023-10-03 PROCEDURE — 87040 BLOOD CULTURE FOR BACTERIA: CPT | Performed by: EMERGENCY MEDICINE

## 2023-10-03 PROCEDURE — 71045 X-RAY EXAM CHEST 1 VIEW: CPT

## 2023-10-03 PROCEDURE — 83605 ASSAY OF LACTIC ACID: CPT | Performed by: EMERGENCY MEDICINE

## 2023-10-03 PROCEDURE — 250N000013 HC RX MED GY IP 250 OP 250 PS 637: Performed by: EMERGENCY MEDICINE

## 2023-10-03 PROCEDURE — 71045 X-RAY EXAM CHEST 1 VIEW: CPT | Mod: 26 | Performed by: RADIOLOGY

## 2023-10-03 PROCEDURE — 250N000013 HC RX MED GY IP 250 OP 250 PS 637: Performed by: STUDENT IN AN ORGANIZED HEALTH CARE EDUCATION/TRAINING PROGRAM

## 2023-10-03 PROCEDURE — 99285 EMERGENCY DEPT VISIT HI MDM: CPT | Mod: 25 | Performed by: EMERGENCY MEDICINE

## 2023-10-03 PROCEDURE — 99291 CRITICAL CARE FIRST HOUR: CPT | Performed by: EMERGENCY MEDICINE

## 2023-10-03 PROCEDURE — 120N000002 HC R&B MED SURG/OB UMMC

## 2023-10-03 PROCEDURE — 99222 1ST HOSP IP/OBS MODERATE 55: CPT | Mod: GC | Performed by: INTERNAL MEDICINE

## 2023-10-03 PROCEDURE — 82140 ASSAY OF AMMONIA: CPT | Performed by: EMERGENCY MEDICINE

## 2023-10-03 PROCEDURE — 80048 BASIC METABOLIC PNL TOTAL CA: CPT | Performed by: EMERGENCY MEDICINE

## 2023-10-03 PROCEDURE — 85610 PROTHROMBIN TIME: CPT | Performed by: EMERGENCY MEDICINE

## 2023-10-03 RX ORDER — LACTULOSE 10 G/15ML
20 SOLUTION ORAL ONCE
Status: COMPLETED | OUTPATIENT
Start: 2023-10-03 | End: 2023-10-03

## 2023-10-03 RX ORDER — PANTOPRAZOLE SODIUM 40 MG/1
40 TABLET, DELAYED RELEASE ORAL DAILY
Status: DISCONTINUED | OUTPATIENT
Start: 2023-10-03 | End: 2023-10-05 | Stop reason: HOSPADM

## 2023-10-03 RX ORDER — ASPIRIN 81 MG/1
81 TABLET ORAL DAILY
Status: DISCONTINUED | OUTPATIENT
Start: 2023-10-03 | End: 2023-10-03

## 2023-10-03 RX ORDER — SODIUM BICARBONATE 650 MG/1
1300 TABLET ORAL 2 TIMES DAILY
Status: DISCONTINUED | OUTPATIENT
Start: 2023-10-03 | End: 2023-10-05 | Stop reason: HOSPADM

## 2023-10-03 RX ORDER — LACTULOSE 10 G/15ML
20 SOLUTION ORAL
Status: DISCONTINUED | OUTPATIENT
Start: 2023-10-03 | End: 2023-10-05 | Stop reason: HOSPADM

## 2023-10-03 RX ORDER — LACTULOSE 10 G/15ML
100 SOLUTION ORAL
Status: DISCONTINUED | OUTPATIENT
Start: 2023-10-03 | End: 2023-10-05 | Stop reason: HOSPADM

## 2023-10-03 RX ORDER — CEFTRIAXONE 1 G/1
1 INJECTION, POWDER, FOR SOLUTION INTRAMUSCULAR; INTRAVENOUS EVERY 24 HOURS
Status: DISCONTINUED | OUTPATIENT
Start: 2023-10-03 | End: 2023-10-04

## 2023-10-03 RX ORDER — ONDANSETRON 4 MG/1
4 TABLET, ORALLY DISINTEGRATING ORAL EVERY 6 HOURS PRN
Status: DISCONTINUED | OUTPATIENT
Start: 2023-10-03 | End: 2023-10-05 | Stop reason: HOSPADM

## 2023-10-03 RX ORDER — DIPHENHYDRAMINE HCL 25 MG
25 CAPSULE ORAL EVERY 6 HOURS PRN
Status: DISCONTINUED | OUTPATIENT
Start: 2023-10-03 | End: 2023-10-05 | Stop reason: HOSPADM

## 2023-10-03 RX ORDER — ONDANSETRON 2 MG/ML
4 INJECTION INTRAMUSCULAR; INTRAVENOUS EVERY 6 HOURS PRN
Status: DISCONTINUED | OUTPATIENT
Start: 2023-10-03 | End: 2023-10-05 | Stop reason: HOSPADM

## 2023-10-03 RX ADMIN — SERTRALINE HYDROCHLORIDE 50 MG: 50 TABLET ORAL at 19:56

## 2023-10-03 RX ADMIN — SODIUM BICARBONATE 650 MG TABLET 1300 MG: at 19:55

## 2023-10-03 RX ADMIN — CEFTRIAXONE SODIUM 1 G: 1 INJECTION, POWDER, FOR SOLUTION INTRAMUSCULAR; INTRAVENOUS at 17:34

## 2023-10-03 RX ADMIN — PANTOPRAZOLE SODIUM 40 MG: 40 TABLET, DELAYED RELEASE ORAL at 19:55

## 2023-10-03 RX ADMIN — RIFAXIMIN 550 MG: 550 TABLET ORAL at 19:55

## 2023-10-03 RX ADMIN — LACTULOSE 20 G: 20 SOLUTION ORAL at 14:40

## 2023-10-03 ASSESSMENT — ACTIVITIES OF DAILY LIVING (ADL)
ADLS_ACUITY_SCORE: 35
DRESSING/BATHING_DIFFICULTY: NO
DIFFICULTY_EATING/SWALLOWING: NO
ADLS_ACUITY_SCORE: 35
CHANGE_IN_FUNCTIONAL_STATUS_SINCE_ONSET_OF_CURRENT_ILLNESS/INJURY: YES
WALKING_OR_CLIMBING_STAIRS_DIFFICULTY: NO
TOILETING_ISSUES: NO
ADLS_ACUITY_SCORE: 35
WEAR_GLASSES_OR_BLIND: NO
DOING_ERRANDS_INDEPENDENTLY_DIFFICULTY: OTHER (SEE COMMENTS)
CONCENTRATING,_REMEMBERING_OR_MAKING_DECISIONS_DIFFICULTY: OTHER (SEE COMMENTS)
FALL_HISTORY_WITHIN_LAST_SIX_MONTHS: NO

## 2023-10-03 NOTE — ED TRIAGE NOTES
"Triage Assessment & Note:    BP (!) 151/100   Pulse 94   Temp 98.1  F (36.7  C) (Oral)   Resp 16   Ht 1.753 m (5' 9\")   Wt 108 kg (238 lb)   SpO2 99%   BMI 35.15 kg/m      Patient presents with: BIBA for altered LOC. PT is a liver failure pt. PT's family reports 16hrs of increasing confusion. PT is still having Bm's.     Home Treatments/Remedies: Prescribed meds     Febrile / Afebrile? Afebrile     Duration of C/o:  1 day    Jeferson Nieves RN  October 3, 2023       Triage Assessment       Row Name 10/03/23 3659       Triage Assessment (Adult)    Airway WDL WDL       Respiratory WDL    Respiratory WDL WDL       Cardiac WDL    Cardiac WDL WDL                    "

## 2023-10-03 NOTE — ED PROVIDER NOTES
ED Provider Note  [facility]      History     Chief Complaint   Patient presents with    Altered Mental Status     HPI  Elroy Morel Sr. is a 43 year old male PMH of cirrhosis, esophageal varices, HTN, alcohol use disorder, chemical dependency, hyperammonemia who presents to the ER for evaluation of altered mental status.    Patient presents with wife. He began acting altered today. He was normal yesterday but just fatigued. He is taking his lactulose according to wife but at smaller doses.  He took 10 g as a 30 g of lactulose that he needs to take today.. He is not usually confused like this. He has liver disease from alcoholism. No fevers. He gets paracentesis every tues/fri. No issues breathing. No other symptoms.    Admitted to an outside hospital on 9/18 for decompensation of liver cirrhosis after presenting for weakness, slowness of thinking, and abdominal distention.    Past Medical History  Past Medical History:   Diagnosis Date    Alcohol use disorder, severe, dependence (H)     Alcohol withdrawal seizure (H)     Alcoholic cirrhosis of liver with ascites (H)     Esophageal varices (H)     Essential hypertension     Gastroesophageal reflux disease without esophagitis     History of methamphetamine use     Sustained remission since 2003    Hypercholesterolemia     Tobacco abuse      Past Surgical History:   Procedure Laterality Date    COLONOSCOPY N/A 8/28/2023    Procedure: COLONOSCOPY, WITH POLYPECTOMY via bx forceps;  Surgeon: Alyssa Tsai MD;  Location: [facility]    IR PARACENTESIS  8/31/2023    IR PARACENTESIS  9/15/2023     aspirin 81 MG EC tablet  diphenhydrAMINE (BENADRYL) 25 MG capsule  gabapentin (NEURONTIN) 100 MG capsule  lactulose (CEPHULAC) 10 GM packet  lactulose (CHRONULAC) 10 GM/15ML solution  melatonin 3 MG tablet  pantoprazole (PROTONIX) 40 MG EC tablet  predniSONE (DELTASONE) 20 MG tablet  rifaximin (XIFAXAN) 550 MG TABS tablet  sertraline (ZOLOFT)  "50 MG tablet  sodium bicarbonate 650 MG tablet      Allergies   Allergen Reactions    Metronidazole Other (See Comments), Dizziness and Unknown    Omeprazole Other (See Comments) and Unknown     Irritability (tolerates Protonix well)     Family History  No family history on file.  Social History   Social History     Tobacco Use    Smoking status: Former     Types: Cigarettes    Smokeless tobacco: Former   Substance Use Topics    Alcohol use: Not Currently     Comment: last drink 8/19    Drug use: Not Currently     Types: Amphetamines     Comment: Sustained remission         A medically appropriate review of systems was performed with pertinent positives and negatives noted in the HPI, and all other systems negative.    Physical Exam   BP: (!) 151/100  Pulse: 94  Temp: 98.1  F (36.7  C)  Resp: 16  Height: 175.3 cm (5' 9\")  Weight: 108 kg (238 lb)  SpO2: 99 %  Physical Exam  Constitutional:       General: He is not in acute distress.     Appearance: He is well-developed. He is ill-appearing. He is not toxic-appearing or diaphoretic.      Comments: Icteric skin;    HENT:      Head: Normocephalic and atraumatic.   Eyes:      General: Scleral icterus present.   Cardiovascular:      Rate and Rhythm: Normal rate and regular rhythm.      Heart sounds: Normal heart sounds.   Pulmonary:      Effort: Pulmonary effort is normal. No respiratory distress.      Breath sounds: No wheezing.   Abdominal:      General: There is no distension.      Palpations: Abdomen is soft.      Tenderness: There is no abdominal tenderness. There is no rebound.      Comments: Fluid shifts but soft; nontender   Musculoskeletal:      Cervical back: Normal range of motion and neck supple.   Skin:     General: Skin is warm.   Neurological:      Mental Status: He is alert. He is confused.      GCS: GCS eye subscore is 4. GCS verbal subscore is 5. GCS motor subscore is 6.      Comments: Oriented x 1; unable to answer questions; sleepy   Psychiatric:       "   Behavior: Behavior normal.         Thought Content: Thought content normal.           ED Course, Procedures, & Data      Procedures       Results for orders placed or performed during the hospital encounter of 10/03/23   XR Chest Port 1 View     Status: None (Preliminary result)    Impression    RESIDENT PRELIMINARY INTERPRETATION  Impression: Stable decreased lung volumes without evidence of acute  focal airspace opacity consistent with pneumonia.    Comprehensive metabolic panel     Status: Abnormal   Result Value Ref Range    Sodium 134 (L) 135 - 145 mmol/L    Potassium 3.5 3.4 - 5.3 mmol/L    Carbon Dioxide (CO2) 23 22 - 29 mmol/L    Anion Gap 13 7 - 15 mmol/L    Urea Nitrogen 32.0 (H) 6.0 - 20.0 mg/dL    Creatinine 1.75 (H) 0.67 - 1.17 mg/dL    GFR Estimate 49 (L) >60 mL/min/1.73m2    Calcium 9.4 8.6 - 10.0 mg/dL    Chloride 98 98 - 107 mmol/L    Glucose 124 (H) 70 - 99 mg/dL    Alkaline Phosphatase 194 (H) 40 - 129 U/L     (H) 0 - 45 U/L     (H) 0 - 70 U/L    Protein Total      Albumin 3.6 3.5 - 5.2 g/dL    Bilirubin Total 20.6 (HH) <=1.2 mg/dL   Ammonia     Status: Abnormal   Result Value Ref Range    Ammonia 89 (H) 16 - 60 umol/L   Lactic acid whole blood     Status: Abnormal   Result Value Ref Range    Lactic Acid 2.3 (H) 0.7 - 2.0 mmol/L   Lipase     Status: Abnormal   Result Value Ref Range    Lipase 190 (H) 13 - 60 U/L   INR     Status: Abnormal   Result Value Ref Range    INR 1.36 (H) 0.85 - 1.15   CBC with platelets and differential     Status: Abnormal   Result Value Ref Range    WBC Count 14.7 (H) 4.0 - 11.0 10e3/uL    RBC Count 3.14 (L) 4.40 - 5.90 10e6/uL    Hemoglobin 11.1 (L) 13.3 - 17.7 g/dL    Hematocrit 32.7 (L) 40.0 - 53.0 %     (H) 78 - 100 fL    MCH 35.4 (H) 26.5 - 33.0 pg    MCHC 33.9 31.5 - 36.5 g/dL    RDW 19.2 (H) 10.0 - 15.0 %    Platelet Count 104 (L) 150 - 450 10e3/uL    % Neutrophils 84 %    % Lymphocytes 6 %    % Monocytes 7 %    Mids % (Monos, Eos, Basos)       % Eosinophils 1 %    % Basophils 0 %    % Immature Granulocytes 2 %    NRBCs per 100 WBC 0 <1 /100    Absolute Neutrophils 12.4 (H) 1.6 - 8.3 10e3/uL    Absolute Lymphocytes 0.9 0.8 - 5.3 10e3/uL    Absolute Monocytes 1.0 0.0 - 1.3 10e3/uL    Mids Abs (Monos, Eos, Basos)      Absolute Eosinophils 0.2 0.0 - 0.7 10e3/uL    Absolute Basophils 0.0 0.0 - 0.2 10e3/uL    Absolute Immature Granulocytes 0.2 <=0.4 10e3/uL    Absolute NRBCs 0.0 10e3/uL   Extra Tube     Status: None    Narrative    The following orders were created for panel order Extra Tube.  Procedure                               Abnormality         Status                     ---------                               -----------         ------                     Extra Red Top Tube[[id number]]                               Final result                 Please view results for these tests on the individual orders.   Extra Red Top Tube     Status: None   Result Value Ref Range    Hold Specimen JI    CBC with platelets differential     Status: Abnormal    Narrative    The following orders were created for panel order CBC with platelets differential.  Procedure                               Abnormality         Status                     ---------                               -----------         ------                     CBC with platelets and d...[[id number]]  Abnormal            Final result                 Please view results for these tests on the individual orders.     Medications   cefTRIAXone (ROCEPHIN) 1 g vial to attach to  mL bag for ADULTS or NS 50 mL bag for PEDS (has no administration in time range)   lactulose (CHRONULAC) solution 20 g (20 g Oral $Given 10/3/23 0330)                    Results for orders placed or performed during the hospital encounter of 10/03/23   CBC with platelets differential     Status: None ()    Narrative    The following orders were created for panel order CBC with platelets differential.  Procedure                                Abnormality         Status                     ---------                               -----------         ------                     CBC with platelets and d...[[id number]]                                                   Please view results for these tests on the individual orders.     Medications - No data to display  Labs Ordered and Resulted from Time of ED Arrival to Time of ED Departure - No data to display  No orders to display          Critical care was performed.   Critical Care Addendum  My initial assessment, based on my focused history and physical exam, established a high suspicion that Elroy Morel Sr. has altered mental status, which requires immediate intervention, and therefore he is critically ill.     After the initial assessment, the care team initiated multiple lab tests and consulted with EMS and IM  to provide stabilization care. Due to the critical nature of this patient, I reassessed vital signs, physical exam, and mental status multiple times prior to his disposition.     Time also spent performing reviewing test results and discussion with consultants.     Critical care time (excluding teaching time and procedures): 30 minutes.     Assessment & Plan    Patient is a 43-year-old male with known history of decompensated liver cirrhosis who presents to the ER due to altered mental status.  Patient is ANO x1.  Unable to give any history.  Patient has asterixis on exam.  Patient's ammonia this morning was elevated to 90.  Patient has severely elevated LFTs.  Patient's WBC has decreased from prior 30,000-14,000 today.  No signs of diffuse infection.  His abdomen is soft and nontender.  Plan will be admit to the hospital to internal medicine for treatment of hepatic encephalopathy.  Report was given to internal medicine for admission.    I have reviewed the nursing notes. I have reviewed the findings, diagnosis, plan and need for follow up with the patient.    New Prescriptions     No medications on file       Final diagnoses:   Hepatic encephalopathy (H)   Alcoholic cirrhosis of liver with ascites (H)   Hyperammonemia (H24)     I, Milagros Florian, am serving as a trained medical scribe to document services personally performed by Kierra Radford MD, based on the provider's statements to me.     I, Kierra Radford MD, was physically present and have reviewed and verified the accuracy of this note documented by Milagros Florian.    Kierra Radford MD  [facility] EMERGENCY DEPARTMENT  10/3/2023     Kierra Radford MD  10/03/23 8904

## 2023-10-03 NOTE — H&P
[facility]    History and Physical - Medicine Service, [facility] TEAM 5       Date of Admission:  10/3/2023    Assessment & Plan      Elroy Morel Sr. is a 43 year old male admitted on 10/3/2023. He has a history of alcoholic cirrhosis c/b ascites, varices, and recent JOCELYN and is admitted for hepatic encephalopathy.    Hepatic encephalopathy  Alcoholic cirrhosis  Recurrent ascites  History of variceal bleeding(8/13/2023) w/ esophageal ulcer at area (8/28)   Presenting with acute onset of confusion in the context of cirrhosis with multiple recent hospitalizations for complications thereof. Labs today are actually significantly improved from his recent hospital discharge, however his ammonia level is higher than previously. Given his clinical exam and symptoms, most likely cause of his confusion is hepatic encephalopathy. Has been taking his lactulose regularly and having frequent bowel movements; I wonder if he may have an occult infection or other metabolic disruption that triggered this decompensation. CXR and head CT pending, blood cultures drawn, UA/Ucx pending. Will cover for SBP with ceftriaxone for now, pending paracentesis tomorrow.  -Lactulose per HE protocol  -PTA rifaximin 550 mg BID  -Ceftriaxone 1 g q24h  -PTA pantoprazole 40 mg daily  -PTA benadryl 25 mg q6h PRN for itching  -CXR pending (no clear respiratory symptoms)  -UA/Ucx pending  -Bcx x 2 pending  -Daily MELD labs  -CT head negative for acute intracranial pathology  -Plan for para with CAPS tomorrow morning    MELD 3.0: 29 at 10/3/2023  2:05 PM  MELD-Na: 28 at 10/3/2023  2:05 PM  Calculated from:  Serum Creatinine: 1.75 mg/dL at 10/3/2023  2:05 PM  Serum Sodium: 134 mmol/L at 10/3/2023  2:05 PM  Total Bilirubin: 20.6 mg/dL at 10/3/2023  2:05 PM  Serum Albumin: 3.6 g/dL (Using max of 3.5 g/dL) at 10/3/2023  2:05 PM  INR(ratio): 1.36 at 10/3/2023  2:05 PM  Age at listing (hypothetical): 43 years  Sex:  "Male at 10/3/2023  2:05 PM    Normocytic anemia, chronic  Hgb actually a bit up on admission, may be due to hemoconcentration. No signs of bleeding on admission.    Leukocytosis, improved  WBC 14.7 on admission, down from the low 30s during last admission. Was on prednisone for alcoholic hepatitis, but has completed this. May suggest infection, workup as above.    CKD  Creatinine 1.75 on admission, markedly improved from recent hospitalization (recent JOCELYN with Cr as high as 7).  -Trend CMP as above  -Continue PTA sodium bicarbonate 1300 mg BID    Aspirin use:  I can't find a compelling indication for his daily aspirin-- GI told him to stop it a few weeks ago given his bleeding risk. Will hold.  -Stop daily aspirin    Mood disorder: continue sertraline        Diet:  Regular with 2 g sodium restriction  DVT Prophylaxis: Pneumatic Compression Devices  Carroll Catheter: Not present  Fluids: none  Lines: None     Cardiac Monitoring: None  Code Status:  Full Code (confirmed on admission with patient's wife)    Clinically Significant Risk Factors Present on Admission                 # Coagulation Defect: INR = 1.36 (Ref range: 0.85 - 1.15) and/or PTT = 42 Seconds (Ref range: 22 - 38 Seconds), will monitor for bleeding    # Drug Induced Platelet Defect: home medication list includes an antiplatelet medication     # Hypertension: Noted on problem list      # Obesity: Estimated body mass index is 35.15 kg/m  as calculated from the following:    Height as of this encounter: 1.753 m (5' 9\").    Weight as of this encounter: 108 kg (238 lb).              Disposition Plan      Expected Discharge Date: 10/05/2023                The patient's care was discussed with the Attending Physician, Dr. Ramos .      Mike Deluca MD  Medicine Service, [facility] TEAM [address] [place] [address]  Securely message with [employer] (more info)  Text page via [facility] Paging/Directory   See signed in provider for up " to date coverage information  ______________________________________________________________________    Chief Complaint   Confusion    History is obtained from the patient and his spouse.    History of Present Illness   Elroy Morel Sr. is a 43 year old male who has a history of alcoholic cirrhosis c/b ascites, varices, and recent JOCELYN and presents with one day of confusion.    This morning, he was at his baseline mental status. His wife took him for some scheduled labs at [facility] and then she brought him home before going to her job. She called him throughout the day to check in but he never answered. In the early afternoon, worried about his lack of response she went home to check on him and called EMS. She found him resting on the couch, acutely confused. She feels confident that he didn't fall-- he was able to ambulate independently and she has a security camera covering her stairs that did not motion-trigger.    [place] (patient's wife) has been ensuring he gets his lactulose consistently and he has been having bowel movements regularly, typically 4-5 times per day. No signs of bleeding, no abdominal pain, no fevers or chills, no vomiting, respiratory problems, rashes. He otherwise has been doing fairly well since discharging from [facility] on 9/23 (hospitalized for decompensated cirrhosis with a GI bleed and severe JOCELYN). No alcohol intake, per [place] he just took a pleth test today. Was due to have a scheduled paracentesis today, but ended up in the ED instead.    Past Medical History    Past Medical History:   Diagnosis Date    Alcohol use disorder, severe, dependence (H)     Alcohol withdrawal seizure (H)     Alcoholic cirrhosis of liver with ascites (H)     Esophageal varices (H)     Essential hypertension     Gastroesophageal reflux disease without esophagitis     History of methamphetamine use     Sustained remission since 2003    Hypercholesterolemia     Tobacco abuse        Past Surgical History   Past  Surgical History:   Procedure Laterality Date    COLONOSCOPY N/A 2023    Procedure: COLONOSCOPY, WITH POLYPECTOMY via bx forceps;  Surgeon: Alyssa Tsai MD;  Location:  GI    IR PARACENTESIS  2023    IR PARACENTESIS  9/15/2023       Prior to Admission Medications   Prior to Admission Medications   Prescriptions Last Dose Informant Patient Reported? Taking?   aspirin 81 MG EC tablet   No No   Sig: Take 1 tablet (81 mg) by mouth daily for 120 days   diphenhydrAMINE (BENADRYL) 25 MG capsule   No No   Sig: Take 1 capsule (25 mg) by mouth every 6 hours as needed for itching   gabapentin (NEURONTIN) 100 MG capsule   No No   Sig: Take 1 capsule (100 mg) by mouth At Bedtime for 30 days   lactulose (CEPHULAC) 10 GM packet   No No   Sig: Take 2 packets (20 g) by mouth 3 times daily   lactulose (CHRONULAC) 10 GM/15ML solution   No No   Si mLs (20 g) by Oral or NG Tube route daily as needed for constipation   melatonin 3 MG tablet   Yes No   Sig: Take 3 mg by mouth nightly as needed for sleep   pantoprazole (PROTONIX) 40 MG EC tablet   No No   Sig: Take 1 tablet (40 mg) by mouth daily   predniSONE (DELTASONE) 20 MG tablet   No No   Sig: Take 2 tablets (40 mg) by mouth daily   rifaximin (XIFAXAN) 550 MG TABS tablet   No No   Si tablet (550 mg) by Oral or Feeding Tube route 2 times daily   sertraline (ZOLOFT) 50 MG tablet   No No   Sig: Take 1 tablet (50 mg) by mouth daily   sodium bicarbonate 650 MG tablet   No No   Sig: Take 2 tablets (1,300 mg) by mouth 2 times daily      Facility-Administered Medications: None         Physical Exam   Vital Signs: Temp: 98.3  F (36.8  C) Temp src: Oral BP: 129/72 Pulse: 93   Resp: 16 SpO2: 100 % O2 Device: None (Room air)    Weight: 238 lbs 0 oz    General Appearance: Awake, responds to questions and commands, appears tired  Eyes: Roving eye movements when eyes closed, PERRL, hippus with pupillary light exposure bilaterally, sclerae icteric  HEENT: MMM,  "atraumatic head  Respiratory: Breathing comfortably in room air, CTAB  Cardiovascular: RRR, no murmurs, warm and well-perfused  GI: Abdomen ascitic but soft, no tenderness to palpation  Skin: Jaundiced  Musculoskeletal: Extremities grossly normal  Neurologic: Awake, disoriented, perseverates on \"'03\" after reporting the year as 2003. Asterixis present, cranial nerves grossly intact, Moves all extremities appropriately    Medical Decision Making       Please see A&P for additional details of medical decision making.      Data     I have personally reviewed the following data over the past 24 hrs:    14.7 (H)  \   11.1 (L)   / 104 (L)     134 (L) 98 32.0 (H) /  124 (H)   3.5 23 1.75 (H) \     ALT: 133 (H) AST: 151 (H) AP: 194 (H) TBILI: 20.6 (HH)   ALB: 3.6 TOT PROTEIN: N/A LIPASE: 190 (H)     Procal: N/A CRP: N/A Lactic Acid: 2.3 (H)       INR:  1.36 (H) PTT:  N/A   D-dimer:  N/A Fibrinogen:  N/A       "

## 2023-10-04 ENCOUNTER — APPOINTMENT (OUTPATIENT)
Dept: OCCUPATIONAL THERAPY | Facility: CLINIC | Age: 43
DRG: 442 | End: 2023-10-04
Attending: STUDENT IN AN ORGANIZED HEALTH CARE EDUCATION/TRAINING PROGRAM
Payer: COMMERCIAL

## 2023-10-04 LAB
ABSOLUTE NEUTROPHILS, BODY FLUID: 71.1 /UL
ALBUMIN BODY FLUID SOURCE: NORMAL
ALBUMIN FLD-MCNC: 0.4 G/DL
ALBUMIN SERPL BCG-MCNC: 3 G/DL (ref 3.5–5.2)
ALP SERPL-CCNC: 163 U/L (ref 40–129)
ALT SERPL W P-5'-P-CCNC: 106 U/L (ref 0–70)
ANION GAP SERPL CALCULATED.3IONS-SCNC: 12 MMOL/L (ref 7–15)
APPEARANCE FLD: ABNORMAL
AST SERPL W P-5'-P-CCNC: 126 U/L (ref 0–45)
BILIRUB SERPL-MCNC: 18.6 MG/DL
BUN SERPL-MCNC: 36 MG/DL (ref 6–20)
CALCIUM SERPL-MCNC: 8.8 MG/DL (ref 8.6–10)
CELL COUNT BODY FLUID SOURCE: ABNORMAL
CHLORIDE SERPL-SCNC: 97 MMOL/L (ref 98–107)
COLOR FLD: YELLOW
CREAT SERPL-MCNC: 1.86 MG/DL (ref 0.67–1.17)
DEPRECATED HCO3 PLAS-SCNC: 21 MMOL/L (ref 22–29)
EGFRCR SERPLBLD CKD-EPI 2021: 45 ML/MIN/1.73M2
FIBRINOGEN PPP-MCNC: 243 MG/DL (ref 170–490)
GLUCOSE SERPL-MCNC: 105 MG/DL (ref 70–99)
HOLD SPECIMEN: NORMAL
INR PPP: 1.49 (ref 0.85–1.15)
LYMPHOCYTES NFR FLD MANUAL: 20 %
MONOS+MACROS NFR FLD MANUAL: NORMAL %
NEUTS BAND NFR FLD MANUAL: 27 %
OTHER CELLS FLD MANUAL: 53 %
POTASSIUM SERPL-SCNC: 3.3 MMOL/L (ref 3.4–5.3)
POTASSIUM SERPL-SCNC: 3.5 MMOL/L (ref 3.4–5.3)
PROT FLD-MCNC: 0.8 G/DL
PROT SERPL-MCNC: ABNORMAL G/DL
PROTEIN BODY FLUID SOURCE: NORMAL
SODIUM SERPL-SCNC: 130 MMOL/L (ref 135–145)
WBC # FLD AUTO: 267 /UL

## 2023-10-04 PROCEDURE — 82042 OTHER SOURCE ALBUMIN QUAN EA: CPT | Performed by: STUDENT IN AN ORGANIZED HEALTH CARE EDUCATION/TRAINING PROGRAM

## 2023-10-04 PROCEDURE — 250N000011 HC RX IP 250 OP 636: Performed by: STUDENT IN AN ORGANIZED HEALTH CARE EDUCATION/TRAINING PROGRAM

## 2023-10-04 PROCEDURE — 85610 PROTHROMBIN TIME: CPT | Performed by: STUDENT IN AN ORGANIZED HEALTH CARE EDUCATION/TRAINING PROGRAM

## 2023-10-04 PROCEDURE — 999N000147 HC STATISTIC PT IP EVAL DEFER

## 2023-10-04 PROCEDURE — 82040 ASSAY OF SERUM ALBUMIN: CPT | Performed by: STUDENT IN AN ORGANIZED HEALTH CARE EDUCATION/TRAINING PROGRAM

## 2023-10-04 PROCEDURE — 89050 BODY FLUID CELL COUNT: CPT | Performed by: STUDENT IN AN ORGANIZED HEALTH CARE EDUCATION/TRAINING PROGRAM

## 2023-10-04 PROCEDURE — 97165 OT EVAL LOW COMPLEX 30 MIN: CPT | Mod: GO

## 2023-10-04 PROCEDURE — 85384 FIBRINOGEN ACTIVITY: CPT | Performed by: STUDENT IN AN ORGANIZED HEALTH CARE EDUCATION/TRAINING PROGRAM

## 2023-10-04 PROCEDURE — 250N000013 HC RX MED GY IP 250 OP 250 PS 637: Performed by: INTERNAL MEDICINE

## 2023-10-04 PROCEDURE — P9047 ALBUMIN (HUMAN), 25%, 50ML: HCPCS | Performed by: STUDENT IN AN ORGANIZED HEALTH CARE EDUCATION/TRAINING PROGRAM

## 2023-10-04 PROCEDURE — 97530 THERAPEUTIC ACTIVITIES: CPT | Mod: GO

## 2023-10-04 PROCEDURE — 87070 CULTURE OTHR SPECIMN AEROBIC: CPT | Performed by: STUDENT IN AN ORGANIZED HEALTH CARE EDUCATION/TRAINING PROGRAM

## 2023-10-04 PROCEDURE — 250N000013 HC RX MED GY IP 250 OP 250 PS 637: Performed by: STUDENT IN AN ORGANIZED HEALTH CARE EDUCATION/TRAINING PROGRAM

## 2023-10-04 PROCEDURE — 0W9G3ZZ DRAINAGE OF PERITONEAL CAVITY, PERCUTANEOUS APPROACH: ICD-10-PCS | Performed by: STUDENT IN AN ORGANIZED HEALTH CARE EDUCATION/TRAINING PROGRAM

## 2023-10-04 PROCEDURE — 49083 ABD PARACENTESIS W/IMAGING: CPT | Performed by: STUDENT IN AN ORGANIZED HEALTH CARE EDUCATION/TRAINING PROGRAM

## 2023-10-04 PROCEDURE — 84157 ASSAY OF PROTEIN OTHER: CPT | Performed by: STUDENT IN AN ORGANIZED HEALTH CARE EDUCATION/TRAINING PROGRAM

## 2023-10-04 PROCEDURE — 84132 ASSAY OF SERUM POTASSIUM: CPT | Performed by: INTERNAL MEDICINE

## 2023-10-04 PROCEDURE — 120N000002 HC R&B MED SURG/OB UMMC

## 2023-10-04 PROCEDURE — 36415 COLL VENOUS BLD VENIPUNCTURE: CPT | Performed by: STUDENT IN AN ORGANIZED HEALTH CARE EDUCATION/TRAINING PROGRAM

## 2023-10-04 PROCEDURE — 36415 COLL VENOUS BLD VENIPUNCTURE: CPT | Performed by: INTERNAL MEDICINE

## 2023-10-04 PROCEDURE — 99232 SBSQ HOSP IP/OBS MODERATE 35: CPT | Mod: 25 | Performed by: INTERNAL MEDICINE

## 2023-10-04 PROCEDURE — 97535 SELF CARE MNGMENT TRAINING: CPT | Mod: GO

## 2023-10-04 RX ORDER — ALBUMIN (HUMAN) 12.5 G/50ML
25 SOLUTION INTRAVENOUS ONCE
Status: COMPLETED | OUTPATIENT
Start: 2023-10-04 | End: 2023-10-04

## 2023-10-04 RX ORDER — POTASSIUM CHLORIDE 750 MG/1
40 TABLET, EXTENDED RELEASE ORAL ONCE
Status: COMPLETED | OUTPATIENT
Start: 2023-10-04 | End: 2023-10-04

## 2023-10-04 RX ADMIN — PANTOPRAZOLE SODIUM 40 MG: 40 TABLET, DELAYED RELEASE ORAL at 09:28

## 2023-10-04 RX ADMIN — RIFAXIMIN 550 MG: 550 TABLET ORAL at 20:05

## 2023-10-04 RX ADMIN — POTASSIUM CHLORIDE 40 MEQ: 750 TABLET, EXTENDED RELEASE ORAL at 09:28

## 2023-10-04 RX ADMIN — RIFAXIMIN 550 MG: 550 TABLET ORAL at 09:28

## 2023-10-04 RX ADMIN — ALBUMIN HUMAN 25 G: 0.25 SOLUTION INTRAVENOUS at 13:49

## 2023-10-04 RX ADMIN — LACTULOSE 20 G: 20 SOLUTION ORAL at 09:28

## 2023-10-04 RX ADMIN — SERTRALINE HYDROCHLORIDE 50 MG: 50 TABLET ORAL at 09:29

## 2023-10-04 ASSESSMENT — ACTIVITIES OF DAILY LIVING (ADL)
ADLS_ACUITY_SCORE: 21
PREVIOUS_RESPONSIBILITIES: MEAL PREP;HOUSEKEEPING;LAUNDRY;SHOPPING;MEDICATION MANAGEMENT
ADLS_ACUITY_SCORE: 21
ADLS_ACUITY_SCORE: 23
ADLS_ACUITY_SCORE: 23
DEPENDENT_IADLS:: INDEPENDENT;MEDICATION MANAGEMENT
ADLS_ACUITY_SCORE: 21
ADLS_ACUITY_SCORE: 23
ADLS_ACUITY_SCORE: 23

## 2023-10-04 NOTE — CONSULTS
"Care Management Initial Consult    General Information  Assessment completed with: Patient, Care Team Member, -chart review,    Type of CM/SW Visit: Initial Assessment    Primary Care Provider verified and updated as needed: Yes   Readmission within the last 30 days: lack of support, previous discharge plan unsuccessful      Reason for Consult: other (see comments) (elevated readmission score)  Advance Care Planning:            Communication Assessment  Patient's communication style: spoken language (English or Bilingual)    Hearing Difficulty or Deaf: no   Wear Glasses or Blind: no    Cognitive  Cognitive/Neuro/Behavioral: .WDL except, orientation  Level of Consciousness: alert  Arousal Level: opens eyes spontaneously  Orientation: disoriented to, time  Mood/Behavior: cooperative  Best Language: 0 - No aphasia  Speech: clear, spontaneous    Living Environment:   People in home: child(gabriela), adult (18 and 20 yr old), spouse (wife)   Current living Arrangements: house      Able to return to prior arrangements: yes       Family/Social Support:  Care provided by: self, spouse/significant other  Provides care for: no one  Marital Status:   Wife, Children          Description of Support System: Supportive, Involved    Support Assessment: Adequate family and caregiver support, Adequate social supports    Current Resources:   Patient receiving home care services: No     Community Resources: None  Equipment currently used at home: none  Supplies currently used at home: None    Employment/Financial:  Employment Status: disabled -- when SW asked if pt is employed, patient said \"I was and I don't think I'm gonna be anymore.\"       Financial Concerns: No concerns identified           Does the patient's insurance plan have a 3 day qualifying hospital stay waiver?  No    Lifestyle & Psychosocial Needs:  Social Determinants of Health     Food Insecurity: Not on file   Depression: Not at risk (8/30/2023)    PHQ-2     PHQ-2 " Score: 2   Housing Stability: Not on file   Tobacco Use: Medium Risk (9/15/2023)    Patient History     Smoking Tobacco Use: Former     Smokeless Tobacco Use: Former     Passive Exposure: Not on file   Financial Resource Strain: Not on file   Alcohol Use: Not on file   Transportation Needs: Not on file   Physical Activity: Not on file   Interpersonal Safety: Not on file   Stress: Not on file   Social Connections: Not on file       Functional Status:  Prior to admission patient needed assistance:   Dependent ADLs:: Independent  Dependent IADLs:: Independent, Medication Management  Assesssment of Functional Status: At functional baseline    Mental Health Status:  Mental Health Status: No Current Concerns       Chemical Dependency Status:  Chemical Dependency Status: No Current Concerns       - when SW asked patient if he had any current concerns regarding his chemical health, patient declined having any concerns. Per chart review, patient has a history of alcohol abuse and most recently was discharged to [facility] on 8/29.           Values/Beliefs:  Spiritual, Cultural Beliefs, [employer]ism Practices, Values that affect care:  did not ask               Additional Information:    Elroy Morel Sr. is a 43 year old male admitted on 10/3/2023. He has a history of alcoholic cirrhosis c/b ascites, varices, and recent JOCELYN and is admitted for hepatic encephalopathy.     SW met with patient at bedside to complete assessment. Introduced self and role. Patient agreeable to assessment, although provided SW with short answers and did not appear to be very engaged in assessment. Some information obtained via chart review. Please see details above.       AAYUSH BrownW   [address]    Phone: [phone]  Pager: [phone]

## 2023-10-04 NOTE — PLAN OF CARE
"BP (!) 146/84 (BP Location: Left arm)   Pulse 86   Temp 98.2  F (36.8  C) (Oral)   Resp 18   Ht 1.753 m (5' 9\")   Wt 99.5 kg (219 lb 6.4 oz)   SpO2 100%   BMI 32.40 kg/m        Hypertension within parameters. Diarrhea x 2 and loose stool x 1. Tea colored urine. Denies pain. SBA to commode, ambulated to bathroom and commode. A&O x 3, disoriented to time. Bed alarm on. Irritable, just wanted to be left alone to sleep.    Goal Outcome Evaluation: Ongoing.       Plan of Care Reviewed With: patient    Overall Patient Progress: no change      "

## 2023-10-04 NOTE — PROVIDER NOTIFICATION
"Writer susan JOHNSON \"Pt says he needs albumin after paracentesis- can you order this? thanks!\"  "

## 2023-10-04 NOTE — PROGRESS NOTES
[facility]    Progress Note - Medicine Service, MAROON TEAM 5       Date of Admission:  10/3/2023    Assessment & Plan   Elroy Morel Sr. is a 43 year old male admitted on 10/3/2023. He has a history of alcoholic cirrhosis c/b ascites, varices, and recent JOCELYN and is admitted for hepatic encephalopathy.     Hepatic encephalopathy  Alcoholic cirrhosis  Recurrent ascites  History of variceal bleeding(8/13/2023) w/ esophageal ulcer at area (8/28)   Presenting with acute onset of confusion in the context of cirrhosis with multiple recent hospitalizations for complications thereof. Labs today are actually significantly improved from his recent hospital discharge, however his ammonia level is higher than previously. Given his clinical exam and symptoms, most likely cause of his confusion is hepatic encephalopathy. Has been taking his lactulose regularly and having frequent bowel movements; I wonder if he may have an occult infection or other metabolic disruption that triggered this decompensation. CXR and head CT pending, blood cultures drawn, UA/Ucx pending. Covering for SBP with ceftriaxone for now, pending peritoneal cell counts from para today. Mental status much clearer today, reports that his bowel movement count had been dropping off over the days leading up to admission. He thinks he needs more lactulose going forward.  -Lactulose per HE protocol  -PTA rifaximin 550 mg BID  -Ceftriaxone 1 g q24h  -PTA pantoprazole 40 mg daily  -PTA benadryl 25 mg q6h PRN for itching  -CXR pending (no clear respiratory symptoms)  -UA does not suggest UTI  -Bcx x 2 pending  -Daily MELD labs  -Para with CAPS today, 25 g albumin afterwards     MELD 3.0: 31 at 10/4/2023  6:07 AM  MELD-Na: 31 at 10/4/2023  6:07 AM  Calculated from:  Serum Creatinine: 1.86 mg/dL at 10/4/2023  6:07 AM  Serum Sodium: 130 mmol/L at 10/4/2023  6:07 AM  Total Bilirubin: 18.6 mg/dL at 10/4/2023  6:07 AM  Serum  "Albumin: 3.0 g/dL at 10/4/2023  6:07 AM  INR(ratio): 1.49 at 10/4/2023  6:07 AM  Age at listing (hypothetical): 43 years  Sex: Male at 10/4/2023  6:07 AM     Normocytic anemia, chronic  Hgb actually a bit up on admission, may be due to hemoconcentration. No signs of bleeding on admission.     Leukocytosis, improved  WBC 14.7 on admission, down from the low 30s during last admission. Was on prednisone for alcoholic hepatitis, but has completed this. May suggest infection, workup as above.     CKD  Creatinine 1.75 on admission, markedly improved from recent hospitalization (recent JOCELYN with Cr as high as 7).  -Trend CMP as above  -Continue PTA sodium bicarbonate 1300 mg BID     Aspirin use:  I can't find a compelling indication for his daily aspirin-- GI told him to stop it a few weeks ago given his bleeding risk. Will hold.  -Stop daily aspirin     Mood disorder: continue sertraline       Diet: Combination Diet Regular Diet Adult; 2 gm NA Diet    DVT Prophylaxis: Pneumatic Compression Devices  Carroll Catheter: Not present  Fluids: none  Lines: None     Cardiac Monitoring: None  Code Status: Full Code      Clinically Significant Risk Factors Present on Admission        # Hypokalemia: Lowest K = 3.3 mmol/L in last 2 days, will replace as needed    # Hypercalcemia: corrected calcium is >10.1, will monitor as appropriate    # Hypoalbuminemia: Lowest albumin = 3 g/dL at 10/4/2023  6:07 AM, will monitor as appropriate  # Coagulation Defect: INR = 1.49 (Ref range: 0.85 - 1.15) and/or PTT = 42 Seconds (Ref range: 22 - 38 Seconds), will monitor for bleeding  # Drug Induced Platelet Defect: home medication list includes an antiplatelet medication   # Hypertension: Noted on problem list      # Obesity: Estimated body mass index is 32.4 kg/m  as calculated from the following:    Height as of this encounter: 1.753 m (5' 9\").    Weight as of this encounter: 99.5 kg (219 lb 6.4 oz).              Disposition Plan      Expected " Discharge Date: 10/05/2023      Destination: home with family          The patient's care was discussed with the Attending Physician, Dr. Ramos .    Mike Deluca MD  Medicine Service, [facility] TEAM 5  [facility]  Securely message with [employer] (more info)  Text page via [facility] Paging/Directory   See signed in provider for up to date coverage information  ______________________________________________________________________    Interval History   Much clearer today. Feeling significantly better. Notes that he wasn't having as many stools in the days leading up to admission. Paracentesis performed this morning, feeling pretty good.     Physical Exam   Vital Signs: Temp: 98.2  F (36.8  C) Temp src: Oral BP: (!) 146/84 Pulse: 86   Resp: 18 SpO2: 100 % O2 Device: None (Room air)    Weight: 219 lbs 6.4 oz    General Appearance: Awake, responds to questions and commands, more alert today  Eyes: sclerae icteric  HEENT: MMM, atraumatic head  Respiratory: Breathing comfortably in room air, CTAB  Cardiovascular: RRR, no murmurs, warm and well-perfused  GI: Abdomen less distended today, soft, no tenderness to palpation  Skin: Jaundiced  Musculoskeletal: Extremities grossly normal  Neurologic: Awake, much more oriented today, cranial nerves grossly intact, Moves all extremities appropriately    Medical Decision Making       Please see A&P for additional details of medical decision making.      Data     I have personally reviewed the following data over the past 24 hrs:    14.7 (H)  \   11.1 (L)   / 104 (L)     130 (L) 97 (L) 36.0 (H) /  105 (H)   3.3 (L) 21 (L) 1.86 (H) \     ALT: 106 (H) AST: 126 (H) AP: 163 (H) TBILI: 18.6 (HH)   ALB: 3.0 (L) TOT PROTEIN: N/A LIPASE: 190 (H)     Procal: N/A CRP: N/A Lactic Acid: 2.0       INR:  1.49 (H) PTT:  N/A   D-dimer:  N/A Fibrinogen:  243

## 2023-10-04 NOTE — PROGRESS NOTES
10/04/23 1000   Appointment Info   Signing Clinician's Name / Credentials (OT) KORIN Tilley   Student Supervision Direct Patient Contact Provided       Present no   Language English   Living Environment   People in Home child(gabriela), adult;spouse   Current Living Arrangements house   Home Accessibility stairs to enter home;stairs within home   Number of Stairs, Main Entrance 4   Stair Railings, Main Entrance railings safe and in good condition   Number of Stairs, Within Home, Primary greater than 10 stairs   Stair Railings, Within Home, Primary railing on right side (ascending)   Transportation Anticipated family or friend will provide   Living Environment Comments Pt lives in a two level home with adult childen and wife. Pt has 4 stairs at the entrance and 16 stairs within home. Pt has a tub shower.   Self-Care   Usual Activity Tolerance good   Current Activity Tolerance moderate   Regular Exercise No   Equipment Currently Used at Home none   Fall history within last six months no   Activity/Exercise/Self-Care Comment Pt is IND with ADLs at baseline.   Instrumental Activities of Daily Living (IADL)   Previous Responsibilities meal prep;housekeeping;laundry;shopping;medication management   IADL Comments Pt is IND with IADLs at baseline, and splits IADL responsibilities with wife and adult children.   General Information   Onset of Illness/Injury or Date of Surgery 10/03/23   Referring Physician Mike Deluca MD   Patient/Family Therapy Goal Statement (OT) Pt would like to d/c to home   Additional Occupational Profile Info/Pertinent History of Current Problem Elroy Morel Sr. is a 43 year old male admitted on 10/3/2023. He has a history of alcoholic cirrhosis c/b ascites, varices, and recent JOCELYN and is admitted for hepatic encephalopathy.   Existing Precautions/Restrictions other (see comments)  (cognition)   Limitations/Impairments safety/cognitive   Left Upper Extremity  (Weight-bearing Status) full weight-bearing (FWB)   Right Upper Extremity (Weight-bearing Status) full weight-bearing (FWB)   Left Lower Extremity (Weight-bearing Status) full weight-bearing (FWB)   Right Lower Extremity (Weight-bearing Status) full weight-bearing (FWB)   General Observations and Info Pt greeted supine in bed with bed alarm on and agreeale to therapy.   Cognitive Status Examination   Orientation Status person;place   Cognitive Status Comments Pt presents with definite cognitive deficits. See below for SLUMS score   Cognitive Screens/Assessments   Cognitive Assessments Completed [facility] Mental Status Exam (UMS):  Total Score out of /30 10/30   [facility] Norms 1-20 equals dementia   [facility] Domains assessed: orientation, memory, attention, executive functions   SLUMS Interpretation Per [facility] results, pt presents with significant cogitive deficits.   Visual Perception   Visual Impairment/Limitations WFL   Sensory   Sensory Comments WFL   Pain Assessment   Patient Currently in Pain No   Range of Motion Comprehensive   General Range of Motion no range of motion deficits identified   Comment, General Range of Motion per observation, BUE WFL   Strength Comprehensive (MMT)   Comment, General Manual Muscle Testing (MMT) Assessment per clinical judgement BUE grossly 5/5   Bed Mobility   Bed Mobility supine-sit   Supine-Sit [place] (Bed Mobility) supervision   Transfers   Transfers sit-stand transfer   Transfer Comments SBA   Sit-Stand Transfer   Sit-Stand [place] (Transfers) supervision   Balance   Balance Assessment no deficits were identified   Balance Comments Pt ambulated ~5ft without any LOB   Activities of Daily Living   BADL Assessment/Intervention upper body dressing;lower body dressing;grooming;toileting   Upper Body Dressing Assessment/Training   Comment, (Upper Body Dressing) per clinical judgement   [place] Level (Upper Body Dressing) supervision   Lower Body  Dressing Assessment/Training   Comment, (Lower Body Dressing) per clinical judgement   [place] Level (Lower Body Dressing) supervision   Grooming Assessment/Training   [place] Level (Grooming) supervision   Comment, (Grooming) per clinical judgement   Toileting   Comment, (Toileting) per clinical judgement   [place] Level (Toileting) supervision   Clinical Impression   Criteria for Skilled Therapeutic Interventions Met (OT) Yes, treatment indicated   OT Diagnosis decreased safety with ADL/IADL completion due to cognitive deficits   Influenced by the following impairments Cognitive impairements/confusion   OT Problem List-Impairments impacting ADL problems related to;activity tolerance impaired;cognition   Assessment of Occupational Performance 1-3 Performance Deficits   Identified Performance Deficits higher level cog, IADLs   Planned Therapy Interventions (OT) IADL retraining;cognition;home program guidelines   Clinical Decision Making Complexity (OT) low complexity   Anticipated Equipment Needs Upon Discharge (OT)   (tbd with further therapy)   Risk & Benefits of therapy have been explained evaluation/treatment results reviewed;risks/benefits reviewed;care plan/treatment goals reviewed;patient   OT Total Evaluation Time   OT Eval, Low Complexity Minutes (50048) 8

## 2023-10-04 NOTE — PROCEDURES
[facility]  CAPS PROCEDURE NOTE  Date of Admission:  10/3/2023  Consult Requested by: Medicine  Reason for Consult: diagnostic evaluation of ascites and management of symptomatic ascites    Indication/HPI: Abdominal distension     Pre-Procedure Diagnosis: Ascites    Post-Procedure Diagnosis: Ascites    Risk Assessment: Average risk, Technically straightforward; patient's anticoagulation has been held according to guidelines based on the agent and platelets and coags are within guidelines    Procedure Outcome:  Diagnostic paracentesis performed and Therapeutic paracentesis performed with 2.5 liters of ascites removed.   See additional procedure details below.    The primary covering service should follow up and address any lab results as appropriate.    BOGDAN TORRES MD  [facility]  Securely message with [employer] (more info)  Text page via [employer] Paging/Directory   See signed in provider for up to date coverage information      [facility]    Paracentesis    Date/Time: 10/4/2023 10:47 AM    Performed by: Bogdan Torres MD  Authorized by: Bogdan Torres MD    PRE-PROCEDURE DETAILS  Initial or subsequent exam: initial  Procedure purpose: diagnostic and therapeutic  Indications: abdominal discomfort secondary to ascites        UNIVERSAL PROTOCOL   Site Marked: Yes  Prior Images Obtained and Reviewed:  Yes  Required items: Required blood products, implants, devices and special equipment available    Patient identity confirmed:  Verbally with patient  NA - No sedation, light sedation, or local anesthesia  Confirmation Checklist:  Patient's identity using two indicators, relevant allergies, procedure was appropriate and matched the consent or emergent situation and correct equipment/implants were available  Time out: Immediately prior to the procedure a time out was called    Universal  Protocol: the Joint Commission Universal Protocol was followed    Preparation: Patient was prepped and draped in usual sterile fashion    ESBL (mL):  1     ANESTHESIA    Anesthesia:  Local infiltration  Local Anesthetic:  Lidocaine 1% without epinephrine  Anesthetic Total (mL):  10      SEDATION    Patient Sedated: No    POST PROCEDURE DETAILS  Needle gauge: 19.  Ultrasound guidance: yes  Puncture site: RUQ.  Fluid removed: 2500(ml)  Fluid appearance: serosanguinous  Dressing: Sterile Bandaid with GLue.        PROCEDURE    Patient Tolerance:  Patient tolerated the procedure well with no immediate complications  Length of time physician/provider present for 1:1 monitoring during sedation: 0        POC US GUIDE FOR PARACENTESIS     Impression  US Indication: abdominal distension    Limited abdominal ultrasound was performed and demonstrated an adequate fluid collection on the right side of the abdomen.    Doppler of the skin demonstrated an area at this site without significant vasculature.  A paracentesis at this site was subsequently performed.    ELLY TORRES MD

## 2023-10-04 NOTE — PLAN OF CARE
Physical Therapy: Orders received. Chart reviewed and discussed with care team.? Physical Therapy not indicated due to lack of mobility needs.Per observation and OT, pt up to SBA for all mobility.? Defer discharge recommendations to OT.? Will complete orders.

## 2023-10-04 NOTE — PLAN OF CARE
Goal Outcome Evaluation:      Plan of Care Reviewed With: patient    Overall Patient Progress: improvingOverall Patient Progress: improving    Outcome Evaluation: Anticipate patient will discharge home with homecare.

## 2023-10-04 NOTE — PLAN OF CARE
Goal Outcome Evaluation:      Plan of Care Reviewed With: patient    Overall Patient Progress: improvingOverall Patient Progress: improving    Outcome Evaluation: bedside paracentesis done, A&Ox4, took dose of lactulose    A&Ox4 (sometimes needs help with day, but knew month and year). Tolerating regular diet. Ambulating in halls with SBA- pt refused bed alarm. Denies N/V and pain. Skin jaundiced with generalized scabs. 2 BM today, voiding jackson urine. RA- CANAS. LS clear. K replaced orally, recheck pending. R & L PIV-SL. 100mL albumin given

## 2023-10-04 NOTE — PLAN OF CARE
Goal Outcome Evaluation:Admitted/transferred from:   2 RN full   skin assessment completed by Mariya Gamino, RN and Reginaldo Anne RN    Skin assessment finding: Noted multiple dry scabs to bilat UEs and bilat. LEs. Reports ongoing itchiness,scratches skin,Area right trochanter,right shin,lateral with removed scab,opened, no apparent bleeding,covered with band aid.Color jaundice.  Interventions/actions: New band aid applied to open scab.Encourage to turn self, and Report increased itchiness,and/or new area of bleed from scabs.    Bedside Emergency Equipment Present:  Suction Regulator: Yes  Suction Canister: Yes  Tubing between Regulator and Canister: Yes  O2 Regulator with Tree: Yes  Ambu Bag: Yes  Patient arrived to floor approx 2300.Denied pain.Alert to situation,place,forgetful to date/year.Correct answer to 2 of 3 math addition questions.Response clear.Tolerated food/fluid intake.Void dark tea 80ml.med loose stool X 1 after arrival to room.Continua to monitor .

## 2023-10-05 ENCOUNTER — APPOINTMENT (OUTPATIENT)
Dept: OCCUPATIONAL THERAPY | Facility: CLINIC | Age: 43
DRG: 442 | End: 2023-10-05
Payer: COMMERCIAL

## 2023-10-05 VITALS
SYSTOLIC BLOOD PRESSURE: 142 MMHG | BODY MASS INDEX: 32.5 KG/M2 | HEART RATE: 104 BPM | TEMPERATURE: 98.8 F | HEIGHT: 69 IN | WEIGHT: 219.4 LBS | OXYGEN SATURATION: 100 % | RESPIRATION RATE: 16 BRPM | DIASTOLIC BLOOD PRESSURE: 77 MMHG

## 2023-10-05 LAB
ALBUMIN SERPL BCG-MCNC: 3.4 G/DL (ref 3.5–5.2)
ALP SERPL-CCNC: 188 U/L (ref 40–129)
ALT SERPL W P-5'-P-CCNC: 102 U/L (ref 0–70)
ANION GAP SERPL CALCULATED.3IONS-SCNC: 11 MMOL/L (ref 7–15)
AST SERPL W P-5'-P-CCNC: 122 U/L (ref 0–45)
BILIRUB SERPL-MCNC: 18.3 MG/DL
BUN SERPL-MCNC: 31.7 MG/DL (ref 6–20)
CALCIUM SERPL-MCNC: 8.9 MG/DL (ref 8.6–10)
CHLORIDE SERPL-SCNC: 96 MMOL/L (ref 98–107)
CREAT SERPL-MCNC: 1.66 MG/DL (ref 0.67–1.17)
DEPRECATED HCO3 PLAS-SCNC: 22 MMOL/L (ref 22–29)
EGFRCR SERPLBLD CKD-EPI 2021: 52 ML/MIN/1.73M2
GLUCOSE SERPL-MCNC: 94 MG/DL (ref 70–99)
HOLD SPECIMEN: NORMAL
INR PPP: 1.44 (ref 0.85–1.15)
POTASSIUM SERPL-SCNC: 3.5 MMOL/L (ref 3.4–5.3)
PROT SERPL-MCNC: ABNORMAL G/DL
SODIUM SERPL-SCNC: 129 MMOL/L (ref 135–145)

## 2023-10-05 PROCEDURE — 97530 THERAPEUTIC ACTIVITIES: CPT | Mod: GO

## 2023-10-05 PROCEDURE — 85610 PROTHROMBIN TIME: CPT | Performed by: INTERNAL MEDICINE

## 2023-10-05 PROCEDURE — 36415 COLL VENOUS BLD VENIPUNCTURE: CPT | Performed by: INTERNAL MEDICINE

## 2023-10-05 PROCEDURE — 82040 ASSAY OF SERUM ALBUMIN: CPT | Performed by: INTERNAL MEDICINE

## 2023-10-05 PROCEDURE — 99239 HOSP IP/OBS DSCHRG MGMT >30: CPT | Mod: GC | Performed by: INTERNAL MEDICINE

## 2023-10-05 PROCEDURE — 250N000013 HC RX MED GY IP 250 OP 250 PS 637: Performed by: STUDENT IN AN ORGANIZED HEALTH CARE EDUCATION/TRAINING PROGRAM

## 2023-10-05 RX ORDER — LACTULOSE 10 G/10G
20 SOLUTION ORAL 3 TIMES DAILY
Qty: 30 EACH | Refills: 0 | COMMUNITY
Start: 2023-10-05 | End: 2023-10-05

## 2023-10-05 RX ORDER — LACTULOSE 10 G/15ML
20 SOLUTION ORAL
Qty: 946 ML | Refills: 3 | Status: ON HOLD | OUTPATIENT
Start: 2023-10-05 | End: 2023-10-15

## 2023-10-05 RX ORDER — LACTULOSE 10 G/10G
20 SOLUTION ORAL 3 TIMES DAILY
Qty: 60 EACH | Refills: 0 | Status: SHIPPED | OUTPATIENT
Start: 2023-10-05 | End: 2023-10-13

## 2023-10-05 RX ORDER — LACTULOSE 10 G/15ML
20 SOLUTION ORAL
Qty: 237 ML | Refills: 3 | COMMUNITY
Start: 2023-10-05 | End: 2023-10-05

## 2023-10-05 RX ADMIN — PANTOPRAZOLE SODIUM 40 MG: 40 TABLET, DELAYED RELEASE ORAL at 07:52

## 2023-10-05 RX ADMIN — SERTRALINE HYDROCHLORIDE 50 MG: 50 TABLET ORAL at 07:52

## 2023-10-05 RX ADMIN — LACTULOSE 20 G: 20 SOLUTION ORAL at 08:00

## 2023-10-05 RX ADMIN — RIFAXIMIN 550 MG: 550 TABLET ORAL at 07:52

## 2023-10-05 ASSESSMENT — ACTIVITIES OF DAILY LIVING (ADL)
ADLS_ACUITY_SCORE: 23

## 2023-10-05 NOTE — PLAN OF CARE
"BP (!) 142/77 (BP Location: Left arm)   Pulse 104   Temp 98.8  F (37.1  C) (Oral)   Resp 16   Ht 1.753 m (5' 9\")   Wt 99.5 kg (219 lb 6.4 oz)   SpO2 100%   BMI 32.40 kg/m       Problem: Plan of Care - These are the overarching goals to be used throughout the patient stay.    Goal: Plan of Care Review  Description: The Plan of Care Review/Shift note should be completed every shift.  The Outcome Evaluation is a brief statement about your assessment that the patient is improving, declining, or no change.  This information will be displayed automatically on your shift note.  Outcome: Adequate for Care Transition     Goal Outcome Evaluation: AVS printed and signed. Education complete. No further questions. Pt discharged home.       "

## 2023-10-05 NOTE — PLAN OF CARE
Goal Outcome Evaluation:      Plan of Care Reviewed With: patient    Overall Patient Progress: improvingOverall Patient Progress: improving    Outcome Evaluation: Pt AOx4, cooperative with cares. denies chest pain, on RA. VSS.  on [place] protocol. up adl ib. continue POC

## 2023-10-05 NOTE — DISCHARGE SUMMARY
"[facility]  Discharge Summary - Medicine & Pediatrics       Date of Admission:  10/3/2023  Date of Discharge:  10/5/2023 10:10 AM  Discharging Provider: Dr. Sandrine Crowe  Discharge Service: Medicine Service, [facility] TEAM 5    Discharge Diagnoses   Hepatic encephalopathy  Alcoholic cirrhosis  Recurrent ascites  CKD    Clinically Significant Risk Factors     # Obesity: Estimated body mass index is 32.4 kg/m  as calculated from the following:    Height as of this encounter: 1.753 m (5' 9\").    Weight as of this encounter: 99.5 kg (219 lb 6.4 oz).       Follow-ups Needed After Discharge   Follow-up Appointments     Follow Up ([facility])      Follow up with your primary care provider as needed.    Appointments on [place] and/or [facility] (with [facility] or [facility]   provider or service). Call [phone] if you haven't heard regarding   these appointments within 7 days of discharge.          Unresulted Labs Ordered in the Past 30 Days of this Admission       Date and Time Order Name Status Description    10/4/2023  7:42 AM Ascites Fluid Aerobic Bacterial Culture Routine Preliminary     10/3/2023  2:47 PM Blood Culture Hand, Left Preliminary     10/3/2023  1:52 PM Blood Culture Peripheral Blood Preliminary         These results will be followed up by on-call hospitalist.    Discharge Disposition   Discharged to home  Condition at discharge: Stable    Hospital Course   Elroy Morel Sr. was admitted on 10/3/2023 for hepatic encephalopathy.  The following problems were addressed during his hospitalization:    Hepatic encephalopathy  Presented with acute onset of confusion in the context of cirrhosis with multiple recent hospitalizations for complications thereof. Labs on admission actually significantly improved from his recent hospital discharge, however his ammonia level was higher than previously. Given his clinical exam and symptoms, most likely cause of his " confusion was hepatic encephalopathy. Evaluated for infection: CXR unrevealing, blood cultures NGTD, UA not suggestive of UTI. CT head negative for intracranial pathology. Covered for SBP with ceftriaxone initially, paracentesis performed 10/4 and cell counts did not suggest SBP. Stopped ceftriaxone. Mental status much clearer after treating with more frequent lactulose, reports that his bowel movement count had been dropping off over the days leading up to admission. He thinks he needs more lactulose going forward. Made a plan to titrate lactulose intake to goal of 4-5 bowel movements per day, more if needed to keep mental status clear.  -Follow-up with PCP as needed    CKD  Creatinine improved significantly since recent hospitalization, Cr 1.66 on day of discharge, though cystatin C GFR was 28.    Aspirin use:  I can't find a compelling indication for his daily aspirin-- GI told him to stop it a few weeks ago given his bleeding risk.  -Stopped aspirin on admission    Consultations This Hospital Stay   PHYSICAL THERAPY ADULT IP CONSULT  OCCUPATIONAL THERAPY ADULT IP CONSULT  INTERNAL MEDICINE PROCEDURE TEAM ADULT IP CONSULT [facility] - PARACENTESIS  CARE MANAGEMENT / SOCIAL WORK IP CONSULT    Code Status   Prior       The patient was discussed with Dr. Ramos.    Mike Deluca MD  [address] [address]  Phone: [phone]  ______________________________________________________________________    Physical Exam   Vital Signs: Temp: 98.8  F (37.1  C) Temp src: Oral BP: (!) 142/77 Pulse: 104   Resp: 16 SpO2: 100 % O2 Device: None (Room air)    Weight: 219 lbs 6.4 oz    General Appearance: Awake, responds to questions and commands  Eyes: sclerae icteric  HEENT: MMM  Respiratory: Breathing comfortably in room air, CTAB  Cardiovascular: RRR, no murmurs, warm and well-perfused  GI: Abdomen less distended today, soft, no tenderness to  palpation  Skin: Jaundiced  Musculoskeletal: Extremities grossly normal  Neurologic: Awake, much more oriented today, cranial nerves grossly intact, Moves all extremities appropriately. No asterixis.      Primary Care Physician   Latosha Baires    Discharge Orders      Reason for your hospital stay    You were in the hospital for hepatic encephalopathy-- confusion caused by buildup of waste products in the blood resulting from your liver disease. Please keep taking lactulose at home to prevent confusion. Your goal should be 4-5 soft stools daily, take enough lactulose to achieve this goal. You might need to change the frequency to make this happen. We did not find an infection in your belly or anywhere else, so no need to go home on antibiotics. See your primary care provider as needed if new symptoms arise.     Activity    Your activity upon discharge: activity as tolerated     Follow Up ([facility])    Follow up with your primary care provider as needed.    Appointments on [place] and/or [facility] (with [facility] or [facility] provider or service). Call [phone] if you haven't heard regarding these appointments within 7 days of discharge.     Diet    Follow this diet upon discharge: Orders Placed This Encounter      Combination Diet Regular Diet Adult; 2 gm NA Diet       Significant Results and Procedures   Results for orders placed or performed during the hospital encounter of 10/03/23   XR Chest Port 1 View    Narrative    Exam: XR CHEST PORT 1 VIEW, 10/3/2023 5:04 PM    Comparison: Chest radiograph 9/3/2023    History: r/o pna    Findings:  Portable AP view of the chest, supine.      Heart size within normal limits. Stable low lung volumes with  accentuation of the central pulmonary vasculature and interstitium. No  appreciable pleural effusion or pneumothorax. No acute or aggressive  osseous abnormalities. Partially visualized upper abdomen is  unremarkable.       Impression    Impression: Continued low lung  volumes without convincing evidence of  acute airspace disease.     I have personally reviewed the examination and initial interpretation  and I agree with the findings.    MARSHA CABRERA DO         SYSTEM ID:  [id number]   CT Head w/o Contrast    Narrative    CT HEAD W/O CONTRAST 10/3/2023 5:26 PM    History: AMS in liver failure   ICD-10:    Comparison: None    Technique: Using multidetector thin collimation helical acquisition  technique, axial, coronal and sagittal CT images from the skull base  to the vertex were obtained without intravenous contrast.   (topogram) image(s) also obtained and reviewed.    Findings: There is no intracranial hemorrhage, mass effect, or midline  shift. Gray/white matter differentiation in both cerebral hemispheres  is preserved. Ventricles are proportionate to the cerebral sulci. The  basal cisterns are clear.    The bony calvaria and the bones of the skull base are normal. The  visualized portions of the paranasal sinuses and mastoid air cells are  clear.      Impression    Impression:  No acute intracranial pathology.     I have personally reviewed the examination and initial interpretation  and I agree with the findings.    HUGO NAVARRO MD         SYSTEM ID:  [id number]   POC US GUIDE FOR PARACENTESIS    Impression    US Indication: abdominal distension    Limited abdominal ultrasound was performed and demonstrated an adequate fluid collection on the right side of the abdomen.     Doppler of the skin demonstrated an area at this site without significant vasculature.  A paracentesis at this site was subsequently performed.    ELLY TORRES MD        Discharge Medications   Discharge Medication List as of 10/5/2023  9:05 AM        CONTINUE these medications which have CHANGED    Details   lactulose (CEPHULAC) 10 GM packet Take 2 packets (20 g) by mouth 3 times daily Take throughout the day with a goal of 4-5 soft stools per day. Take more if you feel confusion coming on., Disp-60  each, R-0, E-Prescribe      lactulose (CHRONULAC) 10 GM/15ML solution 30 mLs (20 g) by Oral or NG Tube route every 3 hours as needed (Hepatic Encephalopathy prevention) Take throughout the day with a goal of 4-5 soft stools per day. Take more if you feel confusion coming on., Disp-946 mL, R-3, E-Prescribe           CONTINUE these medications which have NOT CHANGED    Details   diphenhydrAMINE (BENADRYL) 25 MG capsule Take 1 capsule (25 mg) by mouth every 6 hours as needed for itching, Disp-60 capsule, R-3, E-Prescribe      gabapentin (NEURONTIN) 100 MG capsule Take 1 capsule (100 mg) by mouth At Bedtime for 30 days, Disp-30 capsule, R-0, E-Prescribe      melatonin 3 MG tablet Take 3 mg by mouth nightly as needed for sleep, Historical      pantoprazole (PROTONIX) 40 MG EC tablet Take 1 tablet (40 mg) by mouth daily, Disp-90 tablet, R-0, E-Prescribe      rifaximin (XIFAXAN) 550 MG TABS tablet 1 tablet (550 mg) by Oral or Feeding Tube route 2 times daily, Disp-60 tablet, R-3, E-Prescribe      sertraline (ZOLOFT) 50 MG tablet Take 1 tablet (50 mg) by mouth daily, Disp-60 tablet, R-3, E-Prescribe      sodium bicarbonate 650 MG tablet Take 2 tablets (1,300 mg) by mouth 2 times daily, Disp-120 tablet, R-3, E-Prescribe           STOP taking these medications       aspirin 81 MG EC tablet Comments:   Reason for Stopping:         predniSONE (DELTASONE) 20 MG tablet Comments:   Reason for Stopping:             Allergies   Allergies   Allergen Reactions    Metronidazole Other (See Comments), Dizziness and Unknown    Omeprazole Other (See Comments) and Unknown     Irritability (tolerates Protonix well)

## 2023-10-05 NOTE — PLAN OF CARE
Occupational Therapy Discharge Summary    Reason for therapy discharge:    Discharged to home.    Progress towards therapy goal(s). See goals on Care Plan in [facility] electronic health record for goal details.  Goals partially met.  Barriers to achieving goals:   discharge from facility.    Therapy recommendation(s):    Continued therapy is recommended.  Rationale/Recommendations:  Continue to address cognitive deficits in OP OT.

## 2023-10-05 NOTE — PROGRESS NOTES
Shift [id number]  Activity: Ind   Neuros: A &O X4  Cardiac: WNL, denies chest pain  Respiratory: WNL. No SOB or resp distress noted, on R/A   GI/: 2 loose stool is on lactulose   Diet: regular 2GM NA   Skin: have paracentesis site right lower abd, dressing CDI  Lines: (R) PIV   Incisions/Drains: no drains  Labs: on K replacement   Pain/nausea: denies pain and feeling nauseated   Plan: [place] protocol, monitoring bowel movement and pain mgmt

## 2023-10-08 LAB
BACTERIA BLD CULT: NO GROWTH
BACTERIA BLD CULT: NO GROWTH

## 2023-10-09 LAB — BACTERIA FLD CULT: NO GROWTH

## 2023-10-13 ENCOUNTER — APPOINTMENT (OUTPATIENT)
Dept: CT IMAGING | Facility: CLINIC | Age: 43
DRG: 442 | End: 2023-10-13
Attending: EMERGENCY MEDICINE
Payer: COMMERCIAL

## 2023-10-13 ENCOUNTER — HOSPITAL ENCOUNTER (INPATIENT)
Facility: CLINIC | Age: 43
LOS: 3 days | Discharge: HOME | DRG: 442 | End: 2023-10-16
Attending: EMERGENCY MEDICINE | Admitting: STUDENT IN AN ORGANIZED HEALTH CARE EDUCATION/TRAINING PROGRAM
Payer: COMMERCIAL

## 2023-10-13 DIAGNOSIS — I85.10 SECONDARY ESOPHAGEAL VARICES WITHOUT BLEEDING (H): ICD-10-CM

## 2023-10-13 DIAGNOSIS — K72.90 DECOMPENSATED HEPATIC CIRRHOSIS (H): ICD-10-CM

## 2023-10-13 DIAGNOSIS — K74.60 DECOMPENSATED HEPATIC CIRRHOSIS (H): ICD-10-CM

## 2023-10-13 DIAGNOSIS — K70.31 ALCOHOLIC CIRRHOSIS OF LIVER WITH ASCITES (H): ICD-10-CM

## 2023-10-13 DIAGNOSIS — Z11.52 ENCOUNTER FOR SCREENING LABORATORY TESTING FOR SEVERE ACUTE RESPIRATORY SYNDROME CORONAVIRUS 2 (SARS-COV-2): ICD-10-CM

## 2023-10-13 DIAGNOSIS — N17.9 AKI (ACUTE KIDNEY INJURY) (H): ICD-10-CM

## 2023-10-13 DIAGNOSIS — K76.82 HEPATIC ENCEPHALOPATHY (H): ICD-10-CM

## 2023-10-13 DIAGNOSIS — F10.20 ALCOHOL USE DISORDER, SEVERE, DEPENDENCE (H): Primary | ICD-10-CM

## 2023-10-13 LAB
ABSOLUTE NEUTROPHILS, BODY FLUID: 15.4 /UL
ALBUMIN BODY FLUID SOURCE: NORMAL
ALBUMIN FLD-MCNC: 0.4 G/DL
ALBUMIN SERPL BCG-MCNC: 3.1 G/DL (ref 3.5–5.2)
ALBUMIN SERPL BCG-MCNC: 3.1 G/DL (ref 3.5–5.2)
ALP SERPL-CCNC: 256 U/L (ref 40–129)
ALT SERPL W P-5'-P-CCNC: 57 U/L (ref 0–70)
AMMONIA PLAS-SCNC: 111 UMOL/L (ref 16–60)
ANION GAP SERPL CALCULATED.3IONS-SCNC: 16 MMOL/L (ref 7–15)
APPEARANCE FLD: ABNORMAL
AST SERPL W P-5'-P-CCNC: 86 U/L (ref 0–45)
B-OH-BUTYR SERPL-SCNC: NORMAL MMOL/L
BASO+EOS+MONOS # BLD AUTO: ABNORMAL 10*3/UL
BASO+EOS+MONOS NFR BLD AUTO: ABNORMAL %
BASOPHILS # BLD AUTO: 0.1 10E3/UL (ref 0–0.2)
BASOPHILS NFR BLD AUTO: 1 %
BILIRUB SERPL-MCNC: 15.1 MG/DL
BUN SERPL-MCNC: 36.4 MG/DL (ref 6–20)
CALCIUM SERPL-MCNC: 9.1 MG/DL (ref 8.6–10)
CELL COUNT BODY FLUID SOURCE: ABNORMAL
CHLORIDE SERPL-SCNC: 93 MMOL/L (ref 98–107)
COLOR FLD: YELLOW
CREAT SERPL-MCNC: 2.26 MG/DL (ref 0.67–1.17)
DEPRECATED HCO3 PLAS-SCNC: 16 MMOL/L (ref 22–29)
EGFRCR SERPLBLD CKD-EPI 2021: 36 ML/MIN/1.73M2
EOSINOPHIL # BLD AUTO: 0.1 10E3/UL (ref 0–0.7)
EOSINOPHIL NFR BLD AUTO: 1 %
ERYTHROCYTE [DISTWIDTH] IN BLOOD BY AUTOMATED COUNT: 17.2 % (ref 10–15)
ETHANOL SERPL-MCNC: <0.01 G/DL
FLUAV RNA SPEC QL NAA+PROBE: NEGATIVE
FLUBV RNA RESP QL NAA+PROBE: NEGATIVE
GLUCOSE SERPL-MCNC: 131 MG/DL (ref 70–99)
GRAM STAIN RESULT: NORMAL
GRAM STAIN RESULT: NORMAL
HCT VFR BLD AUTO: 34.2 % (ref 40–53)
HGB BLD-MCNC: 11.4 G/DL (ref 13.3–17.7)
HOLD SPECIMEN: NORMAL
IMM GRANULOCYTES # BLD: 0.1 10E3/UL
IMM GRANULOCYTES NFR BLD: 1 %
INR PPP: 1.53 (ref 0.85–1.15)
LIPASE SERPL-CCNC: 162 U/L (ref 13–60)
LYMPHOCYTES # BLD AUTO: 1.5 10E3/UL (ref 0.8–5.3)
LYMPHOCYTES NFR BLD AUTO: 11 %
LYMPHOCYTES NFR FLD MANUAL: 35 %
MAGNESIUM SERPL-MCNC: 1.9 MG/DL (ref 1.7–2.3)
MCH RBC QN AUTO: 34 PG (ref 26.5–33)
MCHC RBC AUTO-ENTMCNC: 33.3 G/DL (ref 31.5–36.5)
MCV RBC AUTO: 102 FL (ref 78–100)
MONOCYTES # BLD AUTO: 1.8 10E3/UL (ref 0–1.3)
MONOCYTES NFR BLD AUTO: 14 %
MONOS+MACROS NFR FLD MANUAL: NORMAL %
NEUTROPHILS # BLD AUTO: 9.8 10E3/UL (ref 1.6–8.3)
NEUTROPHILS NFR BLD AUTO: 72 %
NEUTS BAND NFR FLD MANUAL: 12 %
NRBC # BLD AUTO: 0 10E3/UL
NRBC BLD AUTO-RTO: 0 /100
OSMOLALITY SERPL: 284 MMOL/KG (ref 275–295)
OTHER CELLS FLD MANUAL: 53 %
PLATELET # BLD AUTO: 152 10E3/UL (ref 150–450)
POTASSIUM SERPL-SCNC: 3.8 MMOL/L (ref 3.4–5.3)
PROT FLD-MCNC: 0.7 G/DL
PROT SERPL-MCNC: 7.5 G/DL (ref 6.4–8.3)
PROTEIN BODY FLUID SOURCE: NORMAL
RBC # BLD AUTO: 3.35 10E6/UL (ref 4.4–5.9)
RSV RNA SPEC NAA+PROBE: NEGATIVE
SARS-COV-2 RNA RESP QL NAA+PROBE: NEGATIVE
SODIUM SERPL-SCNC: 125 MMOL/L (ref 135–145)
WBC # BLD AUTO: 13.5 10E3/UL (ref 4–11)
WBC # FLD AUTO: 128 /UL

## 2023-10-13 PROCEDURE — 82140 ASSAY OF AMMONIA: CPT | Performed by: EMERGENCY MEDICINE

## 2023-10-13 PROCEDURE — 250N000013 HC RX MED GY IP 250 OP 250 PS 637: Performed by: EMERGENCY MEDICINE

## 2023-10-13 PROCEDURE — 89051 BODY FLUID CELL COUNT: CPT | Performed by: EMERGENCY MEDICINE

## 2023-10-13 PROCEDURE — 82010 KETONE BODYS QUAN: CPT | Performed by: EMERGENCY MEDICINE

## 2023-10-13 PROCEDURE — 87070 CULTURE OTHR SPECIMN AEROBIC: CPT | Performed by: EMERGENCY MEDICINE

## 2023-10-13 PROCEDURE — 87040 BLOOD CULTURE FOR BACTERIA: CPT | Performed by: EMERGENCY MEDICINE

## 2023-10-13 PROCEDURE — 85610 PROTHROMBIN TIME: CPT | Performed by: EMERGENCY MEDICINE

## 2023-10-13 PROCEDURE — 250N000013 HC RX MED GY IP 250 OP 250 PS 637: Performed by: STUDENT IN AN ORGANIZED HEALTH CARE EDUCATION/TRAINING PROGRAM

## 2023-10-13 PROCEDURE — 82803 BLOOD GASES ANY COMBINATION: CPT

## 2023-10-13 PROCEDURE — 99222 1ST HOSP IP/OBS MODERATE 55: CPT | Mod: GC | Performed by: STUDENT IN AN ORGANIZED HEALTH CARE EDUCATION/TRAINING PROGRAM

## 2023-10-13 PROCEDURE — 83690 ASSAY OF LIPASE: CPT | Performed by: EMERGENCY MEDICINE

## 2023-10-13 PROCEDURE — P9047 ALBUMIN (HUMAN), 25%, 50ML: HCPCS | Performed by: STUDENT IN AN ORGANIZED HEALTH CARE EDUCATION/TRAINING PROGRAM

## 2023-10-13 PROCEDURE — 83735 ASSAY OF MAGNESIUM: CPT | Performed by: STUDENT IN AN ORGANIZED HEALTH CARE EDUCATION/TRAINING PROGRAM

## 2023-10-13 PROCEDURE — 99291 CRITICAL CARE FIRST HOUR: CPT | Performed by: EMERGENCY MEDICINE

## 2023-10-13 PROCEDURE — 82042 OTHER SOURCE ALBUMIN QUAN EA: CPT | Performed by: EMERGENCY MEDICINE

## 2023-10-13 PROCEDURE — 83930 ASSAY OF BLOOD OSMOLALITY: CPT | Performed by: EMERGENCY MEDICINE

## 2023-10-13 PROCEDURE — 120N000011 HC R&B TRANSPLANT UMMC

## 2023-10-13 PROCEDURE — 87637 SARSCOV2&INF A&B&RSV AMP PRB: CPT | Performed by: EMERGENCY MEDICINE

## 2023-10-13 PROCEDURE — 250N000011 HC RX IP 250 OP 636: Performed by: STUDENT IN AN ORGANIZED HEALTH CARE EDUCATION/TRAINING PROGRAM

## 2023-10-13 PROCEDURE — 87205 SMEAR GRAM STAIN: CPT | Performed by: EMERGENCY MEDICINE

## 2023-10-13 PROCEDURE — 80053 COMPREHEN METABOLIC PANEL: CPT | Performed by: EMERGENCY MEDICINE

## 2023-10-13 PROCEDURE — 82077 ASSAY SPEC XCP UR&BREATH IA: CPT | Performed by: EMERGENCY MEDICINE

## 2023-10-13 PROCEDURE — 36415 COLL VENOUS BLD VENIPUNCTURE: CPT | Performed by: EMERGENCY MEDICINE

## 2023-10-13 PROCEDURE — 85025 COMPLETE CBC W/AUTO DIFF WBC: CPT | Performed by: EMERGENCY MEDICINE

## 2023-10-13 PROCEDURE — 80321 ALCOHOLS BIOMARKERS 1OR 2: CPT | Performed by: STUDENT IN AN ORGANIZED HEALTH CARE EDUCATION/TRAINING PROGRAM

## 2023-10-13 PROCEDURE — 999N000127 HC STATISTIC PERIPHERAL IV START W US GUIDANCE

## 2023-10-13 PROCEDURE — 87075 CULTR BACTERIA EXCEPT BLOOD: CPT | Performed by: EMERGENCY MEDICINE

## 2023-10-13 PROCEDURE — 70450 CT HEAD/BRAIN W/O DYE: CPT | Mod: 26 | Performed by: RADIOLOGY

## 2023-10-13 PROCEDURE — 250N000009 HC RX 250: Performed by: EMERGENCY MEDICINE

## 2023-10-13 PROCEDURE — 70450 CT HEAD/BRAIN W/O DYE: CPT

## 2023-10-13 PROCEDURE — 258N000003 HC RX IP 258 OP 636: Performed by: EMERGENCY MEDICINE

## 2023-10-13 PROCEDURE — 84157 ASSAY OF PROTEIN OTHER: CPT | Performed by: EMERGENCY MEDICINE

## 2023-10-13 PROCEDURE — 99291 CRITICAL CARE FIRST HOUR: CPT | Mod: 25 | Performed by: EMERGENCY MEDICINE

## 2023-10-13 RX ORDER — FOLIC ACID 1 MG/1
1 TABLET ORAL DAILY
Status: DISCONTINUED | OUTPATIENT
Start: 2023-10-13 | End: 2023-10-16 | Stop reason: HOSPADM

## 2023-10-13 RX ORDER — ALBUTEROL SULFATE 90 UG/1
AEROSOL, METERED RESPIRATORY (INHALATION)
COMMUNITY
End: 2023-11-28

## 2023-10-13 RX ORDER — ONDANSETRON 2 MG/ML
4 INJECTION INTRAMUSCULAR; INTRAVENOUS EVERY 6 HOURS PRN
Status: DISCONTINUED | OUTPATIENT
Start: 2023-10-13 | End: 2023-10-16 | Stop reason: HOSPADM

## 2023-10-13 RX ORDER — SODIUM BICARBONATE 650 MG/1
2 TABLET ORAL 2 TIMES DAILY
Status: ON HOLD | COMMUNITY
End: 2023-10-16

## 2023-10-13 RX ORDER — DIPHENHYDRAMINE HCL 25 MG
25 CAPSULE ORAL EVERY 6 HOURS PRN
Status: DISCONTINUED | OUTPATIENT
Start: 2023-10-13 | End: 2023-10-16 | Stop reason: HOSPADM

## 2023-10-13 RX ORDER — ONDANSETRON 4 MG/1
4 TABLET, ORALLY DISINTEGRATING ORAL EVERY 6 HOURS PRN
Status: DISCONTINUED | OUTPATIENT
Start: 2023-10-13 | End: 2023-10-16 | Stop reason: HOSPADM

## 2023-10-13 RX ORDER — LACTULOSE 20 G/20G
20 POWDER, FOR SOLUTION ORAL
Status: ON HOLD | COMMUNITY
Start: 2023-10-05 | End: 2023-10-15

## 2023-10-13 RX ORDER — LACTULOSE 10 G/15ML
20 SOLUTION ORAL
Status: DISCONTINUED | OUTPATIENT
Start: 2023-10-13 | End: 2023-10-13

## 2023-10-13 RX ORDER — THIAMINE HYDROCHLORIDE 100 MG/ML
100 INJECTION, SOLUTION INTRAMUSCULAR; INTRAVENOUS DAILY
Status: DISCONTINUED | OUTPATIENT
Start: 2023-10-13 | End: 2023-10-16 | Stop reason: HOSPADM

## 2023-10-13 RX ORDER — LACTULOSE 10 G/15ML
20 SOLUTION ORAL
Status: DISCONTINUED | OUTPATIENT
Start: 2023-10-13 | End: 2023-10-14

## 2023-10-13 RX ORDER — CHOLESTYRAMINE LIGHT 4 G/5.7G
4 POWDER, FOR SUSPENSION ORAL
Status: ON HOLD | COMMUNITY
Start: 2023-09-23 | End: 2023-10-15

## 2023-10-13 RX ORDER — ALBUMIN (HUMAN) 12.5 G/50ML
100 SOLUTION INTRAVENOUS DAILY
Status: COMPLETED | OUTPATIENT
Start: 2023-10-13 | End: 2023-10-14

## 2023-10-13 RX ORDER — METOCLOPRAMIDE HYDROCHLORIDE 5 MG/ML
10 INJECTION INTRAMUSCULAR; INTRAVENOUS ONCE
Status: DISCONTINUED | OUTPATIENT
Start: 2023-10-13 | End: 2023-10-16

## 2023-10-13 RX ORDER — ONDANSETRON 4 MG/1
4 TABLET, ORALLY DISINTEGRATING ORAL EVERY 8 HOURS PRN
COMMUNITY
Start: 2023-08-30 | End: 2023-11-27

## 2023-10-13 RX ORDER — LIDOCAINE HYDROCHLORIDE 20 MG/ML
JELLY TOPICAL ONCE
Status: DISCONTINUED | OUTPATIENT
Start: 2023-10-13 | End: 2023-10-16 | Stop reason: HOSPADM

## 2023-10-13 RX ORDER — PANTOPRAZOLE SODIUM 40 MG/1
40 TABLET, DELAYED RELEASE ORAL DAILY
Status: DISCONTINUED | OUTPATIENT
Start: 2023-10-14 | End: 2023-10-16 | Stop reason: HOSPADM

## 2023-10-13 RX ORDER — LACTULOSE 10 G/15ML
100 SOLUTION ORAL
Status: DISCONTINUED | OUTPATIENT
Start: 2023-10-13 | End: 2023-10-14

## 2023-10-13 RX ORDER — ALBUMIN (HUMAN) 12.5 G/50ML
25 SOLUTION INTRAVENOUS DAILY
Status: CANCELLED | OUTPATIENT
Start: 2023-10-13 | End: 2023-10-15

## 2023-10-13 RX ORDER — LIDOCAINE HYDROCHLORIDE AND EPINEPHRINE 10; 10 MG/ML; UG/ML
1 INJECTION, SOLUTION INFILTRATION; PERINEURAL ONCE
Status: COMPLETED | OUTPATIENT
Start: 2023-10-13 | End: 2023-10-13

## 2023-10-13 RX ORDER — GABAPENTIN 100 MG/1
100 CAPSULE ORAL AT BEDTIME
Status: ON HOLD | COMMUNITY
End: 2023-10-15

## 2023-10-13 RX ORDER — GABAPENTIN 100 MG/1
100 CAPSULE ORAL AT BEDTIME
Status: DISCONTINUED | OUTPATIENT
Start: 2023-10-13 | End: 2023-10-16 | Stop reason: HOSPADM

## 2023-10-13 RX ORDER — LANOLIN ALCOHOL/MO/W.PET/CERES
CREAM (GRAM) TOPICAL
Status: ON HOLD | COMMUNITY
Start: 2023-08-18 | End: 2023-10-15

## 2023-10-13 RX ORDER — CALCIUM CARBONATE/VITAMIN D3 500 MG-10
TABLET,CHEWABLE ORAL
COMMUNITY
Start: 2023-09-01 | End: 2024-01-08

## 2023-10-13 RX ORDER — FOLIC ACID 5 MG/ML
1 INJECTION, SOLUTION INTRAMUSCULAR; INTRAVENOUS; SUBCUTANEOUS DAILY
Status: DISCONTINUED | OUTPATIENT
Start: 2023-10-13 | End: 2023-10-16 | Stop reason: HOSPADM

## 2023-10-13 RX ORDER — LACTULOSE 10 G/15ML
100 SOLUTION ORAL
Status: DISCONTINUED | OUTPATIENT
Start: 2023-10-13 | End: 2023-10-13

## 2023-10-13 RX ORDER — HYDROXYZINE HYDROCHLORIDE 25 MG/1
25 TABLET, FILM COATED ORAL EVERY 6 HOURS PRN
COMMUNITY
Start: 2023-09-23 | End: 2023-11-28

## 2023-10-13 RX ADMIN — FOLIC ACID 1 MG: 1 TABLET ORAL at 18:39

## 2023-10-13 RX ADMIN — THIAMINE HCL TAB 100 MG 100 MG: 100 TAB at 18:39

## 2023-10-13 RX ADMIN — LACTULOSE 20 G: 20 SOLUTION ORAL at 22:05

## 2023-10-13 RX ADMIN — ALBUMIN HUMAN 100 G: 0.25 SOLUTION INTRAVENOUS at 18:38

## 2023-10-13 RX ADMIN — LACTULOSE 20 G: 20 SOLUTION ORAL at 15:10

## 2023-10-13 RX ADMIN — LIDOCAINE HYDROCHLORIDE AND EPINEPHRINE 1 ML: 10; 10 INJECTION, SOLUTION INFILTRATION; PERINEURAL at 15:30

## 2023-10-13 RX ADMIN — LACTULOSE 20 G: 20 SOLUTION ORAL at 19:20

## 2023-10-13 RX ADMIN — SODIUM CHLORIDE, POTASSIUM CHLORIDE, SODIUM LACTATE AND CALCIUM CHLORIDE 500 ML: 600; 310; 30; 20 INJECTION, SOLUTION INTRAVENOUS at 16:56

## 2023-10-13 RX ADMIN — LACTULOSE 20 G: 20 SOLUTION ORAL at 17:14

## 2023-10-13 RX ADMIN — RIFAXIMIN 550 MG: 550 TABLET ORAL at 20:19

## 2023-10-13 ASSESSMENT — ACTIVITIES OF DAILY LIVING (ADL)
ADLS_ACUITY_SCORE: 35

## 2023-10-13 NOTE — PROGRESS NOTES
Vascular Access Services Notes:    Pt was unavailable for PIV placement. VAS to try again later.        Jericho Valdez, LIZAN, RN [facility]

## 2023-10-13 NOTE — ED NOTES
Bed: UUED-E  Expected date:   Expected time:   Means of arrival:   Comments:  SP17  Jaundice, weak, altered mental status, advanced cancer

## 2023-10-13 NOTE — CONSULTS
GASTROENTEROLOGY CONSULTATION      Date of Admission:  10/13/2023          ASSESSMENT AND RECOMMENDATIONS:     43 year old male with a history of alcohol use disorder, alcohol-related decompensated cirrhosis and alcohol hepatitis complicated by JOCELYN, ascites, hepatic encephalopathy presents again with encephalopathy.  Hepatology consulted for further evaluation.    #Altered mental status  #Alcohol related liver disease  #Alcoholic hepatitis  #Ascites  #JOCELYN  #Hyponatremia    MELD 3.0: 33 at 10/13/2023  1:20 PM  MELD-Na: 33 at 10/13/2023  1:20 PM  Calculated from:  Serum Creatinine: 2.26 mg/dL at 10/13/2023  1:20 PM  Serum Sodium: 125 mmol/L at 10/13/2023  1:20 PM  Total Bilirubin: 15.1 mg/dL at 10/13/2023  1:20 PM  Serum Albumin: 3.1 g/dL at 10/13/2023  1:20 PM  INR(ratio): 1.53 at 10/13/2023  1:20 PM  Age at listing (hypothetical): 43 years  Sex: Male at 10/13/2023  1:20 PM      Follows with Kathleen Muller at [facility].    Presents with worsening encephalopathy despite aggressive lactulose treatment at home, not missing doses, and meeting bowel movement goals.     This episode of altered mental status could be in the setting of hepatic encephalopathy (triggered by infection vs GI bleed) vs metabolic encephalopathy (hyponatremia vs infectious vs vitamin deficiency vs electrolytes abnormalities vs uremic). Most likely this is in the setting of dehydration and hyponatremia in the setting of ongoing diarrhea due to aggressive lactulose administration.     Patient is not a liver transplant candidate in our institution.        RECOMMENDATIONS    - Please obtain CT head.  - Recommend Diagnostic and therapeutic paracentesis. Please obtain cell count, gram stain, fluid cultures.  - Please administer albumin with paracentesis (12.5 g for every L, limit to 4 L per paracentesis).  - Recommend albumin 100 g /day for at least 2 days given JOCELYN. Do not exceed this limit.  - Recommend CXR, UA, blood cultures, stool  studies.  - Continue daily lactulose through NG tube for goal of 3-4 bowel movements daily. Avoid aggressive lactulose administration as patient is likely dehydrated. Continue rifaximin.  - Recommend PO thiamine supplementation.   - Will obtain zinc levels.   - MELD labs daily.   - Will await Peth levels.  - Due for EGD for post-banding surveillance. Will hold off at this time, monitor for signs of GI bleeding.   - Recommend low sodium diet.  - Patient is not a liver transplant candidate at our institution.   - Patient meets criteria for primary SBP prophylaxis. Recommend Ciprofloxacin (or IV Ceftriaxone while inpatient).     Gastroenterology follow up recommendations: TBD    Thank you for involving us in this patient's care. Please do not hesitate to contact the GI service with any questions or concerns.     Patient care plan discussed with Dr. Leventhal, GI staff physician.    Sage Ayers MD  Gastroenterology Fellow  Pager             Chief Complaint:   We were asked by Medicine service to evaluate this patient with altered mental status.     History is obtained from the patient and the medical record.          History of Present Illness:   Elroy Morel Sr. is a 43 year old male with a history of alcohol use disorder, alcohol-related decompensated cirrhosis and alcohol hepatitis complicated by JOCELYN, ascites, hepatic encephalopathy presents again with encephalopathy.  Hepatology consulted for further evaluation.    Patient presents with altered mental status, abdominal distention, stool incontinence, generalized weakness, accompanied with his wife.  His wife has made sure that he takes all his medications including lactulose 3 times daily every day.  For the past few days he has become more confused and lethargic, despite having multiple bowel movements daily.  He was scheduled to have a paracentesis today, gets these twice weekly.  Last 1 was done 3 days ago with no evidence of SBP, with 3 L  of ascites removed and 25 g of albumin replacement provided.  He has had a few hospitalizations recently at [facility] in the setting of hepatic encephalopathy. His wife also reports of blood coming out of his nose recently, denies any episodes of vomiting, melena, or hematochezia.     History mostly obtained from his wife as patient is very confused.             Past Medical History:   Reviewed and edited as appropriate  Past Medical History:   Diagnosis Date    Alcohol use disorder, severe, dependence (H)     Alcohol withdrawal seizure (H)     Alcoholic cirrhosis of liver with ascites (H)     Esophageal varices (H)     Essential hypertension     Gastroesophageal reflux disease without esophagitis     History of methamphetamine use     Sustained remission since 2003    Hypercholesterolemia     Tobacco abuse             Past Surgical History:   Reviewed and edited as appropriate   Past Surgical History:   Procedure Laterality Date    COLONOSCOPY N/A 8/28/2023    Procedure: COLONOSCOPY, WITH POLYPECTOMY via bx forceps;  Surgeon: Dara Tsai MD;  Location:  GI    IR PARACENTESIS  8/31/2023    IR PARACENTESIS  9/15/2023            Previous Endoscopy:     Results for orders placed or performed during the hospital encounter of 08/21/23   COLONOSCOPY   Result Value Ref Range    COLONOSCOPY       [address] [address] [phone]     Endoscopy Department  _______________________________________________________________________________  Patient Name: Elroy Morel             Procedure Date: 8/28/2023 11:09 AM  MRN: [id number]                       Account Number: [id number]  YOB: 1980              Admit Type: Inpatient  Age: 43                               Room:  #3                      Gender: Male                          Note Status: Finalized  Attending MD: DARA TSAI MD,   Total Sedation Time: 9  minutes  _______________________________________________________________________________     Procedure:             Colonoscopy  Indications:           Hematochezia  Providers:             DARA GAN MD, Leti Parra RN, Emmy Quarles RN  Referring MD:            Medicines:             Fentanyl 100 micrograms IV  Complications:          No immediate complications.  _______________________________________________________________________________  Procedure:             Pre-Anesthesia Assessment:                         - Prior to the procedure, a History and Physical was                          performed, and patient medications and allergies were                          reviewed. The patient is competent. The risks and                          benefits of the procedure and the sedation options and                          risks were discussed with the patient. All questions                          were answered and informed consent was obtained.                          Patient identification and proposed procedure were                          verified by the physician in the procedure room.                          Mental Status Examination: alert and oriented. Airway                          Examination: normal oropharyngeal airway and neck                          mobility. Respiratory Examination: clear to                           auscultation. CV Examination: normal. Prophylactic                          Antibiotics: The patient does not require prophylactic                          antibiotics. Prior Anticoagulants: The patient has                          taken no anticoagulant or antiplatelet agents. ASA                          Grade Assessment: III - A patient with severe systemic                          disease. After reviewing the risks and benefits, the                          patient was deemed in satisfactory condition to                           undergo the procedure. The anesthesia plan was to use                          moderate sedation / analgesia (conscious sedation).                          Immediately prior to administration of medications,                          the patient was re-assessed for adequacy to receive                          sedatives. The heart rate, respiratory rate, oxygen                          saturations, blood pressure, adequacy of pulmonary                           ventilation, and response to care were monitored                          throughout the procedure. The physical status of the                          patient was re-assessed after the procedure.                         After obtaining informed consent, the colonoscope was                          passed under direct vision. Throughout the procedure,                          the patient's blood pressure, pulse, and oxygen                          saturations were monitored continuously. The                          Colonoscope was introduced through the anus and                          advanced to the terminal ileum. The colonoscopy was                          performed without difficulty. The patient tolerated                          the procedure well. The quality of the bowel                          preparation was good.                                                                                   Findings:       Two sessile polyps were found in the s igmoid colon. The polyps were        diminutive in size. These polyps were removed with a cold biopsy        forceps. Resection and retrieval were complete.       Small, non-bleeding rectal varices were found.       A diffuse area of mildly erythematous mucosa was found in the sigmoid        colon.                                                                                   Impression:            - Two diminutive polyps in the sigmoid colon, removed                          with a cold biopsy  forceps. Resected and retrieved.                         - Rectal varices.                         - Erythematous mucosa in the sigmoid colon and rectum                          with come erythem, consistent with portal colopathy.                          This was the likely cause of hematochezia.                         No blood was seen.                         Terminal ileum was normal.  Recommendation:        - Await pathology results.                                                                                      electronically signed by Dara Gan  ______________________________  DARA GAN MD  8/28/2023 12:41:13 PM  I was physically present for the entire viewing portion of the exam.  __________________________  Signature of teaching physician  B4c/Isra GAN MD  Number of Addenda: 0    Note Initiated On: 8/28/2023 11:09 AM  Scope In: 12:14:28 PM  Scope Out: 12:25:25 PM              Social History:   Reviewed and edited as appropriate  Social History     Socioeconomic History    Marital status:      Spouse name: Not on file    Number of children: Not on file    Years of education: Not on file    Highest education level: Not on file   Occupational History    Not on file   Tobacco Use    Smoking status: Former     Types: Cigarettes    Smokeless tobacco: Former   Substance and Sexual Activity    Alcohol use: Not Currently     Comment: last drink 8/19    Drug use: Not Currently     Types: Amphetamines     Comment: Sustained remission    Sexual activity: Not on file   Other Topics Concern    Not on file   Social History Narrative    Pt is , 2 children. Employed.      Social Determinants of Health     Financial Resource Strain: Not on file   Food Insecurity: Not on file   Transportation Needs: Not on file   Physical Activity: Not on file   Stress: Not on file   Social Connections: Not on file   Interpersonal Safety: Not on file   Housing Stability: Not on file             Family History:   Reviewed and edited as appropriate  No known history of gastrointestinal/liver disease or  gastrointestinal malignancies       Allergies:   Reviewed and edited as appropriate     Allergies   Allergen Reactions    Metronidazole Other (See Comments), Dizziness and Unknown    Omeprazole Other (See Comments) and Unknown     Irritability (tolerates Protonix well)            Medications:     Current Facility-Administered Medications   Medication    Daily 2 GRAM acetaminophen limit, unless fulminent liver failure or transaminases greater than or equal to 300 - 400, then none    lactulose (CHRONULAC) solution 20 g    Or    lactulose (CHRONULAC) solution for enema prep 100 g    lidocaine (XYLOCAINE) 2 % external gel    lidocaine 1% with EPINEPHrine 1:100,000 injection 1 mL    metoclopramide (REGLAN) injection 10 mg    rifaximin (XIFAXAN) tablet 550 mg     Current Outpatient Medications   Medication Sig    diphenhydrAMINE (BENADRYL) 25 MG capsule Take 1 capsule (25 mg) by mouth every 6 hours as needed for itching    gabapentin (NEURONTIN) 100 MG capsule Take 1 capsule (100 mg) by mouth At Bedtime for 30 days    lactulose (CEPHULAC) 10 GM packet Take 2 packets (20 g) by mouth 3 times daily Take throughout the day with a goal of 4-5 soft stools per day. Take more if you feel confusion coming on.    lactulose (CHRONULAC) 10 GM/15ML solution 30 mLs (20 g) by Oral or NG Tube route every 3 hours as needed (Hepatic Encephalopathy prevention) Take throughout the day with a goal of 4-5 soft stools per day. Take more if you feel confusion coming on.    melatonin 3 MG tablet Take 3 mg by mouth nightly as needed for sleep    pantoprazole (PROTONIX) 40 MG EC tablet Take 1 tablet (40 mg) by mouth daily    rifaximin (XIFAXAN) 550 MG TABS tablet 1 tablet (550 mg) by Oral or Feeding Tube route 2 times daily    sertraline (ZOLOFT) 50 MG tablet Take 1 tablet (50 mg) by mouth daily             Review of Systems:     A  complete review of systems was performed and is negative except as noted in the HPI           Physical Exam:   BP (!) 142/101   Pulse 99   Temp 98  F (36.7  C) (Oral)   Resp 15   SpO2 100%   Wt:   Wt Readings from Last 2 Encounters:   10/03/23 99.5 kg (219 lb 6.4 oz)   09/12/23 108.4 kg (238 lb 15.7 oz)      Constitutional: confused, A&Ox1.  Eyes: Sclera injected  Ears/nose/mouth/throat: Normal oropharynx without ulcers or exudate, mucus membranes moist  Neck: supple  CV: RRR, No edema  Respiratory: Unlabored breathing  Abdomen: + distended, nontender, no peritoneal signs  Skin: warm, perfused, + jaundice  Neuro: Unable to assess.   Psych: Unable to assess.   MSK: Unable to assess.          Data:   Labs and imaging below were independently reviewed and interpreted    BMPNo lab results found in last 7 days.  CBC  Recent Labs   Lab 10/13/23  1320   WBC 13.5*   RBC 3.35*   HGB 11.4*   HCT 34.2*   *   MCH 34.0*   MCHC 33.3   RDW 17.2*        INR  Recent Labs   Lab 10/13/23  1320   INR 1.53*     LFTsNo lab results found in last 7 days.   PANCNo lab results found in last 7 days.    Imaging: None yet this admission.

## 2023-10-13 NOTE — ED PROVIDER NOTES
[place] EMERGENCY DEPARTMENT ([facility])    10/13/23       ED PROVIDER NOTE       History   AMS    HPI  Elroy Morel Sr. is a 43 year old male with history of decompensated alcoholic liver cirrhosis with ascites in early remission (last drink 8/13/2023), hypertension, esophageal varices, recent hospitalization for hepatic encephalopathy who presents to the emergency department via EMS from home with increased altered mental statuss.  He is oriented to himself and his wife.  However he was reportedly confused and not compliant with paramedics in route.  He is scheduled to have paracentesis twice a week, was due for another therapeutic paracentesis today but his wife states that he was too altered to get him to that appointment.  He has been progressively sleepy for the past few days.  She started giving him lactulose every few hours this morning when she noted he was increasingly sleepy and confused.  It got to the point where she had to use a syringe to put the liquid in his mouth.  He did have stool output but continued to be very lethargic.  Patient's wife states he has been having some bloody noses recently and she found some blood on the floor and on the back of his boxers today.  However, she looked at his bottom and did not notice any blood there or any external hemorrhoids.  He did not have any blood in the loose stools that he had after the lactulose today. Blood sugar was 109 in route.  Patient's wife says he will never complain of abdominal pain and has not complained of any new symptoms lately.  She does not think there is any way that he has been drinking again.      Past Medical History  Past Medical History:   Diagnosis Date    Alcohol use disorder, severe, dependence (H)     Alcohol withdrawal seizure (H)     Alcoholic cirrhosis of liver with ascites (H)     Esophageal varices (H)     Essential hypertension     Gastroesophageal reflux disease without esophagitis     History of  methamphetamine use     Sustained remission since 2003    Hypercholesterolemia     Tobacco abuse      Past Surgical History:   Procedure Laterality Date    COLONOSCOPY N/A 8/28/2023    Procedure: COLONOSCOPY, WITH POLYPECTOMY via bx forceps;  Surgeon: Alyssa Tsai MD;  Location: [facility]    IR PARACENTESIS  8/31/2023    IR PARACENTESIS  9/15/2023     diphenhydrAMINE (BENADRYL) 25 MG capsule  gabapentin (NEURONTIN) 100 MG capsule  lactulose (CEPHULAC) 10 GM packet  lactulose (CHRONULAC) 10 GM/15ML solution  melatonin 3 MG tablet  pantoprazole (PROTONIX) 40 MG EC tablet  rifaximin (XIFAXAN) 550 MG TABS tablet  sertraline (ZOLOFT) 50 MG tablet      Allergies   Allergen Reactions    Metronidazole Other (See Comments), Dizziness and Unknown    Omeprazole Other (See Comments) and Unknown     Irritability (tolerates Protonix well)     Family History  No family history on file.  Social History   Social History     Tobacco Use    Smoking status: Former     Types: Cigarettes    Smokeless tobacco: Former   Substance Use Topics    Alcohol use: Not Currently     Comment: last drink 8/19    Drug use: Not Currently     Types: Amphetamines     Comment: Sustained remission         A medically appropriate review of systems was performed with pertinent positives and negatives noted in the HPI, and all other systems negative.    Physical Exam   BP: (!) 142/101  Pulse: 99  Temp: 98  F (36.7  C)  Resp: 15  SpO2: 100 %  Physical Exam  Vitals and nursing note reviewed.   Constitutional:       General: He is not in acute distress.     Appearance: He is well-developed. He is ill-appearing. He is not toxic-appearing or diaphoretic.   HENT:      Head: Normocephalic and atraumatic. No contusion or laceration.      Nose: Nose normal.      Mouth/Throat:      Mouth: Mucous membranes are dry.   Eyes:      General: Scleral icterus present.      Conjunctiva/sclera: Conjunctivae normal.   Cardiovascular:      Rate and Rhythm: Normal rate.    Pulmonary:      Effort: Pulmonary effort is normal. No respiratory distress.      Breath sounds: No stridor.   Abdominal:      General: There is no distension.      Tenderness: There is no abdominal tenderness. There is no guarding or rebound.   Musculoskeletal:         General: No deformity or signs of injury. Normal range of motion.      Cervical back: Normal range of motion and neck supple.   Skin:     General: Skin is warm and dry.      Coloration: Skin is jaundiced. Skin is not pale.      Findings: Rash present.   Neurological:      General: No focal deficit present.      Mental Status: He is easily aroused. He is lethargic and confused.      GCS: GCS eye subscore is 3. GCS verbal subscore is 4. GCS motor subscore is 5.      Motor: Motor function is intact. No tremor.   Psychiatric:         Attention and Perception: He is inattentive.         Mood and Affect: Affect is flat and angry.         Speech: Speech is delayed and slurred.         Behavior: Behavior is uncooperative, slowed and withdrawn.         Thought Content: Thought content normal.         Cognition and Memory: Cognition is impaired.         Judgment: Judgment is impulsive and inappropriate.           ED Course, Procedures, & Data      POC US GUIDE FOR PARACENTESIS    Date/Time: 10/13/2023 3:31 PM    Performed by: Phyllis Olsen MD  Authorized by: Phyllis Olsen MD    Consent:     Consent obtained:  Written    Consent given by:  Spouse    Risks, benefits, and alternatives were discussed: yes      Risks discussed:  Bleeding, bowel perforation and infection  Universal protocol:     Procedure explained and questions answered to patient or proxy's satisfaction: yes      Relevant documents present and verified: yes      Test results available: yes      Imaging studies available: yes      Site/side marked: yes    Pre-procedure details:     Procedure purpose:  Diagnostic    Preparation: Patient was prepped and draped in usual sterile fashion     Anesthesia:     Anesthesia method:  Local infiltration    Local anesthetic:  Lidocaine 1% WITH epi  Procedure details:     Needle gauge:  20    Ultrasound guidance: yes      Puncture site:  R lower quadrant    Fluid removed amount:  20    Fluid appearance:  Alanna    Dressing:  Adhesive bandage  Post-procedure details:     Procedure completion:  Tolerated well, no immediate complications                  Results for orders placed or performed during the hospital encounter of 10/13/23   CT Head w/o Contrast     Status: None    Narrative    CT HEAD W/O CONTRAST 10/13/2023 3:10 PM    History: ams     Comparison: 10/3/2023 head CT    Technique: Using multidetector thin collimation helical acquisition  technique, axial, coronal and sagittal CT images from the skull base  to the vertex were obtained without intravenous contrast.   (topogram) image(s) also obtained and reviewed.    Findings: There is no intracranial hemorrhage, mass effect, or midline  shift. Gray/white matter differentiation in both cerebral hemispheres  is preserved. Ventricles are proportionate to the cerebral sulci. The  basal cisterns are clear.    The bony calvaria and the bones of the skull base are normal. The  visualized portions of the paranasal sinuses and mastoid air cells are  clear.      Impression    Impression:  No acute intracranial pathology.     I have personally reviewed the examination and initial interpretation  and I agree with the findings.    BELIA MURRY MD         SYSTEM ID:  [id number]   Comprehensive metabolic panel     Status: Abnormal   Result Value Ref Range    Sodium 125 (L) 135 - 145 mmol/L    Potassium 3.8 3.4 - 5.3 mmol/L    Carbon Dioxide (CO2) 16 (L) 22 - 29 mmol/L    Anion Gap 16 (H) 7 - 15 mmol/L    Urea Nitrogen 36.4 (H) 6.0 - 20.0 mg/dL    Creatinine 2.26 (H) 0.67 - 1.17 mg/dL    GFR Estimate 36 (L) >60 mL/min/1.73m2    Calcium 9.1 8.6 - 10.0 mg/dL    Chloride 93 (L) 98 - 107 mmol/L    Glucose 131 (H) 70 -  99 mg/dL    Alkaline Phosphatase 256 (H) 40 - 129 U/L    AST 86 (H) 0 - 45 U/L    ALT 57 0 - 70 U/L    Protein Total 7.5 6.4 - 8.3 g/dL    Albumin 3.1 (L) 3.5 - 5.2 g/dL    Bilirubin Total 15.1 (HH) <=1.2 mg/dL   Lipase     Status: Abnormal   Result Value Ref Range    Lipase 162 (H) 13 - 60 U/L   Ammonia     Status: Abnormal   Result Value Ref Range    Ammonia 111 (HH) 16 - 60 umol/L   Ethyl Alcohol Level     Status: Normal   Result Value Ref Range    Alcohol ethyl <0.01 <=0.01 g/dL   [place] Draw     Status: None    Narrative    The following orders were created for panel order [place] Draw.  Procedure                               Abnormality         Status                     ---------                               -----------         ------                     Extra Blood Culture Bottle[[id number]]                       Final result               Extra Blue Top Tube[[id number]]                              Final result               Extra Red Top Tube[[id number]]                               Final result               Extra Green Top (Lithium...[[id number]]                      Final result                 Please view results for these tests on the individual orders.   CBC with platelets and differential     Status: Abnormal   Result Value Ref Range    WBC Count 13.5 (H) 4.0 - 11.0 10e3/uL    RBC Count 3.35 (L) 4.40 - 5.90 10e6/uL    Hemoglobin 11.4 (L) 13.3 - 17.7 g/dL    Hematocrit 34.2 (L) 40.0 - 53.0 %     (H) 78 - 100 fL    MCH 34.0 (H) 26.5 - 33.0 pg    MCHC 33.3 31.5 - 36.5 g/dL    RDW 17.2 (H) 10.0 - 15.0 %    Platelet Count 152 150 - 450 10e3/uL    % Neutrophils 72 %    % Lymphocytes 11 %    % Monocytes 14 %    Mids % (Monos, Eos, Basos)      % Eosinophils 1 %    % Basophils 1 %    % Immature Granulocytes 1 %    NRBCs per 100 WBC 0 <1 /100    Absolute Neutrophils 9.8 (H) 1.6 - 8.3 10e3/uL    Absolute Lymphocytes 1.5 0.8 - 5.3 10e3/uL    Absolute Monocytes 1.8 (H) 0.0 - 1.3 10e3/uL    Mids Abs  (Monos, Eos, Basos)      Absolute Eosinophils 0.1 0.0 - 0.7 10e3/uL    Absolute Basophils 0.1 0.0 - 0.2 10e3/uL    Absolute Immature Granulocytes 0.1 <=0.4 10e3/uL    Absolute NRBCs 0.0 10e3/uL   Extra Blood Culture Bottle     Status: None   Result Value Ref Range    Hold Specimen JIC    Extra Blue Top Tube     Status: None   Result Value Ref Range    Hold Specimen JIC    Extra Red Top Tube     Status: None   Result Value Ref Range    Hold Specimen JIC    Extra Green Top (Lithium Heparin) ON ICE     Status: None   Result Value Ref Range    Hold Specimen JIC    INR     Status: Abnormal   Result Value Ref Range    INR 1.53 (H) 0.85 - 1.15   Albumin level     Status: Abnormal   Result Value Ref Range    Albumin 3.1 (L) 3.5 - 5.2 g/dL   Symptomatic Influenza A/B, RSV, & SARS-CoV2 PCR (COVID-19) Nasopharyngeal     Status: Normal    Specimen: Nasopharyngeal; Swab   Result Value Ref Range    Influenza A PCR Negative Negative    Influenza B PCR Negative Negative    RSV PCR Negative Negative    SARS CoV2 PCR Negative Negative    Narrative    Testing was performed using the Xpert Xpress CoV2/Flu/RSV Assay on the Cepheid GeneXpert Instrument. This test should be ordered for the detection of SARS-CoV-2, influenza, and RSV viruses in individuals who meet clinical and/or epidemiological criteria. Test performance is unknown in asymptomatic patients. This test is for in vitro diagnostic use under the FDA EUA for laboratories certified under CLIA to perform high or moderate complexity testing. This test has not been FDA cleared or approved. A negative result does not rule out the presence of PCR inhibitors in the specimen or target RNA in concentration below the limit of detection for the assay. If only one viral target is positive but coinfection with multiple targets is suspected, the sample should be re-tested with another FDA cleared, approved, or authorized test, if coinfection would change clinical management. This test was  validated by the [facility]. These laboratories are certified under the Clinical Laboratory Improvement Amendments of 1988 (CLIA-88) as qualified to perform high complexity laboratory testing.   CBC with platelets differential     Status: Abnormal    Narrative    The following orders were created for panel order CBC with platelets differential.  Procedure                               Abnormality         Status                     ---------                               -----------         ------                     CBC with platelets and d...[[id number]]  Abnormal            Final result                 Please view results for these tests on the individual orders.   Cell count with differential fluid     Status: None (In process)    Narrative    The following orders were created for panel order Cell count with differential fluid.  Procedure                               Abnormality         Status                     ---------                               -----------         ------                     Cell Count Body Fluid[[id number]]                            In process                 Differential Body Fluid[[id number]]                          In process                   Please view results for these tests on the individual orders.     Medications   lidocaine (XYLOCAINE) 2 % external gel (has no administration in time range)   metoclopramide (REGLAN) injection 10 mg (has no administration in time range)   rifaximin (XIFAXAN) tablet 550 mg (has no administration in time range)   lactulose (CHRONULAC) solution 20 g (has no administration in time range)     Or   lactulose (CHRONULAC) solution for enema prep 100 g (has no administration in time range)   lidocaine 1% with EPINEPHrine 1:100,000 injection 1 mL (has no administration in time range)   lactated ringers BOLUS 500 mL (has no administration in time range)     Labs Ordered and Resulted from Time of ED Arrival to Time of ED Departure    COMPREHENSIVE METABOLIC PANEL - Abnormal       Result Value    Sodium 125 (*)     Potassium 3.8      Carbon Dioxide (CO2) 16 (*)     Anion Gap 16 (*)     Urea Nitrogen 36.4 (*)     Creatinine 2.26 (*)     GFR Estimate 36 (*)     Calcium 9.1      Chloride 93 (*)     Glucose 131 (*)     Alkaline Phosphatase 256 (*)     AST 86 (*)     ALT 57      Protein Total 7.5      Albumin 3.1 (*)     Bilirubin Total 15.1 (*)    LIPASE - Abnormal    Lipase 162 (*)    AMMONIA - Abnormal    Ammonia 111 (*)    CBC WITH PLATELETS AND DIFFERENTIAL - Abnormal    WBC Count 13.5 (*)     RBC Count 3.35 (*)     Hemoglobin 11.4 (*)     Hematocrit 34.2 (*)      (*)     MCH 34.0 (*)     MCHC 33.3      RDW 17.2 (*)     Platelet Count 152      % Neutrophils 72      % Lymphocytes 11      % Monocytes 14      Mids % (Monos, Eos, Basos)        % Eosinophils 1      % Basophils 1      % Immature Granulocytes 1      NRBCs per 100 WBC 0      Absolute Neutrophils 9.8 (*)     Absolute Lymphocytes 1.5      Absolute Monocytes 1.8 (*)     Mids Abs (Monos, Eos, Basos)        Absolute Eosinophils 0.1      Absolute Basophils 0.1      Absolute Immature Granulocytes 0.1      Absolute NRBCs 0.0     INR - Abnormal    INR 1.53 (*)    ALBUMIN LEVEL - Abnormal    Albumin 3.1 (*)    ETHYL ALCOHOL LEVEL - Normal    Alcohol ethyl <0.01     INFLUENZA A/B, RSV, & SARS-COV2 PCR - Normal    Influenza A PCR Negative      Influenza B PCR Negative      RSV PCR Negative      SARS CoV2 PCR Negative     PROTEIN FLUID   ALBUMIN FLUID   ROUTINE UA WITH MICROSCOPIC REFLEX TO CULTURE   KETONE BETA-HYDROXYBUTYRATE QUANTITATIVE, RAPID   OSMOLALITY, RANDOM URINE   OSMOLALITY   CELL COUNT BODY FLUID   DIFERENTIAL BODY FLUID   BLOOD CULTURE   BLOOD CULTURE   AEROBIC BACTERIAL CULTURE ROUTINE   GRAM STAIN   ANAEROBIC BACTERIAL CULTURE ROUTINE   CELL COUNT WITH DIFFERENTIAL FLUID   FRACTIONAL EXCRETION OF SODIUM     CT Head w/o Contrast   Final Result   Impression:   No acute  intracranial pathology.       I have personally reviewed the examination and initial interpretation   and I agree with the findings.      BELIA MURRY MD            SYSTEM ID:  [id number]      XR Chest Port 1 View    (Results Pending)   POC US GUIDE FOR PARACENTESIS    (Results Pending)          Critical care was performed.   Critical Care Addendum  My initial assessment, based on my review of nursing observations, review of vital signs, focused history, and physical exam, established a high suspicion that Elroy Morel Sr. has altered mental status and a high risk electrolyte abnormality, which requires immediate intervention, and therefore he is critically ill.     After the initial assessment, the care team initiated multiple lab tests, initiated IV fluid administration, and initiated medication therapy with lactulose protocol  to provide stabilization care. Due to the critical nature of this patient, I reassessed nursing observations, vital signs, physical exam, review of cardiac rhythm monitor, mental status, neurologic status, and respiratory status multiple times prior to his disposition.     Time also spent performing documentation, discussion with family to obtain medical information for decision making, reviewing test results, discussion with consultants, and coordination of care.     Critical care time (excluding teaching time and procedures): 41 minutes.     Assessment & Plan    Elroy Morel Sr. is a 43 year old male with history of decompensated alcoholic liver cirrhosis with ascites in early remission (last drink 8/13/2023), gastropathy, recent hospitalization for hepatic encephalopathy, smoking who presents to the emergency department via EMS from home with increased altered mental status.    Ddx: Hepatic encephalopathy, GI bleed, SBP, bacteremia, UTI, intracranial hemorrhage    Patient with progressive encephalopathy over the past few days.  Wife tried giving lactulose this morning with loose stool  but no improvement in mentation.  No recent infectious symptoms.  On arrival, patient is lethargic but will open his eyes to none noxious stimuli.  He does look like he would be an aspiration risk.  Initially planned for placement of an NG tube to give lactulose.  However he has a history of esophageal varices that required banding in August.  Talked to GI fellow who stated it would be safe to attempt a bedside NG tube placement with a usual NG tube.  On reevaluation, patient started gradually having improved mental status and was able to safely take oral lactulose at this time so we deferred NG tube placement.  CT head without acute pathology.  Labs notable for white count of 13.5, hemoglobin 11.4 and stable.  Platelets 152.  Alcohol negative.  Ammonia 111.  Lipase 162 and chronically elevated.  Sodium 125, normal potassium, anion gap 16, bicarb 16, JOCELYN with creatinine of 2.26 up from 1.66.  Alk phos elevated to 56 from 188.  Bilirubin 15.1 with baseline around 18-20.  AST improved from baseline at 86 and ALT now normal.  Lateral studies negative.  A diagnostic paracentesis was performed without complication.  Cell counts and cultures are in process.  Patient has had bowel movements in the emergency department but no observed melena or hematochezia.  At this time he is appropriate for general care.  Ordered a 500 cc bolus of lactated Ringer's and obtaining a blood gas with lactate.  Ordered the [facility] nurse initiated protocol for treatment of encephalopathy with lactulose.    Patient signed out to Dr. Macias at shift change.  Handoff provided to medicine but awaiting results of cell count on ascitic fluid.  Would initiate antibiotics as indicated for evidence of SBP.  Please see addendum for results of work up and remaining management.        I have reviewed the nursing notes. I have reviewed the findings, diagnosis, plan and need for follow up with the patient.    New Prescriptions    No medications on file        Final diagnoses:   Hepatic encephalopathy (H)   JOCELYN (acute kidney injury) (H24)       Phyllis Olsen  [facility] EMERGENCY DEPARTMENT  10/13/2023     Phyllis Olsen MD  10/13/23 [id number]

## 2023-10-13 NOTE — LETTER
Transition Communication Hand-off for Care Transitions to Next Level of Care Provider    Name: Elroy Morel   : 1980  MRN #: [id number]  Primary Care Provider: Latosha Baires     Primary Clinic: [address]  [place] [address]     Reason for Hospitalization:  Hepatic encephalopathy (H) [K76.82]  JOCELYN (acute kidney injury) (H24) [N17.9]  Admit Date/Time: 10/13/2023  1:07 PM  Discharge Date: ***  Payor Source: Payor: [facility] / Plan: [employer] / Product Type: HMO /     Readmission Assessment Measure (BRANDAN) Risk Score/category: ***    Plan of Care Goals/Milestone Events:   Patient Concerns: ***   Patient Goals:   Short-term ***   Long-term ***   Medical Goals   Short-term ***   Long-term ***         Reason for Communication Hand-off Referral: {CCREASONCMCTNHANDOFFREFERRALCTS:60814}    Discharge Plan:       Concern for non-adherence with plan of care:   Y/N ***  Discharge Needs Assessment:  Needs      Flowsheet Row Most Recent Value   Equipment Currently Used at Home none            Already enrolled in Tele-monitoring program and name of program:  ***  Follow-up specialty is recommended: {YES / NO:00494}    Follow-up plan:    Future Appointments   Date Time Provider Department Center   2023  9:00 AM [facility]   2023  8:00 AM Nimco Diego MD [facility]   2023  [facility]30 [facility]   12/15/2023  9:30 AM Brendan Domingo MD [facility]       Any outstanding tests or procedures:              Key Recommendations:      MARILUZ Sandoval    AVS/Discharge Summary is the source of truth; this is a helpful guide for improved communication of patient story

## 2023-10-13 NOTE — ED TRIAGE NOTES
BIBA from home. C/o AMS, increased weakness.  Oriented to self, aware of who wife is.    Late stage CKD, liver disease,      109 Blood glucose  170/98  99% spo2 RA  102 bpm  98.9 temp      EMS reports patient was not compliant in rig d/t AMS.  Abdominal swelling, incontinence, wounds generalized.  Presents with Jaundice.  Suppose to have paracentesis today. Does it x2 week.

## 2023-10-13 NOTE — H&P
Physician Attestation   I, Raymundo Rodriguez, was present with the medical student Jack Haro and the medical resident Dr. Everton Deluca who participated in the service and in the documentation of the note.  I have verified the history and personally performed the physical exam and medical decision making.  I agree with the assessment and plan of care as documented in the note.      Key findings: note edited in blue    Please see A&P for additional details of medical decision making.    I have personally reviewed the following data over the past 24 hrs:    13.5 (H)  \   11.4 (L)   / 152     125 (L) 93 (L) 36.4 (H) /  131 (H)   3.8 16 (L) 2.26 (H) \     ALT: 57 AST: 86 (H) AP: 256 (H) TBILI: 15.1 (HH)   ALB: 3.1 (L); 3.1 (L) TOT PROTEIN: 7.5 LIPASE: 162 (H)     INR:  1.53 (H) PTT:  N/A   D-dimer:  N/A Fibrinogen:  N/A         Raymundo Askew (Evelina) MD Michael  Internal Medicine/Pediatrics  Hospitalist    Date of Service (when I saw the patient): 10/13/23    [facility]    History and Physical - Medicine Service, [facility] TEAM 5       Date of Admission:  10/13/2023    Assessment & Plan      Elroy Morel Sr. is a 43 year old male with history of decompensated alcoholic liver cirrhosis with ascites, esophageal varices, and gastropathy, recent hospitalization for hepatic encephalopathy (10/3-10/5) admitted on 10/13/2023 with  hepatic encephalopathy and JOCELYN.    Hepatic encephalopathy  Decompensated alcoholic cirrhosis (recurrent ascites, HE, EV, gastropathy)  Chronic Hypervolemic vs hypovolemic hyponatremia  Presents with worsening encephalopathy despite aggressive lactulose treatment at home, not missing doses, and meeting bowel movement goals. Given his clinical exam and symptoms, most likely cause of his confusion is hepatic encephalopathy. CT head negative for intracranial pathology. Ongoing evaluation for infection as below. Hyponatremia is chronic and has ranged from 120-132  In the last 2 months, mostly in the 120s. Not obviously fluid overloaded in exam other than ascites in abdomen, could be hypovolemic as suggested by JOCELYN and diarrhea from lactulose. Serum osm nl on admission. S/p 500 ml LR bolus.  - CXR pending  - blood cultures collected  - UA order placed with reference to culture  Paracentesis performed 10/13 and cell counts are pending  - Diagnostic and therapeutic paracentesis: cell count, gram stain, fluid cultures.  - Please administer albumin with paracentesis (12.5 g for every L, limit to 4 L per paracentesis).  - gets paracenteses twice weekly, last was 3 days prior to admission  - Albumin 100g /day for at least 2 days given JOCELYN.  - CXR, UA, blood cultures, Will d/w GI what stool studies they recommend, presumably loose stools are from aggressive lactulose administration.  - Continue daily lactulose for goal of 4-5 bowel movements daily. Continue rifaximin.  - Serum osm nl, urine sodium and urine osm ordered for hypoNa workup  - IV/PO thiamine and folic acid  - Zinc levels.   - MELD labs daily  - Peth level  - Due for EGD for post-banding surveillance.  - Low sodium diet  - Will readdress need for SBP prophylaxis on 10/14/23    MELD 3.0: 33 at 10/13/2023  1:20 PM  MELD-Na: 33 at 10/13/2023  1:20 PM  Calculated from:  Serum Creatinine: 2.26 mg/dL at 10/13/2023  1:20 PM  Serum Sodium: 125 mmol/L at 10/13/2023  1:20 PM  Total Bilirubin: 15.1 mg/dL at 10/13/2023  1:20 PM  Serum Albumin: 3.1 g/dL at 10/13/2023  1:20 PM  INR(ratio): 1.53 at 10/13/2023  1:20 PM  Age at listing (hypothetical): 43 years  Sex: Male at 10/13/2023  1:20 PM    JOCELYN on CKD  Hepatorenal syndrome?  Creatinine 2.26 10/13/23 from baseline 1.7-1.9. Possibly in a setting of developing HRS.  - avoid nephrotoxic medications  - daily CMP  - Albumin 100g /day for at least 2 days given JOCELYN.    GERD  - PTA pantoprazole    Mood  - PTA Zoloft        Diet: Advance Diet as Tolerated: Clear Liquid Diet  DVT Prophylaxis:  "pneumatic compression devices  Carroll Catheter: Not present  Fluids: none  Lines: None     Cardiac Monitoring: None  Code Status: Full Code    Clinically Significant Risk Factors Present on Admission         # Hyponatremia: Lowest Na = 125 mmol/L in last 2 days, will monitor as appropriate      # Hypoalbuminemia: Lowest albumin = 3.1 g/dL at 10/13/2023  1:20 PM, will monitor as appropriate    # Coagulation Defect: INR = 1.53 (Ref range: 0.85 - 1.15) and/or PTT = 42 Seconds (Ref range: 22 - 38 Seconds), will monitor for bleeding      # Hypertension: Noted on problem list      # Obesity: Estimated body mass index is 32.4 kg/m  as calculated from the following:    Height as of 10/3/23: 1.753 m (5' 9\").    Weight as of 10/3/23: 99.5 kg (219 lb 6.4 oz).              Disposition Plan      Expected Discharge Date: 10/15/2023                The patient's care was discussed with the Attending Physician, Dr. Rodriguez .    Jack Haro  Medical Student  Medicine Service, [address]  Securely message with [employer] (more info)  Text page via [facility] Paging/Directory   See signed in provider for up to date coverage information  ______________________________________________________________________    Chief Complaint   No complains from patient, per his wife (when she was present at ED admission and met ED staff).    History of Present Illness   Elroy Morel Sr. is a 43 year old male with history of alcoholic liver cirrhosis with ascites, gastropathy, recent hospitalization for hepatic encephalopathy admitted on 10/13/2023 with decompensation of liver cirrhosis with hepatic encephalopathy.    HPI was collected by ED staff from patient's wife at admission:  He is scheduled to have paracentesis twice a week, was due for another therapeutic paracentesis today but his wife states that he was too altered to get him to that appointment.  He has been progressively sleepy for the " past few days.  She started giving him lactulose every few hours this morning when she noted he was increasingly sleepy and confused.  It got to the point where she had to use a syringe to put the liquid in his mouth.  He did have stool output but continued to be very lethargic.  Patient's wife states he has been having some bloody noses recently and she found some blood on the floor and on the back of his boxers today. However, she looked at his bottom and did not notice any blood there or any external hemorrhoids.  He did not have any blood in the loose stools that he had after the lactulose today. Patient's wife says he will never complain of abdominal pain and has not complained of any new symptoms lately.  She does not think there is any way that he has been drinking again     Past Medical History    Past Medical History:   Diagnosis Date    Alcohol use disorder, severe, dependence (H)     Alcohol withdrawal seizure (H)     Alcoholic cirrhosis of liver with ascites (H)     Esophageal varices (H)     Essential hypertension     Gastroesophageal reflux disease without esophagitis     History of methamphetamine use     Sustained remission since 2003    Hypercholesterolemia     Tobacco abuse        Past Surgical History   Past Surgical History:   Procedure Laterality Date    COLONOSCOPY N/A 8/28/2023    Procedure: COLONOSCOPY, WITH POLYPECTOMY via bx forceps;  Surgeon: Alyssa Tsai MD;  Location: [facility]    IR PARACENTESIS  8/31/2023    IR PARACENTESIS  9/15/2023       Prior to Admission Medications   Prior to Admission Medications   Prescriptions Last Dose Informant Patient Reported? Taking?   diphenhydrAMINE (BENADRYL) 25 MG capsule   No No   Sig: Take 1 capsule (25 mg) by mouth every 6 hours as needed for itching   gabapentin (NEURONTIN) 100 MG capsule   No No   Sig: Take 1 capsule (100 mg) by mouth At Bedtime for 30 days   lactulose (CEPHULAC) 10 GM packet   No No   Sig: Take 2 packets (20 g) by  mouth 3 times daily Take throughout the day with a goal of 4-5 soft stools per day. Take more if you feel confusion coming on.   lactulose (CHRONULAC) 10 GM/15ML solution   No No   Si mLs (20 g) by Oral or NG Tube route every 3 hours as needed (Hepatic Encephalopathy prevention) Take throughout the day with a goal of 4-5 soft stools per day. Take more if you feel confusion coming on.   melatonin 3 MG tablet   Yes No   Sig: Take 3 mg by mouth nightly as needed for sleep   pantoprazole (PROTONIX) 40 MG EC tablet   No No   Sig: Take 1 tablet (40 mg) by mouth daily   rifaximin (XIFAXAN) 550 MG TABS tablet   No No   Si tablet (550 mg) by Oral or Feeding Tube route 2 times daily   sertraline (ZOLOFT) 50 MG tablet   No No   Sig: Take 1 tablet (50 mg) by mouth daily      Facility-Administered Medications: None        Physical Exam   Vital Signs: Temp: 98  F (36.7  C) Temp src: Oral BP: (!) 148/104 Pulse: 101   Resp: 15 SpO2: 100 % O2 Device: None (Room air)      Constitutional: confused, A&Ox1.  Eyes: Sclera injected  Neck: supple  CV: RRR, No edema  Respiratory: Unlabored breathing  Abdomen: + distended, nontender, no peritoneal signs  Skin: warm, perfused, + jaundice.  Neuro: Unable to assess. Asterixis present.  Psych: Unable to assess.   MSK: Unable to assess.

## 2023-10-14 ENCOUNTER — APPOINTMENT (OUTPATIENT)
Dept: GENERAL RADIOLOGY | Facility: CLINIC | Age: 43
DRG: 442 | End: 2023-10-14
Attending: EMERGENCY MEDICINE
Payer: COMMERCIAL

## 2023-10-14 LAB
ALBUMIN SERPL BCG-MCNC: 3.5 G/DL (ref 3.5–5.2)
ALBUMIN UR-MCNC: 10 MG/DL
ALP SERPL-CCNC: 198 U/L (ref 40–129)
ALT SERPL W P-5'-P-CCNC: 45 U/L (ref 0–70)
ANION GAP SERPL CALCULATED.3IONS-SCNC: 12 MMOL/L (ref 7–15)
ANION GAP SERPL CALCULATED.3IONS-SCNC: 16 MMOL/L (ref 7–15)
APPEARANCE UR: CLEAR
AST SERPL W P-5'-P-CCNC: 69 U/L (ref 0–45)
BILIRUB SERPL-MCNC: 13.4 MG/DL
BILIRUB UR QL STRIP: ABNORMAL
BUN SERPL-MCNC: 33.6 MG/DL (ref 6–20)
BUN SERPL-MCNC: 37.4 MG/DL (ref 6–20)
CALCIUM SERPL-MCNC: 9.2 MG/DL (ref 8.6–10)
CALCIUM SERPL-MCNC: 9.2 MG/DL (ref 8.6–10)
CHLORIDE SERPL-SCNC: 94 MMOL/L (ref 98–107)
CHLORIDE SERPL-SCNC: 99 MMOL/L (ref 98–107)
COLOR UR AUTO: ABNORMAL
CREAT SERPL-MCNC: 1.95 MG/DL (ref 0.67–1.17)
CREAT SERPL-MCNC: 2.22 MG/DL (ref 0.67–1.17)
CREAT UR-MCNC: 128 MG/DL
DEPRECATED HCO3 PLAS-SCNC: 16 MMOL/L (ref 22–29)
DEPRECATED HCO3 PLAS-SCNC: 17 MMOL/L (ref 22–29)
EGFRCR SERPLBLD CKD-EPI 2021: 37 ML/MIN/1.73M2
EGFRCR SERPLBLD CKD-EPI 2021: 43 ML/MIN/1.73M2
ERYTHROCYTE [DISTWIDTH] IN BLOOD BY AUTOMATED COUNT: 17.2 % (ref 10–15)
FRACT EXCRET NA UR+SERPL-RTO: NORMAL %
GLUCOSE SERPL-MCNC: 102 MG/DL (ref 70–99)
GLUCOSE SERPL-MCNC: 161 MG/DL (ref 70–99)
GLUCOSE UR STRIP-MCNC: NEGATIVE MG/DL
HCO3 BLDV-SCNC: 19 MMOL/L (ref 21–28)
HCT VFR BLD AUTO: 29.7 % (ref 40–53)
HGB BLD-MCNC: 9.8 G/DL (ref 13.3–17.7)
HGB UR QL STRIP: NEGATIVE
INR PPP: 1.61 (ref 0.85–1.15)
KETONES UR STRIP-MCNC: NEGATIVE MG/DL
LACTATE BLD-SCNC: 2.4 MMOL/L
LEUKOCYTE ESTERASE UR QL STRIP: NEGATIVE
MCH RBC QN AUTO: 33.3 PG (ref 26.5–33)
MCHC RBC AUTO-ENTMCNC: 33 G/DL (ref 31.5–36.5)
MCV RBC AUTO: 101 FL (ref 78–100)
MUCOUS THREADS #/AREA URNS LPF: PRESENT /LPF
NITRATE UR QL: NEGATIVE
OSMOLALITY UR: 446 MMOL/KG (ref 100–1200)
PCO2 BLDV: 32 MM HG (ref 40–50)
PH BLDV: 7.38 [PH] (ref 7.32–7.43)
PH UR STRIP: 5.5 [PH] (ref 5–7)
PLATELET # BLD AUTO: 123 10E3/UL (ref 150–450)
PO2 BLDV: 29 MM HG (ref 25–47)
POTASSIUM SERPL-SCNC: 3.4 MMOL/L (ref 3.4–5.3)
POTASSIUM SERPL-SCNC: 3.5 MMOL/L (ref 3.4–5.3)
PROT SERPL-MCNC: 7.1 G/DL (ref 6.4–8.3)
RBC # BLD AUTO: 2.94 10E6/UL (ref 4.4–5.9)
RBC URINE: 1 /HPF
SAO2 % BLDV: 56 % (ref 94–100)
SODIUM SERPL-SCNC: 126 MMOL/L (ref 135–145)
SODIUM SERPL-SCNC: 128 MMOL/L (ref 135–145)
SODIUM UR-SCNC: <20 MMOL/L
SODIUM UR-SCNC: <20 MMOL/L
SP GR UR STRIP: 1.02 (ref 1–1.03)
UROBILINOGEN UR STRIP-MCNC: NORMAL MG/DL
WBC # BLD AUTO: 10.2 10E3/UL (ref 4–11)
WBC URINE: 1 /HPF

## 2023-10-14 PROCEDURE — P9047 ALBUMIN (HUMAN), 25%, 50ML: HCPCS | Performed by: STUDENT IN AN ORGANIZED HEALTH CARE EDUCATION/TRAINING PROGRAM

## 2023-10-14 PROCEDURE — 82310 ASSAY OF CALCIUM: CPT

## 2023-10-14 PROCEDURE — 84630 ASSAY OF ZINC: CPT | Performed by: STUDENT IN AN ORGANIZED HEALTH CARE EDUCATION/TRAINING PROGRAM

## 2023-10-14 PROCEDURE — 120N000011 HC R&B TRANSPLANT UMMC

## 2023-10-14 PROCEDURE — 84300 ASSAY OF URINE SODIUM: CPT | Performed by: EMERGENCY MEDICINE

## 2023-10-14 PROCEDURE — 250N000013 HC RX MED GY IP 250 OP 250 PS 637: Performed by: EMERGENCY MEDICINE

## 2023-10-14 PROCEDURE — 81003 URINALYSIS AUTO W/O SCOPE: CPT | Performed by: EMERGENCY MEDICINE

## 2023-10-14 PROCEDURE — 250N000011 HC RX IP 250 OP 636: Performed by: STUDENT IN AN ORGANIZED HEALTH CARE EDUCATION/TRAINING PROGRAM

## 2023-10-14 PROCEDURE — 83935 ASSAY OF URINE OSMOLALITY: CPT | Performed by: EMERGENCY MEDICINE

## 2023-10-14 PROCEDURE — 71045 X-RAY EXAM CHEST 1 VIEW: CPT

## 2023-10-14 PROCEDURE — 80053 COMPREHEN METABOLIC PANEL: CPT | Performed by: STUDENT IN AN ORGANIZED HEALTH CARE EDUCATION/TRAINING PROGRAM

## 2023-10-14 PROCEDURE — 85027 COMPLETE CBC AUTOMATED: CPT | Performed by: STUDENT IN AN ORGANIZED HEALTH CARE EDUCATION/TRAINING PROGRAM

## 2023-10-14 PROCEDURE — 36415 COLL VENOUS BLD VENIPUNCTURE: CPT

## 2023-10-14 PROCEDURE — 85610 PROTHROMBIN TIME: CPT | Performed by: STUDENT IN AN ORGANIZED HEALTH CARE EDUCATION/TRAINING PROGRAM

## 2023-10-14 PROCEDURE — 71045 X-RAY EXAM CHEST 1 VIEW: CPT | Mod: 26 | Performed by: RADIOLOGY

## 2023-10-14 PROCEDURE — 36415 COLL VENOUS BLD VENIPUNCTURE: CPT | Performed by: STUDENT IN AN ORGANIZED HEALTH CARE EDUCATION/TRAINING PROGRAM

## 2023-10-14 PROCEDURE — 84300 ASSAY OF URINE SODIUM: CPT | Performed by: STUDENT IN AN ORGANIZED HEALTH CARE EDUCATION/TRAINING PROGRAM

## 2023-10-14 PROCEDURE — 99223 1ST HOSP IP/OBS HIGH 75: CPT | Mod: GC | Performed by: INTERNAL MEDICINE

## 2023-10-14 PROCEDURE — 99232 SBSQ HOSP IP/OBS MODERATE 35: CPT | Mod: GC | Performed by: STUDENT IN AN ORGANIZED HEALTH CARE EDUCATION/TRAINING PROGRAM

## 2023-10-14 PROCEDURE — 250N000013 HC RX MED GY IP 250 OP 250 PS 637: Performed by: STUDENT IN AN ORGANIZED HEALTH CARE EDUCATION/TRAINING PROGRAM

## 2023-10-14 RX ORDER — HYDROXYZINE HYDROCHLORIDE 25 MG/1
25 TABLET, FILM COATED ORAL EVERY 6 HOURS PRN
Status: DISCONTINUED | OUTPATIENT
Start: 2023-10-14 | End: 2023-10-16 | Stop reason: HOSPADM

## 2023-10-14 RX ORDER — LACTULOSE 10 G/10G
30 SOLUTION ORAL 4 TIMES DAILY PRN
Status: DISCONTINUED | OUTPATIENT
Start: 2023-10-14 | End: 2023-10-14

## 2023-10-14 RX ORDER — CIPROFLOXACIN 250 MG/1
500 TABLET, FILM COATED ORAL
Status: CANCELLED | OUTPATIENT
Start: 2023-10-15

## 2023-10-14 RX ORDER — CIPROFLOXACIN 250 MG/1
500 TABLET, FILM COATED ORAL EVERY EVENING
Status: DISCONTINUED | OUTPATIENT
Start: 2023-10-14 | End: 2023-10-16 | Stop reason: HOSPADM

## 2023-10-14 RX ORDER — LACTULOSE 10 G/10G
30 SOLUTION ORAL 4 TIMES DAILY
Status: DISCONTINUED | OUTPATIENT
Start: 2023-10-14 | End: 2023-10-15

## 2023-10-14 RX ADMIN — SERTRALINE HYDROCHLORIDE 50 MG: 50 TABLET ORAL at 07:48

## 2023-10-14 RX ADMIN — THIAMINE HCL TAB 100 MG 100 MG: 100 TAB at 07:48

## 2023-10-14 RX ADMIN — RIFAXIMIN 550 MG: 550 TABLET ORAL at 07:56

## 2023-10-14 RX ADMIN — FOLIC ACID 1 MG: 1 TABLET ORAL at 07:48

## 2023-10-14 RX ADMIN — LACTULOSE 20 G: 20 SOLUTION ORAL at 02:45

## 2023-10-14 RX ADMIN — LACTULOSE 20 G: 20 SOLUTION ORAL at 04:55

## 2023-10-14 RX ADMIN — CIPROFLOXACIN HYDROCHLORIDE 500 MG: 250 TABLET, FILM COATED ORAL at 19:53

## 2023-10-14 RX ADMIN — RIFAXIMIN 550 MG: 550 TABLET ORAL at 19:53

## 2023-10-14 RX ADMIN — LACTULOSE 20 G: 20 SOLUTION ORAL at 13:33

## 2023-10-14 RX ADMIN — ALBUMIN HUMAN 100 G: 0.25 SOLUTION INTRAVENOUS at 07:57

## 2023-10-14 RX ADMIN — LACTULOSE 20 G: 20 SOLUTION ORAL at 06:41

## 2023-10-14 RX ADMIN — PANTOPRAZOLE SODIUM 40 MG: 40 TABLET, DELAYED RELEASE ORAL at 07:49

## 2023-10-14 ASSESSMENT — ACTIVITIES OF DAILY LIVING (ADL)
ADLS_ACUITY_SCORE: 35
ADLS_ACUITY_SCORE: 18
ADLS_ACUITY_SCORE: 35
ADLS_ACUITY_SCORE: 35
ADLS_ACUITY_SCORE: 18
DEPENDENT_IADLS:: CLEANING;COOKING;LAUNDRY;SHOPPING;MEDICATION MANAGEMENT;TRANSPORTATION
ADLS_ACUITY_SCORE: 18
ADLS_ACUITY_SCORE: 18

## 2023-10-14 NOTE — PROGRESS NOTES
Report given to Nurse on 7A and patient has been transferred to the floor.  Patient has had 2 BM today and the student doctor during round stated that we can cut down on the PRN lactulose. The team is aware that patient is not able to produce urine for urine test since his BM mixes up with the urine.  Urinal was given to patient and he stated that when it is time to have BM, everything goes. IV albumin given this morning, tolerating clear liquid diet. Up to BSC independently. Does not seem confused like before.  Continue with POC.

## 2023-10-14 NOTE — PLAN OF CARE
Goal Outcome Evaluation:    Plan of Care Reviewed With: patient    Overall Patient Progress: no change    Outcome Evaluation: Pt reports living at home with his wife and adult children. Goal is to return there.

## 2023-10-14 NOTE — PLAN OF CARE
Goal Outcome Evaluation:       Problem: Liver Failure  Goal: Improved Oral Intake  Outcome: Progressing       Advanced diet to regular adult.                     MEDICATION MANAGEMENT AGREEMENT    I, JERARDO Hanson, 1959, agree to use the following medications only as prescribed by Suze Garduno MD (\"My Prescribing Provider\").    Medication and Date Prescribed Dose Directions Quantity Per Month   Tramadol 50 mg tablet Take one tablet daily as needed 30 tablets                 The purpose of this Medication Management Agreement is to describe my responsibilities with regard to my medications and to outline the conditions that must be met in order for me to continue treatment with these medications by My Prescribing Provider. I understand that these medications may not eliminate my symptoms, but may reduce my discomfort and improve what I am able to do each day. These medications are narcotics and have the potential for developing dependency or addiction.      MY RESPONSIBILITIES  · I will only take my medications at the dose and frequency described above.  · I will not increase or change how I take my medications without the prior approval of My Prescribing Provider.  · I understand that if I use more medication or take medication more often than prescribed, it may result in my being without medication for a period of time, and could possibly result in my injury or death.  · I will not share, sell, or trade my medication for money, goods or services.  · I will not use any illegal controlled substances, including marijuana, cocaine, etc.  · I will discontinue all previously used medication for the condition being treated by this medication, unless I am told to continue them by My Prescribing Provider.  · If there is any question of my ability to safely perform any activity, especially driving, I agree that I will not attempt to perform that activity until my ability to perform the activity has been evaluated or I have not used my medication for at least 3 days.   · I agree to participate in any medical, psychological or psychiatric assessments recommended by My  Prescribing Provider.    MY PHARMACY  · I agree to only use the following pharmacy (\"Pharmacy\") for the medication(s) listed above:    Ph[facility]acy: [facility]   Location: [place], [place]   Phone Number: [phone]  · I agree to immediately notify My Prescribing Provider prior to changing my Pharmacy for any reason.    NEW PRESCRIPTIONS AND PRESCRIPTION REFILLS  · I will only request prescription refills from My Prescribing Provider at an office visit or during regular office hours.  · I understand that refills will not be available during evenings or weekends.  · I will not request a prescription for the medications listed above from a health care provider other than My Prescribing Provider unless it is an emergency.    LOST OR STOLEN MEDICATIONS  · I will safeguard my medications from loss or theft and I understand that misplaced, lost or stolen medications will not be replaced.    COMMUNICATION WITH MY HEALTH CARE PROVIDERS, MY PHARMACY AND LAW ENFORCEMENT  I understand that it is difficult for health care providers to safely and effectively treat my symptoms if they do not have clear and accurate information about how I am using my medications or other drugs. Therefore, I agree to the following:  · I will communicate openly and honestly with My Prescribing Provider, my other health care providers and my Pharmacy.  · I will inform other health care providers that I am taking the medications listed above and that I have signed this Medication Management Agreement.  · I understand that in order to properly adjust and manage my treatment with the medications listed above, My Prescribing Provider may communicate with my Pharmacy and other health care providers involved in my care.  · If I seek emergency medical treatment, I will inform the health care providers caring for me that I am taking the medications listed above and that I have signed the Medication Management agreement. In addition, I will ask that My  Prescribing Provider be contacted to discuss any changes to my prescription medication regimen.   · I understand that My Prescribing Provider and my Pharmacy will cooperate with any city, state, or federal agency in the investigation of any possible illegal actions, including the sale or other diversion of my medications.    LABORATORY STUDIES AND RANDOM DRUG SCREENING  · I realize that all medications have potential side effects and I will have the recommended laboratory studies required to keep the regimen as safe as possible.  · I will submit to blood and/or urine test(s), as requested by My Prescribing provider to determine my compliance with this Medication Management Agreement and my medication regimen.    GENERAL BEHAVIOR  Inappropriate, aggressive, or threatening behavior may result in you being terminated (dropped) from our clinic and having your controlled medications stopped. This could include cursing, raising your voice, behaving in a physically intimidating way, or harassing our employees. We try as much as possible to be professional and appropriate with our patients, and we ask that you and those with you behave the same way toward your providers, other office personnel, and other patients and their families. If you have a complaint, you may contact the .      FAILURE TO COMPLY WITH THIS AGREEMENT  I understand that compliance with this Medication Management Agreement is important to continuing my treatment with My Prescribing Provider. If I fail to comply with this Medication Management Agreement, I understand that any or all of the following may occur:  · My Prescribing Provider will no longer prescribe the medications listed above.  · My Prescribing Provider will no longer be one of my health care providers.  · I will no longer be able to seek treatment at [facility].    I understand that if I miss 2 appointments for any reason, including no-shows, and/or cancellations with  less than 24 hours' notice, this may, at the doctor's discretion, result in my medications being tapered off over a several day period as necessary to avoid withdrawal symptoms. In addition, failure to comply with the prescribed treatment plan including therapies may result in my medications being tapered.    I agree to follow these guidelines that have been fully explained to me. All of my questions and concerns regarding treatment have been adequately answered. A copy of this document has been given to me.    Medication Management Agreement - Addendum    1. I understand that some of the medications being prescribed can be very potent. These medications may include narcotics, tranquilizers, and/or barbiturates.  2. I understand that treatment with these medications is controlled by State and Federal Government Agencies. These medications can be highly effective when taken as directed under close supervision.  3. I understand that possible side effects, risk and complications can sometimes occur. These may include, but are not limited to:  · Confusion, mental status changes  · Increased sleepiness and drowsiness  · Change in breathing, usually too slow  · Change in appetite, usually decreased  · Change in bowel habits, usually constipation  · Possible decreased coordination, balance, reaction time, etc., possibly making it difficult to drive a motor vehicle.  4. I will take the medications only as directed. If I use all the medication sooner than prescribed, I understand that it will not be replaced. I further understand that NO allowances will be made for lost, stolen, misplaced or damaged medications. THERE WILL BE NO EARLY REFILLS FOR ANY REASON.  5. I am responsible for keeping track of the amount of medication left and plan ahead for arranging for a refill of my prescription in a timely manner so that I do not run out of medication. A 48-hour advance notice is required for refills of prescriptions. Request for  refills must be telephoned to the office during regular office hours (Monday-Friday 8 am-4:30 pm). Refills will be telephoned to my pharmacy or the prescription can be picked up by me at the office during regular office hours. No prescriptions will be mailed to the patient.   6. I agree to help myself by training to change my behavior towards a healthier lifestyle. This includes not smoking, using alcohol in moderation as permitted by my physician, and concentrating on diet, weight control and exercise. I understand that only by following a healthier lifestyle can I hope to have the most successful outcome to my treatment.  7. I understand that if I violate any of the mentioned conditions, my controlled substance prescriptions and/or treatment by our clinic may be ended. If the violation involved obtaining controlled substances from or sharing controlled substances with another individual, the incidence may also be reported to my primary physician, local medical facilities and other authorities.     I have read and understood the information listed above. By signing this form, I agree to follow the expectations outlined above.    This Medication Management Agreement is entered into on this 7/15/2022.    _____________________________________            _______________    Patient Signature        Date    _____________________________________            _______________                        Provider Signature        Date    _____________________________________    ________________           Witness Signature        Date

## 2023-10-14 NOTE — PROVIDER NOTIFICATION
UU-UED, UED16, Adriel CONNOR, 1980  FYI: Patient's new BP is 129/83. Patient still shows  similar confusion q 2hrs ago lactulose given . Yosef Shah [id number]

## 2023-10-14 NOTE — PROGRESS NOTES
BP (!) 133/94 (BP Location: Right arm)   Pulse 81   Temp 98.1  F (36.7  C) (Oral)   Resp 18   SpO2 97%     Shift: [id number]  Isolation Status: none  VS: 100% on room air, afebrile  Neuro: Aox4   Behaviors: withdrawal, somewhat flat, cooperative but not talkative  BG: none  Labs:none  Respiratory: WDL  Cardiac: WDL  Pain/Nausea: Denies pain and nausea   PRN: hydroxyzine for itching available but denied- prefers HS benadryl   Diet: regular   IV Access: 1 right PIV   Infusion(s): Albumin administered this shift   Lines/Drains: none  GI/: 3 loose stools this shift, patient refused lactulose   Skin: jaundice, yellow sclera, rash and scabs from scratching on legs, torso, arm, etc.   Mobility: UAL, independent  Events/Education: received paracentesis-removed 4L  Plan: waiting on discharge plan

## 2023-10-14 NOTE — MEDICATION SCRIBE - ADMISSION MEDICATION HISTORY
Medication Scribe Admission Medication History    Admission medication history is complete. The information provided in this note is only as accurate as the sources available at the time of the update.    Information Source(s): Hospital records and CareEverywhere/SureScripts via  Dispense report, 10/5 discharge summary    Pertinent Information:   -Because of the pt being hospitalized recently 10/5/23 writer used the discharge summary and supplemented the rest with the dispense report.    Changes made to PTA medication list:  Added: Kristalose 20 gm, Albuterol inhaler, Calcium w/ vitamin D3, cholestyramine light 4 gm, Folic acid 400 mg, Hydroxyzine 25 mg, zofran 4 mg, sodium bicarbonate 650 mg  Deleted: Lactulose packet  Changed: None    Medication Affordability:Unable to assess       Allergies reviewed with patient and updates made in EHR: unable to assess    Medication History Completed By: Keli Lozano 10/13/2023 8:31 PM    PTA Med List   Medication Sig Last Dose    albuterol (PROAIR HFA/PROVENTIL HFA/VENTOLIN HFA) 108 (90 Base) MCG/ACT inhaler Inhale 1-2 Puffs by mouth every 4 hours if needed. Unknown at unknown    Calcium Carb-Cholecalciferol 500-10 MG-MCG CHEW Chew 1 Tablet by mouth once daily. Unknown at unknown    cholestyramine light (QUESTRAN) 4 GM packet Take 4 g by mouth daily (with breakfast) Unknown at unknown    diphenhydrAMINE (BENADRYL) 25 MG capsule Take 1 capsule (25 mg) by mouth every 6 hours as needed for itching Unknown at unknown    folic acid (FOLVITE) 400 MCG tablet Take 1 Tablet (400 mcg) by mouth once daily. Unknown at unknown    gabapentin (NEURONTIN) 100 MG capsule Take 100 mg by mouth at bedtime Unknown at unknown    hydrOXYzine (ATARAX) 25 MG tablet Take 25 mg by mouth every 6 hours as needed Unknown at unknown    KRISTALOSE 20 g packet Take 20 g by mouth Unknown at unknown    lactulose (CHRONULAC) 10 GM/15ML solution 30 mLs (20 g) by Oral or NG Tube route every 3 hours as needed  (Hepatic Encephalopathy prevention) Take throughout the day with a goal of 4-5 soft stools per day. Take more if you feel confusion coming on. Unknown at unknown    melatonin 3 MG tablet Take 3 mg by mouth nightly as needed for sleep Unknown at unknown    ondansetron (ZOFRAN ODT) 4 MG ODT tab Take 4 mg by mouth every 8 hours as needed Unknown at unknown    pantoprazole (PROTONIX) 40 MG EC tablet Take 1 tablet (40 mg) by mouth daily Unknown at unknown    rifaximin (XIFAXAN) 550 MG TABS tablet 1 tablet (550 mg) by Oral or Feeding Tube route 2 times daily Unknown at unknown    sertraline (ZOLOFT) 50 MG tablet Take 1 tablet (50 mg) by mouth daily Unknown at unknown    sodium bicarbonate 650 MG tablet Take 2 tablets by mouth 2 times daily Unknown at unknown

## 2023-10-14 NOTE — CONSULTS
Care Management Initial Consult    General Information  Assessment completed with: PatientAd  Type of CM/SW Visit: Initial Assessment  Primary Care Provider verified and updated as needed: Yes - Sixtojustin Baires with [facility]  Readmission within the last 30 days: Yes  Reason for Consult: elevated risk score  Advance Care Planning: Advance Care Planning Reviewed: Verified with patient that he has a health care directive. Spoke with Malissa (wife) via phone - she reported she can scan the health care directive to this SW on Monday 10/16/23.     Communication Assessment  Patient's communication style: spoken language (English)    Hearing Difficulty or Deaf: no   Wear Glasses or Blind: no    Cognitive  Cognitive/Neuro/Behavioral: .WDL except, mood/behavior  Level of Consciousness: intermittent confusion  Arousal Level: opens eyes spontaneously  Orientation: disoriented to, person, situation  Mood/Behavior: flat affect, cooperative, calm  Best Language: 0 - No aphasia  Speech: clear, spontaneous    Living Environment:   People in home: Malissa (wife), Elroy Wilkinson (adult son), Vijay (adult son)  Current living Arrangements: house      Able to return to prior arrangements: yes       Family/Social Support:  Care provided by: self, spouse  Provides care for: no one  Marital Status:   Description of Support System: Supportive, Involved- Pt resides with his family. Reports his sister Janet lives local. Reports his parents are .      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  No    Lifestyle & Psychosocial  "Needs:  Social Determinants of Health     Food Insecurity: Not on file   Depression: Not at risk (8/30/2023)    PHQ-2     PHQ-2 Score: 2   Housing Stability: Not on file   Tobacco Use: Medium Risk (9/15/2023)    Patient History     Smoking Tobacco Use: Former     Smokeless Tobacco Use: Former     Passive Exposure: Not on file   Financial Resource Strain: Not on file   Alcohol Use: Not on file   Transportation Needs: Not on file   Physical Activity: Not on file   Interpersonal Safety: Not on file   Stress: Not on file   Social Connections: Not on file     Functional Status:  Prior to admission patient needed assistance:   Dependent ADLs: Incontinence, Bathing (Malissa supports with personal care as needed.)  Dependent IADLs: Cleaning, Cooking, Laundry, Shopping, Medication Management, Transportation (Malissa helps.)     Mental Health Status:  Mental Health Status: Current Concern - Pt reported he feels really stressed about his health. He doesn't have anyone to help him during the day, because everyone is the household goes to work. He feels he's in an endless cycle with his liver failure and reported it affects his mood and mental health greatly.    Chemical Dependency Status:  Chemical Dependency Status: Past Concern - Pt reported he has gone to treatment for alcohol use disorder twice. SW inquired how he felt after completing treatment the most recent time. He reported he can't/won't drink alcohol now and seemed to have the attitude of \"what does it matter\" that he went to treatment.        Values/Beliefs:  Spiritual, Cultural Beliefs, Restorationist Practices, Values that affect care: no             Additional Information: SW completed initial assessment with pt at bedside. Pt discussed how he forgets to take his medications during the day. Even with an alarm set, he will sleep through the alarm. Pt has commercial insurance from his employment and receives short term disability payments. SW offered private duty home " care options meaning pt could pay privately to have a care attendant at home to assist him with medication compliance and whatever else he needs help with. Pt reported he couldn't afford that. Pt offered the option to apply for Medical Assistance but pt shared he makes too much money for that. SW explored informal supports who could assist pt; he reported everybody works full time. There wouldn't appear to be a lot of options to support pt in the community unless he wants to move to assisted living which he told me is not his goal at this time.    Anabel Garg, [facility]   Weekend Pager: [phone]

## 2023-10-14 NOTE — PROVIDER NOTIFICATION
UU-UED, UED16, Adriel CONNOR, 1980  FYI:Patient needs medication for high BP in future. Patient has some confusion and was given lactulose Bp has decreased. Patient continues to void. Yosef Shah [id number]

## 2023-10-14 NOTE — PLAN OF CARE
Goal Outcome Evaluation:    Status: [id number]    Patient has yellow sclera and has some somnolence overnight. Patient received lactulose Q2hrs. Patient would make inappropriate comments and at one point urinated on floor. Patient assist x 1, Patient has bed side commode. Patient has on briefs for intermittent episodes of incontinence. Patient is sometimes cooperative with cares and at times non-cooperative. Patient need to be re-directed occasionally. MD is aware of patient's confusion noting confusion in past. Patient had one hypertensive episode but BP has been VSS. Patient declined using urinal for urine collect. Scabs are generalized throughout body especially abdomen and LE abdomen distended    Hand off given to next nurse.

## 2023-10-14 NOTE — PROGRESS NOTES
Physician Attestation   I, Raymundo Rodriguez, was present with the medical/CHANDRIKA student Jack Haro and the medical resident Dr. Everton Deluca who participated in the service and in the documentation of the note.  I have verified the history and personally performed the physical exam and medical decision making.  I agree with the assessment and plan of care as documented in the note.      Key findings: see edits in blue    Please see A&P for additional details of medical decision making.    I have personally reviewed the following data over the past 24 hrs:    10.2  \   9.8 (L)   / 123 (L)     126 (L) 94 (L) 37.4 (H) /  161 (H)   3.5 16 (L) 2.22 (H) \       ALT: 45 AST: 69 (H) AP: 198 (H) TBILI: 13.4 (H)   ALB: 3.5 TOT PROTEIN: 7.1 LIPASE: N/A       INR:  1.61 (H) PTT:  N/A   D-dimer:  N/A Fibrinogen:  N/A         Raymundo Askew (Evelina) MD Michael  Internal Medicine/Pediatrics  Hospitalist  Date of Service (when I saw the patient): 10/14/23    [facility]    Progress Note - Medicine Service, MAROON TEAM 5       Date of Admission:  10/13/2023    Assessment & Plan   Elroy Morel Sr. is a 43 year old male with history of decompensated alcoholic liver cirrhosis with ascites, esophageal varices, and gastropathy, recent hospitalization for hepatic encephalopathy (10/3-10/5) admitted on 10/13/2023 with hepatic encephalopathy and JOCELYN.     Updates 10/14/23:  Started SBP prophylaxis 500 mg Cipro due to ascites protein  0.7  Therapeutic paracentesis likely on 10/15/23 (if Crea downward trending)  Encephalopathy with positive trend, Hagan = 1  Hydroxyzine 25 mg BID for itching    Hepatic encephalopathy  Decompensated alcoholic cirrhosis (recurrent ascites, HE, EV, gastropathy)  Chronic Hypervolemic vs hypovolemic hyponatremia  Presents with worsening encephalopathy despite aggressive lactulose treatment at home, not missing doses, and meeting bowel movement goals. Given his  clinical exam and symptoms, most likely cause of his confusion is hepatic encephalopathy. CT head negative for intracranial pathology. Ongoing evaluation for infection as below. Hyponatremia is chronic and has ranged from 120-132 In the last 2 months, mostly in the 120s. Not obviously fluid overloaded in exam other than ascites in abdomen, could be hypovolemic as suggested by JOCELYN and diarrhea from lactulose. Serum osm nl on admission. S/p 500 ml LR bolus. CXR: no infection. Blood cultures negative so far. Gets paracenteses twice weekly, last was 3 days prior to admission. Paracentesis(diagnostic)  performed 10/13 and negative for SBP. UA unremarkable.   - GI consulted  - Peth and Zinc pending  - Please administer albumin with paracentesis (12.5 g for every L, limit to 4 L per paracentesis).  Consider therapeutic paracentesis prior to discharge if JOCELYN improves/resolves  - Albumin 100g /day for 3 days given JOCELYN (10/13-10/15)  - Continue rifaximin.  - IV/PO thiamine and folic acid  - MELD labs daily  - Due for EGD for post-banding surveillance.  - Low sodium high calorie/high protein diet  - SBP prophylaxis 500 mg Cipro due to ascites protein  0.7  - therapeutic paracentesis likely on 10/15/23 (if Crea downward trending)  - restart of diuretics after JOCELYN resolution trend  - HE assessment Q4  - Lactulose 30g PRN for Bms 4-5 daily    MELD 3.0: 33 at 10/13/2023  1:20 PM  MELD-Na: 33 at 10/13/2023  1:20 PM  Calculated from:  Serum Creatinine: 2.26 mg/dL at 10/13/2023  1:20 PM  Serum Sodium: 125 mmol/L at 10/13/2023  1:20 PM  Total Bilirubin: 15.1 mg/dL at 10/13/2023  1:20 PM  Serum Albumin: 3.1 g/dL at 10/13/2023  1:20 PM  INR(ratio): 1.53 at 10/13/2023  1:20 PM  Age at listing (hypothetical): 43 years  Sex: Male at 10/13/2023  1:20 PM     JOCELYN on CKD  Hepatorenal syndrome?  Creatinine 2.26 10/13/23 from baseline 1.7-1.9. Possibly in a setting of developing HRS. No significant Crea trend on 10/14/23  - avoid nephrotoxic  "medications  - daily CMP  - Albumin 100g /day x 3 days (10/13-10/15) - will need to order 10/15  - Gabapentin on hold     GERD  - PTA pantoprazole     Mood  - PTA Zoloft    Itching  - Ongoing problem in setting of liver insufficiency.  - Hydroxyzine 25mg BID        Diet: Advance Diet as Tolerated: Regular Diet Adult    DVT Prophylaxis: Pneumatic Compression Devices  Carroll Catheter: Not present  Fluids: none  Lines: None     Cardiac Monitoring: None  Code Status: Full Code      Clinically Significant Risk Factors Present on Admission         # Hyponatremia: Lowest Na = 125 mmol/L in last 2 days, will monitor as appropriate      # Hypoalbuminemia: Lowest albumin = 3.1 g/dL at 10/13/2023  1:20 PM, will monitor as appropriate    # Coagulation Defect: INR = 1.61 (Ref range: 0.85 - 1.15) and/or PTT = 42 Seconds (Ref range: 22 - 38 Seconds), will monitor for bleeding  # Thrombocytopenia: Lowest platelets = 123 in last 2 days, will monitor for bleeding     # Hypertension: Noted on problem list      # Obesity: Estimated body mass index is 32.4 kg/m  as calculated from the following:    Height as of 10/3/23: 1.753 m (5' 9\").    Weight as of 10/3/23: 99.5 kg (219 lb 6.4 oz).            Disposition Plan      Expected Discharge Date: 10/15/2023                The patient's care was discussed with the Attending Physician, Dr. Rodriguez .    Jack Haro  Medical Student  Medicine Service, [address]  Securely message with [employer] (more info)  Text page via [facility] Paging/Directory   See signed in provider for up to date coverage information  ______________________________________________________________________    Interval History   NAOE. Stool ~every 30 minutes. Mental status is better today, although confused. Complains of itching and increased ascites.  Talked to his wife over the phone. They have a problem with dosing his medications Mon-Fri when she is at work and need " help with that.    Physical Exam   Vital Signs: Temp: 98.1  F (36.7  C) Temp src: Oral BP: (!) 133/94 Pulse: 81   Resp: 18 SpO2: 97 % O2 Device: None (Room air)      Constitutional: confused, A&Ox3.  Eyes: Sclera injected  Neck: supple  CV: RRR, No edema  Respiratory: Unlabored breathing  Abdomen: + distended, nontender, no peritoneal signs  Skin: warm, perfused, + jaundice.  Neuro: Unable to assess. Asterixis present.

## 2023-10-15 ENCOUNTER — ANCILLARY PROCEDURE (OUTPATIENT)
Dept: ULTRASOUND IMAGING | Facility: CLINIC | Age: 43
End: 2023-10-15
Attending: STUDENT IN AN ORGANIZED HEALTH CARE EDUCATION/TRAINING PROGRAM
Payer: COMMERCIAL

## 2023-10-15 ENCOUNTER — HEALTH MAINTENANCE LETTER (OUTPATIENT)
Age: 43
End: 2023-10-15

## 2023-10-15 LAB
ALBUMIN SERPL BCG-MCNC: 3.4 G/DL (ref 3.5–5.2)
ALP SERPL-CCNC: 177 U/L (ref 40–129)
ALT SERPL W P-5'-P-CCNC: 35 U/L (ref 0–70)
ANION GAP SERPL CALCULATED.3IONS-SCNC: 12 MMOL/L (ref 7–15)
AST SERPL W P-5'-P-CCNC: 57 U/L (ref 0–45)
BILIRUB SERPL-MCNC: 11.1 MG/DL
BUN SERPL-MCNC: 31.7 MG/DL (ref 6–20)
CALCIUM SERPL-MCNC: 9.3 MG/DL (ref 8.6–10)
CHLORIDE SERPL-SCNC: 99 MMOL/L (ref 98–107)
CREAT SERPL-MCNC: 1.93 MG/DL (ref 0.67–1.17)
DEPRECATED HCO3 PLAS-SCNC: 18 MMOL/L (ref 22–29)
EGFRCR SERPLBLD CKD-EPI 2021: 44 ML/MIN/1.73M2
ERYTHROCYTE [DISTWIDTH] IN BLOOD BY AUTOMATED COUNT: 16.9 % (ref 10–15)
FIBRINOGEN PPP-MCNC: 203 MG/DL (ref 170–490)
GLUCOSE SERPL-MCNC: 81 MG/DL (ref 70–99)
HCT VFR BLD AUTO: 28.1 % (ref 40–53)
HGB BLD-MCNC: 9.2 G/DL (ref 13.3–17.7)
INR PPP: 1.69 (ref 0.85–1.15)
MCH RBC QN AUTO: 33.2 PG (ref 26.5–33)
MCHC RBC AUTO-ENTMCNC: 32.7 G/DL (ref 31.5–36.5)
MCV RBC AUTO: 101 FL (ref 78–100)
PLATELET # BLD AUTO: 107 10E3/UL (ref 150–450)
POTASSIUM SERPL-SCNC: 3.4 MMOL/L (ref 3.4–5.3)
PROT SERPL-MCNC: 6.7 G/DL (ref 6.4–8.3)
RBC # BLD AUTO: 2.77 10E6/UL (ref 4.4–5.9)
SODIUM SERPL-SCNC: 129 MMOL/L (ref 135–145)
WBC # BLD AUTO: 7.8 10E3/UL (ref 4–11)
ZINC SERPL-MCNC: 72.8 UG/DL

## 2023-10-15 PROCEDURE — 0W9G3ZZ DRAINAGE OF PERITONEAL CAVITY, PERCUTANEOUS APPROACH: ICD-10-PCS | Performed by: STUDENT IN AN ORGANIZED HEALTH CARE EDUCATION/TRAINING PROGRAM

## 2023-10-15 PROCEDURE — 99232 SBSQ HOSP IP/OBS MODERATE 35: CPT | Mod: 25 | Performed by: STUDENT IN AN ORGANIZED HEALTH CARE EDUCATION/TRAINING PROGRAM

## 2023-10-15 PROCEDURE — 85384 FIBRINOGEN ACTIVITY: CPT | Performed by: PEDIATRICS

## 2023-10-15 PROCEDURE — 36415 COLL VENOUS BLD VENIPUNCTURE: CPT | Performed by: STUDENT IN AN ORGANIZED HEALTH CARE EDUCATION/TRAINING PROGRAM

## 2023-10-15 PROCEDURE — 250N000011 HC RX IP 250 OP 636: Performed by: STUDENT IN AN ORGANIZED HEALTH CARE EDUCATION/TRAINING PROGRAM

## 2023-10-15 PROCEDURE — P9047 ALBUMIN (HUMAN), 25%, 50ML: HCPCS | Performed by: STUDENT IN AN ORGANIZED HEALTH CARE EDUCATION/TRAINING PROGRAM

## 2023-10-15 PROCEDURE — 250N000013 HC RX MED GY IP 250 OP 250 PS 637: Performed by: STUDENT IN AN ORGANIZED HEALTH CARE EDUCATION/TRAINING PROGRAM

## 2023-10-15 PROCEDURE — 85610 PROTHROMBIN TIME: CPT | Performed by: STUDENT IN AN ORGANIZED HEALTH CARE EDUCATION/TRAINING PROGRAM

## 2023-10-15 PROCEDURE — 250N000013 HC RX MED GY IP 250 OP 250 PS 637: Performed by: EMERGENCY MEDICINE

## 2023-10-15 PROCEDURE — 80053 COMPREHEN METABOLIC PANEL: CPT | Performed by: STUDENT IN AN ORGANIZED HEALTH CARE EDUCATION/TRAINING PROGRAM

## 2023-10-15 PROCEDURE — 120N000011 HC R&B TRANSPLANT UMMC

## 2023-10-15 PROCEDURE — 85027 COMPLETE CBC AUTOMATED: CPT | Performed by: STUDENT IN AN ORGANIZED HEALTH CARE EDUCATION/TRAINING PROGRAM

## 2023-10-15 PROCEDURE — 49083 ABD PARACENTESIS W/IMAGING: CPT | Performed by: STUDENT IN AN ORGANIZED HEALTH CARE EDUCATION/TRAINING PROGRAM

## 2023-10-15 RX ORDER — CHOLESTYRAMINE LIGHT 4 G/5.7G
4 POWDER, FOR SUSPENSION ORAL EVERY 24 HOURS
Status: DISCONTINUED | OUTPATIENT
Start: 2023-10-15 | End: 2023-10-16 | Stop reason: HOSPADM

## 2023-10-15 RX ORDER — LANOLIN ALCOHOL/MO/W.PET/CERES
100 CREAM (GRAM) TOPICAL DAILY
Qty: 30 TABLET | Refills: 3 | Status: SHIPPED | OUTPATIENT
Start: 2023-10-16 | End: 2024-03-12

## 2023-10-15 RX ORDER — CIPROFLOXACIN 500 MG/1
500 TABLET, FILM COATED ORAL EVERY EVENING
Qty: 30 TABLET | Refills: 3 | Status: SHIPPED | OUTPATIENT
Start: 2023-10-16 | End: 2023-11-27

## 2023-10-15 RX ORDER — PANTOPRAZOLE SODIUM 40 MG/1
40 TABLET, DELAYED RELEASE ORAL DAILY
Qty: 30 TABLET | Refills: 3 | Status: SHIPPED | OUTPATIENT
Start: 2023-10-15 | End: 2024-03-12

## 2023-10-15 RX ORDER — FOLIC ACID 1 MG/1
1 TABLET ORAL DAILY
Qty: 30 TABLET | Refills: 3 | Status: SHIPPED | OUTPATIENT
Start: 2023-10-16 | End: 2023-11-27

## 2023-10-15 RX ORDER — CHOLESTYRAMINE LIGHT 4 G/5.7G
4 POWDER, FOR SUSPENSION ORAL
Qty: 30 PACKET | Refills: 1 | Status: SHIPPED | OUTPATIENT
Start: 2023-10-15 | End: 2024-03-12

## 2023-10-15 RX ORDER — LACTULOSE 10 G/10G
20 SOLUTION ORAL 3 TIMES DAILY
Status: DISCONTINUED | OUTPATIENT
Start: 2023-10-15 | End: 2023-10-16 | Stop reason: HOSPADM

## 2023-10-15 RX ORDER — CHOLESTYRAMINE LIGHT 4 G/5.7G
4 POWDER, FOR SUSPENSION ORAL DAILY
Status: DISCONTINUED | OUTPATIENT
Start: 2023-10-15 | End: 2023-10-15

## 2023-10-15 RX ORDER — GABAPENTIN 100 MG/1
100 CAPSULE ORAL AT BEDTIME
Qty: 30 CAPSULE | Refills: 3 | Status: SHIPPED | OUTPATIENT
Start: 2023-10-15 | End: 2023-11-28

## 2023-10-15 RX ORDER — ALBUMIN (HUMAN) 12.5 G/50ML
100 SOLUTION INTRAVENOUS ONCE
Status: COMPLETED | OUTPATIENT
Start: 2023-10-15 | End: 2023-10-15

## 2023-10-15 RX ORDER — LACTULOSE 20 G/30ML
20 SOLUTION ORAL 3 TIMES DAILY
Qty: 946 ML | Refills: 3 | Status: SHIPPED | OUTPATIENT
Start: 2023-10-15 | End: 2023-10-16

## 2023-10-15 RX ORDER — LACTULOSE 10 G/15ML
20 SOLUTION ORAL
Qty: 946 ML | Refills: 3 | Status: SHIPPED | OUTPATIENT
Start: 2023-10-15 | End: 2023-10-16

## 2023-10-15 RX ADMIN — RIFAXIMIN 550 MG: 550 TABLET ORAL at 20:42

## 2023-10-15 RX ADMIN — RIFAXIMIN 550 MG: 550 TABLET ORAL at 08:57

## 2023-10-15 RX ADMIN — THIAMINE HCL TAB 100 MG 100 MG: 100 TAB at 08:57

## 2023-10-15 RX ADMIN — PANTOPRAZOLE SODIUM 40 MG: 40 TABLET, DELAYED RELEASE ORAL at 08:57

## 2023-10-15 RX ADMIN — FOLIC ACID 1 MG: 1 TABLET ORAL at 08:57

## 2023-10-15 RX ADMIN — ALBUMIN HUMAN 100 G: 0.25 SOLUTION INTRAVENOUS at 11:47

## 2023-10-15 RX ADMIN — LACTULOSE 30 G: 10 POWDER, FOR SOLUTION ORAL at 08:57

## 2023-10-15 RX ADMIN — CIPROFLOXACIN HYDROCHLORIDE 500 MG: 250 TABLET, FILM COATED ORAL at 20:42

## 2023-10-15 RX ADMIN — SERTRALINE HYDROCHLORIDE 50 MG: 50 TABLET ORAL at 08:57

## 2023-10-15 ASSESSMENT — ACTIVITIES OF DAILY LIVING (ADL)
ADLS_ACUITY_SCORE: 18

## 2023-10-15 NOTE — CARE PLAN
Vitals: /80 (BP Location: Right arm)   Pulse 91   Temp 98.2  F (36.8  C) (Oral)   Resp 16   SpO2 100%   Time: 6943-4709  Neuro: A/O x 4, calls appropriately and makes needs known.  Activity: up ad grazyna   Pain and N/V: Denies Pain and n/v.  GI/: Frequent loose stools, Voiding spontaneously. AUOP.   Cardiac: Denies cardiac chest pain.   Diet: Regular.   Respiratory: Denies SOB, on RA  Lines: combination diet high kcal/high protein diet.   Incisions/Drains/Skin: Generalized scratches. Abdominal distention.   Lab: Reviewed.  New changes this shift: No significant changes this shift, Continue POC.

## 2023-10-15 NOTE — PROGRESS NOTES
Care Management Follow Up    Length of Stay (days): 2    Expected Discharge Date: 10/15/2023     Concerns to be Addressed: discharge planning       Patient plan of care discussed at interdisciplinary rounds:  n/a    Anticipated Discharge Disposition:  Home     Anticipated Discharge Services: n/a    Anticipated Discharge DME: n/a     Patient/family educated on Medicare website which has current facility and service quality ratings: no    Education Provided on the Discharge Plan: Yes    Patient/Family in Agreement with the Plan: yes    Referrals Placed by CM/SW: n/a    Private pay costs discussed: Not applicable    Additional Information: CAMILLE was paged by provider reporting pt's wife would like more information on applying for Medical Assistance for pt. CAMILLE called Malissa who reported she would like pt to be referred to financial counseling. CAMILLE explained the financial counselor would screen pt and provide info on whether he would qualify for MA. CAMILLE informed Malissa that if pt were to qualify for MA there is an option to receive [facility] funding for personal care attendant to assist pt at home. This would be another assessment/application process. Malissa expressed understanding.    CAMILLE sent pt info to Marcela Galdamez (financial counselor).    MARILUZ Sandoval  [facility]   Weekend Pager: [phone]

## 2023-10-15 NOTE — PROCEDURES
[facility]  CAPS PROCEDURE NOTE  Date of Admission:  10/13/2023  Consult Requested by: Medicine  Reason for Consult: management of symptomatic ascites    Indication/HPI: Decompensated cirrhosis    Pre-Procedure Diagnosis: Ascites    Post-Procedure Diagnosis: Ascites    Risk Assessment: Average risk, Technically straightforward; patient's anticoagulation has been held according to guidelines based on the agent and platelets and coags are within guidelines    Procedure Outcome:  Therapeutic paracentesis performed with 4 liters of ascites removed.   See additional procedure details below.    The primary covering service should follow up and address any lab results as appropriate.    Attila Rankin MD  [facility]  Securely message with [employer] (more info)  Text page via [email or web address] Paging/Directory   See signed in provider for up to date coverage information      [facility]    Paracentesis    Date/Time: 10/15/2023 11:59 AM    Performed by: Attila Rankin MD  Authorized by: Attila Rankin MD    PRE-PROCEDURE DETAILS  Procedure purpose: therapeutic  Indications: abdominal discomfort secondary to ascites        UNIVERSAL PROTOCOL   Site Marked: Yes  Prior Images Obtained and Reviewed:  Yes  Required items: Required blood products, implants, devices and special equipment available    Patient identity confirmed:  Verbally with patient, arm band and hospital-assigned identification number  NA - No sedation, light sedation, or local anesthesia  Confirmation Checklist:  Patient's identity using two indicators  Time out: Immediately prior to the procedure a time out was called    Universal Protocol: the Joint Commission Universal Protocol was followed    Preparation: Patient was prepped and draped in usual sterile fashion       ANESTHESIA    Anesthesia:  Local  infiltration  Local Anesthetic:  Lidocaine 1% without epinephrine      SEDATION    Patient Sedated: No    POST PROCEDURE DETAILS  Ultrasound guidance: yes  Puncture site: right lower quadrant  Fluid appearance: serous        PROCEDURE  Describe Procedure: The risks and benefits of the procedure were explained to the patient who expressed understanding and opted to proceed.  Consent was obtained and placed in the chart.  A time out was performed.  An area of ascites was located and marked using ultrasound guidance in the right lower quadrant; the area was prepped and draped in the usual sterile fashion.  8 ml of 1% lidocaine was instilled and ascites located.  The 5 Trinidadian paracentesis catheter and needle were inserted under real-time guidance until ascites obtained then the needle removed and the catheter advanced.  The apparatus was connected to vacuum bottles and a total of 4000 ml of jackson colored fluid removed.   A specimen was not sent for analysis. The catheter was withdrawn and the area dressed, using skin glue, gauze and Tegaderm.           Patient Tolerance:  Patient tolerated the procedure well with no immediate complications  Length of time physician/provider present for 1:1 monitoring during sedation: 0        POC US GUIDE FOR PARACENTESIS     Impression  US Indication: decompensated liver disease    FINDINGS:  Limited abdominal ultrasound was performed and demonstrated an adequate fluid collection on the right lower quadrant of the abdomen. Doppler of the skin demonstrated an area at this site without significant vasculature. A paracentesis at this site was subsequently performed.

## 2023-10-15 NOTE — PLAN OF CARE
/74 (BP Location: Right arm)   Pulse 88   Temp 98.4  F (36.9  C) (Oral)   Resp 18   SpO2 100%     Shift: 2867-1568  Isolation Status: none  VS: 100% on room air, afebrile  Neuro: Aox3   Behaviors: withdrawal, somewhat flat, cooperative but not talkative  BG: none  Labs: urine sent in for labs, results pending  Respiratory: WDL  Cardiac: WDL  Pain/Nausea: Denies pain and nausea, discomfort with abdominal distention, requesting paracentesis   PRN: hydroxyzine for itching, denied- close to HS benadryl   Diet: regular   IV Access: 1 left and 1 right PIV   Infusion(s): none  Lines/Drains: none  GI/: 6 loose stools this shift-lactulose held   Skin: jaundice, yellow sclera, rash and scabs from scratching on legs, torso, arm, etc.   Mobility: UAL, independent  Events/Education: none  Plan: waiting for paracentesis, monitor electrolytes, continuing observation

## 2023-10-15 NOTE — PROGRESS NOTES
[facility]    Progress Note - Hospitalist Service, ASHISH TEAM        Date of Admission:  10/13/2023    Assessment & Plan   Elroy Morel Sr. is a 43 year old male admitted on 10/13/2023. He has a history of decompensated alcoholic liver cirrhosis with ascites, esophageal varices, and gastropathy, recent hospitalization for hepatic encephalopathy (10/3-10/5) admitted on 10/13/2023 with hepatic encephalopathy and JOCELYN.      Changes today:  - paracentesis max 4L out  - day 3 of albumin challenge 100g (will give after para)  - 10x BM, decrease lactulose to 20g TID (home dose)  - restart PTA Questran daily for itching  - updated wife Malissa  - asked SW to call wife today/tomorrow to talk about home care/medical assistance. Patient had told SW that he would not qualify etc but he was encephalopathic and in fact his wife is very interested in seeing if they could apply for assistance.      Hepatic encephalopathy, improving  Decompensated alcoholic cirrhosis (recurrent ascites receiving paracenteses twice weekly, recurrent HE, EV, gastropathy)  Chronic hypervolemic vs acute hypovolemic hyponatremia  Presents with worsening encephalopathy. Initially reporting that he was having adequate BMs and not missing doses but likely he has been missing doses while wife is at work and when she gets home, she tries to give him additional PRNs but likely just falling behind. Presents again with HE. CT head negative for intracranial pathology. Infectious w/up including UA, CXR, BCx negative. Hyponatremia is chronic and has ranged from 120-132 In the last 2 months, mostly in the 120s. Not obviously fluid overloaded in exam other than ascites in abdomen, could be hypovolemic as suggested by JOCELYN and diarrhea from lactulose. Serum osm nl on admission. S/p 500 ml LR bolus.   - GI consulted, appreciate recs  - Diagnostic para 10/13 neg for SBP  - started PO cipro 500 mg daily for SBP ppx given  low ascites protein   - therapeutic paracentesis on 10/15, limit to 4L  - Day 3 of albumin challenge so receiving 100 g today; otherwise needs 12.5g of albumin for every L removed in paracentesis  - decrease lactulose to PTA dose of 20 g TID, goal 4-5 BMs + PRNs if not meeting goal  - Peth and Zinc pending  - Continue rifaximin.  - IV/PO thiamine and folic acid  - MELD labs daily  - Due for EGD for post-banding surveillance OP  - Low sodium high calorie/high protein diet  - HE assessment Q4  - Social work to call wife 10/15 or 10/16  No other family members can help out administering meds while wife is at work. Questioning if they can get home care, applying for medical assistance, etc.    For discharge:  - needs refills of all meds here in our discharge pharmacy  - currently has dave lactulose powder + PRN solution. Solution works better for him and his wife. Please change his powder to solution for all dave and PRN doses on discharge meds.        MELD 3.0: 31 at 10/15/2023  4:56 AM  MELD-Na: 31 at 10/15/2023  4:56 AM  Calculated from:  Serum Creatinine: 1.93 mg/dL at 10/15/2023  4:56 AM  Serum Sodium: 129 mmol/L at 10/15/2023  4:56 AM  Total Bilirubin: 11.1 mg/dL at 10/15/2023  4:56 AM  Serum Albumin: 3.4 g/dL at 10/15/2023  4:56 AM  INR(ratio): 1.69 at 10/15/2023  4:56 AM  Age at listing (hypothetical): 43 years  Sex: Male at 10/15/2023  4:56 AM       JOCELYN on CKD  Hepatorenal syndrome?  Creatinine 2.26 10/13/23 from baseline 1.7-1.9. Possibly in a setting of developing HRS. Cr minimally improved with albumin.  - avoid nephrotoxic medications  - daily CMP  - Albumin 100g /day x 3 days (10/13-15)  - Gabapentin on hold for now pending improvement in mental status, it is already renally dosed   - not on PTA diuretics and would not start diuretics with ongoing resolving JOCELYN     GERD  - PTA pantoprazole     Mood  - PTA Zoloft     Itching  - Ongoing problem in setting of liver insufficiency.  - Hydroxyzine 25mg BID  -  "restart PTA Questran 4g daily        Diet: Advance Diet as Tolerated: Regular Diet Adult    DVT Prophylaxis: Pneumatic Compression Devices  Carroll Catheter: Not present  Fluids: none  Lines: None     Cardiac Monitoring: None  Code Status: Full Code         Diet: Combination Diet High Kcal/High Protein Diet, ADULT; 2 gm NA Diet    DVT Prophylaxis: Low Risk/Ambulatory with no VTE prophylaxis indicated  Carroll Catheter: Not present  Fluids: allbumin  Lines: None     Cardiac Monitoring: None  Code Status: Full Code      Clinically Significant Risk Factors         # Hyponatremia: Lowest Na = 125 mmol/L in last 2 days, will monitor as appropriate      # Hypoalbuminemia: Lowest albumin = 3.1 g/dL at 10/13/2023  1:20 PM, will monitor as appropriate  # Coagulation Defect: INR = 1.69 (Ref range: 0.85 - 1.15) and/or PTT = 42 Seconds (Ref range: 22 - 38 Seconds), will monitor for bleeding  # Thrombocytopenia: Lowest platelets = 107 in last 2 days, will monitor for bleeding   # Hypertension: Noted on problem list        # Obesity: Estimated body mass index is 32.4 kg/m  as calculated from the following:    Height as of 10/3/23: 1.753 m (5' 9\").    Weight as of 10/3/23: 99.5 kg (219 lb 6.4 oz)., PRESENT ON ADMISSION     # Financial/Environmental Concerns: none         Disposition Plan     Expected Discharge Date: 10/15/2023      Destination: home            Raymundo Rodriguez MD (Sally)  Internal Medicine/Pediatrics  Hospitalist    Hospitalist Service, [facility]  Securely message with [employer] (more info)  Text page via [facility] Paging/Directory   See signed in provider for up to date coverage information  ______________________________________________________________________    Interval History   No acute events overnight. Mental status better today. 10+ BMs overnight. Planning for para - very distended. No other c/o concerns. Talked to wife and he had called her to wish her a " happy birthday, and it's not her birthday, so clearly still confused even though he is oriented.     Physical Exam   Vital Signs: Temp: 98.8  F (37.1  C) Temp src: Oral BP: 130/89 Pulse: 92   Resp: 18 SpO2: 100 % O2 Device: None (Room air)    Weight: 0 lbs 0 oz      General: awake, alert, in no acute distress   HEENT: NCAT, PERRL, EOMI, sclera anicteric, no nasal discharge, MMM  CV: RRR, no murmurs noted  Resp: CTAB, no increased WOB  Abd: Soft, nontender, distended, +BS, no rebound or guarding  MSK: No peripheral edema, extremities warm and well perfused  Skin: warm, dry, jaundiced, excoriated on legs  Neuro: Oriented to location, time, self.      Medical Decision Making       Please see A&P for additional details of medical decision making.      Data     I have personally reviewed the following data over the past 24 hrs:    7.8  \   9.2 (L)   / 107 (L)     129 (L) 99 31.7 (H) /  81   3.4 18 (L) 1.93 (H) \     ALT: 35 AST: 57 (H) AP: 177 (H) TBILI: 11.1 (H)   ALB: 3.4 (L) TOT PROTEIN: 6.7 LIPASE: N/A     Procal: N/A CRP: N/A Lactic Acid: N/A       INR:  1.69 (H) PTT:  N/A   D-dimer:  N/A Fibrinogen:  203       Imaging results reviewed over the past 24 hrs:   Recent Results (from the past 24 hour(s))   POC US GUIDE FOR PARACENTESIS    Impression    US Indication: decompensated liver disease    FINDINGS:  Limited abdominal ultrasound was performed and demonstrated an adequate fluid collection on the right lower quadrant of the abdomen. Doppler of the skin demonstrated an area at this site without significant vasculature. A paracentesis at this site was subsequently performed.

## 2023-10-16 VITALS
HEART RATE: 91 BPM | SYSTOLIC BLOOD PRESSURE: 127 MMHG | TEMPERATURE: 99 F | OXYGEN SATURATION: 100 % | RESPIRATION RATE: 16 BRPM | DIASTOLIC BLOOD PRESSURE: 79 MMHG

## 2023-10-16 LAB
ALBUMIN SERPL BCG-MCNC: 3.8 G/DL (ref 3.5–5.2)
ALP SERPL-CCNC: 152 U/L (ref 40–129)
ALT SERPL W P-5'-P-CCNC: 26 U/L (ref 0–70)
ANION GAP SERPL CALCULATED.3IONS-SCNC: 15 MMOL/L (ref 7–15)
AST SERPL W P-5'-P-CCNC: 55 U/L (ref 0–45)
BILIRUB SERPL-MCNC: 9.8 MG/DL
BUN SERPL-MCNC: 28.5 MG/DL (ref 6–20)
CALCIUM SERPL-MCNC: 8.8 MG/DL (ref 8.6–10)
CHLORIDE SERPL-SCNC: 95 MMOL/L (ref 98–107)
CREAT SERPL-MCNC: 1.74 MG/DL (ref 0.67–1.17)
DEPRECATED HCO3 PLAS-SCNC: 17 MMOL/L (ref 22–29)
EGFRCR SERPLBLD CKD-EPI 2021: 49 ML/MIN/1.73M2
ERYTHROCYTE [DISTWIDTH] IN BLOOD BY AUTOMATED COUNT: 16.8 % (ref 10–15)
GLUCOSE SERPL-MCNC: 100 MG/DL (ref 70–99)
HCT VFR BLD AUTO: 26.4 % (ref 40–53)
HGB BLD-MCNC: 8.7 G/DL (ref 13.3–17.7)
INR PPP: 1.77 (ref 0.85–1.15)
LABORATORY REPORT: NORMAL
MCH RBC QN AUTO: 32.8 PG (ref 26.5–33)
MCHC RBC AUTO-ENTMCNC: 33 G/DL (ref 31.5–36.5)
MCV RBC AUTO: 100 FL (ref 78–100)
PLATELET # BLD AUTO: 114 10E3/UL (ref 150–450)
PLPETH BLD-MCNC: <10 NG/ML
POPETH BLD-MCNC: <10 NG/ML
POTASSIUM SERPL-SCNC: 3.4 MMOL/L (ref 3.4–5.3)
PROT SERPL-MCNC: 6.5 G/DL (ref 6.4–8.3)
RBC # BLD AUTO: 2.65 10E6/UL (ref 4.4–5.9)
SODIUM SERPL-SCNC: 127 MMOL/L (ref 135–145)
WBC # BLD AUTO: 8.2 10E3/UL (ref 4–11)

## 2023-10-16 PROCEDURE — 99239 HOSP IP/OBS DSCHRG MGMT >30: CPT | Mod: GC | Performed by: STUDENT IN AN ORGANIZED HEALTH CARE EDUCATION/TRAINING PROGRAM

## 2023-10-16 PROCEDURE — 36415 COLL VENOUS BLD VENIPUNCTURE: CPT | Performed by: STUDENT IN AN ORGANIZED HEALTH CARE EDUCATION/TRAINING PROGRAM

## 2023-10-16 PROCEDURE — 250N000013 HC RX MED GY IP 250 OP 250 PS 637: Performed by: STUDENT IN AN ORGANIZED HEALTH CARE EDUCATION/TRAINING PROGRAM

## 2023-10-16 PROCEDURE — 250N000013 HC RX MED GY IP 250 OP 250 PS 637: Performed by: EMERGENCY MEDICINE

## 2023-10-16 PROCEDURE — 85610 PROTHROMBIN TIME: CPT | Performed by: STUDENT IN AN ORGANIZED HEALTH CARE EDUCATION/TRAINING PROGRAM

## 2023-10-16 PROCEDURE — 80053 COMPREHEN METABOLIC PANEL: CPT | Performed by: STUDENT IN AN ORGANIZED HEALTH CARE EDUCATION/TRAINING PROGRAM

## 2023-10-16 PROCEDURE — 85027 COMPLETE CBC AUTOMATED: CPT | Performed by: STUDENT IN AN ORGANIZED HEALTH CARE EDUCATION/TRAINING PROGRAM

## 2023-10-16 RX ORDER — LACTULOSE 10 G/10G
30 SOLUTION ORAL 3 TIMES DAILY
Qty: 300 PACKET | Refills: 3 | Status: SHIPPED | OUTPATIENT
Start: 2023-10-16 | End: 2023-11-28

## 2023-10-16 RX ORDER — LACTULOSE 20 G/30ML
30 SOLUTION ORAL 3 TIMES DAILY
Qty: 946 ML | Refills: 3 | Status: SHIPPED | OUTPATIENT
Start: 2023-10-16 | End: 2023-11-28

## 2023-10-16 RX ORDER — LACTULOSE 10 G/15ML
30 SOLUTION ORAL
Qty: 946 ML | Refills: 3 | Status: SHIPPED | OUTPATIENT
Start: 2023-10-16 | End: 2023-11-28

## 2023-10-16 RX ORDER — LACTULOSE 10 G/10G
30 SOLUTION ORAL 3 TIMES DAILY
Qty: 300 PACKET | Refills: 4 | Status: SHIPPED | OUTPATIENT
Start: 2023-10-16 | End: 2023-11-28

## 2023-10-16 RX ORDER — LACTULOSE 10 G/15ML
20 SOLUTION ORAL 3 TIMES DAILY PRN
Status: DISCONTINUED | OUTPATIENT
Start: 2023-10-16 | End: 2023-10-16 | Stop reason: HOSPADM

## 2023-10-16 RX ADMIN — SERTRALINE HYDROCHLORIDE 50 MG: 50 TABLET ORAL at 08:34

## 2023-10-16 RX ADMIN — FOLIC ACID 1 MG: 1 TABLET ORAL at 08:34

## 2023-10-16 RX ADMIN — PANTOPRAZOLE SODIUM 40 MG: 40 TABLET, DELAYED RELEASE ORAL at 08:34

## 2023-10-16 RX ADMIN — RIFAXIMIN 550 MG: 550 TABLET ORAL at 08:34

## 2023-10-16 RX ADMIN — THIAMINE HCL TAB 100 MG 100 MG: 100 TAB at 08:34

## 2023-10-16 ASSESSMENT — ACTIVITIES OF DAILY LIVING (ADL)
ADLS_ACUITY_SCORE: 18

## 2023-10-16 NOTE — PLAN OF CARE
Goal Outcome Evaluation:      Plan of Care Reviewed With: patient    Overall Patient Progress: improving    Outcome Evaluation: A&Ox4. Voiding spontaneously, BM x3 today. Jaundiced, generalized itching at baseline. High kcal, protein diet; good appetite. Denies pain and nausea. PIV removed by patient just prior to discharge. Up ad grazyna. Son picked patient up. Reviewed discharge instructions, all questions answered. Medications to be picked up at discharge pharmacy.

## 2023-10-16 NOTE — DISCHARGE SUMMARY
"[facility]  Discharge Summary - Medicine & Pediatrics       Date of Admission:  10/13/2023  Date of Discharge:  10/16/2023  1:35 PM  Discharging Provider: Dr. Raymundo Rodriguez  Discharge Service: Medicine Service, ASHISH TEAM 5    Discharge Diagnoses   Hepatic encephalopathy  Decompensated alcoholic cirrhosis (recurrent ascites receiving paracenteses twice weekly, recurrent HE, EV, gastropathy)   Chronic hypervolemic vs acute hypovolemic hyponatremia   JOCELYN on CKD   GERD  Itching    Clinically Significant Risk Factors     # Obesity: Estimated body mass index is 32.4 kg/m  as calculated from the following:    Height as of 10/3/23: 1.753 m (5' 9\").    Weight as of 10/3/23: 99.5 kg (219 lb 6.4 oz).       Follow-ups Needed After Discharge   Follow-up Appointments     Adult [facility] Follow-up and recommended labs and tests      Follow up with your liver doctor  on 10/31 and  your kidney doctor on   12/15 as scheduled.     Appointments on [place] and/or [facility] (with [facility] or [facility]   provider or service). Call [phone] if you haven't heard regarding   these appointments within 7 days of discharge.            Unresulted Labs Ordered in the Past 30 Days of this Admission       Date and Time Order Name Status Description    10/13/2023  1:57 PM Anaerobic bacterial culture Preliminary     10/13/2023  1:57 PM Ascites Fluid Aerobic Bacterial Culture Routine Preliminary     10/13/2023  1:55 PM Blood Culture Peripheral Blood Preliminary     10/13/2023  1:55 PM Blood Culture Arm, Left Preliminary         These results will be followed up by hospitalist pool.    Discharge Disposition   Discharged to home  Condition at discharge: Stable    Hospital Course   Elroy Morel Sr. was admitted on 10/13/2023 for hepatic encephalopathy.  The following problems were addressed during his hospitalization:    Hepatic encephalopathy  Decompensated alcoholic cirrhosis (recurrent ascites " receiving paracenteses twice weekly, recurrent HE, EV, gastropathy)   Chronic hypervolemic vs acute hypovolemic hyponatremia   Presents with worsening encephalopathy. Initially reporting that he was having adequate BMs and not missing doses but likely he has been missing doses while wife is at work and when she gets home, she tries to give him additional PRNs but likely just falling behind. Presents again with HE. CT head negative for intracranial pathology. Infectious w/up including UA, CXR, BCx negative; diagnostic para on 10/13 neg for SBP. Peth neg. Hyponatremia is chronic and has ranged from 120-132 In the last 2 months, mostly in the 120s. Not obviously fluid overloaded in exam other than ascites in abdomen, could be hypovolemic as suggested by JOCELYN and diarrhea from lactulose. Treated on [place] protocol with resolution of HE.   - started PO cipro 500 mg daily, meets criteria for primary SBP ppx  - Increased lactulose to 30 g TID dave with 30 mg q3H PRN for goal 4-5 stools daily  - continue PTA rifaximin  - therapeutic para 4L out on 10/15 - continue PTA twice weekly hanh  - Folate, thiamine  - OP EGD for post-banding surveillance, dave by GI  - referred to financial counseling to apply for MA, home care services (might be able to help with med administration in the future while wife is at work)  - f/up with GI as scheduled    JOCELYN on CKD   Cr improved after albumin challenge 100g x 3 days. Has had elevated Crs in the last few months so generally improving.   - f/up with nephrology as scheduled  - deferring diuretics decision to nephrology as JOCELYN resolves  - sodium bicarb stopped by nephrology OP      GERD - PTA PPI  Itching - PTA Questran    Consultations This Hospital Stay   GI HEPATOLOGY ADULT IP CONSULT  NURSING TO CONSULT FOR VASCULAR ACCESS CARE IP CONSULT  CARE MANAGEMENT / SOCIAL WORK IP CONSULT  INTERNAL MEDICINE PROCEDURE TEAM ADULT IP CONSULT [facility] - PARACENTESIS  INTERNAL MEDICINE PROCEDURE  TEAM ADULT IP CONSULT [place] - PARACENTESIS    Code Status   Full Code         Physician Attestation   I, Raymundo Rodriguez, was present with the medical student Jack Haro and the medical resident Dr. Ad Tolliver who participated in the service and in the documentation of the note.  I have verified the history and personally performed the physical exam and medical decision making.  I agree with the assessment and plan of care as documented in the note.        Please see A&P for additional details of medical decision making.    I have personally reviewed the following data over the past 24 hrs:    8.2  \   8.7 (L)   / 114 (L)     127 (L) 95 (L) 28.5 (H) /  100 (H)   3.4 17 (L) 1.74 (H) \     ALT: 26 AST: 55 (H) AP: 152 (H) TBILI: 9.8 (H)   ALB: 3.8 TOT PROTEIN: 6.5 LIPASE: N/A     INR:  1.77 (H) PTT:  N/A   D-dimer:  N/A Fibrinogen:  N/A         Raymundo Askew (Evelina) MD Michael  Internal Medicine/Pediatrics  Hospitalist    Date of Service (when I saw the patient): 10/16/23      [address] [place]  [address] [facility] [address]  Phone: [phone]  ______________________________________________________________________    Physical Exam   Vital Signs: Temp: 99  F (37.2  C) Temp src: Oral BP: 127/79 Pulse: 91   Resp: 16 SpO2: 100 % O2 Device: None (Room air)    Weight: 0 lbs 0 oz    General: awake, alert, in no acute distress   HEENT: NCAT, PERRL, EOMI, sclera anicteric, no nasal discharge, MMM  CV: RRR, no murmurs noted  Resp: CTAB, no increased WOB  Abd: Soft, nontender, distended, +BS, no rebound or guarding  MSK: No peripheral edema, extremities warm and well perfused  Skin: warm, dry, jaundiced, excoriations on legs  Neuro: Oriented to location, time, self.      Primary Care Physician   Latosha Baires    Discharge Orders      Adult GI  Referral - Procedure Only      Reason for your hospital stay    You were admitted to the hospital for hepatic  encephalopathy (confusion due to your liver).     Activity    Your activity upon discharge: activity as tolerated     Adult [facility] Follow-up and recommended labs and tests    Follow up with your liver doctor  on 10/31 and  your kidney doctor on 12/15 as scheduled.     Appointments on [place] and/or [facility] (with [facility] or [facility] provider or service). Call [phone] if you haven't heard regarding these appointments within 7 days of discharge.     When to contact your care team    Call your primary doctor if you have any of the following: fevers, chills, abdominal pain, lethargy, confusion, or any other new concerning symptoms.     Discharge Instructions    1. Lactulose at least 3 times a day to get 4-5 bowel movements a day. Give more if starting to be more confused.   2. Social work follow up and your outpatient doctors can follow up on applying for medical assistance/looking for more help at home to ensure that you get enough lactulose in during the day.  3. Paracenteses twice a week, last one was on 10/15 in the hospital.     Resume Home Care Services     Diet    Follow this diet upon discharge: Orders Placed This Encounter       High Kcal/High Protein Diet, ADULT; 2 gm sodium Diet       Significant Results and Procedures   Most Recent 3 CBC's:  Recent Labs   Lab Test 10/16/23  0526 10/15/23  0456 10/14/23  0938   WBC 8.2 7.8 10.2   HGB 8.7* 9.2* 9.8*    101* 101*   * 107* 123*     Most Recent 3 BMP's:  Recent Labs   Lab Test 10/16/23  0526 10/15/23  0456 10/14/23  1834   * 129* 128*   POTASSIUM 3.4 3.4 3.4   CHLORIDE 95* 99 99   CO2 17* 18* 17*   BUN 28.5* 31.7* 33.6*   CR 1.74* 1.93* 1.95*   ANIONGAP 15 12 12   ENEDINA 8.8 9.3 9.2   * 81 102*     Most Recent 2 LFT's:  Recent Labs   Lab Test 10/16/23  0526 10/15/23  0456   AST 55* 57*   ALT 26 35   ALKPHOS 152* 177*   BILITOTAL 9.8* 11.1*     Most Recent 3 INR's:  Recent Labs   Lab Test 10/16/23  0526 10/15/23  0451  10/14/23  0938   INR 1.77* 1.69* 1.61*   ,   Results for orders placed or performed during the hospital encounter of 10/13/23   CT Head w/o Contrast    Narrative    CT HEAD W/O CONTRAST 10/13/2023 3:10 PM    History: ams     Comparison: 10/3/2023 head CT    Technique: Using multidetector thin collimation helical acquisition  technique, axial, coronal and sagittal CT images from the skull base  to the vertex were obtained without intravenous contrast.   (topogram) image(s) also obtained and reviewed.    Findings: There is no intracranial hemorrhage, mass effect, or midline  shift. Gray/white matter differentiation in both cerebral hemispheres  is preserved. Ventricles are proportionate to the cerebral sulci. The  basal cisterns are clear.    The bony calvaria and the bones of the skull base are normal. The  visualized portions of the paranasal sinuses and mastoid air cells are  clear.      Impression    Impression:  No acute intracranial pathology.     I have personally reviewed the examination and initial interpretation  and I agree with the findings.    BELIA MURRY MD         SYSTEM ID:  [id number]   POC US GUIDE FOR PARACENTESIS    Narrative    Phyllis Olsen MD     10/13/2023  4:49 PM  POC US GUIDE FOR PARACENTESIS    Date/Time: 10/13/2023 3:31 PM    Performed by: Phyllis Olsen MD  Authorized by: Phyllis Olsen MD    Consent:     Consent obtained:  Written    Consent given by:  Spouse    Risks, benefits, and alternatives were discussed: yes      Risks discussed:  Bleeding, bowel perforation and infection  Universal protocol:     Procedure explained and questions answered to patient or proxy's   satisfaction: yes      Relevant documents present and verified: yes      Test results available: yes      Imaging studies available: yes      Site/side marked: yes    Pre-procedure details:     Procedure purpose:  Diagnostic    Preparation: Patient was prepped and draped in usual sterile fashion    Anesthesia:      Anesthesia method:  Local infiltration    Local anesthetic:  Lidocaine 1% WITH epi  Procedure details:     Needle gauge:  20    Ultrasound guidance: yes      Puncture site:  R lower quadrant    Fluid removed amount:  20    Fluid appearance:  Alanna    Dressing:  Adhesive bandage  Post-procedure details:     Procedure completion:  Tolerated well, no immediate complications   XR Chest Port 1 View    Narrative    Examination: XR CHEST PORT 1 VIEW 10/14/2023 8:10 AM    Indication: ams    Comparison: X-ray 10/3/2023    Findings:  AP portable chest. Trachea is midline. Cardiac silhouette is within  normal limits. No significant pleural effusion or appreciable  pneumothorax. Mild interval increase in the mild diffuse interstitial  prominence and mild peribronchial cuffing. No focal consolidation.  Unremarkable visualized upper abdomen. No acute osseous abnormality.      Impression    Impression:   1. Slight increase in the mild diffuse interstitial pulmonary edema.  2. No opacities to suggest infection.  3. No significant pleural effusion or pneumothorax.    I have personally reviewed the examination and initial interpretation  and I agree with the findings.    LEIGHTON UPTON MD         SYSTEM ID:  [id number]   POC US GUIDE FOR PARACENTESIS    Impression    US Indication: decompensated liver disease    FINDINGS:  Limited abdominal ultrasound was performed and demonstrated an adequate fluid collection on the right lower quadrant of the abdomen. Doppler of the skin demonstrated an area at this site without significant vasculature. A paracentesis at this site was subsequently performed.         Discharge Medications   Discharge Medication List as of 10/16/2023  1:13 PM        START taking these medications    Details   ciprofloxacin (CIPRO) 500 MG tablet Take 1 tablet (500 mg) by mouth every evening, Disp-30 tablet, R-3, E-Prescribe      vitamin B1 (B-1) 100 MG tablet Take 1 tablet (100 mg) by mouth daily, Disp-30 tablet,  R-3, E-Prescribe           CONTINUE these medications which have CHANGED    Details   cholestyramine light (QUESTRAN) 4 GM packet Take 1 packet (4 g) by mouth daily (with breakfast), Disp-30 packet, R-1, E-Prescribe      folic acid (FOLVITE) 1 MG tablet Take 1 tablet (1 mg) by mouth daily, Disp-30 tablet, R-3, E-Prescribe      gabapentin (NEURONTIN) 100 MG capsule Take 1 capsule (100 mg) by mouth at bedtime, Disp-30 capsule, R-3, E-Prescribe      !! lactulose (CHRONULAC) 10 GM/15ML solution Take 45 mLs (30 g) by mouth every 3 hours as needed (Hepatic Encephalopathy prevention) Take throughout the day with a goal of 4-5 soft stools per day. Take more if you feel confusion coming on., Disp-946 mL, R-3, E-Prescribe      !! lactulose 20 GM/30ML solution Take 45 mLs (30 g) by mouth 3 times daily Goal 4-5 stools daily., Disp-946 mL, R-3, E-Prescribe      pantoprazole (PROTONIX) 40 MG EC tablet Take 1 tablet (40 mg) by mouth daily, Disp-30 tablet, R-3, E-Prescribe      rifaximin (XIFAXAN) 550 MG TABS tablet 1 tablet (550 mg) by Oral or Feeding Tube route 2 times daily, Disp-60 tablet, R-3, E-Prescribe      sertraline (ZOLOFT) 50 MG tablet Take 1 tablet (50 mg) by mouth daily, Disp-30 tablet, R-3, E-Prescribe       !! - Potential duplicate medications found. Please discuss with provider.        CONTINUE these medications which have NOT CHANGED    Details   albuterol (PROAIR HFA/PROVENTIL HFA/VENTOLIN HFA) 108 (90 Base) MCG/ACT inhaler Inhale 1-2 Puffs by mouth every 4 hours if needed., Historical      Calcium Carb-Cholecalciferol 500-10 MG-MCG CHEW Chew 1 Tablet by mouth once daily., Historical      diphenhydrAMINE (BENADRYL) 25 MG capsule Take 1 capsule (25 mg) by mouth every 6 hours as needed for itching, Disp-60 capsule, R-3, E-Prescribe      hydrOXYzine (ATARAX) 25 MG tablet Take 25 mg by mouth every 6 hours as needed, Historical      melatonin 3 MG tablet Take 3 mg by mouth nightly as needed for sleep, Historical       ondansetron (ZOFRAN ODT) 4 MG ODT tab Take 4 mg by mouth every 8 hours as needed, Historical           STOP taking these medications       KRISTALOSE 20 g packet Comments:   Reason for Stopping:         sodium bicarbonate 650 MG tablet Comments:   Reason for Stopping:             Allergies   Allergies   Allergen Reactions    Metronidazole Other (See Comments), Dizziness and Unknown    Omeprazole Other (See Comments) and Unknown     Irritability (tolerates Protonix well)

## 2023-10-16 NOTE — PLAN OF CARE
/73 (BP Location: Right arm)   Pulse 98   Temp 99.2  F (37.3  C) (Oral)   Resp 16   SpO2 100%     Shift: 1337-0746  Isolation Status: n/a  VS: stable on RA, afebrile  Neuro: Aox4  Behaviors: calm and cooperative   BG: N/a  Labs: Am labs pending   Respiratory: WNL  Cardiac: WNL  Pain/Nausea: Denies pain and nausea   PRN: none   Diet: Regular   IV Access: 1 L PIV 1 R PIV  Infusion(s): N/a  Lines/Drains: none  GI/: 2 loose BM with lactulose   Skin:  jaundice, yellow sclera, rash and scabs from scratching on legs   Mobility: independent   Events/Education: none  Plan: got paracentesis yesterday

## 2023-10-18 LAB
BACTERIA BLD CULT: NO GROWTH
BACTERIA BLD CULT: NO GROWTH
BACTERIA FLD CULT: NO GROWTH

## 2023-10-19 ENCOUNTER — PATIENT OUTREACH (OUTPATIENT)
Dept: CARE COORDINATION | Facility: CLINIC | Age: 43
End: 2023-10-19
Payer: COMMERCIAL

## 2023-10-19 NOTE — PROGRESS NOTES
Clinic Care Coordination Contact  Care Team Conversations    Patient identified for care management outreach, however [facility] RN staff has already followed up with patient to ensure they are following up with PCP and have needs and resources met. Clinic RN will refer back to CAMILLE PRATT if needed/appropriate.    Tamra Machuca, [facility]  Social Work Care Coordinator - [facility]  Care Coordination  [email or web address]  Cell Phone: [phone]  Gender pronouns: she/her  Employed by [employer]

## 2023-10-20 LAB — BACTERIA FLD CULT: NORMAL

## 2023-10-31 ENCOUNTER — PATIENT OUTREACH (OUTPATIENT)
Dept: CARE COORDINATION | Facility: CLINIC | Age: 43
End: 2023-10-31
Payer: COMMERCIAL

## 2023-10-31 ASSESSMENT — ACTIVITIES OF DAILY LIVING (ADL): DEPENDENT_IADLS:: CLEANING;COOKING;LAUNDRY;SHOPPING;MEDICATION MANAGEMENT;TRANSPORTATION

## 2023-10-31 NOTE — PROGRESS NOTES
Clinical Product Navigator RN reviewed chart; patient on payer product coverage.  Review results:   CPN Initial Information Gathering  Referral Source: Health Plan     Patient identified by her health plan for care management, per chart review:   43 yr old male, with 4 ED visits and 6 hospital admissions in the last 12 months. PMHx of severe alcohol use disorder, withdrawal seizures, decompensated cirrhosis (c/b grade II EV, ascites, portal hypertensive gastropathy), HTN, prior C.diff infection, alcoholic liver cirrhosis with ascites (twice weekly paracentesis), JOCELYN, and acute on chronic GI bleed s/p EGD with variceal banding 8/28. He was discharged to [facility] for chemical dependency treatment on 8/30/23. Patient has had a few short stays in inpatient chemical dependency August -September 2023: [facility] and [place] however needed frequent hospitalizations for signs of decompensation. As of 9/25/23 it was deemed that patient is not transplant candidate at either [facility] or the [facility] due to not being sober long enough (last ETOH use mid-August 2023) Patient lives with wife and two adult children. They all work full time; patient is on short term disability and alone during the day. Patient is followed by Primary Care RN CC and SW CC as needed. They have reviewed in home support options and penitentiary options. Patient not interested in these currently. Patient is followed by [facility] PCP, [facility], and Gastroenterology. Has upcoming appointments to establish are with [facility]. No outreach indicated at this time.     Caty Monique RN   Clinical Product Navigator   [email or web address]   Office: [phone]

## 2023-11-02 ENCOUNTER — TELEPHONE (OUTPATIENT)
Dept: NEPHROLOGY | Facility: CLINIC | Age: 43
End: 2023-11-02
Payer: COMMERCIAL

## 2023-11-02 NOTE — TELEPHONE ENCOUNTER
New pt appt scheduled from wait list previously 12/15 to now 11/27 at 4:00 pm in person. Pt's spouse (Malissa) in agreement to get labs at already scheduled lab appt a few days prior at local  clinic. Lab orders needed.  Surekha Dai,  Nephrology Clinic Navigator  [facility]  Direct: [phone].

## 2023-11-14 DIAGNOSIS — K70.31 ALCOHOLIC CIRRHOSIS OF LIVER WITH ASCITES (H): Primary | ICD-10-CM

## 2023-11-20 DIAGNOSIS — N17.9 AKI (ACUTE KIDNEY INJURY) (H): ICD-10-CM

## 2023-11-20 DIAGNOSIS — I10 ESSENTIAL HYPERTENSION: Primary | ICD-10-CM

## 2023-11-24 ENCOUNTER — LAB (OUTPATIENT)
Dept: LAB | Facility: CLINIC | Age: 43
End: 2023-11-24
Payer: COMMERCIAL

## 2023-11-24 DIAGNOSIS — I10 ESSENTIAL HYPERTENSION: ICD-10-CM

## 2023-11-24 DIAGNOSIS — K70.31 ALCOHOLIC CIRRHOSIS OF LIVER WITH ASCITES (H): ICD-10-CM

## 2023-11-24 DIAGNOSIS — N17.9 AKI (ACUTE KIDNEY INJURY) (H): ICD-10-CM

## 2023-11-24 DIAGNOSIS — N18.9 CHRONIC KIDNEY DISEASE, UNSPECIFIED CKD STAGE: ICD-10-CM

## 2023-11-24 LAB
AFP SERPL-MCNC: 3.2 NG/ML
ALBUMIN MFR UR ELPH: 40.5 MG/DL
ALBUMIN SERPL BCG-MCNC: 3.3 G/DL (ref 3.5–5.2)
ALBUMIN UR-MCNC: 30 MG/DL
ALP SERPL-CCNC: 195 U/L (ref 40–150)
ALT SERPL W P-5'-P-CCNC: 38 U/L (ref 0–70)
ANION GAP SERPL CALCULATED.3IONS-SCNC: 11 MMOL/L (ref 7–15)
APPEARANCE UR: CLEAR
AST SERPL W P-5'-P-CCNC: 103 U/L (ref 0–45)
BACTERIA #/AREA URNS HPF: ABNORMAL /HPF
BILIRUB DIRECT SERPL-MCNC: 1.19 MG/DL (ref 0–0.3)
BILIRUB SERPL-MCNC: 2.3 MG/DL
BILIRUB UR QL STRIP: ABNORMAL
BUN SERPL-MCNC: 18 MG/DL (ref 6–20)
CALCIUM SERPL-MCNC: 9.2 MG/DL (ref 8.6–10)
CHLORIDE SERPL-SCNC: 106 MMOL/L (ref 98–107)
COLOR UR AUTO: YELLOW
CREAT SERPL-MCNC: 1.81 MG/DL (ref 0.67–1.17)
CREAT UR-MCNC: 184 MG/DL
CREAT UR-MCNC: 184 MG/DL
DEPRECATED HCO3 PLAS-SCNC: 17 MMOL/L (ref 22–29)
EGFRCR SERPLBLD CKD-EPI 2021: 47 ML/MIN/1.73M2
ERYTHROCYTE [DISTWIDTH] IN BLOOD BY AUTOMATED COUNT: 17.1 % (ref 10–15)
FERRITIN SERPL-MCNC: 525 NG/ML (ref 31–409)
GLUCOSE SERPL-MCNC: 161 MG/DL (ref 70–99)
GLUCOSE UR STRIP-MCNC: NEGATIVE MG/DL
GRAN CASTS #/AREA URNS LPF: ABNORMAL /LPF
HCT VFR BLD AUTO: 30.2 % (ref 40–53)
HGB BLD-MCNC: 10.3 G/DL (ref 13.3–17.7)
HGB UR QL STRIP: NEGATIVE
HYALINE CASTS #/AREA URNS LPF: ABNORMAL /LPF
INR PPP: 1.36 (ref 0.85–1.15)
IRON BINDING CAPACITY (ROCHE): 126 UG/DL (ref 240–430)
IRON SATN MFR SERPL: 31 % (ref 15–46)
IRON SERPL-MCNC: 39 UG/DL (ref 61–157)
KETONES UR STRIP-MCNC: NEGATIVE MG/DL
LEUKOCYTE ESTERASE UR QL STRIP: NEGATIVE
MCH RBC QN AUTO: 31 PG (ref 26.5–33)
MCHC RBC AUTO-ENTMCNC: 34.1 G/DL (ref 31.5–36.5)
MCV RBC AUTO: 91 FL (ref 78–100)
MICROALBUMIN UR-MCNC: <12 MG/L
MICROALBUMIN/CREAT UR: NORMAL MG/G{CREAT}
MUCOUS THREADS #/AREA URNS LPF: PRESENT /LPF
NITRATE UR QL: NEGATIVE
PH UR STRIP: 6.5 [PH] (ref 5–8)
PHOSPHATE SERPL-MCNC: 3.1 MG/DL (ref 2.5–4.5)
PLATELET # BLD AUTO: 139 10E3/UL (ref 150–450)
POTASSIUM SERPL-SCNC: 3.5 MMOL/L (ref 3.4–5.3)
PROT SERPL-MCNC: 7.5 G/DL (ref 6.4–8.3)
PROT/CREAT 24H UR: 0.22 MG/MG CR (ref 0–0.2)
PTH-INTACT SERPL-MCNC: 19 PG/ML (ref 15–65)
RBC # BLD AUTO: 3.32 10E6/UL (ref 4.4–5.9)
RBC #/AREA URNS AUTO: ABNORMAL /HPF
RBC CASTS #/AREA URNS LPF: ABNORMAL /LPF
SODIUM SERPL-SCNC: 134 MMOL/L (ref 135–145)
SP GR UR STRIP: 1.02 (ref 1–1.03)
SQUAMOUS #/AREA URNS AUTO: ABNORMAL /LPF
UROBILINOGEN UR STRIP-ACNC: 0.2 E.U./DL
VIT D+METAB SERPL-MCNC: 20 NG/ML (ref 20–50)
WBC # BLD AUTO: 9.8 10E3/UL (ref 4–11)
WBC #/AREA URNS AUTO: ABNORMAL /HPF

## 2023-11-24 PROCEDURE — 82570 ASSAY OF URINE CREATININE: CPT

## 2023-11-24 PROCEDURE — 83550 IRON BINDING TEST: CPT

## 2023-11-24 PROCEDURE — 82610 CYSTATIN C: CPT

## 2023-11-24 PROCEDURE — 82248 BILIRUBIN DIRECT: CPT

## 2023-11-24 PROCEDURE — 99000 SPECIMEN HANDLING OFFICE-LAB: CPT

## 2023-11-24 PROCEDURE — 83970 ASSAY OF PARATHORMONE: CPT

## 2023-11-24 PROCEDURE — 84100 ASSAY OF PHOSPHORUS: CPT

## 2023-11-24 PROCEDURE — 82043 UR ALBUMIN QUANTITATIVE: CPT

## 2023-11-24 PROCEDURE — 85027 COMPLETE CBC AUTOMATED: CPT

## 2023-11-24 PROCEDURE — 82306 VITAMIN D 25 HYDROXY: CPT

## 2023-11-24 PROCEDURE — 82728 ASSAY OF FERRITIN: CPT

## 2023-11-24 PROCEDURE — 82105 ALPHA-FETOPROTEIN SERUM: CPT

## 2023-11-24 PROCEDURE — 80053 COMPREHEN METABOLIC PANEL: CPT

## 2023-11-24 PROCEDURE — 85610 PROTHROMBIN TIME: CPT

## 2023-11-24 PROCEDURE — 83540 ASSAY OF IRON: CPT

## 2023-11-24 PROCEDURE — 36415 COLL VENOUS BLD VENIPUNCTURE: CPT

## 2023-11-24 PROCEDURE — 81001 URINALYSIS AUTO W/SCOPE: CPT

## 2023-11-24 PROCEDURE — 84156 ASSAY OF PROTEIN URINE: CPT

## 2023-11-26 DIAGNOSIS — N18.9 CHRONIC KIDNEY DISEASE, UNSPECIFIED CKD STAGE: Primary | ICD-10-CM

## 2023-11-26 LAB
CYSTATIN C (ROCHE): 2 MG/L (ref 0.6–1)
GFR SERPL CREATININE-BSD FRML MDRD: 33 ML/MIN/1.73M2
LABORATORY REPORT: NORMAL
PETH INTERPRETATION: NORMAL
PLPETH BLD-MCNC: <10 NG/ML
POPETH BLD-MCNC: <10 NG/ML

## 2023-11-26 NOTE — PROGRESS NOTES
[facility] Hepatology    New Patient Visit    Referring provider:  Emil Moser  Chief complaint:  Alcohol related liver disease    Assessment  43 year old male with past medical history of decompensated alcohol-related cirrhosis complicated by hepatic encephalopathy, ascites and variceal bleeding (8/2023) and alcohol use disorder.    #. Decompensated alcohol-related cirrhosis complicated by hepatic encephalopathy, ascites and variceal bleeding (8/2023)   #. Alcohol use disorder; hx of alcohol withdrawal seizures   #. Protein calorie malnutrition  #. Pruritus   MELD 3.0: 21   Patient with decompensated alcohol-related cirrhosis.  This is hepatic encephalopathy is generally well-controlled with maximal medical therapy -he is working to titrate his lactulose appropriately.  His ascites is controlled with twice weekly paracenteses; he is off diuretics given issues with hyponatremia and acute kidney injury.  He has a history of variceal bleeding for which he was banded -he reports being up-to-date on variceal screening.  He was not previously on a beta-blocker.    With regards to his alcohol use disorder, his last use was 8/2023.  He is engaged with AA and a sponsor.  He has not engaged in formal programming.  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  He needs to complete a formal chemical dependency assessment and then complete formal programming prior to being considered for liver transplantation.  Suspects that if the patient has ongoing complete alcohol sobriety likely will have ongoing recompensation.    We discussed liver transplantation and the process that would be required going forward.  Once the patient completes a chemical dependency assessment and starts programming we will plan to refer the patient for transplant evaluation.    Plan  -- Referral to chemical dependency assessment and plan for programming  based on the assessment. Continue complete alcohol abstinence. Not interested in pharmacotherapy for cravings'  -- Start coreg 3.125mg BID for hx of variceal bleeding + hypertension; if ongoing issues with ascites, will stop coreg.   -- Diuretics: none given JOCELYN + continue twice weekly paracenteses (Tuesday + Fridays at [facility]) - max 5L, 50g albumin  -- Patient following with nephrology given renal dysfunction  -- Ciprofloxacin 500mg daily for SBP ppx   -- Hepatic encephalopathy management: Lactulose (3-4 BM per day) + Rifaximin 550mg BID   * Stop benadryl and gabapentin  -- HCC Screening: Recommend abdominal US + AFP q6 months, next due 3/2024  -- Variceal Screening next due 11/2024  -- Questran/cholestyramine for pruritis     Health Maintenance:  -- COVID Vaccination: none prior; recommend vaccination but patient is not interested   -- Recommend yearly influenza vaccination; none prior; recommend vaccination but patient is not interested   -- Recommend dental visits every 6 months    Nutrition & Physical Activity:  -- Recommend low sodium (< 2 grams per day) + high protein diet; information from [email or web address] provided     RTC: 3 months    Nimco Diego MD (Lizzie)  Advanced & Transplant Hepatology  [facility]      HPI:  ALD related Cirrhosis  - hx HE  - hx ascites  - hx variceal bleed (8/2023)  - last EGD 8/2023 - grade II EV s/p banding   - HCC screening - US 9/2023 without hepatic lesions    History:  - Fall 2022: hospitalized for a seizure related to alcohol withdrawl and was found to have alcohol related hepatitits. Saw Kathleen Muller (GI) at [facility] 10/24/2022 who discussed his alcohol related liver disease.    - Admitted from August 13 to August 18, 2023 with hematemesis and blood per rectum.  Per report at that time his last alcohol use was July 4.  He underwent upper endoscopy with banding in mid August.   - Admitted from August 21 to August 30, 2023 for jaundice upon  trying to be referred to [facility].  Also noted to have an acute kidney injury. GI EGD/colonoscopy 8/28 - Grade II EV s/p banding, portal hypertensive gastropathy, esophageal ulcer at prior variceal treatment site, 2 polyps in sigmoid colon s/p removal, rectal varices, erythematous mucosa in sigmoid colon/rectum c/w portal colopathy.   - Admitted from September 2 to September 11, 2023 with abdominal pain and distention and was found to have acute kidney injury.  - Admitted from September 18 to September 23, 2023 to [facility] with abdominal discomfort, jaundice and confusion.  - Seen by Kathleen Muller 9/25/2023 in the clinic - per documentation at that time his last alcohol use was August of 2023  - Admitted from October 3 to October 5, 2023 for hepatic encephalopathy.  Infectious evaluation negative.  - Admitted from October 13 to October 16, 2023 for hepatic encephalopathy, hyponatremia and acute kidney injury.  There was a concern that he missed some of his lactulose dosing in the setting of his wife being at work.    - Emergency department visit on 11/7/2023 for hypokalemia    Patient reports that since early November he has otherwise been doing well.  He reports that his energy level comes and goes.  He reports that he is eating okay.  He reports trying to adhere to a low-sodium and high-protein diet but it has been difficult because he does not like the taste of protein shakes or bars.  He feels as though he has been losing muscle mass but has been gaining fluid in his abdomen.    With regards to his confusion he is generally well-controlled with lactulose plus rifaximin.  Prior to developing cirrhosis he had issues with intermittent loose stools when he takes lactulose he will 5 stools per day that are generally soft in nature however he has been eating more cheese and stools.      Denies any hematochezia or melena.  He has a history of variceal bleeding in August 2023.  He reports an upper  endoscopy at [facility] within the last month -reportedly no banding was performed.    He gets a paracentesis done every Tuesday and Friday at [facility].  He is not currently on diuretics given issues with hyponatremia and acute kidney injury.    His pruritus is well-controlled with cholestyramine although he does not take that every day given he does not like the taste.    With regards to his alcohol use he has been drinking for many years but he increased his use about 2 to 3 years ago in the setting of COVID-19 as well as his parents death.  He reports drinking up to 48 ounces of liquor per day.  His last drink was 8/18/2023.  He did a few days in [facility] but was not able to complete this program due to medical issues.  He currently engages in AA twice and sees a sponsor - he started this about two months ago.    Medical hx Surgical hx   Past Medical History:   Diagnosis Date    Alcohol use disorder, severe, dependence (H)     Alcohol withdrawal seizure (H)     Alcoholic cirrhosis of liver with ascites (H)     Esophageal varices (H)     Essential hypertension     Gastroesophageal reflux disease without esophagitis     History of methamphetamine use     Sustained remission since 2003    Hypercholesterolemia     Tobacco abuse       Past Surgical History:   Procedure Laterality Date    COLONOSCOPY N/A 8/28/2023    Procedure: COLONOSCOPY, WITH POLYPECTOMY via bx forceps;  Surgeon: Alyssa Tsai MD;  Location:  GI    IR PARACENTESIS  8/31/2023    IR PARACENTESIS  9/15/2023          Medications  Current Outpatient Medications   Medication Sig Dispense Refill    Calcium Carb-Cholecalciferol 500-10 MG-MCG CHEW Chew 1 Tablet by mouth once daily.      carvedilol (COREG) 3.125 MG tablet Take 1 tablet (3.125 mg) by mouth 2 times daily (with meals) 90 tablet 3    cholestyramine light (QUESTRAN) 4 GM packet Take 1 packet (4 g) by mouth daily (with breakfast) 30 packet 1    folic acid  (FOLVITE) 1 MG tablet Take 1 tablet (1 mg) by mouth daily for 180 days 90 tablet 1    lactulose 20 GM/30ML solution Take 45 mLs (30 g) by mouth 3 times daily Goal 4-5 stools daily. 946 mL 3    ondansetron (ZOFRAN) 4 MG tablet Take 1 tablet (4 mg) by mouth every 8 hours as needed for nausea 30 tablet 3    pantoprazole (PROTONIX) 40 MG EC tablet Take 1 tablet (40 mg) by mouth daily 30 tablet 3    potassium chloride ER (KLOR-CON M) 10 MEQ CR tablet Take 1 tablet by mouth daily      rifaximin (XIFAXAN) 550 MG TABS tablet 1 tablet (550 mg) by Oral or Feeding Tube route 2 times daily 60 tablet 3    sertraline (ZOLOFT) 50 MG tablet Take 1 tablet (50 mg) by mouth daily 30 tablet 3    vitamin B1 (B-1) 100 MG tablet Take 1 tablet (100 mg) by mouth daily 30 tablet 3    albuterol (PROAIR HFA/PROVENTIL HFA/VENTOLIN HFA) 108 (90 Base) MCG/ACT inhaler Inhale 1-2 Puffs by mouth every 4 hours if needed.      hydrOXYzine (ATARAX) 25 MG tablet Take 25 mg by mouth every 6 hours as needed      melatonin 3 MG tablet Take 3 mg by mouth nightly as needed for sleep         Allergies  Allergies   Allergen Reactions    Metronidazole Other (See Comments), Dizziness and Unknown    Omeprazole Other (See Comments) and Unknown     Irritability (tolerates Protonix well)       Family hx Social hx   Family History   Problem Relation Age of Onset    Coronary Artery Disease Mother     Cerebrovascular Disease Mother     Liver Cancer No family hx of     Liver Disease No family hx of      Positive for a first degree relative with drug or alcohol problems.  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 Not currently working  - Lives with wife Malissa.  "They have several kids together.   - Alcohol: Prior heavy use. H/o alcohol withdrawal seizures (last 2022). H/o lodging [facility] x several days; not completed  - Tobacco: Former tobacco use, quit 2020.  - Drug use: history of amphetamine use. History of prior treatment for amphetamine use.      Review of systems  A 10-point review of systems was negative.    Examination  BP (!) 138/98   Pulse 88   Temp 97.9  F (36.6  C) (Oral)   Ht 1.753 m (5' 9\")   Wt 99.3 kg (219 lb)   BMI 32.34 kg/m    Body mass index is 32.34 kg/m .    Gen-NAD  Eye- EOMI  ENT- MMM  CVS- RRR, no murmurs  RS- CTA bilaterally  Abd- Overweight, soft, non-tender, mild distension  Extr- 2+ radial pulses bilaterally, no lower extremity edema bilaterally  MS- hands without clubbing  Neuro- A+Ox3, no asterixis  Skin- no rash or jaundice  Psych- normal mood    Laboratory  BMP  Recent Labs   Lab Test 11/24/23  0859 10/16/23  0526 10/15/23  0456 10/14/23  1834   * 127* 129* 128*   POTASSIUM 3.5 3.4 3.4 3.4   CHLORIDE 106 95* 99 99   ENEDINA 9.2 8.8 9.3 9.2   CO2 17* 17* 18* 17*   BUN 18.0 28.5* 31.7* 33.6*   CR 1.81* 1.74* 1.93* 1.95*   * 100* 81 102*     CBC  Recent Labs   Lab Test 11/24/23  0859 10/16/23  0526 10/15/23  0456 10/14/23  0938   WBC 9.8 8.2 7.8 10.2   RBC 3.32* 2.65* 2.77* 2.94*   HGB 10.3* 8.7* 9.2* 9.8*   HCT 30.2* 26.4* 28.1* 29.7*   MCV 91 100 101* 101*   MCH 31.0 32.8 33.2* 33.3*   MCHC 34.1 33.0 32.7 33.0   RDW 17.1* 16.8* 16.9* 17.2*   * 114* 107* 123*     Liver Enzymes   Recent Labs   Lab Test 11/24/23  0859   PROTTOTAL 7.5   ALBUMIN 3.3*   BILITOTAL 2.3*   ALKPHOS 195*   *   ALT 38      INR   INR   Date Value Ref Range Status   11/24/2023 1.36 (H) 0.85 - 1.15 Final         Radiology  Abdominal US 9/2023  1.  Nonspecific gallbladder wall thickening. No additional features to suggest acute cholecystitis.   2.  Cirrhotic appearance of liver. No worrisome hepatic mass.   3.  Moderate to large amount of " intra-abdominal ascites.    Endoscopy  EGD 8/28/2023  - Grade II esophageal varices. Completely eradicated. Banded.   - Portal hypertensive gastropathy.   - Normal examined duodenum.   - Esophageal ulcers at site of prior variceal treatment    Colonoscopy 8/2023  - Two diminutive polyps in the sigmoid colon, removed                          with a cold biopsy forceps. Resected and retrieved.                          - Rectal varices.                          - Erythematous mucosa in the sigmoid colon and rectum                          with come erythem, consistent with portal colopathy.                          This was the likely cause of hematochezia.                          No blood was seen.                          Terminal ileum was normal.

## 2023-11-27 ENCOUNTER — OFFICE VISIT (OUTPATIENT)
Dept: NEPHROLOGY | Facility: CLINIC | Age: 43
End: 2023-11-27
Attending: INTERNAL MEDICINE
Payer: COMMERCIAL

## 2023-11-27 VITALS
SYSTOLIC BLOOD PRESSURE: 161 MMHG | BODY MASS INDEX: 31.88 KG/M2 | TEMPERATURE: 98.3 F | DIASTOLIC BLOOD PRESSURE: 110 MMHG | WEIGHT: 215.9 LBS | HEART RATE: 95 BPM

## 2023-11-27 DIAGNOSIS — F10.20 ALCOHOL USE DISORDER, SEVERE, DEPENDENCE (H): ICD-10-CM

## 2023-11-27 DIAGNOSIS — N17.9 ACUTE RENAL FAILURE, UNSPECIFIED ACUTE RENAL FAILURE TYPE (H): ICD-10-CM

## 2023-11-27 PROCEDURE — 99215 OFFICE O/P EST HI 40 MIN: CPT | Performed by: INTERNAL MEDICINE

## 2023-11-27 PROCEDURE — 99213 OFFICE O/P EST LOW 20 MIN: CPT | Performed by: INTERNAL MEDICINE

## 2023-11-27 RX ORDER — FOLIC ACID 1 MG/1
1 TABLET ORAL DAILY
Qty: 90 TABLET | Refills: 1 | Status: SHIPPED | OUTPATIENT
Start: 2023-11-27 | End: 2024-03-12

## 2023-11-27 ASSESSMENT — PAIN SCALES - GENERAL: PAINLEVEL: NO PAIN (0)

## 2023-11-27 NOTE — LETTER
11/27/2023       RE: Elroy Morel Sr.  [address] [place] [address]     Dear Colleague,    Thank you for referring your patient, Elroy Morel Sr., to the [facility] NEPHROLOG[facility]IC [place] [facility]. Please see a copy of my visit note below.    Nephrology Clinic    Elroy Morel Sr. MRN:[id number] YOB: 1980  Date of Service: 11/27/2023  Primary care provider: Latosha Baires  Requesting physician: Elroy Rankin MD      REASON FOR CONSULT: CKD    HISTORY OF PRESENT ILLNESS:   Elroy Morel Sr. is a 43 year old male who presents for evaluation of CKD post-JOCELYN in the setting of alcoholic liver disease.   The past medical history is significant for alcohol dependence leading to cirrhosis and multiple hospitalizations over the past months,  last from October 13 th till October 16 th 2023. His hospitalizations have been complicated by episodes of JOCELYN with the most severe episode occurring early September with a creatinine level that peaked at 7.7 mg/dL. He has been needing therapeutic paracentesis twice a week. He reports that he has been abstinent since mid-August 2023. Over the past month his creatinine level has been fluctuating between 1.6 and 2.2 mg/dL and the latest value is 1.8 mg/dL on 11/24/2023. He has no evidence of any significant albuminuria with a negative uACR on 11/24/2023. A kidney ultrasound done early September 2023 shows normal size kidneys. He has been off diuretics. The patient's current medications include cavedilol 3.125 mg twice daily, rifaximin 550 mg twice daily, lactulose, cholestyramine and pantoprazole.  The patient denies any dysuria, any pollakiuria, any nocturia, any LE edema, any dyspnea on exertion .  The patient denies ever having kidney stones, urinary tract infections, gross hematuria. There is no family history of CKD.  The following portions of the patient's history were reviewed and  updated as appropriate: allergies, current medications, past family history, past medical history, past social history, past surgical history and problem list.    PAST MEDICAL HISTORY:  Past Medical History:   Diagnosis Date     Alcohol use disorder, severe, dependence (H)      Alcohol withdrawal seizure (H)      Alcoholic cirrhosis of liver with ascites (H)      Esophageal varices (H)      Essential hypertension      Gastroesophageal reflux disease without esophagitis      History of methamphetamine use     Sustained remission since 2003     Hypercholesterolemia      Tobacco abuse      PAST SURGICAL HISTORY:  Past Surgical History:   Procedure Laterality Date     COLONOSCOPY N/A 8/28/2023    Procedure: COLONOSCOPY, WITH POLYPECTOMY via bx forceps;  Surgeon: Alyssa Tsai MD;  Location:  GI     IR PARACENTESIS  8/31/2023     IR PARACENTESIS  9/15/2023     MEDICATIONS:  Prescription Medications as of 11/27/2023         Rx Number Disp Refills Start End Last Dispensed Date Next Fill Date Owning Pharmacy    albuterol (PROAIR HFA/PROVENTIL HFA/VENTOLIN HFA) 108 (90 Base) MCG/ACT inhaler            Sig: Inhale 1-2 Puffs by mouth every 4 hours if needed.    Class: Historical    Calcium Carb-Cholecalciferol 500-10 MG-MCG CHEW    9/1/2023        Sig: Chew 1 Tablet by mouth once daily.    Class: Historical    cholestyramine light (QUESTRAN) 4 GM packet  30 packet 1 10/15/2023    [address]    Sig: Take 1 packet (4 g) by mouth daily (with breakfast)    Class: E-Prescribe    Route: Oral    Renewals       Renewal requests to authorizing provider (Raymundo Rodriguez) <b>prohibited</b>            ciprofloxacin (CIPRO) 500 MG tablet  30 tablet 3 10/16/2023    [address]    Sig: Take 1 tablet (500 mg) by mouth every evening    Class: E-Prescribe    Route: Oral    Renewals       Renewal requests to  authorizing provider (Raymundo Rodriguez) <b>prohibited</b>            diphenhydrAMINE (BENADRYL) 25 MG capsule  60 capsule 3 9/11/2023    [id number] - [place], [place] - [address] [facility]    Sig: Take 1 capsule (25 mg) by mouth every 6 hours as needed for itching    Class: E-Prescribe    Route: Oral    folic acid (FOLVITE) 1 MG tablet  30 tablet 3 10/16/2023    [address]    Sig: Take 1 tablet (1 mg) by mouth daily    Class: E-Prescribe    Route: Oral    Renewals       Renewal requests to authorizing provider (Raymundo Rodriguez) <b>prohibited</b>            gabapentin (NEURONTIN) 100 MG capsule  30 capsule 3 10/15/2023    [address]    Sig: Take 1 capsule (100 mg) by mouth at bedtime    Class: E-Prescribe    Route: Oral    Renewals       Renewal requests to authorizing provider (Raymundo Rodriguez) <b>prohibited</b>            hydrOXYzine (ATARAX) 25 MG tablet    9/23/2023        Sig: Take 25 mg by mouth every 6 hours as needed    Class: Historical    Route: Oral    lactulose (CEPHULAC) 10 GM packet  300 packet 3 10/16/2023    [facility] #[id number] - [place], [place] - [address] [facility]    Sig: Take 3 packets (30 g) by mouth 3 times daily    Class: E-Prescribe    Route: Oral    lactulose (CEPHULAC) 10 GM packet  300 packet 4 10/16/2023    [facility] #[id number] - [place], [place] - [address] [facility]    Sig: Take 3 packets (30 g) by mouth 3 times daily    Class: E-Prescribe    Route: Oral    lactulose (CHRONULAC) 10 GM/15ML solution  946 mL 3 10/16/2023    [address]    Sig: Take 45 mLs (30 g) by mouth every 3 hours as needed (Hepatic Encephalopathy prevention) Take throughout the day with a goal of 4-5 soft stools per day. Take more if you feel  confusion coming on.    Class: E-Prescribe    Route: Oral    lactulose 20 GM/30ML solution  946 mL 3 10/16/2023    [address]    Sig: Take 45 mLs (30 g) by mouth 3 times daily Goal 4-5 stools daily.    Class: E-Prescribe    Route: Oral    melatonin 3 MG tablet            Sig: Take 3 mg by mouth nightly as needed for sleep    Class: Historical    Route: Oral    ondansetron (ZOFRAN ODT) 4 MG ODT tab    2023        Sig: Take 4 mg by mouth every 8 hours as needed    Class: Historical    Route: Oral    pantoprazole (PROTONIX) 40 MG EC tablet  30 tablet 3 10/15/2023    [address]    Sig: Take 1 tablet (40 mg) by mouth daily    Class: E-Prescribe    Route: Oral    Renewals       Renewal requests to authorizing provider (Raymundo Rodriguez) <b>prohibited</b>            rifaximin (XIFAXAN) 550 MG TABS tablet  60 tablet 3 10/15/2023    [address]    Si tablet (550 mg) by Oral or Feeding Tube route 2 times daily    Class: E-Prescribe    Route: Oral or Feeding Tube    Renewals       Renewal requests to authorizing provider (Raymundo Rodriguez) <b>prohibited</b>            sertraline (ZOLOFT) 50 MG tablet  30 tablet 3 10/15/2023    [address]    Sig: Take 1 tablet (50 mg) by mouth daily    Class: E-Prescribe    Route: Oral    Renewals       Renewal requests to authorizing provider (Raymundo Rodriguez) <b>prohibited</b>            vitamin B1 (B-1) 100 MG tablet  30 tablet 3 10/16/2023    [address]    Sig: Take 1 tablet (100 mg) by mouth daily    Class: E-Prescribe    Route: Oral    Renewals       Renewal requests to authorizing provider (Raymundo Rodriguez) <b>prohibited</b>                   ALLERGIES:    Allergies   Allergen Reactions      Metronidazole Other (See Comments), Dizziness and Unknown     Omeprazole Other (See Comments) and Unknown     Irritability (tolerates Protonix well)     REVIEW OF SYSTEMS:    A comprehensive review of systems was performed and found to be negative except as described here or above.  SOCIAL HISTORY:   Social History     Socioeconomic History     Marital status:      Spouse name: Not on file     Number of children: Not on file     Years of education: Not on file     Highest education level: Not on file   Occupational History     Not on file   Tobacco Use     Smoking status: Former     Types: Cigarettes     Smokeless tobacco: Former   Substance and Sexual Activity     Alcohol use: Not Currently     Comment: last drink 8/19     Drug use: Not Currently     Types: Amphetamines     Comment: Sustained remission     Sexual activity: Not on file   Other Topics Concern     Not on file   Social History Narrative    Pt is , 2 children. Employed.      Social Determinants of Health     Financial Resource Strain: Not on file   Food Insecurity: Not on file   Transportation Needs: Not on file   Physical Activity: Not on file   Stress: Not on file   Social Connections: Not on file   Interpersonal Safety: Not on file   Housing Stability: Not on file     FAMILY MEDICAL HISTORY:   No family history on file.  PHYSICAL EXAM:   There were no vitals taken for this visit.  GENERAL APPEARANCE: alert and no distress  EYES: nonicteric  HENT: mouth without ulcers or lesions  NECK: supple, no adenopathy  RESP: lungs clear to auscultation   CV: regular rhythm, normal rate, no rub  ABDOMEN: soft, nontender, normal bowel sounds, no HSM   Extremities: no clubbing, cyanosis, or edema  MS: no evidence of inflammation in joints, no muscle tenderness  SKIN: no rash  NEURO: mentation intact and speech normal  PSYCH: affect normal/bright   LABS:   Recent Results (from the past 672 hour(s))   CBC with platelets    Collection Time: 11/24/23   8:59 AM   Result Value Ref Range    WBC Count 9.8 4.0 - 11.0 10e3/uL    RBC Count 3.32 (L) 4.40 - 5.90 10e6/uL    Hemoglobin 10.3 (L) 13.3 - 17.7 g/dL    Hematocrit 30.2 (L) 40.0 - 53.0 %    MCV 91 78 - 100 fL    MCH 31.0 26.5 - 33.0 pg    MCHC 34.1 31.5 - 36.5 g/dL    RDW 17.1 (H) 10.0 - 15.0 %    Platelet Count 139 (L) 150 - 450 10e3/uL   INR    Collection Time: 11/24/23  8:59 AM   Result Value Ref Range    INR 1.36 (H) 0.85 - 1.15   AFP tumor marker    Collection Time: 11/24/23  8:59 AM   Result Value Ref Range    AFP tumor marker 3.2 <=8.3 ng/mL   Phosphatidylethanol (PEth), Whole Blood    Collection Time: 11/24/23  8:59 AM   Result Value Ref Range    PEth 16:0/18:1 (POPEth) <10 ng/mL    PEth 16:0/18:2 (PLPEth) <10 ng/mL    EER Phosphatidylethanol (PETH) See Note     PEth Interpretation See Note    Renal panel    Collection Time: 11/24/23  8:59 AM   Result Value Ref Range    Sodium 134 (L) 135 - 145 mmol/L    Potassium 3.5 3.4 - 5.3 mmol/L    Chloride 106 98 - 107 mmol/L    Carbon Dioxide (CO2) 17 (L) 22 - 29 mmol/L    Anion Gap 11 7 - 15 mmol/L    Glucose 161 (H) 70 - 99 mg/dL    Urea Nitrogen 18.0 6.0 - 20.0 mg/dL    Creatinine 1.81 (H) 0.67 - 1.17 mg/dL    GFR Estimate 47 (L) >60 mL/min/1.73m2    Calcium 9.2 8.6 - 10.0 mg/dL    Albumin 3.3 (L) 3.5 - 5.2 g/dL    Phosphorus 3.1 2.5 - 4.5 mg/dL   Ferritin    Collection Time: 11/24/23  8:59 AM   Result Value Ref Range    Ferritin 525 (H) 31 - 409 ng/mL   Iron and iron binding capacity    Collection Time: 11/24/23  8:59 AM   Result Value Ref Range    Iron 39 (L) 61 - 157 ug/dL    Iron Binding Capacity 126 (L) 240 - 430 ug/dL    Iron Sat Index 31 15 - 46 %   Parathyroid Hormone Intact    Collection Time: 11/24/23  8:59 AM   Result Value Ref Range    Parathyroid Hormone Intact 19 15 - 65 pg/mL   Vitamin D Deficiency    Collection Time: 11/24/23  8:59 AM   Result Value Ref Range    Vitamin D, Total (25-Hydroxy) 20 20 - 50 ng/mL   Alkaline phosphatase    Collection  Time: 11/24/23  8:59 AM   Result Value Ref Range    Alkaline Phosphatase 195 (H) 40 - 150 U/L   ALT    Collection Time: 11/24/23  8:59 AM   Result Value Ref Range    ALT 38 0 - 70 U/L   AST    Collection Time: 11/24/23  8:59 AM   Result Value Ref Range     (H) 0 - 45 U/L   Bilirubin  total    Collection Time: 11/24/23  8:59 AM   Result Value Ref Range    Bilirubin Total 2.3 (H) <=1.2 mg/dL   Bilirubin direct    Collection Time: 11/24/23  8:59 AM   Result Value Ref Range    Bilirubin Direct 1.19 (H) 0.00 - 0.30 mg/dL   Protein total    Collection Time: 11/24/23  8:59 AM   Result Value Ref Range    Protein Total 7.5 6.4 - 8.3 g/dL   Cystatin C with GFR    Collection Time: 11/24/23  8:59 AM   Result Value Ref Range    Cystatin C 2.0 (H) 0.6 - 1.0 mg/L    GFR Calculated with Cystatin C 33 (L) >=60 mL/min/1.73m2   Protein  random urine    Collection Time: 11/24/23  9:01 AM   Result Value Ref Range    Total Protein Urine mg/dL 40.5   mg/dL    Total Protein Urine mg/mg Creat 0.22 (H) 0.00 - 0.20 mg/mg Cr    Creatinine Urine mg/dL 184.0 mg/dL   Albumin Random Urine Quantitative with Creat Ratio    Collection Time: 11/24/23  9:01 AM   Result Value Ref Range    Creatinine Urine mg/dL 184.0 mg/dL    Albumin Urine mg/L <12.0 mg/L    Albumin Urine mg/g Cr     UA with Microscopic    Collection Time: 11/24/23  9:02 AM   Result Value Ref Range    Color Urine Yellow Colorless, Straw, Light Yellow, Yellow    Appearance Urine Clear Clear    Glucose Urine Negative Negative mg/dL    Bilirubin Urine Small (A) Negative    Ketones Urine Negative Negative mg/dL    Specific Gravity Urine 1.020 1.005 - 1.030    Blood Urine Negative Negative    pH Urine 6.5 5.0 - 8.0    Protein Albumin Urine 30 (A) Negative mg/dL    Urobilinogen Urine 0.2 0.2, 1.0 E.U./dL    Nitrite Urine Negative Negative    Leukocyte Esterase Urine Negative Negative   Urine Microscopic Exam    Collection Time: 11/24/23  9:02 AM   Result Value Ref Range    Bacteria  Urine Few (A) None Seen /HPF    RBC Urine 2-5 (A) 0-2 /HPF /HPF    WBC Urine 5-10 (A) 0-5 /HPF /HPF    Squamous Epithelials Urine None Seen None Seen /LPF    Mucus Urine Present (A) None Seen /LPF    Hyaline Casts Urine 2-5 (A) None Seen /LPF    RBC Casts Urine 0-2 (A) None Seen /LPF    Granular Casts Urine 0-2 (A) None Seen /LPF     CMP  Recent Labs   Lab Test 11/24/23  0859 10/16/23  0526 10/15/23  0456 10/14/23  1834 10/14/23  0938 10/13/23  1320 09/02/23  2031 08/30/23  0533 08/29/23  0803 08/29/23  0606 08/28/23  0645 08/26/23  0653 08/25/23  0620 09/15/21  1235 08/27/20  1401 06/25/20  1501   * 127* 129* 128* 126* 125*   < > 125*  --  128* 130*   < > 131*   < > 140 138   POTASSIUM 3.5 3.4 3.4 3.4 3.5 3.8   < > 4.2  --  4.2 4.1   < > 3.8   < > 4.5 4.4   CHLORIDE 106 95* 99 99 94* 93*   < > 96*  --  98 100   < > 101   < > 103 102   CO2 17* 17* 18* 17* 16* 16*   < > 16*  --  17* 16*   < > 17*   < > 23 24   ANIONGAP 11 15 12 12 16* 16*   < > 13  --  13 14   < > 13   < > 14 12   * 100* 81 102* 161* 131*   < > 85  --  87 115*   < > 89   < > 89 104   BUN 18.0 28.5* 31.7* 33.6* 37.4* 36.4*   < > 45.5*  --  40.0* 41.4*   < > 47.5*   < > 10 12   CR 1.81* 1.74* 1.93* 1.95* 2.22* 2.26*   < > 4.34*  --  3.44* 3.72*   < > 5.40*   < > 1.11 1.23   GFRESTIMATED 47* 49* 44* 43* 37* 36*   < > 16*  --  22* 20*   < > 13*   < > >60 >60   GFRESTBLACK  --   --   --   --   --   --   --   --   --   --   --   --   --   --  >60 >60   ENEDINA 9.2 8.8 9.3 9.2 9.2 9.1   < > 9.0  --  8.9 9.1   < > 9.2   < > 9.6 10.1   MAG  --   --   --   --   --  1.9  --  1.8  --  1.8 1.9   < > 2.0   < >  --   --    PHOS 3.1  --   --   --   --   --   --   --  3.5  --  3.4  --  3.9   < >  --   --    PROTTOTAL 7.5 6.5 6.7  --  7.1 7.5   < >  --   --   --   --   --   --    < > 7.7 7.6   ALBUMIN 3.3* 3.8 3.4*  --  3.5 3.1*  3.1*   < > 3.0*  --  3.3* 3.4*   < > 3.6   < > 4.4 4.2   BILITOTAL 2.3* 9.8* 11.1*  --  13.4* 15.1*   < > 33.6*  --  34.3* 34.9*    < > 30.3*   < > 0.6 0.4   ALKPHOS 195* 152* 177*  --  198* 256*   < > 125  --  122 110   < > 103   < > 54 59   * 55* 57*  --  69* 86*   < > 184*  --  196* 179*   < > 155*   < > 83* 91*   ALT 38 26 35  --  45 57   < > 52  --  57 57   < > 44   < > 122* 133*    < > = values in this interval not displayed.     CBC  Recent Labs   Lab Test 11/24/23  0859 10/16/23  0526 10/15/23  0456 10/14/23  0938   HGB 10.3* 8.7* 9.2* 9.8*   WBC 9.8 8.2 7.8 10.2   RBC 3.32* 2.65* 2.77* 2.94*   HCT 30.2* 26.4* 28.1* 29.7*   MCV 91 100 101* 101*   MCH 31.0 32.8 33.2* 33.3*   MCHC 34.1 33.0 32.7 33.0   RDW 17.1* 16.8* 16.9* 17.2*   * 114* 107* 123*     INR  Recent Labs   Lab Test 11/24/23  0859 10/16/23  0526 10/15/23  0456 10/14/23  0938 09/03/23  0644 09/02/23  2111 08/27/23  0745 08/24/23  0618   INR 1.36* 1.77* 1.69* 1.61*   < > 1.46*   < > 1.72*   PTT  --   --   --   --   --  42*  --  37    < > = values in this interval not displayed.     ABG  Recent Labs   Lab Test 09/06/23  2045   O2PER 21      URINE STUDIES  Recent Labs   Lab Test 11/24/23  0902 10/14/23  1636 10/03/23  1953 09/10/23  2356   COLOR Yellow Dark Yellow* Dark Yellow* Dark Yellow*   APPEARANCE Clear Clear Clear Clear   URINEGLC Negative Negative Negative Negative   URINEBILI Small* Small* Moderate* Moderate*   URINEKETONE Negative Negative Negative Negative   SG 1.020 1.018 1.020 1.015   UBLD Negative Negative Negative Negative   URINEPH 6.5 5.5 5.5 5.5   PROTEIN 30* 10* 10* Negative   UROBILINOGEN 0.2  --   --   --    NITRITE Negative Negative Negative Negative   LEUKEST Negative Negative Negative Negative   RBCU 2-5* 1 <1 0   WBCU 5-10* 1 3 1     No lab results found.    ASSESSMENT AND PLAN:   #CKD stage 3 b mostly secondary to hepatorenal syndrome and multiple episodes of JOCELYN with no significant proteinuria and unremarkable kidneys on imaging. Refractory ascites and hemodynamic fluctuations in the setting of recurrent paracentesis could also be  contributory. I will start spironolactone 25 mg daily and furosemide 20 mg daily with very close follow up and labs. The patient was instructed to keep a BP diary and to weigh himself on a daily basis.  No documented intake of NSAIDs or other nephrotoxic medications.   The patient was instructed keep the sodium intake around 2400 mg /day, follow a plant-based diet and to avoid NSAIDs     #HTN  Primary and secondary to CKD. Management as per above.    #Blood count  Hemoglobin 10.3  Iron sat 31%  Ferritin level 525 could benefit from oral iron    #Acid-base status  CO2 level 17 to be monitored    #Electrolytes  Na 134 mild hyponatremia should resolve as his liver function continues to improve  K 3.5     #BMD  Calcium 9.2          Phosphorus 3.1    albumin 3.3  Vitamin D level 20 would benefit from vitamin D supplementation    #CKD journey/transplant not a candidate at this point    The total time of this encounter amounted to 60 minutes on the day of the encounter. This time included time spent with the patient, reviewing records, ordering tests, and performing post visit documentation.   Labs ordered include: CBC with diff, Renal Panel, CMP, UA with microscopy, UPCR, UACR, vitamin D levels, iPTH levels    The patient will return to follow up in 6 weeks    Didi Fisher MD  Division of Renal Disease and Hypertension  November 27, 2023  3:37 PM

## 2023-11-27 NOTE — NURSING NOTE
Chief Complaint   Patient presents with    RECHECK     Acute kidney Injury     BP (!) 161/110   Pulse 95   Temp 98.3  F (36.8  C) (Oral)   Wt 97.9 kg (215 lb 14.4 oz)   BMI 31.88 kg/m    Thanh Rubio on 11/27/2023 at 3:44 PM

## 2023-11-27 NOTE — PROGRESS NOTES
Nephrology Clinic    Elroy Morel Sr. MRN:[id number] YOB: 1980  Date of Service: 11/27/2023  Primary care provider: Latosha Baires  Requesting physician: Elroy Rankin MD      REASON FOR CONSULT: CKD    HISTORY OF PRESENT ILLNESS:   Elroy Morel Sr. is a 43 year old male who presents for evaluation of CKD post-JOCELYN in the setting of alcoholic liver disease.   The past medical history is significant for alcohol dependence leading to cirrhosis and multiple hospitalizations over the past months,  last from October 13 th till October 16 th 2023. His hospitalizations have been complicated by episodes of JOCELYN with the most severe episode occurring early September with a creatinine level that peaked at 7.7 mg/dL. He has been needing therapeutic paracentesis twice a week. He reports that he has been abstinent since mid-August 2023. Over the past month his creatinine level has been fluctuating between 1.6 and 2.2 mg/dL and the latest value is 1.8 mg/dL on 11/24/2023. He has no evidence of any significant albuminuria with a negative uACR on 11/24/2023. A kidney ultrasound done early September 2023 shows normal size kidneys. He has been off diuretics. The patient's current medications include cavedilol 3.125 mg twice daily, rifaximin 550 mg twice daily, lactulose, cholestyramine and pantoprazole.  The patient denies any dysuria, any pollakiuria, any nocturia, any LE edema, any dyspnea on exertion .  The patient denies ever having kidney stones, urinary tract infections, gross hematuria. There is no family history of CKD.  The following portions of the patient's history were reviewed and updated as appropriate: allergies, current medications, past family history, past medical history, past social history, past surgical history and problem list.    PAST MEDICAL HISTORY:  Past Medical History:   Diagnosis Date    Alcohol use disorder, severe, dependence (H)     Alcohol withdrawal seizure (H)     Alcoholic  cirrhosis of liver with ascites (H)     Esophageal varices (H)     Essential hypertension     Gastroesophageal reflux disease without esophagitis     History of methamphetamine use     Sustained remission since 2003    Hypercholesterolemia     Tobacco abuse      PAST SURGICAL HISTORY:  Past Surgical History:   Procedure Laterality Date    COLONOSCOPY N/A 8/28/2023    Procedure: COLONOSCOPY, WITH POLYPECTOMY via bx forceps;  Surgeon: Alyssa Tsai MD;  Location:  GI    IR PARACENTESIS  8/31/2023    IR PARACENTESIS  9/15/2023     MEDICATIONS:  Prescription Medications as of 11/27/2023         Rx Number Disp Refills Start End Last Dispensed Date Next Fill Date Owning Pharmacy    albuterol (PROAIR HFA/PROVENTIL HFA/VENTOLIN HFA) 108 (90 Base) MCG/ACT inhaler            Sig: Inhale 1-2 Puffs by mouth every 4 hours if needed.    Class: Historical    Calcium Carb-Cholecalciferol 500-10 MG-MCG CHEW    9/1/2023        Sig: Chew 1 Tablet by mouth once daily.    Class: Historical    cholestyramine light (QUESTRAN) 4 GM packet  30 packet 1 10/15/2023    [address]    Sig: Take 1 packet (4 g) by mouth daily (with breakfast)    Class: E-Prescribe    Route: Oral    Renewals       Renewal requests to authorizing provider (Raymundo Rodriguez) <b>prohibited</b>            ciprofloxacin (CIPRO) 500 MG tablet  30 tablet 3 10/16/2023    [address]    Sig: Take 1 tablet (500 mg) by mouth every evening    Class: E-Prescribe    Route: Oral    Renewals       Renewal requests to authorizing provider (Raymundo Rodriguez) <b>prohibited</b>            diphenhydrAMINE (BENADRYL) 25 MG capsule  60 capsule 3 9/11/2023    [id number] - [place], [place] - [address] [facility]    Sig: Take 1 capsule (25 mg) by mouth every 6 hours as needed for itching    Class: E-Prescribe    Route:  Oral    folic acid (FOLVITE) 1 MG tablet  30 tablet 3 10/16/2023    [address]    Sig: Take 1 tablet (1 mg) by mouth daily    Class: E-Prescribe    Route: Oral    Renewals       Renewal requests to authorizing provider (Raymundo Rodriguez) <b>prohibited</b>            gabapentin (NEURONTIN) 100 MG capsule  30 capsule 3 10/15/2023    [address]    Sig: Take 1 capsule (100 mg) by mouth at bedtime    Class: E-Prescribe    Route: Oral    Renewals       Renewal requests to authorizing provider (Raymundo Rodriguez) <b>prohibited</b>            hydrOXYzine (ATARAX) 25 MG tablet    9/23/2023        Sig: Take 25 mg by mouth every 6 hours as needed    Class: Historical    Route: Oral    lactulose (CEPHULAC) 10 GM packet  300 packet 3 10/16/2023    [id number] - [place], [place] - [address]    Sig: Take 3 packets (30 g) by mouth 3 times daily    Class: E-Prescribe    Route: Oral    lactulose (CEPHULAC) 10 GM packet  300 packet 4 10/16/2023    [id number] - [place], [place] - [address] [facility]    Sig: Take 3 packets (30 g) by mouth 3 times daily    Class: E-Prescribe    Route: Oral    lactulose (CHRONULAC) 10 GM/15ML solution  946 mL 3 10/16/2023    [address]    Sig: Take 45 mLs (30 g) by mouth every 3 hours as needed (Hepatic Encephalopathy prevention) Take throughout the day with a goal of 4-5 soft stools per day. Take more if you feel confusion coming on.    Class: E-Prescribe    Route: Oral    lactulose 20 GM/30ML solution  946 mL 3 10/16/2023    [address]    Sig: Take 45 mLs (30 g) by mouth 3 times daily Goal 4-5 stools daily.    Class: E-Prescribe    Route: Oral    melatonin 3 MG tablet            Sig:  Take 3 mg by mouth nightly as needed for sleep    Class: Historical    Route: Oral    ondansetron (ZOFRAN ODT) 4 MG ODT tab    2023        Sig: Take 4 mg by mouth every 8 hours as needed    Class: Historical    Route: Oral    pantoprazole (PROTONIX) 40 MG EC tablet  30 tablet 3 10/15/2023    [address]    Sig: Take 1 tablet (40 mg) by mouth daily    Class: E-Prescribe    Route: Oral    Renewals       Renewal requests to authorizing provider (Raymundo Rodriguez) <b>prohibited</b>            rifaximin (XIFAXAN) 550 MG TABS tablet  60 tablet 3 10/15/2023    [address]    Si tablet (550 mg) by Oral or Feeding Tube route 2 times daily    Class: E-Prescribe    Route: Oral or Feeding Tube    Renewals       Renewal requests to authorizing provider (Raymundo Rodriguez) <b>prohibited</b>            sertraline (ZOLOFT) 50 MG tablet  30 tablet 3 10/15/2023    [address]    Sig: Take 1 tablet (50 mg) by mouth daily    Class: E-Prescribe    Route: Oral    Renewals       Renewal requests to authorizing provider (Raymundo Rodriguez) <b>prohibited</b>            vitamin B1 (B-1) 100 MG tablet  30 tablet 3 10/16/2023    [address]    Sig: Take 1 tablet (100 mg) by mouth daily    Class: E-Prescribe    Route: Oral    Renewals       Renewal requests to authorizing provider (Raymundo Rodriguez) <b>prohibited</b>                   ALLERGIES:    Allergies   Allergen Reactions    Metronidazole Other (See Comments), Dizziness and Unknown    Omeprazole Other (See Comments) and Unknown     Irritability (tolerates Protonix well)     REVIEW OF SYSTEMS:    A comprehensive review of systems was performed and found to be negative except as described here or above.  SOCIAL HISTORY:   Social History     Socioeconomic  History    Marital status:      Spouse name: Not on file    Number of children: Not on file    Years of education: Not on file    Highest education level: Not on file   Occupational History    Not on file   Tobacco Use    Smoking status: Former     Types: Cigarettes    Smokeless tobacco: Former   Substance and Sexual Activity    Alcohol use: Not Currently     Comment: last drink 8/19    Drug use: Not Currently     Types: Amphetamines     Comment: Sustained remission    Sexual activity: Not on file   Other Topics Concern    Not on file   Social History Narrative    Pt is , 2 children. Employed.      Social Determinants of Health     Financial Resource Strain: Not on file   Food Insecurity: Not on file   Transportation Needs: Not on file   Physical Activity: Not on file   Stress: Not on file   Social Connections: Not on file   Interpersonal Safety: Not on file   Housing Stability: Not on file     FAMILY MEDICAL HISTORY:   No family history on file.  PHYSICAL EXAM:   There were no vitals taken for this visit.  GENERAL APPEARANCE: alert and no distress  EYES: nonicteric  HENT: mouth without ulcers or lesions  NECK: supple, no adenopathy  RESP: lungs clear to auscultation   CV: regular rhythm, normal rate, no rub  ABDOMEN: soft, nontender, normal bowel sounds, no HSM   Extremities: no clubbing, cyanosis, or edema  MS: no evidence of inflammation in joints, no muscle tenderness  SKIN: no rash  NEURO: mentation intact and speech normal  PSYCH: affect normal/bright   LABS:   Recent Results (from the past 672 hour(s))   CBC with platelets    Collection Time: 11/24/23  8:59 AM   Result Value Ref Range    WBC Count 9.8 4.0 - 11.0 10e3/uL    RBC Count 3.32 (L) 4.40 - 5.90 10e6/uL    Hemoglobin 10.3 (L) 13.3 - 17.7 g/dL    Hematocrit 30.2 (L) 40.0 - 53.0 %    MCV 91 78 - 100 fL    MCH 31.0 26.5 - 33.0 pg    MCHC 34.1 31.5 - 36.5 g/dL    RDW 17.1 (H) 10.0 - 15.0 %    Platelet Count 139 (L) 150 - 450 10e3/uL   INR     Collection Time: 11/24/23  8:59 AM   Result Value Ref Range    INR 1.36 (H) 0.85 - 1.15   AFP tumor marker    Collection Time: 11/24/23  8:59 AM   Result Value Ref Range    AFP tumor marker 3.2 <=8.3 ng/mL   Phosphatidylethanol (PEth), Whole Blood    Collection Time: 11/24/23  8:59 AM   Result Value Ref Range    PEth 16:0/18:1 (POPEth) <10 ng/mL    PEth 16:0/18:2 (PLPEth) <10 ng/mL    EER Phosphatidylethanol (PETH) See Note     PEth Interpretation See Note    Renal panel    Collection Time: 11/24/23  8:59 AM   Result Value Ref Range    Sodium 134 (L) 135 - 145 mmol/L    Potassium 3.5 3.4 - 5.3 mmol/L    Chloride 106 98 - 107 mmol/L    Carbon Dioxide (CO2) 17 (L) 22 - 29 mmol/L    Anion Gap 11 7 - 15 mmol/L    Glucose 161 (H) 70 - 99 mg/dL    Urea Nitrogen 18.0 6.0 - 20.0 mg/dL    Creatinine 1.81 (H) 0.67 - 1.17 mg/dL    GFR Estimate 47 (L) >60 mL/min/1.73m2    Calcium 9.2 8.6 - 10.0 mg/dL    Albumin 3.3 (L) 3.5 - 5.2 g/dL    Phosphorus 3.1 2.5 - 4.5 mg/dL   Ferritin    Collection Time: 11/24/23  8:59 AM   Result Value Ref Range    Ferritin 525 (H) 31 - 409 ng/mL   Iron and iron binding capacity    Collection Time: 11/24/23  8:59 AM   Result Value Ref Range    Iron 39 (L) 61 - 157 ug/dL    Iron Binding Capacity 126 (L) 240 - 430 ug/dL    Iron Sat Index 31 15 - 46 %   Parathyroid Hormone Intact    Collection Time: 11/24/23  8:59 AM   Result Value Ref Range    Parathyroid Hormone Intact 19 15 - 65 pg/mL   Vitamin D Deficiency    Collection Time: 11/24/23  8:59 AM   Result Value Ref Range    Vitamin D, Total (25-Hydroxy) 20 20 - 50 ng/mL   Alkaline phosphatase    Collection Time: 11/24/23  8:59 AM   Result Value Ref Range    Alkaline Phosphatase 195 (H) 40 - 150 U/L   ALT    Collection Time: 11/24/23  8:59 AM   Result Value Ref Range    ALT 38 0 - 70 U/L   AST    Collection Time: 11/24/23  8:59 AM   Result Value Ref Range     (H) 0 - 45 U/L   Bilirubin  total    Collection Time: 11/24/23  8:59 AM   Result Value  Ref Range    Bilirubin Total 2.3 (H) <=1.2 mg/dL   Bilirubin direct    Collection Time: 11/24/23  8:59 AM   Result Value Ref Range    Bilirubin Direct 1.19 (H) 0.00 - 0.30 mg/dL   Protein total    Collection Time: 11/24/23  8:59 AM   Result Value Ref Range    Protein Total 7.5 6.4 - 8.3 g/dL   Cystatin C with GFR    Collection Time: 11/24/23  8:59 AM   Result Value Ref Range    Cystatin C 2.0 (H) 0.6 - 1.0 mg/L    GFR Calculated with Cystatin C 33 (L) >=60 mL/min/1.73m2   Protein  random urine    Collection Time: 11/24/23  9:01 AM   Result Value Ref Range    Total Protein Urine mg/dL 40.5   mg/dL    Total Protein Urine mg/mg Creat 0.22 (H) 0.00 - 0.20 mg/mg Cr    Creatinine Urine mg/dL 184.0 mg/dL   Albumin Random Urine Quantitative with Creat Ratio    Collection Time: 11/24/23  9:01 AM   Result Value Ref Range    Creatinine Urine mg/dL 184.0 mg/dL    Albumin Urine mg/L <12.0 mg/L    Albumin Urine mg/g Cr     UA with Microscopic    Collection Time: 11/24/23  9:02 AM   Result Value Ref Range    Color Urine Yellow Colorless, Straw, Light Yellow, Yellow    Appearance Urine Clear Clear    Glucose Urine Negative Negative mg/dL    Bilirubin Urine Small (A) Negative    Ketones Urine Negative Negative mg/dL    Specific Gravity Urine 1.020 1.005 - 1.030    Blood Urine Negative Negative    pH Urine 6.5 5.0 - 8.0    Protein Albumin Urine 30 (A) Negative mg/dL    Urobilinogen Urine 0.2 0.2, 1.0 E.U./dL    Nitrite Urine Negative Negative    Leukocyte Esterase Urine Negative Negative   Urine Microscopic Exam    Collection Time: 11/24/23  9:02 AM   Result Value Ref Range    Bacteria Urine Few (A) None Seen /HPF    RBC Urine 2-5 (A) 0-2 /HPF /HPF    WBC Urine 5-10 (A) 0-5 /HPF /HPF    Squamous Epithelials Urine None Seen None Seen /LPF    Mucus Urine Present (A) None Seen /LPF    Hyaline Casts Urine 2-5 (A) None Seen /LPF    RBC Casts Urine 0-2 (A) None Seen /LPF    Granular Casts Urine 0-2 (A) None Seen /LPF     CMP  Recent Labs    Lab Test 11/24/23  0859 10/16/23  0526 10/15/23  0456 10/14/23  1834 10/14/23  0938 10/13/23  1320 09/02/23  2031 08/30/23  0533 08/29/23  0803 08/29/23  0606 08/28/23  0645 08/26/23  0653 08/25/23  0620 09/15/21  1235 08/27/20  1401 06/25/20  1501   * 127* 129* 128* 126* 125*   < > 125*  --  128* 130*   < > 131*   < > 140 138   POTASSIUM 3.5 3.4 3.4 3.4 3.5 3.8   < > 4.2  --  4.2 4.1   < > 3.8   < > 4.5 4.4   CHLORIDE 106 95* 99 99 94* 93*   < > 96*  --  98 100   < > 101   < > 103 102   CO2 17* 17* 18* 17* 16* 16*   < > 16*  --  17* 16*   < > 17*   < > 23 24   ANIONGAP 11 15 12 12 16* 16*   < > 13  --  13 14   < > 13   < > 14 12   * 100* 81 102* 161* 131*   < > 85  --  87 115*   < > 89   < > 89 104   BUN 18.0 28.5* 31.7* 33.6* 37.4* 36.4*   < > 45.5*  --  40.0* 41.4*   < > 47.5*   < > 10 12   CR 1.81* 1.74* 1.93* 1.95* 2.22* 2.26*   < > 4.34*  --  3.44* 3.72*   < > 5.40*   < > 1.11 1.23   GFRESTIMATED 47* 49* 44* 43* 37* 36*   < > 16*  --  22* 20*   < > 13*   < > >60 >60   GFRESTBLACK  --   --   --   --   --   --   --   --   --   --   --   --   --   --  >60 >60   ENEDINA 9.2 8.8 9.3 9.2 9.2 9.1   < > 9.0  --  8.9 9.1   < > 9.2   < > 9.6 10.1   MAG  --   --   --   --   --  1.9  --  1.8  --  1.8 1.9   < > 2.0   < >  --   --    PHOS 3.1  --   --   --   --   --   --   --  3.5  --  3.4  --  3.9   < >  --   --    PROTTOTAL 7.5 6.5 6.7  --  7.1 7.5   < >  --   --   --   --   --   --    < > 7.7 7.6   ALBUMIN 3.3* 3.8 3.4*  --  3.5 3.1*  3.1*   < > 3.0*  --  3.3* 3.4*   < > 3.6   < > 4.4 4.2   BILITOTAL 2.3* 9.8* 11.1*  --  13.4* 15.1*   < > 33.6*  --  34.3* 34.9*   < > 30.3*   < > 0.6 0.4   ALKPHOS 195* 152* 177*  --  198* 256*   < > 125  --  122 110   < > 103   < > 54 59   * 55* 57*  --  69* 86*   < > 184*  --  196* 179*   < > 155*   < > 83* 91*   ALT 38 26 35  --  45 57   < > 52  --  57 57   < > 44   < > 122* 133*    < > = values in this interval not displayed.     CBC  Recent Labs   Lab Test  11/24/23  0859 10/16/23  0526 10/15/23  0456 10/14/23  0938   HGB 10.3* 8.7* 9.2* 9.8*   WBC 9.8 8.2 7.8 10.2   RBC 3.32* 2.65* 2.77* 2.94*   HCT 30.2* 26.4* 28.1* 29.7*   MCV 91 100 101* 101*   MCH 31.0 32.8 33.2* 33.3*   MCHC 34.1 33.0 32.7 33.0   RDW 17.1* 16.8* 16.9* 17.2*   * 114* 107* 123*     INR  Recent Labs   Lab Test 11/24/23  0859 10/16/23  0526 10/15/23  0456 10/14/23  0938 09/03/23  0644 09/02/23  2111 08/27/23  0745 08/24/23  0618   INR 1.36* 1.77* 1.69* 1.61*   < > 1.46*   < > 1.72*   PTT  --   --   --   --   --  42*  --  37    < > = values in this interval not displayed.     ABG  Recent Labs   Lab Test 09/06/23  2045   O2PER 21      URINE STUDIES  Recent Labs   Lab Test 11/24/23  0902 10/14/23  1636 10/03/23  1953 09/10/23  2356   COLOR Yellow Dark Yellow* Dark Yellow* Dark Yellow*   APPEARANCE Clear Clear Clear Clear   URINEGLC Negative Negative Negative Negative   URINEBILI Small* Small* Moderate* Moderate*   URINEKETONE Negative Negative Negative Negative   SG 1.020 1.018 1.020 1.015   UBLD Negative Negative Negative Negative   URINEPH 6.5 5.5 5.5 5.5   PROTEIN 30* 10* 10* Negative   UROBILINOGEN 0.2  --   --   --    NITRITE Negative Negative Negative Negative   LEUKEST Negative Negative Negative Negative   RBCU 2-5* 1 <1 0   WBCU 5-10* 1 3 1     No lab results found.    ASSESSMENT AND PLAN:   #CKD stage 3 b mostly secondary to hepatorenal syndrome and multiple episodes of JOCELYN with no significant proteinuria and unremarkable kidneys on imaging. Refractory ascites and hemodynamic fluctuations in the setting of recurrent paracentesis could also be contributory. I will start spironolactone 25 mg daily and furosemide 20 mg daily with very close follow up and labs. The patient was instructed to keep a BP diary and to weigh himself on a daily basis.  No documented intake of NSAIDs or other nephrotoxic medications.   The patient was instructed keep the sodium intake around 2400 mg /day, follow a  plant-based diet and to avoid NSAIDs     #HTN  Primary and secondary to CKD. Management as per above.    #Blood count  Hemoglobin 10.3  Iron sat 31%  Ferritin level 525 could benefit from oral iron    #Acid-base status  CO2 level 17 to be monitored    #Electrolytes  Na 134 mild hyponatremia should resolve as his liver function continues to improve  K 3.5     #BMD  Calcium 9.2          Phosphorus 3.1    albumin 3.3  Vitamin D level 20 would benefit from vitamin D supplementation    #CKD journey/transplant not a candidate at this point    The total time of this encounter amounted to 60 minutes on the day of the encounter. This time included time spent with the patient, reviewing records, ordering tests, and performing post visit documentation.   Labs ordered include: CBC with diff, Renal Panel, CMP, UA with microscopy, UPCR, UACR, vitamin D levels, iPTH levels    The patient will return to follow up in 6 weeks    Didi Fisher MD  Division of Renal Disease and Hypertension  November 27, 2023  3:37 PM

## 2023-11-27 NOTE — PATIENT INSTRUCTIONS
It was a pleasure taking care of you today.  I've included a brief summary of our discussion and care plan from today's visit below.  Please review this information with your primary care provider.  _______________________________________________________________________    My recommendations are summarized as follows:  -Keep the amount of sodium in your diet at 2.4 g/day (also see instructions attached in that regard)  -Keep a Blood Pressure diary by taking your blood pressure twice a day as instructed (also see instructions attached in that regard). Start tomorrow and take it for one week then send me the numbers via [employer]. Please make sure that you are using a validated blood pressure device by checking that it is the case at: https://www.validatebp.org/  -Weigh yourself every day  -Avoid all NSAID's. Examples include Ibuprofen (Advil, Motrin), naprosyn (Aleve), diclofenac (Voltaren), celebrex among others. Acetaminophen (Tylenol) is ok with maximum dose in 24 hours of 2000 mg.  -Healthy lifestyle measures will keep your kidney's functioning at their current best. This includes regular exercise, weight loss and smoking cessation if you smoke.   -For tips on eating healthy:  -[email or web address]-eating-pyramid/  -Start spironolactone 25 mg once daily (first dose on Wednesday)  -Start furosemide 20 mg daily (first dose on Wednesday)  -If for any reason, you are at risk of volume depletion/dehydration (high grade fevers, severe vomiting or diarrhea) stop furosemide and spironolactone till you get better  -Do labs in one week and in 6 weeks before your next appointment      To schedule imaging please call [phone]     To schedule your lab appointment at the [facility], please call       Return to Nephrology Clinic in 6 weeks to review your progress.    _______________________________________________________________________    Who do I call with any  questions after my visit?    Please be in touch if there are any further questions that arise following today's visit.  There are multiple ways to contact your nephrology care team.      During business hours, you may reach your Nephrology LPN Care Coordinator, Angelina, at .      To schedule or reschedule an appointment, please call [phone].    You can always send a secure message through [employer].  [employer] messages are answered by your nurse or doctor typically within 24 hours.  Please allow extra time on weekends and holidays.      For urgent/emergent questions after business hours, you may reach the on-call Nephrology Fellow by contacting the [facility]  at [phone].     How will I get the results of any tests ordered?    You will receive all of your results.  If you have signed up for Pay with [employer], any tests ordered at your visit will be available to you after your physician reviews them.  Typically this takes 1-2 weeks.  If there are urgent results that require a change in your care plan, your physician or nurse will call you to discuss the next steps.      What is [employer]?  [employer] is a secure way for you to access all of your healthcare records from the [facility].  It is a web based computer program, so you can sign on to it from any location.  It also allows you to send secure messages to your care team.  I recommend signing up for [employer] access if you have not already done so and are comfortable with using a computer.      How do I schedule a follow-up visit?  If you did not schedule a follow-up visit today, please call [phone] to schedule a follow-up office visit.        Sincerely,      Dr. Didi Fisher  [facility]  Division of Nephrology and Hypertension

## 2023-11-28 ENCOUNTER — MYC MEDICAL ADVICE (OUTPATIENT)
Dept: NEPHROLOGY | Facility: CLINIC | Age: 43
End: 2023-11-28

## 2023-11-28 ENCOUNTER — PRE VISIT (OUTPATIENT)
Dept: GASTROENTEROLOGY | Facility: CLINIC | Age: 43
End: 2023-11-28
Payer: COMMERCIAL

## 2023-11-28 ENCOUNTER — OFFICE VISIT (OUTPATIENT)
Dept: GASTROENTEROLOGY | Facility: CLINIC | Age: 43
End: 2023-11-28
Attending: HOSPITALIST
Payer: COMMERCIAL

## 2023-11-28 VITALS
HEART RATE: 88 BPM | DIASTOLIC BLOOD PRESSURE: 98 MMHG | BODY MASS INDEX: 32.44 KG/M2 | TEMPERATURE: 97.9 F | WEIGHT: 219 LBS | HEIGHT: 69 IN | SYSTOLIC BLOOD PRESSURE: 138 MMHG

## 2023-11-28 DIAGNOSIS — I85.11 ESOPHAGEAL VARICES WITH BLEEDING IN DISEASES CLASSIFIED ELSEWHERE (H): Primary | ICD-10-CM

## 2023-11-28 DIAGNOSIS — N17.9 AKI (ACUTE KIDNEY INJURY) (H): ICD-10-CM

## 2023-11-28 DIAGNOSIS — K76.82 HEPATIC ENCEPHALOPATHY (H): ICD-10-CM

## 2023-11-28 DIAGNOSIS — F10.20 ALCOHOL USE DISORDER, SEVERE, DEPENDENCE (H): ICD-10-CM

## 2023-11-28 DIAGNOSIS — K70.31 ALCOHOLIC CIRRHOSIS OF LIVER WITH ASCITES (H): Primary | ICD-10-CM

## 2023-11-28 DIAGNOSIS — E44.1 MILD PROTEIN-CALORIE MALNUTRITION (H): ICD-10-CM

## 2023-11-28 DIAGNOSIS — K70.31 ALCOHOLIC CIRRHOSIS OF LIVER WITH ASCITES (H): ICD-10-CM

## 2023-11-28 PROCEDURE — 99213 OFFICE O/P EST LOW 20 MIN: CPT | Performed by: INTERNAL MEDICINE

## 2023-11-28 PROCEDURE — 99215 OFFICE O/P EST HI 40 MIN: CPT | Performed by: INTERNAL MEDICINE

## 2023-11-28 RX ORDER — CARVEDILOL 3.12 MG/1
3.12 TABLET ORAL 2 TIMES DAILY WITH MEALS
Qty: 90 TABLET | Refills: 3 | Status: SHIPPED | OUTPATIENT
Start: 2023-11-28 | End: 2024-01-08

## 2023-11-28 RX ORDER — SPIRONOLACTONE 25 MG/1
25 TABLET ORAL DAILY
Qty: 90 TABLET | Refills: 1 | Status: SHIPPED | OUTPATIENT
Start: 2023-11-28 | End: 2023-12-06

## 2023-11-28 RX ORDER — POTASSIUM CHLORIDE 750 MG/1
1 TABLET, EXTENDED RELEASE ORAL DAILY
COMMUNITY
Start: 2023-11-08 | End: 2023-12-10

## 2023-11-28 RX ORDER — LACTULOSE 20 G/30ML
30 SOLUTION ORAL 3 TIMES DAILY
Qty: 946 ML | Refills: 3 | Status: SHIPPED | OUTPATIENT
Start: 2023-11-28 | End: 2024-03-12

## 2023-11-28 RX ORDER — FUROSEMIDE 20 MG
20 TABLET ORAL DAILY
Qty: 90 TABLET | Refills: 1 | Status: SHIPPED | OUTPATIENT
Start: 2023-11-28 | End: 2024-03-12

## 2023-11-28 RX ORDER — ONDANSETRON 4 MG/1
4 TABLET, FILM COATED ORAL EVERY 8 HOURS PRN
Qty: 30 TABLET | Refills: 3 | Status: SHIPPED | OUTPATIENT
Start: 2023-11-28 | End: 2024-03-12

## 2023-11-28 ASSESSMENT — PAIN SCALES - GENERAL: PAINLEVEL: NO PAIN (0)

## 2023-11-28 NOTE — PATIENT INSTRUCTIONS
It was a pleasure taking care of you today.  I've included a brief summary of our discussion and care plan from today's visit below.  Please review this information with your primary care provider.  _______________________________________________________________________    My recommendations are summarized as follows:    - Continue paracentesis twice weekly  - Continue lactulose (goal to 3-4) + rifaximin twice daily  - Low sodium + high protein diet  - Chemical dependency assessment and programming based on the assessment  - Start coreg 3.125mg twice daily  - Stop benadryl + stop benadryl    Return to Liver/Hepatology Clinic in 3 months.    _______________________________________________________________________      Cirrhosis Education  Nutrition  - For patients with cirrhosis, it is very important to eat the right types and amounts of foods. We recommend a diet low in carbohydrates/sugars and high in fresh fruits/vegetables, with the right amount of protein. We typically recommend 1.5gm/kg/day of protein, or  grams of protein every day.  - In regard to protein intake, you can focus on: meats (but limit red meat), cooked fish and seafood, vegetable-based protein meat products, tofu, eggs, legumes, dairy products (including milk and yogurt and cheese), unsalted nuts.  - You should eat at least three meals a day and three to four snacks between meals. Bedtime snacks are especially important (preferrably something with some protein)    - Patients with malnutrition and/or loss of muscular mass can improve their nutrition and muscular mass with protein supplementation, particularly at bedtime. Protein supplements can come in many different forms, including shakes, protein power, bars and pudding. Boost, Ensure, [place] Instant Milk, etc.)   - Please avoid eating raw seafood, especially shellfish, because of risk of serious illness  - We recommend all patients with cirrhosis limit their daily sodium intake to less  than 2,000mg (2 grams)    General Liver Health  - Continue to avoid the use of all non-steroid anti-inflammatory medicines (ibuprofen, Aleve, naproxen, aspirin, etc.) as this can cause serious injury to your kidneys in the setting of liver disease.   - If you have pain, you can safely take up to 2 grams (aka 2,000 mg) of tylenol (aka acetaminophen) per day  - If you see a doctor and they prescribe you a new medication, please contact our clinic to let us know what changes are being made  - If you become acutely ill and present to an ER or are admitted to a hospital, please let us know as soon as you are able.  - We recommend that all patients with chronic liver disease be vaccinated against hepatitis A and B.  Please discuss with your primary provider the need for these vaccines  - We recommend screening for Vitamin D deficiency (at least yearly) and replacement/supplementation as warranted.  - Make sure to bring all of your medications (including over the counter supplements, vitamins, etc) once a year for a review with your liver specialist to ensure appropriate use, dosages, drug interactions, as well as verify ongoing medical necessity. We do not recommend taking herbal supplements, teas, weight loss supplements without consulting your liver specialist first    Sleep Troubles  - Sleep issues are very common in patients with chronic liver disease  - Recommend strict avoidance of medications such as opioids, sedatives and sleep aids.    - Low dose melatonin can be used for insomnia, if needed    Alcohol  - No amount of alcohol is safe in chronic liver disease. If you are interested in medications to help with alcohol cravings or a support program to help remain abstinence, feel free to reach out to your medical team who can connect you with resources.     Mental Health  - Take care of your mental health by learning about your liver disease and the many things you can do to enhance your health and well-being.  - If  interested, attend support groups or visit a health psychologist regarding positive coping with a chronic health condition.  - Cirrhosis can cause cognitive and mood changes, especially confusion. Let us know and we can assess this further and likely offer effective treatment options.  - Regularly assess your driving safety for yourself and others.    Liver Related Screening Tests  - We recommend liver cancer screening twice yearly with either ultrasound, CT scan or MRI   - Esophageal varices screening: initial upper endoscopy (EGD) procedure and then every 1-3 years, depending on initial exam findings.    Health Maintenance   - We recommend that all individuals with chronic liver disease establish care with a primary doctor to ensure all your general medical concerns, age-appropriate cancer screening and vaccinations are up to date  - We recommend dental visits every 6-12 months    For other tips go to the Cirrhosis Care website: https://cirrhosiscare.ca/  - Click on the tab at the top 'patients & families' and then use the tabs at the top to navigate through resources from healthy living tips to symptom management _______________________________________________________________________    Scheduling Procedures or Tests  - To schedule endoscopic procedures call: [phone]  - To schedule radiology tests call: [phone] (for [facility]) or [phone] (for [facility])   _______________________________________________________________________    Who do I call with any questions after my visit?  Please be in touch if there are any further questions that arise following today's visit.  There are multiple ways to contact your gastroenterology care team.      To schedule or reschedule an appointment, please call [phone] and choose option 2 (for hepatology) and then option 1 (for scheduling).    During business hours, you may reach a nurse by calling the appointment line ([phone]) and asking to  speak with a nurse    You can send a secure message through [employer] for non-urgent questions. [employer] messages are answered by your nurse or doctor typically within 24 to 48 hours. Please allow extra time on weekends and holidays.      For urgent/emergent questions after business hours, you may reach the on-call GI Fellow by contacting the [facility]  at [phone].     How will I get the results of any tests ordered?    - If you have signed up for [employer], any tests ordered at your visit will be available to you after your physician reviews them.  Typically this takes 1-2 weeks.    - If you do not have MyChart, your results will either be mailed to you as a letter or via a telephone call.  - If there are urgent results that require a change in your care plan, your physician or nurse will call you to discuss the next steps.     What is [employer]?  [employer] is a secure way for you to access all of your healthcare records from the [facility].  It is a web based computer program, so you can sign on to it from any location.  It also allows you to send secure messages to your care team.  I recommend signing up for [employer] access if you have not already done so and are comfortable with using a computer.      How to I schedule a follow-up visit?  If you did not schedule a follow-up visit today, please call [phone] to schedule a follow-up office visit.     Sincerely,    Nimco Diego MD (Lizzie)   of Medicine  Advanced & Transplant Hepatology  [facility]

## 2023-11-28 NOTE — LETTER
11/28/2023         RE: Elroy Morel Sr.  [address] [place] [address]        Dear Colleague,    Thank you for referring your patient, Elroy Morel Sr., to the [facility] [place]. Please see a copy of my visit note below.    [facility] Hepatology    New Patient Visit    Referring provider:  Emil Moser  Chief complaint:  Alcohol related liver disease    Assessment  43 year old male with past medical history of decompensated alcohol-related cirrhosis complicated by hepatic encephalopathy, ascites and variceal bleeding (8/2023) and alcohol use disorder.    #. Decompensated alcohol-related cirrhosis complicated by hepatic encephalopathy, ascites and variceal bleeding (8/2023)   #. Alcohol use disorder; hx of alcohol withdrawal seizures   #. Protein calorie malnutrition  #. Pruritus   MELD 3.0: 21   Patient with decompensated alcohol-related cirrhosis.  This is hepatic encephalopathy is generally well-controlled with maximal medical therapy -he is working to titrate his lactulose appropriately.  His ascites is controlled with twice weekly paracenteses; he is off diuretics given issues with hyponatremia and acute kidney injury.  He has a history of variceal bleeding for which he was banded -he reports being up-to-date on variceal screening.  He was not previously on a beta-blocker.    With regards to his alcohol use disorder, his last use was 8/2023.  He is engaged with AA and a sponsor.  He has not engaged in formal programming.  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  He needs to complete a formal chemical dependency assessment and then complete formal programming prior to being considered for liver transplantation.  Suspects that if the patient has ongoing complete alcohol sobriety likely will have ongoing recompensation.    We discussed liver transplantation and the  process that would be required going forward.  Once the patient completes a chemical dependency assessment and starts programming we will plan to refer the patient for transplant evaluation.    Plan  -- Referral to chemical dependency assessment and plan for programming based on the assessment. Continue complete alcohol abstinence. Not interested in pharmacotherapy for cravings'  -- Start coreg 3.125mg BID for hx of variceal bleeding + hypertension; if ongoing issues with ascites, will stop coreg.   -- Diuretics: none given JOCELYN + continue twice weekly paracenteses (Tuesday + Fridays at [facility]) - max 5L, 50g albumin  -- Patient following with nephrology given renal dysfunction  -- Ciprofloxacin 500mg daily for SBP ppx   -- Hepatic encephalopathy management: Lactulose (3-4 BM per day) + Rifaximin 550mg BID   * Stop benadryl and gabapentin  -- HCC Screening: Recommend abdominal US + AFP q6 months, next due 3/2024  -- Variceal Screening next due 11/2024  -- Questran/cholestyramine for pruritis     Health Maintenance:  -- COVID Vaccination: none prior; recommend vaccination but patient is not interested   -- Recommend yearly influenza vaccination; none prior; recommend vaccination but patient is not interested   -- Recommend dental visits every 6 months    Nutrition & Physical Activity:  -- Recommend low sodium (< 2 grams per day) + high protein diet; information from [email or web address] provided     RTC: 3 months    Nimco Diego MD (Lizzie)  Advanced & Transplant Hepatology  [facility]      HPI:  ALD related Cirrhosis  - hx HE  - hx ascites  - hx variceal bleed (8/2023)  - last EGD 8/2023 - grade II EV s/p banding   - HCC screening - US 9/2023 without hepatic lesions    History:  - Fall 2022: hospitalized for a seizure related to alcohol withdrawl and was found to have alcohol related hepatitits. Saw Kathleen Muller (GI) at [facility] 10/24/2022 who discussed his alcohol related liver disease.     - Admitted from August 13 to August 18, 2023 with hematemesis and blood per rectum.  Per report at that time his last alcohol use was July 4.  He underwent upper endoscopy with banding in mid August.   - Admitted from August 21 to August 30, 2023 for jaundice upon trying to be referred to [facility].  Also noted to have an acute kidney injury. GI EGD/colonoscopy 8/28 - Grade II EV s/p banding, portal hypertensive gastropathy, esophageal ulcer at prior variceal treatment site, 2 polyps in sigmoid colon s/p removal, rectal varices, erythematous mucosa in sigmoid colon/rectum c/w portal colopathy.   - Admitted from September 2 to September 11, 2023 with abdominal pain and distention and was found to have acute kidney injury.  - Admitted from September 18 to September 23, 2023 to [facility] with abdominal discomfort, jaundice and confusion.  - Seen by Kathleen Muller 9/25/2023 in the clinic - per documentation at that time his last alcohol use was August of 2023  - Admitted from October 3 to October 5, 2023 for hepatic encephalopathy.  Infectious evaluation negative.  - Admitted from October 13 to October 16, 2023 for hepatic encephalopathy, hyponatremia and acute kidney injury.  There was a concern that he missed some of his lactulose dosing in the setting of his wife being at work.    - Emergency department visit on 11/7/2023 for hypokalemia    Patient reports that since early November he has otherwise been doing well.  He reports that his energy level comes and goes.  He reports that he is eating okay.  He reports trying to adhere to a low-sodium and high-protein diet but it has been difficult because he does not like the taste of protein shakes or bars.  He feels as though he has been losing muscle mass but has been gaining fluid in his abdomen.    With regards to his confusion he is generally well-controlled with lactulose plus rifaximin.  Prior to developing cirrhosis he had issues with intermittent  loose stools when he takes lactulose he will 5 stools per day that are generally soft in nature however he has been eating more cheese and stools.      Denies any hematochezia or melena.  He has a history of variceal bleeding in August 2023.  He reports an upper endoscopy at [facility] within the last month -reportedly no banding was performed.    He gets a paracentesis done every Tuesday and Friday at [facility].  He is not currently on diuretics given issues with hyponatremia and acute kidney injury.    His pruritus is well-controlled with cholestyramine although he does not take that every day given he does not like the taste.    With regards to his alcohol use he has been drinking for many years but he increased his use about 2 to 3 years ago in the setting of COVID-19 as well as his parents death.  He reports drinking up to 48 ounces of liquor per day.  His last drink was 8/18/2023.  He did a few days in [facility] but was not able to complete this program due to medical issues.  He currently engages in AA twice and sees a sponsor - he started this about two months ago.    Medical hx Surgical hx   Past Medical History:   Diagnosis Date     Alcohol use disorder, severe, dependence (H)      Alcohol withdrawal seizure (H)      Alcoholic cirrhosis of liver with ascites (H)      Esophageal varices (H)      Essential hypertension      Gastroesophageal reflux disease without esophagitis      History of methamphetamine use     Sustained remission since 2003     Hypercholesterolemia      Tobacco abuse       Past Surgical History:   Procedure Laterality Date     COLONOSCOPY N/A 8/28/2023    Procedure: COLONOSCOPY, WITH POLYPECTOMY via bx forceps;  Surgeon: Alyssa Tsai MD;  Location: [facility]     IR PARACENTESIS  8/31/2023     IR PARACENTESIS  9/15/2023          Medications  Current Outpatient Medications   Medication Sig Dispense Refill     Calcium Carb-Cholecalciferol 500-10 MG-MCG CHEW Chew  1 Tablet by mouth once daily.       carvedilol (COREG) 3.125 MG tablet Take 1 tablet (3.125 mg) by mouth 2 times daily (with meals) 90 tablet 3     cholestyramine light (QUESTRAN) 4 GM packet Take 1 packet (4 g) by mouth daily (with breakfast) 30 packet 1     folic acid (FOLVITE) 1 MG tablet Take 1 tablet (1 mg) by mouth daily for 180 days 90 tablet 1     lactulose 20 GM/30ML solution Take 45 mLs (30 g) by mouth 3 times daily Goal 4-5 stools daily. 946 mL 3     ondansetron (ZOFRAN) 4 MG tablet Take 1 tablet (4 mg) by mouth every 8 hours as needed for nausea 30 tablet 3     pantoprazole (PROTONIX) 40 MG EC tablet Take 1 tablet (40 mg) by mouth daily 30 tablet 3     potassium chloride ER (KLOR-CON M) 10 MEQ CR tablet Take 1 tablet by mouth daily       rifaximin (XIFAXAN) 550 MG TABS tablet 1 tablet (550 mg) by Oral or Feeding Tube route 2 times daily 60 tablet 3     sertraline (ZOLOFT) 50 MG tablet Take 1 tablet (50 mg) by mouth daily 30 tablet 3     vitamin B1 (B-1) 100 MG tablet Take 1 tablet (100 mg) by mouth daily 30 tablet 3     albuterol (PROAIR HFA/PROVENTIL HFA/VENTOLIN HFA) 108 (90 Base) MCG/ACT inhaler Inhale 1-2 Puffs by mouth every 4 hours if needed.       hydrOXYzine (ATARAX) 25 MG tablet Take 25 mg by mouth every 6 hours as needed       melatonin 3 MG tablet Take 3 mg by mouth nightly as needed for sleep         Allergies  Allergies   Allergen Reactions     Metronidazole Other (See Comments), Dizziness and Unknown     Omeprazole Other (See Comments) and Unknown     Irritability (tolerates Protonix well)       Family hx Social hx   Family History   Problem Relation Age of Onset     Coronary Artery Disease Mother      Cerebrovascular Disease Mother      Liver Cancer No family hx of      Liver Disease No family hx of      Positive for a first degree relative with drug or alcohol problems.  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 They have several kids together.   - Alcohol: Prior heavy use. H/o alcohol withdrawal seizures (last 2022). H/o [facility] x several days; not completed  - Tobacco: Former tobacco use, quit 2020.  - Drug use: history of amphetamine use. History of prior treatment for amphetamine use.      Review of systems  A 10-point review of systems was negative.    Examination  BP (!) 138/98   Pulse 88   Temp 97.9  F (36.6  C) (Oral)   Ht 1.753 m (5' 9\")   Wt 99.3 kg (219 lb)   BMI 32.34 kg/m    Body mass index is 32.34 kg/m .    Gen-NAD  Eye- EOMI  ENT- MMM  CVS- RRR, no murmurs  RS- CTA bilaterally  Abd- Overweight, soft, non-tender, mild distension  Extr- 2+ radial pulses bilaterally, no lower extremity edema bilaterally  MS- hands without clubbing  Neuro- A+Ox3, no asterixis  Skin- no rash or jaundice  Psych- normal mood    Laboratory  BMP  Recent Labs   Lab Test 11/24/23  0859 10/16/23  0526 10/15/23  0456 10/14/23  1834   * 127* 129* 128*   POTASSIUM 3.5 3.4 3.4 3.4   CHLORIDE 106 95* 99 99   ENEDINA 9.2 8.8 9.3 9.2   CO2 17* 17* 18* 17*   BUN 18.0 28.5* 31.7* 33.6*   CR 1.81* 1.74* 1.93* 1.95*   * 100* 81 102*     CBC  Recent Labs   Lab Test 11/24/23  0859 10/16/23  0526 10/15/23  0456 10/14/23  0938   WBC 9.8 8.2 7.8 10.2   RBC 3.32* 2.65* 2.77* 2.94*   HGB 10.3* 8.7* 9.2* 9.8*   HCT 30.2* 26.4* 28.1* 29.7*   MCV 91 100 101* 101*   MCH 31.0 32.8 33.2* 33.3*   MCHC 34.1 33.0 32.7 33.0   RDW 17.1* 16.8* 16.9* 17.2*   * 114* 107* 123*     Liver Enzymes   Recent Labs   Lab Test 11/24/23  0859   PROTTOTAL 7.5   ALBUMIN 3.3*   BILITOTAL 2.3*   ALKPHOS 195*   *   ALT 38      INR   INR   Date Value Ref " Range Status   11/24/2023 1.36 (H) 0.85 - 1.15 Final         Radiology  Abdominal US 9/2023  1.  Nonspecific gallbladder wall thickening. No additional features to suggest acute cholecystitis.   2.  Cirrhotic appearance of liver. No worrisome hepatic mass.   3.  Moderate to large amount of intra-abdominal ascites.    Endoscopy  EGD 8/28/2023  - Grade II esophageal varices. Completely eradicated. Banded.   - Portal hypertensive gastropathy.   - Normal examined duodenum.   - Esophageal ulcers at site of prior variceal treatment    Colonoscopy 8/2023  - Two diminutive polyps in the sigmoid colon, removed                          with a cold biopsy forceps. Resected and retrieved.                          - Rectal varices.                          - Erythematous mucosa in the sigmoid colon and rectum                          with come erythem, consistent with portal colopathy.                          This was the likely cause of hematochezia.                          No blood was seen.                          Terminal ileum was normal.       Again, thank you for allowing me to participate in the care of your patient.        Sincerely,        Nimco Diego MD

## 2023-11-28 NOTE — NURSING NOTE
"Chief Complaint   Patient presents with    New Patient     Cirrhosis of Liver     BP (!) 138/98   Pulse 88   Temp 97.9  F (36.6  C) (Oral)   Ht 1.753 m (5' 9\")   Wt 99.3 kg (219 lb)   BMI 32.34 kg/m    Vanessa Mariee CMA on 11/28/2023 at 8:31 AM    "

## 2023-12-01 ENCOUNTER — TELEPHONE (OUTPATIENT)
Dept: BEHAVIORAL HEALTH | Facility: CLINIC | Age: 43
End: 2023-12-01

## 2023-12-01 ENCOUNTER — TELEPHONE (OUTPATIENT)
Dept: NEPHROLOGY | Facility: CLINIC | Age: 43
End: 2023-12-01
Payer: COMMERCIAL

## 2023-12-01 NOTE — TELEPHONE ENCOUNTER
Substance Use Evaluation Intake Form    Demographic Information  Patient's legal name:  Elroy Morel Sr.  Patient's preferred/chosen name: Elroy  Patient's pronouns: He/Him  Last 4 of SSN: [id number]  Insurance: [employer] - pt had concerns with out of pocket expenses, advised pt to reach out to his insurance re: this appt  PMI: Not available at this time d/t pending review of authorization  Date of eval: 12/07/2023    Emergency contact: Malissa, wife  Does the patient have a legal guardian (if so, phone number for guardian about placement): No  Applicable guardianship and decision making information was sent to honoring choices: No  Does the patient have a psychiatric advance directive: No    Appointment Information  Who is recommending/reason for appointment: self   / HPSP / other entity that might need assessment: no    Drug of Choice: did not ask  Have you ever had a CLARA assessment (when/where): no  Any CLARA treatment history: yes, [facility] for meth    Service information  Primary Care Provider: Mena Chen in [facility] Providers: no  Are there MH concerns: No  Where is the patient in the care system: Pt is in the community and self referred.  Has consent been obtained for Care Everywhere: No    Advised patient/guardian that appt may take up to 120 min: Yes  Reminder to arrive 15 minutes early: Yes  Is there an active my chart: Yes  Have pre-visit forms been sent: No

## 2023-12-01 NOTE — TELEPHONE ENCOUNTER
Pt was unable to schedule because wife has their calendar// wife will call back to schedule// sent mychart// 12.1.23 KET   
0 (no pain/absence of nonverbal indicators of pain)

## 2023-12-04 ENCOUNTER — LAB (OUTPATIENT)
Dept: LAB | Facility: CLINIC | Age: 43
End: 2023-12-04
Payer: COMMERCIAL

## 2023-12-04 DIAGNOSIS — N17.9 ACUTE RENAL FAILURE, UNSPECIFIED ACUTE RENAL FAILURE TYPE (H): ICD-10-CM

## 2023-12-04 DIAGNOSIS — E87.6 HYPOKALEMIA: Primary | ICD-10-CM

## 2023-12-04 DIAGNOSIS — F10.20 ALCOHOL USE DISORDER, SEVERE, DEPENDENCE (H): ICD-10-CM

## 2023-12-04 DIAGNOSIS — K70.31 ALCOHOLIC CIRRHOSIS OF LIVER WITH ASCITES (H): ICD-10-CM

## 2023-12-04 LAB
ALBUMIN SERPL BCG-MCNC: 3.2 G/DL (ref 3.5–5.2)
ALP SERPL-CCNC: 198 U/L (ref 40–150)
ALT SERPL W P-5'-P-CCNC: 42 U/L (ref 0–70)
ANION GAP SERPL CALCULATED.3IONS-SCNC: 11 MMOL/L (ref 7–15)
AST SERPL W P-5'-P-CCNC: 138 U/L (ref 0–45)
BILIRUB DIRECT SERPL-MCNC: 0.89 MG/DL (ref 0–0.3)
BILIRUB SERPL-MCNC: 1.6 MG/DL
BUN SERPL-MCNC: 14.4 MG/DL (ref 6–20)
CALCIUM SERPL-MCNC: 8.4 MG/DL (ref 8.6–10)
CHLORIDE SERPL-SCNC: 102 MMOL/L (ref 98–107)
CREAT SERPL-MCNC: 1.86 MG/DL (ref 0.67–1.17)
DEPRECATED HCO3 PLAS-SCNC: 21 MMOL/L (ref 22–29)
EGFRCR SERPLBLD CKD-EPI 2021: 45 ML/MIN/1.73M2
ERYTHROCYTE [DISTWIDTH] IN BLOOD BY AUTOMATED COUNT: 16.8 % (ref 10–15)
GLUCOSE SERPL-MCNC: 147 MG/DL (ref 70–99)
HCT VFR BLD AUTO: 31.3 % (ref 40–53)
HGB BLD-MCNC: 10.6 G/DL (ref 13.3–17.7)
INR PPP: 1.28 (ref 0.85–1.15)
MCH RBC QN AUTO: 30.3 PG (ref 26.5–33)
MCHC RBC AUTO-ENTMCNC: 33.9 G/DL (ref 31.5–36.5)
MCV RBC AUTO: 89 FL (ref 78–100)
PLATELET # BLD AUTO: 106 10E3/UL (ref 150–450)
POTASSIUM SERPL-SCNC: 2.8 MMOL/L (ref 3.4–5.3)
PROT SERPL-MCNC: 7 G/DL (ref 6.4–8.3)
RBC # BLD AUTO: 3.5 10E6/UL (ref 4.4–5.9)
SODIUM SERPL-SCNC: 134 MMOL/L (ref 135–145)
WBC # BLD AUTO: 4.5 10E3/UL (ref 4–11)

## 2023-12-04 PROCEDURE — 82248 BILIRUBIN DIRECT: CPT

## 2023-12-04 PROCEDURE — 86364 TISS TRNSGLTMNASE EA IG CLAS: CPT

## 2023-12-04 PROCEDURE — 80053 COMPREHEN METABOLIC PANEL: CPT

## 2023-12-04 PROCEDURE — 36415 COLL VENOUS BLD VENIPUNCTURE: CPT

## 2023-12-04 PROCEDURE — 85610 PROTHROMBIN TIME: CPT

## 2023-12-04 PROCEDURE — 85027 COMPLETE CBC AUTOMATED: CPT

## 2023-12-04 RX ORDER — POTASSIUM CHLORIDE 750 MG/1
10 TABLET, EXTENDED RELEASE ORAL 2 TIMES DAILY
Qty: 30 TABLET | Refills: 3 | Status: SHIPPED | OUTPATIENT
Start: 2023-12-04 | End: 2023-12-10

## 2023-12-05 LAB
TTG IGA SER-ACNC: 2.3 U/ML
TTG IGG SER-ACNC: 1.9 U/ML

## 2023-12-06 DIAGNOSIS — K70.31 ALCOHOLIC CIRRHOSIS OF LIVER WITH ASCITES (H): ICD-10-CM

## 2023-12-06 RX ORDER — SPIRONOLACTONE 25 MG/1
50 TABLET ORAL DAILY
Qty: 90 TABLET | Refills: 1 | Status: SHIPPED | OUTPATIENT
Start: 2023-12-06 | End: 2023-12-10

## 2023-12-06 NOTE — TELEPHONE ENCOUNTER
12/6/2023    Completed reminder call regarding date and time of appointment. Provided phone number to 's Cost of Care Team (P: [phone]) regarding financial concerns with this appointment, confirmed insurance and insurance authorization under appointments. No further questions at this time.    Sydney Connors, Patient Navigator  [phone]

## 2023-12-07 ENCOUNTER — TELEPHONE (OUTPATIENT)
Dept: BEHAVIORAL HEALTH | Facility: CLINIC | Age: 43
End: 2023-12-07

## 2023-12-07 ENCOUNTER — LAB (OUTPATIENT)
Dept: LAB | Facility: CLINIC | Age: 43
End: 2023-12-07
Payer: COMMERCIAL

## 2023-12-07 ENCOUNTER — HOSPITAL ENCOUNTER (OUTPATIENT)
Dept: BEHAVIORAL HEALTH | Facility: CLINIC | Age: 43
Discharge: HOME | End: 2023-12-07
Attending: PSYCHIATRY & NEUROLOGY | Admitting: PSYCHIATRY & NEUROLOGY
Payer: COMMERCIAL

## 2023-12-07 VITALS — BODY MASS INDEX: 30.81 KG/M2 | HEIGHT: 69 IN | WEIGHT: 208 LBS

## 2023-12-07 DIAGNOSIS — K70.31 ALCOHOLIC CIRRHOSIS OF LIVER WITH ASCITES (H): ICD-10-CM

## 2023-12-07 DIAGNOSIS — E87.6 HYPOKALEMIA: ICD-10-CM

## 2023-12-07 DIAGNOSIS — F10.21 ALCOHOL USE DISORDER, SEVERE, IN EARLY REMISSION (H): Primary | ICD-10-CM

## 2023-12-07 PROCEDURE — 80048 BASIC METABOLIC PNL TOTAL CA: CPT

## 2023-12-07 PROCEDURE — 36415 COLL VENOUS BLD VENIPUNCTURE: CPT

## 2023-12-07 PROCEDURE — H0001 ALCOHOL AND/OR DRUG ASSESS: HCPCS

## 2023-12-07 ASSESSMENT — COLUMBIA-SUICIDE SEVERITY RATING SCALE - C-SSRS
1. HAVE YOU WISHED YOU WERE DEAD OR WISHED YOU COULD GO TO SLEEP AND NOT WAKE UP?: YES
1. IN THE PAST MONTH, HAVE YOU WISHED YOU WERE DEAD OR WISHED YOU COULD GO TO SLEEP AND NOT WAKE UP?: NO

## 2023-12-07 ASSESSMENT — PATIENT HEALTH QUESTIONNAIRE - PHQ9
SUM OF ALL RESPONSES TO PHQ QUESTIONS 1-9: 8
SUM OF ALL RESPONSES TO PHQ QUESTIONS 1-9: 8
10. IF YOU CHECKED OFF ANY PROBLEMS, HOW DIFFICULT HAVE THESE PROBLEMS MADE IT FOR YOU TO DO YOUR WORK, TAKE CARE OF THINGS AT HOME, OR GET ALONG WITH OTHER PEOPLE: SOMEWHAT DIFFICULT

## 2023-12-07 ASSESSMENT — PAIN SCALES - GENERAL: PAINLEVEL: NO PAIN (0)

## 2023-12-07 NOTE — TELEPHONE ENCOUNTER
The below telephone note was routed to the [facility] Patient Navigator at: DEPT-TRIAGETRANSITION-PATIENTNAVIGATOR [[id number]] on 12/7/2023 as a referral for IOP treatment at [facility].    A.)  Name: Elroy Morel Sr. Tel #: [phone]  B.)  MRN: [id number]  C.)  Referral is for: an Intensive Outpatient program at one of the [facility] locations for substance use disorder treatment.  D.)  CPA needs to be completed for [facility] only: NO  E.)  Notes: no schedule preference    Thanks,    Sydney Connors
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
## 2023-12-08 LAB
ANION GAP SERPL CALCULATED.3IONS-SCNC: 10 MMOL/L (ref 7–15)
BUN SERPL-MCNC: 12.3 MG/DL (ref 6–20)
CALCIUM SERPL-MCNC: 8.8 MG/DL (ref 8.6–10)
CHLORIDE SERPL-SCNC: 100 MMOL/L (ref 98–107)
CREAT SERPL-MCNC: 1.75 MG/DL (ref 0.67–1.17)
DEPRECATED HCO3 PLAS-SCNC: 24 MMOL/L (ref 22–29)
EGFRCR SERPLBLD CKD-EPI 2021: 49 ML/MIN/1.73M2
GLUCOSE SERPL-MCNC: 113 MG/DL (ref 70–99)
POTASSIUM SERPL-SCNC: 2.9 MMOL/L (ref 3.4–5.3)
SODIUM SERPL-SCNC: 134 MMOL/L (ref 135–145)

## 2023-12-10 ENCOUNTER — DOCUMENTATION ONLY (OUTPATIENT)
Dept: MULTI SPECIALTY CLINIC | Facility: CLINIC | Age: 43
End: 2023-12-10
Payer: COMMERCIAL

## 2023-12-10 DIAGNOSIS — K70.31 ALCOHOLIC CIRRHOSIS OF LIVER WITH ASCITES (H): ICD-10-CM

## 2023-12-10 DIAGNOSIS — E87.6 HYPOKALEMIA: ICD-10-CM

## 2023-12-10 RX ORDER — POTASSIUM CHLORIDE 750 MG/1
20 TABLET, EXTENDED RELEASE ORAL 2 TIMES DAILY
Qty: 30 TABLET | Refills: 3 | Status: SHIPPED | OUTPATIENT
Start: 2023-12-10 | End: 2024-02-05

## 2023-12-10 RX ORDER — SPIRONOLACTONE 25 MG/1
75 TABLET ORAL DAILY
Qty: 90 TABLET | Refills: 1 | Status: SHIPPED | OUTPATIENT
Start: 2023-12-10 | End: 2024-01-11

## 2023-12-10 NOTE — PROGRESS NOTES
Discussed with nephrology/Dr. Tolbert.  - Plan to increase spironolactone from 50mg daily -> 75mg daily  - Plan to increase KCL from 10mg BID to 20mg BID  - Repeat BMP in 3 days

## 2023-12-12 ENCOUNTER — LAB (OUTPATIENT)
Dept: LAB | Facility: CLINIC | Age: 43
End: 2023-12-12
Payer: COMMERCIAL

## 2023-12-12 DIAGNOSIS — E87.6 HYPOKALEMIA: ICD-10-CM

## 2023-12-12 PROCEDURE — 80048 BASIC METABOLIC PNL TOTAL CA: CPT

## 2023-12-12 PROCEDURE — 36415 COLL VENOUS BLD VENIPUNCTURE: CPT

## 2023-12-13 DIAGNOSIS — E87.6 HYPOKALEMIA: Primary | ICD-10-CM

## 2023-12-13 LAB
ANION GAP SERPL CALCULATED.3IONS-SCNC: 10 MMOL/L (ref 7–15)
BUN SERPL-MCNC: 14.9 MG/DL (ref 6–20)
CALCIUM SERPL-MCNC: 9.1 MG/DL (ref 8.6–10)
CHLORIDE SERPL-SCNC: 100 MMOL/L (ref 98–107)
CREAT SERPL-MCNC: 1.74 MG/DL (ref 0.67–1.17)
DEPRECATED HCO3 PLAS-SCNC: 25 MMOL/L (ref 22–29)
EGFRCR SERPLBLD CKD-EPI 2021: 49 ML/MIN/1.73M2
GLUCOSE SERPL-MCNC: 162 MG/DL (ref 70–99)
POTASSIUM SERPL-SCNC: 3.9 MMOL/L (ref 3.4–5.3)
SODIUM SERPL-SCNC: 135 MMOL/L (ref 135–145)

## 2023-12-15 ENCOUNTER — PRE VISIT (OUTPATIENT)
Dept: NEPHROLOGY | Facility: CLINIC | Age: 43
End: 2023-12-15
Payer: COMMERCIAL

## 2023-12-19 ENCOUNTER — LAB (OUTPATIENT)
Dept: LAB | Facility: CLINIC | Age: 43
End: 2023-12-19
Payer: COMMERCIAL

## 2023-12-19 DIAGNOSIS — E87.6 HYPOKALEMIA: ICD-10-CM

## 2023-12-19 DIAGNOSIS — K70.31 ALCOHOLIC CIRRHOSIS OF LIVER WITH ASCITES (H): ICD-10-CM

## 2023-12-19 LAB
ERYTHROCYTE [DISTWIDTH] IN BLOOD BY AUTOMATED COUNT: 15.6 % (ref 10–15)
HCT VFR BLD AUTO: 29.4 % (ref 40–53)
HGB BLD-MCNC: 9.9 G/DL (ref 13.3–17.7)
INR PPP: 1.38 (ref 0.85–1.15)
MCH RBC QN AUTO: 30.3 PG (ref 26.5–33)
MCHC RBC AUTO-ENTMCNC: 33.7 G/DL (ref 31.5–36.5)
MCV RBC AUTO: 90 FL (ref 78–100)
PLATELET # BLD AUTO: 127 10E3/UL (ref 150–450)
RBC # BLD AUTO: 3.27 10E6/UL (ref 4.4–5.9)
WBC # BLD AUTO: 7.3 10E3/UL (ref 4–11)

## 2023-12-19 PROCEDURE — 80048 BASIC METABOLIC PNL TOTAL CA: CPT

## 2023-12-19 PROCEDURE — 85027 COMPLETE CBC AUTOMATED: CPT

## 2023-12-19 PROCEDURE — 85610 PROTHROMBIN TIME: CPT

## 2023-12-19 PROCEDURE — 36415 COLL VENOUS BLD VENIPUNCTURE: CPT

## 2023-12-20 LAB
ANION GAP SERPL CALCULATED.3IONS-SCNC: 9 MMOL/L (ref 7–15)
BUN SERPL-MCNC: 14.5 MG/DL (ref 6–20)
CALCIUM SERPL-MCNC: 9.1 MG/DL (ref 8.6–10)
CHLORIDE SERPL-SCNC: 108 MMOL/L (ref 98–107)
CREAT SERPL-MCNC: 1.62 MG/DL (ref 0.67–1.17)
DEPRECATED HCO3 PLAS-SCNC: 22 MMOL/L (ref 22–29)
EGFRCR SERPLBLD CKD-EPI 2021: 54 ML/MIN/1.73M2
GLUCOSE SERPL-MCNC: 98 MG/DL (ref 70–99)
POTASSIUM SERPL-SCNC: 4.5 MMOL/L (ref 3.4–5.3)
SODIUM SERPL-SCNC: 139 MMOL/L (ref 135–145)

## 2023-12-29 ENCOUNTER — TELEPHONE (OUTPATIENT)
Dept: BEHAVIORAL HEALTH | Facility: CLINIC | Age: 43
End: 2023-12-29
Payer: COMMERCIAL

## 2023-12-29 NOTE — TELEPHONE ENCOUNTER
12/29/2023    Pt called wanting to get set up with treatment.   We discussed [facility] low intensity programs. Pt is leaning towards [facility] Mon/Wed option. Told him I would pass along this info to admission and he would receive a call from them.     Kathleen Suarez [facility] - Patient Navigator   [email or web address]  Direct phone: [phone]

## 2024-01-04 NOTE — TELEPHONE ENCOUNTER
1/4/2024    Pt called reporting he didn't receive a call (noted note from Payton, pt denies having a VM). Told pt I would send a message to [facility] admissions. Pt said his home phone is not in use, call cell: [phone].    Kathleen Suarez [facility] - Patient Navigator   [email or web address]  Direct phone: [phone]

## 2024-01-05 ENCOUNTER — TELEPHONE (OUTPATIENT)
Dept: BEHAVIORAL HEALTH | Facility: CLINIC | Age: 44
End: 2024-01-05

## 2024-01-05 ENCOUNTER — LAB (OUTPATIENT)
Dept: LAB | Facility: CLINIC | Age: 44
End: 2024-01-05
Payer: COMMERCIAL

## 2024-01-05 DIAGNOSIS — N17.9 ACUTE RENAL FAILURE, UNSPECIFIED ACUTE RENAL FAILURE TYPE (H): ICD-10-CM

## 2024-01-05 DIAGNOSIS — F10.20 ALCOHOL USE DISORDER, SEVERE, DEPENDENCE (H): ICD-10-CM

## 2024-01-05 LAB
ERYTHROCYTE [DISTWIDTH] IN BLOOD BY AUTOMATED COUNT: 16.1 % (ref 10–15)
HCT VFR BLD AUTO: 31.5 % (ref 40–53)
HGB BLD-MCNC: 10.3 G/DL (ref 13.3–17.7)
INR PPP: 1.37 (ref 0.85–1.15)
MCH RBC QN AUTO: 29.9 PG (ref 26.5–33)
MCHC RBC AUTO-ENTMCNC: 32.7 G/DL (ref 31.5–36.5)
MCV RBC AUTO: 92 FL (ref 78–100)
PLATELET # BLD AUTO: 111 10E3/UL (ref 150–450)
RBC # BLD AUTO: 3.44 10E6/UL (ref 4.4–5.9)
WBC # BLD AUTO: 5.5 10E3/UL (ref 4–11)

## 2024-01-05 PROCEDURE — 85027 COMPLETE CBC AUTOMATED: CPT

## 2024-01-05 PROCEDURE — 36415 COLL VENOUS BLD VENIPUNCTURE: CPT

## 2024-01-05 PROCEDURE — 85610 PROTHROMBIN TIME: CPT

## 2024-01-05 PROCEDURE — 80053 COMPREHEN METABOLIC PANEL: CPT

## 2024-01-05 NOTE — TELEPHONE ENCOUNTER
----- Message from Candelaria Hartman sent at 1/5/2024 12:08 PM CST -----  Regarding: SERVICE INITIATION  SERVICE INITIATION SCHEDULED ON 1/18, [facility] [place]

## 2024-01-05 NOTE — TELEPHONE ENCOUNTER
2024    Received the following email from pt's wife and pt:    Hello,     My  has been calling and trying to get outpatient treatment set up for a few weeks nnw.he continues to get passed around from one person to the next..but not getting anywhere. It has literally been a month.  He had a chemical assessment done at [facility] and the lady said someone should call ..referral was sent no one ever did..  But he has been calling the number provided.      This is starting to get very frustrating.   Can someone assist us? Or if you can't point us in the right direction.   Please ..  Attached is the slip of paper provided to him.     Name Elroy omalley  : 1980     Phone for him is :[phone]  For wife maulik:[phone]     Your prompt reply is very much appreciated.     Maulik Grijalvatre       Replied with the following:     Hi there -    I have spoke with Ad this week and last week when he was ready to look at programming (noted that on  when he spoke with Payton, he requested a different time to discuss options). As I told Ad, he needs to connect with Payton Hartman who does admissions to [facility] programs. He has been referred to [facility] at this time. Her direct contact number is [phone].  I have CC'd her on this email as well.    If there are further questions/concerns, please let me know.

## 2024-01-06 LAB
ALBUMIN SERPL BCG-MCNC: 3.2 G/DL (ref 3.5–5.2)
ALP SERPL-CCNC: 143 U/L (ref 40–150)
ALT SERPL W P-5'-P-CCNC: 23 U/L (ref 0–70)
ANION GAP SERPL CALCULATED.3IONS-SCNC: 9 MMOL/L (ref 7–15)
AST SERPL W P-5'-P-CCNC: 57 U/L (ref 0–45)
BILIRUB SERPL-MCNC: 0.9 MG/DL
BUN SERPL-MCNC: 14.4 MG/DL (ref 6–20)
CALCIUM SERPL-MCNC: 9.4 MG/DL (ref 8.6–10)
CHLORIDE SERPL-SCNC: 108 MMOL/L (ref 98–107)
CREAT SERPL-MCNC: 1.47 MG/DL (ref 0.67–1.17)
DEPRECATED HCO3 PLAS-SCNC: 23 MMOL/L (ref 22–29)
EGFRCR SERPLBLD CKD-EPI 2021: 60 ML/MIN/1.73M2
GLUCOSE SERPL-MCNC: 143 MG/DL (ref 70–99)
POTASSIUM SERPL-SCNC: 4.6 MMOL/L (ref 3.4–5.3)
PROT SERPL-MCNC: 6.9 G/DL (ref 6.4–8.3)
SODIUM SERPL-SCNC: 140 MMOL/L (ref 135–145)

## 2024-01-08 ENCOUNTER — OFFICE VISIT (OUTPATIENT)
Dept: NEPHROLOGY | Facility: CLINIC | Age: 44
End: 2024-01-08
Attending: INTERNAL MEDICINE
Payer: COMMERCIAL

## 2024-01-08 VITALS
DIASTOLIC BLOOD PRESSURE: 89 MMHG | HEART RATE: 74 BPM | WEIGHT: 216.9 LBS | BODY MASS INDEX: 32.03 KG/M2 | SYSTOLIC BLOOD PRESSURE: 134 MMHG | OXYGEN SATURATION: 100 %

## 2024-01-08 DIAGNOSIS — K70.31 ALCOHOLIC CIRRHOSIS OF LIVER WITH ASCITES (H): Primary | ICD-10-CM

## 2024-01-08 PROCEDURE — 99214 OFFICE O/P EST MOD 30 MIN: CPT | Performed by: INTERNAL MEDICINE

## 2024-01-08 PROCEDURE — 99213 OFFICE O/P EST LOW 20 MIN: CPT | Performed by: INTERNAL MEDICINE

## 2024-01-08 RX ORDER — CARVEDILOL 6.25 MG/1
6.25 TABLET ORAL 2 TIMES DAILY WITH MEALS
COMMUNITY
Start: 2023-12-26 | End: 2024-03-12

## 2024-01-08 ASSESSMENT — PAIN SCALES - GENERAL: PAINLEVEL: NO PAIN (0)

## 2024-01-08 NOTE — PATIENT INSTRUCTIONS
-Please increase spironolactone to 100 mg daily  -Please decrease potassium supplementation to 20 mEq daily  -Please do labs in a week and in 4 weeks before your next appointment

## 2024-01-08 NOTE — NURSING NOTE
Chief Complaint   Patient presents with    RECHECK     6 week follow up; per Dr Fisher     /89 (BP Location: Right arm, Patient Position: Sitting, Cuff Size: Adult Regular)   Pulse 74   Wt 98.4 kg (216 lb 14.4 oz)   SpO2 100%   BMI 32.03 kg/m    Nimco Farmer [facility]

## 2024-01-08 NOTE — LETTER
1/8/2024       RE: Elroy Morel Sr.  [address] [place] [address]     Dear Colleague,    Thank you for referring your patient, Elroy Morel Sr., to the [facility]PHROLOG[facility] [place] at [facility]. Please see a copy of my visit note below.    Nephrology Clinic    Elroy Morel Sr. MRN:[id number] YOB: 1980  Date of Service: 01/08/2024  Primary care provider: Latosha Baires  Requesting physician: Elroy Rankin MD      REASON FOR CONSULT: CKD    HISTORY OF PRESENT ILLNESS:   Elroy Morel Sr. is a 43 year old male who first presented for evaluation of CKD post-JOCELYN in the setting of alcoholic liver disease in November 2024.  The past medical history is significant for alcohol dependence leading to cirrhosis and multiple hospitalizations over the past months, last from October 13 th till October 16th 2023. His hospitalizations have been complicated by episodes of JOCELYN with the most severe episode occurring early September with a creatinine level that peaked at 7.7 mg/dL. He has been needing therapeutic paracentesis twice a week. He reports that he has been abstinent since mid-August 2023. Over the past months his creatinine level has been fluctuating between 1.6 and 2.2 mg/dL and it was 1.8 mg/dL on 11/24/2023. It has improved to 1.47 mg/dL on 1/5/2024. He has no evidence of any significant albuminuria with a negative uACR on 11/24/2023. A kidney ultrasound done early September 2023 shows normal size kidneys. On his first visit, he was started on diuretics and is currently on furosemide 20 mg daily and spironolactone 75 mg daily. He has been needing less frequent paracentesis with less fluid retrieved at each time. His blood pressure has been rising and as a result, his PCP recently increased his carvedilol to 6.25 mg twice daily. He has undergone a preliminary evaluation for liver transplant.    The patient denies any  dysuria, any pollakiuria, any nocturia, any LE edema, any dyspnea on exertion .  The patient denies ever having kidney stones, urinary tract infections, gross hematuria. There is no family history of CKD.  The following portions of the patient's history were reviewed and updated as appropriate: allergies, current medications, past family history, past medical history, past social history, past surgical history and problem list.    PAST MEDICAL HISTORY:  Past Medical History:   Diagnosis Date     Alcohol use disorder, severe, dependence (H)      Alcohol withdrawal seizure (H)      Alcoholic cirrhosis of liver with ascites (H)      Esophageal varices (H)      Essential hypertension      Gastroesophageal reflux disease without esophagitis      History of methamphetamine use     Sustained remission since 2003     Hypercholesterolemia      Tobacco abuse      PAST SURGICAL HISTORY:  Past Surgical History:   Procedure Laterality Date     COLONOSCOPY N/A 8/28/2023    Procedure: COLONOSCOPY, WITH POLYPECTOMY via bx forceps;  Surgeon: Alyssa Tsai MD;  Location: [facility]     IR PARACENTESIS  8/31/2023     IR PARACENTESIS  9/15/2023     IR PARACENTESIS  9/19/2023     IR PARACENTESIS  9/22/2023     IR PARACENTESIS  9/26/2023     IR PARACENTESIS  9/29/2023     IR PARACENTESIS  10/10/2023     IR PARACENTESIS  10/17/2023     IR PARACENTESIS  10/20/2023     IR PARACENTESIS  10/24/2023     IR PARACENTESIS  10/27/2023     IR PARACENTESIS  10/31/2023     IR PARACENTESIS  11/3/2023     IR PARACENTESIS  11/7/2023     IR PARACENTESIS  11/10/2023     IR PARACENTESIS  11/14/2023     IR PARACENTESIS  11/17/2023     IR PARACENTESIS  11/21/2023     IR PARACENTESIS  11/24/2023     IR PARACENTESIS  11/28/2023     IR PARACENTESIS  12/1/2023     IR PARACENTESIS  12/5/2023     IR PARACENTESIS  12/8/2023     IR PARACENTESIS  12/15/2023     IR PARACENTESIS  1/4/2024     IR PARACENTESIS  12/29/2023     IR PARACENTESIS  12/22/2023     IR  PARACENTESIS  12/19/2023     MEDICATIONS:  Prescription Medications as of 1/8/2024         Rx Number Disp Refills Start End Last Dispensed Date Next Fill Date Owning Pharmacy    Calcium Carb-Cholecalciferol 500-10 MG-MCG CHEW  -- -- 9/1/2023 --       Sig: Chew 1 Tablet by mouth once daily.    Class: Historical    carvedilol (COREG) 3.125 MG tablet  90 tablet 3 11/28/2023 --   [facility] #[id number] - [place], [place] - [address] [facility]    Sig: Take 1 tablet (3.125 mg) by mouth 2 times daily (with meals)    Class: E-Prescribe    Route: Oral    cholestyramine light (QUESTRAN) 4 GM packet  30 packet 1 10/15/2023 --   [facility] [place], [place] - [address]     Sig: Take 1 packet (4 g) by mouth daily (with breakfast)    Class: E-Prescribe    Route: Oral    Renewals       Renewal requests to authorizing provider (Raymundo Rodriguez) <b>prohibited</b>            folic acid (FOLVITE) 1 MG tablet  90 tablet 1 11/27/2023 5/25/2024   [id number] - [place], [place] - [address] [facility]    Sig: Take 1 tablet (1 mg) by mouth daily for 180 days    Class: E-Prescribe    Route: Oral    furosemide (LASIX) 20 MG tablet  90 tablet 1 11/28/2023 5/26/2024   [id number] - [place], [place] - [address] [facility]    Sig: Take 1 tablet (20 mg) by mouth daily for 180 days    Class: E-Prescribe    Route: Oral    lactulose 20 GM/30ML solution  946 mL 3 11/28/2023 --   [facility] #[id number] - [place], [place] - [address] [facility]    Sig: Take 45 mLs (30 g) by mouth 3 times daily Goal 4-5 stools daily.    Class: E-Prescribe    Route: Oral    ondansetron (ZOFRAN) 4 MG tablet  30 tablet 3 11/28/2023 --   [facility] #[id number] - [place], [place] - [address]    Sig: Take 1 tablet (4 mg) by mouth every 8 hours as needed for nausea    Class: E-Prescribe     Route: Oral    pantoprazole (PROTONIX) 40 MG EC tablet  30 tablet 3 10/15/2023 --   [address]    Sig: Take 1 tablet (40 mg) by mouth daily    Class: E-Prescribe    Route: Oral    Renewals       Renewal requests to authorizing provider (Raymundo Rodriguez) <b>prohibited</b>            potassium chloride ER (KLOR-CON M) 10 MEQ CR tablet  30 tablet 3 12/10/2023 --   [facility] #[id number] - [place], [place] - [address] [facility]    Sig: Take 2 tablets (20 mEq) by mouth 2 times daily    Class: E-Prescribe    Route: Oral    rifaximin (XIFAXAN) 550 MG TABS tablet  60 tablet 3 2023 --   [facility] [place] #[id number] - [place], [place] - [address] AT [facility]    Si tablet (550 mg) by Oral or Feeding Tube route 2 times daily    Class: E-Prescribe    Route: Oral or Feeding Tube    sertraline (ZOLOFT) 50 MG tablet  30 tablet 3 10/15/2023 --   [place], [place] - [address] [place] [address]    Sig: Take 1 tablet (50 mg) by mouth daily    Class: E-Prescribe    Route: Oral    Renewals       Renewal requests to authorizing provider (Raymundo Rodriguez) <b>prohibited</b>            spironolactone (ALDACTONE) 25 MG tablet  90 tablet 1 12/10/2023 --   [facility] [place] #[id number] - [place], [place] - [address] [facility]    Sig: Take 3 tablets (75 mg) by mouth daily    Class: E-Prescribe    Route: Oral    vitamin B1 (B-1) 100 MG tablet  30 tablet 3 10/16/2023 --   [address]    Sig: Take 1 tablet (100 mg) by mouth daily    Class: E-Prescribe    Route: Oral    Renewals       Renewal requests to authorizing provider (Raymundo Rodriguez) <b>prohibited</b>                   ALLERGIES:    Allergies   Allergen Reactions     Dust Mites      Metronidazole Other (See Comments), Dizziness and Unknown     Pollen Extract      Omeprazole Other  (See Comments) and Unknown     Irritability (tolerates Protonix well)     REVIEW OF SYSTEMS:    A comprehensive review of systems was performed and found to be negative except as described here or above.  SOCIAL HISTORY:   Social History     Socioeconomic History     Marital status:      Spouse name: Not on file     Number of children: Not on file     Years of education: Not on file     Highest education level: Not on file   Occupational History     Not on file   Tobacco Use     Smoking status: Former     Types: Cigarettes     Smokeless tobacco: Never   Substance and Sexual Activity     Alcohol use: Not Currently     Comment: last drink 8/19     Drug use: Not Currently     Types: Amphetamines     Comment: Sustained remission     Sexual activity: Not on file   Other Topics Concern     Not on file   Social History Narrative    Pt is , 2 children. Employed.      Social Determinants of Health     Financial Resource Strain: Not on file   Food Insecurity: Not on file   Transportation Needs: Not on file   Physical Activity: Not on file   Stress: Not on file   Social Connections: Not on file   Interpersonal Safety: Not on file   Housing Stability: Not on file     FAMILY MEDICAL HISTORY:   Family History   Problem Relation Age of Onset     Substance Abuse Mother      Coronary Artery Disease Mother      Cerebrovascular Disease Mother      Substance Abuse Father      Substance Abuse Brother      Liver Cancer No family hx of      Liver Disease No family hx of      PHYSICAL EXAM:   There were no vitals taken for this visit.  GENERAL APPEARANCE: alert and no distress  EYES: nonicteric  HENT: mouth without ulcers or lesions  NECK: supple, no adenopathy  RESP: lungs clear to auscultation   CV: regular rhythm, normal rate, no rub  ABDOMEN: soft, nontender, normal bowel sounds, no HSM   Extremities: no clubbing, cyanosis, or edema  MS: no evidence of inflammation in joints, no muscle tenderness  SKIN: no rash  NEURO:  mentation intact and speech normal  PSYCH: affect normal/bright   LABS:   Recent Results (from the past 672 hour(s))   Basic metabolic panel  (Ca, Cl, CO2, Creat, Gluc, K, Na, BUN)    Collection Time: 12/12/23  2:52 PM   Result Value Ref Range    Sodium 135 135 - 145 mmol/L    Potassium 3.9 3.4 - 5.3 mmol/L    Chloride 100 98 - 107 mmol/L    Carbon Dioxide (CO2) 25 22 - 29 mmol/L    Anion Gap 10 7 - 15 mmol/L    Urea Nitrogen 14.9 6.0 - 20.0 mg/dL    Creatinine 1.74 (H) 0.67 - 1.17 mg/dL    GFR Estimate 49 (L) >60 mL/min/1.73m2    Calcium 9.1 8.6 - 10.0 mg/dL    Glucose 162 (H) 70 - 99 mg/dL   Basic metabolic panel    Collection Time: 12/19/23  2:55 PM   Result Value Ref Range    Sodium 139 135 - 145 mmol/L    Potassium 4.5 3.4 - 5.3 mmol/L    Chloride 108 (H) 98 - 107 mmol/L    Carbon Dioxide (CO2) 22 22 - 29 mmol/L    Anion Gap 9 7 - 15 mmol/L    Urea Nitrogen 14.5 6.0 - 20.0 mg/dL    Creatinine 1.62 (H) 0.67 - 1.17 mg/dL    GFR Estimate 54 (L) >60 mL/min/1.73m2    Calcium 9.1 8.6 - 10.0 mg/dL    Glucose 98 70 - 99 mg/dL   CBC with platelets    Collection Time: 12/19/23  2:56 PM   Result Value Ref Range    WBC Count 7.3 4.0 - 11.0 10e3/uL    RBC Count 3.27 (L) 4.40 - 5.90 10e6/uL    Hemoglobin 9.9 (L) 13.3 - 17.7 g/dL    Hematocrit 29.4 (L) 40.0 - 53.0 %    MCV 90 78 - 100 fL    MCH 30.3 26.5 - 33.0 pg    MCHC 33.7 31.5 - 36.5 g/dL    RDW 15.6 (H) 10.0 - 15.0 %    Platelet Count 127 (L) 150 - 450 10e3/uL   INR    Collection Time: 12/19/23  2:56 PM   Result Value Ref Range    INR 1.38 (H) 0.85 - 1.15   CBC with platelets    Collection Time: 01/05/24  2:46 PM   Result Value Ref Range    WBC Count 5.5 4.0 - 11.0 10e3/uL    RBC Count 3.44 (L) 4.40 - 5.90 10e6/uL    Hemoglobin 10.3 (L) 13.3 - 17.7 g/dL    Hematocrit 31.5 (L) 40.0 - 53.0 %    MCV 92 78 - 100 fL    MCH 29.9 26.5 - 33.0 pg    MCHC 32.7 31.5 - 36.5 g/dL    RDW 16.1 (H) 10.0 - 15.0 %    Platelet Count 111 (L) 150 - 450 10e3/uL   Comprehensive metabolic  panel (BMP + Alb, Alk Phos, ALT, AST, Total. Bili, TP)    Collection Time: 01/05/24  2:46 PM   Result Value Ref Range    Sodium 140 135 - 145 mmol/L    Potassium 4.6 3.4 - 5.3 mmol/L    Carbon Dioxide (CO2) 23 22 - 29 mmol/L    Anion Gap 9 7 - 15 mmol/L    Urea Nitrogen 14.4 6.0 - 20.0 mg/dL    Creatinine 1.47 (H) 0.67 - 1.17 mg/dL    GFR Estimate 60 (L) >60 mL/min/1.73m2    Calcium 9.4 8.6 - 10.0 mg/dL    Chloride 108 (H) 98 - 107 mmol/L    Glucose 143 (H) 70 - 99 mg/dL    Alkaline Phosphatase 143 40 - 150 U/L    AST 57 (H) 0 - 45 U/L    ALT 23 0 - 70 U/L    Protein Total 6.9 6.4 - 8.3 g/dL    Albumin 3.2 (L) 3.5 - 5.2 g/dL    Bilirubin Total 0.9 <=1.2 mg/dL   INR    Collection Time: 01/05/24  2:46 PM   Result Value Ref Range    INR 1.37 (H) 0.85 - 1.15     CMP  Recent Labs   Lab Test 01/05/24  1446 12/19/23  1455 12/12/23  1452 12/07/23  1539 12/04/23  1022 11/24/23  0859 10/16/23  0526 10/14/23  0938 10/13/23  1320 09/02/23  2031 08/30/23  0533 08/29/23  0803 08/29/23  0606 08/28/23  0645 08/26/23  0653 08/25/23  0620 09/15/21  1235 08/27/20  1401 06/25/20  1501    139 135 134* 134* 134* 127*   < > 125*   < > 125*  --  128* 130*   < > 131*   < > 140 138   POTASSIUM 4.6 4.5 3.9 2.9* 2.8* 3.5 3.4   < > 3.8   < > 4.2  --  4.2 4.1   < > 3.8   < > 4.5 4.4   CHLORIDE 108* 108* 100 100 102 106 95*   < > 93*   < > 96*  --  98 100   < > 101   < > 103 102   CO2 23 22 25 24 21* 17* 17*   < > 16*   < > 16*  --  17* 16*   < > 17*   < > 23 24   ANIONGAP 9 9 10 10 11 11 15   < > 16*   < > 13  --  13 14   < > 13   < > 14 12   * 98 162* 113* 147* 161* 100*   < > 131*   < > 85  --  87 115*   < > 89   < > 89 104   BUN 14.4 14.5 14.9 12.3 14.4 18.0 28.5*   < > 36.4*   < > 45.5*  --  40.0* 41.4*   < > 47.5*   < > 10 12   CR 1.47* 1.62* 1.74* 1.75* 1.86* 1.81* 1.74*   < > 2.26*   < > 4.34*  --  3.44* 3.72*   < > 5.40*   < > 1.11 1.23   GFRESTIMATED 60* 54* 49* 49* 45* 47* 49*   < > 36*   < > 16*  --  22* 20*   < > 13*   <  > >60 >60   GFRESTBLACK  --   --   --   --   --   --   --   --   --   --   --   --   --   --   --   --   --  >60 >60   ENEDINA 9.4 9.1 9.1 8.8 8.4* 9.2 8.8   < > 9.1   < > 9.0  --  8.9 9.1   < > 9.2   < > 9.6 10.1   MAG  --   --   --   --   --   --   --   --  1.9  --  1.8  --  1.8 1.9   < > 2.0   < >  --   --    PHOS  --   --   --   --   --  3.1  --   --   --   --   --  3.5  --  3.4  --  3.9   < >  --   --    PROTTOTAL 6.9  --   --   --  7.0 7.5 6.5   < > 7.5   < >  --   --   --   --   --   --    < > 7.7 7.6   ALBUMIN 3.2*  --   --   --  3.2* 3.3* 3.8   < > 3.1*  3.1*   < > 3.0*  --  3.3* 3.4*   < > 3.6   < > 4.4 4.2   BILITOTAL 0.9  --   --   --  1.6* 2.3* 9.8*   < > 15.1*   < > 33.6*  --  34.3* 34.9*   < > 30.3*   < > 0.6 0.4   ALKPHOS 143  --   --   --  198* 195* 152*   < > 256*   < > 125  --  122 110   < > 103   < > 54 59   AST 57*  --   --   --  138* 103* 55*   < > 86*   < > 184*  --  196* 179*   < > 155*   < > 83* 91*   ALT 23  --   --   --  42 38 26   < > 57   < > 52  --  57 57   < > 44   < > 122* 133*    < > = values in this interval not displayed.     CBC  Recent Labs   Lab Test 01/05/24  1446 12/19/23  1456 12/04/23  1022 11/24/23  0859   HGB 10.3* 9.9* 10.6* 10.3*   WBC 5.5 7.3 4.5 9.8   RBC 3.44* 3.27* 3.50* 3.32*   HCT 31.5* 29.4* 31.3* 30.2*   MCV 92 90 89 91   MCH 29.9 30.3 30.3 31.0   MCHC 32.7 33.7 33.9 34.1   RDW 16.1* 15.6* 16.8* 17.1*   * 127* 106* 139*     INR  Recent Labs   Lab Test 01/05/24  1446 12/19/23  1456 12/04/23  1022 11/24/23  0859 09/03/23  0644 09/02/23  2111 08/27/23  0745 08/24/23  0618   INR 1.37* 1.38* 1.28* 1.36*   < > 1.46*   < > 1.72*   PTT  --   --   --   --   --  42*  --  37    < > = values in this interval not displayed.     ABG  Recent Labs   Lab Test 09/06/23  2045   O2PER 21      URINE STUDIES  Recent Labs   Lab Test 11/24/23  0902 10/14/23  1636 10/03/23  1953 09/10/23  2356   COLOR Yellow Dark Yellow* Dark Yellow* Dark Yellow*   APPEARANCE Clear Clear Clear Clear    URINEGLC Negative Negative Negative Negative   URINEBILI Small* Small* Moderate* Moderate*   URINEKETONE Negative Negative Negative Negative   SG 1.020 1.018 1.020 1.015   UBLD Negative Negative Negative Negative   URINEPH 6.5 5.5 5.5 5.5   PROTEIN 30* 10* 10* Negative   UROBILINOGEN 0.2  --   --   --    NITRITE Negative Negative Negative Negative   LEUKEST Negative Negative Negative Negative   RBCU 2-5* 1 <1 0   WBCU 5-10* 1 3 1     No lab results found.    ASSESSMENT AND PLAN:   #CKD stage 3 b mostly secondary to hepatorenal syndrome and multiple episodes of JOCELYN with no significant proteinuria and unremarkable kidneys on imaging. Refractory ascites and hemodynamic fluctuations in the setting of recurrent paracentesis could also be contributory. I will increase spironolactone to 100 mg daily and pursue furosemide 20 mg daily in order to minimize paracentesis as much as possible with very close follow up and labs. The patient was instructed to keep a BP diary and to weigh himself on a daily basis.  No documented intake of NSAIDs or other nephrotoxic medications.   The patient was instructed keep the sodium intake around 2400 mg /day, follow a plant-based diet and to avoid NSAIDs     #HTN  Primary and secondary to CKD. On carvedilol, spironolactone and furosemide. Management as per above.    #Blood count  Hemoglobin 10.3  Iron sat 31%  Ferritin level 525 could benefit from oral iron    #Acid-base status  CO2 level 17 -> 23 improved and normalized    #Electrolytes  Na 134 -> 140 mild hyponatremia that has resolved as his liver function continues to improve  K 3.5 -> 4.6 decrease potassium supplementation to 20 mEq daily     #BMD  Calcium 9.2          Phosphorus 3.1    albumin 3.3  Vitamin D level 20 would benefit from vitamin D supplementation    #CKD journey/transplant not a candidate at this point    The total time of this encounter amounted to 30 minutes on the day of the encounter. This time included time spent  with the patient, reviewing records, ordering tests, and performing post visit documentation.       The patient will return to follow up in 4 weeks    Didi Fisher MD  Division of Renal Disease and Hypertension      Again, thank you for allowing me to participate in the care of your patient.      Sincerely,    Didi Fisehr MD

## 2024-01-10 ENCOUNTER — TELEPHONE (OUTPATIENT)
Dept: NEPHROLOGY | Facility: CLINIC | Age: 44
End: 2024-01-10
Payer: COMMERCIAL

## 2024-01-11 DIAGNOSIS — K70.31 ALCOHOLIC CIRRHOSIS OF LIVER WITH ASCITES (H): ICD-10-CM

## 2024-01-11 RX ORDER — SPIRONOLACTONE 100 MG/1
100 TABLET, FILM COATED ORAL DAILY
Qty: 90 TABLET | Refills: 3 | Status: SHIPPED | OUTPATIENT
Start: 2024-01-11 | End: 2024-03-12

## 2024-01-14 ENCOUNTER — MYC MEDICAL ADVICE (OUTPATIENT)
Dept: NEPHROLOGY | Facility: CLINIC | Age: 44
End: 2024-01-14
Payer: COMMERCIAL

## 2024-01-15 RX ORDER — CARVEDILOL 6.25 MG/1
6.25 TABLET ORAL 2 TIMES DAILY WITH MEALS
Qty: 180 TABLET | Refills: 3 | Status: CANCELLED | OUTPATIENT
Start: 2024-01-15

## 2024-01-15 NOTE — TELEPHONE ENCOUNTER
Medication refill request    Medication refill: carvedilol 6.25 mg BID     Last OV: 1/8/24  Next OV: 2/5/24    Current labs:    Latest Reference Range & Units 01/05/24 14:46   Sodium 135 - 145 mmol/L 140   Potassium 3.4 - 5.3 mmol/L 4.6   Chloride 98 - 107 mmol/L 108 (H)   Carbon Dioxide (CO2) 22 - 29 mmol/L 23   Urea Nitrogen 6.0 - 20.0 mg/dL 14.4   Creatinine 0.67 - 1.17 mg/dL 1.47 (H)   GFR Estimate >60 mL/min/1.73m2 60 (L)   Calcium 8.6 - 10.0 mg/dL 9.4   Anion Gap 7 - 15 mmol/L 9   Albumin 3.5 - 5.2 g/dL 3.2 (L)   Protein Total 6.4 - 8.3 g/dL 6.9   Alkaline Phosphatase 40 - 150 U/L 143   ALT 0 - 70 U/L 23   AST 0 - 45 U/L 57 (H)   Bilirubin Total <=1.2 mg/dL 0.9   Glucose 70 - 99 mg/dL 143 (H)   (H): Data is abnormally high  (L): Data is abnormally low        Routed to be signed      Angelina Guerra LPN  Nephrology  [phone]

## 2024-01-18 ENCOUNTER — HOSPITAL ENCOUNTER (OUTPATIENT)
Dept: BEHAVIORAL HEALTH | Facility: CLINIC | Age: 44
Discharge: HOME | End: 2024-01-18
Attending: FAMILY MEDICINE
Payer: COMMERCIAL

## 2024-01-18 PROCEDURE — H2035 A/D TX PROGRAM, PER HOUR: HCPCS

## 2024-01-18 ASSESSMENT — COLUMBIA-SUICIDE SEVERITY RATING SCALE - C-SSRS
1. HAVE YOU WISHED YOU WERE DEAD OR WISHED YOU COULD GO TO SLEEP AND NOT WAKE UP?: YES
ATTEMPT LIFETIME: NO
2. HAVE YOU ACTUALLY HAD ANY THOUGHTS OF KILLING YOURSELF?: NO
1. IN THE PAST MONTH, HAVE YOU WISHED YOU WERE DEAD OR WISHED YOU COULD GO TO SLEEP AND NOT WAKE UP?: NO

## 2024-01-18 NOTE — PROGRESS NOTES
"Comprehensive Assessment Update:    Comprehensive assessment dated 12/7/24 was reviewed and updates are as follows:   1: No use   2: No new medical concerns   3: Mental health is improving. I am more optimistic.   4: I need to be here for liver   5: No new skills- I am a cold turkey kind of a person   6: No new or pending legal concerns. Feels safe at home     Reason for admission today:  \"It is a requirement for a liver transplant. It is nice to hear stories, I enjoy going to [facility] I have been to tx prior, I didn't lose any of that. Not that it has worked in the past.     Dates of last use and substance(s) used:  8/18/2023  Patient does not have a history of opiate use.    Safety concerns:  No safety concerns        Other:  None at this time     Health Screening:  Given patient's past history, a medication, and physical condition, is there a fall risk?  No  Does the patient have any pain? No  Is the patient on a special diet? If yes, please explain: no  Does the patient have any concerns regarding your nutritional status?   Has the patient had any appetite changes in the last 3 months?  No  Has the patient had any weight loss or weight gain in the last 3 months? No  Has the patient have a history of an eating disorder or been over-eating, avoiding meals, or inducing vomiting?  No  Does the patient have any dental concerns? (Problems with teeth, pain, cavities, braces)?  Yes- has dental services   Are immunizations up to date?  Yes  Any recent exposure to TB, Hepatitis, Measles, or Strep?  No    Dimension Scale Ratings:    Dimension 1: 0 Client displays full functioning with good ability to tolerate and cope with withdrawal discomfort. No signs or symptoms of intoxication or withdrawal or resolving signs or symptoms.    Summary to support rating:  Elroy reports maintained abstinence since 8/18/2023. No current withdrawal concerns.     Dimension 2: 1 Client tolerates and joaquin with physical discomfort and " is able to get the services that the client needs.  Summary to support rating:  Last MELD was 17, [facility]. Elroy has a primary care provider an access to care. At this time he reports attending his medical appointments as scheduled.     Dimension 3: 2 Client has difficulty with impulse control and lacks coping skills. Client has thoughts of suicide or harm to others without means; however, the thoughts may interfere with participation in some treatment activities. Client has difficulty functioning in significant life areas. Client has moderate symptoms of emotional, behavioral, or cognitive problems. Client is able to participate in most treatment activities.  Summary to support rating: Elroy reports a mental health diagnoses of anxiety. He does not report any current symptoms. No SI/SIB/HI/VI at time of service initiation. Elroy has a history of impulsive behaviors, this includes substance use and related behaviors.     Dimension 4: 1 Client is motivated with active reinforcement, to explore treatment and strategies for change, but ambivalent about illness or need for change.  Summary to support rating:  Elroy is motivated for outpatient programing due to requirement for liver transplant. He is willing to engage and was calm and cooperative through this assessment.     Dimension 5: 3 Client has poor recognition and understanding of relapse and recidivism issues and displays moderately high vulnerability for further substance use or mental health problems. Client has few coping skills and rarely applies coping skills.  Summary to support rating:  Elroy has a long history of substance use. He has few coping skills, they have been applied as he has sustained abstinence since 8/18/23. Elroy does not have an active treatment plan.     Dimension 6: 2 Client is engaged in structured, meaningful activity, but peers, family, significant other, and living environment are  unsupportive, or there is criminal justice involvement by the client or among the client's peers, significant others, or in the client's living environment.  Summary to support rating::  Elroy is not currently employed due to medical concerns. He has little daily structure that includes spending time with his children and around his home. Elroy has a history of attending sober support meetings in the community. No current or pending legal concerns.     Initial Service Plan (ISP)    Immediate health, safety, and preliminary service needs identified and plan includes the following based on available information from clients, referral sources, and collateral information.    Safety (SI, SIB, suicide attempts, aggressive behaviors):    Recommended that patient call 911 or go to the local ED should there be a change in any of these risk factors.     Health:  Client does NOT have health issues that would impede participation in treatment    Transportation: Client will be transported to treatment by drive self.   .  Other:  NA     Patient does not have any identified barriers to participating in referred services.      Treatment suggestions for client for the time period until the    initial treatment planning session:  Attend group on 1/22/24. Remain abstinent from all mood altering substances. Report any new or worsening concerns to counselors, in emergency call 911 or get to local emergency department.     Brief Biosocial Gambling Screening:  Do you have any current or past concerns regarding gambling?  no  If YES:  1. Have you ever participated in a gambling treatment program?  no  2. During the past 12 months, have you become restless, irritable, or anxious when trying to stop/cut down on gambling?  no  3. During the past 12 months, have you tried to keep your family or friends from knowing how much you gambled?  no  4. During the past 12 months, did you have such financial trouble, as a result of your gambling, that  you had to get help with living expenses from family, friends, or welfare?  no      Safety (SI, SIB, suicide attempts, aggressive behaviors):  History of SI (suicidal ideation)?  None currently   History of SIB (self-injurious behavior)?  None   History of VI (violent ideation)?  None   History of HI (homicidal ideation)?  None          Name: Elroy Morel Sr.  YOB: 1980  Date: 1/18/2024  My primary care provider: Latosha Baires    My primary [facility] clinic[facility]   My prescriber: PCP and Transplant teams   Other care team support:  Transplant support  My Triggers (check the ones that apply to you):   o Relationship Conflict  o Work Difficulties  o School Concerns  o Home Environmen  o Social Support  o Peer Relationships  o Financial Concerns  o Housing Concerns  o Medical/Health- yes  o Substance Use-yes  o Legal Difficulties Additional People, Places, and Things that I can access for support:     Wife, family, friends     What is important to me and makes life worth living:          GREEN    Good Control  1. I feel good  2. No suicidal thoughts   3. Can work, sleep and play      Action Steps  1. Self-care: balanced meals, exercising, sleep practices, etc.  2. Take your medications as prescribed.  3. Continue meetings with therapist and prescriber.  4.  Do the healthy things that I enjoy.           YELLOW  Getting Worse  I have ANY of these:  1. I do not feel good  2. Difficulty Concentrating  3. Sleep is changing  4. Increase/Change in my thoughts to hurt self and/or others, but I can still manage and not act on it.   5. Not taking care of self.             Action Steps (in addition to the above):  1. Inform your therapist and psychiatric prescriber/PCP.  2. Keep taking your medications as prescribed.    3. Turn to people you can ask for help.  4. Use internal coping strategies -see below.  5. Create safe environment:        -Store firearms for safety       -Lock and limit medications        -Notify friends/family of increase in symptoms                  RED  Get Help  If I have ANY of these:  1. Current and uncontrollable thoughts and/or behaviors to hurt self and/or others.    Actions to manage my safety  1.Contact your emergency person:   -Name:  -Number:  2. Call or Text [phone]  3. Call my crisis team-         -County:        -Number:  3. Or Call 911 or go to the emergency room right away             My Internal Coping Strategies include the following ([place] ones you use):  o Take a bath  o Blow bubbles  o Belly breathing  o Arts & Crafts  o Coloring  o Chew gum  o Fidget toys  o Safe space:  o Time with pets  o Coping toolbox  o Temperature change  o Exercise    Safety Concerns  How To Identify Situations That Make Your Mental Health Worse:  Triggers are things that make your mental health worse.  Look at the list below to help you find your triggers and what you can do about them.     1. Identify Early Warning Signs:    Sometimes symptoms return, even when people do their best to stay well. Symptoms can develop over a short period of time with little or no warning, but most of the time they emerge gradually over several weeks.  Early warning signs are changes that people experience when a relapse is starting. Some early warning signs are common and others are not as common.   Common Early Warning Signs:   o Feeling tense or nervous  o Eating less or more  o Trouble sleeping  o Feeling depressed or low  o Feeling irritable  o Isolating  o Trouble concentrating  o Urges to harm self  o Urges to harm others    2. Identify action steps to take when warning signs are noticed:    Taking Action- It is important to take action if you are experiencing early warning signs of a relapse.  The faster you act, the more likely it is that you can avoid a full relapse.  It is helpful to identify several specific ways to cope with symptoms.      The following is my list of symptoms and coping strategies that I  can use when they are present:    Symptom Coping Strategies   Anxiety -Talk with someone in your support system and let him or her know how you are feeling.  -Use relaxation techniques such as deep breathing or imagery.  -Use positive affirmations to counteract negative self-talk such as  I am learning to let go of worry.    Depression - Schedule your day; include activities you must do and activities you enjoy doing.  - Get some exercise - walk, run, bike, or swim.  - Give yourself credit for even the smallest things you get done.   Sleep Difficulties   - Go to sleep at the same time every day.  - Do something relaxing before bed, such as drinking herbal tea or listening to music.  - Avoid having discussions about upsetting topics before going to bed.   Delusions   - Distract yourself from the disturbing thought by doing something that requires your attention such as a puzzle.  - Check out your beliefs by talking to someone you trust.    Hallucinations   - Use headphones to listen to music.  - Tell voices to  stop  or say to yourself,  I am safe.   - Ignore the hallucinations as much as possible; focus on other things.   Concentration Difficulties - Minimize distractions so there is only one thing for you to focus on at a time.    - Ask the person you are having a conversation with to slow down or repeat things you are unsure of.      Patient consented to co-developed safety plan.  Safety and risk management plan was completed.  Patient agreed to use safety plan should any safety concerns arise.  A copy was given to the patient.     Vulnerable Adult Assessment    Does the patient possess a physical or mental infirmity or other physical, mental, or emotional dysfunction?  No. Patient is not a vulnerable adult.      Time Spent with Client: Start time:  1:30 PM   Stop time:  2:10 PM  Time Spent in Documentation: 25 minutes

## 2024-01-19 DIAGNOSIS — F10.21 ALCOHOL USE DISORDER, SEVERE, IN EARLY REMISSION (H): Primary | ICD-10-CM

## 2024-01-22 ENCOUNTER — DOCUMENTATION ONLY (OUTPATIENT)
Dept: BEHAVIORAL HEALTH | Facility: CLINIC | Age: 44
End: 2024-01-22
Payer: COMMERCIAL

## 2024-01-22 ENCOUNTER — TELEPHONE (OUTPATIENT)
Dept: BEHAVIORAL HEALTH | Facility: CLINIC | Age: 44
End: 2024-01-22
Payer: COMMERCIAL

## 2024-01-22 NOTE — PROGRESS NOTES
"Patient received at 1900, A&Ox4. Patient is pleasant and cooperative but appears depressive. C/O mouth soreness that is relieved by prn magic mouthwash. Endorses ""having clear thoughts but not feeling like self\"" but hopeful that she will get better enough to go home soon. Patient is compliant with medications and mouth checks. Patient reports having a difficult time falling asleep despite being tired prn zolpidem was administered with moderately effective results. Safety measures were maintained on the unit.    " Discharge Note    Discharge type: At Patient Request     Elroy Morel Sr. is a 43 year old   White male who completed an intake on 1/18/24.  Patient  never presented to group. During a phone call with Afshin Mason [facility] on 1/22/24 he stated he is not planning on attending groups as he is going to be attending [facility] instead.     Counselor Name and Title:  Malissa Fernandez [facility]  Date:  1/22/2024  Time:  4:16 PM

## 2024-01-22 NOTE — PROGRESS NOTES
ABSENT NOTE:    Elroy Morel Sr. was absent from group 1/22/2024 . This absence was excused. This writer contacted patient via telephone. He reports that he will not be joining group as he will be attending [facility] instead. Patient will discharge per patient request.     Afshin Mason, [facility]  1/22/2024 , 3:06 PM

## 2024-01-22 NOTE — TELEPHONE ENCOUNTER
----- Message from DIANE Arita sent at 1/19/2024  5:05 PM CST -----  Scheduling Request    Patient Name: Elroy Morel   Location of programming: [facility]   Start Date: January / 19 / 2024  Group:  [facility] on Monday, Wednesday, and Thursday at 9:00 AM to 12:00 PM  Attending Provider (MD): Roseline   Number of visits to be scheduled: 21  Duration of Appointment in minutes: 180  Visit Type: In-person or Treatment - 870    Additional notes: NA

## 2024-01-23 ENCOUNTER — TELEPHONE (OUTPATIENT)
Dept: BEHAVIORAL HEALTH | Facility: CLINIC | Age: 44
End: 2024-01-23
Payer: COMMERCIAL

## 2024-01-23 NOTE — TELEPHONE ENCOUNTER
----- Message from DIANE Palacio sent at 1/22/2024  4:15 PM CST -----  Regarding: Cancellation  Scheduling Request    Please cancel all scheduled appointments for this patient as they are discharging.  Thank you!    DIANE Palacio

## 2024-01-24 ENCOUNTER — TRANSFERRED RECORDS (OUTPATIENT)
Dept: HEALTH INFORMATION MANAGEMENT | Facility: CLINIC | Age: 44
End: 2024-01-24

## 2024-02-05 ENCOUNTER — LAB (OUTPATIENT)
Dept: LAB | Facility: CLINIC | Age: 44
End: 2024-02-05
Attending: INTERNAL MEDICINE
Payer: COMMERCIAL

## 2024-02-05 ENCOUNTER — OFFICE VISIT (OUTPATIENT)
Dept: NEPHROLOGY | Facility: CLINIC | Age: 44
End: 2024-02-05
Attending: INTERNAL MEDICINE
Payer: COMMERCIAL

## 2024-02-05 VITALS
WEIGHT: 208.3 LBS | HEART RATE: 66 BPM | DIASTOLIC BLOOD PRESSURE: 86 MMHG | OXYGEN SATURATION: 99 % | SYSTOLIC BLOOD PRESSURE: 133 MMHG | BODY MASS INDEX: 30.76 KG/M2

## 2024-02-05 DIAGNOSIS — N18.30 STAGE 3 CHRONIC KIDNEY DISEASE, UNSPECIFIED WHETHER STAGE 3A OR 3B CKD (H): ICD-10-CM

## 2024-02-05 DIAGNOSIS — K70.31 ALCOHOLIC CIRRHOSIS OF LIVER WITH ASCITES (H): ICD-10-CM

## 2024-02-05 DIAGNOSIS — E55.9 VITAMIN D DEFICIENCY: Primary | ICD-10-CM

## 2024-02-05 LAB
ALBUMIN SERPL BCG-MCNC: 3.7 G/DL (ref 3.5–5.2)
ALP SERPL-CCNC: 124 U/L (ref 40–150)
ALT SERPL W P-5'-P-CCNC: 32 U/L (ref 0–70)
ANION GAP SERPL CALCULATED.3IONS-SCNC: 9 MMOL/L (ref 7–15)
AST SERPL W P-5'-P-CCNC: 73 U/L (ref 0–45)
BILIRUB DIRECT SERPL-MCNC: 0.41 MG/DL (ref 0–0.3)
BILIRUB SERPL-MCNC: 1.2 MG/DL
BUN SERPL-MCNC: 28.3 MG/DL (ref 6–20)
CALCIUM SERPL-MCNC: 9.9 MG/DL (ref 8.6–10)
CHLORIDE SERPL-SCNC: 105 MMOL/L (ref 98–107)
CREAT SERPL-MCNC: 1.9 MG/DL (ref 0.67–1.17)
DEPRECATED HCO3 PLAS-SCNC: 22 MMOL/L (ref 22–29)
EGFRCR SERPLBLD CKD-EPI 2021: 44 ML/MIN/1.73M2
ERYTHROCYTE [DISTWIDTH] IN BLOOD BY AUTOMATED COUNT: 15.8 % (ref 10–15)
GLUCOSE SERPL-MCNC: 95 MG/DL (ref 70–99)
HCT VFR BLD AUTO: 32 % (ref 40–53)
HGB BLD-MCNC: 10.6 G/DL (ref 13.3–17.7)
INR PPP: 1.3 (ref 0.85–1.15)
MCH RBC QN AUTO: 29.3 PG (ref 26.5–33)
MCHC RBC AUTO-ENTMCNC: 33.1 G/DL (ref 31.5–36.5)
MCV RBC AUTO: 88 FL (ref 78–100)
PLATELET # BLD AUTO: 92 10E3/UL (ref 150–450)
POTASSIUM SERPL-SCNC: 5 MMOL/L (ref 3.4–5.3)
PROT SERPL-MCNC: 8.3 G/DL (ref 6.4–8.3)
RBC # BLD AUTO: 3.62 10E6/UL (ref 4.4–5.9)
SODIUM SERPL-SCNC: 136 MMOL/L (ref 135–145)
WBC # BLD AUTO: 4.8 10E3/UL (ref 4–11)

## 2024-02-05 PROCEDURE — 36415 COLL VENOUS BLD VENIPUNCTURE: CPT | Performed by: PATHOLOGY

## 2024-02-05 PROCEDURE — 82248 BILIRUBIN DIRECT: CPT | Performed by: PATHOLOGY

## 2024-02-05 PROCEDURE — 99213 OFFICE O/P EST LOW 20 MIN: CPT | Performed by: INTERNAL MEDICINE

## 2024-02-05 PROCEDURE — 85610 PROTHROMBIN TIME: CPT | Performed by: PATHOLOGY

## 2024-02-05 PROCEDURE — 99214 OFFICE O/P EST MOD 30 MIN: CPT | Performed by: INTERNAL MEDICINE

## 2024-02-05 PROCEDURE — 85027 COMPLETE CBC AUTOMATED: CPT | Performed by: PATHOLOGY

## 2024-02-05 PROCEDURE — 80053 COMPREHEN METABOLIC PANEL: CPT | Performed by: PATHOLOGY

## 2024-02-05 RX ORDER — CHOLECALCIFEROL (VITAMIN D3) 50 MCG
1 TABLET ORAL DAILY
Qty: 30 TABLET | Refills: 3 | Status: SHIPPED | OUTPATIENT
Start: 2024-02-05 | End: 2024-06-11

## 2024-02-05 ASSESSMENT — PAIN SCALES - GENERAL: PAINLEVEL: NO PAIN (0)

## 2024-02-05 NOTE — LETTER
2/5/2024       RE: Elroy Morel Sr.  [address] [place] [address]     Dear Colleague,    Thank you for referring your patient, Elroy Morel Sr., to the [facility] [place] at [facility]. Please see a copy of my visit note below.    Nephrology Clinic    Elroy Morel Sr. MRN:[id number] YOB: 1980  Date of Service: 02/05/2024  Primary care provider: Latosha Baires  Requesting physician: Elroy Rankin MD      REASON FOR CONSULT: CKD    HISTORY OF PRESENT ILLNESS:   Elroy Morel Sr. is a 43 year old male who first presented for evaluation of CKD post-JOCELYN in the setting of alcoholic liver disease in November 2024.  The past medical history is significant for alcohol dependence leading to cirrhosis and multiple hospitalizations over the past months, last from October 13 th till October 16th 2023. His hospitalizations have been complicated by episodes of JOCELYN with the most severe episode occurring early September with a creatinine level that peaked at 7.7 mg/dL. He has been needing therapeutic paracentesis twice a week. He reports that he has been abstinent since mid-August 2023. Over the past months his creatinine level has been fluctuating between 1.6 and 2.2 mg/dL and it was 1.8 mg/dL on 11/24/2023. It has improved to 1.47 mg/dL on 1/5/2024 and is 1.9 mg/dL on 2/5/2024. He has no evidence of any significant albuminuria with a negative uACR on 11/24/2023. A kidney ultrasound done early September 2023 shows normal size kidneys. On his first visit, he was started on diuretics that were progressively increased and is currently on furosemide 20 mg daily and spironolactone 100 mg daily. He has been needing less frequent paracentesis with less fluid retrieved at each time. His blood pressure has been rising and as a result, his PCP recently increased his carvedilol to 6.25 mg twice daily. He has undergone a preliminary  evaluation for liver transplant. His needs for iterative paracentesis have tremendously gone down and his last paracentesis was round 3 weeks prior and retrieved only 1.5L .     The patient denies any dysuria, any pollakiuria, any nocturia, any LE edema, any dyspnea on exertion .  The patient denies ever having kidney stones, urinary tract infections, gross hematuria. There is no family history of CKD.  The following portions of the patient's history were reviewed and updated as appropriate: allergies, current medications, past family history, past medical history, past social history, past surgical history and problem list.    PAST MEDICAL HISTORY:  Past Medical History:   Diagnosis Date    Alcohol use disorder, severe, dependence (H)     Alcohol withdrawal seizure (H)     Alcoholic cirrhosis of liver with ascites (H)     Esophageal varices (H)     Essential hypertension     Gastroesophageal reflux disease without esophagitis     History of methamphetamine use     Sustained remission since 2003    Hypercholesterolemia     Tobacco abuse      PAST SURGICAL HISTORY:  Past Surgical History:   Procedure Laterality Date    COLONOSCOPY N/A 8/28/2023    Procedure: COLONOSCOPY, WITH POLYPECTOMY via bx forceps;  Surgeon: Alyssa Tsai MD;  Location: [facility]    IR PARACENTESIS  8/31/2023    IR PARACENTESIS  9/15/2023    IR PARACENTESIS  9/19/2023    IR PARACENTESIS  9/22/2023    IR PARACENTESIS  9/26/2023    IR PARACENTESIS  9/29/2023    IR PARACENTESIS  10/10/2023    IR PARACENTESIS  10/17/2023    IR PARACENTESIS  10/20/2023    IR PARACENTESIS  10/24/2023    IR PARACENTESIS  10/27/2023    IR PARACENTESIS  10/31/2023    IR PARACENTESIS  11/3/2023    IR PARACENTESIS  11/7/2023    IR PARACENTESIS  11/10/2023    IR PARACENTESIS  11/14/2023    IR PARACENTESIS  11/17/2023    IR PARACENTESIS  11/21/2023    IR PARACENTESIS  11/24/2023    IR PARACENTESIS  11/28/2023    IR PARACENTESIS  12/1/2023    IR PARACENTESIS   12/5/2023    IR PARACENTESIS  12/8/2023    IR PARACENTESIS  12/15/2023    IR PARACENTESIS  1/4/2024    IR PARACENTESIS  12/29/2023    IR PARACENTESIS  12/22/2023    IR PARACENTESIS  12/19/2023    IR PARACENTESIS  1/16/2024    IR PARACENTESIS  1/9/2024     MEDICATIONS:  Prescription Medications as of 2/5/2024         Rx Number Disp Refills Start End Last Dispensed Date Next Fill Date Owning Pharmacy    carvedilol (COREG) 6.25 MG tablet  -- -- 12/26/2023 --       Sig: Take 6.25 mg by mouth 2 times daily (with meals)    Class: Historical    Route: Oral    cholestyramine light (QUESTRAN) 4 GM packet  30 packet 1 10/15/2023 --   [place], [place] - [address] [place] [address]    Sig: Take 1 packet (4 g) by mouth daily (with breakfast)    Class: E-Prescribe    Route: Oral    Renewals       Renewal requests to authorizing provider (Raymundo Rodriguez MD) <b>prohibited</b>            folic acid (FOLVITE) 1 MG tablet  90 tablet 1 11/27/2023 5/25/2024   [id number] [place], [place] - [address]    Sig: Take 1 tablet (1 mg) by mouth daily for 180 days    Class: E-Prescribe    Route: Oral    furosemide (LASIX) 20 MG tablet  90 tablet 1 11/28/2023 5/26/2024   [id number] - [place], [place] - [address]    Sig: Take 1 tablet (20 mg) by mouth daily for 180 days    Class: E-Prescribe    Route: Oral    lactulose 20 GM/30ML solution  946 mL 3 11/28/2023 --   [id number] - [place], [place] - [address]    Sig: Take 45 mLs (30 g) by mouth 3 times daily Goal 4-5 stools daily.    Class: E-Prescribe    Route: Oral    ondansetron (ZOFRAN) 4 MG tablet  30 tablet 3 11/28/2023 --   [id number] - [place], [place] - [address] [facility]    Sig: Take 1 tablet (4 mg) by mouth every 8 hours as needed for nausea    Class: E-Prescribe    Route: Oral     pantoprazole (PROTONIX) 40 MG EC tablet  30 tablet 3 10/15/2023 --   [place], [place] - [address] [place] [address]    Sig: Take 1 tablet (40 mg) by mouth daily    Class: E-Prescribe    Route: Oral    Renewals       Renewal requests to authorizing provider (Raymundo Rodriguez MD) <b>prohibited</b>            potassium chloride ER (KLOR-CON M) 10 MEQ CR tablet  30 tablet 3 12/10/2023 --   [id number] - [place], [place] - [address] [facility]    Sig: Take 2 tablets (20 mEq) by mouth 2 times daily    Class: E-Prescribe    Route: Oral    rifaximin (XIFAXAN) 550 MG TABS tablet  60 tablet 3 2023 --   [id number] - [place], [place] - [address] AT [facility]    Si tablet (550 mg) by Oral or Feeding Tube route 2 times daily    Class: E-Prescribe    Route: Oral or Feeding Tube    sertraline (ZOLOFT) 50 MG tablet  30 tablet 3 10/15/2023 --   [place], [place] - [address] [place] [address]    Sig: Take 1 tablet (50 mg) by mouth daily    Class: E-Prescribe    Route: Oral    Renewals       Renewal requests to authorizing provider (Raymundo Rodriguez MD) <b>prohibited</b>            spironolactone (ALDACTONE) 100 MG tablet  90 tablet 3 2024 --   [id number] - [place], [place] - [address] AT [facility]    Sig: Take 1 tablet (100 mg) by mouth daily    Class: E-Prescribe    Route: Oral    vitamin B1 (B-1) 100 MG tablet  30 tablet 3 10/16/2023 --   [place], [place] - [address] [place] [address]    Sig: Take 1 tablet (100 mg) by mouth daily    Class: E-Prescribe    Route: Oral    Renewals       Renewal requests to authorizing provider (Raymundo Rodriguez MD) <b>prohibited</b>                   ALLERGIES:    Allergies   Allergen Reactions    Dust Mites     Metronidazole Other (See Comments), Dizziness and Unknown    Pollen Extract     Omeprazole Other (See  Comments) and Unknown     Irritability (tolerates Protonix well)     REVIEW OF SYSTEMS:    A comprehensive review of systems was performed and found to be negative except as described here or above.  SOCIAL HISTORY:   Social History     Socioeconomic History    Marital status:      Spouse name: Not on file    Number of children: Not on file    Years of education: Not on file    Highest education level: Not on file   Occupational History    Not on file   Tobacco Use    Smoking status: Former     Types: Cigarettes    Smokeless tobacco: Never   Substance and Sexual Activity    Alcohol use: Not Currently     Comment: last drink 8/19    Drug use: Not Currently     Types: Amphetamines     Comment: Sustained remission    Sexual activity: Not on file   Other Topics Concern    Not on file   Social History Narrative    Pt is , 2 children. Employed.      Social Determinants of Health     Financial Resource Strain: Not on file   Food Insecurity: Not on file   Transportation Needs: Not on file   Physical Activity: Not on file   Stress: Not on file   Social Connections: Not on file   Interpersonal Safety: Not on file   Housing Stability: Not on file     FAMILY MEDICAL HISTORY:   Family History   Problem Relation Age of Onset    Substance Abuse Mother     Coronary Artery Disease Mother     Cerebrovascular Disease Mother     Substance Abuse Father     Substance Abuse Brother     Liver Cancer No family hx of     Liver Disease No family hx of      PHYSICAL EXAM:   Pulse 66   Wt 94.5 kg (208 lb 4.8 oz)   SpO2 99%   BMI 30.76 kg/m    GENERAL APPEARANCE: alert and no distress  EYES: nonicteric  HENT: mouth without ulcers or lesions  NECK: supple, no adenopathy  RESP: lungs clear to auscultation   CV: regular rhythm, normal rate, no rub  ABDOMEN: soft, nontender, normal bowel sounds, no HSM   Extremities: no clubbing, cyanosis, or edema  MS: no evidence of inflammation in joints, no muscle tenderness  SKIN: no  rash  NEURO: mentation intact and speech normal  PSYCH: affect normal/bright   LABS:   Recent Results (from the past 672 hour(s))   CBC with platelets    Collection Time: 02/05/24  1:10 PM   Result Value Ref Range    WBC Count 4.8 4.0 - 11.0 10e3/uL    RBC Count 3.62 (L) 4.40 - 5.90 10e6/uL    Hemoglobin 10.6 (L) 13.3 - 17.7 g/dL    Hematocrit 32.0 (L) 40.0 - 53.0 %    MCV 88 78 - 100 fL    MCH 29.3 26.5 - 33.0 pg    MCHC 33.1 31.5 - 36.5 g/dL    RDW 15.8 (H) 10.0 - 15.0 %    Platelet Count 92 (L) 150 - 450 10e3/uL   INR    Collection Time: 02/05/24  1:10 PM   Result Value Ref Range    INR 1.30 (H) 0.85 - 1.15     CMP  Recent Labs   Lab Test 01/05/24  1446 12/19/23  1455 12/12/23  1452 12/07/23  1539 12/04/23  1022 11/24/23  0859 10/16/23  0526 10/14/23  0938 10/13/23  1320 09/02/23  2031 08/30/23  0533 08/29/23  0803 08/29/23  0606 08/28/23  0645 08/26/23  0653 08/25/23  0620 09/15/21  1235 08/27/20  1401 06/25/20  1501    139 135 134* 134* 134* 127*   < > 125*   < > 125*  --  128* 130*   < > 131*   < > 140 138   POTASSIUM 4.6 4.5 3.9 2.9* 2.8* 3.5 3.4   < > 3.8   < > 4.2  --  4.2 4.1   < > 3.8   < > 4.5 4.4   CHLORIDE 108* 108* 100 100 102 106 95*   < > 93*   < > 96*  --  98 100   < > 101   < > 103 102   CO2 23 22 25 24 21* 17* 17*   < > 16*   < > 16*  --  17* 16*   < > 17*   < > 23 24   ANIONGAP 9 9 10 10 11 11 15   < > 16*   < > 13  --  13 14   < > 13   < > 14 12   * 98 162* 113* 147* 161* 100*   < > 131*   < > 85  --  87 115*   < > 89   < > 89 104   BUN 14.4 14.5 14.9 12.3 14.4 18.0 28.5*   < > 36.4*   < > 45.5*  --  40.0* 41.4*   < > 47.5*   < > 10 12   CR 1.47* 1.62* 1.74* 1.75* 1.86* 1.81* 1.74*   < > 2.26*   < > 4.34*  --  3.44* 3.72*   < > 5.40*   < > 1.11 1.23   GFRESTIMATED 60* 54* 49* 49* 45* 47* 49*   < > 36*   < > 16*  --  22* 20*   < > 13*   < > >60 >60   GFRESTBLACK  --   --   --   --   --   --   --   --   --   --   --   --   --   --   --   --   --  >60 >60   ENEDINA 9.4 9.1 9.1 8.8 8.4*  9.2 8.8   < > 9.1   < > 9.0  --  8.9 9.1   < > 9.2   < > 9.6 10.1   MAG  --   --   --   --   --   --   --   --  1.9  --  1.8  --  1.8 1.9   < > 2.0   < >  --   --    PHOS  --   --   --   --   --  3.1  --   --   --   --   --  3.5  --  3.4  --  3.9   < >  --   --    PROTTOTAL 6.9  --   --   --  7.0 7.5 6.5   < > 7.5   < >  --   --   --   --   --   --    < > 7.7 7.6   ALBUMIN 3.2*  --   --   --  3.2* 3.3* 3.8   < > 3.1*  3.1*   < > 3.0*  --  3.3* 3.4*   < > 3.6   < > 4.4 4.2   BILITOTAL 0.9  --   --   --  1.6* 2.3* 9.8*   < > 15.1*   < > 33.6*  --  34.3* 34.9*   < > 30.3*   < > 0.6 0.4   ALKPHOS 143  --   --   --  198* 195* 152*   < > 256*   < > 125  --  122 110   < > 103   < > 54 59   AST 57*  --   --   --  138* 103* 55*   < > 86*   < > 184*  --  196* 179*   < > 155*   < > 83* 91*   ALT 23  --   --   --  42 38 26   < > 57   < > 52  --  57 57   < > 44   < > 122* 133*    < > = values in this interval not displayed.     CBC  Recent Labs   Lab Test 02/05/24  1310 01/05/24  1446 12/19/23  1456 12/04/23  1022   HGB 10.6* 10.3* 9.9* 10.6*   WBC 4.8 5.5 7.3 4.5   RBC 3.62* 3.44* 3.27* 3.50*   HCT 32.0* 31.5* 29.4* 31.3*   MCV 88 92 90 89   MCH 29.3 29.9 30.3 30.3   MCHC 33.1 32.7 33.7 33.9   RDW 15.8* 16.1* 15.6* 16.8*   PLT 92* 111* 127* 106*     INR  Recent Labs   Lab Test 02/05/24  1310 01/05/24  1446 12/19/23  1456 12/04/23  1022 09/03/23  0644 09/02/23  2111 08/27/23  0745 08/24/23  0618   INR 1.30* 1.37* 1.38* 1.28*   < > 1.46*   < > 1.72*   PTT  --   --   --   --   --  42*  --  37    < > = values in this interval not displayed.     ABG  Recent Labs   Lab Test 09/06/23 2045   O2PER 21      URINE STUDIES  Recent Labs   Lab Test 11/24/23  0902 10/14/23  1636 10/03/23  1953 09/10/23  2356   COLOR Yellow Dark Yellow* Dark Yellow* Dark Yellow*   APPEARANCE Clear Clear Clear Clear   URINEGLC Negative Negative Negative Negative   URINEBILI Small* Small* Moderate* Moderate*   URINEKETONE Negative Negative Negative Negative    SG 1.020 1.018 1.020 1.015   UBLD Negative Negative Negative Negative   URINEPH 6.5 5.5 5.5 5.5   PROTEIN 30* 10* 10* Negative   UROBILINOGEN 0.2  --   --   --    NITRITE Negative Negative Negative Negative   LEUKEST Negative Negative Negative Negative   RBCU 2-5* 1 <1 0   WBCU 5-10* 1 3 1     No lab results found.    ASSESSMENT AND PLAN:   #CKD stage 3 b mostly secondary to hepatorenal syndrome and multiple episodes of JOCELYN with no significant proteinuria and unremarkable kidneys on imaging. Refractory ascites and hemodynamic fluctuations in the setting of recurrent paracentesis could also be contributory. After increasing spironolactone to 100 mg daily, his needs for paracentesis have significantly declined. The patient was instructed to keep a BP diary and to weigh himself on a daily basis.  No documented intake of NSAIDs or other nephrotoxic medications.   The patient was instructed keep the sodium intake around 2400 mg /day, follow a plant-based diet and to avoid NSAIDs     #HTN  Primary and secondary to CKD. On carvedilol, spironolactone and furosemide. Management as per above.    #Blood count  Hemoglobin 10.3 -> 10.6  Iron sat 31%  Ferritin level 525 could benefit from oral iron    #Acid-base status  CO2 level 17 -> 23 improved and normalized    #Electrolytes  Na 134 -> 140 mild hyponatremia that has resolved as his liver function continues to improve  K 3.5 -> 4.6 -> 5 I stopped potassium supplementation    #BMD  Calcium 9.2          Phosphorus 3.1    albumin 3.3  Vitamin D level 20 would benefit from vitamin D supplementation and I started him on cholecalciferol 1 table daily    #CKD journey/transplant not a candidate at this point    The total time of this encounter amounted to 30 minutes on the day of the encounter. This time included time spent with the patient, reviewing records, ordering tests, and performing post visit documentation.       The patient will return to follow up in 6 weeks    Didi  MD Natalia  Division of Renal Disease and Hypertension

## 2024-02-05 NOTE — PROGRESS NOTES
Nephrology Clinic    Elroy Morel Sr. MRN:[id number] YOB: 1980  Date of Service: 02/05/2024  Primary care provider: Latosha Baires  Requesting physician: Elroy Rankin MD      REASON FOR CONSULT: CKD    HISTORY OF PRESENT ILLNESS:   Elroy Morel Sr. is a 43 year old male who first presented for evaluation of CKD post-JOCELYN in the setting of alcoholic liver disease in November 2024.  The past medical history is significant for alcohol dependence leading to cirrhosis and multiple hospitalizations over the past months, last from October 13 th till October 16th 2023. His hospitalizations have been complicated by episodes of JOCELYN with the most severe episode occurring early September with a creatinine level that peaked at 7.7 mg/dL. He has been needing therapeutic paracentesis twice a week. He reports that he has been abstinent since mid-August 2023. Over the past months his creatinine level has been fluctuating between 1.6 and 2.2 mg/dL and it was 1.8 mg/dL on 11/24/2023. It has improved to 1.47 mg/dL on 1/5/2024 and is 1.9 mg/dL on 2/5/2024. He has no evidence of any significant albuminuria with a negative uACR on 11/24/2023. A kidney ultrasound done early September 2023 shows normal size kidneys. On his first visit, he was started on diuretics that were progressively increased and is currently on furosemide 20 mg daily and spironolactone 100 mg daily. He has been needing less frequent paracentesis with less fluid retrieved at each time. His blood pressure has been rising and as a result, his PCP recently increased his carvedilol to 6.25 mg twice daily. He has undergone a preliminary evaluation for liver transplant. His needs for iterative paracentesis have tremendously gone down and his last paracentesis was round 3 weeks prior and retrieved only 1.5L .     The patient denies any dysuria, any pollakiuria, any nocturia, any LE edema, any dyspnea on exertion .  The patient denies ever having kidney  stones, urinary tract infections, gross hematuria. There is no family history of CKD.  The following portions of the patient's history were reviewed and updated as appropriate: allergies, current medications, past family history, past medical history, past social history, past surgical history and problem list.    PAST MEDICAL HISTORY:  Past Medical History:   Diagnosis Date    Alcohol use disorder, severe, dependence (H)     Alcohol withdrawal seizure (H)     Alcoholic cirrhosis of liver with ascites (H)     Esophageal varices (H)     Essential hypertension     Gastroesophageal reflux disease without esophagitis     History of methamphetamine use     Sustained remission since 2003    Hypercholesterolemia     Tobacco abuse      PAST SURGICAL HISTORY:  Past Surgical History:   Procedure Laterality Date    COLONOSCOPY N/A 8/28/2023    Procedure: COLONOSCOPY, WITH POLYPECTOMY via bx forceps;  Surgeon: Alyssa Tsai MD;  Location: [facility]    IR PARACENTESIS  8/31/2023    IR PARACENTESIS  9/15/2023    IR PARACENTESIS  9/19/2023    IR PARACENTESIS  9/22/2023    IR PARACENTESIS  9/26/2023    IR PARACENTESIS  9/29/2023    IR PARACENTESIS  10/10/2023    IR PARACENTESIS  10/17/2023    IR PARACENTESIS  10/20/2023    IR PARACENTESIS  10/24/2023    IR PARACENTESIS  10/27/2023    IR PARACENTESIS  10/31/2023    IR PARACENTESIS  11/3/2023    IR PARACENTESIS  11/7/2023    IR PARACENTESIS  11/10/2023    IR PARACENTESIS  11/14/2023    IR PARACENTESIS  11/17/2023    IR PARACENTESIS  11/21/2023    IR PARACENTESIS  11/24/2023    IR PARACENTESIS  11/28/2023    IR PARACENTESIS  12/1/2023    IR PARACENTESIS  12/5/2023    IR PARACENTESIS  12/8/2023    IR PARACENTESIS  12/15/2023    IR PARACENTESIS  1/4/2024    IR PARACENTESIS  12/29/2023    IR PARACENTESIS  12/22/2023    IR PARACENTESIS  12/19/2023    IR PARACENTESIS  1/16/2024    IR PARACENTESIS  1/9/2024     MEDICATIONS:  Prescription Medications as of 2/5/2024         Rx  Number Disp Refills Start End Last Dispensed Date Next Fill Date Owning Pharmacy    carvedilol (COREG) 6.25 MG tablet  -- -- 12/26/2023 --       Sig: Take 6.25 mg by mouth 2 times daily (with meals)    Class: Historical    Route: Oral    cholestyramine light (QUESTRAN) 4 GM packet  30 packet 1 10/15/2023 --   [facility] - [place], [place] - [address] [place] [address]    Sig: Take 1 packet (4 g) by mouth daily (with breakfast)    Class: E-Prescribe    Route: Oral    Renewals       Renewal requests to authorizing provider (Raymundo Rodriguez MD) <b>prohibited</b>            folic acid (FOLVITE) 1 MG tablet  90 tablet 1 11/27/2023 5/25/2024   [id number] - [place], [place] - [address] AT [address] [facility]    Sig: Take 1 tablet (1 mg) by mouth daily for 180 days    Class: E-Prescribe    Route: Oral    furosemide (LASIX) 20 MG tablet  90 tablet 1 11/28/2023 5/26/2024   [id number] - [place], [place] - [address] AT [address] [facility]    Sig: Take 1 tablet (20 mg) by mouth daily for 180 days    Class: E-Prescribe    Route: Oral    lactulose 20 GM/30ML solution  946 mL 3 11/28/2023 --   [id number] - [place], [place] - [address] AT [address] [facility]    Sig: Take 45 mLs (30 g) by mouth 3 times daily Goal 4-5 stools daily.    Class: E-Prescribe    Route: Oral    ondansetron (ZOFRAN) 4 MG tablet  30 tablet 3 11/28/2023 --   [id number] - [place], [place] - [address] AT [address] [facility]    Sig: Take 1 tablet (4 mg) by mouth every 8 hours as needed for nausea    Class: E-Prescribe    Route: Oral    pantoprazole (PROTONIX) 40 MG EC tablet  30 tablet 3 10/15/2023 --   [facility] - [place], [place] - [address] [facility]    Sig: Take 1 tablet (40 mg) by mouth daily    Class: E-Prescribe    Route: Oral    Renewals       Renewal requests to authorizing provider (Raymundo Rodriguez MD) <b>prohibited</b>             potassium chloride ER (KLOR-CON M) 10 MEQ CR tablet  30 tablet 3 12/10/2023 --   [facility] #[id number] - [place], [place] - [address] [facility]    Sig: Take 2 tablets (20 mEq) by mouth 2 times daily    Class: E-Prescribe    Route: Oral    rifaximin (XIFAXAN) 550 MG TABS tablet  60 tablet 3 2023 --   [facility] #[id number] - [place], [place] - [address]    Si tablet (550 mg) by Oral or Feeding Tube route 2 times daily    Class: E-Prescribe    Route: Oral or Feeding Tube    sertraline (ZOLOFT) 50 MG tablet  30 tablet 3 10/15/2023 --   [address]    Sig: Take 1 tablet (50 mg) by mouth daily    Class: E-Prescribe    Route: Oral    Renewals       Renewal requests to authorizing provider (Raymundo Rodriguez MD) <b>prohibited</b>            spironolactone (ALDACTONE) 100 MG tablet  90 tablet 3 2024 --   [id number] - [place], [place] - [address]    Sig: Take 1 tablet (100 mg) by mouth daily    Class: E-Prescribe    Route: Oral    vitamin B1 (B-1) 100 MG tablet  30 tablet 3 10/16/2023 --   [address]    Sig: Take 1 tablet (100 mg) by mouth daily    Class: E-Prescribe    Route: Oral    Renewals       Renewal requests to authorizing provider (Raymundo Rodriguez MD) <b>prohibited</b>                   ALLERGIES:    Allergies   Allergen Reactions    Dust Mites     Metronidazole Other (See Comments), Dizziness and Unknown    Pollen Extract     Omeprazole Other (See Comments) and Unknown     Irritability (tolerates Protonix well)     REVIEW OF SYSTEMS:    A comprehensive review of systems was performed and found to be negative except as described here or above.  SOCIAL HISTORY:   Social History     Socioeconomic History    Marital status:      Spouse name: Not on file     Number of children: Not on file    Years of education: Not on file    Highest education level: Not on file   Occupational History    Not on file   Tobacco Use    Smoking status: Former     Types: Cigarettes    Smokeless tobacco: Never   Substance and Sexual Activity    Alcohol use: Not Currently     Comment: last drink 8/19    Drug use: Not Currently     Types: Amphetamines     Comment: Sustained remission    Sexual activity: Not on file   Other Topics Concern    Not on file   Social History Narrative    Pt is , 2 children. Employed.      Social Determinants of Health     Financial Resource Strain: Not on file   Food Insecurity: Not on file   Transportation Needs: Not on file   Physical Activity: Not on file   Stress: Not on file   Social Connections: Not on file   Interpersonal Safety: Not on file   Housing Stability: Not on file     FAMILY MEDICAL HISTORY:   Family History   Problem Relation Age of Onset    Substance Abuse Mother     Coronary Artery Disease Mother     Cerebrovascular Disease Mother     Substance Abuse Father     Substance Abuse Brother     Liver Cancer No family hx of     Liver Disease No family hx of      PHYSICAL EXAM:   Pulse 66   Wt 94.5 kg (208 lb 4.8 oz)   SpO2 99%   BMI 30.76 kg/m    GENERAL APPEARANCE: alert and no distress  EYES: nonicteric  HENT: mouth without ulcers or lesions  NECK: supple, no adenopathy  RESP: lungs clear to auscultation   CV: regular rhythm, normal rate, no rub  ABDOMEN: soft, nontender, normal bowel sounds, no HSM   Extremities: no clubbing, cyanosis, or edema  MS: no evidence of inflammation in joints, no muscle tenderness  SKIN: no rash  NEURO: mentation intact and speech normal  PSYCH: affect normal/bright   LABS:   Recent Results (from the past 672 hour(s))   CBC with platelets    Collection Time: 02/05/24  1:10 PM   Result Value Ref Range    WBC Count 4.8 4.0 - 11.0 10e3/uL    RBC Count 3.62 (L) 4.40 - 5.90 10e6/uL    Hemoglobin 10.6 (L) 13.3 - 17.7  g/dL    Hematocrit 32.0 (L) 40.0 - 53.0 %    MCV 88 78 - 100 fL    MCH 29.3 26.5 - 33.0 pg    MCHC 33.1 31.5 - 36.5 g/dL    RDW 15.8 (H) 10.0 - 15.0 %    Platelet Count 92 (L) 150 - 450 10e3/uL   INR    Collection Time: 02/05/24  1:10 PM   Result Value Ref Range    INR 1.30 (H) 0.85 - 1.15     CMP  Recent Labs   Lab Test 01/05/24  1446 12/19/23  1455 12/12/23  1452 12/07/23  1539 12/04/23  1022 11/24/23  0859 10/16/23  0526 10/14/23  0938 10/13/23  1320 09/02/23  2031 08/30/23  0533 08/29/23  0803 08/29/23  0606 08/28/23  0645 08/26/23  0653 08/25/23  0620 09/15/21  1235 08/27/20  1401 06/25/20  1501    139 135 134* 134* 134* 127*   < > 125*   < > 125*  --  128* 130*   < > 131*   < > 140 138   POTASSIUM 4.6 4.5 3.9 2.9* 2.8* 3.5 3.4   < > 3.8   < > 4.2  --  4.2 4.1   < > 3.8   < > 4.5 4.4   CHLORIDE 108* 108* 100 100 102 106 95*   < > 93*   < > 96*  --  98 100   < > 101   < > 103 102   CO2 23 22 25 24 21* 17* 17*   < > 16*   < > 16*  --  17* 16*   < > 17*   < > 23 24   ANIONGAP 9 9 10 10 11 11 15   < > 16*   < > 13  --  13 14   < > 13   < > 14 12   * 98 162* 113* 147* 161* 100*   < > 131*   < > 85  --  87 115*   < > 89   < > 89 104   BUN 14.4 14.5 14.9 12.3 14.4 18.0 28.5*   < > 36.4*   < > 45.5*  --  40.0* 41.4*   < > 47.5*   < > 10 12   CR 1.47* 1.62* 1.74* 1.75* 1.86* 1.81* 1.74*   < > 2.26*   < > 4.34*  --  3.44* 3.72*   < > 5.40*   < > 1.11 1.23   GFRESTIMATED 60* 54* 49* 49* 45* 47* 49*   < > 36*   < > 16*  --  22* 20*   < > 13*   < > >60 >60   GFRESTBLACK  --   --   --   --   --   --   --   --   --   --   --   --   --   --   --   --   --  >60 >60   ENEDINA 9.4 9.1 9.1 8.8 8.4* 9.2 8.8   < > 9.1   < > 9.0  --  8.9 9.1   < > 9.2   < > 9.6 10.1   MAG  --   --   --   --   --   --   --   --  1.9  --  1.8  --  1.8 1.9   < > 2.0   < >  --   --    PHOS  --   --   --   --   --  3.1  --   --   --   --   --  3.5  --  3.4  --  3.9   < >  --   --    PROTTOTAL 6.9  --   --   --  7.0 7.5 6.5   < > 7.5   < >  --    --   --   --   --   --    < > 7.7 7.6   ALBUMIN 3.2*  --   --   --  3.2* 3.3* 3.8   < > 3.1*  3.1*   < > 3.0*  --  3.3* 3.4*   < > 3.6   < > 4.4 4.2   BILITOTAL 0.9  --   --   --  1.6* 2.3* 9.8*   < > 15.1*   < > 33.6*  --  34.3* 34.9*   < > 30.3*   < > 0.6 0.4   ALKPHOS 143  --   --   --  198* 195* 152*   < > 256*   < > 125  --  122 110   < > 103   < > 54 59   AST 57*  --   --   --  138* 103* 55*   < > 86*   < > 184*  --  196* 179*   < > 155*   < > 83* 91*   ALT 23  --   --   --  42 38 26   < > 57   < > 52  --  57 57   < > 44   < > 122* 133*    < > = values in this interval not displayed.     CBC  Recent Labs   Lab Test 02/05/24 1310 01/05/24  1446 12/19/23  1456 12/04/23  1022   HGB 10.6* 10.3* 9.9* 10.6*   WBC 4.8 5.5 7.3 4.5   RBC 3.62* 3.44* 3.27* 3.50*   HCT 32.0* 31.5* 29.4* 31.3*   MCV 88 92 90 89   MCH 29.3 29.9 30.3 30.3   MCHC 33.1 32.7 33.7 33.9   RDW 15.8* 16.1* 15.6* 16.8*   PLT 92* 111* 127* 106*     INR  Recent Labs   Lab Test 02/05/24  1310 01/05/24  1446 12/19/23  1456 12/04/23  1022 09/03/23  0644 09/02/23  2111 08/27/23  0745 08/24/23  0618   INR 1.30* 1.37* 1.38* 1.28*   < > 1.46*   < > 1.72*   PTT  --   --   --   --   --  42*  --  37    < > = values in this interval not displayed.     ABG  Recent Labs   Lab Test 09/06/23  2045   O2PER 21      URINE STUDIES  Recent Labs   Lab Test 11/24/23  0902 10/14/23  1636 10/03/23  1953 09/10/23  2356   COLOR Yellow Dark Yellow* Dark Yellow* Dark Yellow*   APPEARANCE Clear Clear Clear Clear   URINEGLC Negative Negative Negative Negative   URINEBILI Small* Small* Moderate* Moderate*   URINEKETONE Negative Negative Negative Negative   SG 1.020 1.018 1.020 1.015   UBLD Negative Negative Negative Negative   URINEPH 6.5 5.5 5.5 5.5   PROTEIN 30* 10* 10* Negative   UROBILINOGEN 0.2  --   --   --    NITRITE Negative Negative Negative Negative   LEUKEST Negative Negative Negative Negative   RBCU 2-5* 1 <1 0   WBCU 5-10* 1 3 1     No lab results  found.    ASSESSMENT AND PLAN:   #CKD stage 3 b mostly secondary to hepatorenal syndrome and multiple episodes of JOCELYN with no significant proteinuria and unremarkable kidneys on imaging. Refractory ascites and hemodynamic fluctuations in the setting of recurrent paracentesis could also be contributory. After increasing spironolactone to 100 mg daily, his needs for paracentesis have significantly declined. The patient was instructed to keep a BP diary and to weigh himself on a daily basis.  No documented intake of NSAIDs or other nephrotoxic medications.   The patient was instructed keep the sodium intake around 2400 mg /day, follow a plant-based diet and to avoid NSAIDs     #HTN  Primary and secondary to CKD. On carvedilol, spironolactone and furosemide. Management as per above.    #Blood count  Hemoglobin 10.3 -> 10.6  Iron sat 31%  Ferritin level 525 could benefit from oral iron    #Acid-base status  CO2 level 17 -> 23 improved and normalized    #Electrolytes  Na 134 -> 140 mild hyponatremia that has resolved as his liver function continues to improve  K 3.5 -> 4.6 -> 5 I stopped potassium supplementation    #BMD  Calcium 9.2          Phosphorus 3.1    albumin 3.3  Vitamin D level 20 would benefit from vitamin D supplementation and I started him on cholecalciferol 1 table daily    #CKD journey/transplant not a candidate at this point    The total time of this encounter amounted to 30 minutes on the day of the encounter. This time included time spent with the patient, reviewing records, ordering tests, and performing post visit documentation.       The patient will return to follow up in 6 weeks    Didi Fisher MD  Division of Renal Disease and Hypertension

## 2024-02-05 NOTE — PATIENT INSTRUCTIONS
-Stop potassium supplementation  -Start vitamin d (cholecalciferol) 1 tablet every day with meals  -Do labs and come back to clinic in 6 weeks from now

## 2024-02-05 NOTE — NURSING NOTE
Chief Complaint   Patient presents with    RECHECK     Follow up     /86 (BP Location: Right arm, Patient Position: Sitting, Cuff Size: Adult Regular)   Pulse 66   Wt 94.5 kg (208 lb 4.8 oz)   SpO2 99%   BMI 30.76 kg/m    Sandra Duff on 2/5/2024 at 1:42 PM

## 2024-02-26 NOTE — PROGRESS NOTES
[facility] Hepatology    Assessment  43 year old male with past medical history of decompensated alcohol-related cirrhosis complicated by hepatic encephalopathy, ascites and variceal bleeding (8/2023) and alcohol use disorder.    #. Decompensated alcohol-related cirrhosis complicated by hepatic encephalopathy, ascites and variceal bleeding (8/2023)   #. Alcohol use disorder; hx of alcohol withdrawal seizures   MELD 3.0: 15  ABO: AB    The patient has decompensated alcohol-related cirrhosis.  He has a history of variceal bleeding for which he is on carvedilol.  The patient is due for variceal screening.  The patient has no recent signs or symptoms of bleeding.  The patient's ascites is currently well controlled with low-dose diuretics; the frequency of paracenteses has been spaced out and I suspect that this is related to hepatic recompensation in the setting of ongoing alcohol sobriety.  The patient's hepatic encephalopathy is well-controlled with maximal medical therapy.    With regards to the patient's alcohol use disorder he has completed intensive outpatient programming at [facility] and continues to engage in their outpatient programming as well as Alcoholics Anonymous.  He reports that he has ongoing sobriety.    Suspect that the patient will have continued improvement with regards to his liver disease in the setting of sobriety but given he has a history of hepatic encephalopathy, ascites and history of variceal bleeding he would benefit from engaging in the transplant evaluation process.  Discussed with transplant coordinator (Sheng) who will plan to get the patient to see the surgical specialist, nutrition and .    Plan  -- Follow up cystatin C, HFE testing and vitamin D testing  -- Coreg 6.25mg BID for hx of variceal bleeding  -- Diuretics: spironolactone 100mg daily + lasix 20mg daily   -- Hepatic encephalopathy: Lactulose (3-4 BM per day) + Rifaximin 550mg BID  -- HCC Screening:  abdominal MRI in July 2024 given prior adrenal lesion plus follow-up sub centimeter hepatic lesion  -- Variceal Screening: due  -- Continue to engage in alcohol programming.  Continue complete alcohol sobriety.    Health Maintenance:  -- Recommend yearly influenza vaccination  -- Recommend dental visits every 6 months  -- Colonoscopy: due for colon cancer screening at 45 years of age; last 8/2023 with hyperplastic polyp    Nutrition & Physical Activity:  -- Recommend low sodium (< 2 grams per day) + high protein diet; information from [email or web address] provided     RTC: 3 months    Nimco Diego MD (Lizzie)  Advanced & Transplant Hepatology  [facility]    I spent 60 minutes on this encounter performing the following: reviewing the patient's medical record (clinic visits, hospital records, lab results, imaging and procedural documentation), history taking, physical exam and documentation on the date of the encounter. I also spent part of the time in coordination of care and counseling.    The longitudinal plan of care for the diagnosis(es)/condition(s) as documented were addressed during this visit. Due to the added complexity in care, I will continue to support Ad in the subsequent management and with ongoing continuity of care.    HPI:  ALD related Cirrhosis  - hx HE  - hx ascites  - hx variceal bleed (8/2023)  - last EGD 8/2023 - grade II EV s/p banding   - HCC screening - CT 1/2024 - 5mm focus in the right hepatic lobe    The patient presented with his wife.    The patient reports that he has been seen at [facility] and has started the evaluation for liver transplantation.  The [facility] is closer to his home and wishes to pursue care at the [facility] as able.    He reports that he has been feeling well lately.  The frequency of his paracenteses have decreased.  His last paracentesis was about 2 weeks ago and the one prior to that was a month ago.  He reports  adherence to his spironolactone 100 mg/day and Lasix 20 mg a day.  He cooks with his wife at home and focuses on a low sodium and high-protein diet.    His confusion has been well-controlled with rifaximin and lactulose.  He takes his lactulose every other day.  He denies any issues with lethargy or confusion.  He has 2-3 bowel movements per day without any blood.  He reports that his sleep has been better recently.    He denies any overt signs or symptoms of bleeding.    He is engaged in outpatient intensive programming at [facility] and then has switched to 2 days a week for outpatient programming.  He denies any alcohol cravings.  He continues to engage in AA twice a week.    Medical hx Surgical hx   Past Medical History:   Diagnosis Date     Alcohol use disorder, severe, dependence (H)      Alcohol withdrawal seizure (H)      Alcoholic cirrhosis of liver with ascites (H)      Esophageal varices (H)      Essential hypertension      Gastroesophageal reflux disease without esophagitis      History of methamphetamine use     Sustained remission since 2003     Hypercholesterolemia      Tobacco abuse       Past Surgical History:   Procedure Laterality Date     COLONOSCOPY N/A 8/28/2023    Procedure: COLONOSCOPY, WITH POLYPECTOMY via bx forceps;  Surgeon: Alyssa Tsai MD;  Location: [facility]     IR PARACENTESIS  8/31/2023     IR PARACENTESIS  9/15/2023     IR PARACENTESIS  9/19/2023     IR PARACENTESIS  9/22/2023     IR PARACENTESIS  9/26/2023     IR PARACENTESIS  9/29/2023     IR PARACENTESIS  10/10/2023     IR PARACENTESIS  10/17/2023     IR PARACENTESIS  10/20/2023     IR PARACENTESIS  10/24/2023     IR PARACENTESIS  10/27/2023     IR PARACENTESIS  10/31/2023     IR PARACENTESIS  11/3/2023     IR PARACENTESIS  11/7/2023     IR PARACENTESIS  11/10/2023     IR PARACENTESIS  11/14/2023     IR PARACENTESIS  11/17/2023     IR PARACENTESIS  11/21/2023     IR PARACENTESIS  11/24/2023     IR PARACENTESIS   11/28/2023     IR PARACENTESIS  12/1/2023     IR PARACENTESIS  12/5/2023     IR PARACENTESIS  12/8/2023     IR PARACENTESIS  12/15/2023     IR PARACENTESIS  1/4/2024     IR PARACENTESIS  12/29/2023     IR PARACENTESIS  12/22/2023     IR PARACENTESIS  12/19/2023     IR PARACENTESIS  1/16/2024     IR PARACENTESIS  1/9/2024          Medications  Current Outpatient Medications   Medication Sig Dispense Refill     carvedilol (COREG) 6.25 MG tablet Take 1 tablet (6.25 mg) by mouth 2 times daily (with meals) 90 tablet 3     furosemide (LASIX) 20 MG tablet Take 1 tablet (20 mg) by mouth daily 90 tablet 1     lactulose 20 GM/30ML solution Take 45 mLs (30 g) by mouth 3 times daily Goal 4-5 stools daily. 946 mL 3     pantoprazole (PROTONIX) 20 MG EC tablet Take 1 tablet (20 mg) by mouth daily 90 tablet 3     rifaximin (XIFAXAN) 550 MG TABS tablet 1 tablet (550 mg) by Oral or Feeding Tube route 2 times daily 60 tablet 3     sertraline (ZOLOFT) 50 MG tablet Take 1 tablet (50 mg) by mouth daily 30 tablet 3     spironolactone (ALDACTONE) 100 MG tablet Take 1 tablet (100 mg) by mouth daily 90 tablet 3     vitamin D3 (CHOLECALCIFEROL) 50 mcg (2000 units) tablet Take 1 tablet (50 mcg) by mouth daily for 120 days 30 tablet 3       Allergies  Allergies   Allergen Reactions     Dust Mites      Metronidazole Other (See Comments), Dizziness and Unknown     Pollen Extract      Omeprazole Other (See Comments) and Unknown     Irritability (tolerates Protonix well)       Family hx Social hx   Family History   Problem Relation Age of Onset     Substance Abuse Mother      Coronary Artery Disease Mother      Cerebrovascular Disease Mother      Substance Abuse Father      Substance Abuse Brother      Liver Cancer No family hx of      Liver Disease No family hx of      Positive for a first degree relative with drug or alcohol problems.  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 Not currently working, but plans to go back in the next couple of months.  - Lives with wife Malissa. They have 2 kids  - Alcohol: Prior heavy use. H/o alcohol withdrawal seizures (last 2022). H/o lodging plus x several days; not completed.   After last use engaged in [place] and has continuing engagement with [employer] plus engages with Alcoholics Anonymous.  - Tobacco: Former tobacco use, quit 2020.  - Drug use: history of amphetamine use. History of prior treatment for amphetamine use.      Review of systems  A 10-point review of systems was negative.    Examination  /69 (BP Location: Left arm, Patient Position: Sitting, Cuff Size: Adult Regular)   Pulse 72   Temp 98.4  F (36.9  C) (Oral)   Resp 18   Ht 1.753 m (5' 9.02\")   Wt 96.8 kg (213 lb 8 oz)   SpO2 98%   BMI 31.51 kg/m    Body mass index is 31.51 kg/m .    Gen-NAD  Eye- EOMI  ENT- MMM  CVS- RRR, no murmurs  RS- CTA bilaterally  Abd- Overweight, soft, non-tender, mild distension  Extr- 2+ radial pulses bilaterally, no lower extremity edema bilaterally  MS- hands without clubbing  Neuro- A+Ox3, no asterixis  Skin- no jaundice. Terrys nails  Psych- normal mood    Laboratory  BMP  Recent Labs   Lab Test 03/08/24  0951 02/05/24  1310 01/05/24  1446 12/19/23  1455    136 140 139   POTASSIUM 5.1 5.0 4.6 4.5   CHLORIDE 109* 105 108* 108*   ENEDINA 9.6 9.9 9.4 9.1   CO2 23 22 23 22   BUN 35.7* 28.3* 14.4 14.5   CR 1.74* 1.90* 1.47* 1.62*   * 95 143* 98     CBC  Recent Labs   Lab Test 03/08/24  0951 02/05/24  1310 01/05/24  1446 12/19/23  1456   WBC 3.9* 4.8 5.5 7.3   RBC 3.66* 3.62* 3.44* 3.27*   HGB 10.8* 10.6* 10.3* 9.9*   HCT 33.4* 32.0* 31.5* 29.4*   MCV 91 88 92 90   MCH 29.5 29.3 29.9 30.3 "   MCHC 32.3 33.1 32.7 33.7   RDW 15.3* 15.8* 16.1* 15.6*   PLT 82* 92* 111* 127*     Liver Enzymes   Recent Labs   Lab Test 03/08/24  0951   PROTTOTAL 7.5   ALBUMIN 3.6   BILITOTAL 0.6   ALKPHOS 119   AST 61*   ALT 36      INR   INR   Date Value Ref Range Status   03/08/2024 1.30 (H) 0.85 - 1.15 Final      Radiology  CT Abdomen w/IV Contrast 1/2024  1. Cirrhotic morphology of liver with sequela of portal hypertension including recanalization of umbilical vein, splenomegaly, and ascites.   2. Arterially enhancing 5 mm focus in right hepatic lobe without evidence of washout delay.   3. Mild gastrohepatic, arnel hepatis, mesenteric, and retroperitoneal adenopathy.   4. Soft tissue thickening in left upper abdomen in region of left adrenal gland.    TTE 12/2023  1. Borderline enlarged left ventricular chamber size, no regional wall motion abnormalities, calculated 2-D biplane volumetric ejection fraction of 58%.   2. Normal left ventricular diastolic function.   3. Normal right ventricular chamber size, normal systolic function, estimated right ventricular systolic pressure 27 mmHg (right atrial pressure of 5 mmHg).   4. No  significant valvular heart disease.   5. Enlarged sinus of Valsalva diameter of 41 mm, upper limit of normal for age, sex and BSA is 40 mm.   6. Trivial right to left shunt at rest and with Valsalva release; the timing and behavior of the shunt are consistent with an intrapulmonary mechanism.   7. Ascites.    PFT 12/2023  Measured lung volumes, and spirometry are within normal limits. Diffusing capacity adjusted for anemia is moderately reduced suggestive of pulmonary parenchymal endovascular abnormalities.    Endoscopy  EGD 8/28/2023  - Grade II esophageal varices. Completely eradicated. Banded.   - Portal hypertensive gastropathy.   - Normal examined duodenum.   - Esophageal ulcers at site of prior variceal treatment    Colonoscopy 8/2023  - Two diminutive polyps in the sigmoid colon, removed                           with a cold biopsy forceps. Resected and retrieved.                          - Rectal varices.                          - Erythematous mucosa in the sigmoid colon and rectum                          with come erythem, consistent with portal colopathy.                          This was the likely cause of hematochezia.                          No blood was seen.                          Terminal ileum was normal.     A. SIGMOID COLON POLYP, POLYPECTOMY:  -Hyperplastic polyp

## 2024-03-08 ENCOUNTER — LAB (OUTPATIENT)
Dept: LAB | Facility: CLINIC | Age: 44
End: 2024-03-08
Payer: COMMERCIAL

## 2024-03-08 DIAGNOSIS — K70.31 ALCOHOLIC CIRRHOSIS OF LIVER WITH ASCITES (H): ICD-10-CM

## 2024-03-08 DIAGNOSIS — N18.30 STAGE 3 CHRONIC KIDNEY DISEASE, UNSPECIFIED WHETHER STAGE 3A OR 3B CKD (H): ICD-10-CM

## 2024-03-08 LAB
ALBUMIN UR-MCNC: NEGATIVE MG/DL
APPEARANCE UR: CLEAR
BILIRUB UR QL STRIP: NEGATIVE
COLOR UR AUTO: YELLOW
ERYTHROCYTE [DISTWIDTH] IN BLOOD BY AUTOMATED COUNT: 15.3 % (ref 10–15)
GLUCOSE UR STRIP-MCNC: NEGATIVE MG/DL
HCT VFR BLD AUTO: 33.4 % (ref 40–53)
HGB BLD-MCNC: 10.8 G/DL (ref 13.3–17.7)
HGB UR QL STRIP: NEGATIVE
INR PPP: 1.3 (ref 0.85–1.15)
KETONES UR STRIP-MCNC: NEGATIVE MG/DL
LEUKOCYTE ESTERASE UR QL STRIP: NEGATIVE
MCH RBC QN AUTO: 29.5 PG (ref 26.5–33)
MCHC RBC AUTO-ENTMCNC: 32.3 G/DL (ref 31.5–36.5)
MCV RBC AUTO: 91 FL (ref 78–100)
NITRATE UR QL: NEGATIVE
PH UR STRIP: 6 [PH] (ref 5–7)
PLATELET # BLD AUTO: 82 10E3/UL (ref 150–450)
RBC # BLD AUTO: 3.66 10E6/UL (ref 4.4–5.9)
SP GR UR STRIP: 1.02 (ref 1–1.03)
UROBILINOGEN UR STRIP-ACNC: 0.2 E.U./DL
WBC # BLD AUTO: 3.9 10E3/UL (ref 4–11)

## 2024-03-08 PROCEDURE — 80053 COMPREHEN METABOLIC PANEL: CPT

## 2024-03-08 PROCEDURE — 36415 COLL VENOUS BLD VENIPUNCTURE: CPT

## 2024-03-08 PROCEDURE — 82043 UR ALBUMIN QUANTITATIVE: CPT

## 2024-03-08 PROCEDURE — 85610 PROTHROMBIN TIME: CPT

## 2024-03-08 PROCEDURE — 81003 URINALYSIS AUTO W/O SCOPE: CPT

## 2024-03-08 PROCEDURE — 82248 BILIRUBIN DIRECT: CPT

## 2024-03-08 PROCEDURE — 84156 ASSAY OF PROTEIN URINE: CPT

## 2024-03-08 PROCEDURE — 85027 COMPLETE CBC AUTOMATED: CPT

## 2024-03-08 PROCEDURE — 82570 ASSAY OF URINE CREATININE: CPT

## 2024-03-09 LAB
ALBUMIN MFR UR ELPH: 9.9 MG/DL
ALBUMIN SERPL BCG-MCNC: 3.6 G/DL (ref 3.5–5.2)
ALP SERPL-CCNC: 119 U/L (ref 40–150)
ALT SERPL W P-5'-P-CCNC: 36 U/L (ref 0–70)
ANION GAP SERPL CALCULATED.3IONS-SCNC: 6 MMOL/L (ref 7–15)
AST SERPL W P-5'-P-CCNC: 61 U/L (ref 0–45)
BILIRUB DIRECT SERPL-MCNC: 0.26 MG/DL (ref 0–0.3)
BILIRUB SERPL-MCNC: 0.6 MG/DL
BUN SERPL-MCNC: 35.7 MG/DL (ref 6–20)
CALCIUM SERPL-MCNC: 9.6 MG/DL (ref 8.6–10)
CHLORIDE SERPL-SCNC: 109 MMOL/L (ref 98–107)
CREAT SERPL-MCNC: 1.74 MG/DL (ref 0.67–1.17)
CREAT UR-MCNC: 126 MG/DL
CREAT UR-MCNC: 126 MG/DL
DEPRECATED HCO3 PLAS-SCNC: 23 MMOL/L (ref 22–29)
EGFRCR SERPLBLD CKD-EPI 2021: 49 ML/MIN/1.73M2
GLUCOSE SERPL-MCNC: 103 MG/DL (ref 70–99)
MICROALBUMIN UR-MCNC: <12 MG/L
MICROALBUMIN/CREAT UR: NORMAL MG/G{CREAT}
POTASSIUM SERPL-SCNC: 5.1 MMOL/L (ref 3.4–5.3)
PROT SERPL-MCNC: 7.5 G/DL (ref 6.4–8.3)
PROT/CREAT 24H UR: 0.08 MG/MG CR (ref 0–0.2)
SODIUM SERPL-SCNC: 138 MMOL/L (ref 135–145)

## 2024-03-12 ENCOUNTER — OFFICE VISIT (OUTPATIENT)
Dept: GASTROENTEROLOGY | Facility: CLINIC | Age: 44
End: 2024-03-12
Attending: INTERNAL MEDICINE
Payer: COMMERCIAL

## 2024-03-12 VITALS
TEMPERATURE: 98.4 F | RESPIRATION RATE: 18 BRPM | BODY MASS INDEX: 31.62 KG/M2 | HEIGHT: 69 IN | DIASTOLIC BLOOD PRESSURE: 69 MMHG | HEART RATE: 72 BPM | WEIGHT: 213.5 LBS | OXYGEN SATURATION: 98 % | SYSTOLIC BLOOD PRESSURE: 109 MMHG

## 2024-03-12 DIAGNOSIS — I85.01 BLEEDING ESOPHAGEAL VARICES, UNSPECIFIED ESOPHAGEAL VARICES TYPE (H): ICD-10-CM

## 2024-03-12 DIAGNOSIS — K76.82 HEPATIC ENCEPHALOPATHY (H): ICD-10-CM

## 2024-03-12 DIAGNOSIS — E55.9 VITAMIN D DEFICIENCY: ICD-10-CM

## 2024-03-12 DIAGNOSIS — K70.31 ALCOHOLIC CIRRHOSIS OF LIVER WITH ASCITES (H): Primary | ICD-10-CM

## 2024-03-12 PROCEDURE — G2211 COMPLEX E/M VISIT ADD ON: HCPCS | Performed by: INTERNAL MEDICINE

## 2024-03-12 PROCEDURE — 99215 OFFICE O/P EST HI 40 MIN: CPT | Performed by: INTERNAL MEDICINE

## 2024-03-12 PROCEDURE — 99417 PROLNG OP E/M EACH 15 MIN: CPT | Performed by: INTERNAL MEDICINE

## 2024-03-12 PROCEDURE — 99213 OFFICE O/P EST LOW 20 MIN: CPT | Performed by: INTERNAL MEDICINE

## 2024-03-12 RX ORDER — PANTOPRAZOLE SODIUM 20 MG/1
20 TABLET, DELAYED RELEASE ORAL DAILY
Qty: 90 TABLET | Refills: 3 | Status: ON HOLD | OUTPATIENT
Start: 2024-03-12 | End: 2024-06-06

## 2024-03-12 RX ORDER — LACTULOSE 20 G/30ML
30 SOLUTION ORAL 3 TIMES DAILY
Qty: 946 ML | Refills: 3 | Status: SHIPPED | OUTPATIENT
Start: 2024-03-12 | End: 2024-04-08

## 2024-03-12 RX ORDER — SPIRONOLACTONE 100 MG/1
100 TABLET, FILM COATED ORAL DAILY
Qty: 90 TABLET | Refills: 3 | Status: SHIPPED | OUTPATIENT
Start: 2024-03-12 | End: 2024-04-07

## 2024-03-12 RX ORDER — CARVEDILOL 6.25 MG/1
6.25 TABLET ORAL 2 TIMES DAILY WITH MEALS
Qty: 90 TABLET | Refills: 3 | Status: SHIPPED | OUTPATIENT
Start: 2024-03-12 | End: 2024-06-12

## 2024-03-12 RX ORDER — FUROSEMIDE 20 MG
20 TABLET ORAL DAILY
Qty: 90 TABLET | Refills: 1 | Status: SHIPPED | OUTPATIENT
Start: 2024-03-12 | End: 2024-06-12

## 2024-03-12 ASSESSMENT — PAIN SCALES - GENERAL: PAINLEVEL: NO PAIN (0)

## 2024-03-12 NOTE — NURSING NOTE
"Chief Complaint   Patient presents with    RECHECK     Cirrhosis      Vital signs:  Temp: 98.4  F (36.9  C) Temp src: Oral BP: 109/69 Pulse: 72   Resp: 18 SpO2: 98 %     Height: 175.3 cm (5' 9.02\") Weight: 96.8 kg (213 lb 8 oz)  Estimated body mass index is 31.51 kg/m  as calculated from the following:    Height as of this encounter: 1.753 m (5' 9.02\").    Weight as of this encounter: 96.8 kg (213 lb 8 oz).      Maggy Bravo, [facility]  3/12/2024 8:33 AM    "

## 2024-03-12 NOTE — LETTER
3/12/2024         RE: Elroy Morel Sr.  [address] [place] [address]        Dear Colleague,    Thank you for referring your patient, Elroy Morel Sr., to the [facility]. Please see a copy of my visit note below.    [facility] Hepatology    Assessment  43 year old male with past medical history of decompensated alcohol-related cirrhosis complicated by hepatic encephalopathy, ascites and variceal bleeding (8/2023) and alcohol use disorder.    #. Decompensated alcohol-related cirrhosis complicated by hepatic encephalopathy, ascites and variceal bleeding (8/2023)   #. Alcohol use disorder; hx of alcohol withdrawal seizures   MELD 3.0: 15  ABO: AB    The patient has decompensated alcohol-related cirrhosis.  He has a history of variceal bleeding for which he is on carvedilol.  The patient is due for variceal screening.  The patient has no recent signs or symptoms of bleeding.  The patient's ascites is currently well controlled with low-dose diuretics; the frequency of paracenteses has been spaced out and I suspect that this is related to hepatic recompensation in the setting of ongoing alcohol sobriety.  The patient's hepatic encephalopathy is well-controlled with maximal medical therapy.    With regards to the patient's alcohol use disorder he has completed intensive outpatient programming at [facility] and continues to engage in their outpatient programming as well as Alcoholics Anonymous.  He reports that he has ongoing sobriety.    Suspect that the patient will have continued improvement with regards to his liver disease in the setting of sobriety but given he has a history of hepatic encephalopathy, ascites and history of variceal bleeding he would benefit from engaging in the transplant evaluation process.  Discussed with transplant coordinator (Sheng) who will plan to get the patient to see the surgical specialist, nutrition and social  worker.    Plan  -- Follow up cystatin C, HFE testing and vitamin D testing  -- Coreg 6.25mg BID for hx of variceal bleeding  -- Diuretics: spironolactone 100mg daily + lasix 20mg daily   -- Hepatic encephalopathy: Lactulose (3-4 BM per day) + Rifaximin 550mg BID  -- HCC Screening: abdominal MRI in July 2024 given prior adrenal lesion plus follow-up sub centimeter hepatic lesion  -- Variceal Screening: due  -- Continue to engage in alcohol programming.  Continue complete alcohol sobriety.    Health Maintenance:  -- Recommend yearly influenza vaccination  -- Recommend dental visits every 6 months  -- Colonoscopy: due for colon cancer screening at 45 years of age; last 8/2023 with hyperplastic polyp    Nutrition & Physical Activity:  -- Recommend low sodium (< 2 grams per day) + high protein diet; information from [email or web address] provided     RTC: 3 months    Nimco Diego MD (Lizzie)  Advanced & Transplant Hepatology  [facility]    I spent 60 minutes on this encounter performing the following: reviewing the patient's medical record (clinic visits, hospital records, lab results, imaging and procedural documentation), history taking, physical exam and documentation on the date of the encounter. I also spent part of the time in coordination of care and counseling.    The longitudinal plan of care for the diagnosis(es)/condition(s) as documented were addressed during this visit. Due to the added complexity in care, I will continue to support Ad in the subsequent management and with ongoing continuity of care.    HPI:  ALD related Cirrhosis  - hx HE  - hx ascites  - hx variceal bleed (8/2023)  - last EGD 8/2023 - grade II EV s/p banding   - HCC screening - CT 1/2024 - 5mm focus in the right hepatic lobe    The patient presented with his wife.    The patient reports that he has been seen at [facility] and has started the evaluation for liver transplantation.  The [facility] is closer  to his home and wishes to pursue care at the [facility] as able.    He reports that he has been feeling well lately.  The frequency of his paracenteses have decreased.  His last paracentesis was about 2 weeks ago and the one prior to that was a month ago.  He reports adherence to his spironolactone 100 mg/day and Lasix 20 mg a day.  He cooks with his wife at home and focuses on a low sodium and high-protein diet.    His confusion has been well-controlled with rifaximin and lactulose.  He takes his lactulose every other day.  He denies any issues with lethargy or confusion.  He has 2-3 bowel movements per day without any blood.  He reports that his sleep has been better recently.    He denies any overt signs or symptoms of bleeding.    He is engaged in outpatient intensive programming at [facility] and then has switched to 2 days a week for outpatient programming.  He denies any alcohol cravings.  He continues to engage in AA twice a week.    Medical hx Surgical hx   Past Medical History:   Diagnosis Date     Alcohol use disorder, severe, dependence (H)      Alcohol withdrawal seizure (H)      Alcoholic cirrhosis of liver with ascites (H)      Esophageal varices (H)      Essential hypertension      Gastroesophageal reflux disease without esophagitis      History of methamphetamine use     Sustained remission since 2003     Hypercholesterolemia      Tobacco abuse       Past Surgical History:   Procedure Laterality Date     COLONOSCOPY N/A 8/28/2023    Procedure: COLONOSCOPY, WITH POLYPECTOMY via bx forceps;  Surgeon: Alyssa Tsai MD;  Location:  GI     IR PARACENTESIS  8/31/2023     IR PARACENTESIS  9/15/2023     IR PARACENTESIS  9/19/2023     IR PARACENTESIS  9/22/2023     IR PARACENTESIS  9/26/2023     IR PARACENTESIS  9/29/2023     IR PARACENTESIS  10/10/2023     IR PARACENTESIS  10/17/2023     IR PARACENTESIS  10/20/2023     IR PARACENTESIS  10/24/2023     IR PARACENTESIS  10/27/2023      IR PARACENTESIS  10/31/2023     IR PARACENTESIS  11/3/2023     IR PARACENTESIS  11/7/2023     IR PARACENTESIS  11/10/2023     IR PARACENTESIS  11/14/2023     IR PARACENTESIS  11/17/2023     IR PARACENTESIS  11/21/2023     IR PARACENTESIS  11/24/2023     IR PARACENTESIS  11/28/2023     IR PARACENTESIS  12/1/2023     IR PARACENTESIS  12/5/2023     IR PARACENTESIS  12/8/2023     IR PARACENTESIS  12/15/2023     IR PARACENTESIS  1/4/2024     IR PARACENTESIS  12/29/2023     IR PARACENTESIS  12/22/2023     IR PARACENTESIS  12/19/2023     IR PARACENTESIS  1/16/2024     IR PARACENTESIS  1/9/2024          Medications  Current Outpatient Medications   Medication Sig Dispense Refill     carvedilol (COREG) 6.25 MG tablet Take 1 tablet (6.25 mg) by mouth 2 times daily (with meals) 90 tablet 3     furosemide (LASIX) 20 MG tablet Take 1 tablet (20 mg) by mouth daily 90 tablet 1     lactulose 20 GM/30ML solution Take 45 mLs (30 g) by mouth 3 times daily Goal 4-5 stools daily. 946 mL 3     pantoprazole (PROTONIX) 20 MG EC tablet Take 1 tablet (20 mg) by mouth daily 90 tablet 3     rifaximin (XIFAXAN) 550 MG TABS tablet 1 tablet (550 mg) by Oral or Feeding Tube route 2 times daily 60 tablet 3     sertraline (ZOLOFT) 50 MG tablet Take 1 tablet (50 mg) by mouth daily 30 tablet 3     spironolactone (ALDACTONE) 100 MG tablet Take 1 tablet (100 mg) by mouth daily 90 tablet 3     vitamin D3 (CHOLECALCIFEROL) 50 mcg (2000 units) tablet Take 1 tablet (50 mcg) by mouth daily for 120 days 30 tablet 3       Allergies  Allergies   Allergen Reactions     Dust Mites      Metronidazole Other (See Comments), Dizziness and Unknown     Pollen Extract      Omeprazole Other (See Comments) and Unknown     Irritability (tolerates Protonix well)       Family hx Social hx   Family History   Problem Relation Age of Onset     Substance Abuse Mother      Coronary Artery Disease Mother      Cerebrovascular Disease Mother      Substance Abuse Father       "Substance Abuse Brother      Liver Cancer No family hx of      Liver Disease No family hx of      Positive for a first degree relative with drug or alcohol problems.  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 Not currently working, but plans to go back in the next couple of months.  - Lives with wife Malissa. They have 2 kids  - Alcohol: Prior heavy use. H/o alcohol withdrawal seizures (last 2022). H/o lodging plus x several days; not completed.   After last use engaged in [employer] and has continuing engagement with [employer] plus engages with Alcoholics Anonymous.  - Tobacco: Former tobacco use, quit 2020.  - Drug use: history of amphetamine use. History of prior treatment for amphetamine use.      Review of systems  A 10-point review of systems was negative.    Examination  /69 (BP Location: Left arm, Patient Position: Sitting, Cuff Size: Adult Regular)   Pulse 72   Temp 98.4  F (36.9  C) (Oral)   Resp 18   Ht 1.753 m (5' 9.02\")   Wt 96.8 kg (213 lb 8 oz)   SpO2 98%   BMI 31.51 kg/m    Body mass index is 31.51 kg/m .    Gen-NAD  Eye- EOMI  ENT- MMM  CVS- RRR, no murmurs  RS- CTA bilaterally  Abd- Overweight, soft, non-tender, mild distension  Extr- 2+ radial pulses bilaterally, no lower extremity edema bilaterally  MS- hands without clubbing  Neuro- A+Ox3, no asterixis  Skin- no jaundice. Terrys nails  Psych- normal mood    Laboratory  BMP  Recent Labs   Lab Test 03/08/24  0951 02/05/24  1310 01/05/24  1446 12/19/23  1455    136 140 139   POTASSIUM 5.1 5.0 4.6 4.5   CHLORIDE 109* 105 108* 108*   ENEDINA 9.6 9.9 9.4 9.1   CO2 23 22 23 22   BUN 35.7* 28.3* 14.4 14.5   CR 1.74* 1.90* 1.47* " 1.62*   * 95 143* 98     CBC  Recent Labs   Lab Test 03/08/24  0951 02/05/24  1310 01/05/24  1446 12/19/23  1456   WBC 3.9* 4.8 5.5 7.3   RBC 3.66* 3.62* 3.44* 3.27*   HGB 10.8* 10.6* 10.3* 9.9*   HCT 33.4* 32.0* 31.5* 29.4*   MCV 91 88 92 90   MCH 29.5 29.3 29.9 30.3   MCHC 32.3 33.1 32.7 33.7   RDW 15.3* 15.8* 16.1* 15.6*   PLT 82* 92* 111* 127*     Liver Enzymes   Recent Labs   Lab Test 03/08/24  0951   PROTTOTAL 7.5   ALBUMIN 3.6   BILITOTAL 0.6   ALKPHOS 119   AST 61*   ALT 36      INR   INR   Date Value Ref Range Status   03/08/2024 1.30 (H) 0.85 - 1.15 Final      Radiology  CT Abdomen w/IV Contrast 1/2024  1. Cirrhotic morphology of liver with sequela of portal hypertension including recanalization of umbilical vein, splenomegaly, and ascites.   2. Arterially enhancing 5 mm focus in right hepatic lobe without evidence of washout delay.   3. Mild gastrohepatic, arnel hepatis, mesenteric, and retroperitoneal adenopathy.   4. Soft tissue thickening in left upper abdomen in region of left adrenal gland.    TTE 12/2023  1. Borderline enlarged left ventricular chamber size, no regional wall motion abnormalities, calculated 2-D biplane volumetric ejection fraction of 58%.   2. Normal left ventricular diastolic function.   3. Normal right ventricular chamber size, normal systolic function, estimated right ventricular systolic pressure 27 mmHg (right atrial pressure of 5 mmHg).   4. No  significant valvular heart disease.   5. Enlarged sinus of Valsalva diameter of 41 mm, upper limit of normal for age, sex and BSA is 40 mm.   6. Trivial right to left shunt at rest and with Valsalva release; the timing and behavior of the shunt are consistent with an intrapulmonary mechanism.   7. Ascites.    PFT 12/2023  Measured lung volumes, and spirometry are within normal limits. Diffusing capacity adjusted for anemia is moderately reduced suggestive of pulmonary parenchymal endovascular abnormalities.    Endoscopy  EGD  8/28/2023  - Grade II esophageal varices. Completely eradicated. Banded.   - Portal hypertensive gastropathy.   - Normal examined duodenum.   - Esophageal ulcers at site of prior variceal treatment    Colonoscopy 8/2023  - Two diminutive polyps in the sigmoid colon, removed                          with a cold biopsy forceps. Resected and retrieved.                          - Rectal varices.                          - Erythematous mucosa in the sigmoid colon and rectum                          with come erythem, consistent with portal colopathy.                          This was the likely cause of hematochezia.                          No blood was seen.                          Terminal ileum was normal.     A. SIGMOID COLON POLYP, POLYPECTOMY:  -Hyperplastic polyp      Again, thank you for allowing me to participate in the care of your patient.        Sincerely,        Nimco Diego MD

## 2024-03-13 ENCOUNTER — REFERRAL (OUTPATIENT)
Dept: TRANSPLANT | Facility: CLINIC | Age: 44
End: 2024-03-13
Payer: COMMERCIAL

## 2024-03-13 DIAGNOSIS — K70.31 ALCOHOLIC CIRRHOSIS OF LIVER WITH ASCITES (H): Primary | ICD-10-CM

## 2024-03-13 DIAGNOSIS — K70.30 ALCOHOLIC CIRRHOSIS (H): Primary | ICD-10-CM

## 2024-03-13 NOTE — LETTER
3/20/2024    Elroy Morel Sr.  [address] [place] [address]      Dear Elroy,    Thank you for your interest in the Transplant Center at [facility]. We look forward to being a part of your care team and assisting you through the transplant process.    As we discussed, your transplant coordinator is Lia Molina (Liver).  You may call your coordinator at any time with questions or concerns.  Your first scheduled call will be on 3/21/2024 between 10 am - 1 pm.  If this needs to change, call [phone].    Please complete the following.    Fill out and return the enclosed forms  Authorization for Electronic Communication  Authorization to Discuss Protected Health Information  Authorization for Release of Protected Health Information    Sign up for:  [email or web address], access to your electronic medical record (see enclosed pamphlet)  [email or web address], a transplant education website        You can use these tools to learn more about your transplant, communicate with your care team, and track your medical details          Sincerely,      Solid Organ Transplant  [facility]    cc: Referring Physician PCP

## 2024-03-19 VITALS — BODY MASS INDEX: 31.99 KG/M2 | WEIGHT: 216 LBS | HEIGHT: 69 IN

## 2024-03-19 NOTE — TELEPHONE ENCOUNTER
March 19, 2024 5:58 PM - Suzi Barnhart LPN:   Patient was asked the following questions during liver intake call.      Referring Provider: Emil Moser MD   Referring Diagnosis: Alcoholic cirrhosis of Liver with ascites   PCP:  Latosha Baires PA-C      1)Do you know why you have liver disease: Drinking alcohol       If Alcoholic Cirrhosis is present when was your last drink:  8/18/2023      Have you ever been through treatment for alcohol: pt reports currently attends  [facility]. Also was in out-pt in [place]   2) Presence of Ascites: Yes  Paracentesis: most recent 1/16/2024  3) Presence of Hepatic Encephalopathy: Yes  Medications: lactulose  4) History of GI Bleeding: Esophageal varices w/ bleeding noted 8/30/2023  5) Oxygen Use: No  6) EGD: 8/14/2023 @[facility]   7) Colonoscopy: w/polypectomy @ [facility]   8) MELD Score: 15 (3/08/2024)  9)  Labs available for review from PCP/GI:  CE  10)HCC Diagnosis: No (if no, go to #11)                                        11)Insurance information: [facility] [employer] [facility] (private) eff 1/1/2024 with ID [id number]; Group [id number].                  Referral intake process completed.  Patient is aware that after financial approval is received, medical records will be requested.   Patient confirmed for a callback from transplant coordinator on 3/21/2024.  Tentative evaluation date TBD.     Confirmed coordinator will discuss evaluation process in more detail at the time of their call.   Patient is aware of the need to arrange age appropriate cancer screening, vaccinations, and dental care.  Reminded patient to complete questionnaire, complete medical records release, and review packet prior to evaluation visit .  Assessed patient for special needs (ie--wheelchair, assistance, guardian, and ):   None  Patient instructed to call [phone] with questions.      Patient gave verbal consent during intake call to obtain medical records and documents outside of  [facility]:   Yes

## 2024-03-21 NOTE — TELEPHONE ENCOUNTER
LIVER DISEASE ETOH  REFERRING PROVIDER  HEALTH SYSTEM    Existing patient of Dr Diego  Working with [facility] - wasn't eligible due to short sobriety at time, was earlier on.    -----------------------------------------------------------------------------------------------------------------------------  MELD  15      ABO AB      Liver issues for quite awhile, cirrhosis 'pretty bad'    Last ETOH 8/18.23  In/out of hospital until Nov 2023    In outpatient treatment  and AA right now.      Numbers getting better.     Wasn't having any liver sx    Has diverticulitis/diverticulosis, had colonoscopies and liver work up as part of that process    3-4 yrs ago, issues with lever noted.      Hospitalized with significant bleed in Aug 2023    Persistent nausea until recently, now intermittent/rare     Feels pretty good right now, still deals with persistent fatigue    Ascites  Mega Furosemide- on both   Denzel - Last done in January   TIPS- no  HE- controlled on Lactulose-sparingly and on Rifaximin  Kidney function elevated creat, GFR 49' they thought he may need dialysis at one point  Variceal screening Last EGD-  Aug 2023?   HCC Screening Last imaging and location  Alcohol use prior to quitting in August, 32 oz malt liquor per day or 6 bottles between he and wife  per week; no one drinking in home anymore - currently in program at [facility], nearing completion?  Also in AA    Hospitalizations since Aug, several  ---------------------------------------------------------------------------------------------------------------------------------  PMH  Cardiac- HTN- on carvedilol (varices)   Pulm- denies, previous smoker but quit 12+ yrs ago  Diabetes - no  Abdominal surgery - undescended testicle at age 7  CRC Screening- due at age 45  Vaccinations: has not had flu, covid- discussed importance; refuses covid.       SHX    Lives with wife, 2 sons (ages 21, 19)  Currently no working, on disability- for cirrhosis  Used to work as lab tech  and  , plans to return to work in future   ---------------------------------------------------------------------------------------------------------------------------------  LDLT Discussed no, ABO AB, uncertain if needs transplant     PLAN    Eval 4/16, has eval with Carlos Enrique terry before

## 2024-03-22 ENCOUNTER — LAB (OUTPATIENT)
Dept: LAB | Facility: CLINIC | Age: 44
End: 2024-03-22
Payer: COMMERCIAL

## 2024-03-22 DIAGNOSIS — K70.31 ALCOHOLIC CIRRHOSIS OF LIVER WITH ASCITES (H): ICD-10-CM

## 2024-03-22 DIAGNOSIS — E55.9 VITAMIN D DEFICIENCY: ICD-10-CM

## 2024-03-22 LAB
ERYTHROCYTE [DISTWIDTH] IN BLOOD BY AUTOMATED COUNT: 15.4 % (ref 10–15)
HCT VFR BLD AUTO: 35.1 % (ref 40–53)
HGB BLD-MCNC: 11.1 G/DL (ref 13.3–17.7)
INR PPP: 1.28 (ref 0.85–1.15)
LAB DIRECTOR COMMENTS: NORMAL
LAB DIRECTOR DISCLAIMER: NORMAL
LAB DIRECTOR INTERPRETATION: NORMAL
LAB DIRECTOR METHODOLOGY: NORMAL
LAB DIRECTOR RESULTS: NORMAL
LOCATION OF TASK: NORMAL
MCH RBC QN AUTO: 29.2 PG (ref 26.5–33)
MCHC RBC AUTO-ENTMCNC: 31.6 G/DL (ref 31.5–36.5)
MCV RBC AUTO: 92 FL (ref 78–100)
PLATELET # BLD AUTO: 89 10E3/UL (ref 150–450)
RBC # BLD AUTO: 3.8 10E6/UL (ref 4.4–5.9)
SPECIMEN DESCRIPTION: NORMAL
WBC # BLD AUTO: 3.9 10E3/UL (ref 4–11)

## 2024-03-22 PROCEDURE — 82306 VITAMIN D 25 HYDROXY: CPT

## 2024-03-22 PROCEDURE — 36415 COLL VENOUS BLD VENIPUNCTURE: CPT

## 2024-03-22 PROCEDURE — 82610 CYSTATIN C: CPT

## 2024-03-22 PROCEDURE — 80053 COMPREHEN METABOLIC PANEL: CPT

## 2024-03-22 PROCEDURE — 81256 HFE GENE: CPT

## 2024-03-22 PROCEDURE — 85027 COMPLETE CBC AUTOMATED: CPT

## 2024-03-22 PROCEDURE — G0452 MOLECULAR PATHOLOGY INTERPR: HCPCS | Mod: GC | Performed by: STUDENT IN AN ORGANIZED HEALTH CARE EDUCATION/TRAINING PROGRAM

## 2024-03-22 PROCEDURE — 85610 PROTHROMBIN TIME: CPT

## 2024-03-23 LAB
ALBUMIN SERPL BCG-MCNC: 3.8 G/DL (ref 3.5–5.2)
ALP SERPL-CCNC: 111 U/L (ref 40–150)
ALT SERPL W P-5'-P-CCNC: 41 U/L (ref 0–70)
ANION GAP SERPL CALCULATED.3IONS-SCNC: 12 MMOL/L (ref 7–15)
AST SERPL W P-5'-P-CCNC: 60 U/L (ref 0–45)
BILIRUB SERPL-MCNC: 1.1 MG/DL
BUN SERPL-MCNC: 31.3 MG/DL (ref 6–20)
CALCIUM SERPL-MCNC: 9.4 MG/DL (ref 8.6–10)
CHLORIDE SERPL-SCNC: 105 MMOL/L (ref 98–107)
CREAT SERPL-MCNC: 1.82 MG/DL (ref 0.67–1.17)
CYSTATIN C (ROCHE): 2.3 MG/L (ref 0.6–1)
DEPRECATED HCO3 PLAS-SCNC: 20 MMOL/L (ref 22–29)
EGFRCR SERPLBLD CKD-EPI 2021: 47 ML/MIN/1.73M2
GFR SERPL CREATININE-BSD FRML MDRD: 28 ML/MIN/1.73M2
GLUCOSE SERPL-MCNC: 96 MG/DL (ref 70–99)
POTASSIUM SERPL-SCNC: 4.4 MMOL/L (ref 3.4–5.3)
PROT SERPL-MCNC: 7.7 G/DL (ref 6.4–8.3)
SODIUM SERPL-SCNC: 137 MMOL/L (ref 135–145)
VIT D+METAB SERPL-MCNC: 27 NG/ML (ref 20–50)

## 2024-03-26 ENCOUNTER — TELEPHONE (OUTPATIENT)
Dept: GASTROENTEROLOGY | Facility: CLINIC | Age: 44
End: 2024-03-26
Payer: COMMERCIAL

## 2024-03-26 NOTE — TELEPHONE ENCOUNTER
"Endoscopy Scheduling Screen    Have you had a positive Covid test in the last 14 days?  No    What is your communication preference for Instructions and/or Bowel Prep?   MyChart    What insurance is in the chart?  Other:  [facility]    Ordering/Referring Provider: MUSHTAQ MORRIS   (If ordering provider performs procedure, schedule with ordering provider unless otherwise instructed. )    BMI: Estimated body mass index is 31.9 kg/m  as calculated from the following:    Height as of 3/19/24: 1.753 m (5' 9\").    Weight as of 3/19/24: 98 kg (216 lb).     Sedation Ordered  MAC/deep sedation.   BMI<= 45 45 < BMI <= 48 48 < BMI < = 50  BMI > 50   No Restrictions No [facility]  No [facility]  [facility] with exceptions Hospital Only OR Only       Do you have a history of malignant hyperthermia?  No    (Females) Are you currently pregnant?   No     Have you been diagnosed or told you have pulmonary hypertension?   No    Do you have an LVAD?  No    Have you been told you have moderate to severe sleep apnea?  Yes (RN Review required for scheduling unless scheduling in Hospital.)    Have you been told you have COPD, asthma, or any other lung disease?  No    Do you have any heart conditions?  No     Have you ever had or are you waiting for an organ transplant?  Yes. Have you had or are on on the wait list for a heart/lung transplant? No, may schedule at all facilities except [facility]    Have you had a stroke or transient ischemic attack (TIA aka \"mini stroke\" in the last 6 months?   No    Have you been diagnosed with or been told you have cirrhosis of the liver?   Yes (RN Review required for scheduling unless scheduling in Hospital.)    Are you currently on dialysis?   No    Do you need assistance transferring?   No    BMI: Estimated body mass index is 31.9 kg/m  as calculated from the following:    Height as of 3/19/24: 1.753 m (5' 9\").    Weight as of 3/19/24: 98 kg (216 lb).     Is patients BMI > 40 and scheduling location [facility]?  No    Do you " take an injectable medication for weight loss or diabetes (excluding insulin)?  No    Do you take the medication Naltrexone?  No    Do you take blood thinners?  No       Prep   Are you currently on dialysis or do you have chronic kidney disease?  No    Do you have a diagnosis of diabetes?  No    Do you have a diagnosis of cystic fibrosis (CF)?  No    On a regular basis do you go 3 -5 days between bowel movements?  No    BMI > 40?  No    Preferred Pharmacy:    [id number] - [place], [place] - [address] [place] [address]  Phone: [phone] Fax: [phone]      Final Scheduling Details     Procedure scheduled  Upper endoscopy (EGD)    Surgeon:  LEVENTHAL    Date of procedure:  6/6/24    Pre-OP / PAC:   No - Not required for this site.    Location  [facility] - Per order.    Sedation   MAC/Deep Sedation - Per order.      Patient Reminders:   You will receive a call from a Nurse to review instructions and health history.  This assessment must be completed prior to your procedure.  Failure to complete the Nurse assessment may result in the procedure being cancelled.      On the day of your procedure, please designate an adult(s) who can drive you home stay with you for the next 24 hours. The medicines used in the exam will make you sleepy. You will not be able to drive.      You cannot take public transportation, ride share services, or non-medical taxi service without a responsible caregiver.  Medical transport services are allowed with the requirement that a responsible caregiver will receive you at your destination.  We require that drivers and caregivers are confirmed prior to your procedure.

## 2024-04-04 DIAGNOSIS — N18.30 STAGE 3 CHRONIC KIDNEY DISEASE, UNSPECIFIED WHETHER STAGE 3A OR 3B CKD (H): Primary | ICD-10-CM

## 2024-04-07 ENCOUNTER — MYC REFILL (OUTPATIENT)
Dept: GASTROENTEROLOGY | Facility: CLINIC | Age: 44
End: 2024-04-07
Payer: COMMERCIAL

## 2024-04-07 DIAGNOSIS — K70.31 ALCOHOLIC CIRRHOSIS OF LIVER WITH ASCITES (H): ICD-10-CM

## 2024-04-08 ENCOUNTER — LAB (OUTPATIENT)
Dept: LAB | Facility: CLINIC | Age: 44
End: 2024-04-08
Attending: INTERNAL MEDICINE
Payer: COMMERCIAL

## 2024-04-08 ENCOUNTER — OFFICE VISIT (OUTPATIENT)
Dept: NEPHROLOGY | Facility: CLINIC | Age: 44
End: 2024-04-08
Attending: INTERNAL MEDICINE
Payer: COMMERCIAL

## 2024-04-08 VITALS
WEIGHT: 216.5 LBS | BODY MASS INDEX: 31.97 KG/M2 | OXYGEN SATURATION: 100 % | DIASTOLIC BLOOD PRESSURE: 77 MMHG | HEART RATE: 71 BPM | SYSTOLIC BLOOD PRESSURE: 116 MMHG | TEMPERATURE: 97.8 F

## 2024-04-08 DIAGNOSIS — K70.31 ALCOHOLIC CIRRHOSIS OF LIVER WITH ASCITES (H): Primary | ICD-10-CM

## 2024-04-08 DIAGNOSIS — N18.30 STAGE 3 CHRONIC KIDNEY DISEASE, UNSPECIFIED WHETHER STAGE 3A OR 3B CKD (H): ICD-10-CM

## 2024-04-08 DIAGNOSIS — K70.31 ALCOHOLIC CIRRHOSIS OF LIVER WITH ASCITES (H): ICD-10-CM

## 2024-04-08 LAB
ALBUMIN MFR UR ELPH: 8.1 MG/DL
ALBUMIN SERPL BCG-MCNC: 3.6 G/DL (ref 3.5–5.2)
ANION GAP SERPL CALCULATED.3IONS-SCNC: 9 MMOL/L (ref 7–15)
BUN SERPL-MCNC: 36.1 MG/DL (ref 6–20)
CALCIUM SERPL-MCNC: 9.2 MG/DL (ref 8.6–10)
CHLORIDE SERPL-SCNC: 107 MMOL/L (ref 98–107)
CREAT SERPL-MCNC: 1.98 MG/DL (ref 0.67–1.17)
CREAT UR-MCNC: 142 MG/DL
CYSTATIN C (ROCHE): 2.1 MG/L (ref 0.6–1)
DEPRECATED HCO3 PLAS-SCNC: 23 MMOL/L (ref 22–29)
EGFRCR SERPLBLD CKD-EPI 2021: 42 ML/MIN/1.73M2
GFR SERPL CREATININE-BSD FRML MDRD: 31 ML/MIN/1.73M2
GLUCOSE SERPL-MCNC: 102 MG/DL (ref 70–99)
HGB BLD-MCNC: 11.3 G/DL (ref 13.3–17.7)
PHOSPHATE SERPL-MCNC: 5.1 MG/DL (ref 2.5–4.5)
POTASSIUM SERPL-SCNC: 4.7 MMOL/L (ref 3.4–5.3)
PROT/CREAT 24H UR: 0.06 MG/MG CR (ref 0–0.2)
SODIUM SERPL-SCNC: 139 MMOL/L (ref 135–145)

## 2024-04-08 PROCEDURE — 84156 ASSAY OF PROTEIN URINE: CPT | Performed by: PATHOLOGY

## 2024-04-08 PROCEDURE — 99000 SPECIMEN HANDLING OFFICE-LAB: CPT | Performed by: PATHOLOGY

## 2024-04-08 PROCEDURE — 36415 COLL VENOUS BLD VENIPUNCTURE: CPT | Performed by: PATHOLOGY

## 2024-04-08 PROCEDURE — 99213 OFFICE O/P EST LOW 20 MIN: CPT | Performed by: INTERNAL MEDICINE

## 2024-04-08 PROCEDURE — 82610 CYSTATIN C: CPT | Performed by: INTERNAL MEDICINE

## 2024-04-08 PROCEDURE — 80069 RENAL FUNCTION PANEL: CPT | Performed by: PATHOLOGY

## 2024-04-08 PROCEDURE — 85018 HEMOGLOBIN: CPT | Performed by: PATHOLOGY

## 2024-04-08 PROCEDURE — 99214 OFFICE O/P EST MOD 30 MIN: CPT | Performed by: INTERNAL MEDICINE

## 2024-04-08 RX ORDER — SPIRONOLACTONE 100 MG/1
100 TABLET, FILM COATED ORAL DAILY
Qty: 90 TABLET | Refills: 3 | Status: SHIPPED | OUTPATIENT
Start: 2024-04-08 | End: 2024-06-12

## 2024-04-08 ASSESSMENT — PAIN SCALES - GENERAL: PAINLEVEL: NO PAIN (0)

## 2024-04-08 NOTE — PROGRESS NOTES
Nephrology Clinic    Elroy Morel Sr. MRN:[id number] YOB: 1980  Date of Service: 04/08/2024  Primary care provider: Latosha Baires  Requesting physician: Elroy Rankin MD      REASON FOR CONSULT: CKD    HISTORY OF PRESENT ILLNESS:   Elroy Morel Sr. is a 43 year old male who first presented for evaluation of CKD post-JOCELYN in the setting of alcoholic liver disease in November 2024.  The past medical history is significant for alcohol dependence leading to cirrhosis and multiple hospitalizations over the past months, last from October 13 th till October 16th 2023. His hospitalizations have been complicated by episodes of JOCELYN with the most severe episode occurring early September with a creatinine level that peaked at 7.7 mg/dL. He had been needing therapeutic paracentesis twice a week initially however his alcoholic hepatitis has improved and at the time of this visit his last paracentesis was done in January 2024. He reports that he has been abstinent since mid-August 2023. Over the past months his creatinine level has been fluctuating between 1.6 and 2.2 mg/dL and it is 1.98 mg/dL on 4/8/2024. He has no evidence of any significant albuminuria with a negative uACR on 11/24/2023.  The upCR is 0.06 mg/mg Cr on 4/8/2024.A kidney ultrasound done early September 2023 shows normal size kidneys. On his first visit, he was started on diuretics that were progressively increased and is currently on furosemide 20 mg daily and spironolactone 100 mg daily.  His blood pressure is well controlled on this and on carvedilol to 6.25 mg twice daily. He has undergone a preliminary evaluation for liver transplant. He used to be on lactulose and stopped it on his own 3 weeks ago with no adverse repercussions. He remains on rifaximin twice daily.  The patient denies any dysuria, any pollakiuria, any nocturia, any LE edema, any dyspnea on exertion .  The patient denies ever having kidney stones, urinary tract infections,  gross hematuria. There is no family history of CKD.  The following portions of the patient's history were reviewed and updated as appropriate: allergies, current medications, past family history, past medical history, past social history, past surgical history and problem list.    PAST MEDICAL HISTORY:  Past Medical History:   Diagnosis Date    Alcohol use disorder, severe, dependence (H)     Alcohol withdrawal seizure (H)     Alcoholic cirrhosis of liver with ascites (H)     Esophageal varices (H)     Essential hypertension     Gastroesophageal reflux disease without esophagitis     History of methamphetamine use     Sustained remission since 2003    Hypercholesterolemia     Tobacco abuse      PAST SURGICAL HISTORY:  Past Surgical History:   Procedure Laterality Date    COLONOSCOPY N/A 8/28/2023    Procedure: COLONOSCOPY, WITH POLYPECTOMY via bx forceps;  Surgeon: Alyssa Tsai MD;  Location: [facility]    IR PARACENTESIS  8/31/2023    IR PARACENTESIS  9/15/2023    IR PARACENTESIS  9/19/2023    IR PARACENTESIS  9/22/2023    IR PARACENTESIS  9/26/2023    IR PARACENTESIS  9/29/2023    IR PARACENTESIS  10/10/2023    IR PARACENTESIS  10/17/2023    IR PARACENTESIS  10/20/2023    IR PARACENTESIS  10/24/2023    IR PARACENTESIS  10/27/2023    IR PARACENTESIS  10/31/2023    IR PARACENTESIS  11/3/2023    IR PARACENTESIS  11/7/2023    IR PARACENTESIS  11/10/2023    IR PARACENTESIS  11/14/2023    IR PARACENTESIS  11/17/2023    IR PARACENTESIS  11/21/2023    IR PARACENTESIS  11/24/2023    IR PARACENTESIS  11/28/2023    IR PARACENTESIS  12/1/2023    IR PARACENTESIS  12/5/2023    IR PARACENTESIS  12/8/2023    IR PARACENTESIS  12/15/2023    IR PARACENTESIS  1/4/2024    IR PARACENTESIS  12/29/2023    IR PARACENTESIS  12/22/2023    IR PARACENTESIS  12/19/2023    IR PARACENTESIS  1/16/2024    IR PARACENTESIS  1/9/2024     MEDICATIONS:  Prescription Medications as of 4/8/2024         Rx Number Disp Refills Start End Last  Dispensed Date Next Fill Date Owning Pharmacy    carvedilol (COREG) 6.25 MG tablet  90 tablet 3 3/12/2024 --   [id number] [place], [place] - [address] AT [facility]    Sig: Take 1 tablet (6.25 mg) by mouth 2 times daily (with meals)    Class: E-Prescribe    Route: Oral    furosemide (LASIX) 20 MG tablet  90 tablet 1 3/12/2024 --   [facility][id number] - [place], [place] - [address] AT [facility]    Sig: Take 1 tablet (20 mg) by mouth daily    Class: E-Prescribe    Route: Oral    lactulose 20 GM/30ML solution  946 mL 3 3/12/2024 --   [facility] #[id number] - [place], [place] - [address] AT [facility]    Sig: Take 45 mLs (30 g) by mouth 3 times daily Goal 4-5 stools daily.    Class: E-Prescribe    Route: Oral    pantoprazole (PROTONIX) 20 MG EC tablet  90 tablet 3 3/12/2024 --   [id number] - [place], [place] - [address] AT [facility]    Sig: Take 1 tablet (20 mg) by mouth daily    Class: E-Prescribe    Route: Oral    rifaximin (XIFAXAN) 550 MG TABS tablet  60 tablet 3 3/12/2024 --   [id number] - [place], [place] - [address] AT [facility]    Si tablet (550 mg) by Oral or Feeding Tube route 2 times daily    Class: E-Prescribe    Route: Oral or Feeding Tube    sertraline (ZOLOFT) 50 MG tablet  30 tablet 3 10/15/2023 --   [place], [place] - [address] [place] [address]    Sig: Take 1 tablet (50 mg) by mouth daily    Class: E-Prescribe    Route: Oral    Renewals       Renewal requests to authorizing provider (Raymundo Rodriguez MD) <b>prohibited</b>            spironolactone (ALDACTONE) 100 MG tablet  90 tablet 3 3/12/2024 --   [id number] - [place], [place] - [address] [facility] [address] [facility] [address]    Sig: Take 1 tablet (100 mg) by mouth daily    Class: E-Prescribe    Route: Oral    vitamin D3 (CHOLECALCIFEROL) 50 mcg (2000 units)  tablet  30 tablet 3 2024   [id number] - [place], [place] - [address] [facility] [address] [facility]    Sig: Take 1 tablet (50 mcg) by mouth daily for 120 days    Class: E-Prescribe    Route: Oral           ALLERGIES:    Allergies   Allergen Reactions    Dust Mites     Pollen Extract     Metronidazole Dizziness, Other (See Comments) and Unknown     Other Reaction(s): Other (see comments)    Feels foggy on this medication    Omeprazole Other (See Comments) and Unknown     Irritability (tolerates Protonix well)    Other Reaction(s): Other (see comments)     REVIEW OF SYSTEMS:    A comprehensive review of systems was performed and found to be negative except as described here or above.  SOCIAL HISTORY:   Social History     Socioeconomic History    Marital status:      Spouse name: Not on file    Number of children: Not on file    Years of education: Not on file    Highest education level: Not on file   Occupational History    Not on file   Tobacco Use    Smoking status: Former     Types: Cigarettes     Quit date:      Years since quittin.2    Smokeless tobacco: Never   Substance and Sexual Activity    Alcohol use: Not Currently     Comment: last drink 2023    Drug use: Not Currently     Types: Amphetamines     Comment: Sustained remission    Sexual activity: Not on file   Other Topics Concern    Not on file   Social History Narrative    Pt is , 2 children. Employed.      Social Determinants of Health     Financial Resource Strain: Not on file   Food Insecurity: Not on file   Transportation Needs: Not on file   Physical Activity: Not on file   Stress: Not on file   Social Connections: Not on file   Interpersonal Safety: Not on file   Housing Stability: Not on file     FAMILY MEDICAL HISTORY:   Family History   Problem Relation Age of Onset    Substance Abuse Mother     Coronary Artery Disease Mother     Cerebrovascular Disease Mother     Substance Abuse Father      Substance Abuse Brother     Liver Cancer No family hx of     Liver Disease No family hx of      PHYSICAL EXAM:   /75   Pulse 71   Temp 97.8  F (36.6  C) (Oral)   Wt 98.2 kg (216 lb 8 oz)   SpO2 100%   BMI 31.97 kg/m    GENERAL APPEARANCE: alert and no distress  EYES: nonicteric  HENT: mouth without ulcers or lesions  NECK: supple, no adenopathy  RESP: lungs clear to auscultation   CV: regular rhythm, normal rate, no rub  ABDOMEN: soft, nontender, normal bowel sounds, no HSM   Extremities: no clubbing, cyanosis, or edema  MS: no evidence of inflammation in joints, no muscle tenderness  SKIN: no rash  NEURO: mentation intact and speech normal  PSYCH: affect normal/bright   LABS:   Recent Results (from the past 672 hour(s))   CBC with platelets    Collection Time: 03/22/24 10:37 AM   Result Value Ref Range    WBC Count 3.9 (L) 4.0 - 11.0 10e3/uL    RBC Count 3.80 (L) 4.40 - 5.90 10e6/uL    Hemoglobin 11.1 (L) 13.3 - 17.7 g/dL    Hematocrit 35.1 (L) 40.0 - 53.0 %    MCV 92 78 - 100 fL    MCH 29.2 26.5 - 33.0 pg    MCHC 31.6 31.5 - 36.5 g/dL    RDW 15.4 (H) 10.0 - 15.0 %    Platelet Count 89 (L) 150 - 450 10e3/uL   INR    Collection Time: 03/22/24 10:37 AM   Result Value Ref Range    INR 1.28 (H) 0.85 - 1.15   Comprehensive metabolic panel    Collection Time: 03/22/24 10:37 AM   Result Value Ref Range    Sodium 137 135 - 145 mmol/L    Potassium 4.4 3.4 - 5.3 mmol/L    Carbon Dioxide (CO2) 20 (L) 22 - 29 mmol/L    Anion Gap 12 7 - 15 mmol/L    Urea Nitrogen 31.3 (H) 6.0 - 20.0 mg/dL    Creatinine 1.82 (H) 0.67 - 1.17 mg/dL    GFR Estimate 47 (L) >60 mL/min/1.73m2    Calcium 9.4 8.6 - 10.0 mg/dL    Chloride 105 98 - 107 mmol/L    Glucose 96 70 - 99 mg/dL    Alkaline Phosphatase 111 40 - 150 U/L    AST 60 (H) 0 - 45 U/L    ALT 41 0 - 70 U/L    Protein Total 7.7 6.4 - 8.3 g/dL    Albumin 3.8 3.5 - 5.2 g/dL    Bilirubin Total 1.1 <=1.2 mg/dL   Vitamin D Deficiency    Collection Time: 03/22/24 10:37 AM   Result  Value Ref Range    Vitamin D, Total (25-Hydroxy) 27 20 - 50 ng/mL   Hemochromatosis mutation    Collection Time: 03/22/24 10:37 AM   Result Value Ref Range    Specimen Description       Blood: ACD      RESULTS       HEMOCHROMATOSIS RESULTS    HFE Gene C282Y (G845A) RESULTS:    C282Y Mutation Interpretation: NORMAL    HFE Gene H63D (C187G) RESULTS:    H63D Mutation Interpretation: NORMAL    HFE Gene S65C (A193T) RESULTS:    S65C Mutation Interpretation: NORMAL      INTERPRETATION       This patient does not carry the C282Y, the H63D or the S65C mutations in the HFE gene. The absence of these mutations, particularly in non-Caucasians, does not rule out the presence of hemochromatosis.  (Electronically signed by: SOLANGE CHAPPELL MD March 28, 2024 6:43 PM)      METHODOLOGY       The regions of genomic DNA containing c.845 G>A(C282Y) mutation, the c.187 C>G (H63D), and the c.193 A>T(S65C) mutation in the HFE gene were simultaneously amplified using the polymerase chain reaction.  The amplified products were digested with restriction endonuclease BbrPI and Hinf1 respectively and products were analyzed by gel electrophoresis.      COMMENTS       If a patient is the recipient of an allogeneic bone marrow transplant, this test must be done on a pre-transplant sample or buccal swab.  A previous allogeneic bone marrow transplant will interfere with test results.  Call the [facility]([phone]) for instructions on sample collection for these patients.      DISCLAIMER       This test was developed and its performance characteristics determined by [facility]. It has not been cleared or approved by the FDA. The laboratory is regulated under CLIA as qualified to perform high-complexity testing. This test is used for clinical purposes. It should not be regarded as investigational or for research.    A resident/fellow in an accredited training program was involved in the  selection of testing, review of laboratory data, and/or interpretation of this case.  I, as the senior physician, attest that I: (i) confirmed appropriate testing, (ii) examined the relevant raw data for the specimen(s); and (iii) rendered or confirmed the interpretation(s).        Signout Location if Remote Report signed out at:   SSE[facility]          Cystatin C with GFR    Collection Time: 03/22/24 10:37 AM   Result Value Ref Range    Cystatin C 2.3 (H) 0.6 - 1.0 mg/L    GFR Calculated with Cystatin C 28 (L) >=60 mL/min/1.73m2   Renal panel    Collection Time: 04/08/24  7:04 AM   Result Value Ref Range    Sodium 139 135 - 145 mmol/L    Potassium 4.7 3.4 - 5.3 mmol/L    Chloride 107 98 - 107 mmol/L    Carbon Dioxide (CO2) 23 22 - 29 mmol/L    Anion Gap 9 7 - 15 mmol/L    Glucose 102 (H) 70 - 99 mg/dL    Urea Nitrogen 36.1 (H) 6.0 - 20.0 mg/dL    Creatinine 1.98 (H) 0.67 - 1.17 mg/dL    GFR Estimate 42 (L) >60 mL/min/1.73m2    Calcium 9.2 8.6 - 10.0 mg/dL    Albumin 3.6 3.5 - 5.2 g/dL    Phosphorus 5.1 (H) 2.5 - 4.5 mg/dL   Hemoglobin    Collection Time: 04/08/24  7:04 AM   Result Value Ref Range    Hemoglobin 11.3 (L) 13.3 - 17.7 g/dL   Protein  random urine    Collection Time: 04/08/24  7:08 AM   Result Value Ref Range    Total Protein Urine mg/dL 8.1   mg/dL    Total Protein Urine mg/mg Creat 0.06 0.00 - 0.20 mg/mg Cr    Creatinine Urine mg/dL 142.0 mg/dL     CMP  Recent Labs   Lab Test 04/08/24  0704 03/22/24  1037 03/08/24  0951 02/05/24  1310 01/05/24  1446 12/04/23  1022 11/24/23  0859 10/14/23  0938 10/13/23  1320 09/02/23  2031 08/30/23  0533 08/29/23  0803 08/29/23  0606 08/28/23  0645 09/15/21  1235 08/27/20  1401 06/25/20  1501    137 138 136 140   < > 134*   < > 125*   < > 125*  --  128* 130*   < > 140 138   POTASSIUM 4.7 4.4 5.1 5.0 4.6   < > 3.5   < > 3.8   < > 4.2  --  4.2 4.1   < > 4.5 4.4   CHLORIDE 107 105 109* 105 108*   < > 106   < > 93*   < > 96*  --  98 100   < > 103 102   CO2 23 20* 23 22 23    < > 17*   < > 16*   < > 16*  --  17* 16*   < > 23 24   ANIONGAP 9 12 6* 9 9   < > 11   < > 16*   < > 13  --  13 14   < > 14 12   * 96 103* 95 143*   < > 161*   < > 131*   < > 85  --  87 115*   < > 89 104   BUN 36.1* 31.3* 35.7* 28.3* 14.4   < > 18.0   < > 36.4*   < > 45.5*  --  40.0* 41.4*   < > 10 12   CR 1.98* 1.82* 1.74* 1.90* 1.47*   < > 1.81*   < > 2.26*   < > 4.34*  --  3.44* 3.72*   < > 1.11 1.23   GFRESTIMATED 42* 47* 49* 44* 60*   < > 47*   < > 36*   < > 16*  --  22* 20*   < > >60 >60   GFRESTBLACK  --   --   --   --   --   --   --   --   --   --   --   --   --   --   --  >60 >60   ENEDINA 9.2 9.4 9.6 9.9 9.4   < > 9.2   < > 9.1   < > 9.0  --  8.9 9.1   < > 9.6 10.1   MAG  --   --   --   --   --   --   --   --  1.9  --  1.8  --  1.8 1.9   < >  --   --    PHOS 5.1*  --   --   --   --   --  3.1  --   --   --   --  3.5  --  3.4   < >  --   --    PROTTOTAL  --  7.7 7.5 8.3 6.9   < > 7.5   < > 7.5   < >  --   --   --   --    < > 7.7 7.6   ALBUMIN 3.6 3.8 3.6 3.7 3.2*   < > 3.3*   < > 3.1*  3.1*   < > 3.0*  --  3.3* 3.4*   < > 4.4 4.2   BILITOTAL  --  1.1 0.6 1.2 0.9   < > 2.3*   < > 15.1*   < > 33.6*  --  34.3* 34.9*   < > 0.6 0.4   ALKPHOS  --  111 119 124 143   < > 195*   < > 256*   < > 125  --  122 110   < > 54 59   AST  --  60* 61* 73* 57*   < > 103*   < > 86*   < > 184*  --  196* 179*   < > 83* 91*   ALT  --  41 36 32 23   < > 38   < > 57   < > 52  --  57 57   < > 122* 133*    < > = values in this interval not displayed.     CBC  Recent Labs   Lab Test 04/08/24  0704 03/22/24  1037 03/08/24  0951 02/05/24  1310 01/05/24  1446   HGB 11.3* 11.1* 10.8* 10.6* 10.3*   WBC  --  3.9* 3.9* 4.8 5.5   RBC  --  3.80* 3.66* 3.62* 3.44*   HCT  --  35.1* 33.4* 32.0* 31.5*   MCV  --  92 91 88 92   MCH  --  29.2 29.5 29.3 29.9   MCHC  --  31.6 32.3 33.1 32.7   RDW  --  15.4* 15.3* 15.8* 16.1*   PLT  --  89* 82* 92* 111*     INR  Recent Labs   Lab Test 03/22/24  1037 03/08/24  0951 02/05/24  1310 01/05/24  1446  09/03/23  0644 09/02/23  2111 08/27/23  0745 08/24/23  0618   INR 1.28* 1.30* 1.30* 1.37*   < > 1.46*   < > 1.72*   PTT  --   --   --   --   --  42*  --  37    < > = values in this interval not displayed.     ABG  Recent Labs   Lab Test 09/06/23  2045   O2PER 21      URINE STUDIES  Recent Labs   Lab Test 03/08/24  1000 11/24/23  0902 10/14/23  1636 10/03/23  1953 09/10/23  2356   COLOR Yellow Yellow Dark Yellow* Dark Yellow* Dark Yellow*   APPEARANCE Clear Clear Clear Clear Clear   URINEGLC Negative Negative Negative Negative Negative   URINEBILI Negative Small* Small* Moderate* Moderate*   URINEKETONE Negative Negative Negative Negative Negative   SG 1.020 1.020 1.018 1.020 1.015   UBLD Negative Negative Negative Negative Negative   URINEPH 6.0 6.5 5.5 5.5 5.5   PROTEIN Negative 30* 10* 10* Negative   UROBILINOGEN 0.2 0.2  --   --   --    NITRITE Negative Negative Negative Negative Negative   LEUKEST Negative Negative Negative Negative Negative   RBCU  --  2-5* 1 <1 0   WBCU  --  5-10* 1 3 1     No lab results found.    ASSESSMENT AND PLAN:   #CKD stage 3 b mostly secondary to hepatorenal syndrome and multiple episodes of JOCELYN with no significant proteinuria and unremarkable kidneys on imaging. Refractory ascites and hemodynamic fluctuations in the setting of recurrent paracentesis could also be contributory. After increasing spironolactone to 100 mg daily, his needs for paracentesis have resolved. As his liver disease has significantly improved, I will try to decrease spironolactone to 50 mg daily and watch him closely. The patient was instructed to keep a BP diary and to weigh himself on a daily basis. He will do repeat lb  No documented intake of NSAIDs or other nephrotoxic medications.   The patient was instructed keep the sodium intake around 2400 mg /day, follow a plant-based diet and to avoid NSAIDs     #HTN  Primary and secondary to CKD. On carvedilol, spironolactone and furosemide. Management as per  above.    #Blood count  Hemoglobin 10.3 -> 10.6-> 11.3  Iron sat 31%  Ferritin level 525 could benefit from oral iron    #Acid-base status  CO2 level 17 -> 23 improved and normalized    #Electrolytes  Na 134 -> 140 mild hyponatremia that has resolved as his liver function continues to improve  K 3.5 -> 4.6 -> 5 -> 4.7 I stopped potassium supplementation    #BMD  Calcium 9.2          Phosphorus 3.1    albumin 3.3  Vitamin D level 20 would benefit from vitamin D supplementation and I started him on cholecalciferol 1 table daily    #CKD journey/transplant not a candidate at this point    The total time of this encounter amounted to 30 minutes on the day of the encounter. This time included time spent with the patient, reviewing records, ordering tests, and performing post visit documentation.       The patient will return to follow up in 3 months with repeat labs    Didi Fisher MD  Division of Renal Disease and Hypertension

## 2024-04-08 NOTE — PATIENT INSTRUCTIONS
-Please decrease spironolactone to 50 mg once daily (half a tablet of 100 mg)  -Please do labs in 2 weeks and in 3 months before your next appointment   Yes

## 2024-04-08 NOTE — LETTER
4/8/2024       RE: Elroy Morel Sr.  [address] [place] [address]     Dear Colleague,    Thank you for referring your patient, Elroy Morel Sr., to the [facility]PH[facility]OLOG[facility] [place] [facility]. Please see a copy of my visit note below.    Nephrology Clinic    Elroy Morel Sr. MRN:[id number] YOB: 1980  Date of Service: 04/08/2024  Primary care provider: Latosha Baires  Requesting physician: Elroy Rankin MD      REASON FOR CONSULT: CKD    HISTORY OF PRESENT ILLNESS:   Elroy Morel Sr. is a 43 year old male who first presented for evaluation of CKD post-JOCELYN in the setting of alcoholic liver disease in November 2024.  The past medical history is significant for alcohol dependence leading to cirrhosis and multiple hospitalizations over the past months, last from October 13 th till October 16th 2023. His hospitalizations have been complicated by episodes of JOCELYN with the most severe episode occurring early September with a creatinine level that peaked at 7.7 mg/dL. He had been needing therapeutic paracentesis twice a week initially however his alcoholic hepatitis has improved and at the time of this visit his last paracentesis was done in January 2024. He reports that he has been abstinent since mid-August 2023. Over the past months his creatinine level has been fluctuating between 1.6 and 2.2 mg/dL and it is 1.98 mg/dL on 4/8/2024. He has no evidence of any significant albuminuria with a negative uACR on 11/24/2023.  The upCR is 0.06 mg/mg Cr on 4/8/2024.A kidney ultrasound done early September 2023 shows normal size kidneys. On his first visit, he was started on diuretics that were progressively increased and is currently on furosemide 20 mg daily and spironolactone 100 mg daily.  His blood pressure is well controlled on this and on carvedilol to 6.25 mg twice daily. He has undergone a preliminary  evaluation for liver transplant. He used to be on lactulose and stopped it on his own 3 weeks ago with no adverse repercussions. He remains on rifaximin twice daily.  The patient denies any dysuria, any pollakiuria, any nocturia, any LE edema, any dyspnea on exertion .  The patient denies ever having kidney stones, urinary tract infections, gross hematuria. There is no family history of CKD.  The following portions of the patient's history were reviewed and updated as appropriate: allergies, current medications, past family history, past medical history, past social history, past surgical history and problem list.    PAST MEDICAL HISTORY:  Past Medical History:   Diagnosis Date     Alcohol use disorder, severe, dependence (H)      Alcohol withdrawal seizure (H)      Alcoholic cirrhosis of liver with ascites (H)      Esophageal varices (H)      Essential hypertension      Gastroesophageal reflux disease without esophagitis      History of methamphetamine use     Sustained remission since 2003     Hypercholesterolemia      Tobacco abuse      PAST SURGICAL HISTORY:  Past Surgical History:   Procedure Laterality Date     COLONOSCOPY N/A 8/28/2023    Procedure: COLONOSCOPY, WITH POLYPECTOMY via bx forceps;  Surgeon: Alyssa Tsai MD;  Location: [facility]     IR PARACENTESIS  8/31/2023     IR PARACENTESIS  9/15/2023     IR PARACENTESIS  9/19/2023     IR PARACENTESIS  9/22/2023     IR PARACENTESIS  9/26/2023     IR PARACENTESIS  9/29/2023     IR PARACENTESIS  10/10/2023     IR PARACENTESIS  10/17/2023     IR PARACENTESIS  10/20/2023     IR PARACENTESIS  10/24/2023     IR PARACENTESIS  10/27/2023     IR PARACENTESIS  10/31/2023     IR PARACENTESIS  11/3/2023     IR PARACENTESIS  11/7/2023     IR PARACENTESIS  11/10/2023     IR PARACENTESIS  11/14/2023     IR PARACENTESIS  11/17/2023     IR PARACENTESIS  11/21/2023     IR PARACENTESIS  11/24/2023     IR PARACENTESIS  11/28/2023     IR PARACENTESIS  12/1/2023      IR PARACENTESIS  2023     IR PARACENTESIS  2023     IR PARACENTESIS  12/15/2023     IR PARACENTESIS  2024     IR PARACENTESIS  2023     IR PARACENTESIS  2023     IR PARACENTESIS  2023     IR PARACENTESIS  2024     IR PARACENTESIS  2024     MEDICATIONS:  Prescription Medications as of 2024         Rx Number Disp Refills Start End Last Dispensed Date Next Fill Date Owning Pharmacy    carvedilol (COREG) 6.25 MG tablet  90 tablet 3 3/12/2024 --   [id number] - [place], [place] - [address] AT [address] [facility]    Sig: Take 1 tablet (6.25 mg) by mouth 2 times daily (with meals)    Class: E-Prescribe    Route: Oral    furosemide (LASIX) 20 MG tablet  90 tablet 1 3/12/2024 --   [id number] - [place], [place] - [address] AT [address] [facility]    Sig: Take 1 tablet (20 mg) by mouth daily    Class: E-Prescribe    Route: Oral    lactulose 20 GM/30ML solution  946 mL 3 3/12/2024 --   [facility] #[id number] - [place], [place] - [address] AT [address] [facility]    Sig: Take 45 mLs (30 g) by mouth 3 times daily Goal 4-5 stools daily.    Class: E-Prescribe    Route: Oral    pantoprazole (PROTONIX) 20 MG EC tablet  90 tablet 3 3/12/2024 --   [id number] - [place], [place] - [address] AT [address] [facility]    Sig: Take 1 tablet (20 mg) by mouth daily    Class: E-Prescribe    Route: Oral    rifaximin (XIFAXAN) 550 MG TABS tablet  60 tablet 3 3/12/2024 --   [id number] - [place], [place] - [address] AT [address] [facility]    Si tablet (550 mg) by Oral or Feeding Tube route 2 times daily    Class: E-Prescribe    Route: Oral or Feeding Tube    sertraline (ZOLOFT) 50 MG tablet  30 tablet 3 10/15/2023 --   [facility] - [place], [place] - [address] [facility]    Sig: Take 1 tablet (50 mg) by mouth daily    Class: E-Prescribe    Route: Oral    Renewals       Renewal  requests to authorizing provider (Raymundo Rodriguez MD) <b>prohibited</b>            spironolactone (ALDACTONE) 100 MG tablet  90 tablet 3 3/12/2024 --   [facility] #[id number] - [place], [place] - [address] AT [facility]    Sig: Take 1 tablet (100 mg) by mouth daily    Class: E-Prescribe    Route: Oral    vitamin D3 (CHOLECALCIFEROL) 50 mcg (2000 units) tablet  30 tablet 3 2024   [facility] #[id number] - [place], [place] - [address] AT [facility]    Sig: Take 1 tablet (50 mcg) by mouth daily for 120 days    Class: E-Prescribe    Route: Oral           ALLERGIES:    Allergies   Allergen Reactions     Dust Mites      Pollen Extract      Metronidazole Dizziness, Other (See Comments) and Unknown     Other Reaction(s): Other (see comments)    Feels foggy on this medication     Omeprazole Other (See Comments) and Unknown     Irritability (tolerates Protonix well)    Other Reaction(s): Other (see comments)     REVIEW OF SYSTEMS:    A comprehensive review of systems was performed and found to be negative except as described here or above.  SOCIAL HISTORY:   Social History     Socioeconomic History     Marital status:      Spouse name: Not on file     Number of children: Not on file     Years of education: Not on file     Highest education level: Not on file   Occupational History     Not on file   Tobacco Use     Smoking status: Former     Types: Cigarettes     Quit date:      Years since quittin.2     Smokeless tobacco: Never   Substance and Sexual Activity     Alcohol use: Not Currently     Comment: last drink 2023     Drug use: Not Currently     Types: Amphetamines     Comment: Sustained remission     Sexual activity: Not on file   Other Topics Concern     Not on file   Social History Narrative    Pt is , 2 children. Employed.      Social Determinants of Health     Financial Resource Strain: Not on file   Food Insecurity: Not on file    Transportation Needs: Not on file   Physical Activity: Not on file   Stress: Not on file   Social Connections: Not on file   Interpersonal Safety: Not on file   Housing Stability: Not on file     FAMILY MEDICAL HISTORY:   Family History   Problem Relation Age of Onset     Substance Abuse Mother      Coronary Artery Disease Mother      Cerebrovascular Disease Mother      Substance Abuse Father      Substance Abuse Brother      Liver Cancer No family hx of      Liver Disease No family hx of      PHYSICAL EXAM:   /75   Pulse 71   Temp 97.8  F (36.6  C) (Oral)   Wt 98.2 kg (216 lb 8 oz)   SpO2 100%   BMI 31.97 kg/m    GENERAL APPEARANCE: alert and no distress  EYES: nonicteric  HENT: mouth without ulcers or lesions  NECK: supple, no adenopathy  RESP: lungs clear to auscultation   CV: regular rhythm, normal rate, no rub  ABDOMEN: soft, nontender, normal bowel sounds, no HSM   Extremities: no clubbing, cyanosis, or edema  MS: no evidence of inflammation in joints, no muscle tenderness  SKIN: no rash  NEURO: mentation intact and speech normal  PSYCH: affect normal/bright   LABS:   Recent Results (from the past 672 hour(s))   CBC with platelets    Collection Time: 03/22/24 10:37 AM   Result Value Ref Range    WBC Count 3.9 (L) 4.0 - 11.0 10e3/uL    RBC Count 3.80 (L) 4.40 - 5.90 10e6/uL    Hemoglobin 11.1 (L) 13.3 - 17.7 g/dL    Hematocrit 35.1 (L) 40.0 - 53.0 %    MCV 92 78 - 100 fL    MCH 29.2 26.5 - 33.0 pg    MCHC 31.6 31.5 - 36.5 g/dL    RDW 15.4 (H) 10.0 - 15.0 %    Platelet Count 89 (L) 150 - 450 10e3/uL   INR    Collection Time: 03/22/24 10:37 AM   Result Value Ref Range    INR 1.28 (H) 0.85 - 1.15   Comprehensive metabolic panel    Collection Time: 03/22/24 10:37 AM   Result Value Ref Range    Sodium 137 135 - 145 mmol/L    Potassium 4.4 3.4 - 5.3 mmol/L    Carbon Dioxide (CO2) 20 (L) 22 - 29 mmol/L    Anion Gap 12 7 - 15 mmol/L    Urea Nitrogen 31.3 (H) 6.0 - 20.0 mg/dL    Creatinine 1.82 (H) 0.67 -  1.17 mg/dL    GFR Estimate 47 (L) >60 mL/min/1.73m2    Calcium 9.4 8.6 - 10.0 mg/dL    Chloride 105 98 - 107 mmol/L    Glucose 96 70 - 99 mg/dL    Alkaline Phosphatase 111 40 - 150 U/L    AST 60 (H) 0 - 45 U/L    ALT 41 0 - 70 U/L    Protein Total 7.7 6.4 - 8.3 g/dL    Albumin 3.8 3.5 - 5.2 g/dL    Bilirubin Total 1.1 <=1.2 mg/dL   Vitamin D Deficiency    Collection Time: 03/22/24 10:37 AM   Result Value Ref Range    Vitamin D, Total (25-Hydroxy) 27 20 - 50 ng/mL   Hemochromatosis mutation    Collection Time: 03/22/24 10:37 AM   Result Value Ref Range    Specimen Description       Blood: ACD      RESULTS       HEMOCHROMATOSIS RESULTS    HFE Gene C282Y (G845A) RESULTS:    C282Y Mutation Interpretation: NORMAL    HFE Gene H63D (C187G) RESULTS:    H63D Mutation Interpretation: NORMAL    HFE Gene S65C (A193T) RESULTS:    S65C Mutation Interpretation: NORMAL      INTERPRETATION       This patient does not carry the C282Y, the H63D or the S65C mutations in the HFE gene. The absence of these mutations, particularly in non-Caucasians, does not rule out the presence of hemochromatosis.  (Electronically signed by: SOLANGE CHAPPELL MD March 28, 2024 6:43 PM)      METHODOLOGY       The regions of genomic DNA containing c.845 G>A(C282Y) mutation, the c.187 C>G (H63D), and the c.193 A>T(S65C) mutation in the HFE gene were simultaneously amplified using the polymerase chain reaction.  The amplified products were digested with restriction endonuclease BbrPI and Hinf1 respectively and products were analyzed by gel electrophoresis.      COMMENTS       If a patient is the recipient of an allogeneic bone marrow transplant, this test must be done on a pre-transplant sample or buccal swab.  A previous allogeneic bone marrow transplant will interfere with test results.  Call the [facility]([phone]) for instructions on sample collection for these patients.      DISCLAIMER       This test was developed and its  performance characteristics determined by [facility]. It has not been cleared or approved by the FDA. The laboratory is regulated under CLIA as qualified to perform high-complexity testing. This test is used for clinical purposes. It should not be regarded as investigational or for research.    A resident/fellow in an accredited training program was involved in the selection of testing, review of laboratory data, and/or interpretation of this case.  I, as the senior physician, attest that I: (i) confirmed appropriate testing, (ii) examined the relevant raw data for the specimen(s); and (iii) rendered or confirmed the interpretation(s).        Signout Location if Remote Report signed out at:   SSE1          Cystatin C with GFR    Collection Time: 03/22/24 10:37 AM   Result Value Ref Range    Cystatin C 2.3 (H) 0.6 - 1.0 mg/L    GFR Calculated with Cystatin C 28 (L) >=60 mL/min/1.73m2   Renal panel    Collection Time: 04/08/24  7:04 AM   Result Value Ref Range    Sodium 139 135 - 145 mmol/L    Potassium 4.7 3.4 - 5.3 mmol/L    Chloride 107 98 - 107 mmol/L    Carbon Dioxide (CO2) 23 22 - 29 mmol/L    Anion Gap 9 7 - 15 mmol/L    Glucose 102 (H) 70 - 99 mg/dL    Urea Nitrogen 36.1 (H) 6.0 - 20.0 mg/dL    Creatinine 1.98 (H) 0.67 - 1.17 mg/dL    GFR Estimate 42 (L) >60 mL/min/1.73m2    Calcium 9.2 8.6 - 10.0 mg/dL    Albumin 3.6 3.5 - 5.2 g/dL    Phosphorus 5.1 (H) 2.5 - 4.5 mg/dL   Hemoglobin    Collection Time: 04/08/24  7:04 AM   Result Value Ref Range    Hemoglobin 11.3 (L) 13.3 - 17.7 g/dL   Protein  random urine    Collection Time: 04/08/24  7:08 AM   Result Value Ref Range    Total Protein Urine mg/dL 8.1   mg/dL    Total Protein Urine mg/mg Creat 0.06 0.00 - 0.20 mg/mg Cr    Creatinine Urine mg/dL 142.0 mg/dL     CMP  Recent Labs   Lab Test 04/08/24  0704 03/22/24  1037 03/08/24  0951 02/05/24  1310 01/05/24  1446 12/04/23  1022 11/24/23  0859 10/14/23  0938 10/13/23  1320  09/02/23 2031 08/30/23  0533 08/29/23  0803 08/29/23  0606 08/28/23  0645 09/15/21  1235 08/27/20  1401 06/25/20  1501    137 138 136 140   < > 134*   < > 125*   < > 125*  --  128* 130*   < > 140 138   POTASSIUM 4.7 4.4 5.1 5.0 4.6   < > 3.5   < > 3.8   < > 4.2  --  4.2 4.1   < > 4.5 4.4   CHLORIDE 107 105 109* 105 108*   < > 106   < > 93*   < > 96*  --  98 100   < > 103 102   CO2 23 20* 23 22 23   < > 17*   < > 16*   < > 16*  --  17* 16*   < > 23 24   ANIONGAP 9 12 6* 9 9   < > 11   < > 16*   < > 13  --  13 14   < > 14 12   * 96 103* 95 143*   < > 161*   < > 131*   < > 85  --  87 115*   < > 89 104   BUN 36.1* 31.3* 35.7* 28.3* 14.4   < > 18.0   < > 36.4*   < > 45.5*  --  40.0* 41.4*   < > 10 12   CR 1.98* 1.82* 1.74* 1.90* 1.47*   < > 1.81*   < > 2.26*   < > 4.34*  --  3.44* 3.72*   < > 1.11 1.23   GFRESTIMATED 42* 47* 49* 44* 60*   < > 47*   < > 36*   < > 16*  --  22* 20*   < > >60 >60   GFRESTBLACK  --   --   --   --   --   --   --   --   --   --   --   --   --   --   --  >60 >60   ENEDINA 9.2 9.4 9.6 9.9 9.4   < > 9.2   < > 9.1   < > 9.0  --  8.9 9.1   < > 9.6 10.1   MAG  --   --   --   --   --   --   --   --  1.9  --  1.8  --  1.8 1.9   < >  --   --    PHOS 5.1*  --   --   --   --   --  3.1  --   --   --   --  3.5  --  3.4   < >  --   --    PROTTOTAL  --  7.7 7.5 8.3 6.9   < > 7.5   < > 7.5   < >  --   --   --   --    < > 7.7 7.6   ALBUMIN 3.6 3.8 3.6 3.7 3.2*   < > 3.3*   < > 3.1*  3.1*   < > 3.0*  --  3.3* 3.4*   < > 4.4 4.2   BILITOTAL  --  1.1 0.6 1.2 0.9   < > 2.3*   < > 15.1*   < > 33.6*  --  34.3* 34.9*   < > 0.6 0.4   ALKPHOS  --  111 119 124 143   < > 195*   < > 256*   < > 125  --  122 110   < > 54 59   AST  --  60* 61* 73* 57*   < > 103*   < > 86*   < > 184*  --  196* 179*   < > 83* 91*   ALT  --  41 36 32 23   < > 38   < > 57   < > 52  --  57 57   < > 122* 133*    < > = values in this interval not displayed.     CBC  Recent Labs   Lab Test 04/08/24  0704 03/22/24  1037 03/08/24  0951  02/05/24  1310 01/05/24  1446   HGB 11.3* 11.1* 10.8* 10.6* 10.3*   WBC  --  3.9* 3.9* 4.8 5.5   RBC  --  3.80* 3.66* 3.62* 3.44*   HCT  --  35.1* 33.4* 32.0* 31.5*   MCV  --  92 91 88 92   MCH  --  29.2 29.5 29.3 29.9   MCHC  --  31.6 32.3 33.1 32.7   RDW  --  15.4* 15.3* 15.8* 16.1*   PLT  --  89* 82* 92* 111*     INR  Recent Labs   Lab Test 03/22/24  1037 03/08/24  0951 02/05/24  1310 01/05/24  1446 09/03/23  0644 09/02/23  2111 08/27/23  0745 08/24/23  0618   INR 1.28* 1.30* 1.30* 1.37*   < > 1.46*   < > 1.72*   PTT  --   --   --   --   --  42*  --  37    < > = values in this interval not displayed.     ABG  Recent Labs   Lab Test 09/06/23  2045   O2PER 21      URINE STUDIES  Recent Labs   Lab Test 03/08/24  1000 11/24/23  0902 10/14/23  1636 10/03/23  1953 09/10/23  2356   COLOR Yellow Yellow Dark Yellow* Dark Yellow* Dark Yellow*   APPEARANCE Clear Clear Clear Clear Clear   URINEGLC Negative Negative Negative Negative Negative   URINEBILI Negative Small* Small* Moderate* Moderate*   URINEKETONE Negative Negative Negative Negative Negative   SG 1.020 1.020 1.018 1.020 1.015   UBLD Negative Negative Negative Negative Negative   URINEPH 6.0 6.5 5.5 5.5 5.5   PROTEIN Negative 30* 10* 10* Negative   UROBILINOGEN 0.2 0.2  --   --   --    NITRITE Negative Negative Negative Negative Negative   LEUKEST Negative Negative Negative Negative Negative   RBCU  --  2-5* 1 <1 0   WBCU  --  5-10* 1 3 1     No lab results found.    ASSESSMENT AND PLAN:   #CKD stage 3 b mostly secondary to hepatorenal syndrome and multiple episodes of JOCELYN with no significant proteinuria and unremarkable kidneys on imaging. Refractory ascites and hemodynamic fluctuations in the setting of recurrent paracentesis could also be contributory. After increasing spironolactone to 100 mg daily, his needs for paracentesis have resolved. As his liver disease has significantly improved, I will try to decrease spironolactone to 50 mg daily and watch him  closely. The patient was instructed to keep a BP diary and to weigh himself on a daily basis. He will do repeat lb  No documented intake of NSAIDs or other nephrotoxic medications.   The patient was instructed keep the sodium intake around 2400 mg /day, follow a plant-based diet and to avoid NSAIDs     #HTN  Primary and secondary to CKD. On carvedilol, spironolactone and furosemide. Management as per above.    #Blood count  Hemoglobin 10.3 -> 10.6-> 11.3  Iron sat 31%  Ferritin level 525 could benefit from oral iron    #Acid-base status  CO2 level 17 -> 23 improved and normalized    #Electrolytes  Na 134 -> 140 mild hyponatremia that has resolved as his liver function continues to improve  K 3.5 -> 4.6 -> 5 -> 4.7 I stopped potassium supplementation    #BMD  Calcium 9.2          Phosphorus 3.1    albumin 3.3  Vitamin D level 20 would benefit from vitamin D supplementation and I started him on cholecalciferol 1 table daily    #CKD journey/transplant not a candidate at this point    The total time of this encounter amounted to 30 minutes on the day of the encounter. This time included time spent with the patient, reviewing records, ordering tests, and performing post visit documentation.       The patient will return to follow up in 3 months with repeat labs    Didi Fisher MD  Division of Renal Disease and Hypertension      Again, thank you for allowing me to participate in the care of your patient.      Sincerely,    Didi Fisher MD

## 2024-04-08 NOTE — NURSING NOTE
Chief Complaint   Patient presents with    RECHECK     JOCELYN 8 wk f/u       Vitals:    04/08/24 0740 04/08/24 0741 04/08/24 0748   BP: 116/77 113/75 116/77   Pulse: 71     Temp: 97.8  F (36.6  C)     TempSrc: Oral     SpO2: 100%     Weight: 98.2 kg (216 lb 8 oz)         BP Readings from Last 3 Encounters:   04/08/24 116/77   03/12/24 109/69   02/05/24 133/86       /77   Pulse 71   Temp 97.8  F (36.6  C) (Oral)   Wt 98.2 kg (216 lb 8 oz)   SpO2 100%   BMI 31.97 kg/m       Thanh Rubio

## 2024-04-15 LAB
ABO/RH(D): NORMAL
ANTIBODY SCREEN: NEGATIVE
SPECIMEN EXPIRATION DATE: NORMAL

## 2024-04-15 NOTE — PROGRESS NOTES
[facility] SOLID ORGAN TRANSPLANT  OUTPATIENT MNT: LIVER TRANSPLANT EVALUATION    Current BMI: 29.7 (HT 71.5 in,  lbs/98 kg)  BMI is within recommendation of <45 for liver transplant    Handgrip Strength (average of 3 using dominant hand): 45  Frailty Assessment-- Not Frail (2/5 points)- unintended wt loss, low activity     Reference:  Score of 0-2 = Not Frail  Score of 3-5 = Frail     FraiLT-- Robust (LFI: 2.39)  [email or web address]     TIME SPENT: 15 minutes  VISIT TYPE: Initial   REFERRING PHYSICIAN: Gilberto  PT ACCOMPANIED BY: his wife     History of previous txp: self     NUTRITION ASSESSMENT  H/o etoh. Some CKD post JOCELYN  CKD/post JOCELYN    - Meal prep & grocery shopping: pt or his wife   - Previous RD education: not asked   - Appetite: good/baseline   - Food allergies/intolerances: no  - Issues chewing or swallowing: no  - N/V/D/C: no  - Food access concerns: no     Vitamins, Supplements, Pertinent Meds: vit D, MVI   Herbal Medicines/Supplements: none   Protein supplements: none     Weight hx // fluid retention:   - + ascites (improved, no para since Jan), no HARLEY  - muscle loss & fat loss- but has regained some muscle per pt   Wt Readings from Last 10 Encounters:   04/16/24 98.2 kg (216 lb 6.4 oz)   04/08/24 98.2 kg (216 lb 8 oz)   03/19/24 98 kg (216 lb)   03/12/24 96.8 kg (213 lb 8 oz)   02/05/24 94.5 kg (208 lb 4.8 oz)   01/08/24 98.4 kg (216 lb 14.4 oz)   11/28/23 99.3 kg (219 lb)   11/27/23 97.9 kg (215 lb 14.4 oz)   10/03/23 99.5 kg (219 lb 6.4 oz)   09/12/23 108.4 kg (238 lb 15.7 oz)     PHYSICAL ACTIVITY   No routine activity, yet reports he is active day-to-day (ie yesterday loaded up a trailer with plywood)  ADLs usually go OK     - Falls: last fall in December; reports total of 4-5 falls in the past 1 year - feels functional status is improving over time  - Physical therapy: no   - Cane/walker/wheelchair: no     DIET RECALL  Breakfast BF sandwich (homemade typically); cereal;  omelet with veggies and meat     Lunch Snacks on chips, cookies    Dinner Pasta (chicken abhishek) or rice (white or rice-a-shola) + protein (any kind), hot dish (veggies usually mixed into meals as opposed to side of veggies)   Snacks Fruit cocktail cups, candy, cheese, jerky      Beverages Diet soda (fruit-flavored ginger ale- 4-5 cans/day), water, juice (8 oz/day), lemonade (not daily)   Dining out 1-2x/week      NUTRITION DIAGNOSIS   No nutrition diagnosis identified at this time     NUTRITION INTERVENTION   Nutrition education provided:  Discussed sodium intake (low sodium foods and drinks, seasoning food without salt and tips for low sodium diet).    Reviewed adequate protein intake. Encouraged receiving protein from both animal and plant based sources. Encouraged snacking on protein during the day. Could consider a protein drink/bar or supplement to help meet protein needs if he feels the need.     Reviewed post txp diet guidelines in brief (will review in further detail post txp):  (1) Review of proper food safety measures d/t immunosuppressant therapy post-op and increased risk for food-borne illness    (2) Avoid the following post txp d/t risk for rejection, unknown effects on the organs, and/or potential interactions with immunosuppressants:  - Herbal, Chinese, holistic, chiropractic, natural, alternative medicines and supplements  - Detoxes and cleanses  - Weight loss pills  - Protein powders or other products with extracts or herbs (ie green tea extract)    (3) Med regimen and possible side effects    Patient Understanding: Pt verbalized understanding of education provided.  Expected Engagement: Good  Follow-Up Plans: PRN     NUTRITION GOALS   No nutrition goals identified at this time    Shobha Vail, RD, LD, CCTD

## 2024-04-16 ENCOUNTER — ALLIED HEALTH/NURSE VISIT (OUTPATIENT)
Dept: TRANSPLANT | Facility: CLINIC | Age: 44
End: 2024-04-16
Attending: INTERNAL MEDICINE
Payer: COMMERCIAL

## 2024-04-16 ENCOUNTER — COMMITTEE REVIEW (OUTPATIENT)
Dept: TRANSPLANT | Facility: CLINIC | Age: 44
End: 2024-04-16

## 2024-04-16 ENCOUNTER — ANCILLARY PROCEDURE (OUTPATIENT)
Dept: GENERAL RADIOLOGY | Facility: CLINIC | Age: 44
End: 2024-04-16
Attending: INTERNAL MEDICINE
Payer: COMMERCIAL

## 2024-04-16 ENCOUNTER — LAB (OUTPATIENT)
Dept: LAB | Facility: CLINIC | Age: 44
End: 2024-04-16
Attending: INTERNAL MEDICINE
Payer: COMMERCIAL

## 2024-04-16 ENCOUNTER — ANCILLARY PROCEDURE (OUTPATIENT)
Dept: ULTRASOUND IMAGING | Facility: CLINIC | Age: 44
End: 2024-04-16
Attending: INTERNAL MEDICINE
Payer: COMMERCIAL

## 2024-04-16 VITALS
HEIGHT: 72 IN | SYSTOLIC BLOOD PRESSURE: 128 MMHG | WEIGHT: 216.4 LBS | OXYGEN SATURATION: 100 % | HEART RATE: 54 BPM | DIASTOLIC BLOOD PRESSURE: 86 MMHG | BODY MASS INDEX: 29.31 KG/M2

## 2024-04-16 DIAGNOSIS — K70.30 ALCOHOLIC CIRRHOSIS (H): ICD-10-CM

## 2024-04-16 DIAGNOSIS — K70.30 ALCOHOLIC CIRRHOSIS OF LIVER WITHOUT ASCITES (H): ICD-10-CM

## 2024-04-16 DIAGNOSIS — R82.81 PYURIA: ICD-10-CM

## 2024-04-16 DIAGNOSIS — Z01.818 ENCOUNTER FOR PRE-TRANSPLANT EVALUATION FOR LIVER TRANSPLANT: Primary | ICD-10-CM

## 2024-04-16 DIAGNOSIS — K70.30 ALCOHOLIC CIRRHOSIS (H): Primary | ICD-10-CM

## 2024-04-16 DIAGNOSIS — I10 ESSENTIAL HYPERTENSION: ICD-10-CM

## 2024-04-16 DIAGNOSIS — K70.31 ALCOHOLIC CIRRHOSIS OF LIVER WITH ASCITES (H): ICD-10-CM

## 2024-04-16 DIAGNOSIS — Z87.891 FORMER SMOKER, STOPPED SMOKING IN DISTANT PAST: ICD-10-CM

## 2024-04-16 LAB
ALBUMIN SERPL BCG-MCNC: 3.7 G/DL (ref 3.5–5.2)
ALBUMIN UR-MCNC: NEGATIVE MG/DL
ALP SERPL-CCNC: 96 U/L (ref 40–150)
ALT SERPL W P-5'-P-CCNC: 36 U/L (ref 0–70)
ANION GAP SERPL CALCULATED.3IONS-SCNC: 10 MMOL/L (ref 7–15)
APPEARANCE UR: CLEAR
AST SERPL W P-5'-P-CCNC: 61 U/L (ref 0–45)
BILIRUB DIRECT SERPL-MCNC: 0.26 MG/DL (ref 0–0.3)
BILIRUB SERPL-MCNC: 0.8 MG/DL
BILIRUB UR QL STRIP: NEGATIVE
BUN SERPL-MCNC: 32.4 MG/DL (ref 6–20)
CALCIUM SERPL-MCNC: 9.4 MG/DL (ref 8.6–10)
CHLORIDE SERPL-SCNC: 105 MMOL/L (ref 98–107)
COLOR UR AUTO: YELLOW
CREAT SERPL-MCNC: 1.87 MG/DL (ref 0.67–1.17)
DEPRECATED HCO3 PLAS-SCNC: 22 MMOL/L (ref 22–29)
EGFRCR SERPLBLD CKD-EPI 2021: 45 ML/MIN/1.73M2
ERYTHROCYTE [DISTWIDTH] IN BLOOD BY AUTOMATED COUNT: 14.9 % (ref 10–15)
FERRITIN SERPL-MCNC: 135 NG/ML (ref 31–409)
GLUCOSE SERPL-MCNC: 101 MG/DL (ref 70–99)
GLUCOSE UR STRIP-MCNC: NEGATIVE MG/DL
HCT VFR BLD AUTO: 32.8 % (ref 40–53)
HGB BLD-MCNC: 11 G/DL (ref 13.3–17.7)
HGB UR QL STRIP: NEGATIVE
INR PPP: 1.27 (ref 0.85–1.15)
IRON BINDING CAPACITY (ROCHE): 263 UG/DL (ref 240–430)
IRON SATN MFR SERPL: 19 % (ref 15–46)
IRON SERPL-MCNC: 51 UG/DL (ref 61–157)
KETONES UR STRIP-MCNC: NEGATIVE MG/DL
LEUKOCYTE ESTERASE UR QL STRIP: NEGATIVE
MCH RBC QN AUTO: 29.9 PG (ref 26.5–33)
MCHC RBC AUTO-ENTMCNC: 33.5 G/DL (ref 31.5–36.5)
MCV RBC AUTO: 89 FL (ref 78–100)
NITRATE UR QL: NEGATIVE
PH UR STRIP: 6.5 [PH] (ref 5–7)
PHOSPHATE SERPL-MCNC: 3.8 MG/DL (ref 2.5–4.5)
PLATELET # BLD AUTO: 78 10E3/UL (ref 150–450)
POTASSIUM SERPL-SCNC: 4.8 MMOL/L (ref 3.4–5.3)
PROT SERPL-MCNC: 7.6 G/DL (ref 6.4–8.3)
PSA SERPL DL<=0.01 NG/ML-MCNC: 0.21 NG/ML (ref 0–2.5)
RBC # BLD AUTO: 3.68 10E6/UL (ref 4.4–5.9)
SODIUM SERPL-SCNC: 137 MMOL/L (ref 135–145)
SP GR UR STRIP: 1.02 (ref 1–1.03)
TSH SERPL DL<=0.005 MIU/L-ACNC: 1.61 UIU/ML (ref 0.3–4.2)
UROBILINOGEN UR STRIP-MCNC: 2 MG/DL
WBC # BLD AUTO: 4 10E3/UL (ref 4–11)

## 2024-04-16 PROCEDURE — 84100 ASSAY OF PHOSPHORUS: CPT | Performed by: PATHOLOGY

## 2024-04-16 PROCEDURE — 99204 OFFICE O/P NEW MOD 45 MIN: CPT | Performed by: TRANSPLANT SURGERY

## 2024-04-16 PROCEDURE — 80307 DRUG TEST PRSMV CHEM ANLYZR: CPT | Mod: 90 | Performed by: PATHOLOGY

## 2024-04-16 PROCEDURE — 86481 TB AG RESPONSE T-CELL SUSP: CPT | Performed by: INTERNAL MEDICINE

## 2024-04-16 PROCEDURE — 85610 PROTHROMBIN TIME: CPT | Performed by: PATHOLOGY

## 2024-04-16 PROCEDURE — 80053 COMPREHEN METABOLIC PANEL: CPT | Performed by: PATHOLOGY

## 2024-04-16 PROCEDURE — 85027 COMPLETE CBC AUTOMATED: CPT | Performed by: PATHOLOGY

## 2024-04-16 PROCEDURE — 76700 US EXAM ABDOM COMPLETE: CPT | Mod: XU | Performed by: RADIOLOGY

## 2024-04-16 PROCEDURE — 82105 ALPHA-FETOPROTEIN SERUM: CPT | Performed by: INTERNAL MEDICINE

## 2024-04-16 PROCEDURE — 81003 URINALYSIS AUTO W/O SCOPE: CPT | Mod: XU | Performed by: PATHOLOGY

## 2024-04-16 PROCEDURE — 83550 IRON BINDING TEST: CPT | Performed by: PATHOLOGY

## 2024-04-16 PROCEDURE — 82248 BILIRUBIN DIRECT: CPT | Performed by: PATHOLOGY

## 2024-04-16 PROCEDURE — 36415 COLL VENOUS BLD VENIPUNCTURE: CPT | Performed by: PATHOLOGY

## 2024-04-16 PROCEDURE — 84443 ASSAY THYROID STIM HORMONE: CPT | Performed by: PATHOLOGY

## 2024-04-16 PROCEDURE — 82728 ASSAY OF FERRITIN: CPT | Performed by: PATHOLOGY

## 2024-04-16 PROCEDURE — 99213 OFFICE O/P EST LOW 20 MIN: CPT | Performed by: TRANSPLANT SURGERY

## 2024-04-16 PROCEDURE — 82306 VITAMIN D 25 HYDROXY: CPT | Performed by: INTERNAL MEDICINE

## 2024-04-16 PROCEDURE — 93000 ELECTROCARDIOGRAM COMPLETE: CPT | Mod: GC | Performed by: INTERNAL MEDICINE

## 2024-04-16 PROCEDURE — 93975 VASCULAR STUDY: CPT | Performed by: RADIOLOGY

## 2024-04-16 PROCEDURE — G0480 DRUG TEST DEF 1-7 CLASSES: HCPCS | Mod: 90 | Performed by: PATHOLOGY

## 2024-04-16 PROCEDURE — 71046 X-RAY EXAM CHEST 2 VIEWS: CPT | Performed by: RADIOLOGY

## 2024-04-16 PROCEDURE — 83540 ASSAY OF IRON: CPT | Performed by: PATHOLOGY

## 2024-04-16 PROCEDURE — 99000 SPECIMEN HANDLING OFFICE-LAB: CPT | Performed by: PATHOLOGY

## 2024-04-16 PROCEDURE — G0103 PSA SCREENING: HCPCS | Performed by: PATHOLOGY

## 2024-04-16 PROCEDURE — 84466 ASSAY OF TRANSFERRIN: CPT | Performed by: INTERNAL MEDICINE

## 2024-04-16 PROCEDURE — 86900 BLOOD TYPING SEROLOGIC ABO: CPT | Performed by: INTERNAL MEDICINE

## 2024-04-16 NOTE — PROGRESS NOTES
Assessment and Plan:  1. liver transplant evaluation - patient is a good candidate overall. Benefits and surgical risks of a liver transplantation were discussed.  2.  End stage liver disease due to Laennec's.  MELD has decreased from >35 to <15 with abstinence.  No paracentesis since Jan.  Hopefully will not need a LT, but proceed with eval as safety net.  3. Portal HTN  4. Pyuria: recheck UA.  If persistent, check TB quantiferon. UC  5. One drug HTN: ok control.  6. Former obesity.    Surgical evaluation:  1. Portal Vein:prob ok, but no imaging with contrast since 2015  2. Hepatic Artery: unmanipulated  3. TIPS: absent  4. Previous Abdominal Surgery: No  5. Hepatocellular Carcinoma: None  6. Ascites: Present - minimal  7. Costal Angle: wide  8. Portopulmonary Hypertension: check ECHO  9. Hepatopulmonary Syndrome: absent  10. Cardiac Evaluation: needs echocardiogram, cardiac CTA pending  11. Nutritional Status: Moderate  12. Diabetes: no  13.Hypertension one drug HTN  14. Smoker:quit 12 yrs ago  14: Fraility index:see dietician note  15. Meets guidelines to receive Living Donor  No, would wait to see if he needs a liver  16. Potential Living donors No    Recommendations: above      Patients overall evaluation will be discussed at the Liver Transplant selection committee meeting with a final recommendation on the patients suitability for transplant to be made at that time.    Consult Full  Details:  Elroy Morel SrJuan José was for evaluation as a potential liver transplant recipient.    Reason for Visit:  Elroy Morel Sr. is a 43 year old year old male with Laennec's, who presents for liver transplant evaluation.    HPI:  Presenting complaint: Abdominal distention    Past Medical History:   Diagnosis Date    Alcohol use disorder, severe, dependence (H)     Alcohol withdrawal seizure (H)     Alcoholic cirrhosis of liver with ascites (H)     Esophageal varices (H)     Essential hypertension     Gastroesophageal  reflux disease without esophagitis     History of methamphetamine use     Sustained remission since 2003    Hypercholesterolemia     Tobacco abuse      Past Surgical History:   Procedure Laterality Date    COLONOSCOPY N/A 8/28/2023    Procedure: COLONOSCOPY, WITH POLYPECTOMY via bx forceps;  Surgeon: Alyssa Tsai MD;  Location: [facility]    IR PARACENTESIS  8/31/2023    IR PARACENTESIS  9/15/2023    IR PARACENTESIS  9/19/2023    IR PARACENTESIS  9/22/2023    IR PARACENTESIS  9/26/2023    IR PARACENTESIS  9/29/2023    IR PARACENTESIS  10/10/2023    IR PARACENTESIS  10/17/2023    IR PARACENTESIS  10/20/2023    IR PARACENTESIS  10/24/2023    IR PARACENTESIS  10/27/2023    IR PARACENTESIS  10/31/2023    IR PARACENTESIS  11/3/2023    IR PARACENTESIS  11/7/2023    IR PARACENTESIS  11/10/2023    IR PARACENTESIS  11/14/2023    IR PARACENTESIS  11/17/2023    IR PARACENTESIS  11/21/2023    IR PARACENTESIS  11/24/2023    IR PARACENTESIS  11/28/2023    IR PARACENTESIS  12/1/2023    IR PARACENTESIS  12/5/2023    IR PARACENTESIS  12/8/2023    IR PARACENTESIS  12/15/2023    IR PARACENTESIS  1/4/2024    IR PARACENTESIS  12/29/2023    IR PARACENTESIS  12/22/2023    IR PARACENTESIS  12/19/2023    IR PARACENTESIS  1/16/2024    IR PARACENTESIS  1/9/2024     Past Surgical History:   Procedure Laterality Date    COLONOSCOPY N/A 8/28/2023    Procedure: COLONOSCOPY, WITH POLYPECTOMY via bx forceps;  Surgeon: Alyssa Tsai MD;  Location: [facility]    IR PARACENTESIS  8/31/2023    IR PARACENTESIS  9/15/2023    IR PARACENTESIS  9/19/2023    IR PARACENTESIS  9/22/2023    IR PARACENTESIS  9/26/2023    IR PARACENTESIS  9/29/2023    IR PARACENTESIS  10/10/2023    IR PARACENTESIS  10/17/2023    IR PARACENTESIS  10/20/2023    IR PARACENTESIS  10/24/2023    IR PARACENTESIS  10/27/2023    IR PARACENTESIS  10/31/2023    IR PARACENTESIS  11/3/2023    IR PARACENTESIS  11/7/2023    IR PARACENTESIS  11/10/2023    IR PARACENTESIS   11/14/2023    IR PARACENTESIS  11/17/2023    IR PARACENTESIS  11/21/2023    IR PARACENTESIS  11/24/2023    IR PARACENTESIS  11/28/2023    IR PARACENTESIS  12/1/2023    IR PARACENTESIS  12/5/2023    IR PARACENTESIS  12/8/2023    IR PARACENTESIS  12/15/2023    IR PARACENTESIS  1/4/2024    IR PARACENTESIS  12/29/2023    IR PARACENTESIS  12/22/2023    IR PARACENTESIS  12/19/2023    IR PARACENTESIS  1/16/2024    IR PARACENTESIS  1/9/2024     Family History   Problem Relation Age of Onset    Substance Abuse Mother     Coronary Artery Disease Mother     Cerebrovascular Disease Mother     Substance Abuse Father     Substance Abuse Brother     Liver Cancer No family hx of     Liver Disease No family hx of      Allergies   Allergen Reactions    Dust Mites     Pollen Extract     Metronidazole Dizziness, Other (See Comments) and Unknown     Other Reaction(s): Other (see comments)    Feels foggy on this medication    Omeprazole Other (See Comments) and Unknown     Irritability (tolerates Protonix well)    Other Reaction(s): Other (see comments)     Prior to Admission medications    Medication Sig Start Date End Date Taking? Authorizing Provider   carvedilol (COREG) 6.25 MG tablet Take 1 tablet (6.25 mg) by mouth 2 times daily (with meals) 3/12/24  Yes Nimco Diego MD   furosemide (LASIX) 20 MG tablet Take 1 tablet (20 mg) by mouth daily 3/12/24  Yes Nimco Diego MD   pantoprazole (PROTONIX) 20 MG EC tablet Take 1 tablet (20 mg) by mouth daily 3/12/24  Yes Nimco Diego MD   rifaximin (XIFAXAN) 550 MG TABS tablet 1 tablet (550 mg) by Oral or Feeding Tube route 2 times daily 3/12/24  Yes Nimco Diego MD   sertraline (ZOLOFT) 50 MG tablet Take 1 tablet (50 mg) by mouth daily 10/15/23  Yes Raymundo Rodriguez MD   spironolactone (ALDACTONE) 100 MG tablet Take 1 tablet (100 mg) by mouth daily 4/8/24  Yes BrandieNimco burciaga MD   vitamin D3 (CHOLECALCIFEROL) 50  "mcg (2000 units) tablet Take 1 tablet (50 mcg) by mouth daily for 120 days 2/5/24 6/4/24 Yes Didi Fisher MD       Previous Transplant Hx: No    Cardiovascular Hx:       h/o Cardiac Issues: No       Exercise Tolerance: no chest pain or shortness of breath with exertion.    Potential Donor(s): No    ROS:    REVIEW OF SYSTEMS (check box if normal)  [x]                GENERAL  [x]                  PULMONARY [x]                 GENITOURINARY  [x]                 CNS                 [x]                  CARDIAC  [x]                  ENDOCRINE  [x]                 EARS,NOSE,THROAT [x]                  GASTROINTESTINAL [x]                  NEUROLOGIC    [x]                 MUSCLOSKELTAL  [x]                   HEMATOLOGY    Examination:     Vitals:  /86   Pulse 54   Ht 1.816 m (5' 11.5\")   Wt 98.2 kg (216 lb 6.4 oz)   SpO2 100%   BMI 29.76 kg/m      GENERAL APPEARANCE: alert and no distress  EYES: PERRL  HENT: mouth without ulcers or lesions  NECK: supple, no adenopathy  RESP: lungs clear to auscultation - no rales, rhonchi or wheezes  CV: regular rhythm, normal rate, no rub   ABDOMEN:  soft, nontender, no HSM or masses and bowel sounds normal. No sig ascites, edema etc  MS: extremities normal- no gross deformities noted, no evidence of inflammation in joints, no muscle tenderness  SKIN: no rash  NEURO: Normal strength and tone, sensory exam grossly normal, mentation intact and speech normal  PSYCH: mentation appears normal. and affect normal/bright      Results:   Recent Results (from the past 168 hour(s))   EKG 12-lead, tracing only [EKG1]    Collection Time: 04/16/24  9:20 AM   Result Value Ref Range    Systolic Blood Pressure  mmHg    Diastolic Blood Pressure  mmHg    Ventricular Rate 59 BPM    Atrial Rate 59 BPM    NY Interval 148 ms    QRS Duration 90 ms     ms    QTc 447 ms    P Axis 15 degrees    R AXIS 36 degrees    T Axis 26 degrees    Interpretation ECG       Sinus bradycardia  Otherwise normal " ECG  No previous ECGs available     CBC with platelets    Collection Time: 04/16/24  9:26 AM   Result Value Ref Range    WBC Count 4.0 4.0 - 11.0 10e3/uL    RBC Count 3.68 (L) 4.40 - 5.90 10e6/uL    Hemoglobin 11.0 (L) 13.3 - 17.7 g/dL    Hematocrit 32.8 (L) 40.0 - 53.0 %    MCV 89 78 - 100 fL    MCH 29.9 26.5 - 33.0 pg    MCHC 33.5 31.5 - 36.5 g/dL    RDW 14.9 10.0 - 15.0 %    Platelet Count 78 (L) 150 - 450 10e3/uL   INR    Collection Time: 04/16/24  9:26 AM   Result Value Ref Range    INR 1.27 (H) 0.85 - 1.15   Comprehensive metabolic panel    Collection Time: 04/16/24  9:26 AM   Result Value Ref Range    Sodium 137 135 - 145 mmol/L    Potassium 4.8 3.4 - 5.3 mmol/L    Carbon Dioxide (CO2) 22 22 - 29 mmol/L    Anion Gap 10 7 - 15 mmol/L    Urea Nitrogen 32.4 (H) 6.0 - 20.0 mg/dL    Creatinine 1.87 (H) 0.67 - 1.17 mg/dL    GFR Estimate 45 (L) >60 mL/min/1.73m2    Calcium 9.4 8.6 - 10.0 mg/dL    Chloride 105 98 - 107 mmol/L    Glucose 101 (H) 70 - 99 mg/dL    Alkaline Phosphatase 96 40 - 150 U/L    AST 61 (H) 0 - 45 U/L    ALT 36 0 - 70 U/L    Protein Total 7.6 6.4 - 8.3 g/dL    Albumin 3.7 3.5 - 5.2 g/dL    Bilirubin Total 0.8 <=1.2 mg/dL   Ferritin    Collection Time: 04/16/24  9:26 AM   Result Value Ref Range    Ferritin 135 31 - 409 ng/mL   Iron and iron binding capacity    Collection Time: 04/16/24  9:26 AM   Result Value Ref Range    Iron 51 (L) 61 - 157 ug/dL    Iron Binding Capacity 263 240 - 430 ug/dL    Iron Sat Index 19 15 - 46 %   Phosphorus    Collection Time: 04/16/24  9:26 AM   Result Value Ref Range    Phosphorus 3.8 2.5 - 4.5 mg/dL   TSH with free T4 reflex    Collection Time: 04/16/24  9:26 AM   Result Value Ref Range    TSH 1.61 0.30 - 4.20 uIU/mL   Prostate spec antigen screen    Collection Time: 04/16/24  9:26 AM   Result Value Ref Range    Prostate Specific Antigen Screen 0.21 0.00 - 2.50 ng/mL   Adult Type and Screen    Collection Time: 04/16/24  9:26 AM   Result Value Ref Range    ABO/RH(D)  AB POS     Antibody Screen Negative Negative    SPECIMEN EXPIRATION DATE [id number]    Bilirubin direct    Collection Time: 04/16/24  9:26 AM   Result Value Ref Range    Bilirubin Direct 0.26 0.00 - 0.30 mg/dL   UA Macroscopic with reflex to Microscopic and Culture    Collection Time: 04/16/24  9:52 AM    Specimen: Urine, Midstream   Result Value Ref Range    Color Urine Yellow Colorless, Straw, Light Yellow, Yellow    Appearance Urine Clear Clear    Glucose Urine Negative Negative mg/dL    Bilirubin Urine Negative Negative    Ketones Urine Negative Negative mg/dL    Specific Gravity Urine 1.021 1.003 - 1.035    Blood Urine Negative Negative    pH Urine 6.5 5.0 - 7.0    Protein Albumin Urine Negative Negative mg/dL    Urobilinogen Urine 2.0 Normal, 2.0 mg/dL    Nitrite Urine Negative Negative    Leukocyte Esterase Urine Negative Negative     I had a long discussion with the patient regarding liver transplantation which included but was not limited to  the following points:    Liver transplant selection committee process.  The federal rules for cadaveric waiting list, the size and blood type matching of the organ. The availability of living-related donor transplantation.  The types of donors: brain death donors, non-heart beating donors, partial liver grafts: splits and living donor grafts  Extended criteria  Donors (older age, steasosis) and the increased  risk of primary non-function using the extended criteria donors  The CDC high risk donors,  Risk of donor transmitted infections and donor transmitted malignancy  The liver transplant operation and the associated risks and technical complications which can include intraoperative death, post operative death,  Primary non-function, bleeding requiring re-operations, arterial and biliary complications, bowel perforations, and intra abdominal abscess. Some of these complicaitons may require a second operation.  The postoperative course, the ICU stay and risk of  postoperative complications which can include sepsis, MI, stroke, brain injury, pneumonia, pleural effusions, and renal dysfunction.  The current 1 year and 5 year graft and patient survivals.  The need for life long immunosuppressive therapy and the side effects of these medications, including the possibility of toxicity, opportunistic infections, risk of cancer including lymphoma, and the possibility of rejection even if the patient is taking the medication exactly as prescribed.  The need for compliance with medications and follow-up visits in the clinic and thereafter.  The patient and family understand these risks and wish to proceed to transplantation       I spent over 60 minutes: 10 chart review and looking for xrays, 30 f2f, explanation and discussion, 15 documentation.

## 2024-04-16 NOTE — COMMITTEE REVIEW
Abdominal Committee Review Note     Evaluation Date: 4/16/2024  Committee Review Date: 4/16/2024    Organ being evaluated for: Liver    Transplant Phase: Evaluation  Transplant Status: Active    Transplant Coordinator: Lia Molina  Transplant Surgeon:        Referring Physician: Emil Moser    Primary Diagnosis: Acute Alcohol-Associated Hepatitis With or Without Cirrhosis  Secondary Diagnosis:     Committee Review Members:  Nutrition Shobha Vail, RD   Pharmacist Attila Mckeon, [facility]    - Clinical Bruna Allen, DORIE, Anabel Watts, [facility]   Transplant Ember Watson, BECKA, Cesia Ness, RN, Evelyn Ramos LPN, Huma Murphy, RN, Lia Molina, RN, Jr Mike Roper, RN, Ted Swan, BECKA, Sheng Greene, RN, Milagros Crowley, RN   Transplant Hepatology  Nimco Diego MD, DASHAWN PrescottC, Janeth Simpson PA-C, Jennifer Crews MD, Javi Chaidez MD, Damian Fernandez DO, Thomas M. Leventhal, MD   Transplant Surgery Gilbert Jennings MD, Cherelle Liz NP, Brendan Gu MD, Jason Coy MD, Indira Amaya NP, Yasmany Castillo MD, Abbe Macias MD       Transplant Eligibility: Cirrhosis with MELD, ETOH    Committee Review Decision: Approved    Relative Contraindications: Other    Absolute Contraindications: None    Committee Chair Leventhal, Thomas Michael, MD verbally attested to the committee's decision.    Committee Discussion Details:     Approved to list upon completion of evaluation, including cardiology testing/cardiology clearance.  Defer listing until reassessed by hepatology  (follow up scheduled with Dr Diego currently on 6/18/24.)

## 2024-04-16 NOTE — PATIENT INSTRUCTIONS
"Nutrition & Frailty for Transplant Recipients  With end-stage organ disease, you are at higher risk for malnutrition, deconditioning, frailty, and a reduced functional status. The goal is to have a well-maintained nutritional status (or a good nutritional reserve). Once you \"lose\" your nutritional buffer or functional status, it is very hard to get back. The more functional you are, the better you will do with recovering from surgery, etc.     Functional Status:  - Combined with good nutrition, maintaining functional status will help keep you strong enough for surgery.   - Do what activity you can tolerate. Please ask your Physician for a Physical Therapy referral if you would find this helpful.     Nutrition:  - Protein foods are the building blocks of muscle. Try to get protein at each meal and snack time. Including protein before bed is also helpful to carry you through the night when your muscle is more prone to breakdown.   - Sometimes eating protein is more work than drinking liquids. If this is the case, please supplement with a pre-made protein shake or make your own smoothie with protein powder, milk/water, yogurt, fruit, nuts/rigo seeds, etc    Sources of Protein  For animal proteins (chicken, beef, fish), deck of cards size is approximately 3 ounces or 24 grams protein.  Food Portion  Grams of Protein   Animal proteins (chicken, turkey, venison, fish/seafood, red meat) 1 ounce  7-9   Egg  1  6   Cottage cheese  1/4 cup  7    Cheese  1 ounce (1 slice, 1 cheese stick) 6   Milk (cow's) 4 ounces or 1/2 cup  4   Dry skim milk powder 2 Tbsp  4   Ricotta cheese  1/4 cup  7    [place]enton Instant Breakfast (made with milk) 1/2 cup  7   Pudding 1/2 cup  4   Yogurt (ie Yoplait) 1/2 cup  5   Greek yogurt 5 oz container 15   Tofu  1/4 cup  5   Soymilk  1/2 cup  3   Tempeh  1/4 cup  8   Lentils  1/4 cup 5   Kidney beans, black beans, etc 1/4 cup  4   Chickpeas 1/4 cup  4   Veggie burger  1 crystal  7   Soy nuts  1/4 cup  " "17   Sunflower seeds  2 Tbsp  8   Peanuts  1 ounce 7   Almonds  15 6   Pistachios 25 6   Peanut/almond butter 2 Tbsp  8    My favorite protein bars are Atkins wafer cookie or Power Crunch protein bars- wafer cookie type texture.     Liver Health & Nutrition      Visit [email or web address] to review valuable information about nutrition and exercise     Nutrition  - For patients with cirrhosis, it is very important to eat the right types and amounts of foods.  We recommend a diet low in carbohydrates/sugars and high in fresh fruits/vegetables, with the right amount of protein.  We typically recommend  grams of protein every day. Your Registered Dietitian will be able to calculate your estimated protein needs based on your weight and degree of muscle wasting associated with your liver disease.  - In regard to protein intake, you can focus on: red meat, poultry, cooked fish and seafood, vegetable-based protein meat products/meat substitutes (Beyond Burgers, etc), tofu and other soy products, eggs, beans/legumes, dairy products (including milk and yogurt and cheese), unsalted nuts, nut butters, etc.  - You should eat at least three meals a day and three to four snacks between meals. Your goal is to include protein at each meal and snack. Focus on eating protein first, then if still hungry, go for the vegetables, fruit, starches, etc.   - Bedtime snacks are especially important (preferrably something with some protein) - toast or fruit with peanut butter, spoonful of peanut butter, Greek yogurt, handful of nuts, string cheese stick, boiled egg, protein bar  - Patients with malnutrition and/or loss of muscle mass can improve their nutrition and muscle mass by drinking at least 2 protein drinks per day. A good time of day to drink one of these is before bed, when your body is preparing to be in the \"fasted\" state all night and muscle mass may break down more readily. Some options include: Ensure, Boost, Glucerna, " Premier Protein, FairLife, Orgain, or powdered whey protein (or similar supplements) mixed with milk, yogurt, fruit, peanut butter, etc.   - Please avoid eating raw seafood, especially shellfish, because of risk of serious illness  - We recommend all patients with cirrhosis limit their daily sodium intake to less than 2000 mg. Consider tracking your current sodium intake for 3 days on pen/paper or with an tomeka like [employer]irma to understand where most of your sodium is coming from, what you might need to modify in your diet to remain within the 2000 mg/day budget, etc.     Shobha Vail RD LD CCTD  Transplant Dietitian

## 2024-04-16 NOTE — PROGRESS NOTES
"Psychosocial Assessment  Elroy \"Ad\" Campbell Morel  was seen in the Transplant Center as part of his evaluation as a potential liver transplant recipient. He presented to clinic with his wife, Malissa \"[place].\"    Living Situation: Ad lives in a single family home in [place], [place] with his wife Mod[place] and two adult children. He's resided there since 2010 and does not have any concerns related to his living environment.    Education/Employment:  Ad completed some college and has not been working since January. He is utilizing long term disability. His spouse also works full time. He is not a .   Financial /Income: Ad receives SSDI and income from long term disability. He does not voice any financial concerns.   Health Insurance:  Ad [employer] through his spouses employer. This writer talked with Elroy about the financial risks of transplant, particularly about the high cost of transplant related medications and the importance of maintaining adequate health insurance coverage.  Family/Social Support: Ad identified his spouse as a primary support. They've been together for 20+ years. They have two adult children, Elroy and Vijay who live at home. He has a few step-siblings, one of whom lives locally ([place]). Ad identified his Uncle and friends as supportive.   Ad's primary caregiver post transplant would be his wife, [place]. They have two adult children in the home who would be able to provide support, along with extended family members and friends.   This writer stressed the importance of having a stable and involved support network before and after transplant.  Provided Elroy  with education about the relationship between a stable support system and better surgical and post-transplant outcomes compared to patients with a limited support system.    Functional Status: No functional concerns related to transplantation.   Chemical Dependency:  Ad's last consumption of alcohol was August 2023. Prior to " that he was drinking 32oz of malt beer/day. He reports that at one point he was consuming 1 liter of alcohol/day. Ad discontinued tobacco use 12 years ago. He has a history of methamphetamine use that he discontinued in his 20s after her attended a treatment program.   Ad spent some time in senior care in his 20s for methamphetamine use. He engaged in treatment after discontinuing meth use in his 20s and is currently engaged in a substance use treatment program through [facility].   Mental Health: Ad was started on Sertraline in August when he discontinued his alcohol use. He does not have a history of any mental health issues, but does feel like this medication is helpful. He's had counseling on and off throughout the years. No history of psychiatric hospitalizations or suicidal thoughts.   Adjustment to Illness:  This writer provided Elroy  with supportive counseling throughout this interview.  This writer also encouraged Elroy to attend the liver transplant support group for additional support and encouragement.   Impression/Recommendations:   Elroy verbalizes understanding the psychosocial risks of transplant and teaching provided during this evaluation.  Elroy has met the sobriety criteria recommended for listing. He is currently engaged in substance use programming. He reports that he is scheduled to complete the program soon. Ad signed a release of information for [facility], and I will obtain collateral. PETh pending from today. He has two negative Peth from October 2023 and November 2023.   Pending clinical from his substance use treatment program, there would be no contraindications to transplant from a psychosocial perspective.  Elroy has adequate finances and health insurance for transplant, and an intact support system. This writer will remain available to assist patient throughout the evaluation process and will follow patient through transplant if --- is listed.  It was a pleasure  to evaluate this patient for liver transplant.   Teaching completed during assessment:  1.     Housing and relocation needs post transplant.  2.     Caregiver needs post transplant.  3.     Financial issues related to transplant.  4.     Risks of alcohol use post transplant.  5.     Common psychosocial stressors pre/post transplant.        6.     Liver Transplant support group availability.        7.     Advanced [employer] Directive - He reports that he has a health care directive at home. Will follow up with him re: this.              Psychosocial Risks of Transplant Reviewed:  1.     Increased stress related to your emotional, family, social, employment, or   financial situation.  2.     Affect on work and/or disability benefits.  3.     Affect on future health and life insurance.  4.     Transplant outcome expectations may not be met.  5.     Mental Health risks: anxiety, depression, PTSD, guilt, grief and chronic fatigue.     DORIE Mondragon, LICSW

## 2024-04-16 NOTE — LETTER
4/16/2024         RE: Elroy Morel Sr.  [address] [place] [address]        Dear Colleague,    Thank you for referring your patient, Elroy Morel Sr., to the [facility]. Please see a copy of my visit note below.    Assessment and Plan:  1. liver transplant evaluation - patient is a good candidate overall. Benefits and surgical risks of a liver transplantation were discussed.  2.  End stage liver disease due to Laennec's.  MELD has decreased from >35 to <15 with abstinence.  No paracentesis since Jan.  Hopefully will not need a LT, but proceed with eval as safety net.  3. Portal HTN  4. Pyuria: recheck UA.  If persistent, check TB quantiferon. UC  5. One drug HTN: ok control.  6. Former obesity.    Surgical evaluation:  1. Portal Vein:prob ok, but no imaging with contrast since 2015  2. Hepatic Artery: unmanipulated  3. TIPS: absent  4. Previous Abdominal Surgery: No  5. Hepatocellular Carcinoma: None  6. Ascites: Present - minimal  7. Costal Angle: wide  8. Portopulmonary Hypertension: check ECHO  9. Hepatopulmonary Syndrome: absent  10. Cardiac Evaluation: needs echocardiogram, cardiac CTA pending  11. Nutritional Status: Moderate  12. Diabetes: no  13.Hypertension one drug HTN  14. Smoker:quit 12 yrs ago  14: Fraility index:see dietician note  15. Meets guidelines to receive Living Donor  No, would wait to see if he needs a liver  16. Potential Living donors No    Recommendations: above      Patients overall evaluation will be discussed at the Liver Transplant selection committee meeting with a final recommendation on the patients suitability for transplant to be made at that time.    Consult Full  Details:  Elroy Morel Sr. was for evaluation as a potential liver transplant recipient.    Reason for Visit:  Elroy Morel Sr. is a 43 year old year old male with Laennec's, who presents for liver transplant evaluation.    HPI:  Presenting  complaint: Abdominal distention    Past Medical History:   Diagnosis Date     Alcohol use disorder, severe, dependence (H)      Alcohol withdrawal seizure (H)      Alcoholic cirrhosis of liver with ascites (H)      Esophageal varices (H)      Essential hypertension      Gastroesophageal reflux disease without esophagitis      History of methamphetamine use     Sustained remission since 2003     Hypercholesterolemia      Tobacco abuse      Past Surgical History:   Procedure Laterality Date     COLONOSCOPY N/A 8/28/2023    Procedure: COLONOSCOPY, WITH POLYPECTOMY via bx forceps;  Surgeon: Alyssa Tsai MD;  Location: [facility]     IR PARACENTESIS  8/31/2023     IR PARACENTESIS  9/15/2023     IR PARACENTESIS  9/19/2023     IR PARACENTESIS  9/22/2023     IR PARACENTESIS  9/26/2023     IR PARACENTESIS  9/29/2023     IR PARACENTESIS  10/10/2023     IR PARACENTESIS  10/17/2023     IR PARACENTESIS  10/20/2023     IR PARACENTESIS  10/24/2023     IR PARACENTESIS  10/27/2023     IR PARACENTESIS  10/31/2023     IR PARACENTESIS  11/3/2023     IR PARACENTESIS  11/7/2023     IR PARACENTESIS  11/10/2023     IR PARACENTESIS  11/14/2023     IR PARACENTESIS  11/17/2023     IR PARACENTESIS  11/21/2023     IR PARACENTESIS  11/24/2023     IR PARACENTESIS  11/28/2023     IR PARACENTESIS  12/1/2023     IR PARACENTESIS  12/5/2023     IR PARACENTESIS  12/8/2023     IR PARACENTESIS  12/15/2023     IR PARACENTESIS  1/4/2024     IR PARACENTESIS  12/29/2023     IR PARACENTESIS  12/22/2023     IR PARACENTESIS  12/19/2023     IR PARACENTESIS  1/16/2024     IR PARACENTESIS  1/9/2024     Past Surgical History:   Procedure Laterality Date     COLONOSCOPY N/A 8/28/2023    Procedure: COLONOSCOPY, WITH POLYPECTOMY via bx forceps;  Surgeon: Alyssa Tsai MD;  Location: [facility]     IR PARACENTESIS  8/31/2023     IR PARACENTESIS  9/15/2023     IR PARACENTESIS  9/19/2023     IR PARACENTESIS  9/22/2023     IR PARACENTESIS  9/26/2023      IR PARACENTESIS  9/29/2023     IR PARACENTESIS  10/10/2023     IR PARACENTESIS  10/17/2023     IR PARACENTESIS  10/20/2023     IR PARACENTESIS  10/24/2023     IR PARACENTESIS  10/27/2023     IR PARACENTESIS  10/31/2023     IR PARACENTESIS  11/3/2023     IR PARACENTESIS  11/7/2023     IR PARACENTESIS  11/10/2023     IR PARACENTESIS  11/14/2023     IR PARACENTESIS  11/17/2023     IR PARACENTESIS  11/21/2023     IR PARACENTESIS  11/24/2023     IR PARACENTESIS  11/28/2023     IR PARACENTESIS  12/1/2023     IR PARACENTESIS  12/5/2023     IR PARACENTESIS  12/8/2023     IR PARACENTESIS  12/15/2023     IR PARACENTESIS  1/4/2024     IR PARACENTESIS  12/29/2023     IR PARACENTESIS  12/22/2023     IR PARACENTESIS  12/19/2023     IR PARACENTESIS  1/16/2024     IR PARACENTESIS  1/9/2024     Family History   Problem Relation Age of Onset     Substance Abuse Mother      Coronary Artery Disease Mother      Cerebrovascular Disease Mother      Substance Abuse Father      Substance Abuse Brother      Liver Cancer No family hx of      Liver Disease No family hx of      Allergies   Allergen Reactions     Dust Mites      Pollen Extract      Metronidazole Dizziness, Other (See Comments) and Unknown     Other Reaction(s): Other (see comments)    Feels foggy on this medication     Omeprazole Other (See Comments) and Unknown     Irritability (tolerates Protonix well)    Other Reaction(s): Other (see comments)     Prior to Admission medications    Medication Sig Start Date End Date Taking? Authorizing Provider   carvedilol (COREG) 6.25 MG tablet Take 1 tablet (6.25 mg) by mouth 2 times daily (with meals) 3/12/24  Yes Nimco Diego MD   furosemide (LASIX) 20 MG tablet Take 1 tablet (20 mg) by mouth daily 3/12/24  Yes Nimco Diego MD   pantoprazole (PROTONIX) 20 MG EC tablet Take 1 tablet (20 mg) by mouth daily 3/12/24  Yes Nimco Diego MD   rifaximin (XIFAXAN) 550 MG TABS tablet 1 tablet  "(550 mg) by Oral or Feeding Tube route 2 times daily 3/12/24  Yes Nimco Diego MD   sertraline (ZOLOFT) 50 MG tablet Take 1 tablet (50 mg) by mouth daily 10/15/23  Yes Raymundo Rodriguez MD   spironolactone (ALDACTONE) 100 MG tablet Take 1 tablet (100 mg) by mouth daily 4/8/24  Yes Nimco Diego MD   vitamin D3 (CHOLECALCIFEROL) 50 mcg (2000 units) tablet Take 1 tablet (50 mcg) by mouth daily for 120 days 2/5/24 6/4/24 Yes Didi Fisher MD       Previous Transplant Hx: No    Cardiovascular Hx:       h/o Cardiac Issues: No       Exercise Tolerance: no chest pain or shortness of breath with exertion.    Potential Donor(s): No    ROS:    REVIEW OF SYSTEMS (check box if normal)  [x]                GENERAL  [x]                  PULMONARY [x]                 GENITOURINARY  [x]                 CNS                 [x]                  CARDIAC  [x]                  ENDOCRINE  [x]                 EARS,NOSE,THROAT [x]                  GASTROINTESTINAL [x]                  NEUROLOGIC    [x]                 MUSCLOSKELTAL  [x]                   HEMATOLOGY    Examination:     Vitals:  /86   Pulse 54   Ht 1.816 m (5' 11.5\")   Wt 98.2 kg (216 lb 6.4 oz)   SpO2 100%   BMI 29.76 kg/m      GENERAL APPEARANCE: alert and no distress  EYES: PERRL  HENT: mouth without ulcers or lesions  NECK: supple, no adenopathy  RESP: lungs clear to auscultation - no rales, rhonchi or wheezes  CV: regular rhythm, normal rate, no rub   ABDOMEN:  soft, nontender, no HSM or masses and bowel sounds normal. No sig ascites, edema etc  MS: extremities normal- no gross deformities noted, no evidence of inflammation in joints, no muscle tenderness  SKIN: no rash  NEURO: Normal strength and tone, sensory exam grossly normal, mentation intact and speech normal  PSYCH: mentation appears normal. and affect normal/bright      Results:   Recent Results (from the past 168 hour(s))   EKG 12-lead, tracing only [EKG1]    " Collection Time: 04/16/24  9:20 AM   Result Value Ref Range    Systolic Blood Pressure  mmHg    Diastolic Blood Pressure  mmHg    Ventricular Rate 59 BPM    Atrial Rate 59 BPM    NM Interval 148 ms    QRS Duration 90 ms     ms    QTc 447 ms    P Axis 15 degrees    R AXIS 36 degrees    T Axis 26 degrees    Interpretation ECG       Sinus bradycardia  Otherwise normal ECG  No previous ECGs available     CBC with platelets    Collection Time: 04/16/24  9:26 AM   Result Value Ref Range    WBC Count 4.0 4.0 - 11.0 10e3/uL    RBC Count 3.68 (L) 4.40 - 5.90 10e6/uL    Hemoglobin 11.0 (L) 13.3 - 17.7 g/dL    Hematocrit 32.8 (L) 40.0 - 53.0 %    MCV 89 78 - 100 fL    MCH 29.9 26.5 - 33.0 pg    MCHC 33.5 31.5 - 36.5 g/dL    RDW 14.9 10.0 - 15.0 %    Platelet Count 78 (L) 150 - 450 10e3/uL   INR    Collection Time: 04/16/24  9:26 AM   Result Value Ref Range    INR 1.27 (H) 0.85 - 1.15   Comprehensive metabolic panel    Collection Time: 04/16/24  9:26 AM   Result Value Ref Range    Sodium 137 135 - 145 mmol/L    Potassium 4.8 3.4 - 5.3 mmol/L    Carbon Dioxide (CO2) 22 22 - 29 mmol/L    Anion Gap 10 7 - 15 mmol/L    Urea Nitrogen 32.4 (H) 6.0 - 20.0 mg/dL    Creatinine 1.87 (H) 0.67 - 1.17 mg/dL    GFR Estimate 45 (L) >60 mL/min/1.73m2    Calcium 9.4 8.6 - 10.0 mg/dL    Chloride 105 98 - 107 mmol/L    Glucose 101 (H) 70 - 99 mg/dL    Alkaline Phosphatase 96 40 - 150 U/L    AST 61 (H) 0 - 45 U/L    ALT 36 0 - 70 U/L    Protein Total 7.6 6.4 - 8.3 g/dL    Albumin 3.7 3.5 - 5.2 g/dL    Bilirubin Total 0.8 <=1.2 mg/dL   Ferritin    Collection Time: 04/16/24  9:26 AM   Result Value Ref Range    Ferritin 135 31 - 409 ng/mL   Iron and iron binding capacity    Collection Time: 04/16/24  9:26 AM   Result Value Ref Range    Iron 51 (L) 61 - 157 ug/dL    Iron Binding Capacity 263 240 - 430 ug/dL    Iron Sat Index 19 15 - 46 %   Phosphorus    Collection Time: 04/16/24  9:26 AM   Result Value Ref Range    Phosphorus 3.8 2.5 - 4.5 mg/dL    TSH with free T4 reflex    Collection Time: 04/16/24  9:26 AM   Result Value Ref Range    TSH 1.61 0.30 - 4.20 uIU/mL   Prostate spec antigen screen    Collection Time: 04/16/24  9:26 AM   Result Value Ref Range    Prostate Specific Antigen Screen 0.21 0.00 - 2.50 ng/mL   Adult Type and Screen    Collection Time: 04/16/24  9:26 AM   Result Value Ref Range    ABO/RH(D) AB POS     Antibody Screen Negative Negative    SPECIMEN EXPIRATION DATE [id number]    Bilirubin direct    Collection Time: 04/16/24  9:26 AM   Result Value Ref Range    Bilirubin Direct 0.26 0.00 - 0.30 mg/dL   UA Macroscopic with reflex to Microscopic and Culture    Collection Time: 04/16/24  9:52 AM    Specimen: Urine, Midstream   Result Value Ref Range    Color Urine Yellow Colorless, Straw, Light Yellow, Yellow    Appearance Urine Clear Clear    Glucose Urine Negative Negative mg/dL    Bilirubin Urine Negative Negative    Ketones Urine Negative Negative mg/dL    Specific Gravity Urine 1.021 1.003 - 1.035    Blood Urine Negative Negative    pH Urine 6.5 5.0 - 7.0    Protein Albumin Urine Negative Negative mg/dL    Urobilinogen Urine 2.0 Normal, 2.0 mg/dL    Nitrite Urine Negative Negative    Leukocyte Esterase Urine Negative Negative     I had a long discussion with the patient regarding liver transplantation which included but was not limited to  the following points:    Liver transplant selection committee process.  The federal rules for cadaveric waiting list, the size and blood type matching of the organ. The availability of living-related donor transplantation.  The types of donors: brain death donors, non-heart beating donors, partial liver grafts: splits and living donor grafts  Extended criteria  Donors (older age, steasosis) and the increased  risk of primary non-function using the extended criteria donors  The CDC high risk donors,  Risk of donor transmitted infections and donor transmitted malignancy  The liver transplant operation  and the associated risks and technical complications which can include intraoperative death, post operative death,  Primary non-function, bleeding requiring re-operations, arterial and biliary complications, bowel perforations, and intra abdominal abscess. Some of these complicaitons may require a second operation.  The postoperative course, the ICU stay and risk of postoperative complications which can include sepsis, MI, stroke, brain injury, pneumonia, pleural effusions, and renal dysfunction.  The current 1 year and 5 year graft and patient survivals.  The need for life long immunosuppressive therapy and the side effects of these medications, including the possibility of toxicity, opportunistic infections, risk of cancer including lymphoma, and the possibility of rejection even if the patient is taking the medication exactly as prescribed.  The need for compliance with medications and follow-up visits in the clinic and thereafter.  The patient and family understand these risks and wish to proceed to transplantation       I spent over 60 minutes: 10 chart review and looking for xrays, 30 f2f, explanation and discussion, 15 documentation.      Again, thank you for allowing me to participate in the care of your patient.        Sincerely,        Abbe Macias MD

## 2024-04-17 LAB
AFP SERPL-MCNC: 2 NG/ML
ATRIAL RATE - MUSE: 59 BPM
DIASTOLIC BLOOD PRESSURE - MUSE: NORMAL MMHG
ETHYL GLUCURONIDE UR QL SCN: NEGATIVE NG/ML
GAMMA INTERFERON BACKGROUND BLD IA-ACNC: 0.03 IU/ML
INTERPRETATION ECG - MUSE: NORMAL
M TB IFN-G BLD-IMP: NEGATIVE
M TB IFN-G CD4+ BCKGRND COR BLD-ACNC: 7.7 IU/ML
MITOGEN IGNF BCKGRD COR BLD-ACNC: 0.01 IU/ML
MITOGEN IGNF BCKGRD COR BLD-ACNC: 0.02 IU/ML
P AXIS - MUSE: 15 DEGREES
PR INTERVAL - MUSE: 148 MS
QRS DURATION - MUSE: 90 MS
QT - MUSE: 452 MS
QTC - MUSE: 447 MS
QUANTIFERON MITOGEN: 7.73 IU/ML
QUANTIFERON NIL TUBE: 0.03 IU/ML
QUANTIFERON TB1 TUBE: 0.04 IU/ML
QUANTIFERON TB2 TUBE: 0.05
R AXIS - MUSE: 36 DEGREES
SYSTOLIC BLOOD PRESSURE - MUSE: NORMAL MMHG
T AXIS - MUSE: 26 DEGREES
TRANSFERRIN SERPL-MCNC: 228 MG/DL (ref 200–360)
VENTRICULAR RATE- MUSE: 59 BPM
VIT D+METAB SERPL-MCNC: 28 NG/ML (ref 20–50)

## 2024-04-18 LAB
LABORATORY REPORT: NORMAL
PETH INTERPRETATION: NORMAL
PLPETH BLD-MCNC: <10 NG/ML
POPETH BLD-MCNC: <10 NG/ML

## 2024-04-22 ENCOUNTER — TELEPHONE (OUTPATIENT)
Dept: NEPHROLOGY | Facility: CLINIC | Age: 44
End: 2024-04-22
Payer: COMMERCIAL

## 2024-04-22 DIAGNOSIS — E61.1 IRON DEFICIENCY: Primary | ICD-10-CM

## 2024-04-22 DIAGNOSIS — K70.30 ALCOHOLIC CIRRHOSIS (H): ICD-10-CM

## 2024-05-10 NOTE — TELEPHONE ENCOUNTER
RECORDS RECEIVED FROM:    DATE RECEIVED:    NOTES STATUS DETAILS   OFFICE NOTE from referring provider  Internal 3/12/24 note- Nimco Diego MD  @  HEPATOLOGY    OFFICE NOTE from other cardiologists  Care Everywhere 12/26/23 note-  HEPATOLOGY @ [facility]    RECORDS from hospital/ED Internal 9/2/23 [facility]   MEDICATION LIST Internal    GENERAL CARDIO RECORDS   (ALL APPOINTMENT TYPES)     LABS (CBC,BMP,CMP, TSH) Internal    EKG (STRIPS & REPORTS) Internal 4/16/24   MONITORS (STRIPS & REPORTS) N/A    ECHOS (IMAGES AND REPORTS) Care Everywhere 1/5/24   STRESS TESTS (IMAGES AND REPORTS) Internal 9/8/20   cMRI (IMAGES AND REPORTS) N/A    Cardiac cath (IMAGES AND REPORTS) N/A    CT/CTA (IMAGES AND REPORTS) Internal 6/3/24 scheduled

## 2024-05-15 ENCOUNTER — MYC MEDICAL ADVICE (OUTPATIENT)
Dept: TRANSPLANT | Facility: CLINIC | Age: 44
End: 2024-05-15
Payer: COMMERCIAL

## 2024-05-21 ENCOUNTER — DOCUMENTATION ONLY (OUTPATIENT)
Dept: TRANSPLANT | Facility: CLINIC | Age: 44
End: 2024-05-21
Payer: COMMERCIAL

## 2024-05-21 NOTE — PROGRESS NOTES
I received patient's discharge summary from [facility]. It indicates that he successfully completed the program. Documentation sent to HIM to be scanned into his EMR.     Ad is an appropriate liver transplant candidate from a psychosocial perspective.

## 2024-05-24 ENCOUNTER — TELEPHONE (OUTPATIENT)
Dept: GASTROENTEROLOGY | Facility: CLINIC | Age: 44
End: 2024-05-24
Payer: COMMERCIAL

## 2024-05-24 NOTE — TELEPHONE ENCOUNTER
Attempted to contact patient in order to complete pre assessment questions.     No answer. Left message to return call to [phone] option 4    Callback required communication sent via [employer]ardio.      Procedure details:    Patient scheduled for Upper endoscopy (EGD) on 6.6.24.     Arrival time: 1100. Procedure time 1230    Facility location: [facility]; [address] [place] [address], [address] Floor, [place] [address]. Check in location: Main entrance at registration desk.    Sedation type: MAC    Pre op exam needed? N/A    Indication for procedure:   Bleeding esophageal varices, unspecified esophageal varices type            Chart review:     Electronic implanted devices? No    Recent diagnosis of diverticulitis within the last 6 weeks? N/A    Diabetic? No      Medication review:    Anticoagulants? No    NSAIDS? No    Other medication HOLDING recommendations:  N/A      Prep for procedure:     Prep instructions sent via [employer]ardio.       Ashtyn Rowland RN  Endoscopy Procedure Pre Assessment RN

## 2024-05-28 NOTE — TELEPHONE ENCOUNTER
Pre assessment completed for upcoming procedure.   (Please see previous telephone encounter notes for complete details)    Patient  returned call.       Procedure details:    Arrival time and facility location reviewed.    Pre op exam needed? N/A    Designated  policy reviewed. Instructed to have someone stay 24  hours post procedure.       Medication review:    Medications reviewed. Please see supporting documentation below. Holding recommendations discussed (if applicable).       Prep for procedure:     Procedure prep instructions reviewed.        Any additional information needed:  N/A      Patient  verbalized understanding and had no questions or concerns at this time.      Jennifer Tsai RN  Endoscopy Procedure Pre Assessment RN  [phone] option 4

## 2024-06-03 ENCOUNTER — HOSPITAL ENCOUNTER (OUTPATIENT)
Dept: CT IMAGING | Facility: CLINIC | Age: 44
Discharge: HOME | End: 2024-06-03
Attending: INTERNAL MEDICINE | Admitting: INTERNAL MEDICINE
Payer: COMMERCIAL

## 2024-06-03 VITALS — SYSTOLIC BLOOD PRESSURE: 123 MMHG | HEART RATE: 52 BPM | DIASTOLIC BLOOD PRESSURE: 86 MMHG

## 2024-06-03 DIAGNOSIS — K70.30 ALCOHOLIC CIRRHOSIS (H): ICD-10-CM

## 2024-06-03 PROCEDURE — 75574 CT ANGIO HRT W/3D IMAGE: CPT | Mod: 26 | Performed by: INTERNAL MEDICINE

## 2024-06-03 PROCEDURE — 75574 CT ANGIO HRT W/3D IMAGE: CPT

## 2024-06-03 PROCEDURE — 250N000011 HC RX IP 250 OP 636: Performed by: INTERNAL MEDICINE

## 2024-06-03 PROCEDURE — 250N000013 HC RX MED GY IP 250 OP 250 PS 637: Performed by: INTERNAL MEDICINE

## 2024-06-03 RX ORDER — ACYCLOVIR 200 MG/1
0-1 CAPSULE ORAL
Status: DISCONTINUED | OUTPATIENT
Start: 2024-06-03 | End: 2024-06-04 | Stop reason: HOSPADM

## 2024-06-03 RX ORDER — IOPAMIDOL 755 MG/ML
120 INJECTION, SOLUTION INTRAVASCULAR ONCE
Status: COMPLETED | OUTPATIENT
Start: 2024-06-03 | End: 2024-06-03

## 2024-06-03 RX ORDER — NITROGLYCERIN 0.4 MG/1
.4-.8 TABLET SUBLINGUAL
Status: DISCONTINUED | OUTPATIENT
Start: 2024-06-03 | End: 2024-06-04 | Stop reason: HOSPADM

## 2024-06-03 RX ORDER — DIPHENHYDRAMINE HYDROCHLORIDE 50 MG/ML
25-50 INJECTION INTRAMUSCULAR; INTRAVENOUS
Status: DISCONTINUED | OUTPATIENT
Start: 2024-06-03 | End: 2024-06-04 | Stop reason: HOSPADM

## 2024-06-03 RX ORDER — METOPROLOL TARTRATE 1 MG/ML
5-15 INJECTION, SOLUTION INTRAVENOUS
Status: DISCONTINUED | OUTPATIENT
Start: 2024-06-03 | End: 2024-06-04 | Stop reason: HOSPADM

## 2024-06-03 RX ORDER — ONDANSETRON 2 MG/ML
4 INJECTION INTRAMUSCULAR; INTRAVENOUS
Status: DISCONTINUED | OUTPATIENT
Start: 2024-06-03 | End: 2024-06-04 | Stop reason: HOSPADM

## 2024-06-03 RX ORDER — IVABRADINE 5 MG/1
5-15 TABLET, FILM COATED ORAL
Status: DISCONTINUED | OUTPATIENT
Start: 2024-06-03 | End: 2024-06-04 | Stop reason: HOSPADM

## 2024-06-03 RX ORDER — DIPHENHYDRAMINE HCL 25 MG
25 CAPSULE ORAL
Status: DISCONTINUED | OUTPATIENT
Start: 2024-06-03 | End: 2024-06-04 | Stop reason: HOSPADM

## 2024-06-03 RX ORDER — METOPROLOL TARTRATE 25 MG/1
25-100 TABLET, FILM COATED ORAL
Status: COMPLETED | OUTPATIENT
Start: 2024-06-03 | End: 2024-06-03

## 2024-06-03 RX ORDER — METHYLPREDNISOLONE SODIUM SUCCINATE 125 MG/2ML
125 INJECTION, POWDER, LYOPHILIZED, FOR SOLUTION INTRAMUSCULAR; INTRAVENOUS
Status: DISCONTINUED | OUTPATIENT
Start: 2024-06-03 | End: 2024-06-04 | Stop reason: HOSPADM

## 2024-06-03 RX ADMIN — NITROGLYCERIN 0.8 MG: 0.4 TABLET SUBLINGUAL at 09:04

## 2024-06-03 RX ADMIN — METOPROLOL TARTRATE 50 MG: 50 TABLET, FILM COATED ORAL at 08:18

## 2024-06-03 RX ADMIN — IOPAMIDOL 120 ML: 755 INJECTION, SOLUTION INTRAVENOUS at 09:01

## 2024-06-03 NOTE — PROGRESS NOTES
Pt arrived for Coronary CT angiogram. Test, meds and side effects reviewed with pt. Resting HR 61 bpm. Given 50 mg PO Metoprolol per verbal order. Administered 0.8 mg SL nitro on CTA table per order. CTA completed. Patient tolerated procedure well and denies symptoms of allergic reaction. Post monitoring completed and VSS. D/C instructions reviewed with pt whom verbalized understanding of need to increase PO fluids today. D/C to gold waiting room accompanied by staff.    Inez Pineda RN

## 2024-06-05 ENCOUNTER — OFFICE VISIT (OUTPATIENT)
Dept: CARDIOLOGY | Facility: CLINIC | Age: 44
End: 2024-06-05
Attending: INTERNAL MEDICINE
Payer: COMMERCIAL

## 2024-06-05 ENCOUNTER — ANESTHESIA EVENT (OUTPATIENT)
Dept: GASTROENTEROLOGY | Facility: CLINIC | Age: 44
End: 2024-06-05
Payer: COMMERCIAL

## 2024-06-05 ENCOUNTER — PRE VISIT (OUTPATIENT)
Dept: CARDIOLOGY | Facility: CLINIC | Age: 44
End: 2024-06-05
Payer: COMMERCIAL

## 2024-06-05 ENCOUNTER — TELEPHONE (OUTPATIENT)
Dept: CARDIOLOGY | Facility: CLINIC | Age: 44
End: 2024-06-05

## 2024-06-05 VITALS
HEIGHT: 69 IN | WEIGHT: 224.7 LBS | SYSTOLIC BLOOD PRESSURE: 121 MMHG | DIASTOLIC BLOOD PRESSURE: 83 MMHG | BODY MASS INDEX: 33.28 KG/M2 | OXYGEN SATURATION: 100 % | HEART RATE: 63 BPM

## 2024-06-05 DIAGNOSIS — D63.8 ANEMIA IN OTHER CHRONIC DISEASES CLASSIFIED ELSEWHERE: ICD-10-CM

## 2024-06-05 DIAGNOSIS — R59.9 LYMPH NODE ENLARGEMENT: Primary | ICD-10-CM

## 2024-06-05 DIAGNOSIS — I10 ESSENTIAL HYPERTENSION: Primary | ICD-10-CM

## 2024-06-05 DIAGNOSIS — I25.10 CORONARY ARTERY DISEASE INVOLVING NATIVE CORONARY ARTERY OF NATIVE HEART WITHOUT ANGINA PECTORIS: ICD-10-CM

## 2024-06-05 DIAGNOSIS — K70.30 ALCOHOLIC CIRRHOSIS OF LIVER WITHOUT ASCITES (H): ICD-10-CM

## 2024-06-05 DIAGNOSIS — Z01.810 PRE-OPERATIVE CARDIOVASCULAR EXAMINATION: ICD-10-CM

## 2024-06-05 DIAGNOSIS — E78.2 MIXED HYPERLIPIDEMIA: ICD-10-CM

## 2024-06-05 DIAGNOSIS — D69.6 THROMBOCYTOPENIA (H): ICD-10-CM

## 2024-06-05 DIAGNOSIS — D68.9 COAGULOPATHY (H): ICD-10-CM

## 2024-06-05 DIAGNOSIS — K70.31 ALCOHOLIC CIRRHOSIS OF LIVER WITH ASCITES (H): ICD-10-CM

## 2024-06-05 PROCEDURE — 99213 OFFICE O/P EST LOW 20 MIN: CPT | Performed by: INTERNAL MEDICINE

## 2024-06-05 PROCEDURE — 99204 OFFICE O/P NEW MOD 45 MIN: CPT | Performed by: INTERNAL MEDICINE

## 2024-06-05 RX ORDER — ASPIRIN 325 MG
325 TABLET ORAL ONCE
Status: CANCELLED | OUTPATIENT
Start: 2024-06-05 | End: 2024-06-05

## 2024-06-05 RX ORDER — POTASSIUM CHLORIDE 1500 MG/1
20 TABLET, EXTENDED RELEASE ORAL
Status: CANCELLED | OUTPATIENT
Start: 2024-06-05

## 2024-06-05 RX ORDER — ASPIRIN 81 MG/1
325 TABLET ORAL DAILY
Qty: 90 TABLET | Refills: 0 | Status: SHIPPED | OUTPATIENT
Start: 2024-06-05 | End: 2024-06-05

## 2024-06-05 RX ORDER — ASPIRIN 81 MG/1
81 TABLET ORAL DAILY
Qty: 90 TABLET | Refills: 0 | Status: SHIPPED | OUTPATIENT
Start: 2024-06-05 | End: 2024-06-11

## 2024-06-05 RX ORDER — LIDOCAINE 40 MG/G
CREAM TOPICAL
Status: CANCELLED | OUTPATIENT
Start: 2024-06-05

## 2024-06-05 RX ORDER — SODIUM CHLORIDE 9 MG/ML
INJECTION, SOLUTION INTRAVENOUS CONTINUOUS
Status: CANCELLED | OUTPATIENT
Start: 2024-06-05

## 2024-06-05 RX ORDER — ASPIRIN 81 MG/1
243 TABLET, CHEWABLE ORAL ONCE
Status: CANCELLED | OUTPATIENT
Start: 2024-06-05

## 2024-06-05 RX ORDER — POTASSIUM CHLORIDE 1500 MG/1
40 TABLET, EXTENDED RELEASE ORAL
Status: CANCELLED | OUTPATIENT
Start: 2024-06-05

## 2024-06-05 ASSESSMENT — PAIN SCALES - GENERAL: PAINLEVEL: NO PAIN (0)

## 2024-06-05 NOTE — PROGRESS NOTES
I am delighted to see Elroy Morel Sr. in consultation.The primary encounter diagnosis was Essential hypertension. Diagnoses of Alcoholic cirrhosis (H), Alcoholic cirrhosis of liver with ascites (H), Anemia in other chronic diseases classified elsewhere, Coagulopathy (H24), Thrombocytopenia (H24), Mixed hyperlipidemia, Pre-operative cardiovascular examination, and Coronary artery disease involving native coronary artery of native heart without angina pectoris were also pertinent to this visit.   As you know, the patient is a 43 year old  male. He   has a past medical history of Alcohol use disorder, severe, dependence (H), Alcohol withdrawal seizure (H), Alcoholic cirrhosis of liver with ascites (H), Chronic kidney disease, Coronary artery disease, Esophageal varices (H), Essential hypertension, Gastroesophageal reflux disease without esophagitis, History of methamphetamine use, Hypercholesterolemia, Hyperlipidemia, and Tobacco abuse..    On this visit, the patient states that he has good exercise tolerance.  The patient denies chest pressure/discomfort, dyspnea, palpitations, near-syncope, syncope, orthopnea, paroxysmal nocturnal dyspnea, and lower extermity edema.    The patient's cardiovascular risk factors include hypertension, high cholesterol, and positive family history.    The following portions of the patient's history were reviewed and updated as appropriate: allergies, current medications, past family history, past medical history, past social history, past surgical history, and the problem list.    PMH: The patient's past medical history includes:    Past Medical History:   Diagnosis Date    Alcohol use disorder, severe, dependence (H)     Alcohol withdrawal seizure (H)     Alcoholic cirrhosis of liver with ascites (H)     Chronic kidney disease     Coronary artery disease     Esophageal varices (H)     Essential hypertension     Gastroesophageal reflux disease without esophagitis      History of methamphetamine use     Sustained remission since 2003    Hypercholesterolemia     Hyperlipidemia     Tobacco abuse       Past Surgical History:   Procedure Laterality Date    COLONOSCOPY N/A 8/28/2023    Procedure: COLONOSCOPY, WITH POLYPECTOMY via bx forceps;  Surgeon: Alyssa Tsai MD;  Location: [facility]    IR PARACENTESIS  8/31/2023    IR PARACENTESIS  9/15/2023    IR PARACENTESIS  9/19/2023    IR PARACENTESIS  9/22/2023    IR PARACENTESIS  9/26/2023    IR PARACENTESIS  9/29/2023    IR PARACENTESIS  10/10/2023    IR PARACENTESIS  10/17/2023    IR PARACENTESIS  10/20/2023    IR PARACENTESIS  10/24/2023    IR PARACENTESIS  10/27/2023    IR PARACENTESIS  10/31/2023    IR PARACENTESIS  11/3/2023    IR PARACENTESIS  11/7/2023    IR PARACENTESIS  11/10/2023    IR PARACENTESIS  11/14/2023    IR PARACENTESIS  11/17/2023    IR PARACENTESIS  11/21/2023    IR PARACENTESIS  11/24/2023    IR PARACENTESIS  11/28/2023    IR PARACENTESIS  12/1/2023    IR PARACENTESIS  12/5/2023    IR PARACENTESIS  12/8/2023    IR PARACENTESIS  12/15/2023    IR PARACENTESIS  1/4/2024    IR PARACENTESIS  12/29/2023    IR PARACENTESIS  12/22/2023    IR PARACENTESIS  12/19/2023    IR PARACENTESIS  1/16/2024    IR PARACENTESIS  1/9/2024       The patient's medications as of the current encounter are:     Current Outpatient Medications   Medication Sig Dispense Refill    carvedilol (COREG) 6.25 MG tablet Take 1 tablet (6.25 mg) by mouth 2 times daily (with meals) 90 tablet 3    furosemide (LASIX) 20 MG tablet Take 1 tablet (20 mg) by mouth daily 90 tablet 1    pantoprazole (PROTONIX) 20 MG EC tablet Take 1 tablet (20 mg) by mouth daily 90 tablet 3    rifaximin (XIFAXAN) 550 MG TABS tablet 1 tablet (550 mg) by Oral or Feeding Tube route 2 times daily 60 tablet 3    sertraline (ZOLOFT) 50 MG tablet Take 1 tablet (50 mg) by mouth daily 30 tablet 3    spironolactone (ALDACTONE) 100 MG tablet Take 1 tablet (100 mg) by mouth daily  90 tablet 3       Labs:     Lab on 04/16/2024   Component Date Value Ref Range Status    WBC Count 04/16/2024 4.0  4.0 - 11.0 10e3/uL Final    RBC Count 04/16/2024 3.68 (L)  4.40 - 5.90 10e6/uL Final    Hemoglobin 04/16/2024 11.0 (L)  13.3 - 17.7 g/dL Final    Hematocrit 04/16/2024 32.8 (L)  40.0 - 53.0 % Final    MCV 04/16/2024 89  78 - 100 fL Final    MCH 04/16/2024 29.9  26.5 - 33.0 pg Final    MCHC 04/16/2024 33.5  31.5 - 36.5 g/dL Final    RDW 04/16/2024 14.9  10.0 - 15.0 % Final    Platelet Count 04/16/2024 78 (L)  150 - 450 10e3/uL Final    INR 04/16/2024 1.27 (H)  0.85 - 1.15 Final    Sodium 04/16/2024 137  135 - 145 mmol/L Final    Reference intervals for this test were updated on 09/26/2023 to more accurately reflect our healthy population. There may be differences in the flagging of prior results with similar values performed with this method. Interpretation of those prior results can be made in the context of the updated reference intervals.     Potassium 04/16/2024 4.8  3.4 - 5.3 mmol/L Final    Carbon Dioxide (CO2) 04/16/2024 22  22 - 29 mmol/L Final    Anion Gap 04/16/2024 10  7 - 15 mmol/L Final    Urea Nitrogen 04/16/2024 32.4 (H)  6.0 - 20.0 mg/dL Final    Creatinine 04/16/2024 1.87 (H)  0.67 - 1.17 mg/dL Final    GFR Estimate 04/16/2024 45 (L)  >60 mL/min/1.73m2 Final    Calcium 04/16/2024 9.4  8.6 - 10.0 mg/dL Final    Chloride 04/16/2024 105  98 - 107 mmol/L Final    Glucose 04/16/2024 101 (H)  70 - 99 mg/dL Final    Alkaline Phosphatase 04/16/2024 96  40 - 150 U/L Final    Reference intervals for this test were updated on 11/14/2023 to more accurately reflect our healthy population. There may be differences in the flagging of prior results with similar values performed with this method. Interpretation of those prior results can be made in the context of the updated reference intervals.    AST 04/16/2024 61 (H)  0 - 45 U/L Final    Reference intervals for this test were updated on 6/12/2023 to  more accurately reflect our healthy population. There may be differences in the flagging of prior results with similar values performed with this method. Interpretation of those prior results can be made in the context of the updated reference intervals.    ALT 04/16/2024 36  0 - 70 U/L Final    Reference intervals for this test were updated on 6/12/2023 to more accurately reflect our healthy population. There may be differences in the flagging of prior results with similar values performed with this method. Interpretation of those prior results can be made in the context of the updated reference intervals.      Protein Total 04/16/2024 7.6  6.4 - 8.3 g/dL Final    Albumin 04/16/2024 3.7  3.5 - 5.2 g/dL Final    Bilirubin Total 04/16/2024 0.8  <=1.2 mg/dL Final    AFP tumor marker 04/16/2024 2.0  <=8.3 ng/mL Final    Ferritin 04/16/2024 135  31 - 409 ng/mL Final    Iron 04/16/2024 51 (L)  61 - 157 ug/dL Final    Iron Binding Capacity 04/16/2024 263  240 - 430 ug/dL Final    Iron Sat Index 04/16/2024 19  15 - 46 % Final    Phosphorus 04/16/2024 3.8  2.5 - 4.5 mg/dL Final    Transferrin 04/16/2024 228.0  200.0 - 360.0 mg/dL Final    TSH 04/16/2024 1.61  0.30 - 4.20 uIU/mL Final    Vitamin D, Total (25-Hydroxy) 04/16/2024 28  20 - 50 ng/mL Final    optimum levels    Prostate Specific Antigen Screen 04/16/2024 0.21  0.00 - 2.50 ng/mL Final    Ethyl Glucuronide Urine 04/16/2024 Negative  Cutoff 500 ng/mL Final    INTERPRETIVE INFORMATION: Ethyl Glucuronide Screen with   Reflex to Confirmation, Urine    Ethyl glucuronide is a direct metabolite of ethanol and can   be detected up to 80 hours in urine after ethanol   ingestion.  The cutoff for positive by immunoassay is set   at 500 ng/mL.  A positive result will be confirmed by   liquid chromatography tandem mass spectrometry (LC-MS/MS).  Performed By: [facility]  [address] [place] [id number]  : Yasir Uribe MD, PhD  LISBETH  Number: [id number]    PEth 16:0/18:1 (POPEth) 04/16/2024 <10  ng/mL Final    PEth 16:0/18:1 (POPEth)  Less than 10 ng/mL............Not detected  Less than 20 ng/mL............Abstinence or light alcohol   consumption  20 - 200 ng/mL................Moderate alcohol consumption  Greater than 200 ng/mL........Heavy alcohol consumption or   chronic alcohol use    (Reference: NISREEN Zimmerman and JOHNNY Peters 2018 J. Forensic Sci)    PEth 16:0/18:2 (PLPEth) 04/16/2024 <10  ng/mL Final    Reference ranges are not well established.    EER Phosphatidylethanol (PETH) 04/16/2024 See Note   Final    Authorized individuals can access the [facility]   Enhanced Report using the following link:      [email or web address]    PEth Interpretation 04/16/2024 See Comment   Final    Comment: Phosphatidylethanol (PEth) is a group of phospholipids   formed in the presence of ethanol, phospholipase D and   phosphatidylcholine. PEth is known to be a direct alcohol   biomarker. The predominant PEth homologues are PEth   16:0/18:1 (POPEth) and PEth 16:0/18:2 (PLPEth), which   account for 37-46% and 26-28% of the total PEth homologues,   respectively. PEth is incorporated into the phospholipid   membrane of red blood cells and has a general half-life of   4-10 days and a window of detection of 2-4 weeks. However,   the window of detection is longer in individuals who   chronically or excessively consume alcohol. The limit of   quantification is 10 ng/mL. Serial monitoring of PEth may   be helpful in monitoring alcohol abstinence over time. PEth   results should be interpreted in the context of the   patient's clinical and behavioral history.  Patients with advanced liver disease may have falsely   elevated PEth concentrations (Rebecca CURRY et al 2018,   Alcoholism Clinical &                            Experimental Research).    This test was developed and its performance characteristics   determined by [facility]. It has not  been cleared or   approved by the U.S. Food and Drug Administration. This   test was performed in a CLIA-certified laboratory and is   intended for clinical purposes.  Performed By: [facility]  [address] [place] [address]  : Yasir Uribe MD, PhD  CLIA Number: [id number]    Color Urine 04/16/2024 Yellow  Colorless, Straw, Light Yellow, Yellow Final    Appearance Urine 04/16/2024 Clear  Clear Final    Glucose Urine 04/16/2024 Negative  Negative mg/dL Final    Bilirubin Urine 04/16/2024 Negative  Negative Final    Ketones Urine 04/16/2024 Negative  Negative mg/dL Final    Specific Gravity Urine 04/16/2024 1.021  1.003 - 1.035 Final    Blood Urine 04/16/2024 Negative  Negative Final    pH Urine 04/16/2024 6.5  5.0 - 7.0 Final    Protein Albumin Urine 04/16/2024 Negative  Negative mg/dL Final    Urobilinogen Urine 04/16/2024 2.0  Normal, 2.0 mg/dL Final    Nitrite Urine 04/16/2024 Negative  Negative Final    Leukocyte Esterase Urine 04/16/2024 Negative  Negative Final    ABO/RH(D) 04/16/2024 AB POS   Final    Antibody Screen 04/16/2024 Negative  Negative Final    SPECIMEN EXPIRATION DATE 04/16/2024 [id number]   Final    Quantiferon Nil Tube 04/16/2024 0.03  IU/mL Final    Quantiferon TB1 Tube 04/16/2024 0.04  IU/mL Final    Quantiferon TB2 Tube 04/16/2024 0.05   Final    Quantiferon Mitogen 04/16/2024 7.73  IU/mL Final    Bilirubin Direct 04/16/2024 0.26  0.00 - 0.30 mg/dL Final    Quantiferon-TB Gold Plus 04/16/2024 Negative  Negative Final    No interferon gamma response to M.tuberculosis antigens was detected. Infection with M.tuberculosis is unlikely, however a single negative result does not exclude infection. In patients at high risk for infection, a second test should be considered in accordance with the 2017 ATS/IDSA/CDC Clinical Pract  ice Guidelines for Diagnosis of Tuberculosis in Adults and Children     TB1 Ag minus Nil Value 04/16/2024 0.01  IU/mL Final     TB2 Ag minus Nil Value 04/16/2024 0.02  IU/mL Final    Mitogen minus Nil Result 04/16/2024 7.70  IU/mL Final    Nil Result 04/16/2024 0.03  IU/mL Final       Allergies:    Allergies   Allergen Reactions    Dust Mites     Pollen Extract     Metronidazole Dizziness, Other (See Comments) and Unknown     Other Reaction(s): Other (see comments)    Feels foggy on this medication    Omeprazole Other (See Comments) and Unknown     Irritability (tolerates Protonix well)    Other Reaction(s): Other (see comments)       Family History:   Family History   Problem Relation Age of Onset    Substance Abuse Mother     Coronary Artery Disease Mother     Cerebrovascular Disease Mother     Substance Abuse Father     Substance Abuse Brother     No Known Problems Son     No Known Problems Son     Liver Cancer No family hx of     Liver Disease No family hx of        Psychosocial history:  reports that he quit smoking about 12 years ago. His smoking use included cigarettes. He has never used smokeless tobacco. He reports that he does not currently use alcohol. He reports that he does not currently use drugs after having used the following drugs: Amphetamines.    Review of systems: negative for, palpitations, exertional chest pain or pressure, paroxysmal nocturnal dyspnea, dyspnea on exertion, orthopnea, lower extremity edema, syncope or near-syncope, and exercise intolerance    In addition,   General: No change in weight, sleep or appetite.  Normal energy.  No fever or chills  Eyes: Negative for vision changes or eye problems  ENT: No problems with ears, nose or throat.  No difficulty swallowing.  Resp: No coughing, wheezing or shortness of breath  GI: No nausea, vomiting,  heartburn, abdominal pain, diarrhea, constipation or change in bowel habits; cirrhosis  : No urinary frequency or dysuria, bladder or kidney problems  Musculoskeletal: No significant muscle or joint pains  Neurologic: No headaches, numbness, tingling, weakness,  "problems with balance or coordination  Psychiatric: No problems with anxiety, depression or mental health  Heme/immune/allergy: anemia, thrombocytopenia, coagulopathy  Endocrine: No history of thyroid disease, diabetes or other endocrine disorders  Skin: No rashes,worrisome lesions or skin problems  Vascular:  No claudication, lifestyle limiting or otherwise; no ischemic rest pain; no non-healing ulcers. No weakness, No loss of sensation        Physical examination  Vitals: /83 (BP Location: Right arm, Patient Position: Chair, Cuff Size: Adult Regular)   Pulse 63   Ht 1.762 m (5' 9.37\")   Wt 101.9 kg (224 lb 11.2 oz)   SpO2 100%   BMI 32.83 kg/m    BMI= Body mass index is 32.83 kg/m .    In general, the patient is a pleasant male in no apparent distress.    HEENT: Normiocephalic and atraumatic.  PERRLA.  EOMI.  Sclerae white, not injected.  Nares clear.  Pharynx without erythema or exudate.  Dentition intact.    Neck: No adenopathy.  No thyromegaly. Carotids +2/2 bilaterally without bruits.  No jugular venous distension.   Heart:  The PMI is in the 5th ICS in the midclavicular line. There is no heave. Regular rate and rhythm. Normal S1, S2 splits physiologically. +s4   Lungs: Clear to asculation.  No ronchi, wheezes, rales.  No dullness to percussion.   Abdomen: Soft, nontender, nondistended. No organomegaly. No AAA.  No bruits.   Extremities: No clubbing, cyanosis, or edema. The pulses were intact bilaterally.   Neurological: The neurological examination reveal a patient who was oriented to person, place, and time.  The remainder of the examination was nonfocal.    Cardiac tests include:    4/16/24 - ecg - sinus alcides, normal axis, waveforms  12/26/23 - echo - normal EF, no significant pulmonary HTN, mild ascending aortic dilation  6/3/24 - CTA - Ca score 364, severe prox LAD, moderate ramus      Assessment and Plan    CAD/preop - plan cath, start ASA  HTN - carvedilol, spironolactone  HL - start statin " after cath    The patient is to return  after procedure. The patient understood the treatment plan as outlined above.  There were no barriers to learning.      Tonny Quintanilla MD

## 2024-06-05 NOTE — ANESTHESIA PREPROCEDURE EVALUATION
Anesthesia Pre-Procedure Evaluation    Patient: Elroy Morel .   MRN: [id number] : 1980        Procedure : Procedure(s):  Esophagoscopy, gastroscopy, duodenoscopy (EGD), combined          Past Medical History:   Diagnosis Date    Alcohol use disorder, severe, dependence (H)     Alcohol withdrawal seizure (H)     Alcoholic cirrhosis of liver with ascites (H)     Chronic kidney disease     Coronary artery disease     Esophageal varices (H)     Essential hypertension     Gastroesophageal reflux disease without esophagitis     History of methamphetamine use     Sustained remission since     Hypercholesterolemia     Hyperlipidemia     Tobacco abuse       Past Surgical History:   Procedure Laterality Date    COLONOSCOPY N/A 2023    Procedure: COLONOSCOPY, WITH POLYPECTOMY via bx forceps;  Surgeon: Alyssa Tsai MD;  Location: [facility]    IR PARACENTESIS  2023    IR PARACENTESIS  9/15/2023    IR PARACENTESIS  2023    IR PARACENTESIS  2023    IR PARACENTESIS  2023    IR PARACENTESIS  2023    IR PARACENTESIS  10/10/2023    IR PARACENTESIS  10/17/2023    IR PARACENTESIS  10/20/2023    IR PARACENTESIS  10/24/2023    IR PARACENTESIS  10/27/2023    IR PARACENTESIS  10/31/2023    IR PARACENTESIS  11/3/2023    IR PARACENTESIS  2023    IR PARACENTESIS  11/10/2023    IR PARACENTESIS  2023    IR PARACENTESIS  2023    IR PARACENTESIS  2023    IR PARACENTESIS  2023    IR PARACENTESIS  2023    IR PARACENTESIS  2023    IR PARACENTESIS  2023    IR PARACENTESIS  2023    IR PARACENTESIS  12/15/2023    IR PARACENTESIS  2024    IR PARACENTESIS  2023    IR PARACENTESIS  2023    IR PARACENTESIS  2023    IR PARACENTESIS  2024    IR PARACENTESIS  2024      Allergies   Allergen Reactions    Dust Mites     Pollen Extract     Metronidazole Dizziness, Other (See Comments) and Unknown     Other Reaction(s):  Other (see comments)    Feels foggy on this medication    Omeprazole Other (See Comments) and Unknown     Irritability (tolerates Protonix well)    Other Reaction(s): Other (see comments)      Social History     Tobacco Use    Smoking status: Former     Current packs/day: 0.00     Types: Cigarettes     Quit date:      Years since quittin.4    Smokeless tobacco: Never   Substance Use Topics    Alcohol use: Not Currently     Comment: last drink 2023      Wt Readings from Last 1 Encounters:   24 101.9 kg (224 lb 11.2 oz)        Anesthesia Evaluation   Pt has had prior anesthetic.         ROS/MED HX  ENT/Pulmonary:       Neurologic:       Cardiovascular:     (+)  hypertension- -  CAD -  - -                                      METS/Exercise Tolerance:     Hematologic: Comments: # thrombocytopenia    (+)      anemia,          Musculoskeletal:       GI/Hepatic:     (+)   esophageal disease, Varices,         liver disease,       Renal/Genitourinary:     (+) renal disease, type: CRI,            Endo:       Psychiatric/Substance Use:     (+)   alcohol abuse      Infectious Disease:       Malignancy:       Other:               OUTSIDE LABS:  CBC:   Lab Results   Component Value Date    WBC 4.0 2024    WBC 3.9 (L) 2024    HGB 11.0 (L) 2024    HGB 11.3 (L) 2024    HCT 32.8 (L) 2024    HCT 35.1 (L) 2024    PLT 78 (L) 2024    PLT 89 (L) 2024     BMP:   Lab Results   Component Value Date     2024     2024    POTASSIUM 4.8 2024    POTASSIUM 4.7 2024    CHLORIDE 105 2024    CHLORIDE 107 2024    CO2 22 2024    CO2 23 2024    BUN 32.4 (H) 2024    BUN 36.1 (H) 2024    CR 1.87 (H) 2024    CR 1.98 (H) 2024     (H) 2024     (H) 2024     COAGS:   Lab Results   Component Value Date    PTT 42 (H) 2023    INR 1.27 (H) 2024    FIBR 203 10/15/2023     POC:  "No results found for: \"BGM\", \"HCG\", \"HCGS\"  HEPATIC:   Lab Results   Component Value Date    ALBUMIN 3.7 04/16/2024    PROTTOTAL 7.6 04/16/2024    ALT 36 04/16/2024    AST 61 (H) 04/16/2024    ALKPHOS 96 04/16/2024    BILITOTAL 0.8 04/16/2024    MYLES 111 (HH) 10/13/2023     OTHER:   Lab Results   Component Value Date    LACT 2.4 (H) 10/13/2023    A1C 5.7 (H) 06/23/2023    ENEDINA 9.4 04/16/2024    PHOS 3.8 04/16/2024    MAG 1.9 10/13/2023    LIPASE 162 (H) 10/13/2023    TSH 1.61 04/16/2024    SED 39 (H) 08/11/2023       Anesthesia Plan    ASA Status:  3       Anesthesia Type: MAC.     - Reason for MAC: straight local not clinically adequate              Consents            Postoperative Care            Comments:               Terry Allen MD    I have reviewed the pertinent notes and labs in the chart from the past 30 days and (re)examined the patient.  Any updates or changes from those notes are reflected in this note.              # Obesity: Estimated body mass index is 32.83 kg/m  as calculated from the following:    Height as of 6/5/24: 1.762 m (5' 9.37\").    Weight as of 6/5/24: 101.9 kg (224 lb 11.2 oz).      "

## 2024-06-05 NOTE — TELEPHONE ENCOUNTER
Pt's wife Malissa (consent to communicate on file) called inquiring what time they need to arrive for the EGD scheduled tomorrow 6/6/24.  The time of 1100 am for the arrival was reviewed.      No other questions or concerns at this time.     Mariana Johnson RN

## 2024-06-05 NOTE — LETTER
6/5/2024      RE: Elroy Husseinconner Stahl.  [address] [place] [address]       Dear Colleague,    Thank you for the opportunity to participate in the care of your patient, Elroy Morel Juan José, at the [facility] [place] [facility]. Please see a copy of my visit note below.    I am delighted to see Elroy Hightower Adriel Stark in consultation.The primary encounter diagnosis was Essential hypertension. Diagnoses of Alcoholic cirrhosis (H), Alcoholic cirrhosis of liver with ascites (H), Anemia in other chronic diseases classified elsewhere, Coagulopathy (H24), Thrombocytopenia (H24), Mixed hyperlipidemia, Pre-operative cardiovascular examination, and Coronary artery disease involving native coronary artery of native heart without angina pectoris were also pertinent to this visit.   As you know, the patient is a 43 year old  male. He   has a past medical history of Alcohol use disorder, severe, dependence (H), Alcohol withdrawal seizure (H), Alcoholic cirrhosis of liver with ascites (H), Chronic kidney disease, Coronary artery disease, Esophageal varices (H), Essential hypertension, Gastroesophageal reflux disease without esophagitis, History of methamphetamine use, Hypercholesterolemia, Hyperlipidemia, and Tobacco abuse..    On this visit, the patient states that he has good exercise tolerance.  The patient denies chest pressure/discomfort, dyspnea, palpitations, near-syncope, syncope, orthopnea, paroxysmal nocturnal dyspnea, and lower extermity edema.    The patient's cardiovascular risk factors include hypertension, high cholesterol, and positive family history.    The following portions of the patient's history were reviewed and updated as appropriate: allergies, current medications, past family history, past medical history, past social history, past surgical history, and the problem list.    PMH: The patient's past medical  history includes:    Past Medical History:   Diagnosis Date     Alcohol use disorder, severe, dependence (H)      Alcohol withdrawal seizure (H)      Alcoholic cirrhosis of liver with ascites (H)      Chronic kidney disease      Coronary artery disease      Esophageal varices (H)      Essential hypertension      Gastroesophageal reflux disease without esophagitis      History of methamphetamine use     Sustained remission since 2003     Hypercholesterolemia      Hyperlipidemia      Tobacco abuse       Past Surgical History:   Procedure Laterality Date     COLONOSCOPY N/A 8/28/2023    Procedure: COLONOSCOPY, WITH POLYPECTOMY via bx forceps;  Surgeon: Alyssa Tsai MD;  Location: [facility]     IR PARACENTESIS  8/31/2023     IR PARACENTESIS  9/15/2023     IR PARACENTESIS  9/19/2023     IR PARACENTESIS  9/22/2023     IR PARACENTESIS  9/26/2023     IR PARACENTESIS  9/29/2023     IR PARACENTESIS  10/10/2023     IR PARACENTESIS  10/17/2023     IR PARACENTESIS  10/20/2023     IR PARACENTESIS  10/24/2023     IR PARACENTESIS  10/27/2023     IR PARACENTESIS  10/31/2023     IR PARACENTESIS  11/3/2023     IR PARACENTESIS  11/7/2023     IR PARACENTESIS  11/10/2023     IR PARACENTESIS  11/14/2023     IR PARACENTESIS  11/17/2023     IR PARACENTESIS  11/21/2023     IR PARACENTESIS  11/24/2023     IR PARACENTESIS  11/28/2023     IR PARACENTESIS  12/1/2023     IR PARACENTESIS  12/5/2023     IR PARACENTESIS  12/8/2023     IR PARACENTESIS  12/15/2023     IR PARACENTESIS  1/4/2024     IR PARACENTESIS  12/29/2023     IR PARACENTESIS  12/22/2023     IR PARACENTESIS  12/19/2023     IR PARACENTESIS  1/16/2024     IR PARACENTESIS  1/9/2024       The patient's medications as of the current encounter are:     Current Outpatient Medications   Medication Sig Dispense Refill     carvedilol (COREG) 6.25 MG tablet Take 1 tablet (6.25 mg) by mouth 2 times daily (with meals) 90 tablet 3     furosemide (LASIX) 20 MG tablet Take 1 tablet (20  mg) by mouth daily 90 tablet 1     pantoprazole (PROTONIX) 20 MG EC tablet Take 1 tablet (20 mg) by mouth daily 90 tablet 3     rifaximin (XIFAXAN) 550 MG TABS tablet 1 tablet (550 mg) by Oral or Feeding Tube route 2 times daily 60 tablet 3     sertraline (ZOLOFT) 50 MG tablet Take 1 tablet (50 mg) by mouth daily 30 tablet 3     spironolactone (ALDACTONE) 100 MG tablet Take 1 tablet (100 mg) by mouth daily 90 tablet 3       Labs:     Lab on 04/16/2024   Component Date Value Ref Range Status     WBC Count 04/16/2024 4.0  4.0 - 11.0 10e3/uL Final     RBC Count 04/16/2024 3.68 (L)  4.40 - 5.90 10e6/uL Final     Hemoglobin 04/16/2024 11.0 (L)  13.3 - 17.7 g/dL Final     Hematocrit 04/16/2024 32.8 (L)  40.0 - 53.0 % Final     MCV 04/16/2024 89  78 - 100 fL Final     MCH 04/16/2024 29.9  26.5 - 33.0 pg Final     MCHC 04/16/2024 33.5  31.5 - 36.5 g/dL Final     RDW 04/16/2024 14.9  10.0 - 15.0 % Final     Platelet Count 04/16/2024 78 (L)  150 - 450 10e3/uL Final     INR 04/16/2024 1.27 (H)  0.85 - 1.15 Final     Sodium 04/16/2024 137  135 - 145 mmol/L Final    Reference intervals for this test were updated on 09/26/2023 to more accurately reflect our healthy population. There may be differences in the flagging of prior results with similar values performed with this method. Interpretation of those prior results can be made in the context of the updated reference intervals.      Potassium 04/16/2024 4.8  3.4 - 5.3 mmol/L Final     Carbon Dioxide (CO2) 04/16/2024 22  22 - 29 mmol/L Final     Anion Gap 04/16/2024 10  7 - 15 mmol/L Final     Urea Nitrogen 04/16/2024 32.4 (H)  6.0 - 20.0 mg/dL Final     Creatinine 04/16/2024 1.87 (H)  0.67 - 1.17 mg/dL Final     GFR Estimate 04/16/2024 45 (L)  >60 mL/min/1.73m2 Final     Calcium 04/16/2024 9.4  8.6 - 10.0 mg/dL Final     Chloride 04/16/2024 105  98 - 107 mmol/L Final     Glucose 04/16/2024 101 (H)  70 - 99 mg/dL Final     Alkaline Phosphatase 04/16/2024 96  40 - 150 U/L Final     Reference intervals for this test were updated on 11/14/2023 to more accurately reflect our healthy population. There may be differences in the flagging of prior results with similar values performed with this method. Interpretation of those prior results can be made in the context of the updated reference intervals.     AST 04/16/2024 61 (H)  0 - 45 U/L Final    Reference intervals for this test were updated on 6/12/2023 to more accurately reflect our healthy population. There may be differences in the flagging of prior results with similar values performed with this method. Interpretation of those prior results can be made in the context of the updated reference intervals.     ALT 04/16/2024 36  0 - 70 U/L Final    Reference intervals for this test were updated on 6/12/2023 to more accurately reflect our healthy population. There may be differences in the flagging of prior results with similar values performed with this method. Interpretation of those prior results can be made in the context of the updated reference intervals.       Protein Total 04/16/2024 7.6  6.4 - 8.3 g/dL Final     Albumin 04/16/2024 3.7  3.5 - 5.2 g/dL Final     Bilirubin Total 04/16/2024 0.8  <=1.2 mg/dL Final     AFP tumor marker 04/16/2024 2.0  <=8.3 ng/mL Final     Ferritin 04/16/2024 135  31 - 409 ng/mL Final     Iron 04/16/2024 51 (L)  61 - 157 ug/dL Final     Iron Binding Capacity 04/16/2024 263  240 - 430 ug/dL Final     Iron Sat Index 04/16/2024 19  15 - 46 % Final     Phosphorus 04/16/2024 3.8  2.5 - 4.5 mg/dL Final     Transferrin 04/16/2024 228.0  200.0 - 360.0 mg/dL Final     TSH 04/16/2024 1.61  0.30 - 4.20 uIU/mL Final     Vitamin D, Total (25-Hydroxy) 04/16/2024 28  20 - 50 ng/mL Final    optimum levels     Prostate Specific Antigen Screen 04/16/2024 0.21  0.00 - 2.50 ng/mL Final     Ethyl Glucuronide Urine 04/16/2024 Negative  Cutoff 500 ng/mL Final    INTERPRETIVE INFORMATION: Ethyl Glucuronide Screen with   Reflex to  Confirmation, Urine    Ethyl glucuronide is a direct metabolite of ethanol and can   be detected up to 80 hours in urine after ethanol   ingestion.  The cutoff for positive by immunoassay is set   at 500 ng/mL.  A positive result will be confirmed by   liquid chromatography tandem mass spectrometry (LC-MS/MS).  Performed By: [facility]  [address] [place] [id number]  : Yasir Uribe MD, PhD  CLIA Number: [id number]     PEth 16:0/18:1 (POPEth) 04/16/2024 <10  ng/mL Final    PEth 16:0/18:1 (POPEth)  Less than 10 ng/mL............Not detected  Less than 20 ng/mL............Abstinence or light alcohol   consumption  20 - 200 ng/mL................Moderate alcohol consumption  Greater than 200 ng/mL........Heavy alcohol consumption or   chronic alcohol use    (Reference: NISREEN Zimmerman and JOHNNY Peters 2018 J. Forensic Sci)     PEth 16:0/18:2 (PLPEth) 04/16/2024 <10  ng/mL Final    Reference ranges are not well established.     EER Phosphatidylethanol (PETH) 04/16/2024 See Note   Final    Authorized individuals can access the [facility]   Enhanced Report using the following link:      [email or web address]     PEth Interpretation 04/16/2024 See Comment   Final    Comment: Phosphatidylethanol (PEth) is a group of phospholipids   formed in the presence of ethanol, phospholipase D and   phosphatidylcholine. PEth is known to be a direct alcohol   biomarker. The predominant PEth homologues are PEth   16:0/18:1 (POPEth) and PEth 16:0/18:2 (PLPEth), which   account for 37-46% and 26-28% of the total PEth homologues,   respectively. PEth is incorporated into the phospholipid   membrane of red blood cells and has a general half-life of   4-10 days and a window of detection of 2-4 weeks. However,   the window of detection is longer in individuals who   chronically or excessively consume alcohol. The limit of   quantification is 10 ng/mL. Serial monitoring of PEth may   be  helpful in monitoring alcohol abstinence over time. PEth   results should be interpreted in the context of the   patient's clinical and behavioral history.  Patients with advanced liver disease may have falsely   elevated PEth concentrations (Rebecca CURRY et al 2018,   Alcoholism Clinical &                            Experimental Research).    This test was developed and its performance characteristics   determined by [facility]. It has not been cleared or   approved by the U.S. Food and Drug Administration. This   test was performed in a CLIA-certified laboratory and is   intended for clinical purposes.  Performed By: [facility]  [address] [place] [address]  : Yasir Uribe MD, PhD  CLIA Number: [id number]     Color Urine 04/16/2024 Yellow  Colorless, Straw, Light Yellow, Yellow Final     Appearance Urine 04/16/2024 Clear  Clear Final     Glucose Urine 04/16/2024 Negative  Negative mg/dL Final     Bilirubin Urine 04/16/2024 Negative  Negative Final     Ketones Urine 04/16/2024 Negative  Negative mg/dL Final     Specific Gravity Urine 04/16/2024 1.021  1.003 - 1.035 Final     Blood Urine 04/16/2024 Negative  Negative Final     pH Urine 04/16/2024 6.5  5.0 - 7.0 Final     Protein Albumin Urine 04/16/2024 Negative  Negative mg/dL Final     Urobilinogen Urine 04/16/2024 2.0  Normal, 2.0 mg/dL Final     Nitrite Urine 04/16/2024 Negative  Negative Final     Leukocyte Esterase Urine 04/16/2024 Negative  Negative Final     ABO/RH(D) 04/16/2024 AB POS   Final     Antibody Screen 04/16/2024 Negative  Negative Final     SPECIMEN EXPIRATION DATE 04/16/2024 [id number]   Final     Quantiferon Nil Tube 04/16/2024 0.03  IU/mL Final     Quantiferon TB1 Tube 04/16/2024 0.04  IU/mL Final     Quantiferon TB2 Tube 04/16/2024 0.05   Final     Quantiferon Mitogen 04/16/2024 7.73  IU/mL Final     Bilirubin Direct 04/16/2024 0.26  0.00 - 0.30 mg/dL Final     Quantiferon-TB Gold Plus  04/16/2024 Negative  Negative Final    No interferon gamma response to M.tuberculosis antigens was detected. Infection with M.tuberculosis is unlikely, however a single negative result does not exclude infection. In patients at high risk for infection, a second test should be considered in accordance with the 2017 ATS/IDSA/CDC Clinical Pract  ice Guidelines for Diagnosis of Tuberculosis in Adults and Children      TB1 Ag minus Nil Value 04/16/2024 0.01  IU/mL Final     TB2 Ag minus Nil Value 04/16/2024 0.02  IU/mL Final     Mitogen minus Nil Result 04/16/2024 7.70  IU/mL Final     Nil Result 04/16/2024 0.03  IU/mL Final       Allergies:    Allergies   Allergen Reactions     Dust Mites      Pollen Extract      Metronidazole Dizziness, Other (See Comments) and Unknown     Other Reaction(s): Other (see comments)    Feels foggy on this medication     Omeprazole Other (See Comments) and Unknown     Irritability (tolerates Protonix well)    Other Reaction(s): Other (see comments)       Family History:   Family History   Problem Relation Age of Onset     Substance Abuse Mother      Coronary Artery Disease Mother      Cerebrovascular Disease Mother      Substance Abuse Father      Substance Abuse Brother      No Known Problems Son      No Known Problems Son      Liver Cancer No family hx of      Liver Disease No family hx of        Psychosocial history:  reports that he quit smoking about 12 years ago. His smoking use included cigarettes. He has never used smokeless tobacco. He reports that he does not currently use alcohol. He reports that he does not currently use drugs after having used the following drugs: Amphetamines.    Review of systems: negative for, palpitations, exertional chest pain or pressure, paroxysmal nocturnal dyspnea, dyspnea on exertion, orthopnea, lower extremity edema, syncope or near-syncope, and exercise intolerance    In addition,   General: No change in weight, sleep or appetite.  Normal energy.   "No fever or chills  Eyes: Negative for vision changes or eye problems  ENT: No problems with ears, nose or throat.  No difficulty swallowing.  Resp: No coughing, wheezing or shortness of breath  GI: No nausea, vomiting,  heartburn, abdominal pain, diarrhea, constipation or change in bowel habits; cirrhosis  : No urinary frequency or dysuria, bladder or kidney problems  Musculoskeletal: No significant muscle or joint pains  Neurologic: No headaches, numbness, tingling, weakness, problems with balance or coordination  Psychiatric: No problems with anxiety, depression or mental health  Heme/immune/allergy: anemia, thrombocytopenia, coagulopathy  Endocrine: No history of thyroid disease, diabetes or other endocrine disorders  Skin: No rashes,worrisome lesions or skin problems  Vascular:  No claudication, lifestyle limiting or otherwise; no ischemic rest pain; no non-healing ulcers. No weakness, No loss of sensation        Physical examination  Vitals: /83 (BP Location: Right arm, Patient Position: Chair, Cuff Size: Adult Regular)   Pulse 63   Ht 1.762 m (5' 9.37\")   Wt 101.9 kg (224 lb 11.2 oz)   SpO2 100%   BMI 32.83 kg/m    BMI= Body mass index is 32.83 kg/m .    In general, the patient is a pleasant male in no apparent distress.    HEENT: Normiocephalic and atraumatic.  PERRLA.  EOMI.  Sclerae white, not injected.  Nares clear.  Pharynx without erythema or exudate.  Dentition intact.    Neck: No adenopathy.  No thyromegaly. Carotids +2/2 bilaterally without bruits.  No jugular venous distension.   Heart:  The PMI is in the 5th ICS in the midclavicular line. There is no heave. Regular rate and rhythm. Normal S1, S2 splits physiologically. +s4   Lungs: Clear to asculation.  No ronchi, wheezes, rales.  No dullness to percussion.   Abdomen: Soft, nontender, nondistended. No organomegaly. No AAA.  No bruits.   Extremities: No clubbing, cyanosis, or edema. The pulses were intact bilaterally.   Neurological: " The neurological examination reveal a patient who was oriented to person, place, and time.  The remainder of the examination was nonfocal.    Cardiac tests include:    4/16/24 - ecg - sinus alcides, normal axis, waveforms  12/26/23 - echo - normal EF, no significant pulmonary HTN, mild ascending aortic dilation  6/3/24 - CTA - Ca score 364, severe prox LAD, moderate ramus      Assessment and Plan    CAD/preop - plan cath, start ASA  HTN - carvedilol, spironolactone  HL - start statin after cath    The patient is to return  after procedure. The patient understood the treatment plan as outlined above.  There were no barriers to learning.      Tonny Quintanilla MD

## 2024-06-05 NOTE — PATIENT INSTRUCTIONS
Complete a diagnostic procedure called a Coronary Angiogram.    As soon as results are compiled and reviewed, you will be notified.    Follow up based on results.    Begin aspirin 81 mg daily. RX sent to pharmacy.     Coronary Angiogram    A coronary angiogram is a procedure that uses X-ray imaging to see your heart's blood vessels. The test is generally done to see if there's a restriction in blood flow going to the heart.    Coronary angiograms are part of a general group of procedures known as heart (cardiac) catheterizations. Cardiac catheterization procedures can both diagnose and treat heart and blood vessel conditions. A coronary angiogram, which can help diagnose heart conditions, is the most common type of cardiac catheterization procedure.    During a coronary angiogram, a type of dye that's visible by an X-ray machine is injected into the blood vessels of your heart. The X-ray machine rapidly takes a series of images (angiograms), offering a look at your blood vessels. If necessary, your doctor can open clogged heart arteries (angioplasty) during your coronary angiogram.    When you arrive you will be escorted back to the pre procedure area. Here they will insert an IV, draw labs, and obtain a short medical history. Please bring an updated list of your current medications.  A physician will come and talk with you about the procedure and obtain consent.  A nurse from the Cardiac Catheterization Lab will then escort you to the procedure area. You will be receiving sedation during the procedure so you will need someone to drive you home from the hospital and ensure that you arrive safely inside your home.    A small incision is made at the entry site- usually the neck, wrist, or groin, and a thin catheter inserted into your artery and carefully threaded to your heart or coronary arteries.  Dye (contrast material) is injected through the catheter. The dye is easy to see on X-ray images. As it moves through  your blood vessels, your doctor can observe its flow and identify any blockages or constricted areas. Depending on what your doctor discovers during your angiogram, you may have additional catheter procedures at the same time, such as a stent placement to open up a narrowed artery.     Having an angiogram takes about one hour, although it may be longer, especially if combined with other cardiac catheterization procedures. Preparation and post-procedure care can add more time.  When the angiogram is over, the catheter is removed from your arm or groin and the incision is closed.    After the procedure you will recover for a short period (2 - 6 hours). You will be discharged with instructions for post procedure care. However, depending on what the angiogram shows you may need to have stents placed.  Very rarely, this may require an overnight stay. We ask that you bring a small bag of necessities for your comfort if you would need to stay overnight. Leave valuables at home.    Pre-procedure instructions - Coronary Angiogram  Patient Education    Location is [address] Waiting Room  Please plan on being at the hospital all day.  At any time, emergencies and/or urgent cases may come up which could delay the start of your procedure.    Pre-procedure instructions - Coronary Angiogram  Shower in the evening before or the morning of the procedure  No solid food for 8 hours prior and nothing to drink 2 hours prior to arrival time  You can take your morning medications (except for diabetic and blood thinners) with sips of water.  Take 325 mg of Asprin 24 hours prior to the procedure and the morning of procedure.   You will need to arrange a ride to drop you off and pick you up, as you will be unable to drive home.  Prior to discharge you may be required to lay flat for approximately 2-4 hours in the recovery unit to ensure proper  clotting of the artery. You will need a responsible adult to stay with you for 24 hours post-procedure.              Diabetic Medication Instructions  Hold oral diabetic medication in morning of your procedure and for 48 hours after IV contrast is given  Typical instructions for insulin diabetic medication holding are below. However, please reach out to your Primary Care Provider or Endocrinologist for specific instructions  DO NOT take any oral diabetic medication, short-acting diabetes medications/insulin, humalog or regular insulin the morning of your test  Take   dose of long-acting insulin (Lantus, Levemir) the day of your test  Remember to bring your glucometer and insulin with you to take after your test if needed  GLP-1 Agonists Instructions  DO NOT take injectable GLP-1 agonists semaglutide (Ozempic, Wegovy), dulaglutide (Trulicity), exenatide ER (Bydureon), tirzepatide (Mounjaro), or oral semaglutide (Rybelsus) for 7 days prior your procedure  Hold once daily injectable GLP-1 agonists exenatide (Byetta), liraglutide (Saxenda, Victoza), lixisenatide (Soligua) the day before and day of your procedure                  Anticoagulation Medication Instructions   NA  Write N/A if not currently taking    You will need to follow up with one of our cardiology APPs 1-2 weeks after your procedure. If you need help scheduling or rescheduling your appointment, please call [phone]

## 2024-06-05 NOTE — TELEPHONE ENCOUNTER
Cath Lab Case Request/Order    Location: [address] [address] [facility] Waiting Room    Procedure: Coronary Angiogram    Procedure Date: 6/26    Patient Arrival Time: 6:30    Procedure Time: 2nd case to follow    Ordering Provider:     Performing Cardiologist: Dr. Tyree Quinteros    Inpatient Bed Needed: No    Post-  Procedure CHANDRIKA appointment scheduled (1 - 2 weeks): YES     Date: 7/8     Provider: Danielle Orozco    Communicated Patient Instructions:     NPO, nothing to eat 8 hours and drink 2 hours before arrival time: Yes     , need to arrange a ride home - unable to drive post- procedure: No     Adult at home, need a responsible adult to stay with patient 24 hours post- procedure: No    Appointment was scheduled: Over the phone    Patient expressed understanding of above instructions and denied further questions at this time.    Dipti Alba

## 2024-06-06 ENCOUNTER — ANESTHESIA (OUTPATIENT)
Dept: GASTROENTEROLOGY | Facility: CLINIC | Age: 44
End: 2024-06-06
Payer: COMMERCIAL

## 2024-06-06 ENCOUNTER — HOSPITAL ENCOUNTER (OUTPATIENT)
Facility: CLINIC | Age: 44
Discharge: HOME | End: 2024-06-06
Attending: INTERNAL MEDICINE | Admitting: INTERNAL MEDICINE
Payer: COMMERCIAL

## 2024-06-06 VITALS
TEMPERATURE: 97.9 F | OXYGEN SATURATION: 100 % | SYSTOLIC BLOOD PRESSURE: 140 MMHG | DIASTOLIC BLOOD PRESSURE: 86 MMHG | RESPIRATION RATE: 16 BRPM | HEART RATE: 64 BPM

## 2024-06-06 DIAGNOSIS — K70.30 ALCOHOLIC CIRRHOSIS (H): ICD-10-CM

## 2024-06-06 DIAGNOSIS — I10 ESSENTIAL HYPERTENSION: ICD-10-CM

## 2024-06-06 DIAGNOSIS — D69.6 THROMBOCYTOPENIA (H): ICD-10-CM

## 2024-06-06 DIAGNOSIS — D68.9 COAGULOPATHY (H): ICD-10-CM

## 2024-06-06 DIAGNOSIS — K70.31 ALCOHOLIC CIRRHOSIS OF LIVER WITH ASCITES (H): ICD-10-CM

## 2024-06-06 DIAGNOSIS — Z01.810 PRE-OPERATIVE CARDIOVASCULAR EXAMINATION: ICD-10-CM

## 2024-06-06 DIAGNOSIS — E78.2 MIXED HYPERLIPIDEMIA: ICD-10-CM

## 2024-06-06 DIAGNOSIS — D63.8 ANEMIA IN OTHER CHRONIC DISEASES CLASSIFIED ELSEWHERE: ICD-10-CM

## 2024-06-06 DIAGNOSIS — I25.10 CORONARY ARTERY DISEASE INVOLVING NATIVE CORONARY ARTERY OF NATIVE HEART WITHOUT ANGINA PECTORIS: ICD-10-CM

## 2024-06-06 LAB — UPPER GI ENDOSCOPY: NORMAL

## 2024-06-06 PROCEDURE — 370N000017 HC ANESTHESIA TECHNICAL FEE, PER MIN: Performed by: INTERNAL MEDICINE

## 2024-06-06 PROCEDURE — 250N000009 HC RX 250: Performed by: STUDENT IN AN ORGANIZED HEALTH CARE EDUCATION/TRAINING PROGRAM

## 2024-06-06 PROCEDURE — 258N000003 HC RX IP 258 OP 636: Mod: JZ | Performed by: STUDENT IN AN ORGANIZED HEALTH CARE EDUCATION/TRAINING PROGRAM

## 2024-06-06 PROCEDURE — 43251 EGD REMOVE LESION SNARE: CPT | Performed by: INTERNAL MEDICINE

## 2024-06-06 PROCEDURE — 88305 TISSUE EXAM BY PATHOLOGIST: CPT | Mod: 26 | Performed by: PATHOLOGY

## 2024-06-06 PROCEDURE — 88305 TISSUE EXAM BY PATHOLOGIST: CPT | Mod: TC | Performed by: INTERNAL MEDICINE

## 2024-06-06 PROCEDURE — 250N000011 HC RX IP 250 OP 636: Performed by: STUDENT IN AN ORGANIZED HEALTH CARE EDUCATION/TRAINING PROGRAM

## 2024-06-06 PROCEDURE — 43235 EGD DIAGNOSTIC BRUSH WASH: CPT | Performed by: INTERNAL MEDICINE

## 2024-06-06 PROCEDURE — 43255 EGD CONTROL BLEEDING ANY: CPT | Mod: 59

## 2024-06-06 PROCEDURE — 43235 EGD DIAGNOSTIC BRUSH WASH: CPT | Performed by: ANESTHESIOLOGY

## 2024-06-06 RX ORDER — PROPOFOL 10 MG/ML
INJECTION, EMULSION INTRAVENOUS CONTINUOUS PRN
Status: DISCONTINUED | OUTPATIENT
Start: 2024-06-06 | End: 2024-06-06

## 2024-06-06 RX ORDER — LIDOCAINE 40 MG/G
CREAM TOPICAL
Status: DISCONTINUED | OUTPATIENT
Start: 2024-06-06 | End: 2024-06-06 | Stop reason: HOSPADM

## 2024-06-06 RX ORDER — DEXAMETHASONE SODIUM PHOSPHATE 4 MG/ML
4 INJECTION, SOLUTION INTRA-ARTICULAR; INTRALESIONAL; INTRAMUSCULAR; INTRAVENOUS; SOFT TISSUE
Status: DISCONTINUED | OUTPATIENT
Start: 2024-06-06 | End: 2024-06-06 | Stop reason: HOSPADM

## 2024-06-06 RX ORDER — ONDANSETRON 2 MG/ML
4 INJECTION INTRAMUSCULAR; INTRAVENOUS EVERY 30 MIN PRN
Status: DISCONTINUED | OUTPATIENT
Start: 2024-06-06 | End: 2024-06-06 | Stop reason: HOSPADM

## 2024-06-06 RX ORDER — ONDANSETRON 2 MG/ML
4 INJECTION INTRAMUSCULAR; INTRAVENOUS
Status: DISCONTINUED | OUTPATIENT
Start: 2024-06-06 | End: 2024-06-06 | Stop reason: HOSPADM

## 2024-06-06 RX ORDER — ONDANSETRON 4 MG/1
4 TABLET, ORALLY DISINTEGRATING ORAL EVERY 30 MIN PRN
Status: DISCONTINUED | OUTPATIENT
Start: 2024-06-06 | End: 2024-06-06 | Stop reason: HOSPADM

## 2024-06-06 RX ORDER — PROPOFOL 10 MG/ML
INJECTION, EMULSION INTRAVENOUS PRN
Status: DISCONTINUED | OUTPATIENT
Start: 2024-06-06 | End: 2024-06-06

## 2024-06-06 RX ORDER — NALOXONE HYDROCHLORIDE 0.4 MG/ML
0.1 INJECTION, SOLUTION INTRAMUSCULAR; INTRAVENOUS; SUBCUTANEOUS
Status: DISCONTINUED | OUTPATIENT
Start: 2024-06-06 | End: 2024-06-06 | Stop reason: HOSPADM

## 2024-06-06 RX ORDER — SODIUM CHLORIDE, SODIUM LACTATE, POTASSIUM CHLORIDE, CALCIUM CHLORIDE 600; 310; 30; 20 MG/100ML; MG/100ML; MG/100ML; MG/100ML
INJECTION, SOLUTION INTRAVENOUS CONTINUOUS PRN
Status: DISCONTINUED | OUTPATIENT
Start: 2024-06-06 | End: 2024-06-06

## 2024-06-06 RX ORDER — PANTOPRAZOLE SODIUM 20 MG/1
20 TABLET, DELAYED RELEASE ORAL DAILY
Qty: 90 TABLET | Refills: 3 | Status: SHIPPED | OUTPATIENT
Start: 2024-06-06 | End: 2024-06-12

## 2024-06-06 RX ORDER — OXYCODONE HYDROCHLORIDE 5 MG/1
5 TABLET ORAL
Status: DISCONTINUED | OUTPATIENT
Start: 2024-06-06 | End: 2024-06-06 | Stop reason: HOSPADM

## 2024-06-06 RX ORDER — OXYCODONE HYDROCHLORIDE 10 MG/1
10 TABLET ORAL
Status: DISCONTINUED | OUTPATIENT
Start: 2024-06-06 | End: 2024-06-06 | Stop reason: HOSPADM

## 2024-06-06 RX ADMIN — PROPOFOL 100 MCG/KG/MIN: 10 INJECTION, EMULSION INTRAVENOUS at 12:29

## 2024-06-06 RX ADMIN — PROPOFOL 70 MG: 10 INJECTION, EMULSION INTRAVENOUS at 12:28

## 2024-06-06 RX ADMIN — SODIUM CHLORIDE, POTASSIUM CHLORIDE, SODIUM LACTATE AND CALCIUM CHLORIDE: 600; 310; 30; 20 INJECTION, SOLUTION INTRAVENOUS at 12:25

## 2024-06-06 RX ADMIN — TOPICAL ANESTHETIC 1 SPRAY: 200 SPRAY DENTAL; PERIODONTAL at 12:25

## 2024-06-06 ASSESSMENT — ACTIVITIES OF DAILY LIVING (ADL)
ADLS_ACUITY_SCORE: 35
ADLS_ACUITY_SCORE: 33
ADLS_ACUITY_SCORE: 35

## 2024-06-06 NOTE — OR NURSING
Procedure: egd with ablation/stasis of multiple gastric polyps, with a total of 8 16mm clips placed  Sedation: monitored anesthesia care  Specimens: verified, sent to lab.   O2: ra  Tolerated procedure: well  Pt to recovery area in stable condition accompanied by RN and anesthesia, bedside report given to recovery RN  Other:  steris clip card given to pt    All belongings with patient at time of transfer.

## 2024-06-06 NOTE — ANESTHESIA POSTPROCEDURE EVALUATION
Patient: Elroy Morle Sr.    Procedure: Procedure(s):  Esophagoscopy, gastroscopy, duodenoscopy (EGD), combined       Anesthesia Type:  MAC    Note:  Disposition: Outpatient   Postop Pain Control: Uneventful            Sign Out: Well controlled pain   PONV: No   Neuro/Psych: Uneventful            Sign Out: Acceptable/Baseline neuro status   Airway/Respiratory: Uneventful            Sign Out: Acceptable/Baseline resp. status   CV/Hemodynamics: Uneventful            Sign Out: Acceptable CV status; No obvious hypovolemia; No obvious fluid overload   Other NRE: NONE   DID A NON-ROUTINE EVENT OCCUR? No       Last vitals:  Vitals:    06/06/24 1155 06/06/24 1335   BP: (!) 137/94 (!) 141/87   Pulse: 61    Resp: 18 14   Temp: 36.6  C (97.9  F)    SpO2: 100% 99%       Electronically Signed By: Solomon Shirley MD  June 6, 2024  1:41 PM

## 2024-06-06 NOTE — ANESTHESIA CARE TRANSFER NOTE
Patient: Elroy Morel Sr.    Procedure: Procedure(s):  Esophagoscopy, gastroscopy, duodenoscopy (EGD), combined       Diagnosis: Bleeding esophageal varices, unspecified esophageal varices type (H) [I85.01]  Diagnosis Additional Information: No value filed.    Anesthesia Type:   MAC     Note:    Oropharynx: oropharynx clear of all foreign objects and spontaneously breathing  Level of Consciousness: awake  Oxygen Supplementation: nasal cannula  Level of Supplemental Oxygen (L/min / FiO2): 2  Independent Airway: airway patency satisfactory and stable  Dentition: dentition unchanged  Vital Signs Stable: post-procedure vital signs reviewed and stable  Report to RN Given: handoff report given  Patient transferred to: Phase II    Handoff Report: Identifed the Patient, Identified the Reponsible Provider, Reviewed the pertinent medical history, Discussed the surgical course, Reviewed Intra-OP anesthesia mangement and issues during anesthesia, Set expectations for post-procedure period and Allowed opportunity for questions and acknowledgement of understanding      Vitals:  Vitals Value Taken Time   /87 06/06/24 1333   Temp     Pulse     Resp     SpO2 99 % 06/06/24 1334   Vitals shown include unfiled device data.    Electronically Signed By: Terry Allen MD  June 6, 2024  1:34 PM

## 2024-06-07 LAB
PATH REPORT.COMMENTS IMP SPEC: NORMAL
PATH REPORT.COMMENTS IMP SPEC: NORMAL
PATH REPORT.FINAL DX SPEC: NORMAL
PATH REPORT.GROSS SPEC: NORMAL
PATH REPORT.MICROSCOPIC SPEC OTHER STN: NORMAL
PATH REPORT.RELEVANT HX SPEC: NORMAL
PHOTO IMAGE: NORMAL

## 2024-06-11 ENCOUNTER — MYC REFILL (OUTPATIENT)
Dept: GASTROENTEROLOGY | Facility: CLINIC | Age: 44
End: 2024-06-11
Payer: COMMERCIAL

## 2024-06-11 ENCOUNTER — MYC REFILL (OUTPATIENT)
Dept: CARDIOLOGY | Facility: CLINIC | Age: 44
End: 2024-06-11
Payer: COMMERCIAL

## 2024-06-11 ENCOUNTER — MYC REFILL (OUTPATIENT)
Dept: NEPHROLOGY | Facility: CLINIC | Age: 44
End: 2024-06-11
Payer: COMMERCIAL

## 2024-06-11 DIAGNOSIS — K70.31 ALCOHOLIC CIRRHOSIS OF LIVER WITH ASCITES (H): ICD-10-CM

## 2024-06-11 DIAGNOSIS — E55.9 VITAMIN D DEFICIENCY: ICD-10-CM

## 2024-06-11 DIAGNOSIS — I25.10 CORONARY ARTERY DISEASE INVOLVING NATIVE CORONARY ARTERY OF NATIVE HEART WITHOUT ANGINA PECTORIS: ICD-10-CM

## 2024-06-12 DIAGNOSIS — K70.31 ALCOHOLIC CIRRHOSIS OF LIVER WITH ASCITES (H): ICD-10-CM

## 2024-06-12 RX ORDER — PANTOPRAZOLE SODIUM 20 MG/1
20 TABLET, DELAYED RELEASE ORAL DAILY
Qty: 90 TABLET | Refills: 3 | Status: SHIPPED | OUTPATIENT
Start: 2024-06-12 | End: 2024-09-09

## 2024-06-12 RX ORDER — SPIRONOLACTONE 100 MG/1
100 TABLET, FILM COATED ORAL DAILY
Qty: 90 TABLET | Refills: 3 | Status: SHIPPED | OUTPATIENT
Start: 2024-06-12 | End: 2024-06-18

## 2024-06-12 RX ORDER — CARVEDILOL 6.25 MG/1
6.25 TABLET ORAL 2 TIMES DAILY WITH MEALS
Qty: 90 TABLET | Refills: 3 | Status: SHIPPED | OUTPATIENT
Start: 2024-06-12 | End: 2024-09-09

## 2024-06-12 RX ORDER — FUROSEMIDE 20 MG
20 TABLET ORAL DAILY
Qty: 90 TABLET | Refills: 1 | Status: SHIPPED | OUTPATIENT
Start: 2024-06-12 | End: 2024-09-09

## 2024-06-12 RX ORDER — CHOLECALCIFEROL (VITAMIN D3) 50 MCG
1 TABLET ORAL DAILY
Qty: 90 TABLET | Refills: 2 | Status: ON HOLD | OUTPATIENT
Start: 2024-06-12 | End: 2024-06-26

## 2024-06-12 NOTE — TELEPHONE ENCOUNTER
Nephrology Note: Medication Refill Request    Medication Refill Request:     Medication/Dose/Frequency: Vitamin d   Preferred Pharmacy: [facility]  Provider:      Dr. Fisher                     SITUATION/BACKROUND:                 Last office visit: 4/8/2024        Future office visit: not scheduled as of yet     ASSESSMENT:     Neph Assessments:    Recent Labs:  CBC Results:  Recent Labs   Lab Test 04/16/24  0926   WBC 4.0   RBC 3.68*   HGB 11.0*   HCT 32.8*   MCV 89   MCH 29.9   MCHC 33.5   RDW 14.9   PLT 78*     Last Renal Panel:  Sodium   Date Value Ref Range Status   04/16/2024 137 135 - 145 mmol/L Final     Comment:     Reference intervals for this test were updated on 09/26/2023 to more accurately reflect our healthy population. There may be differences in the flagging of prior results with similar values performed with this method. Interpretation of those prior results can be made in the context of the updated reference intervals.      Potassium   Date Value Ref Range Status   04/16/2024 4.8 3.4 - 5.3 mmol/L Final   06/07/2022 4.1 3.5 - 5.0 mmol/L Final     Chloride   Date Value Ref Range Status   04/16/2024 105 98 - 107 mmol/L Final   06/07/2022 97 (L) 98 - 107 mmol/L Final     Carbon Dioxide (CO2)   Date Value Ref Range Status   04/16/2024 22 22 - 29 mmol/L Final   06/07/2022 24 22 - 31 mmol/L Final     Anion Gap   Date Value Ref Range Status   04/16/2024 10 7 - 15 mmol/L Final   06/07/2022 13 5 - 18 mmol/L Final     Glucose   Date Value Ref Range Status   04/16/2024 101 (H) 70 - 99 mg/dL Final   06/07/2022 97 70 - 125 mg/dL Final     Urea Nitrogen   Date Value Ref Range Status   04/16/2024 32.4 (H) 6.0 - 20.0 mg/dL Final   06/07/2022 11 8 - 22 mg/dL Final     Creatinine   Date Value Ref Range Status   04/16/2024 1.87 (H) 0.67 - 1.17 mg/dL Final     GFR Estimate   Date Value Ref Range Status   04/16/2024 45 (L) >60 mL/min/1.73m2 Final   08/27/2020 >60 >60 mL/min/1.73m2 Final     Calcium   Date Value  Ref Range Status   04/16/2024 9.4 8.6 - 10.0 mg/dL Final     Phosphorus   Date Value Ref Range Status   04/16/2024 3.8 2.5 - 4.5 mg/dL Final     Albumin   Date Value Ref Range Status   04/16/2024 3.7 3.5 - 5.2 g/dL Final   06/07/2022 4.3 3.5 - 5.0 g/dL Final       Current Medication List:   Current Outpatient Medications (Antihypertensive, Cardiovascular, Diuretics, Beta blockers, Calcium blockers, Anticoagulants)   Medication Sig    carvedilol (COREG) 6.25 MG tablet Take 1 tablet (6.25 mg) by mouth 2 times daily (with meals)    furosemide (LASIX) 20 MG tablet Take 1 tablet (20 mg) by mouth daily    spironolactone (ALDACTONE) 100 MG tablet Take 1 tablet (100 mg) by mouth daily     Current Outpatient Medications (Other)   Medication Sig    aspirin (ASPIRIN LOW DOSE) 81 MG EC tablet Take 1 tablet (81 mg) by mouth daily    pantoprazole (PROTONIX) 20 MG EC tablet Take 1 tablet (20 mg) by mouth daily    rifaximin (XIFAXAN) 550 MG TABS tablet 1 tablet (550 mg) by Oral or Feeding Tube route 2 times daily    sertraline (ZOLOFT) 50 MG tablet Take 1 tablet (50 mg) by mouth daily    vitamin D3 (CHOLECALCIFEROL) 50 mcg (2000 units) tablet Take 1 tablet (50 mcg) by mouth daily       Has patient had kidney transplant in the prior 1 year: no.   Has patient been seen the last 12 months: Yes.  Associated labs reviewed for medication: Yes    PLAN:     Medication refilled per protocol: Yes    ANTONIO CARBAJAL RN             4

## 2024-06-13 RX ORDER — CARVEDILOL 6.25 MG/1
6.25 TABLET ORAL 2 TIMES DAILY WITH MEALS
Qty: 90 TABLET | Refills: 3 | Status: SHIPPED | OUTPATIENT
Start: 2024-06-13 | End: 2024-07-07

## 2024-06-13 RX ORDER — PANTOPRAZOLE SODIUM 20 MG/1
20 TABLET, DELAYED RELEASE ORAL DAILY
Qty: 90 TABLET | Refills: 3 | Status: SHIPPED | OUTPATIENT
Start: 2024-06-13 | End: 2024-09-09

## 2024-06-13 RX ORDER — FUROSEMIDE 20 MG
20 TABLET ORAL DAILY
Qty: 90 TABLET | Refills: 1 | Status: SHIPPED | OUTPATIENT
Start: 2024-06-13 | End: 2024-09-09

## 2024-06-14 RX ORDER — ASPIRIN 81 MG/1
81 TABLET ORAL DAILY
Qty: 90 TABLET | Refills: 0 | Status: SHIPPED | OUTPATIENT
Start: 2024-06-14 | End: 2024-09-09

## 2024-06-17 DIAGNOSIS — I25.10 CORONARY ARTERY DISEASE INVOLVING NATIVE CORONARY ARTERY OF NATIVE HEART WITHOUT ANGINA PECTORIS: Primary | ICD-10-CM

## 2024-06-17 DIAGNOSIS — Z01.810 PRE-OPERATIVE CARDIOVASCULAR EXAMINATION: ICD-10-CM

## 2024-06-17 RX ORDER — SODIUM CHLORIDE 9 MG/ML
INJECTION, SOLUTION INTRAVENOUS CONTINUOUS
Status: CANCELLED | OUTPATIENT
Start: 2024-06-17

## 2024-06-17 RX ORDER — POTASSIUM CHLORIDE 1500 MG/1
40 TABLET, EXTENDED RELEASE ORAL
Status: CANCELLED | OUTPATIENT
Start: 2024-06-17

## 2024-06-17 RX ORDER — NICOTINE POLACRILEX 4 MG
15-30 LOZENGE BUCCAL
Status: CANCELLED | OUTPATIENT
Start: 2024-06-17

## 2024-06-17 RX ORDER — ASPIRIN 81 MG/1
243 TABLET, CHEWABLE ORAL ONCE
Status: CANCELLED | OUTPATIENT
Start: 2024-06-17

## 2024-06-17 RX ORDER — DEXTROSE MONOHYDRATE 25 G/50ML
25-50 INJECTION, SOLUTION INTRAVENOUS
Status: CANCELLED | OUTPATIENT
Start: 2024-06-17

## 2024-06-17 RX ORDER — POTASSIUM CHLORIDE 1500 MG/1
20 TABLET, EXTENDED RELEASE ORAL
Status: CANCELLED | OUTPATIENT
Start: 2024-06-17

## 2024-06-17 RX ORDER — LIDOCAINE 40 MG/G
CREAM TOPICAL
Status: CANCELLED | OUTPATIENT
Start: 2024-06-17

## 2024-06-17 RX ORDER — ASPIRIN 325 MG
325 TABLET ORAL ONCE
Status: CANCELLED | OUTPATIENT
Start: 2024-06-17 | End: 2024-06-17

## 2024-06-17 NOTE — PROGRESS NOTES
[facility] Hepatology    Assessment  44 year old male with past medical history of decompensated alcohol-related cirrhosis complicated by hepatic encephalopathy, ascites and variceal bleeding (8/2023) and alcohol use disorder. PMhx also includes obesity.     #. Decompensated alcohol-related cirrhosis complicated by hepatic encephalopathy, ascites and variceal bleeding (8/2023)   #. Alcohol use disorder; hx of alcohol withdrawal seizures   MELD 3.0: 15    Patient with a history of decompensated alcohol-related cirrhosis.  His disease has been complicated by hepatic encephalopathy that is currently controlled with rifaximin monotherapy.  He was previously on lactulose but reports pooping at least 3 times per day without lactulose and has had no issues with lethargy or confusion.  With regards to his ascites he is currently managed with spironolactone and Lasix however he developed breast tenderness so we will plan to switch him to eplerenone (from spironolactone).  Given his history of variceal bleeding he is currently on carvedilol.  He is up-to-date on variceal screening and liver cancer screening    With regards to the patient's alcohol use disorder he is in completed intensive outpatient programming at [facility].  He reports ongoing alcohol sobriety.    Liver Transplantation:  The patient is currently undergoing liver transplant evaluation.  He is not listed for liver transplantation.  The patient is undergoing cardiac catheterization on 6/26/2024.  In the setting of sobriety the patient's liver is slowly starting to improve with well-controlled hepatic encephalopathy and ascites at this time. MELD 3.0: 15.  Given his low MELD and improving synthetic function, will continue to have ongoing discussions regarding the value of liver transplantation given the improvement in his condition with sobriety    Plan  -- Coreg 6.25mg BID for hx of variceal bleeding  -- Diuretics: epleronone 100mg daily + lasix 20mg  daily   -- Hepatic encephalopathy: Rifaximin 550mg BID  -- HCC Screening: abdominal MRI for next imaging modality given prior adrenal lesion plus follow-up sub centimeter hepatic lesion  -- Variceal Screening: due 6/2025  -- Continue to engage in alcohol sober supports.  Continue complete alcohol sobriety.  -- Diclofenac gel for joint pains  -- Taurine 1g BID PRN cramping (OTC)    Health Maintenance:  -- Recommend yearly influenza vaccination  -- Recommend dental visits every 6 months  -- Colonoscopy: last 8/2023 with hyperplastic polyp    Nutrition & Physical Activity:  -- Low sodium (< 2 grams per day) + high protein diet    RTC: 6 months    Nimco Diego MD (Lizzie)  Advanced & Transplant Hepatology  [facility]    I spent 40 minutes on this encounter performing the following: reviewing the patient's medical record (clinic visits, hospital records, lab results, imaging and procedural documentation), history taking, physical exam and documentation on the date of the encounter. I also spent part of the time in coordination of care and counseling.    The longitudinal plan of care for the diagnosis(es)/condition(s) as documented were addressed during this visit. Due to the added complexity in care, I will continue to support Ad in the subsequent management and with ongoing continuity of care.     HPI:  ALD related Cirrhosis  - hx HE  - hx ascites  - hx variceal bleed (8/2023)  - last EGD 6/2024: small EV, no bleeding  - HCC screening - US 4/2024: no hepatic lesions    Patient presents alone.    He reports doing well without any acute concerns or complaints.  He is currently taking spironolactone 100 mg and Lasix 20 mg/day based on the medical record however he is slightly on clear of what medication dosing he is on.  He reports some sensitivity in his breasts since starting the spironolactone.  He reports that he is okay trying to reduce his sodium/salt intake but is not strict about it.   He denies any lower extremity edema.    He is currently on rifaximin twice daily.  He reports about 3 bowel movements per day.  He denies any issues with lethargy or confusion.    He denies any melena, hematochezia or other overt signs or symptoms of bleeding.    Seen by addiction 4/2024 at [place]: He has maintained abstinence from alcohol and all substances of abuse, completed intensive outpatient program, and engages in twice weekly AA with sponsorship. His mood/anxiety are stabilized and he expresses good insight into his addictions.     He is planned for a cardiac catheterization on 6/26/2024 given a CTA that demonstrated coronary calcifications    Medical hx Surgical hx   Past Medical History:   Diagnosis Date    Alcohol use disorder, severe, dependence (H)     Alcohol withdrawal seizure (H)     Alcoholic cirrhosis of liver with ascites (H)     Chronic kidney disease     Coronary artery disease     Esophageal varices (H)     Essential hypertension     Gastroesophageal reflux disease without esophagitis     History of methamphetamine use     Sustained remission since 2003    Hypercholesterolemia     Hyperlipidemia     Tobacco abuse       Past Surgical History:   Procedure Laterality Date    COLONOSCOPY N/A 8/28/2023    Procedure: COLONOSCOPY, WITH POLYPECTOMY via bx forceps;  Surgeon: Alyssa Tsai MD;  Location: [facility]    ESOPHAGOSCOPY, GASTROSCOPY, DUODENOSCOPY (EGD), COMBINED N/A 6/6/2024    Procedure: Esophagoscopy, gastroscopy, duodenoscopy (EGD), combined;  Surgeon: Leventhal, Thomas Michael, MD;  Location: [facility]    IR PARACENTESIS  8/31/2023    IR PARACENTESIS  9/15/2023    IR PARACENTESIS  9/19/2023    IR PARACENTESIS  9/22/2023    IR PARACENTESIS  9/26/2023    IR PARACENTESIS  9/29/2023    IR PARACENTESIS  10/10/2023    IR PARACENTESIS  10/17/2023    IR PARACENTESIS  10/20/2023    IR PARACENTESIS  10/24/2023    IR PARACENTESIS  10/27/2023    IR PARACENTESIS  10/31/2023    IR PARACENTESIS   11/3/2023    IR PARACENTESIS  11/7/2023    IR PARACENTESIS  11/10/2023    IR PARACENTESIS  11/14/2023    IR PARACENTESIS  11/17/2023    IR PARACENTESIS  11/21/2023    IR PARACENTESIS  11/24/2023    IR PARACENTESIS  11/28/2023    IR PARACENTESIS  12/1/2023    IR PARACENTESIS  12/5/2023    IR PARACENTESIS  12/8/2023    IR PARACENTESIS  12/15/2023    IR PARACENTESIS  1/4/2024    IR PARACENTESIS  12/29/2023    IR PARACENTESIS  12/22/2023    IR PARACENTESIS  12/19/2023    IR PARACENTESIS  1/16/2024    IR PARACENTESIS  1/9/2024          Medications  Current Outpatient Medications   Medication Sig Dispense Refill    aspirin (ASPIRIN LOW DOSE) 81 MG EC tablet Take 1 tablet (81 mg) by mouth daily 90 tablet 0    carvedilol (COREG) 6.25 MG tablet Take 1 tablet (6.25 mg) by mouth 2 times daily (with meals) 90 tablet 3    carvedilol (COREG) 6.25 MG tablet Take 1 tablet (6.25 mg) by mouth 2 times daily (with meals) 90 tablet 3    diclofenac (VOLTAREN) 1 % topical gel Apply 4 g topically 4 times daily 100 g 3    eplerenone (INSPRA) 50 MG tablet Take 1 tablet (50 mg) by mouth 2 times daily 90 tablet 3    furosemide (LASIX) 20 MG tablet Take 1 tablet (20 mg) by mouth daily 90 tablet 1    furosemide (LASIX) 20 MG tablet Take 1 tablet (20 mg) by mouth daily 90 tablet 1    pantoprazole (PROTONIX) 20 MG EC tablet Take 1 tablet (20 mg) by mouth daily 90 tablet 3    pantoprazole (PROTONIX) 20 MG EC tablet Take 1 tablet (20 mg) by mouth daily 90 tablet 3    rifaximin (XIFAXAN) 550 MG TABS tablet 1 tablet (550 mg) by Oral or Feeding Tube route 2 times daily 60 tablet 3    rifaximin (XIFAXAN) 550 MG TABS tablet 1 tablet (550 mg) by Oral or Feeding Tube route 2 times daily 60 tablet 3    sertraline (ZOLOFT) 50 MG tablet Take 1 tablet (50 mg) by mouth daily 30 tablet 3    vitamin D3 (CHOLECALCIFEROL) 50 mcg (2000 units) tablet Take 1 tablet (50 mcg) by mouth daily 90 tablet 2       Allergies  Allergies   Allergen Reactions    Dust Mites      Pollen Extract     Metronidazole Dizziness, Other (See Comments) and Unknown     Other Reaction(s): Other (see comments)    Feels foggy on this medication    Omeprazole Other (See Comments) and Unknown     Irritability (tolerates Protonix well)    Other Reaction(s): Other (see comments)       Family hx Social hx   Family History   Problem Relation Age of Onset    Substance Abuse Mother     Coronary Artery Disease Mother     Cerebrovascular Disease Mother     Substance Abuse Father     Substance Abuse Brother     No Known Problems Son     No Known Problems Son     Liver Cancer No family hx of     Liver Disease No family hx of      Positive for a first degree relative with drug or alcohol problems.  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 Not currently working.  - Lives with wife Malissa. They have 2 kids  - Alcohol: Prior heavy use. H/o alcohol withdrawal seizures (last ). H/o lodging plus x several days; not completed.   After last use, he completed [employer].  - Tobacco: Former tobacco use, quit .  - Drug use: history of amphetamine use. History of prior treatment for amphetamine use.      Review of systems  A 10-point review of systems was negative.    Examination  /89   Pulse 64   Temp 98.2  F (36.8  C) (Oral)   Wt 102.3 kg (225 lb 8 oz)   SpO2 100%   BMI 32.95 kg/m    Body mass index is 32.95 kg/m .    Gen-NAD  Eye- EOMI  ENT- MMM  CVS- RRR, no murmurs  RS- CTA bilaterally  Abd- Overweight, soft, non-tender, mild distension  Extr- 2+ radial pulses bilaterally, no lower extremity edema bilaterally  MS-  hands without clubbing  Neuro- A+Ox3, no asterixis  Skin- no jaundice. Terrys nails  Psych- normal mood    Laboratory  BMP  Recent Labs   Lab Test 06/18/24  0803 04/16/24  0926 04/08/24  0704 03/22/24  1037    137 139 137   POTASSIUM 4.4 4.8 4.7 4.4   CHLORIDE 106 105 107 105   ENEDINA 9.5 9.4 9.2 9.4   CO2 23 22 23 20*   BUN 34.2* 32.4* 36.1* 31.3*   CR 1.84* 1.87* 1.98* 1.82*   * 101* 102* 96     CBC  Recent Labs   Lab Test 04/16/24  0926 04/08/24  0704 03/22/24  1037 03/08/24  0951 02/05/24  1310   WBC 4.0  --  3.9* 3.9* 4.8   RBC 3.68*  --  3.80* 3.66* 3.62*   HGB 11.0*   < > 11.1* 10.8* 10.6*   HCT 32.8*  --  35.1* 33.4* 32.0*   MCV 89  --  92 91 88   MCH 29.9  --  29.2 29.5 29.3   MCHC 33.5  --  31.6 32.3 33.1   RDW 14.9  --  15.4* 15.3* 15.8*   PLT 78*  --  89* 82* 92*    < > = values in this interval not displayed.     Liver Enzymes   Recent Labs   Lab Test 06/18/24  0803   PROTTOTAL 7.2   ALBUMIN 3.8   BILITOTAL 0.7   ALKPHOS 101   AST 52*   ALT 37      INR   INR   Date Value Ref Range Status   06/18/2024 1.30 (H) 0.85 - 1.15 Final       Radiology  Abd US 4/2024  No focal hepatic observation.     TTE 12/2023  1. Borderline enlarged left ventricular chamber size, no regional wall motion abnormalities, calculated 2-D biplane volumetric ejection fraction of 58%.   2. Normal left ventricular diastolic function.   3. Normal right ventricular chamber size, normal systolic function, estimated right ventricular systolic pressure 27 mmHg (right atrial pressure of 5 mmHg).   4. No  significant valvular heart disease.   5. Enlarged sinus of Valsalva diameter of 41 mm, upper limit of normal for age, sex and BSA is 40 mm.   6. Trivial right to left shunt at rest and with Valsalva release; the timing and behavior of the shunt are consistent with an intrapulmonary mechanism.   7. Ascites.    Endoscopy  EGD 6/6/2024  - Small (< 5 mm) esophageal varices with no bleeding                          and no stigmata of  recent bleeding.                          - A single gastric polyp. Complete resection. Polyp                          tissue not retrieved. Clips (MR conditional) were                          placed.                          - A single gastric polyp. Resected and retrieved. Clip                          (MR conditional) was placed.                          - A single gastric polyp. Resected and retrieved. Clip                          (MR conditional) was placed.                          - A single gastric polyp. Clips were placed.                          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                          - Normal examined duodenum.   Path: A. STOMACH, POLYP  - Hyperplastic/inflammatory polyp  - No evidence of dysplasia or malignancy     Colonoscopy 8/2023  - Two diminutive polyps in the sigmoid colon, removed                          with a cold biopsy forceps. Resected and retrieved.                          - Rectal varices.                          - Erythematous mucosa in the sigmoid colon and rectum                          with come erythem, consistent with portal colopathy.                          This was the likely cause of hematochezia.                          No blood was seen.                          Terminal ileum was normal.     A. SIGMOID COLON POLYP, POLYPECTOMY:  -Hyperplastic polyp

## 2024-06-18 ENCOUNTER — OFFICE VISIT (OUTPATIENT)
Dept: GASTROENTEROLOGY | Facility: CLINIC | Age: 44
End: 2024-06-18
Attending: INTERNAL MEDICINE
Payer: COMMERCIAL

## 2024-06-18 ENCOUNTER — LAB (OUTPATIENT)
Dept: LAB | Facility: CLINIC | Age: 44
End: 2024-06-18
Attending: INTERNAL MEDICINE
Payer: COMMERCIAL

## 2024-06-18 VITALS
OXYGEN SATURATION: 100 % | HEART RATE: 64 BPM | DIASTOLIC BLOOD PRESSURE: 89 MMHG | BODY MASS INDEX: 32.95 KG/M2 | TEMPERATURE: 98.2 F | SYSTOLIC BLOOD PRESSURE: 130 MMHG | WEIGHT: 225.5 LBS

## 2024-06-18 DIAGNOSIS — E66.811 CLASS 1 OBESITY DUE TO EXCESS CALORIES WITHOUT SERIOUS COMORBIDITY WITH BODY MASS INDEX (BMI) OF 32.0 TO 32.9 IN ADULT: ICD-10-CM

## 2024-06-18 DIAGNOSIS — K70.30 ALCOHOLIC CIRRHOSIS (H): ICD-10-CM

## 2024-06-18 DIAGNOSIS — I85.11 ESOPHAGEAL VARICES WITH BLEEDING IN DISEASES CLASSIFIED ELSEWHERE (H): ICD-10-CM

## 2024-06-18 DIAGNOSIS — M25.50 MULTIPLE JOINT PAIN: Primary | ICD-10-CM

## 2024-06-18 DIAGNOSIS — K76.82 HEPATIC ENCEPHALOPATHY (H): ICD-10-CM

## 2024-06-18 DIAGNOSIS — F10.90 ALCOHOL USE DISORDER: ICD-10-CM

## 2024-06-18 DIAGNOSIS — K70.31 ALCOHOLIC CIRRHOSIS OF LIVER WITH ASCITES (H): ICD-10-CM

## 2024-06-18 DIAGNOSIS — E66.09 CLASS 1 OBESITY DUE TO EXCESS CALORIES WITHOUT SERIOUS COMORBIDITY WITH BODY MASS INDEX (BMI) OF 32.0 TO 32.9 IN ADULT: ICD-10-CM

## 2024-06-18 LAB
ALBUMIN SERPL BCG-MCNC: 3.8 G/DL (ref 3.5–5.2)
ALP SERPL-CCNC: 101 U/L (ref 40–150)
ALT SERPL W P-5'-P-CCNC: 37 U/L (ref 0–70)
ANION GAP SERPL CALCULATED.3IONS-SCNC: 10 MMOL/L (ref 7–15)
AST SERPL W P-5'-P-CCNC: 52 U/L (ref 0–45)
BILIRUB SERPL-MCNC: 0.7 MG/DL
BUN SERPL-MCNC: 34.2 MG/DL (ref 6–20)
CALCIUM SERPL-MCNC: 9.5 MG/DL (ref 8.6–10)
CHLORIDE SERPL-SCNC: 106 MMOL/L (ref 98–107)
CREAT SERPL-MCNC: 1.84 MG/DL (ref 0.67–1.17)
DEPRECATED HCO3 PLAS-SCNC: 23 MMOL/L (ref 22–29)
EGFRCR SERPLBLD CKD-EPI 2021: 46 ML/MIN/1.73M2
GLUCOSE SERPL-MCNC: 100 MG/DL (ref 70–99)
INR PPP: 1.3 (ref 0.85–1.15)
POTASSIUM SERPL-SCNC: 4.4 MMOL/L (ref 3.4–5.3)
PROT SERPL-MCNC: 7.2 G/DL (ref 6.4–8.3)
SODIUM SERPL-SCNC: 139 MMOL/L (ref 135–145)

## 2024-06-18 PROCEDURE — G0480 DRUG TEST DEF 1-7 CLASSES: HCPCS | Mod: 90 | Performed by: PATHOLOGY

## 2024-06-18 PROCEDURE — G2211 COMPLEX E/M VISIT ADD ON: HCPCS | Performed by: INTERNAL MEDICINE

## 2024-06-18 PROCEDURE — 85610 PROTHROMBIN TIME: CPT | Performed by: PATHOLOGY

## 2024-06-18 PROCEDURE — 36415 COLL VENOUS BLD VENIPUNCTURE: CPT | Performed by: PATHOLOGY

## 2024-06-18 PROCEDURE — 80053 COMPREHEN METABOLIC PANEL: CPT | Performed by: PATHOLOGY

## 2024-06-18 PROCEDURE — 99215 OFFICE O/P EST HI 40 MIN: CPT | Performed by: INTERNAL MEDICINE

## 2024-06-18 PROCEDURE — 99000 SPECIMEN HANDLING OFFICE-LAB: CPT | Performed by: PATHOLOGY

## 2024-06-18 PROCEDURE — 99213 OFFICE O/P EST LOW 20 MIN: CPT | Performed by: INTERNAL MEDICINE

## 2024-06-18 RX ORDER — EPLERENONE 50 MG/1
50 TABLET, FILM COATED ORAL 2 TIMES DAILY
Qty: 90 TABLET | Refills: 3 | Status: SHIPPED | OUTPATIENT
Start: 2024-06-18 | End: 2024-09-09

## 2024-06-18 NOTE — PATIENT INSTRUCTIONS
It was a pleasure taking care of you today.  I've included a brief summary of our discussion and care plan from today's visit below.  Please review this information with your primary care provider.  _______________________________________________________________________    My recommendations are summarized as follows:    - Stop spironolactone, start eplererone 50mg tablet x 2 at one time; message me if you have fluid build up or the breast tenderness doesn't improve in 2-3 weeks  - Continue lasix 20mg daily  - Continue rifaximin 550mg twice daily  - Continue coreg/carvedilol 6.25mg twice daily   - Diclofenac/voltaren gel for joint pains  - Taurine (over the counter) 1 gram twice daily for cramping   - Make an appointment with Latosha Baires to talk about joint pains    Return to Liver/Hepatology Clinic in 6 months.    _______________________________________________________________________    Cirrhosis Education  Nutrition  - For patients with cirrhosis, it is very important to eat the right types and amounts of foods. We recommend a diet low in carbohydrates/sugars and high in fresh fruits/vegetables, with the right amount of protein. We typically recommend 1.5gm/kg/day of protein, or  grams of protein every day.  - In regard to protein intake, you can focus on: meats (but limit red meat), cooked fish and seafood, vegetable-based protein meat products, tofu, eggs, legumes, dairy products (including milk and yogurt and cheese), unsalted nuts.  - You should eat at least three meals a day and three to four snacks between meals. Bedtime snacks are especially important (preferrably something with some protein)    - Patients with malnutrition and/or loss of muscular mass can improve their nutrition and muscular mass with protein supplementation, particularly at bedtime. Protein supplements can come in many different forms, including shakes, protein power, bars and pudding. Boost, Ensure, [place] Instant Milk, etc.)    - Please avoid eating raw seafood, especially shellfish, because of risk of serious illness  - We recommend all patients with cirrhosis limit their daily sodium intake to less than 2,000mg (2 grams)    General Liver Health  - Continue to avoid the use of all non-steroid anti-inflammatory medicines (ibuprofen, Aleve, naproxen, aspirin, etc.) as this can cause serious injury to your kidneys in the setting of liver disease.   - If you have pain, you can safely take up to 2 grams (aka 2,000 mg) of tylenol (aka acetaminophen) per day  - If you see a doctor and they prescribe you a new medication, please contact our clinic to let us know what changes are being made  - If you become acutely ill and present to an ER or are admitted to a hospital, please let us know as soon as you are able.  - We recommend that all patients with chronic liver disease be vaccinated against hepatitis A and B.  Please discuss with your primary provider the need for these vaccines  - We recommend screening for Vitamin D deficiency (at least yearly) and replacement/supplementation as warranted.  - Make sure to bring all of your medications (including over the counter supplements, vitamins, etc) once a year for a review with your liver specialist to ensure appropriate use, dosages, drug interactions, as well as verify ongoing medical necessity. We do not recommend taking herbal supplements, teas, weight loss supplements without consulting your liver specialist first    Sleep Troubles  - Sleep issues are very common in patients with chronic liver disease  - Recommend strict avoidance of medications such as opioids, sedatives and sleep aids.    - Low dose melatonin can be used for insomnia, if needed    Alcohol  - No amount of alcohol is safe in chronic liver disease. If you are interested in medications to help with alcohol cravings or a support program to help remain abstinence, feel free to reach out to your medical team who can connect you  with resources.     Mental Health  - Take care of your mental health by learning about your liver disease and the many things you can do to enhance your health and well-being.  - If interested, attend support groups or visit a health psychologist regarding positive coping with a chronic health condition.  - Cirrhosis can cause cognitive and mood changes, especially confusion. Let us know and we can assess this further and likely offer effective treatment options.  - Regularly assess your driving safety for yourself and others.    Liver Related Screening Tests  - We recommend liver cancer screening twice yearly with either ultrasound, CT scan or MRI   - Esophageal varices screening: initial upper endoscopy (EGD) procedure and then every 1-3 years, depending on initial exam findings.    Health Maintenance   - We recommend that all individuals with chronic liver disease establish care with a primary doctor to ensure all your general medical concerns, age-appropriate cancer screening and vaccinations are up to date  - We recommend dental visits every 6-12 months    For other tips go to the Cirrhosis Care website: https://cirrhosiscare.ca/  - Click on the tab at the top 'patients & families' and then use the tabs at the top to navigate through resources from healthy living tips to symptom management _______________________________________________________________________    Scheduling Procedures or Tests  - To schedule endoscopic procedures call: [phone]  - To schedule radiology tests call: [phone] (for [facility]) or [phone] (for [facility])   _______________________________________________________________________    Who do I call with any questions after my visit?  Please be in touch if there are any further questions that arise following today's visit.  There are multiple ways to contact your gastroenterology care team.      To schedule or reschedule an appointment, please call [phone]  and choose option 2 (for hepatology) and then option 1 (for scheduling).    During business hours, you may reach a nurse by calling the appointment line ([phone]) and asking to speak with a nurse    You can send a secure message through [employer] for non-urgent questions. [employer] messages are answered by your nurse or doctor typically within 24 to 48 hours. Please allow extra time on weekends and holidays.      For urgent/emergent questions after business hours, you may reach the on-call GI Fellow by contacting the [facility]  at [phone].     How will I get the results of any tests ordered?    - If you have signed up for MyChart, any tests ordered at your visit will be available to you after your physician reviews them.  Typically this takes 1-2 weeks.    - If you do not have MyChart, your results will either be mailed to you as a letter or via a telephone call.  - If there are urgent results that require a change in your care plan, your physician or nurse will call you to discuss the next steps.     What is [employer]?  [employer] is a secure way for you to access all of your healthcare records from the [facility].  It is a web based computer program, so you can sign on to it from any location.  It also allows you to send secure messages to your care team.  I recommend signing up for [employer] access if you have not already done so and are comfortable with using a computer.      How to I schedule a follow-up visit?  If you did not schedule a follow-up visit today, please call [phone] to schedule a follow-up office visit.     Sincerely,    Nimco Diego MD (Lizzie)   of Medicine  Advanced & Transplant Hepatology  [facility]

## 2024-06-18 NOTE — LETTER
6/18/2024      Elroy Morel Sr.  [address] [place] [address]      Dear Colleague,    Thank you for referring your patient, Elroy Morel Sr., to the [facility]PAT[facility] [place]. Please see a copy of my visit note below.    [facility] Hepatology    Assessment  44 year old male with past medical history of decompensated alcohol-related cirrhosis complicated by hepatic encephalopathy, ascites and variceal bleeding (8/2023) and alcohol use disorder. PMhx also includes obesity.     #. Decompensated alcohol-related cirrhosis complicated by hepatic encephalopathy, ascites and variceal bleeding (8/2023)   #. Alcohol use disorder; hx of alcohol withdrawal seizures   MELD 3.0: 15    Patient with a history of decompensated alcohol-related cirrhosis.  His disease has been complicated by hepatic encephalopathy that is currently controlled with rifaximin monotherapy.  He was previously on lactulose but reports pooping at least 3 times per day without lactulose and has had no issues with lethargy or confusion.  With regards to his ascites he is currently managed with spironolactone and Lasix however he developed breast tenderness so we will plan to switch him to eplerenone (from spironolactone).  Given his history of variceal bleeding he is currently on carvedilol.  He is up-to-date on variceal screening and liver cancer screening    With regards to the patient's alcohol use disorder he is in completed intensive outpatient programming at [facility].  He reports ongoing alcohol sobriety.    Liver Transplantation:  The patient is currently undergoing liver transplant evaluation.  He is not listed for liver transplantation.  The patient is undergoing cardiac catheterization on 6/26/2024.  In the setting of sobriety the patient's liver is slowly starting to improve with well-controlled hepatic encephalopathy and ascites at this time. MELD 3.0: 15.  Given his low MELD and improving  synthetic function, will continue to have ongoing discussions regarding the value of liver transplantation given the improvement in his condition with sobriety    Plan  -- Coreg 6.25mg BID for hx of variceal bleeding  -- Diuretics: epleronone 100mg daily + lasix 20mg daily   -- Hepatic encephalopathy: Rifaximin 550mg BID  -- HCC Screening: abdominal MRI for next imaging modality given prior adrenal lesion plus follow-up sub centimeter hepatic lesion  -- Variceal Screening: due 6/2025  -- Continue to engage in alcohol sober supports.  Continue complete alcohol sobriety.  -- Diclofenac gel for joint pains  -- Taurine 1g BID PRN cramping (OTC)    Health Maintenance:  -- Recommend yearly influenza vaccination  -- Recommend dental visits every 6 months  -- Colonoscopy: last 8/2023 with hyperplastic polyp    Nutrition & Physical Activity:  -- Low sodium (< 2 grams per day) + high protein diet    RTC: 6 months    Nimco Diego MD (Lizzie)  Advanced & Transplant Hepatology  [facility]    I spent 40 minutes on this encounter performing the following: reviewing the patient's medical record (clinic visits, hospital records, lab results, imaging and procedural documentation), history taking, physical exam and documentation on the date of the encounter. I also spent part of the time in coordination of care and counseling.    The longitudinal plan of care for the diagnosis(es)/condition(s) as documented were addressed during this visit. Due to the added complexity in care, I will continue to support Ad in the subsequent management and with ongoing continuity of care.     HPI:  ALD related Cirrhosis  - hx HE  - hx ascites  - hx variceal bleed (8/2023)  - last EGD 6/2024: small EV, no bleeding  - HCC screening - US 4/2024: no hepatic lesions    Patient presents alone.    He reports doing well without any acute concerns or complaints.  He is currently taking spironolactone 100 mg and Lasix 20 mg/day  based on the medical record however he is slightly on clear of what medication dosing he is on.  He reports some sensitivity in his breasts since starting the spironolactone.  He reports that he is okay trying to reduce his sodium/salt intake but is not strict about it.  He denies any lower extremity edema.    He is currently on rifaximin twice daily.  He reports about 3 bowel movements per day.  He denies any issues with lethargy or confusion.    He denies any melena, hematochezia or other overt signs or symptoms of bleeding.    Seen by addiction 4/2024 at [facility]: He has maintained abstinence from alcohol and all substances of abuse, completed intensive outpatient program, and engages in twice weekly AA with sponsorship. His mood/anxiety are stabilized and he expresses good insight into his addictions.     He is planned for a cardiac catheterization on 6/26/2024 given a CTA that demonstrated coronary calcifications    Medical hx Surgical hx   Past Medical History:   Diagnosis Date    Alcohol use disorder, severe, dependence (H)     Alcohol withdrawal seizure (H)     Alcoholic cirrhosis of liver with ascites (H)     Chronic kidney disease     Coronary artery disease     Esophageal varices (H)     Essential hypertension     Gastroesophageal reflux disease without esophagitis     History of methamphetamine use     Sustained remission since 2003    Hypercholesterolemia     Hyperlipidemia     Tobacco abuse       Past Surgical History:   Procedure Laterality Date    COLONOSCOPY N/A 8/28/2023    Procedure: COLONOSCOPY, WITH POLYPECTOMY via bx forceps;  Surgeon: Alyssa Tsai MD;  Location:  GI    ESOPHAGOSCOPY, GASTROSCOPY, DUODENOSCOPY (EGD), COMBINED N/A 6/6/2024    Procedure: Esophagoscopy, gastroscopy, duodenoscopy (EGD), combined;  Surgeon: Leventhal, Thomas Michael, MD;  Location:  GI    IR PARACENTESIS  8/31/2023    IR PARACENTESIS  9/15/2023    IR PARACENTESIS  9/19/2023    IR PARACENTESIS   9/22/2023    IR PARACENTESIS  9/26/2023    IR PARACENTESIS  9/29/2023    IR PARACENTESIS  10/10/2023    IR PARACENTESIS  10/17/2023    IR PARACENTESIS  10/20/2023    IR PARACENTESIS  10/24/2023    IR PARACENTESIS  10/27/2023    IR PARACENTESIS  10/31/2023    IR PARACENTESIS  11/3/2023    IR PARACENTESIS  11/7/2023    IR PARACENTESIS  11/10/2023    IR PARACENTESIS  11/14/2023    IR PARACENTESIS  11/17/2023    IR PARACENTESIS  11/21/2023    IR PARACENTESIS  11/24/2023    IR PARACENTESIS  11/28/2023    IR PARACENTESIS  12/1/2023    IR PARACENTESIS  12/5/2023    IR PARACENTESIS  12/8/2023    IR PARACENTESIS  12/15/2023    IR PARACENTESIS  1/4/2024    IR PARACENTESIS  12/29/2023    IR PARACENTESIS  12/22/2023    IR PARACENTESIS  12/19/2023    IR PARACENTESIS  1/16/2024    IR PARACENTESIS  1/9/2024          Medications  Current Outpatient Medications   Medication Sig Dispense Refill    aspirin (ASPIRIN LOW DOSE) 81 MG EC tablet Take 1 tablet (81 mg) by mouth daily 90 tablet 0    carvedilol (COREG) 6.25 MG tablet Take 1 tablet (6.25 mg) by mouth 2 times daily (with meals) 90 tablet 3    carvedilol (COREG) 6.25 MG tablet Take 1 tablet (6.25 mg) by mouth 2 times daily (with meals) 90 tablet 3    diclofenac (VOLTAREN) 1 % topical gel Apply 4 g topically 4 times daily 100 g 3    eplerenone (INSPRA) 50 MG tablet Take 1 tablet (50 mg) by mouth 2 times daily 90 tablet 3    furosemide (LASIX) 20 MG tablet Take 1 tablet (20 mg) by mouth daily 90 tablet 1    furosemide (LASIX) 20 MG tablet Take 1 tablet (20 mg) by mouth daily 90 tablet 1    pantoprazole (PROTONIX) 20 MG EC tablet Take 1 tablet (20 mg) by mouth daily 90 tablet 3    pantoprazole (PROTONIX) 20 MG EC tablet Take 1 tablet (20 mg) by mouth daily 90 tablet 3    rifaximin (XIFAXAN) 550 MG TABS tablet 1 tablet (550 mg) by Oral or Feeding Tube route 2 times daily 60 tablet 3    rifaximin (XIFAXAN) 550 MG TABS tablet 1 tablet (550 mg) by Oral or Feeding Tube route 2 times  daily 60 tablet 3    sertraline (ZOLOFT) 50 MG tablet Take 1 tablet (50 mg) by mouth daily 30 tablet 3    vitamin D3 (CHOLECALCIFEROL) 50 mcg (2000 units) tablet Take 1 tablet (50 mcg) by mouth daily 90 tablet 2       Allergies  Allergies   Allergen Reactions    Dust Mites     Pollen Extract     Metronidazole Dizziness, Other (See Comments) and Unknown     Other Reaction(s): Other (see comments)    Feels foggy on this medication    Omeprazole Other (See Comments) and Unknown     Irritability (tolerates Protonix well)    Other Reaction(s): Other (see comments)       Family hx Social hx   Family History   Problem Relation Age of Onset    Substance Abuse Mother     Coronary Artery Disease Mother     Cerebrovascular Disease Mother     Substance Abuse Father     Substance Abuse Brother     No Known Problems Son     No Known Problems Son     Liver Cancer No family hx of     Liver Disease No family hx of      Positive for a first degree relative with drug or alcohol problems.  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 Not currently working.  - Lives with wife Malissa. They have 2 kids  - Alcohol: Prior heavy use. H/o alcohol withdrawal seizures (last ). H/o lodging plus x several days; not completed.   After last use, he completed [employer].  - Tobacco: Former tobacco use, quit .  - Drug use: history of amphetamine use. History of prior treatment for amphetamine use.      Review of systems  A 10-point review of systems was negative.    Examination  /89   Pulse 64   Temp 98.2  F (36.8  C) (Oral)   Wt 102.3 kg (225 lb 8  oz)   SpO2 100%   BMI 32.95 kg/m    Body mass index is 32.95 kg/m .    Gen-NAD  Eye- EOMI  ENT- MMM  CVS- RRR, no murmurs  RS- CTA bilaterally  Abd- Overweight, soft, non-tender, mild distension  Extr- 2+ radial pulses bilaterally, no lower extremity edema bilaterally  MS- hands without clubbing  Neuro- A+Ox3, no asterixis  Skin- no jaundice. Terrys nails  Psych- normal mood    Laboratory  BMP  Recent Labs   Lab Test 06/18/24  0803 04/16/24  0926 04/08/24  0704 03/22/24  1037    137 139 137   POTASSIUM 4.4 4.8 4.7 4.4   CHLORIDE 106 105 107 105   ENEDINA 9.5 9.4 9.2 9.4   CO2 23 22 23 20*   BUN 34.2* 32.4* 36.1* 31.3*   CR 1.84* 1.87* 1.98* 1.82*   * 101* 102* 96     CBC  Recent Labs   Lab Test 04/16/24  0926 04/08/24  0704 03/22/24  1037 03/08/24  0951 02/05/24  1310   WBC 4.0  --  3.9* 3.9* 4.8   RBC 3.68*  --  3.80* 3.66* 3.62*   HGB 11.0*   < > 11.1* 10.8* 10.6*   HCT 32.8*  --  35.1* 33.4* 32.0*   MCV 89  --  92 91 88   MCH 29.9  --  29.2 29.5 29.3   MCHC 33.5  --  31.6 32.3 33.1   RDW 14.9  --  15.4* 15.3* 15.8*   PLT 78*  --  89* 82* 92*    < > = values in this interval not displayed.     Liver Enzymes   Recent Labs   Lab Test 06/18/24  0803   PROTTOTAL 7.2   ALBUMIN 3.8   BILITOTAL 0.7   ALKPHOS 101   AST 52*   ALT 37      INR   INR   Date Value Ref Range Status   06/18/2024 1.30 (H) 0.85 - 1.15 Final       Radiology  Abd US 4/2024  No focal hepatic observation.     TTE 12/2023  1. Borderline enlarged left ventricular chamber size, no regional wall motion abnormalities, calculated 2-D biplane volumetric ejection fraction of 58%.   2. Normal left ventricular diastolic function.   3. Normal right ventricular chamber size, normal systolic function, estimated right ventricular systolic pressure 27 mmHg (right atrial pressure of 5 mmHg).   4. No  significant valvular heart disease.   5. Enlarged sinus of Valsalva diameter of 41 mm, upper limit of normal for age, sex and BSA is 40 mm.   6. Trivial right  to left shunt at rest and with Valsalva release; the timing and behavior of the shunt are consistent with an intrapulmonary mechanism.   7. Ascites.    Endoscopy  EGD 6/6/2024  - Small (< 5 mm) esophageal varices with no bleeding                          and no stigmata of recent bleeding.                          - A single gastric polyp. Complete resection. Polyp                          tissue not retrieved. Clips (MR conditional) were                          placed.                          - A single gastric polyp. Resected and retrieved. Clip                          (MR conditional) was placed.                          - A single gastric polyp. Resected and retrieved. Clip                          (MR conditional) was placed.                          - A single gastric polyp. Clips were placed.                          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                          - Normal examined duodenum.   Path: A. STOMACH, POLYP  - Hyperplastic/inflammatory polyp  - No evidence of dysplasia or malignancy     Colonoscopy 8/2023  - Two diminutive polyps in the sigmoid colon, removed                          with a cold biopsy forceps. Resected and retrieved.                          - Rectal varices.                          - Erythematous mucosa in the sigmoid colon and rectum                          with come erythem, consistent with portal colopathy.                          This was the likely cause of hematochezia.                          No blood was seen.                          Terminal ileum was normal.     A. SIGMOID COLON POLYP, POLYPECTOMY:  -Hyperplastic polyp

## 2024-06-26 ENCOUNTER — APPOINTMENT (OUTPATIENT)
Dept: LAB | Facility: CLINIC | Age: 44
End: 2024-06-26
Attending: INTERNAL MEDICINE
Payer: COMMERCIAL

## 2024-06-26 ENCOUNTER — HOSPITAL ENCOUNTER (OUTPATIENT)
Dept: CT IMAGING | Facility: CLINIC | Age: 44
Discharge: HOME | End: 2024-06-26
Attending: INTERNAL MEDICINE | Admitting: INTERNAL MEDICINE
Payer: COMMERCIAL

## 2024-06-26 ENCOUNTER — HOSPITAL ENCOUNTER (OUTPATIENT)
Facility: CLINIC | Age: 44
Discharge: HOME | End: 2024-06-26
Attending: INTERNAL MEDICINE | Admitting: INTERNAL MEDICINE
Payer: COMMERCIAL

## 2024-06-26 ENCOUNTER — APPOINTMENT (OUTPATIENT)
Dept: MEDSURG UNIT | Facility: CLINIC | Age: 44
End: 2024-06-26
Attending: INTERNAL MEDICINE
Payer: COMMERCIAL

## 2024-06-26 VITALS
HEIGHT: 69 IN | TEMPERATURE: 97.3 F | BODY MASS INDEX: 33.3 KG/M2 | WEIGHT: 224.8 LBS | OXYGEN SATURATION: 100 % | DIASTOLIC BLOOD PRESSURE: 79 MMHG | SYSTOLIC BLOOD PRESSURE: 118 MMHG | RESPIRATION RATE: 14 BRPM | HEART RATE: 56 BPM

## 2024-06-26 DIAGNOSIS — R59.1 LYMPHADENOPATHY: Primary | ICD-10-CM

## 2024-06-26 DIAGNOSIS — Z01.810 PRE-OPERATIVE CARDIOVASCULAR EXAMINATION: ICD-10-CM

## 2024-06-26 DIAGNOSIS — R59.9 LYMPH NODE ENLARGEMENT: ICD-10-CM

## 2024-06-26 DIAGNOSIS — I25.10 CORONARY ARTERY DISEASE INVOLVING NATIVE CORONARY ARTERY OF NATIVE HEART WITHOUT ANGINA PECTORIS: ICD-10-CM

## 2024-06-26 PROBLEM — Z98.890 STATUS POST CORONARY ANGIOGRAM: Status: ACTIVE | Noted: 2024-06-26

## 2024-06-26 LAB
ANION GAP SERPL CALCULATED.3IONS-SCNC: 9 MMOL/L (ref 7–15)
APTT PPP: 32 SECONDS (ref 22–38)
ATRIAL RATE - MUSE: 57 BPM
BUN SERPL-MCNC: 36.7 MG/DL (ref 6–20)
CALCIUM SERPL-MCNC: 9.5 MG/DL (ref 8.6–10)
CHLORIDE SERPL-SCNC: 108 MMOL/L (ref 98–107)
CREAT SERPL-MCNC: 2.09 MG/DL (ref 0.67–1.17)
DEPRECATED HCO3 PLAS-SCNC: 22 MMOL/L (ref 22–29)
DIASTOLIC BLOOD PRESSURE - MUSE: NORMAL MMHG
EGFRCR SERPLBLD CKD-EPI 2021: 39 ML/MIN/1.73M2
ERYTHROCYTE [DISTWIDTH] IN BLOOD BY AUTOMATED COUNT: 14.6 % (ref 10–15)
GLUCOSE SERPL-MCNC: 88 MG/DL (ref 70–99)
HCT VFR BLD AUTO: 33.1 % (ref 40–53)
HGB BLD-MCNC: 11.1 G/DL (ref 13.3–17.7)
INR PPP: 1.26 (ref 0.85–1.15)
INTERPRETATION ECG - MUSE: NORMAL
MCH RBC QN AUTO: 30.5 PG (ref 26.5–33)
MCHC RBC AUTO-ENTMCNC: 33.5 G/DL (ref 31.5–36.5)
MCV RBC AUTO: 91 FL (ref 78–100)
P AXIS - MUSE: 10 DEGREES
PLATELET # BLD AUTO: 73 10E3/UL (ref 150–450)
POTASSIUM SERPL-SCNC: 4.4 MMOL/L (ref 3.4–5.3)
PR INTERVAL - MUSE: 162 MS
QRS DURATION - MUSE: 92 MS
QT - MUSE: 438 MS
QTC - MUSE: 426 MS
R AXIS - MUSE: 30 DEGREES
RBC # BLD AUTO: 3.64 10E6/UL (ref 4.4–5.9)
SODIUM SERPL-SCNC: 139 MMOL/L (ref 135–145)
SYSTOLIC BLOOD PRESSURE - MUSE: NORMAL MMHG
T AXIS - MUSE: 19 DEGREES
VENTRICULAR RATE- MUSE: 57 BPM
WBC # BLD AUTO: 4.5 10E3/UL (ref 4–11)

## 2024-06-26 PROCEDURE — 93454 CORONARY ARTERY ANGIO S&I: CPT | Performed by: INTERNAL MEDICINE

## 2024-06-26 PROCEDURE — 250N000009 HC RX 250: Performed by: INTERNAL MEDICINE

## 2024-06-26 PROCEDURE — 999N000054 HC STATISTIC EKG NON-CHARGEABLE

## 2024-06-26 PROCEDURE — 71250 CT THORAX DX C-: CPT | Mod: 26 | Performed by: RADIOLOGY

## 2024-06-26 PROCEDURE — 36415 COLL VENOUS BLD VENIPUNCTURE: CPT | Performed by: INTERNAL MEDICINE

## 2024-06-26 PROCEDURE — C1726 CATH, BAL DIL, NON-VASCULAR: HCPCS | Performed by: INTERNAL MEDICINE

## 2024-06-26 PROCEDURE — 999N000134 HC STATISTIC PP CARE STAGE 3

## 2024-06-26 PROCEDURE — 93454 CORONARY ARTERY ANGIO S&I: CPT | Mod: 26 | Performed by: INTERNAL MEDICINE

## 2024-06-26 PROCEDURE — 999N000142 HC STATISTIC PROCEDURE PREP ONLY

## 2024-06-26 PROCEDURE — 99152 MOD SED SAME PHYS/QHP 5/>YRS: CPT | Performed by: INTERNAL MEDICINE

## 2024-06-26 PROCEDURE — 85049 AUTOMATED PLATELET COUNT: CPT | Performed by: INTERNAL MEDICINE

## 2024-06-26 PROCEDURE — 85730 THROMBOPLASTIN TIME PARTIAL: CPT | Performed by: INTERNAL MEDICINE

## 2024-06-26 PROCEDURE — C1887 CATHETER, GUIDING: HCPCS | Performed by: INTERNAL MEDICINE

## 2024-06-26 PROCEDURE — 71250 CT THORAX DX C-: CPT

## 2024-06-26 PROCEDURE — 258N000003 HC RX IP 258 OP 636: Performed by: INTERNAL MEDICINE

## 2024-06-26 PROCEDURE — 250N000011 HC RX IP 250 OP 636: Performed by: INTERNAL MEDICINE

## 2024-06-26 PROCEDURE — C1894 INTRO/SHEATH, NON-LASER: HCPCS | Performed by: INTERNAL MEDICINE

## 2024-06-26 PROCEDURE — 85610 PROTHROMBIN TIME: CPT | Performed by: INTERNAL MEDICINE

## 2024-06-26 PROCEDURE — 250N000013 HC RX MED GY IP 250 OP 250 PS 637: Performed by: INTERNAL MEDICINE

## 2024-06-26 PROCEDURE — 250N000011 HC RX IP 250 OP 636: Mod: JZ | Performed by: INTERNAL MEDICINE

## 2024-06-26 PROCEDURE — 80048 BASIC METABOLIC PNL TOTAL CA: CPT | Performed by: INTERNAL MEDICINE

## 2024-06-26 PROCEDURE — 272N000001 HC OR GENERAL SUPPLY STERILE: Performed by: INTERNAL MEDICINE

## 2024-06-26 PROCEDURE — 99152 MOD SED SAME PHYS/QHP 5/>YRS: CPT | Mod: GC | Performed by: INTERNAL MEDICINE

## 2024-06-26 RX ORDER — NICARDIPINE HYDROCHLORIDE 2.5 MG/ML
INJECTION INTRAVENOUS
Status: DISCONTINUED | OUTPATIENT
Start: 2024-06-26 | End: 2024-06-26 | Stop reason: HOSPADM

## 2024-06-26 RX ORDER — MULTIPLE VITAMINS W/ MINERALS TAB 9MG-400MCG
1 TAB ORAL DAILY
COMMUNITY

## 2024-06-26 RX ORDER — NALOXONE HYDROCHLORIDE 0.4 MG/ML
0.4 INJECTION, SOLUTION INTRAMUSCULAR; INTRAVENOUS; SUBCUTANEOUS
Status: DISCONTINUED | OUTPATIENT
Start: 2024-06-26 | End: 2024-06-26 | Stop reason: HOSPADM

## 2024-06-26 RX ORDER — FENTANYL CITRATE 50 UG/ML
INJECTION, SOLUTION INTRAMUSCULAR; INTRAVENOUS
Status: DISCONTINUED | OUTPATIENT
Start: 2024-06-26 | End: 2024-06-26 | Stop reason: HOSPADM

## 2024-06-26 RX ORDER — ASPIRIN 81 MG/1
243 TABLET, CHEWABLE ORAL ONCE
Status: COMPLETED | OUTPATIENT
Start: 2024-06-26 | End: 2024-06-26

## 2024-06-26 RX ORDER — HEPARIN SODIUM 1000 [USP'U]/ML
INJECTION, SOLUTION INTRAVENOUS; SUBCUTANEOUS
Status: DISCONTINUED | OUTPATIENT
Start: 2024-06-26 | End: 2024-06-26 | Stop reason: HOSPADM

## 2024-06-26 RX ORDER — DEXTROSE MONOHYDRATE 25 G/50ML
25-50 INJECTION, SOLUTION INTRAVENOUS
Status: DISCONTINUED | OUTPATIENT
Start: 2024-06-26 | End: 2024-06-26 | Stop reason: HOSPADM

## 2024-06-26 RX ORDER — ASPIRIN 325 MG
325 TABLET ORAL ONCE
Status: COMPLETED | OUTPATIENT
Start: 2024-06-26 | End: 2024-06-26

## 2024-06-26 RX ORDER — NICOTINE POLACRILEX 4 MG
15-30 LOZENGE BUCCAL
Status: DISCONTINUED | OUTPATIENT
Start: 2024-06-26 | End: 2024-06-26 | Stop reason: HOSPADM

## 2024-06-26 RX ORDER — NITROGLYCERIN 5 MG/ML
VIAL (ML) INTRAVENOUS
Status: DISCONTINUED | OUTPATIENT
Start: 2024-06-26 | End: 2024-06-26 | Stop reason: HOSPADM

## 2024-06-26 RX ORDER — IOPAMIDOL 755 MG/ML
INJECTION, SOLUTION INTRAVASCULAR
Status: DISCONTINUED | OUTPATIENT
Start: 2024-06-26 | End: 2024-06-26 | Stop reason: HOSPADM

## 2024-06-26 RX ORDER — SODIUM CHLORIDE 9 MG/ML
INJECTION, SOLUTION INTRAVENOUS CONTINUOUS
Status: DISCONTINUED | OUTPATIENT
Start: 2024-06-26 | End: 2024-06-26 | Stop reason: HOSPADM

## 2024-06-26 RX ORDER — ATROPINE SULFATE 0.1 MG/ML
0.5 INJECTION INTRAVENOUS
Status: DISCONTINUED | OUTPATIENT
Start: 2024-06-26 | End: 2024-06-26 | Stop reason: HOSPADM

## 2024-06-26 RX ORDER — OXYCODONE HYDROCHLORIDE 10 MG/1
10 TABLET ORAL EVERY 4 HOURS PRN
Status: DISCONTINUED | OUTPATIENT
Start: 2024-06-26 | End: 2024-06-26 | Stop reason: HOSPADM

## 2024-06-26 RX ORDER — NALOXONE HYDROCHLORIDE 0.4 MG/ML
0.2 INJECTION, SOLUTION INTRAMUSCULAR; INTRAVENOUS; SUBCUTANEOUS
Status: DISCONTINUED | OUTPATIENT
Start: 2024-06-26 | End: 2024-06-26 | Stop reason: HOSPADM

## 2024-06-26 RX ORDER — POTASSIUM CHLORIDE 750 MG/1
20 TABLET, EXTENDED RELEASE ORAL
Status: DISCONTINUED | OUTPATIENT
Start: 2024-06-26 | End: 2024-06-26 | Stop reason: HOSPADM

## 2024-06-26 RX ORDER — OXYCODONE HYDROCHLORIDE 5 MG/1
5 TABLET ORAL EVERY 4 HOURS PRN
Status: DISCONTINUED | OUTPATIENT
Start: 2024-06-26 | End: 2024-06-26 | Stop reason: HOSPADM

## 2024-06-26 RX ORDER — LIDOCAINE 40 MG/G
CREAM TOPICAL
Status: DISCONTINUED | OUTPATIENT
Start: 2024-06-26 | End: 2024-06-26 | Stop reason: HOSPADM

## 2024-06-26 RX ORDER — FLUMAZENIL 0.1 MG/ML
0.2 INJECTION, SOLUTION INTRAVENOUS
Status: DISCONTINUED | OUTPATIENT
Start: 2024-06-26 | End: 2024-06-26 | Stop reason: HOSPADM

## 2024-06-26 RX ORDER — FENTANYL CITRATE 50 UG/ML
25 INJECTION, SOLUTION INTRAMUSCULAR; INTRAVENOUS
Status: DISCONTINUED | OUTPATIENT
Start: 2024-06-26 | End: 2024-06-26 | Stop reason: HOSPADM

## 2024-06-26 RX ORDER — POTASSIUM CHLORIDE 750 MG/1
40 TABLET, EXTENDED RELEASE ORAL
Status: DISCONTINUED | OUTPATIENT
Start: 2024-06-26 | End: 2024-06-26 | Stop reason: HOSPADM

## 2024-06-26 RX ADMIN — SODIUM CHLORIDE: 9 INJECTION, SOLUTION INTRAVENOUS at 07:44

## 2024-06-26 RX ADMIN — ASPIRIN 325 MG ORAL TABLET 325 MG: 325 PILL ORAL at 07:42

## 2024-06-26 ASSESSMENT — ACTIVITIES OF DAILY LIVING (ADL)
ADLS_ACUITY_SCORE: 35

## 2024-06-26 NOTE — PROCEDURES
Pt s/p  CORS, no intervention. Right radial site w/out hematoma or bleeding. VSS, denied pain. Discharge instructions reviewed w/out questions. Wife bedside. CT scheduled at 2pm.

## 2024-06-26 NOTE — PRE-PROCEDURE
GENERAL PRE-PROCEDURE:   Procedure:  Coronary angiogram with possible percutaneous coronary intervention  Date/Time:  6/26/2024 8:14 AM    Verbal consent obtained?: Yes    Risks and benefits: Risks, benefits and alternatives were discussed    Consent given by:  Patient  Patient states understanding of procedure being performed: Yes    Patient's understanding of procedure matches consent: Yes    Procedure consent matches procedure scheduled: Yes    Expected level of sedation:  Moderate  Appropriately NPO:  Yes  ASA Class:  4  Lungs:  Lungs clear with good breath sounds bilaterally  Heart:  Normal heart sounds and rate  History & Physical reviewed:  History and physical reviewed and no updates needed  Statement of review:  I have reviewed the lab findings, diagnostic data, medications, and the plan for sedation    Holden Sommers, PGY-5  Cardiovascular Disease Fellow

## 2024-06-26 NOTE — PROGRESS NOTES
Patient arrives to 2A in room 11. Patient arrives with wife, Malissa. Patient's VSS, AOx4. 325 mg aspirin given. Awaiting consent, prep otherwise complete. Will continue to assess patient until taken for procedure.

## 2024-06-26 NOTE — DISCHARGE INSTRUCTIONS
Going Home after an Angioplasty or Stent Placement (Cardiac)  ______________________________________________      After you go home:  Have an adult stay with you for 24 hours.  Drink plenty of fluids.  You may eat your normal diet, unless your doctor tells you otherwise.  For 24 hours:  Relax and take it easy.  Do NOT smoke.  Do NOT make any important or legal decisions.  Do NOT drive or operate machines at home or at work.  Do NOT drink alcohol.  Remove the Band-Aid after 24 hours. If there is minor oozing, apply another Band-aid and remove it after 12 hours.  For 2 days, do NOT have sex or do any heavy exercise.  Do NOT take a bath, or use a hot tub or pool for at least 3 days. You may shower.    Care of wrist or arm site  It is normal to have soreness at the puncture site and mild tingling in your hand for up to 3 days.  For 2 days, do not use your hand or arm to support your weight (such as rising from a chair) or bend your wrist (such as lifting a garage door).  For 2 days, do not lift more than 5 pounds or exercise your arm (tennis, golf or bowling).    If you start bleeding from the site in your arm:  Sit down and press firmly on the site with your fingers for 10 minutes. Call your doctor as soon as you can.  If the bleeding stops, sit still and keep your wrist straight for 2 hours.    Medicines  If you have started taking Plavix or Effient, do not stop taking it until you talk to your heart doctor (cardiologist).  If you are on metformin (Glucophage), do not restart it until you have blood tests (within 2 to 3 days after discharge). When your doctor tells you it is safe, you may restart the metformin.  If you have stopped any other medicines, check with your nurse or provider about when to restart them.    Call 911 right away if you have bleeding that is heavy or does not stop.    Call your doctor if:  You have a large or growing hard lump around the site.  The site is red, swollen, hot or tender.  Blood  or fluid is draining from the site.  You have chills or a fever greater than 101 F (38 C).  Your leg or arm feels numb or cool.  You have hives, a rash or unusual itching.      [facility]:  [phone] (7 days a week)

## 2024-06-27 ENCOUNTER — TELEPHONE (OUTPATIENT)
Dept: CARDIOLOGY | Facility: CLINIC | Age: 44
End: 2024-06-27
Payer: COMMERCIAL

## 2024-06-27 NOTE — TELEPHONE ENCOUNTER
Post-Angiogram Discharge Call    How are you feeling since you got home? Do you understand your results?  Pt states he is feeling very well. No pain. Site WDL. No bleeding.  Yes - Results discussed    How is your groin/wrist/incision site? Can you please describe it?  Site WDL, no bleeding.    Do you have any questions regarding your restrictions and when to resume normal activity?  Yes - AVS reviewed    Do you have the anti-platelet medication you were prescribed?  N/A    Any questions about the other medications you were prescribed?  N/A    Has cardiac rehab reached out to you?  N/A - PCTA not performed or not indicated    Do you have any other questions about the discharge instructions?  Yes - AVS reviewed    Has a follow up appointment been made within 1-2 weeks?  YES - With d on July / 08 / 2024

## 2024-07-02 DIAGNOSIS — M25.50 MULTIPLE JOINT PAIN: ICD-10-CM

## 2024-07-07 ENCOUNTER — MYC REFILL (OUTPATIENT)
Dept: GASTROENTEROLOGY | Facility: CLINIC | Age: 44
End: 2024-07-07
Payer: COMMERCIAL

## 2024-07-07 DIAGNOSIS — K70.31 ALCOHOLIC CIRRHOSIS OF LIVER WITH ASCITES (H): ICD-10-CM

## 2024-07-08 ENCOUNTER — OFFICE VISIT (OUTPATIENT)
Dept: CARDIOLOGY | Facility: CLINIC | Age: 44
End: 2024-07-08
Attending: CASE MANAGER/CARE COORDINATOR
Payer: COMMERCIAL

## 2024-07-08 VITALS
HEART RATE: 71 BPM | SYSTOLIC BLOOD PRESSURE: 135 MMHG | BODY MASS INDEX: 34.4 KG/M2 | DIASTOLIC BLOOD PRESSURE: 92 MMHG | WEIGHT: 229.6 LBS | OXYGEN SATURATION: 100 %

## 2024-07-08 DIAGNOSIS — E78.2 MIXED HYPERLIPIDEMIA: ICD-10-CM

## 2024-07-08 DIAGNOSIS — I10 BENIGN ESSENTIAL HYPERTENSION: ICD-10-CM

## 2024-07-08 DIAGNOSIS — I25.10 CORONARY ARTERY DISEASE INVOLVING NATIVE CORONARY ARTERY OF NATIVE HEART WITHOUT ANGINA PECTORIS: Primary | ICD-10-CM

## 2024-07-08 PROCEDURE — 99213 OFFICE O/P EST LOW 20 MIN: CPT | Performed by: CASE MANAGER/CARE COORDINATOR

## 2024-07-08 RX ORDER — CARVEDILOL 6.25 MG/1
6.25 TABLET ORAL 2 TIMES DAILY WITH MEALS
Qty: 90 TABLET | Refills: 3 | Status: SHIPPED | OUTPATIENT
Start: 2024-07-08 | End: 2024-09-09

## 2024-07-08 ASSESSMENT — PAIN SCALES - GENERAL: PAINLEVEL: NO PAIN (0)

## 2024-07-08 NOTE — PROGRESS NOTES
[facility] [place] [facility]   Cardiology Clinic Note      HPI:   Mr. Elroy Morel is a pleasant 44 year old male with medical history pertinent for HTN, HLD, alcoholic cirrhosis, and prior tobacco use. He presents to cardiology clinic for coronary angiogram follow up.    Elroy was most recently seen in cardiology clinic in 2024 by Dr. Quintanilla. He is undergoing liver transplant workup, coronary angiogram given CT angiogram with calcium score results of 354 showed severe proximal LAD. Coronary angiogram on 24 showed mild non-obstructive CAD.     Today in clinic, he denies chest pain, palpitations, dizziness, syncope, or lower extremity edema. Denies pain at incision site.    PAST MEDICAL HISTORY:  Past Medical History:   Diagnosis Date    Alcohol use disorder, severe, dependence (H)     Alcohol withdrawal seizure (H)     Alcoholic cirrhosis of liver with ascites (H)     Chronic kidney disease     Coronary artery disease     Esophageal varices (H)     Essential hypertension     Gastroesophageal reflux disease without esophagitis     History of methamphetamine use     Sustained remission since     Hypercholesterolemia     Hyperlipidemia     Tobacco abuse        FAMILY HISTORY:  Family History   Problem Relation Age of Onset    Substance Abuse Mother     Coronary Artery Disease Mother     Cerebrovascular Disease Mother     Substance Abuse Father     Substance Abuse Brother     No Known Problems Son     No Known Problems Son     Liver Cancer No family hx of     Liver Disease No family hx of        SOCIAL HISTORY:  Social History     Socioeconomic History    Marital status:    Tobacco Use    Smoking status: Former     Current packs/day: 0.00     Types: Cigarettes     Quit date:      Years since quittin.5    Smokeless tobacco: Never   Substance and Sexual Activity    Alcohol use: Not Currently     Comment: last drink 2023    Drug use: Not Currently     Types: Amphetamines     Comment:  Sustained remission   Social History Narrative    Pt is , 2 children. Employed.      Social Determinants of Health     Food Insecurity: No Food Insecurity (12/23/2023)    Received from [facility]    Hunger Vital Sign     Worried About Running Out of Food in the Last Year: Never true     Ran Out of Food in the Last Year: Never true   Transportation Needs: Unmet Transportation Needs (12/23/2023)    Received from [facility]    PRAPARE - Transportation     Lack of Transportation (Medical): No     Lack of Transportation (Non-Medical): Yes   Physical Activity: Insufficiently Active (12/23/2023)    Received from [facility]    Exercise Vital Sign     Days of Exercise per Week: 3 days     Minutes of Exercise per Session: 30 min    [facility], [facility]    Social Connections   Housing Stability: Low Risk  (12/23/2023)    Received from [facility]    Housing Stability     What is your living situation today?: I have a steady place to live       CURRENT MEDICATIONS:  Current Outpatient Medications   Medication Sig Dispense Refill    aspirin (ASPIRIN LOW DOSE) 81 MG EC tablet Take 1 tablet (81 mg) by mouth daily 90 tablet 0    carvedilol (COREG) 6.25 MG tablet Take 1 tablet (6.25 mg) by mouth 2 times daily (with meals) 90 tablet 3    carvedilol (COREG) 6.25 MG tablet Take 1 tablet (6.25 mg) by mouth 2 times daily (with meals) 90 tablet 3    diclofenac (VOLTAREN) 1 % topical gel Apply 4 g topically 4 times daily 100 g 3    eplerenone (INSPRA) 50 MG tablet Take 1 tablet (50 mg) by mouth 2 times daily 90 tablet 3    furosemide (LASIX) 20 MG tablet Take 1 tablet (20 mg) by mouth daily 90 tablet 1    furosemide (LASIX) 20 MG tablet Take 1 tablet (20 mg) by mouth daily 90 tablet 1    multivitamin w/minerals (MULTI-VITAMIN) tablet Take 1 tablet by mouth daily      pantoprazole (PROTONIX) 20 MG  EC tablet Take 1 tablet (20 mg) by mouth daily 90 tablet 3    pantoprazole (PROTONIX) 20 MG EC tablet Take 1 tablet (20 mg) by mouth daily 90 tablet 3    rifaximin (XIFAXAN) 550 MG TABS tablet 1 tablet (550 mg) by Oral or Feeding Tube route 2 times daily 60 tablet 3    rifaximin (XIFAXAN) 550 MG TABS tablet 1 tablet (550 mg) by Oral or Feeding Tube route 2 times daily 60 tablet 3    sertraline (ZOLOFT) 50 MG tablet Take 1 tablet (50 mg) by mouth daily 30 tablet 3     No current facility-administered medications for this visit.       ROS:   Refer to HPI    EXAM:  BP (!) 135/92 (BP Location: Right arm, Patient Position: Chair, Cuff Size: Adult Regular)   Pulse 71   Wt 104.1 kg (229 lb 9.6 oz)   SpO2 100%   BMI 34.40 kg/m    GENERAL: Appears comfortable, in no acute distress.   HEENT: Eye symmetrical, no discharge or icterus bilaterally. Mucous membranes moist and without lesions.  CV: RRR, +S1S2, no murmur, rub, or gallop.   RESPIRATORY: Respirations regular, even, and unlabored. Lungs CTA throughout.   GI: Soft and non distended with normoactive bowel sounds present in all quadrants. No tenderness, rebound, guarding.   EXTREMITIES: no peripheral edema. 2+ bilateral pedal pulses.   NEUROLOGIC: Alert and oriented x 3. No focal deficits.   MUSCULOSKELETAL: No joint swelling or tenderness.   SKIN: No jaundice. No rashes or lesions.     Labs, reviewed with patient in clinic today:  CBC RESULTS:  Lab Results   Component Value Date    WBC 4.5 06/26/2024    RBC 3.64 (L) 06/26/2024    HGB 11.1 (L) 06/26/2024    HCT 33.1 (L) 06/26/2024    MCV 91 06/26/2024    MCH 30.5 06/26/2024    MCHC 33.5 06/26/2024    RDW 14.6 06/26/2024    PLT 73 (L) 06/26/2024       CMP RESULTS:  Lab Results   Component Value Date     06/26/2024    POTASSIUM 4.4 06/26/2024    POTASSIUM 4.1 06/07/2022    CHLORIDE 108 (H) 06/26/2024    CHLORIDE 97 (L) 06/07/2022    CO2 22 06/26/2024    CO2 24 06/07/2022    ANIONGAP 9 06/26/2024    ANIONGAP 13  "2022    GLC 88 2024    GLC 97 2022    BUN 36.7 (H) 2024    BUN 11 2022    CR 2.09 (H) 2024    GFRESTIMATED 39 (L) 2024    GFRESTIMATED >60 2020    GFRESTBLACK >60 2020    ENEDINA 9.5 2024    BILITOTAL 0.7 2024    ALBUMIN 3.8 2024    ALBUMIN 4.3 2022    ALKPHOS 101 2024    ALT 37 2024    AST 52 (H) 2024        INR RESULTS:  Lab Results   Component Value Date    INR 1.26 (H) 2024       Lab Results   Component Value Date    MAG 1.9 10/13/2023     No results found for: \"NTBNPI\"  No results found for: \"NTBNP\"    LIPIDS:  Recent Labs   Lab Test 23  1335 03/15/23  0810   CHOL 252* 238*   HDL 55 50   * 142*   TRIG 138 232*     Coronary CTA 2024:  IMPRESSION:  1.  Severe single-vessel CAD involving the ostial/proximal LAD.  2.  Moderate stenosis involving the ramus intermedius.   3.  Total Agatston score 364, placing the patient in 99th percentile  when compared to age and gender matched control group.  4.  Please review Radiology report for incidental noncardiac findings  that will follow separately.     FINDINGS:     CORONARY CALCIUM SCORE     Total Agatston calcium score: 364   Left main: 0  Left anterior descendin  Left circumflex: 156  Right coronary artery: 112     Coronary Angiogram 2024:    Conclusion    Mild, non obstructive CAD.  No acute coronary intervention.  TR band secured in place at right radial aa site.        Assessment and Plan:   Mr. Morel is a 44 year old male with a PMH of  HTN, HLD, alcoholic cirrhosis, and prior tobacco use.     # Coronary artery disease, mild non-obstructive   ASCVD 10 year risk 2.6%. Patient notes family history of ischemic heart disease. Discussed importance of annual lipid monitoring and potential initiation of statin in the future with liver recovery.   - aspirin 81mg daily     # HTN  - carvedilol 6.25mg BID  - eplerenone 50mg BID  - furosemide 20mg daily    # " HLD  Most recent , unclear if fasting. Goal <100. Given minimal CAD, defer statin initiation.     # Alcoholic cirrhosis  Follows with with Dr. Diego (hepatology)    Follow up:  as needed   Chart review time today: 10 minutes  Visit time today: 12 minutes  Total time spent today: 22 minutes    TAHMINA MENJIVAR CNP  General Cardiology   07/08/24

## 2024-07-08 NOTE — NURSING NOTE
Chief Complaint   Patient presents with    Follow Up     S/P coronary angiogram       Vitals were taken, medications reconciled.    Zayra Jackson, Clinic Assistant   3:23 PM

## 2024-07-08 NOTE — PATIENT INSTRUCTIONS
You were seen today in the Cardiovascular Clinic at the [facility] by:       TAHMINA RESENDIZ CNP    Your visit summary and instructions are as follows:    Continue annual cholesterol lab monitoring - ok if you do that with cardiology or your primary doctor       Return to cardiology clinic as needed     Thank you for your visit today!     Please MyChart message me or call my nurse if you have any questions or concerns.      During Business Hours:  [phone], option # 1 (University) then option # 4 (medical questions) and ask to speak with my nurse.     After hours, weekends or holidays:   [phone], Option #4  Ask to speak to the On-Call Cardiologist. Inform them you are a cardiology patient at the [facility].

## 2024-07-08 NOTE — LETTER
7/8/2024      RE: Elroy Morel Sr.  [address] [place] [address]       Dear Colleague,    Thank you for the opportunity to participate in the care of your patient, Elroy Morel Sr., at the [facility] [place] [facility]. Please see a copy of my visit note below.      [facility] - [facility]   Cardiology Clinic Note      HPI:   Mr. Elroy Morel is a pleasant 44 year old male with medical history pertinent for HTN, HLD, alcoholic cirrhosis, and prior tobacco use. He presents to cardiology clinic for coronary angiogram follow up.    Elroy was most recently seen in cardiology clinic in June 2024 by Dr. Quintanilla. He is undergoing liver transplant workup, coronary angiogram given CT angiogram with calcium score results of 354 showed severe proximal LAD. Coronary angiogram on 6/26/24 showed mild non-obstructive CAD.     Today in clinic, he denies chest pain, palpitations, dizziness, syncope, or lower extremity edema. Denies pain at incision site.    PAST MEDICAL HISTORY:  Past Medical History:   Diagnosis Date     Alcohol use disorder, severe, dependence (H)      Alcohol withdrawal seizure (H)      Alcoholic cirrhosis of liver with ascites (H)      Chronic kidney disease      Coronary artery disease      Esophageal varices (H)      Essential hypertension      Gastroesophageal reflux disease without esophagitis      History of methamphetamine use     Sustained remission since 2003     Hypercholesterolemia      Hyperlipidemia      Tobacco abuse        FAMILY HISTORY:  Family History   Problem Relation Age of Onset     Substance Abuse Mother      Coronary Artery Disease Mother      Cerebrovascular Disease Mother      Substance Abuse Father      Substance Abuse Brother      No Known Problems Son      No Known Problems Son      Liver Cancer No family hx of      Liver Disease No family hx of        SOCIAL HISTORY:  Social History      Socioeconomic History     Marital status:    Tobacco Use     Smoking status: Former     Current packs/day: 0.00     Types: Cigarettes     Quit date:      Years since quittin.5     Smokeless tobacco: Never   Substance and Sexual Activity     Alcohol use: Not Currently     Comment: last drink 2023     Drug use: Not Currently     Types: Amphetamines     Comment: Sustained remission   Social History Narrative    Pt is , 2 children. Employed.      Social Determinants of Health     Food Insecurity: No Food Insecurity (2023)    Received from [facility]    Hunger Vital Sign      Worried About Running Out of Food in the Last Year: Never true      Ran Out of Food in the Last Year: Never true   Transportation Needs: Unmet Transportation Needs (2023)    Received from [facility]    PRAPARE - Transportation      Lack of Transportation (Medical): No      Lack of Transportation (Non-Medical): Yes   Physical Activity: Insufficiently Active (2023)    Received from [facility]    Exercise Vital Sign      Days of Exercise per Week: 3 days      Minutes of Exercise per Session: 30 min    Received from [facility], [facility] [employer] [facility]    Social Connections   Housing Stability: Low Risk  (2023)    Received from [facility]    Housing Stability      What is your living situation today?: I have a steady place to live       CURRENT MEDICATIONS:  Current Outpatient Medications   Medication Sig Dispense Refill     aspirin (ASPIRIN LOW DOSE) 81 MG EC tablet Take 1 tablet (81 mg) by mouth daily 90 tablet 0     carvedilol (COREG) 6.25 MG tablet Take 1 tablet (6.25 mg) by mouth 2 times daily (with meals) 90 tablet 3     carvedilol (COREG) 6.25 MG tablet Take 1 tablet (6.25 mg) by mouth 2 times daily (with meals) 90 tablet 3     diclofenac (VOLTAREN) 1 % topical gel Apply 4 g  topically 4 times daily 100 g 3     eplerenone (INSPRA) 50 MG tablet Take 1 tablet (50 mg) by mouth 2 times daily 90 tablet 3     furosemide (LASIX) 20 MG tablet Take 1 tablet (20 mg) by mouth daily 90 tablet 1     furosemide (LASIX) 20 MG tablet Take 1 tablet (20 mg) by mouth daily 90 tablet 1     multivitamin w/minerals (MULTI-VITAMIN) tablet Take 1 tablet by mouth daily       pantoprazole (PROTONIX) 20 MG EC tablet Take 1 tablet (20 mg) by mouth daily 90 tablet 3     pantoprazole (PROTONIX) 20 MG EC tablet Take 1 tablet (20 mg) by mouth daily 90 tablet 3     rifaximin (XIFAXAN) 550 MG TABS tablet 1 tablet (550 mg) by Oral or Feeding Tube route 2 times daily 60 tablet 3     rifaximin (XIFAXAN) 550 MG TABS tablet 1 tablet (550 mg) by Oral or Feeding Tube route 2 times daily 60 tablet 3     sertraline (ZOLOFT) 50 MG tablet Take 1 tablet (50 mg) by mouth daily 30 tablet 3     No current facility-administered medications for this visit.       ROS:   Refer to HPI    EXAM:  BP (!) 135/92 (BP Location: Right arm, Patient Position: Chair, Cuff Size: Adult Regular)   Pulse 71   Wt 104.1 kg (229 lb 9.6 oz)   SpO2 100%   BMI 34.40 kg/m    GENERAL: Appears comfortable, in no acute distress.   HEENT: Eye symmetrical, no discharge or icterus bilaterally. Mucous membranes moist and without lesions.  CV: RRR, +S1S2, no murmur, rub, or gallop.   RESPIRATORY: Respirations regular, even, and unlabored. Lungs CTA throughout.   GI: Soft and non distended with normoactive bowel sounds present in all quadrants. No tenderness, rebound, guarding.   EXTREMITIES: no peripheral edema. 2+ bilateral pedal pulses.   NEUROLOGIC: Alert and oriented x 3. No focal deficits.   MUSCULOSKELETAL: No joint swelling or tenderness.   SKIN: No jaundice. No rashes or lesions.     Labs, reviewed with patient in clinic today:  CBC RESULTS:  Lab Results   Component Value Date    WBC 4.5 06/26/2024    RBC 3.64 (L) 06/26/2024    HGB 11.1 (L) 06/26/2024     "HCT 33.1 (L) 2024    MCV 91 2024    MCH 30.5 2024    MCHC 33.5 2024    RDW 14.6 2024    PLT 73 (L) 2024       CMP RESULTS:  Lab Results   Component Value Date     2024    POTASSIUM 4.4 2024    POTASSIUM 4.1 2022    CHLORIDE 108 (H) 2024    CHLORIDE 97 (L) 2022    CO2 22 2024    CO2 24 2022    ANIONGAP 9 2024    ANIONGAP 13 2022    GLC 88 2024    GLC 97 2022    BUN 36.7 (H) 2024    BUN 11 2022    CR 2.09 (H) 2024    GFRESTIMATED 39 (L) 2024    GFRESTIMATED >60 2020    GFRESTBLACK >60 2020    ENEDINA 9.5 2024    BILITOTAL 0.7 2024    ALBUMIN 3.8 2024    ALBUMIN 4.3 2022    ALKPHOS 101 2024    ALT 37 2024    AST 52 (H) 2024        INR RESULTS:  Lab Results   Component Value Date    INR 1.26 (H) 2024       Lab Results   Component Value Date    MAG 1.9 10/13/2023     No results found for: \"NTBNPI\"  No results found for: \"NTBNP\"    LIPIDS:  Recent Labs   Lab Test 23  1335 03/15/23  0810   CHOL 252* 238*   HDL 55 50   * 142*   TRIG 138 232*     Coronary CTA 2024:  IMPRESSION:  1.  Severe single-vessel CAD involving the ostial/proximal LAD.  2.  Moderate stenosis involving the ramus intermedius.   3.  Total Agatston score 364, placing the patient in 99th percentile  when compared to age and gender matched control group.  4.  Please review Radiology report for incidental noncardiac findings  that will follow separately.     FINDINGS:     CORONARY CALCIUM SCORE     Total Agatston calcium score: 364   Left main: 0  Left anterior descendin  Left circumflex: 156  Right coronary artery: 112     Coronary Angiogram 2024:    Conclusion    Mild, non obstructive CAD.  No acute coronary intervention.  TR band secured in place at right radial aa site.        Assessment and Plan:   Mr. Morel is a 44 year old male with a PMH of  HTN, " HLD, alcoholic cirrhosis, and prior tobacco use.     # Coronary artery disease, mild non-obstructive   ASCVD 10 year risk 2.6%. Patient notes family history of ischemic heart disease. Discussed importance of annual lipid monitoring and potential initiation of statin in the future with liver recovery.   - aspirin 81mg daily     # HTN  - carvedilol 6.25mg BID  - eplerenone 50mg BID  - furosemide 20mg daily    # HLD  Most recent , unclear if fasting. Goal <100. Given minimal CAD, defer statin initiation.     # Alcoholic cirrhosis  Follows with with Dr. Diego (hepatology)    Follow up:  as needed   Chart review time today: 10 minutes  Visit time today: 12 minutes  Total time spent today: 22 minutes    TAHMINA MENJIVAR CNP  General Cardiology   07/08/24

## 2024-09-08 ENCOUNTER — MYC MEDICAL ADVICE (OUTPATIENT)
Dept: NEPHROLOGY | Facility: CLINIC | Age: 44
End: 2024-09-08
Payer: COMMERCIAL

## 2024-09-09 ENCOUNTER — OFFICE VISIT (OUTPATIENT)
Dept: NEPHROLOGY | Facility: CLINIC | Age: 44
End: 2024-09-09
Attending: INTERNAL MEDICINE
Payer: COMMERCIAL

## 2024-09-09 ENCOUNTER — LAB (OUTPATIENT)
Dept: LAB | Facility: CLINIC | Age: 44
End: 2024-09-09
Payer: COMMERCIAL

## 2024-09-09 VITALS
DIASTOLIC BLOOD PRESSURE: 89 MMHG | BODY MASS INDEX: 36.43 KG/M2 | WEIGHT: 243.1 LBS | OXYGEN SATURATION: 99 % | SYSTOLIC BLOOD PRESSURE: 134 MMHG | TEMPERATURE: 98.6 F | HEART RATE: 71 BPM

## 2024-09-09 DIAGNOSIS — N18.30 STAGE 3 CHRONIC KIDNEY DISEASE, UNSPECIFIED WHETHER STAGE 3A OR 3B CKD (H): Primary | ICD-10-CM

## 2024-09-09 DIAGNOSIS — E66.812 CLASS 2 SEVERE OBESITY DUE TO EXCESS CALORIES WITH SERIOUS COMORBIDITY IN ADULT, UNSPECIFIED BMI (H): ICD-10-CM

## 2024-09-09 DIAGNOSIS — E61.1 IRON DEFICIENCY: ICD-10-CM

## 2024-09-09 DIAGNOSIS — R73.09 ELEVATED GLUCOSE LEVEL: ICD-10-CM

## 2024-09-09 DIAGNOSIS — K70.30 ALCOHOLIC CIRRHOSIS (H): ICD-10-CM

## 2024-09-09 DIAGNOSIS — I25.10 CORONARY ARTERY DISEASE INVOLVING NATIVE CORONARY ARTERY OF NATIVE HEART WITHOUT ANGINA PECTORIS: ICD-10-CM

## 2024-09-09 DIAGNOSIS — K70.31 ALCOHOLIC CIRRHOSIS OF LIVER WITH ASCITES (H): ICD-10-CM

## 2024-09-09 DIAGNOSIS — E66.01 CLASS 2 SEVERE OBESITY DUE TO EXCESS CALORIES WITH SERIOUS COMORBIDITY IN ADULT, UNSPECIFIED BMI (H): ICD-10-CM

## 2024-09-09 LAB
ALBUMIN SERPL BCG-MCNC: 4.1 G/DL (ref 3.5–5.2)
ALP SERPL-CCNC: 82 U/L (ref 40–150)
ALT SERPL W P-5'-P-CCNC: 31 U/L (ref 0–70)
ANION GAP SERPL CALCULATED.3IONS-SCNC: 10 MMOL/L (ref 7–15)
AST SERPL W P-5'-P-CCNC: 46 U/L (ref 0–45)
BILIRUB SERPL-MCNC: 1 MG/DL
BUN SERPL-MCNC: 22.2 MG/DL (ref 6–20)
CALCIUM SERPL-MCNC: 9.3 MG/DL (ref 8.8–10.4)
CHLORIDE SERPL-SCNC: 109 MMOL/L (ref 98–107)
CREAT SERPL-MCNC: 1.73 MG/DL (ref 0.67–1.17)
EGFRCR SERPLBLD CKD-EPI 2021: 49 ML/MIN/1.73M2
ERYTHROCYTE [DISTWIDTH] IN BLOOD BY AUTOMATED COUNT: 14.7 % (ref 10–15)
GLUCOSE SERPL-MCNC: 100 MG/DL (ref 70–99)
HBA1C MFR BLD: 5.1 %
HCO3 SERPL-SCNC: 22 MMOL/L (ref 22–29)
HCT VFR BLD AUTO: 36 % (ref 40–53)
HGB BLD-MCNC: 12.1 G/DL (ref 13.3–17.7)
INR PPP: 1.21 (ref 0.85–1.15)
IRON BINDING CAPACITY (ROCHE): 277 UG/DL (ref 240–430)
IRON SATN MFR SERPL: 25 % (ref 15–46)
IRON SERPL-MCNC: 69 UG/DL (ref 61–157)
MCH RBC QN AUTO: 29.7 PG (ref 26.5–33)
MCHC RBC AUTO-ENTMCNC: 33.6 G/DL (ref 31.5–36.5)
MCV RBC AUTO: 89 FL (ref 78–100)
PLATELET # BLD AUTO: 84 10E3/UL (ref 150–450)
POTASSIUM SERPL-SCNC: 4.8 MMOL/L (ref 3.4–5.3)
PROT SERPL-MCNC: 7.4 G/DL (ref 6.4–8.3)
RBC # BLD AUTO: 4.07 10E6/UL (ref 4.4–5.9)
SODIUM SERPL-SCNC: 141 MMOL/L (ref 135–145)
WBC # BLD AUTO: 4.4 10E3/UL (ref 4–11)

## 2024-09-09 PROCEDURE — 83036 HEMOGLOBIN GLYCOSYLATED A1C: CPT | Performed by: INTERNAL MEDICINE

## 2024-09-09 PROCEDURE — 99213 OFFICE O/P EST LOW 20 MIN: CPT | Performed by: INTERNAL MEDICINE

## 2024-09-09 PROCEDURE — 99214 OFFICE O/P EST MOD 30 MIN: CPT | Performed by: INTERNAL MEDICINE

## 2024-09-09 RX ORDER — ASPIRIN 81 MG/1
81 TABLET ORAL DAILY
Qty: 90 TABLET | Refills: 1 | Status: SHIPPED | OUTPATIENT
Start: 2024-09-09 | End: 2025-03-08

## 2024-09-09 RX ORDER — METOPROLOL SUCCINATE 25 MG/1
25 TABLET, EXTENDED RELEASE ORAL DAILY
Qty: 90 TABLET | Refills: 3 | Status: SHIPPED | OUTPATIENT
Start: 2024-09-09 | End: 2025-09-04

## 2024-09-09 RX ORDER — ATORVASTATIN CALCIUM 20 MG/1
20 TABLET, FILM COATED ORAL DAILY
Qty: 90 TABLET | Refills: 1 | Status: SHIPPED | OUTPATIENT
Start: 2024-09-09 | End: 2025-03-08

## 2024-09-09 ASSESSMENT — PAIN SCALES - GENERAL: PAINLEVEL: NO PAIN (0)

## 2024-09-09 NOTE — PATIENT INSTRUCTIONS
It was a pleasure taking care of you today.  I've included a brief summary of our discussion and care plan from today's visit below.  Please review this information with your primary care provider.  _______________________________________________________________________    My recommendations are summarized as follows:  -Keep the amount of sodium in your diet at 2.3 g/day (also see instructions attached in that regard)  -Keep a Blood Pressure diary by taking your blood pressure twice a day as instructed (also see instructions attached in that regard). Start in one week from now and take it for one week then send me the numbers via [employer]. Please make sure that you are using a validated blood pressure device by checking that it is the case at: https://www.validatebp.org/  -Avoid all NSAID's. Examples include Ibuprofen (Advil, Motrin), naprosyn (Aleve), diclofenac (Voltaren), celebrex among others. Acetaminophen (Tylenol) is ok with maximum dose in 24 hours of 3200 mg.  -Healthy lifestyle measures will keep your kidney's functioning at their current best. This includes regular exercise, weight loss and smoking cessation if you smoke.   -For tips on eating healthy:  -https://www.[facility].[employer].edu/nutritionsource/healthy-eating-pyramid/    -Please start metoprolol succinate 25 mg daily  -Please start atorvastatin 20 mg at bedtime  -Please start aspirin 81 mg daily  -Please return to clinic in 3 months with repeat labs    To schedule imaging please call [phone]     To schedule your lab appointment at the [facility], please call        _______________________________________________________________________    Who do I call with any questions after my visit?    Please be in touch if there are any further questions that arise following today's visit.  There are multiple ways to contact your nephrology care team.      During business hours, you may reach your Nephrology Care Coordinator, at  [phone].      To schedule or reschedule an appointment, please call [phone].    You can always send a secure message through [employer].  [employer] messages are answered by your nurse or doctor typically within 24 hours.  Please allow extra time on weekends and holidays.      For urgent/emergent questions after business hours, you may reach the on-call Nephrology Fellow by contacting the [facility]  at [phone].     How will I get the results of any tests ordered?    You will receive all of your results.  If you have signed up for [employer], any tests ordered at your visit will be available to you after your physician reviews them.  Typically this takes 1-2 weeks.  If there are urgent results that require a change in your care plan, your physician or nurse will call you to discuss the next steps.      What is Sm[employer]?  Smadex is a secure way for you to access all of your healthcare records from the [facility].  It is a web based computer program, so you can sign on to it from any location.  It also allows you to send secure messages to your care team.  I recommend signing up for Smade[employer] access if you have not already done so and are comfortable with using a computer.      How do I schedule a follow-up visit?  If you did not schedule a follow-up visit today, please call [phone] to schedule a follow-up office visit.        Sincerely,      Dr. Didi Fisher  [facility]  Division of Nephrology and Hypertension

## 2024-09-09 NOTE — PROGRESS NOTES
Nephrology Clinic    Elroy Morel Sr. MRN:[id number] YOB: 1980  Date of Service: 09/09/2024  Primary care provider: Latosha Baires  Requesting physician: Elroy Rankin MD      REASON FOR CONSULT: CKD    HISTORY OF PRESENT ILLNESS:   Elroy Morel Sr. is a 43 year old male who first presented for evaluation of CKD post-JOCELYN in the setting of alcoholic liver disease in November 2023.  The past medical history is significant for alcohol dependence leading to cirrhosis and multiple hospitalizations over the past months, last from October 13 th till October 16th 2023. His hospitalizations have been complicated by episodes of JOCELYN with the most severe episode occurring early September with a creatinine level that peaked at 7.7 mg/dL. He had been needing therapeutic paracentesis twice a week initially however his alcoholic hepatitis has improved and at the time of this visit his last paracentesis was done in January 2024. He reports that he has been abstinent since mid-August 2023. Over the past months his creatinine level has been fluctuating between 1.6 and 2.2 mg/dL and it is 1.98 mg/dL on 4/8/2024 and 1.73 mg/dL on 9/9/2024. He has no evidence of any significant albuminuria with a negative uACR on 11/24/2023.  The uPCR is 0.06 mg/mg Cr on 4/8/2024. A kidney ultrasound done early September 2023 shows normal size kidneys. On his first visit, he was started on diuretics that were progressively increased and was on furosemide 20 mg daily and spironolactone 100 mg daily up to one month ago when he decided to stop all his medications to see how his body react to this.  His blood pressure is 134/89 in clinic.   He has undergone a preliminary evaluation for liver transplant.  CT angiogram with calcium score results of 354 showed severe proximal LAD. Coronary angiogram on 6/26/24 showed mild non-obstructive CAD.  He was deemed a candidate for BB, statins if his liver status allows it and aspirin.    The patient  denies any dysuria, any pollakiuria, any nocturia, any LE edema, any dyspnea on exertion .  The patient denies ever having kidney stones, urinary tract infections, gross hematuria. There is no family history of CKD.  The following portions of the patient's history were reviewed and updated as appropriate: allergies, current medications, past family history, past medical history, past social history, past surgical history and problem list.  Endoscopy  EGD 6/6/2024  - Small (< 5 mm) esophageal varices with no bleeding                          and no stigmata of recent bleeding.                          - A single gastric polyp. Complete resection. Polyp                          tissue not retrieved. Clips (MR conditional) were                          placed.                          - A single gastric polyp. Resected and retrieved. Clip                          (MR conditional) was placed.                          - A single gastric polyp. Resected and retrieved. Clip                          (MR conditional) was placed.                          - A single gastric polyp. Clips were placed.                          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                          - Normal examined duodenum.   Path: A. STOMACH, POLYP  - Hyperplastic/inflammatory polyp  - No evidence of dysplasia or malignancy   PAST MEDICAL HISTORY:  Past Medical History:   Diagnosis Date    Alcohol use disorder, severe, dependence (H)     Alcohol withdrawal seizure (H)     Alcoholic cirrhosis of liver with ascites (H)     Chronic kidney disease     Coronary artery disease     Esophageal varices (H)     Essential hypertension     Gastroesophageal reflux disease without esophagitis     History of methamphetamine use     Sustained remission since 2003    Hypercholesterolemia     Hyperlipidemia     Tobacco abuse      PAST SURGICAL HISTORY:  Past Surgical History:   Procedure Laterality Date    COLONOSCOPY N/A 8/28/2023     Procedure: COLONOSCOPY, WITH POLYPECTOMY via bx forceps;  Surgeon: Alyssa Tsai MD;  Location: [facility]    CV CORONARY ANGIOGRAM N/A 6/26/2024    Procedure: Coronary Angiogram;  Surgeon: Tyree Quinteros MD;  Location:  [facility]    CV PCI N/A 6/26/2024    Procedure: Percutaneous Coronary Intervention;  Surgeon: Tyree Quinteros MD;  Location: [facility] CARDIAC CATH LAB    ESOPHAGOSCOPY, GASTROSCOPY, DUODENOSCOPY (EGD), COMBINED N/A 6/6/2024    Procedure: Esophagoscopy, gastroscopy, duodenoscopy (EGD), combined;  Surgeon: Leventhal, Thomas Michael, MD;  Location: [facility]    IR PARACENTESIS  8/31/2023    IR PARACENTESIS  9/15/2023    IR PARACENTESIS  9/19/2023    IR PARACENTESIS  9/22/2023    IR PARACENTESIS  9/26/2023    IR PARACENTESIS  9/29/2023    IR PARACENTESIS  10/10/2023    IR PARACENTESIS  10/17/2023    IR PARACENTESIS  10/20/2023    IR PARACENTESIS  10/24/2023    IR PARACENTESIS  10/27/2023    IR PARACENTESIS  10/31/2023    IR PARACENTESIS  11/3/2023    IR PARACENTESIS  11/7/2023    IR PARACENTESIS  11/10/2023    IR PARACENTESIS  11/14/2023    IR PARACENTESIS  11/17/2023    IR PARACENTESIS  11/21/2023    IR PARACENTESIS  11/24/2023    IR PARACENTESIS  11/28/2023    IR PARACENTESIS  12/1/2023    IR PARACENTESIS  12/5/2023    IR PARACENTESIS  12/8/2023    IR PARACENTESIS  12/15/2023    IR PARACENTESIS  1/4/2024    IR PARACENTESIS  12/29/2023    IR PARACENTESIS  12/22/2023    IR PARACENTESIS  12/19/2023    IR PARACENTESIS  1/16/2024    IR PARACENTESIS  1/9/2024     MEDICATIONS:  Prescription Medications as of 9/9/2024         Rx Number Disp Refills Start End Last Dispensed Date Next Fill Date Owning Pharmacy    aspirin (ASPIRIN LOW DOSE) 81 MG EC tablet  90 tablet 0 6/14/2024 --   [facility] - [place], [place] - [address] [facility]    Sig: Take 1 tablet (81 mg) by mouth daily    Class: E-Prescribe    Route: Oral    carvedilol (COREG) 6.25 MG  tablet  90 tablet 3 7/8/2024 --   [id number] - [place], [place] - [address]    Sig: Take 1 tablet (6.25 mg) by mouth 2 times daily (with meals)    Class: E-Prescribe    Route: Oral    carvedilol (COREG) 6.25 MG tablet  90 tablet 3 6/12/2024 --   [facility] - [place], [place] - [address]    Sig: Take 1 tablet (6.25 mg) by mouth 2 times daily (with meals)    Class: E-Prescribe    Route: Oral    diclofenac (VOLTAREN) 1 % topical gel  100 g 3 7/2/2024 --   [id number] - [place], [place] - [address]    Sig: Apply 4 g topically 4 times daily    Class: E-Prescribe    Route: Topical    eplerenone (INSPRA) 50 MG tablet  90 tablet 3 6/18/2024 --   [facility] - [place], [place] - [address]    Sig: Take 1 tablet (50 mg) by mouth 2 times daily    Class: E-Prescribe    Route: Oral    furosemide (LASIX) 20 MG tablet  90 tablet 1 6/13/2024 --   [facility] - [place], [place] - [address]    Sig: Take 1 tablet (20 mg) by mouth daily    Class: E-Prescribe    Route: Oral    furosemide (LASIX) 20 MG tablet  90 tablet 1 6/12/2024 --   [facility] - [place], [place] - [address]    Sig: Take 1 tablet (20 mg) by mouth daily    Class: E-Prescribe    Route: Oral    multivitamin w/minerals (MULTI-VITAMIN) tablet  -- --  --       Sig: Take 1 tablet by mouth daily    Class: Historical    Route: Oral    pantoprazole (PROTONIX) 20 MG EC tablet  90 tablet 3 6/13/2024 --   [facility] - [place], [place] - [address]    Sig: Take 1 tablet (20 mg) by mouth daily    Class: E-Prescribe    Route: Oral    pantoprazole (PROTONIX) 20 MG EC tablet  90 tablet 3 6/12/2024 --   [facility] - [place], [place] - [address] [facility]    Sig: Take 1 tablet (20 mg)  by mouth daily    Class: E-Prescribe    Route: Oral    rifaximin (XIFAXAN) 550 MG TABS tablet  60 tablet 3 2024 --   [facility] - [place], [place] - [address]    Si tablet (550 mg) by Oral or Feeding Tube route 2 times daily    Class: E-Prescribe    Route: Oral or Feeding Tube    rifaximin (XIFAXAN) 550 MG TABS tablet  60 tablet 3 2024 --   [facility] - [place], [place] - [address]    Si tablet (550 mg) by Oral or Feeding Tube route 2 times daily    Class: E-Prescribe    Route: Oral or Feeding Tube    sertraline (ZOLOFT) 50 MG tablet  30 tablet 3 10/15/2023 --   [facility] - [place], [place] - [address]    Sig: Take 1 tablet (50 mg) by mouth daily    Class: E-Prescribe    Route: Oral    Renewals       Renewal requests to authorizing provider (Raymundo Rodriguez MD) <b>prohibited</b>                   ALLERGIES:    Allergies   Allergen Reactions    Dust Mites     Pollen Extract     Metronidazole Dizziness, Other (See Comments) and Unknown     Other Reaction(s): Other (see comments)    Feels foggy on this medication    Omeprazole Other (See Comments) and Unknown     Irritability (tolerates Protonix well)    Other Reaction(s): Other (see comments)     REVIEW OF SYSTEMS:    A comprehensive review of systems was performed and found to be negative except as described here or above.  SOCIAL HISTORY:   Social History     Socioeconomic History    Marital status:      Spouse name: Not on file    Number of children: Not on file    Years of education: Not on file    Highest education level: Not on file   Occupational History    Not on file   Tobacco Use    Smoking status: Former     Current packs/day: 0.00     Types: Cigarettes     Quit date:      Years since quittin.6    Smokeless tobacco: Never   Substance and Sexual Activity    Alcohol use: Not Currently     Comment: last drink 2023     Drug use: Not Currently     Types: Amphetamines     Comment: Sustained remission    Sexual activity: Not on file   Other Topics Concern    Not on file   Social History Narrative    Pt is , 2 children. Employed.      Social Determinants of Health     Financial Resource Strain: Not on file   Food Insecurity: No Food Insecurity (12/23/2023)    Received from [facility]    Hunger Vital Sign     Worried About Running Out of Food in the Last Year: Never true     Ran Out of Food in the Last Year: Never true   Transportation Needs: Unmet Transportation Needs (12/23/2023)    Received from [facility]    PRAPARE - Transportation     Lack of Transportation (Medical): No     Lack of Transportation (Non-Medical): Yes   Physical Activity: Insufficiently Active (12/23/2023)    Received from [facility]    Exercise Vital Sign     Days of Exercise per Week: 3 days     Minutes of Exercise per Session: 30 min   Stress: Not on file   Social Connections: Unknown (8/13/2023)    [facility]    Social Connections     Frequency of Communication with Friends and Family: Not on file   Interpersonal Safety: Not on file   Housing Stability: Low Risk  (12/23/2023)    Received from [facility]    Housing Stability     What is your living situation today?: I have a steady place to live     FAMILY MEDICAL HISTORY:   Family History   Problem Relation Age of Onset    Substance Abuse Mother     Coronary Artery Disease Mother     Cerebrovascular Disease Mother     Substance Abuse Father     Substance Abuse Brother     No Known Problems Son     No Known Problems Son     Liver Cancer No family hx of     Liver Disease No family hx of      PHYSICAL EXAM:   There were no vitals taken for this visit.  GENERAL APPEARANCE: alert and no distress  EYES: nonicteric  HENT: mouth without ulcers or lesions  NECK:  supple, no adenopathy  RESP: lungs clear to auscultation   CV: regular rhythm, normal rate, no rub  ABDOMEN: soft, nontender, normal bowel sounds, no HSM   Extremities: no clubbing, cyanosis, or edema  MS: no evidence of inflammation in joints, no muscle tenderness  SKIN: no rash  NEURO: mentation intact and speech normal  PSYCH: affect normal/bright   LABS:   Recent Results (from the past 672 hour(s))   CBC with platelets    Collection Time: 09/09/24  9:39 AM   Result Value Ref Range    WBC Count 4.4 4.0 - 11.0 10e3/uL    RBC Count 4.07 (L) 4.40 - 5.90 10e6/uL    Hemoglobin 12.1 (L) 13.3 - 17.7 g/dL    Hematocrit 36.0 (L) 40.0 - 53.0 %    MCV 89 78 - 100 fL    MCH 29.7 26.5 - 33.0 pg    MCHC 33.6 31.5 - 36.5 g/dL    RDW 14.7 10.0 - 15.0 %    Platelet Count 84 (L) 150 - 450 10e3/uL     CMP  Recent Labs   Lab Test 06/26/24  0648 06/18/24  0803 04/16/24  0926 04/08/24  0704 03/22/24  1037 03/08/24  0951 12/04/23  1022 11/24/23  0859 10/14/23  0938 10/13/23  1320 09/02/23  2031 08/30/23  0533 08/29/23  0803 08/29/23  0606 08/28/23  0645 09/15/21  1235 08/27/20  1401 06/25/20  1501    139 137 139 137 138   < > 134*   < > 125*   < > 125*  --  128* 130*   < > 140 138   POTASSIUM 4.4 4.4 4.8 4.7 4.4 5.1   < > 3.5   < > 3.8   < > 4.2  --  4.2 4.1   < > 4.5 4.4   CHLORIDE 108* 106 105 107 105 109*   < > 106   < > 93*   < > 96*  --  98 100   < > 103 102   CO2 22 23 22 23 20* 23   < > 17*   < > 16*   < > 16*  --  17* 16*   < > 23 24   ANIONGAP 9 10 10 9 12 6*   < > 11   < > 16*   < > 13  --  13 14   < > 14 12   GLC 88 100* 101* 102* 96 103*   < > 161*   < > 131*   < > 85  --  87 115*   < > 89 104   BUN 36.7* 34.2* 32.4* 36.1* 31.3* 35.7*   < > 18.0   < > 36.4*   < > 45.5*  --  40.0* 41.4*   < > 10 12   CR 2.09* 1.84* 1.87* 1.98* 1.82* 1.74*   < > 1.81*   < > 2.26*   < > 4.34*  --  3.44* 3.72*   < > 1.11 1.23   GFRESTIMATED 39* 46* 45* 42* 47* 49*   < > 47*   < > 36*   < > 16*  --  22* 20*   < > >60 >60   GFRESTBLACK  --    --   --   --   --   --   --   --   --   --   --   --   --   --   --   --  >60 >60   ENEDINA 9.5 9.5 9.4 9.2 9.4 9.6   < > 9.2   < > 9.1   < > 9.0  --  8.9 9.1   < > 9.6 10.1   MAG  --   --   --   --   --   --   --   --   --  1.9  --  1.8  --  1.8 1.9   < >  --   --    PHOS  --   --  3.8 5.1*  --   --   --  3.1  --   --   --   --  3.5  --  3.4   < >  --   --    PROTTOTAL  --  7.2 7.6  --  7.7 7.5   < > 7.5   < > 7.5   < >  --   --   --   --    < > 7.7 7.6   ALBUMIN  --  3.8 3.7 3.6 3.8 3.6   < > 3.3*   < > 3.1*  3.1*   < > 3.0*  --  3.3* 3.4*   < > 4.4 4.2   BILITOTAL  --  0.7 0.8  --  1.1 0.6   < > 2.3*   < > 15.1*   < > 33.6*  --  34.3* 34.9*   < > 0.6 0.4   ALKPHOS  --  101 96  --  111 119   < > 195*   < > 256*   < > 125  --  122 110   < > 54 59   AST  --  52* 61*  --  60* 61*   < > 103*   < > 86*   < > 184*  --  196* 179*   < > 83* 91*   ALT  --  37 36  --  41 36   < > 38   < > 57   < > 52  --  57 57   < > 122* 133*    < > = values in this interval not displayed.     CBC  Recent Labs   Lab Test 09/09/24  0939 06/26/24  0648 04/16/24  0926 04/08/24  0704 03/22/24  1037   HGB 12.1* 11.1* 11.0* 11.3* 11.1*   WBC 4.4 4.5 4.0  --  3.9*   RBC 4.07* 3.64* 3.68*  --  3.80*   HCT 36.0* 33.1* 32.8*  --  35.1*   MCV 89 91 89  --  92   MCH 29.7 30.5 29.9  --  29.2   MCHC 33.6 33.5 33.5  --  31.6   RDW 14.7 14.6 14.9  --  15.4*   PLT 84* 73* 78*  --  89*     INR  Recent Labs   Lab Test 06/26/24  0648 06/18/24  0803 04/16/24  0926 03/22/24  1037 09/03/23  0644 09/02/23  2111 08/27/23  0745 08/24/23  0618   INR 1.26* 1.30* 1.27* 1.28*   < > 1.46*   < > 1.72*   PTT 32  --   --   --   --  42*  --  37    < > = values in this interval not displayed.     ABG  Recent Labs   Lab Test 09/06/23  2045   O2PER 21      URINE STUDIES  Recent Labs   Lab Test 04/16/24  0952 03/08/24  1000 11/24/23  0902 10/14/23  1636 10/03/23  1953 09/10/23  2356   COLOR Yellow Yellow Yellow Dark Yellow* Dark Yellow* Dark Yellow*   APPEARANCE Clear Clear Clear  Clear Clear Clear   URINEGLC Negative Negative Negative Negative Negative Negative   URINEBILI Negative Negative Small* Small* Moderate* Moderate*   URINEKETONE Negative Negative Negative Negative Negative Negative   SG 1.021 1.020 1.020 1.018 1.020 1.015   UBLD Negative Negative Negative Negative Negative Negative   URINEPH 6.5 6.0 6.5 5.5 5.5 5.5   PROTEIN Negative Negative 30* 10* 10* Negative   UROBILINOGEN  --  0.2 0.2  --   --   --    NITRITE Negative Negative Negative Negative Negative Negative   LEUKEST Negative Negative Negative Negative Negative Negative   RBCU  --   --  2-5* 1 <1 0   WBCU  --   --  5-10* 1 3 1     No lab results found.    ASSESSMENT AND PLAN:   #CKD stage 3 b mostly secondary to hepatorenal syndrome and multiple episodes of JOCELYN with no significant proteinuria and unremarkable kidneys on imaging. As his liver disease has significantly improved, he might not need any significant amount of diuretic anymore and given the presence of CAD I will start in terms of medications to start him first on metoprolol 25 mg daily for BP control.The patient was instructed to keep a BP diary and to lose weight. He will also do a repeat abdominal ultrasound to assess for the presence of any ascites.  The patient was also instructed keep the sodium intake around 2300 mg /day, follow a plant-based diet and to avoid NSAIDs     #HTN  Primary and secondary to CKD. On carvedilol, spironolactone and furosemide. Management as per above.    #Blood count  Hemoglobin 10.3 -> 10.6-> 11.3-> 12.1 improved  Iron sat 31%  Ferritin level 525     #Acid-base status  CO2 level 17 -> 23 -> 22  improved     #Electrolytes  Na 134 -> 140 mild hyponatremia that has resolved as his liver function continues to improve  K 3.5 -> 4.6 -> 5 -> 4.7 I stopped potassium supplementation      #BMD  Calcium 9.2          Phosphorus 3.1    albumin 3.3  Vitamin D level 20 would benefit from vitamin D supplementation and I started him on  cholecalciferol 1 table daily    #CKD journey/transplant not a candidate at this point    The total time of this encounter amounted to 30 minutes on the day of the encounter. This time included time spent with the patient, reviewing records, ordering tests, and performing post visit documentation.       The patient will return to follow up in 3 months with repeat labs    Didi Fisher MD  Division of Renal Disease and Hypertension

## 2024-09-09 NOTE — LETTER
9/9/2024       RE: Elroy Morel Sr.  [address] [place] [address]     Dear Colleague,    Thank you for referring your patient, Elroy Morel Sr., to the [facility] [place] at [facility]. Please see a copy of my visit note below.    Nephrology Clinic    Elroy Morel Sr. MRN:[id number] YOB: 1980  Date of Service: 09/09/2024  Primary care provider: Latosha Baires  Requesting physician: Elroy Rankin MD      REASON FOR CONSULT: CKD    HISTORY OF PRESENT ILLNESS:   Elroy Morel Sr. is a 43 year old male who first presented for evaluation of CKD post-JOCELYN in the setting of alcoholic liver disease in November 2023.  The past medical history is significant for alcohol dependence leading to cirrhosis and multiple hospitalizations over the past months, last from October 13 th till October 16th 2023. His hospitalizations have been complicated by episodes of JOCELYN with the most severe episode occurring early September with a creatinine level that peaked at 7.7 mg/dL. He had been needing therapeutic paracentesis twice a week initially however his alcoholic hepatitis has improved and at the time of this visit his last paracentesis was done in January 2024. He reports that he has been abstinent since mid-August 2023. Over the past months his creatinine level has been fluctuating between 1.6 and 2.2 mg/dL and it is 1.98 mg/dL on 4/8/2024 and 1.73 mg/dL on 9/9/2024. He has no evidence of any significant albuminuria with a negative uACR on 11/24/2023.  The uPCR is 0.06 mg/mg Cr on 4/8/2024. A kidney ultrasound done early September 2023 shows normal size kidneys. On his first visit, he was started on diuretics that were progressively increased and was on furosemide 20 mg daily and spironolactone 100 mg daily up to one month ago when he decided to stop all his medications to see how his body react to this.  His blood  pressure is 134/89 in clinic.   He has undergone a preliminary evaluation for liver transplant.  CT angiogram with calcium score results of 354 showed severe proximal LAD. Coronary angiogram on 6/26/24 showed mild non-obstructive CAD.  He was deemed a candidate for BB, statins if his liver status allows it and aspirin.    The patient denies any dysuria, any pollakiuria, any nocturia, any LE edema, any dyspnea on exertion .  The patient denies ever having kidney stones, urinary tract infections, gross hematuria. There is no family history of CKD.  The following portions of the patient's history were reviewed and updated as appropriate: allergies, current medications, past family history, past medical history, past social history, past surgical history and problem list.  Endoscopy  EGD 6/6/2024  - Small (< 5 mm) esophageal varices with no bleeding                          and no stigmata of recent bleeding.                          - A single gastric polyp. Complete resection. Polyp                          tissue not retrieved. Clips (MR conditional) were                          placed.                          - A single gastric polyp. Resected and retrieved. Clip                          (MR conditional) was placed.                          - A single gastric polyp. Resected and retrieved. Clip                          (MR conditional) was placed.                          - A single gastric polyp. Clips were placed.                          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                          - Normal examined duodenum.   Path: A. STOMACH, POLYP  - Hyperplastic/inflammatory polyp  - No evidence of dysplasia or malignancy   PAST MEDICAL HISTORY:  Past Medical History:   Diagnosis Date     Alcohol use disorder, severe, dependence (H)      Alcohol withdrawal seizure (H)      Alcoholic cirrhosis of liver with ascites (H)      Chronic kidney disease      Coronary artery disease      Esophageal  varices (H)      Essential hypertension      Gastroesophageal reflux disease without esophagitis      History of methamphetamine use     Sustained remission since 2003     Hypercholesterolemia      Hyperlipidemia      Tobacco abuse      PAST SURGICAL HISTORY:  Past Surgical History:   Procedure Laterality Date     COLONOSCOPY N/A 8/28/2023    Procedure: COLONOSCOPY, WITH POLYPECTOMY via bx forceps;  Surgeon: Alyssa Tsai MD;  Location: [facility]     CV CORONARY ANGIOGRAM N/A 6/26/2024    Procedure: Coronary Angiogram;  Surgeon: Tyree Quinteros MD;  Location:  [facility]     CV PCI N/A 6/26/2024    Procedure: Percutaneous Coronary Intervention;  Surgeon: Tyree Quinteros MD;  Location:  HE[facility] CARD[facility] CATH LAB     ESOPHAGOSCOPY, GASTROSCOPY, DUODENOSCOPY (EGD), COMBINED N/A 6/6/2024    Procedure: Esophagoscopy, gastroscopy, duodenoscopy (EGD), combined;  Surgeon: Leventhal, Thomas Michael, MD;  Location: [facility]     IR PARACENTESIS  8/31/2023     IR PARACENTESIS  9/15/2023     IR PARACENTESIS  9/19/2023     IR PARACENTESIS  9/22/2023     IR PARACENTESIS  9/26/2023     IR PARACENTESIS  9/29/2023     IR PARACENTESIS  10/10/2023     IR PARACENTESIS  10/17/2023     IR PARACENTESIS  10/20/2023     IR PARACENTESIS  10/24/2023     IR PARACENTESIS  10/27/2023     IR PARACENTESIS  10/31/2023     IR PARACENTESIS  11/3/2023     IR PARACENTESIS  11/7/2023     IR PARACENTESIS  11/10/2023     IR PARACENTESIS  11/14/2023     IR PARACENTESIS  11/17/2023     IR PARACENTESIS  11/21/2023     IR PARACENTESIS  11/24/2023     IR PARACENTESIS  11/28/2023     IR PARACENTESIS  12/1/2023     IR PARACENTESIS  12/5/2023     IR PARACENTESIS  12/8/2023     IR PARACENTESIS  12/15/2023     IR PARACENTESIS  1/4/2024     IR PARACENTESIS  12/29/2023     IR PARACENTESIS  12/22/2023     IR PARACENTESIS  12/19/2023     IR PARACENTESIS  1/16/2024     IR PARACENTESIS  1/9/2024     MEDICATIONS:  Prescription Medications  as of 9/9/2024         Rx Number Disp Refills Start End Last Dispensed Date Next Fill Date Owning Pharmacy    aspirin (ASPIRIN LOW DOSE) 81 MG EC tablet  90 tablet 0 6/14/2024 --   [facility] - [place], [place] - [address]    Sig: Take 1 tablet (81 mg) by mouth daily    Class: E-Prescribe    Route: Oral    carvedilol (COREG) 6.25 MG tablet  90 tablet 3 7/8/2024 --   [id number] - [place], [place] - [address] AT [facility]    Sig: Take 1 tablet (6.25 mg) by mouth 2 times daily (with meals)    Class: E-Prescribe    Route: Oral    carvedilol (COREG) 6.25 MG tablet  90 tablet 3 6/12/2024 --   [facility] - [place], [place] - [address]    Sig: Take 1 tablet (6.25 mg) by mouth 2 times daily (with meals)    Class: E-Prescribe    Route: Oral    diclofenac (VOLTAREN) 1 % topical gel  100 g 3 7/2/2024 --   [id number] - [place], [place] - [address] AT [facility]    Sig: Apply 4 g topically 4 times daily    Class: E-Prescribe    Route: Topical    eplerenone (INSPRA) 50 MG tablet  90 tablet 3 6/18/2024 --   [facility] - [place], [place] - [address]    Sig: Take 1 tablet (50 mg) by mouth 2 times daily    Class: E-Prescribe    Route: Oral    furosemide (LASIX) 20 MG tablet  90 tablet 1 6/13/2024 --   [facility] - [place], [place] - [address]    Sig: Take 1 tablet (20 mg) by mouth daily    Class: E-Prescribe    Route: Oral    furosemide (LASIX) 20 MG tablet  90 tablet 1 6/12/2024 --   [facility] - [place], [place] - [address]    Sig: Take 1 tablet (20 mg) by mouth daily    Class: E-Prescribe    Route: Oral    multivitamin w/minerals (MULTI-VITAMIN) tablet  -- --  --       Sig: Take 1 tablet by mouth daily    Class: Historical    Route: Oral    pantoprazole (PROTONIX) 20 MG EC  tablet  90 tablet 3 2024 --   [facility] - [place], [place] - [address]    Sig: Take 1 tablet (20 mg) by mouth daily    Class: E-Prescribe    Route: Oral    pantoprazole (PROTONIX) 20 MG EC tablet  90 tablet 3 2024 --   [facility] - [place], [place] - [address]    Sig: Take 1 tablet (20 mg) by mouth daily    Class: E-Prescribe    Route: Oral    rifaximin (XIFAXAN) 550 MG TABS tablet  60 tablet 3 2024 --   [facility] - [place], [place] - [address]    Si tablet (550 mg) by Oral or Feeding Tube route 2 times daily    Class: E-Prescribe    Route: Oral or Feeding Tube    rifaximin (XIFAXAN) 550 MG TABS tablet  60 tablet 3 2024 --   [facility] - [place], [place] - [address]    Si tablet (550 mg) by Oral or Feeding Tube route 2 times daily    Class: E-Prescribe    Route: Oral or Feeding Tube    sertraline (ZOLOFT) 50 MG tablet  30 tablet 3 10/15/2023 --   [facility] - [place], [place] - [address] [facility]    Sig: Take 1 tablet (50 mg) by mouth daily    Class: E-Prescribe    Route: Oral    Renewals       Renewal requests to authorizing provider (Raymundo Rodriguez MD) <b>prohibited</b>                   ALLERGIES:    Allergies   Allergen Reactions     Dust Mites      Pollen Extract      Metronidazole Dizziness, Other (See Comments) and Unknown     Other Reaction(s): Other (see comments)    Feels foggy on this medication     Omeprazole Other (See Comments) and Unknown     Irritability (tolerates Protonix well)    Other Reaction(s): Other (see comments)     REVIEW OF SYSTEMS:    A comprehensive review of systems was performed and found to be negative except as described here or above.  SOCIAL HISTORY:   Social History     Socioeconomic History     Marital status:      Spouse name: Not on file     Number of children: Not on  file     Years of education: Not on file     Highest education level: Not on file   Occupational History     Not on file   Tobacco Use     Smoking status: Former     Current packs/day: 0.00     Types: Cigarettes     Quit date:      Years since quittin.6     Smokeless tobacco: Never   Substance and Sexual Activity     Alcohol use: Not Currently     Comment: last drink 2023     Drug use: Not Currently     Types: Amphetamines     Comment: Sustained remission     Sexual activity: Not on file   Other Topics Concern     Not on file   Social History Narrative    Pt is , 2 children. Employed.      Social Determinants of Health     Financial Resource Strain: Not on file   Food Insecurity: No Food Insecurity (2023)    Received from [facility]    Hunger Vital Sign      Worried About Running Out of Food in the Last Year: Never true      Ran Out of Food in the Last Year: Never true   Transportation Needs: Unmet Transportation Needs (2023)    Received from [facility]    PRAPARE - Transportation      Lack of Transportation (Medical): No      Lack of Transportation (Non-Medical): Yes   Physical Activity: Insufficiently Active (2023)    Received from [facility]    Exercise Vital Sign      Days of Exercise per Week: 3 days      Minutes of Exercise per Session: 30 min   Stress: Not on file   Social Connections: Unknown (2023)    Received from [facility], [facility]    Social Connections      Frequency of Communication with Friends and Family: Not on file   Interpersonal Safety: Not on file   Housing Stability: Low Risk  (2023)    Received from [facility]    Housing Stability      What is your living situation today?: I have a steady place to live     FAMILY MEDICAL HISTORY:   Family History   Problem Relation Age of Onset     Substance Abuse Mother      Coronary  Artery Disease Mother      Cerebrovascular Disease Mother      Substance Abuse Father      Substance Abuse Brother      No Known Problems Son      No Known Problems Son      Liver Cancer No family hx of      Liver Disease No family hx of      PHYSICAL EXAM:   There were no vitals taken for this visit.  GENERAL APPEARANCE: alert and no distress  EYES: nonicteric  HENT: mouth without ulcers or lesions  NECK: supple, no adenopathy  RESP: lungs clear to auscultation   CV: regular rhythm, normal rate, no rub  ABDOMEN: soft, nontender, normal bowel sounds, no HSM   Extremities: no clubbing, cyanosis, or edema  MS: no evidence of inflammation in joints, no muscle tenderness  SKIN: no rash  NEURO: mentation intact and speech normal  PSYCH: affect normal/bright   LABS:   Recent Results (from the past 672 hour(s))   CBC with platelets    Collection Time: 09/09/24  9:39 AM   Result Value Ref Range    WBC Count 4.4 4.0 - 11.0 10e3/uL    RBC Count 4.07 (L) 4.40 - 5.90 10e6/uL    Hemoglobin 12.1 (L) 13.3 - 17.7 g/dL    Hematocrit 36.0 (L) 40.0 - 53.0 %    MCV 89 78 - 100 fL    MCH 29.7 26.5 - 33.0 pg    MCHC 33.6 31.5 - 36.5 g/dL    RDW 14.7 10.0 - 15.0 %    Platelet Count 84 (L) 150 - 450 10e3/uL     CMP  Recent Labs   Lab Test 06/26/24  0648 06/18/24  0803 04/16/24  0926 04/08/24  0704 03/22/24  1037 03/08/24  0951 12/04/23  1022 11/24/23  0859 10/14/23  0938 10/13/23  1320 09/02/23  2031 08/30/23  0533 08/29/23  0803 08/29/23  0606 08/28/23  0645 09/15/21  1235 08/27/20  1401 06/25/20  1501    139 137 139 137 138   < > 134*   < > 125*   < > 125*  --  128* 130*   < > 140 138   POTASSIUM 4.4 4.4 4.8 4.7 4.4 5.1   < > 3.5   < > 3.8   < > 4.2  --  4.2 4.1   < > 4.5 4.4   CHLORIDE 108* 106 105 107 105 109*   < > 106   < > 93*   < > 96*  --  98 100   < > 103 102   CO2 22 23 22 23 20* 23   < > 17*   < > 16*   < > 16*  --  17* 16*   < > 23 24   ANIONGAP 9 10 10 9 12 6*   < > 11   < > 16*   < > 13  --  13 14   < > 14 12   GLC 88  100* 101* 102* 96 103*   < > 161*   < > 131*   < > 85  --  87 115*   < > 89 104   BUN 36.7* 34.2* 32.4* 36.1* 31.3* 35.7*   < > 18.0   < > 36.4*   < > 45.5*  --  40.0* 41.4*   < > 10 12   CR 2.09* 1.84* 1.87* 1.98* 1.82* 1.74*   < > 1.81*   < > 2.26*   < > 4.34*  --  3.44* 3.72*   < > 1.11 1.23   GFRESTIMATED 39* 46* 45* 42* 47* 49*   < > 47*   < > 36*   < > 16*  --  22* 20*   < > >60 >60   GFRESTBLACK  --   --   --   --   --   --   --   --   --   --   --   --   --   --   --   --  >60 >60   ENEDINA 9.5 9.5 9.4 9.2 9.4 9.6   < > 9.2   < > 9.1   < > 9.0  --  8.9 9.1   < > 9.6 10.1   MAG  --   --   --   --   --   --   --   --   --  1.9  --  1.8  --  1.8 1.9   < >  --   --    PHOS  --   --  3.8 5.1*  --   --   --  3.1  --   --   --   --  3.5  --  3.4   < >  --   --    PROTTOTAL  --  7.2 7.6  --  7.7 7.5   < > 7.5   < > 7.5   < >  --   --   --   --    < > 7.7 7.6   ALBUMIN  --  3.8 3.7 3.6 3.8 3.6   < > 3.3*   < > 3.1*  3.1*   < > 3.0*  --  3.3* 3.4*   < > 4.4 4.2   BILITOTAL  --  0.7 0.8  --  1.1 0.6   < > 2.3*   < > 15.1*   < > 33.6*  --  34.3* 34.9*   < > 0.6 0.4   ALKPHOS  --  101 96  --  111 119   < > 195*   < > 256*   < > 125  --  122 110   < > 54 59   AST  --  52* 61*  --  60* 61*   < > 103*   < > 86*   < > 184*  --  196* 179*   < > 83* 91*   ALT  --  37 36  --  41 36   < > 38   < > 57   < > 52  --  57 57   < > 122* 133*    < > = values in this interval not displayed.     CBC  Recent Labs   Lab Test 09/09/24  0939 06/26/24  0648 04/16/24  0926 04/08/24  0704 03/22/24  1037   HGB 12.1* 11.1* 11.0* 11.3* 11.1*   WBC 4.4 4.5 4.0  --  3.9*   RBC 4.07* 3.64* 3.68*  --  3.80*   HCT 36.0* 33.1* 32.8*  --  35.1*   MCV 89 91 89  --  92   MCH 29.7 30.5 29.9  --  29.2   MCHC 33.6 33.5 33.5  --  31.6   RDW 14.7 14.6 14.9  --  15.4*   PLT 84* 73* 78*  --  89*     INR  Recent Labs   Lab Test 06/26/24  0648 06/18/24  0803 04/16/24  0926 03/22/24  1037 09/03/23  0644 09/02/23 2111 08/27/23  0745 08/24/23  0618   INR 1.26* 1.30*  1.27* 1.28*   < > 1.46*   < > 1.72*   PTT 32  --   --   --   --  42*  --  37    < > = values in this interval not displayed.     ABG  Recent Labs   Lab Test 09/06/23 2045   O2PER 21      URINE STUDIES  Recent Labs   Lab Test 04/16/24  0952 03/08/24  1000 11/24/23  0902 10/14/23  1636 10/03/23  1953 09/10/23  2356   COLOR Yellow Yellow Yellow Dark Yellow* Dark Yellow* Dark Yellow*   APPEARANCE Clear Clear Clear Clear Clear Clear   URINEGLC Negative Negative Negative Negative Negative Negative   URINEBILI Negative Negative Small* Small* Moderate* Moderate*   URINEKETONE Negative Negative Negative Negative Negative Negative   SG 1.021 1.020 1.020 1.018 1.020 1.015   UBLD Negative Negative Negative Negative Negative Negative   URINEPH 6.5 6.0 6.5 5.5 5.5 5.5   PROTEIN Negative Negative 30* 10* 10* Negative   UROBILINOGEN  --  0.2 0.2  --   --   --    NITRITE Negative Negative Negative Negative Negative Negative   LEUKEST Negative Negative Negative Negative Negative Negative   RBCU  --   --  2-5* 1 <1 0   WBCU  --   --  5-10* 1 3 1     No lab results found.    ASSESSMENT AND PLAN:   #CKD stage 3 b mostly secondary to hepatorenal syndrome and multiple episodes of JOCELYN with no significant proteinuria and unremarkable kidneys on imaging. As his liver disease has significantly improved, he might not need any significant amount of diuretic anymore and given the presence of CAD I will start in terms of medications to start him first on metoprolol 25 mg daily for BP control.The patient was instructed to keep a BP diary and to lose weight. He will also do a repeat abdominal ultrasound to assess for the presence of any ascites.  The patient was also instructed keep the sodium intake around 2300 mg /day, follow a plant-based diet and to avoid NSAIDs     #HTN  Primary and secondary to CKD. On carvedilol, spironolactone and furosemide. Management as per above.    #Blood count  Hemoglobin 10.3 -> 10.6-> 11.3-> 12.1 improved  Iron sat  31%  Ferritin level 525     #Acid-base status  CO2 level 17 -> 23 -> 22  improved     #Electrolytes  Na 134 -> 140 mild hyponatremia that has resolved as his liver function continues to improve  K 3.5 -> 4.6 -> 5 -> 4.7 I stopped potassium supplementation      #BMD  Calcium 9.2          Phosphorus 3.1    albumin 3.3  Vitamin D level 20 would benefit from vitamin D supplementation and I started him on cholecalciferol 1 table daily    #CKD journey/transplant not a candidate at this point    The total time of this encounter amounted to 30 minutes on the day of the encounter. This time included time spent with the patient, reviewing records, ordering tests, and performing post visit documentation.       The patient will return to follow up in 3 months with repeat labs    Didi Fisher MD  Division of Renal Disease and Hypertension      Again, thank you for allowing me to participate in the care of your patient.      Sincerely,    Didi Fisher MD

## 2024-09-09 NOTE — NURSING NOTE
Chief Complaint   Patient presents with    RECHECK     RETURN ACUTE KIDNEY INJURY           /89 (BP Location: Right arm, Patient Position: Sitting, Cuff Size: Adult Large)   Pulse 71   Temp 98.6  F (37  C) (Oral)   Wt 110.3 kg (243 lb 1.6 oz)   SpO2 99%   BMI 36.43 kg/m      Matt Cosme CMA on 9/9/2024 at 10:05 AM

## 2024-12-01 ENCOUNTER — MYC MEDICAL ADVICE (OUTPATIENT)
Dept: TRANSPLANT | Facility: CLINIC | Age: 44
End: 2024-12-01
Payer: COMMERCIAL

## 2024-12-01 DIAGNOSIS — K70.30 ALCOHOLIC CIRRHOSIS (H): Primary | ICD-10-CM

## 2024-12-01 NOTE — TELEPHONE ENCOUNTER
Doing well overall, asked for udpated labs    Follow up w/ Brandie in Jan- see if too well to close vs proceed

## 2024-12-05 ENCOUNTER — LAB (OUTPATIENT)
Dept: LAB | Facility: CLINIC | Age: 44
End: 2024-12-05
Payer: COMMERCIAL

## 2024-12-05 DIAGNOSIS — K70.31 ALCOHOLIC CIRRHOSIS OF LIVER WITH ASCITES (H): ICD-10-CM

## 2024-12-05 DIAGNOSIS — N18.30 STAGE 3 CHRONIC KIDNEY DISEASE, UNSPECIFIED WHETHER STAGE 3A OR 3B CKD (H): ICD-10-CM

## 2024-12-05 DIAGNOSIS — K70.30 ALCOHOLIC CIRRHOSIS (H): ICD-10-CM

## 2024-12-05 LAB
AFP SERPL-MCNC: 3.8 NG/ML
ALBUMIN MFR UR ELPH: 14.3 MG/DL
ALBUMIN SERPL BCG-MCNC: 4 G/DL (ref 3.5–5.2)
ALBUMIN UR-MCNC: NEGATIVE MG/DL
ALP SERPL-CCNC: 94 U/L (ref 40–150)
ALT SERPL W P-5'-P-CCNC: 33 U/L (ref 0–70)
ANION GAP SERPL CALCULATED.3IONS-SCNC: 9 MMOL/L (ref 7–15)
APPEARANCE UR: CLEAR
AST SERPL W P-5'-P-CCNC: 42 U/L (ref 0–45)
BILIRUB SERPL-MCNC: 0.9 MG/DL
BILIRUB UR QL STRIP: NEGATIVE
BUN SERPL-MCNC: 21.7 MG/DL (ref 6–20)
CALCIUM SERPL-MCNC: 9.6 MG/DL (ref 8.8–10.4)
CHLORIDE SERPL-SCNC: 106 MMOL/L (ref 98–107)
COLOR UR AUTO: YELLOW
CREAT SERPL-MCNC: 1.83 MG/DL (ref 0.67–1.17)
CREAT UR-MCNC: 194 MG/DL
CREAT UR-MCNC: 194 MG/DL
EGFRCR SERPLBLD CKD-EPI 2021: 46 ML/MIN/1.73M2
ERYTHROCYTE [DISTWIDTH] IN BLOOD BY AUTOMATED COUNT: 14.3 % (ref 10–15)
GLUCOSE SERPL-MCNC: 98 MG/DL (ref 70–99)
GLUCOSE UR STRIP-MCNC: NEGATIVE MG/DL
HCO3 SERPL-SCNC: 26 MMOL/L (ref 22–29)
HCT VFR BLD AUTO: 39.6 % (ref 40–53)
HGB BLD-MCNC: 13.3 G/DL (ref 13.3–17.7)
HGB UR QL STRIP: NEGATIVE
INR PPP: 1.11 (ref 0.85–1.15)
KETONES UR STRIP-MCNC: NEGATIVE MG/DL
LEUKOCYTE ESTERASE UR QL STRIP: NEGATIVE
MCH RBC QN AUTO: 29.1 PG (ref 26.5–33)
MCHC RBC AUTO-ENTMCNC: 33.6 G/DL (ref 31.5–36.5)
MCV RBC AUTO: 87 FL (ref 78–100)
MICROALBUMIN UR-MCNC: 27 MG/L
MICROALBUMIN/CREAT UR: 13.92 MG/G CR (ref 0–17)
NITRATE UR QL: NEGATIVE
PH UR STRIP: 5.5 [PH] (ref 5–7)
PLATELET # BLD AUTO: 102 10E3/UL (ref 150–450)
POTASSIUM SERPL-SCNC: 5.3 MMOL/L (ref 3.4–5.3)
PROT SERPL-MCNC: 7.5 G/DL (ref 6.4–8.3)
PROT/CREAT 24H UR: 0.07 MG/MG CR (ref 0–0.2)
RBC # BLD AUTO: 4.57 10E6/UL (ref 4.4–5.9)
SODIUM SERPL-SCNC: 141 MMOL/L (ref 135–145)
SP GR UR STRIP: >=1.03 (ref 1–1.03)
UROBILINOGEN UR STRIP-ACNC: 0.2 E.U./DL
WBC # BLD AUTO: 5.9 10E3/UL (ref 4–11)

## 2024-12-08 ENCOUNTER — HEALTH MAINTENANCE LETTER (OUTPATIENT)
Age: 44
End: 2024-12-08

## 2025-01-03 NOTE — PROGRESS NOTES
[facility]    Assessment  44 year old male with past medical history of decompensated alcohol-related cirrhosis complicated by hepatic encephalopathy, ascites and variceal bleeding (8/2023) and alcohol use disorder. PMhx also includes obesity.     #. Decompensated alcohol-related cirrhosis complicated by hepatic encephalopathy, ascites and variceal bleeding (8/2023)   #. Alcohol use disorder; hx of alcohol withdrawal seizures   MELD 3.0: 13    Patient with a history of decompensated alcohol-related cirrhosis.  His disease has previously been complicated by hepatic encephalopathy, variceal bleeding and ascites.  At this current time he is not on medications for hepatic encephalopathy, medications for ascites, diuretics and has stopped his carvedilol.  He denies any issues with slowness of thought, confusion, overt signs or symptoms of bleeding or fluid buildup.  At this time I think he is recompensated in the setting of alcohol cessation.    The patient is due for liver cancer screening.  The patient stopped a beta-blocker and has a history of variceal bleeding will plan for a repeat EGD to assess for portal hypertension    With regards to the patient's alcohol use disorder he is in completed intensive outpatient programming at [facility].  He reports ongoing alcohol sobriety.    Liver Transplantation:  The patient was previously undergoing liver transplant evaluation but given the patient continues to do well and is off any medications we will plan to not proceed with evaluation.  If he has any decompensation we can consider further evaluation at that time.  This was discussed with the patient and his wife who are agreeable    #. Obesity  Patient with BMI greater than 35 and now approaching 40.  We discussed lifestyle and dietary changes to promote a healthy weight which includes portion control, reducing carbohydrates, sugars and processed foods and increasing exercise/mobility.  The patient will  plan to try these lifestyle and dietary changes himself over the next 6 months but if he has ongoing BMI greater than 35 we will plan to refer to a metabolic weight management program at next visit.  I am concerned that if he has ongoing obesity that is uncontrolled that this could impact his cirrhosis over time.    #. Elevated blood pressure  Patient has a blood pressure cuff at home and will plan to take his blood pressure once weekly and follow-up with his primary care doctor    Plan  -- HCC Screening: US + AFP every 6 months; this will need to continue indefinitely given he has cirrhosis  -- Variceal Screening: EGD now given patient stopped beta blocker   -- Continue complete alcohol cessation  -- Recommend lifestyle and dietary changes to promote a healthy weight which includes portion control, reducing carbohydrates, sugars and processed foods.  Recommend increasing exercise/mobility with his wife    Health Maintenance:  -- Recommend yearly influenza vaccination  -- Recommend dental visits every 6 months; patient up to date  -- Colonoscopy: last 8/2023 with hyperplastic polyp    Nutrition & Physical Activity:  -- Low sodium (< 2 grams per day) + high protein diet    RTC: 6 months    Nimco Diego MD (Lizzie)  Advanced [facility] Transplant Hepatology  [facility]    I spent 40 minutes on this encounter performing the following: reviewing the patient's medical record (clinic visits, hospital records, lab results, imaging and procedural documentation), history taking, physical exam and documentation on the date of the encounter. I also spent part of the time in coordination of care and counseling.    HPI:  ALD related Cirrhosis  - hx HE  - hx ascites  - hx variceal bleed (8/2023)  - last EGD 6/2024: small EV, no bleeding  - HCC screening - US 4/2024: no hepatic lesions    Patient presents alone but his wife is on the phone for the entire visit.    Patient reports doing well without any acute  concerns or complaints.  His last visit he has stopped all of his medications.    He stopped his rifaximin but denies any slowness of thought or confusion.  He stopped his carvedilol but denies any gum bleeding, epistaxis, melena, hematochezia or hematemesis.    He stopped his Lasix and eplerenone but denies any abdominal swelling or lower extremity edema.    He has gained about 20 pounds in the last 6 months.  He denies any significant mobility or exercise.  In the summer he works around the house in the yard but in the winter he does not do much for exercise or mobility.  24-hour diet recall with quite a few carbohydrates and processed foods.  Denies any chest pain or shortness of breath with exertion.    Denies any muscle cramps or joint pains.    Denies any alcohol.    Medical hx Surgical hx   Past Medical History:   Diagnosis Date    Alcohol use disorder, severe, dependence (H)     Alcohol withdrawal seizure (H)     Alcoholic cirrhosis of liver with ascites (H)     Chronic kidney disease     Coronary artery disease     Esophageal varices (H)     Essential hypertension     Gastroesophageal reflux disease without esophagitis     History of methamphetamine use     Sustained remission since 2003    Hypercholesterolemia     Hyperlipidemia     Tobacco abuse       Past Surgical History:   Procedure Laterality Date    COLONOSCOPY N/A 8/28/2023    Procedure: COLONOSCOPY, WITH POLYPECTOMY via bx forceps;  Surgeon: Alyssa Tsai MD;  Location:  GI    CV CORONARY ANGIOGRAM N/A 6/26/2024    Procedure: Coronary Angiogram;  Surgeon: Tyree Quinteros MD;  Location: [facility] CARDIAC CATH LAB    CV PCI N/A 6/26/2024    Procedure: Percutaneous Coronary Intervention;  Surgeon: Tyree Quinteros MD;  Location: [facility] CARDIAC CATH LAB    ESOPHAGOSCOPY, GASTROSCOPY, DUODENOSCOPY (EGD), COMBINED N/A 6/6/2024    Procedure: Esophagoscopy, gastroscopy, duodenoscopy (EGD), combined;  Surgeon: Leventhal,  Mike Hodge MD;  Location: [facility] GI    IR PARACENTESIS  2023    IR PARACENTESIS  9/15/2023    IR PARACENTESIS  2023    IR PARACENTESIS  2023    IR PARACENTESIS  2023    IR PARACENTESIS  2023    IR PARACENTESIS  10/10/2023    IR PARACENTESIS  10/17/2023    IR PARACENTESIS  10/20/2023    IR PARACENTESIS  10/24/2023    IR PARACENTESIS  10/27/2023    IR PARACENTESIS  10/31/2023    IR PARACENTESIS  11/3/2023    IR PARACENTESIS  2023    IR PARACENTESIS  11/10/2023    IR PARACENTESIS  2023    IR PARACENTESIS  2023    IR PARACENTESIS  2023    IR PARACENTESIS  2023    IR PARACENTESIS  2023    IR PARACENTESIS  2023    IR PARACENTESIS  2023    IR PARACENTESIS  2023    IR PARACENTESIS  12/15/2023    IR PARACENTESIS  2024    IR PARACENTESIS  2023    IR PARACENTESIS  2023    IR PARACENTESIS  2023    IR PARACENTESIS  2024    IR PARACENTESIS  2024          Medications  No current outpatient medications on file.       Allergies  Allergies   Allergen Reactions    Dust Mites     Pollen Extract     Metronidazole Dizziness, Other (See Comments) and Unknown     Other Reaction(s): Other (see comments)    Feels foggy on this medication    Omeprazole Other (See Comments) and Unknown     Irritability (tolerates Protonix well)    Other Reaction(s): Other (see comments)       Family hx Social hx   Family History   Problem Relation Age of Onset    Substance Abuse Mother     Coronary Artery Disease Mother     Cerebrovascular Disease Mother     Substance Abuse Father     Substance Abuse Brother     No Known Problems Son     No Known Problems Son     Liver Cancer No family hx of     Liver Disease No family hx of      Positive for a first degree relative with drug or alcohol problems.  Social History     Tobacco Use    Smoking status: Former     Current packs/day: 0.00     Types: Cigarettes     Quit date:      Years since quittin.0     Smokeless tobacco: Never   Substance Use Topics    Alcohol use: Not Currently     Comment: last drink 8/18/2023    Drug use: Not Currently     Types: Amphetamines     Comment: Sustained remission     - Lives with wife Malissa. They have 2 kids (born in 2002 and 2004)  - Alcohol: Prior heavy use. H/o alcohol withdrawal seizures (last 2022). H/o lodging plus x several days; not completed.   After last use, he completed [facility].  - Tobacco: Former tobacco use, quit 2020.  - Drug use: history of amphetamine use. History of prior treatment for amphetamine use.      Review of systems  A 10-point review of systems was negative.    Examination  BP (!) 150/107   Pulse 76   Wt 118.8 kg (262 lb)   SpO2 98%   BMI 39.26 kg/m    Body mass index is 39.26 kg/m .    Gen-NAD  Eye- EOMI  ENT- MMM  CVS- RRR, no murmurs  RS- CTA bilaterally  Abd-obese, soft, non-tender, mild distension  Extr- 2+ radial pulses bilaterally, no lower extremity edema bilaterally  MS- hands without clubbing  Neuro- A+Ox3, no asterixis  Skin- no jaundice. Terrys nails  Psych- normal mood    Laboratory  BMP  Recent Labs   Lab Test 01/08/25  0818 12/05/24  0710 09/09/24  0939 06/26/24  0648    141 141 139   POTASSIUM 3.9 5.3 4.8 4.4   CHLORIDE 107 106 109* 108*   ENEDINA 9.2 9.6 9.3 9.5   CO2 21* 26 22 22   BUN 22.1* 21.7* 22.2* 36.7*   CR 1.68* 1.83* 1.73* 2.09*   * 98 100* 88     CBC  Recent Labs   Lab Test 01/08/25  0818 12/05/24  0710 09/09/24  0939 06/26/24  0648   WBC 6.2 5.9 4.4 4.5   RBC 4.54 4.57 4.07* 3.64*   HGB 13.0* 13.3 12.1* 11.1*   HCT 38.1* 39.6* 36.0* 33.1*   MCV 84 87 89 91   MCH 28.6 29.1 29.7 30.5   MCHC 34.1 33.6 33.6 33.5   RDW 14.5 14.3 14.7 14.6   PLT 94* 102* 84* 73*     Liver Enzymes   Recent Labs   Lab Test 01/08/25  0818   PROTTOTAL 7.4   ALBUMIN 3.9   BILITOTAL 0.8   ALKPHOS 91   AST 34   ALT 26      INR   INR   Date Value Ref Range Status   01/08/2025 1.15 0.85 - 1.15 Final         Radiology  Abd US 4/2024  No focal  hepatic observation.     TTE 12/2023  1. Borderline enlarged left ventricular chamber size, no regional wall motion abnormalities, calculated 2-D biplane volumetric ejection fraction of 58%.   2. Normal left ventricular diastolic function.   3. Normal right ventricular chamber size, normal systolic function, estimated right ventricular systolic pressure 27 mmHg (right atrial pressure of 5 mmHg).   4. No  significant valvular heart disease.   5. Enlarged sinus of Valsalva diameter of 41 mm, upper limit of normal for age, sex and BSA is 40 mm.   6. Trivial right to left shunt at rest and with Valsalva release; the timing and behavior of the shunt are consistent with an intrapulmonary mechanism.   7. Ascites.    Endoscopy  EGD 6/6/2024  - Small (< 5 mm) esophageal varices with no bleeding                          and no stigmata of recent bleeding.                          - A single gastric polyp. Complete resection. Polyp                          tissue not retrieved. Clips (MR conditional) were                          placed.                          - A single gastric polyp. Resected and retrieved. Clip                          (MR conditional) was placed.                          - A single gastric polyp. Resected and retrieved. Clip                          (MR conditional) was placed.                          - A single gastric polyp. Clips were placed.                          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                          - Normal examined duodenum.   Path: A. STOMACH, POLYP  - Hyperplastic/inflammatory polyp  - No evidence of dysplasia or malignancy     Colonoscopy 8/2023  - Two diminutive polyps in the sigmoid colon, removed                          with a cold biopsy forceps. Resected and retrieved.                          - Rectal varices.                          - Erythematous mucosa in the sigmoid colon and rectum                          with come erythem, consistent  with portal colopathy.                          This was the likely cause of hematochezia.                          No blood was seen.                          Terminal ileum was normal.     A. SIGMOID COLON POLYP, POLYPECTOMY:  -Hyperplastic polyp

## 2025-01-08 ENCOUNTER — OFFICE VISIT (OUTPATIENT)
Dept: GASTROENTEROLOGY | Facility: CLINIC | Age: 45
End: 2025-01-08
Attending: INTERNAL MEDICINE
Payer: COMMERCIAL

## 2025-01-08 ENCOUNTER — LAB (OUTPATIENT)
Dept: LAB | Facility: CLINIC | Age: 45
End: 2025-01-08
Payer: COMMERCIAL

## 2025-01-08 VITALS
OXYGEN SATURATION: 98 % | BODY MASS INDEX: 39.26 KG/M2 | HEART RATE: 76 BPM | SYSTOLIC BLOOD PRESSURE: 150 MMHG | DIASTOLIC BLOOD PRESSURE: 107 MMHG | WEIGHT: 262 LBS

## 2025-01-08 DIAGNOSIS — K70.31 ALCOHOLIC CIRRHOSIS OF LIVER WITH ASCITES (H): Primary | ICD-10-CM

## 2025-01-08 DIAGNOSIS — K70.31 ALCOHOLIC CIRRHOSIS OF LIVER WITH ASCITES (H): ICD-10-CM

## 2025-01-08 LAB
AFP SERPL-MCNC: 3.2 NG/ML
ALBUMIN SERPL BCG-MCNC: 3.9 G/DL (ref 3.5–5.2)
ALP SERPL-CCNC: 91 U/L (ref 40–150)
ALT SERPL W P-5'-P-CCNC: 26 U/L (ref 0–70)
ANION GAP SERPL CALCULATED.3IONS-SCNC: 10 MMOL/L (ref 7–15)
AST SERPL W P-5'-P-CCNC: 34 U/L (ref 0–45)
BILIRUB DIRECT SERPL-MCNC: 0.25 MG/DL (ref 0–0.3)
BILIRUB SERPL-MCNC: 0.8 MG/DL
BUN SERPL-MCNC: 22.1 MG/DL (ref 6–20)
CALCIUM SERPL-MCNC: 9.2 MG/DL (ref 8.8–10.4)
CHLORIDE SERPL-SCNC: 107 MMOL/L (ref 98–107)
CREAT SERPL-MCNC: 1.68 MG/DL (ref 0.67–1.17)
EGFRCR SERPLBLD CKD-EPI 2021: 51 ML/MIN/1.73M2
ERYTHROCYTE [DISTWIDTH] IN BLOOD BY AUTOMATED COUNT: 14.5 % (ref 10–15)
GLUCOSE SERPL-MCNC: 104 MG/DL (ref 70–99)
HCO3 SERPL-SCNC: 21 MMOL/L (ref 22–29)
HCT VFR BLD AUTO: 38.1 % (ref 40–53)
HGB BLD-MCNC: 13 G/DL (ref 13.3–17.7)
INR PPP: 1.15 (ref 0.85–1.15)
MCH RBC QN AUTO: 28.6 PG (ref 26.5–33)
MCHC RBC AUTO-ENTMCNC: 34.1 G/DL (ref 31.5–36.5)
MCV RBC AUTO: 84 FL (ref 78–100)
PLATELET # BLD AUTO: 94 10E3/UL (ref 150–450)
POTASSIUM SERPL-SCNC: 3.9 MMOL/L (ref 3.4–5.3)
PROT SERPL-MCNC: 7.4 G/DL (ref 6.4–8.3)
RBC # BLD AUTO: 4.54 10E6/UL (ref 4.4–5.9)
SODIUM SERPL-SCNC: 138 MMOL/L (ref 135–145)
WBC # BLD AUTO: 6.2 10E3/UL (ref 4–11)

## 2025-01-08 PROCEDURE — G0480 DRUG TEST DEF 1-7 CLASSES: HCPCS | Mod: 90 | Performed by: PATHOLOGY

## 2025-01-08 PROCEDURE — 99000 SPECIMEN HANDLING OFFICE-LAB: CPT | Performed by: PATHOLOGY

## 2025-01-08 PROCEDURE — 80053 COMPREHEN METABOLIC PANEL: CPT | Performed by: PATHOLOGY

## 2025-01-08 PROCEDURE — 82105 ALPHA-FETOPROTEIN SERUM: CPT | Performed by: INTERNAL MEDICINE

## 2025-01-08 PROCEDURE — 82248 BILIRUBIN DIRECT: CPT | Performed by: PATHOLOGY

## 2025-01-08 PROCEDURE — 36415 COLL VENOUS BLD VENIPUNCTURE: CPT | Performed by: PATHOLOGY

## 2025-01-08 PROCEDURE — 85027 COMPLETE CBC AUTOMATED: CPT | Performed by: PATHOLOGY

## 2025-01-08 PROCEDURE — 85610 PROTHROMBIN TIME: CPT | Performed by: PATHOLOGY

## 2025-01-08 PROCEDURE — 99215 OFFICE O/P EST HI 40 MIN: CPT | Performed by: INTERNAL MEDICINE

## 2025-01-08 PROCEDURE — 99213 OFFICE O/P EST LOW 20 MIN: CPT | Performed by: INTERNAL MEDICINE

## 2025-01-08 ASSESSMENT — PAIN SCALES - GENERAL: PAINLEVEL_OUTOF10: NO PAIN (0)

## 2025-01-08 NOTE — LETTER
1/8/2025      Elroy Morel Sr.  [address] [place] [address]      Dear Colleague,    Thank you for referring your patient, Elroy Morel Sr., to the [facility] [place]. Please see a copy of my visit note below.    [facility] Hepatology    Assessment  44 year old male with past medical history of decompensated alcohol-related cirrhosis complicated by hepatic encephalopathy, ascites and variceal bleeding (8/2023) and alcohol use disorder. PMhx also includes obesity.     #. Decompensated alcohol-related cirrhosis complicated by hepatic encephalopathy, ascites and variceal bleeding (8/2023)   #. Alcohol use disorder; hx of alcohol withdrawal seizures   MELD 3.0: 13    Patient with a history of decompensated alcohol-related cirrhosis.  His disease has previously been complicated by hepatic encephalopathy, variceal bleeding and ascites.  At this current time he is not on medications for hepatic encephalopathy, medications for ascites, diuretics and has stopped his carvedilol.  He denies any issues with slowness of thought, confusion, overt signs or symptoms of bleeding or fluid buildup.  At this time I think he is recompensated in the setting of alcohol cessation.    The patient is due for liver cancer screening.  The patient stopped a beta-blocker and has a history of variceal bleeding will plan for a repeat EGD to assess for portal hypertension    With regards to the patient's alcohol use disorder he is in completed intensive outpatient programming at [facility].  He reports ongoing alcohol sobriety.    Liver Transplantation:  The patient was previously undergoing liver transplant evaluation but given the patient continues to do well and is off any medications we will plan to not proceed with evaluation.  If he has any decompensation we can consider further evaluation at that time.  This was discussed with the patient and his wife who are agreeable    #.  Obesity  Patient with BMI greater than 35 and now approaching 40.  We discussed lifestyle and dietary changes to promote a healthy weight which includes portion control, reducing carbohydrates, sugars and processed foods and increasing exercise/mobility.  The patient will plan to try these lifestyle and dietary changes himself over the next 6 months but if he has ongoing BMI greater than 35 we will plan to refer to a metabolic weight management program at next visit.  I am concerned that if he has ongoing obesity that is uncontrolled that this could impact his cirrhosis over time.    #. Elevated blood pressure  Patient has a blood pressure cuff at home and will plan to take his blood pressure once weekly and follow-up with his primary care doctor    Plan  -- HCC Screening: US + AFP every 6 months; this will need to continue indefinitely given he has cirrhosis  -- Variceal Screening: EGD now given patient stopped beta blocker   -- Continue complete alcohol cessation  -- Recommend lifestyle and dietary changes to promote a healthy weight which includes portion control, reducing carbohydrates, sugars and processed foods.  Recommend increasing exercise/mobility with his wife    Health Maintenance:  -- Recommend yearly influenza vaccination  -- Recommend dental visits every 6 months; patient up to date  -- Colonoscopy: last 8/2023 with hyperplastic polyp    Nutrition & Physical Activity:  -- Low sodium (< 2 grams per day) + high protein diet    RTC: 6 months    Nimco Diego MD (Lizzie)  Advanced & Transplant Hepatology  [facility]    I spent 40 minutes on this encounter performing the following: reviewing the patient's medical record (clinic visits, hospital records, lab results, imaging and procedural documentation), history taking, physical exam and documentation on the date of the encounter. I also spent part of the time in coordination of care and counseling.    HPI:  ALD related  Cirrhosis  - hx HE  - hx ascites  - hx variceal bleed (8/2023)  - last EGD 6/2024: small EV, no bleeding  - HCC screening - US 4/2024: no hepatic lesions    Patient presents alone but his wife is on the phone for the entire visit.    Patient reports doing well without any acute concerns or complaints.  His last visit he has stopped all of his medications.    He stopped his rifaximin but denies any slowness of thought or confusion.  He stopped his carvedilol but denies any gum bleeding, epistaxis, melena, hematochezia or hematemesis.    He stopped his Lasix and eplerenone but denies any abdominal swelling or lower extremity edema.    He has gained about 20 pounds in the last 6 months.  He denies any significant mobility or exercise.  In the summer he works around the house in the yard but in the winter he does not do much for exercise or mobility.  24-hour diet recall with quite a few carbohydrates and processed foods.  Denies any chest pain or shortness of breath with exertion.    Denies any muscle cramps or joint pains.    Denies any alcohol.    Medical hx Surgical hx   Past Medical History:   Diagnosis Date     Alcohol use disorder, severe, dependence (H)      Alcohol withdrawal seizure (H)      Alcoholic cirrhosis of liver with ascites (H)      Chronic kidney disease      Coronary artery disease      Esophageal varices (H)      Essential hypertension      Gastroesophageal reflux disease without esophagitis      History of methamphetamine use     Sustained remission since 2003     Hypercholesterolemia      Hyperlipidemia      Tobacco abuse       Past Surgical History:   Procedure Laterality Date     COLONOSCOPY N/A 8/28/2023    Procedure: COLONOSCOPY, WITH POLYPECTOMY via bx forceps;  Surgeon: Alyssa Tsai MD;  Location:  GI     CV CORONARY ANGIOGRAM N/A 6/26/2024    Procedure: Coronary Angiogram;  Surgeon: Tyree Quinteros MD;  Location:  HEART CARDIAC CATH LAB     CV PCI N/A 6/26/2024     Procedure: Percutaneous Coronary Intervention;  Surgeon: Tyree Quinteros MD;  Location:  HEART CARDIAC CATH LAB     ESOPHAGOSCOPY, GASTROSCOPY, DUODENOSCOPY (EGD), COMBINED N/A 6/6/2024    Procedure: Esophagoscopy, gastroscopy, duodenoscopy (EGD), combined;  Surgeon: Leventhal, Thomas Michael, MD;  Location:  GI     IR PARACENTESIS  8/31/2023     IR PARACENTESIS  9/15/2023     IR PARACENTESIS  9/19/2023     IR PARACENTESIS  9/22/2023     IR PARACENTESIS  9/26/2023     IR PARACENTESIS  9/29/2023     IR PARACENTESIS  10/10/2023     IR PARACENTESIS  10/17/2023     IR PARACENTESIS  10/20/2023     IR PARACENTESIS  10/24/2023     IR PARACENTESIS  10/27/2023     IR PARACENTESIS  10/31/2023     IR PARACENTESIS  11/3/2023     IR PARACENTESIS  11/7/2023     IR PARACENTESIS  11/10/2023     IR PARACENTESIS  11/14/2023     IR PARACENTESIS  11/17/2023     IR PARACENTESIS  11/21/2023     IR PARACENTESIS  11/24/2023     IR PARACENTESIS  11/28/2023     IR PARACENTESIS  12/1/2023     IR PARACENTESIS  12/5/2023     IR PARACENTESIS  12/8/2023     IR PARACENTESIS  12/15/2023     IR PARACENTESIS  1/4/2024     IR PARACENTESIS  12/29/2023     IR PARACENTESIS  12/22/2023     IR PARACENTESIS  12/19/2023     IR PARACENTESIS  1/16/2024     IR PARACENTESIS  1/9/2024          Medications  No current outpatient medications on file.       Allergies  Allergies   Allergen Reactions     Dust Mites      Pollen Extract      Metronidazole Dizziness, Other (See Comments) and Unknown     Other Reaction(s): Other (see comments)    Feels foggy on this medication     Omeprazole Other (See Comments) and Unknown     Irritability (tolerates Protonix well)    Other Reaction(s): Other (see comments)       Family hx Social hx   Family History   Problem Relation Age of Onset     Substance Abuse Mother      Coronary Artery Disease Mother      Cerebrovascular Disease Mother      Substance Abuse Father      Substance Abuse Brother      No Known Problems  Son      No Known Problems Son      Liver Cancer No family hx of      Liver Disease No family hx of      Positive for a first degree relative with drug or alcohol problems.  Social History     Tobacco Use     Smoking status: Former     Current packs/day: 0.00     Types: Cigarettes     Quit date:      Years since quittin.0     Smokeless tobacco: Never   Substance Use Topics     Alcohol use: Not Currently     Comment: last drink 2023     Drug use: Not Currently     Types: Amphetamines     Comment: Sustained remission     - Lives with wife Malissa. They have 2 kids (born in  and )  - Alcohol: Prior heavy use. H/o alcohol withdrawal seizures (last ). H/o lodging plus x several days; not completed.   After last use, he completed [employer].  - Tobacco: Former tobacco use, quit .  - Drug use: history of amphetamine use. History of prior treatment for amphetamine use.      Review of systems  A 10-point review of systems was negative.    Examination  BP (!) 150/107   Pulse 76   Wt 118.8 kg (262 lb)   SpO2 98%   BMI 39.26 kg/m    Body mass index is 39.26 kg/m .    Gen-NAD  Eye- EOMI  ENT- MMM  CVS- RRR, no murmurs  RS- CTA bilaterally  Abd-obese, soft, non-tender, mild distension  Extr- 2+ radial pulses bilaterally, no lower extremity edema bilaterally  MS- hands without clubbing  Neuro- A+Ox3, no asterixis  Skin- no jaundice. Terrys nails  Psych- normal mood    Laboratory  BMP  Recent Labs   Lab Test 2518 24  0710 24  0939 24  0648    141 141 139   POTASSIUM 3.9 5.3 4.8 4.4   CHLORIDE 107 106 109* 108*   ENEDINA 9.2 9.6 9.3 9.5   CO2 21* 26 22 22   BUN 22.1* 21.7* 22.2* 36.7*   CR 1.68* 1.83* 1.73* 2.09*   * 98 100* 88     CBC  Recent Labs   Lab Test 2518 24  0710 24  0939 24  0648   WBC 6.2 5.9 4.4 4.5   RBC 4.54 4.57 4.07* 3.64*   HGB 13.0* 13.3 12.1* 11.1*   HCT 38.1* 39.6* 36.0* 33.1*   MCV 84 87 89 91   MCH 28.6 29.1 29.7  30.5   MCHC 34.1 33.6 33.6 33.5   RDW 14.5 14.3 14.7 14.6   PLT 94* 102* 84* 73*     Liver Enzymes   Recent Labs   Lab Test 01/08/25  0818   PROTTOTAL 7.4   ALBUMIN 3.9   BILITOTAL 0.8   ALKPHOS 91   AST 34   ALT 26      INR   INR   Date Value Ref Range Status   01/08/2025 1.15 0.85 - 1.15 Final         Radiology  Abd US 4/2024  No focal hepatic observation.     TTE 12/2023  1. Borderline enlarged left ventricular chamber size, no regional wall motion abnormalities, calculated 2-D biplane volumetric ejection fraction of 58%.   2. Normal left ventricular diastolic function.   3. Normal right ventricular chamber size, normal systolic function, estimated right ventricular systolic pressure 27 mmHg (right atrial pressure of 5 mmHg).   4. No  significant valvular heart disease.   5. Enlarged sinus of Valsalva diameter of 41 mm, upper limit of normal for age, sex and BSA is 40 mm.   6. Trivial right to left shunt at rest and with Valsalva release; the timing and behavior of the shunt are consistent with an intrapulmonary mechanism.   7. Ascites.    Endoscopy  EGD 6/6/2024  - Small (< 5 mm) esophageal varices with no bleeding                          and no stigmata of recent bleeding.                          - A single gastric polyp. Complete resection. Polyp                          tissue not retrieved. Clips (MR conditional) were                          placed.                          - A single gastric polyp. Resected and retrieved. Clip                          (MR conditional) was placed.                          - A single gastric polyp. Resected and retrieved. Clip                          (MR conditional) was placed.                          - A single gastric polyp. Clips were placed.                          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                          - Normal examined duodenum.   Path: A. STOMACH, POLYP  - Hyperplastic/inflammatory polyp  - No evidence of dysplasia or  malignancy     Colonoscopy 8/2023  - Two diminutive polyps in the sigmoid colon, removed                          with a cold biopsy forceps. Resected and retrieved.                          - Rectal varices.                          - Erythematous mucosa in the sigmoid colon and rectum                          with come erythem, consistent with portal colopathy.                          This was the likely cause of hematochezia.                          No blood was seen.                          Terminal ileum was normal.     A. SIGMOID COLON POLYP, POLYPECTOMY:  -Hyperplastic polyp      Again, thank you for allowing me to participate in the care of your patient.        Sincerely,        Nimco Diego MD    Electronically signed

## 2025-01-08 NOTE — NURSING NOTE
"Chief Complaint   Patient presents with    RECHECK     Vital signs:      BP: (!) 150/107 Pulse: 76     SpO2: 98 %       Weight: 118.8 kg (262 lb)  Estimated body mass index is 39.26 kg/m  as calculated from the following:    Height as of 6/26/24: 1.74 m (5' 8.5\").    Weight as of this encounter: 118.8 kg (262 lb).      Genet Slaughter CMA   1/8/2025 9:13 AM    "

## 2025-01-14 ENCOUNTER — COMMITTEE REVIEW (OUTPATIENT)
Dept: TRANSPLANT | Facility: CLINIC | Age: 45
End: 2025-01-14
Payer: COMMERCIAL

## 2025-01-14 NOTE — LETTER
January 27, 2025    Elroy Morel Sr.  [address] [place] [address]      Dear Mr. Morel,   The purpose of this letter is to let you know that on 1/14/25 the [facility] Multi-Disciplinary Selection Team reviewed the results of your transplant evaluation.  Based on the results of your evaluation and the selection criteria used by our program, the decision was made to not list you on the liver transplant list, as discussed with Dr. Diego.  This is because at this time you are medically too well for transplant, transplant as not needed.   Important things you should know:  If you would like to discuss the decision, or if your medical status changes you may schedule a return visits with your doctor by calling [phone] and asking to speak to your transplant coordinator. Please reach out to me if you have new or worsening symptoms of your liver disease and feel this may need to be re-evaluated.   We recommend that you continue to follow up with your primary care doctor in order to manage your health concerns. Please continue to follow with Dr. Diego as previously discussed.   Attached is a letter from [employer] that describes the services and information offered to patients by [employer] and the Organ Procurement and Transplantation Network (OPTN).  Thank you for allowing us to participate in your care.    Sincerely,      Irene Molina, RN, BSN  Pre-Liver Transplant Coordinator  Phone: [phone]     Solid Organ Transplant  [facility]    Enclosures: [employer] Letter  cc: Care Team    The Organ Procurement and Transplantation Network Toll-free patient services line:  1-107.331.1654  Your resource for organ transplant information    Staffed 8:30 am - 5:00 pm ET Monday - Friday Leave a message 24/7 to receive a call back    The Organ Procurement and Transplantation Network (OPTN) is the national transplant system. It makes  the policies that decide how donated organs are matched to patients waiting for a transplant. The OPTN:    Makes sure donated organs get matched to people on the transplant waiting list  Tells people about the donation and transplant processes  Makes sure that the public knows about the need for more organ and tissue donations    The OPTN has a free patient services line that you can call to:  Get more information about:  Organ donation and organ transplants  Donation and transplant policies  Get an information kit with:  A list of transplant hospitals  Waiting list information  Talk about any questions you may have about your transplant hospital or organ procurement organization. The staff will do their best to help you or point you to others who may help.  Find out how you can volunteer with the OPTN and help shape transplant policy     The patient services line number is: 3-116-767-3121    Patient services line staff CANNOT answer questions about your own medical care, including:  Waiting list status  Test results  Medical records  You will need to call your transplant hospital for this information.    The following websites have more information about transplantation and donation:    OPTN: https://optn.transplant.hrsa.gov/    For potential living donors and transplant recipients:    Living with transplant: https://www.transplantliving.org/    Living donation process: https://optn.transplant.hrsa.gov/living-donation/    Financial assistance: https://www.livingdonorassistance.org/    Transplantation data: https://www.srtr.org/    Organ donation: https://www.organdonor.gov/    Volunteer with the OPTN: https://optn.transplant.hrsa.gov/get-involved/

## 2025-01-14 NOTE — COMMITTEE REVIEW
Abdominal Patient Discussion Note Transplant Coordinator: Lia Molina  Transplant Surgeon:        Referring Physician: Emil Moser    Committee Review Members:  Nutrition Shobha Vail, RD   Pharmacist Guillermina Ojeda, [facility]    - Clinical Maribell Jain, MSW, Bruna Allen, MSW, Anabel Watts, [facility]   Transplant Cesia Ness, RN, Beatriz Caputo, RN, Jr Mike Roper, [facility], Azalea Nick, APRN CNP, Mariana Avila, [facility], Ted Swan, RN, Sheng Greene, RN, Milagros Marroquin, RN   Transplant Hepatology  Nimco Diego MD, Latosha Loja PA-C, Attila Joseph MD, Alyssa Tsai MD, AMARILIS SCHNEIDER MD, Jennifer Crews MD, Thomas M. Leventhal, MD   Transplant Surgery Gilbert Jennings MD, Katt Rubio PA-C, Solomon Jacobs MD, Olga Martinez, APRN CNP, Yasmany Castillo MD       Additional Discussion Notes and Findings:     Closing evaluation as patient has improved, medically too well from transplant.  MELD 13.

## 2025-01-23 ENCOUNTER — TELEPHONE (OUTPATIENT)
Dept: NEPHROLOGY | Facility: CLINIC | Age: 45
End: 2025-01-23
Payer: COMMERCIAL

## 2025-02-05 ENCOUNTER — TELEPHONE (OUTPATIENT)
Dept: GASTROENTEROLOGY | Facility: CLINIC | Age: 45
End: 2025-02-05
Payer: COMMERCIAL

## 2025-02-05 ENCOUNTER — HOSPITAL ENCOUNTER (OUTPATIENT)
Facility: CLINIC | Age: 45
End: 2025-02-05
Attending: INTERNAL MEDICINE | Admitting: INTERNAL MEDICINE
Payer: COMMERCIAL

## 2025-02-05 NOTE — TELEPHONE ENCOUNTER
"Endoscopy Scheduling Screen    Have you had any respiratory illness or flu-like symptoms in the last 10 days?  No    What is your communication preference for Instructions and/or Bowel Prep?   MyChart    What insurance is in the chart?  Other:  [facility]    Ordering/Referring Provider: Nimco Diego MD     (If ordering provider performs procedure, schedule with ordering provider unless otherwise instructed. )    BMI: Estimated body mass index is 39.26 kg/m  as calculated from the following:    Height as of 6/26/24: 1.74 m (5' 8.5\").    Weight as of 1/8/25: 118.8 kg (262 lb).     Sedation Ordered  MAC/deep sedation.   BMI<= 45 45 < BMI <= 48 48 < BMI < = 50  BMI > 50   No Restrictions No [facility]  No [facility]  [facility] with exceptions Hospital Only OR Only       Do you have a history of malignant hyperthermia?  No    (Females) Are you currently pregnant?   No     Have you been diagnosed or told you have pulmonary hypertension?   No    Do you have an LVAD?  No    Have you been told you have moderate to severe sleep apnea?  Yes. Do you use a CPAP? No. (RN Review required for scheduling unless scheduling in Hospital.)     Have you been told you have COPD, asthma, or any other lung disease?  No    Do you have any heart conditions?  No     Have you ever had or are you waiting for an organ transplant?  No. Continue scheduling, no site restrictions.    Have you had a stroke or transient ischemic attack (TIA aka \"mini stroke\" in the last 6 months?   No    Have you been diagnosed with or been told you have cirrhosis of the liver?   Yes. (RN Review required for scheduling unless scheduling in Hospital.)    Are you currently on dialysis?   No    Do you need assistance transferring?   No    BMI: Estimated body mass index is 39.26 kg/m  as calculated from the following:    Height as of 6/26/24: 1.74 m (5' 8.5\").    Weight as of 1/8/25: 118.8 kg (262 lb).     Is patients BMI > 40 and scheduling location " [facility]?  No    Do you take an injectable or oral medication for weight loss or diabetes (excluding insulin)?  No    Do you take the medication Naltrexone?  No    Do you take blood thinners?  No       Prep   Are you currently on dialysis or do you have chronic kidney disease?  No    Do you have a diagnosis of diabetes?  No    Do you have a diagnosis of cystic fibrosis (CF)?  No    On a regular basis do you go 3 -5 days between bowel movements?  No    BMI > 40?  No    Preferred Pharmacy:        [id number] - [place], [place] - [address] [place] [address]  Phone: [phone] Fax: [phone]      Final Scheduling Details     Procedure scheduled  Upper endoscopy (EGD)    Surgeon:  Keshav     Date of procedure:  4/29/25     Pre-OP / PAC:   No - Not required for this site.    Location  [facility] - Per RN assessment.    Sedation   MAC/Deep Sedation - Per order.      Patient Reminders:   You will receive a call from a Nurse to review instructions and health history.  This assessment must be completed prior to your procedure.  Failure to complete the Nurse assessment may result in the procedure being cancelled.      On the day of your procedure, please designate an adult(s) who can drive you home stay with you for the next 24 hours. The medicines used in the exam will make you sleepy. You will not be able to drive.      You cannot take public transportation, ride share services, or non-medical taxi service without a responsible caregiver.  Medical transport services are allowed with the requirement that a responsible caregiver will receive you at your destination.  We require that drivers and caregivers are confirmed prior to your procedure.

## 2025-02-27 ENCOUNTER — DOCUMENTATION ONLY (OUTPATIENT)
Dept: NEPHROLOGY | Facility: CLINIC | Age: 45
End: 2025-02-27
Payer: COMMERCIAL

## 2025-02-27 DIAGNOSIS — N18.30 STAGE 3 CHRONIC KIDNEY DISEASE, UNSPECIFIED WHETHER STAGE 3A OR 3B CKD (H): Primary | ICD-10-CM

## 2025-02-27 DIAGNOSIS — E55.9 VITAMIN D DEFICIENCY: ICD-10-CM

## 2025-03-03 ENCOUNTER — OFFICE VISIT (OUTPATIENT)
Dept: NEPHROLOGY | Facility: CLINIC | Age: 45
End: 2025-03-03
Attending: INTERNAL MEDICINE
Payer: COMMERCIAL

## 2025-03-03 ENCOUNTER — LAB (OUTPATIENT)
Dept: LAB | Facility: CLINIC | Age: 45
End: 2025-03-03
Attending: INTERNAL MEDICINE
Payer: COMMERCIAL

## 2025-03-03 VITALS
WEIGHT: 264.4 LBS | BODY MASS INDEX: 39.16 KG/M2 | TEMPERATURE: 98.5 F | DIASTOLIC BLOOD PRESSURE: 92 MMHG | SYSTOLIC BLOOD PRESSURE: 150 MMHG | HEART RATE: 75 BPM | HEIGHT: 69 IN | OXYGEN SATURATION: 98 %

## 2025-03-03 DIAGNOSIS — N18.30 STAGE 3 CHRONIC KIDNEY DISEASE, UNSPECIFIED WHETHER STAGE 3A OR 3B CKD (H): ICD-10-CM

## 2025-03-03 DIAGNOSIS — N18.30 STAGE 3 CHRONIC KIDNEY DISEASE, UNSPECIFIED WHETHER STAGE 3A OR 3B CKD (H): Primary | ICD-10-CM

## 2025-03-03 DIAGNOSIS — E55.9 VITAMIN D DEFICIENCY: ICD-10-CM

## 2025-03-03 LAB
ALBUMIN MFR UR ELPH: 18.5 MG/DL
ALBUMIN SERPL BCG-MCNC: 4.1 G/DL (ref 3.5–5.2)
ALP SERPL-CCNC: 90 U/L (ref 40–150)
ALT SERPL W P-5'-P-CCNC: 25 U/L (ref 0–70)
ANION GAP SERPL CALCULATED.3IONS-SCNC: 11 MMOL/L (ref 7–15)
AST SERPL W P-5'-P-CCNC: 35 U/L (ref 0–45)
BILIRUB SERPL-MCNC: 0.6 MG/DL
BUN SERPL-MCNC: 21.5 MG/DL (ref 6–20)
CALCIUM SERPL-MCNC: 9.3 MG/DL (ref 8.8–10.4)
CHLORIDE SERPL-SCNC: 107 MMOL/L (ref 98–107)
CREAT SERPL-MCNC: 1.62 MG/DL (ref 0.67–1.17)
CREAT UR-MCNC: 196 MG/DL
EGFRCR SERPLBLD CKD-EPI 2021: 53 ML/MIN/1.73M2
ERYTHROCYTE [DISTWIDTH] IN BLOOD BY AUTOMATED COUNT: 14.2 % (ref 10–15)
GLUCOSE SERPL-MCNC: 117 MG/DL (ref 70–99)
HCO3 SERPL-SCNC: 20 MMOL/L (ref 22–29)
HCT VFR BLD AUTO: 40.6 % (ref 40–53)
HGB BLD-MCNC: 13.8 G/DL (ref 13.3–17.7)
MCH RBC QN AUTO: 28.5 PG (ref 26.5–33)
MCHC RBC AUTO-ENTMCNC: 34 G/DL (ref 31.5–36.5)
MCV RBC AUTO: 84 FL (ref 78–100)
PLATELET # BLD AUTO: 94 10E3/UL (ref 150–450)
POTASSIUM SERPL-SCNC: 4.2 MMOL/L (ref 3.4–5.3)
PROT SERPL-MCNC: 7.5 G/DL (ref 6.4–8.3)
PROT/CREAT 24H UR: 0.09 MG/MG CR (ref 0–0.2)
PTH-INTACT SERPL-MCNC: 59 PG/ML (ref 15–65)
RBC # BLD AUTO: 4.84 10E6/UL (ref 4.4–5.9)
SODIUM SERPL-SCNC: 138 MMOL/L (ref 135–145)
VIT D+METAB SERPL-MCNC: 32 NG/ML (ref 20–50)
WBC # BLD AUTO: 6.4 10E3/UL (ref 4–11)

## 2025-03-03 PROCEDURE — 82306 VITAMIN D 25 HYDROXY: CPT | Performed by: INTERNAL MEDICINE

## 2025-03-03 PROCEDURE — 99000 SPECIMEN HANDLING OFFICE-LAB: CPT | Performed by: PATHOLOGY

## 2025-03-03 PROCEDURE — 3080F DIAST BP >= 90 MM HG: CPT | Performed by: INTERNAL MEDICINE

## 2025-03-03 PROCEDURE — 83970 ASSAY OF PARATHORMONE: CPT | Performed by: PATHOLOGY

## 2025-03-03 PROCEDURE — 99213 OFFICE O/P EST LOW 20 MIN: CPT | Performed by: INTERNAL MEDICINE

## 2025-03-03 PROCEDURE — 99214 OFFICE O/P EST MOD 30 MIN: CPT | Performed by: INTERNAL MEDICINE

## 2025-03-03 PROCEDURE — 1126F AMNT PAIN NOTED NONE PRSNT: CPT | Performed by: INTERNAL MEDICINE

## 2025-03-03 PROCEDURE — 84156 ASSAY OF PROTEIN URINE: CPT | Performed by: PATHOLOGY

## 2025-03-03 PROCEDURE — 85027 COMPLETE CBC AUTOMATED: CPT | Performed by: PATHOLOGY

## 2025-03-03 PROCEDURE — 3075F SYST BP GE 130 - 139MM HG: CPT | Performed by: INTERNAL MEDICINE

## 2025-03-03 PROCEDURE — 36415 COLL VENOUS BLD VENIPUNCTURE: CPT | Performed by: PATHOLOGY

## 2025-03-03 PROCEDURE — 80053 COMPREHEN METABOLIC PANEL: CPT | Performed by: PATHOLOGY

## 2025-03-03 ASSESSMENT — PAIN SCALES - GENERAL: PAINLEVEL_OUTOF10: NO PAIN (0)

## 2025-03-03 NOTE — PROGRESS NOTES
Nephrology Clinic    Elroy Morel Sr. MRN:[id number] YOB: 1980  Date of Service: 03/03/2025  Primary care provider: Latosha Baires  Requesting physician: Elroy Rankin MD      REASON FOR CONSULT: CKD    HISTORY OF PRESENT ILLNESS:   Elroy Morel Sr. is a 44 year old male who first presented for evaluation of CKD post-JOCELYN in the setting of alcoholic liver disease in November 2023.  The past medical history is significant for alcohol dependence leading to cirrhosis and multiple hospitalizations over several months, last from October 13 th till October 16th 2023. His hospitalizations have been complicated by episodes of JOCELYN with the most severe episode occurring early September 2023 with a creatinine level that peaked at 7.7 mg/dL. He had been needing therapeutic paracentesis twice a week initially however his alcoholic hepatitis has improved and at the time of this visit his last paracentesis was done in January 2024. He reports that he has been abstinent since mid-August 2023. Over the past months his creatinine level has been fluctuating between 1.6 and 2.2 mg/dL and it is 1.62 mg/dL on 3/3/2025. He has no evidence of any significant albuminuria with a negative uACR on 11/24/2023 and on 12/5/2024.  The uPCR is 0.06 mg/mg Cr on 4/8/2024. A kidney ultrasound done early September 2023 shows normal size kidneys. On his first visit, he was started on diuretics that were progressively increased and was on furosemide 20 mg daily and spironolactone 100 mg daily up to August 2024 when he decided to stop all his medications to see how his body react to this.  His blood pressure is 134/89 in clinic.   He underwent a preliminary evaluation for liver transplant.  CT angiogram with calcium score results of 354 showed severe proximal LAD. Coronary angiogram on 6/26/24 showed mild non-obstructive CAD.  He was deemed a candidate for BB, statins if his liver status allows it and aspirin. He was taken off the liver  transplant list in January 2025 as his MELD score has improved to 13. On his last visit, the patient was prescribed metoprolol to address his CAD and high blood pressure but he has been reluctant to take it as he doesn't want to take any chemicals given his history of addiction. His blood pressure however remains high in the context of a 30 lbs weight gain since July 2024.    The patient denies any dysuria, any pollakiuria, any nocturia, any LE edema, any dyspnea on exertion .  The patient denies ever having kidney stones, urinary tract infections, gross hematuria. There is no family history of CKD.  The following portions of the patient's history were reviewed and updated as appropriate: allergies, current medications, past family history, past medical history, past social history, past surgical history and problem list.  Endoscopy  EGD 6/6/2024  - Small (< 5 mm) esophageal varices with no bleeding                          and no stigmata of recent bleeding.                          - A single gastric polyp. Complete resection. Polyp                          tissue not retrieved. Clips (MR conditional) were                          placed.                          - A single gastric polyp. Resected and retrieved. Clip                          (MR conditional) was placed.                          - A single gastric polyp. Resected and retrieved. Clip                          (MR conditional) was placed.                          - A single gastric polyp. Clips were placed.                          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                          - Normal examined duodenum.   Path: A. STOMACH, POLYP  - Hyperplastic/inflammatory polyp  - No evidence of dysplasia or malignancy   PAST MEDICAL HISTORY:  Past Medical History:   Diagnosis Date    Alcohol use disorder, severe, dependence (H)     Alcohol withdrawal seizure (H)     Alcoholic cirrhosis of liver with ascites (H)     Chronic kidney  disease     Coronary artery disease     Esophageal varices (H)     Essential hypertension     Gastroesophageal reflux disease without esophagitis     History of methamphetamine use     Sustained remission since 2003    Hypercholesterolemia     Hyperlipidemia     Tobacco abuse      PAST SURGICAL HISTORY:  Past Surgical History:   Procedure Laterality Date    COLONOSCOPY N/A 8/28/2023    Procedure: COLONOSCOPY, WITH POLYPECTOMY via bx forceps;  Surgeon: Alyssa Tsai MD;  Location: [facility]    CV CORONARY ANGIOGRAM N/A 6/26/2024    Procedure: Coronary Angiogram;  Surgeon: Tyree Quinteros MD;  Location:  [facility]    CV PCI N/A 6/26/2024    Procedure: Percutaneous Coronary Intervention;  Surgeon: Tyree Quinteros MD;  Location:  [facility]    ESOPHAGOSCOPY, GASTROSCOPY, DUODENOSCOPY (EGD), COMBINED N/A 6/6/2024    Procedure: Esophagoscopy, gastroscopy, duodenoscopy (EGD), combined;  Surgeon: Leventhal, Thomas Michael, MD;  Location: [facility]    IR PARACENTESIS  8/31/2023    IR PARACENTESIS  9/15/2023    IR PARACENTESIS  9/19/2023    IR PARACENTESIS  9/22/2023    IR PARACENTESIS  9/26/2023    IR PARACENTESIS  9/29/2023    IR PARACENTESIS  10/10/2023    IR PARACENTESIS  10/17/2023    IR PARACENTESIS  10/20/2023    IR PARACENTESIS  10/24/2023    IR PARACENTESIS  10/27/2023    IR PARACENTESIS  10/31/2023    IR PARACENTESIS  11/3/2023    IR PARACENTESIS  11/7/2023    IR PARACENTESIS  11/10/2023    IR PARACENTESIS  11/14/2023    IR PARACENTESIS  11/17/2023    IR PARACENTESIS  11/21/2023    IR PARACENTESIS  11/24/2023    IR PARACENTESIS  11/28/2023    IR PARACENTESIS  12/1/2023    IR PARACENTESIS  12/5/2023    IR PARACENTESIS  12/8/2023    IR PARACENTESIS  12/15/2023    IR PARACENTESIS  1/4/2024    IR PARACENTESIS  12/29/2023    IR PARACENTESIS  12/22/2023    IR PARACENTESIS  12/19/2023    IR PARACENTESIS  1/16/2024    IR PARACENTESIS  1/9/2024     MEDICATIONS:        ALLERGIES:    Allergies   Allergen Reactions    Dust Mites     Pollen Extract     Metronidazole Dizziness, Other (See Comments) and Unknown     Other Reaction(s): Other (see comments)    Feels foggy on this medication    Omeprazole Other (See Comments) and Unknown     Irritability (tolerates Protonix well)    Other Reaction(s): Other (see comments)     REVIEW OF SYSTEMS:    A comprehensive review of systems was performed and found to be negative except as described here or above.  SOCIAL HISTORY:   Social History     Socioeconomic History    Marital status:      Spouse name: Not on file    Number of children: Not on file    Years of education: Not on file    Highest education level: Not on file   Occupational History    Not on file   Tobacco Use    Smoking status: Former     Current packs/day: 0.00     Types: Cigarettes     Quit date:      Years since quittin.1    Smokeless tobacco: Never   Substance and Sexual Activity    Alcohol use: Not Currently     Comment: last drink 2023    Drug use: Not Currently     Types: Amphetamines     Comment: Sustained remission    Sexual activity: Not on file   Other Topics Concern    Not on file   Social History Narrative    Pt is , 2 children. Employed.      Social Drivers of Health     Financial Resource Strain: Not on file   Food Insecurity: No Food Insecurity (2023)    Received from [facility]    Hunger Vital Sign     Worried About Running Out of Food in the Last Year: Never true     Ran Out of Food in the Last Year: Never true   Transportation Needs: Unmet Transportation Needs (2023)    Received from [facility]    PRAPARE - Transportation     Lack of Transportation (Medical): No     Lack of Transportation (Non-Medical): Yes   Physical Activity: Insufficiently Active (2023)    Received from [facility]    Exercise Vital Sign     Days of Exercise per Week: 3 days     Minutes of Exercise per  Session: 30 min   Stress: Not on file   Social Connections: Unknown (8/13/2023)    [facility]    Social Connections     Frequency of Communication with Friends and Family: Not on file   Interpersonal Safety: Not on file   Housing Stability: Low Risk  (12/23/2023)    [facility]    Housing Stability     What is your living situation today?: I have a steady place to live     FAMILY MEDICAL HISTORY:   Family History   Problem Relation Age of Onset    Substance Abuse Mother     Coronary Artery Disease Mother     Cerebrovascular Disease Mother     Substance Abuse Father     Substance Abuse Brother     No Known Problems Son     No Known Problems Son     Liver Cancer No family hx of     Liver Disease No family hx of      PHYSICAL EXAM:   There were no vitals taken for this visit.  GENERAL APPEARANCE: alert and no distress  EYES: nonicteric  HENT: mouth without ulcers or lesions  NECK: supple, no adenopathy  RESP: lungs clear to auscultation   CV: regular rhythm, normal rate, no rub  ABDOMEN: soft, nontender, normal bowel sounds, no HSM   Extremities: no clubbing, cyanosis, or edema  MS: no evidence of inflammation in joints, no muscle tenderness  SKIN: no rash  NEURO: mentation intact and speech normal  PSYCH: affect normal/bright   LABS:   Recent Results (from the past 4 weeks)   CBC with platelets    Collection Time: 03/03/25  3:37 PM   Result Value Ref Range    WBC Count 6.4 4.0 - 11.0 10e3/uL    RBC Count 4.84 4.40 - 5.90 10e6/uL    Hemoglobin 13.8 13.3 - 17.7 g/dL    Hematocrit 40.6 40.0 - 53.0 %    MCV 84 78 - 100 fL    MCH 28.5 26.5 - 33.0 pg    MCHC 34.0 31.5 - 36.5 g/dL    RDW 14.2 10.0 - 15.0 %    Platelet Count 94 (L) 150 - 450 10e3/uL   Comprehensive metabolic panel    Collection Time: 03/03/25  3:37 PM   Result Value Ref Range    Sodium 138 135 - 145 mmol/L    Potassium 4.2 3.4 - 5.3 mmol/L     Carbon Dioxide (CO2) 20 (L) 22 - 29 mmol/L    Anion Gap 11 7 - 15 mmol/L    Urea Nitrogen 21.5 (H) 6.0 - 20.0 mg/dL    Creatinine 1.62 (H) 0.67 - 1.17 mg/dL    GFR Estimate 53 (L) >60 mL/min/1.73m2    Calcium 9.3 8.8 - 10.4 mg/dL    Chloride 107 98 - 107 mmol/L    Glucose 117 (H) 70 - 99 mg/dL    Alkaline Phosphatase 90 40 - 150 U/L    AST 35 0 - 45 U/L    ALT 25 0 - 70 U/L    Protein Total 7.5 6.4 - 8.3 g/dL    Albumin 4.1 3.5 - 5.2 g/dL    Bilirubin Total 0.6 <=1.2 mg/dL     CMP  Recent Labs   Lab Test 03/03/25  1537 01/08/25  0818 12/05/24  0710 09/09/24  0939 06/18/24  0803 04/16/24  0926 04/08/24  0704 12/04/23  1022 11/24/23  0859 10/14/23  0938 10/13/23  1320 09/02/23  2031 08/30/23  0533 08/29/23  0803 08/29/23  0606 08/28/23  0645 09/15/21  1235 08/27/20  1401 06/25/20  1501    138 141 141   < > 137 139   < > 134*   < > 125*   < > 125*  --  128* 130*   < > 140 138   POTASSIUM 4.2 3.9 5.3 4.8   < > 4.8 4.7   < > 3.5   < > 3.8   < > 4.2  --  4.2 4.1   < > 4.5 4.4   CHLORIDE 107 107 106 109*   < > 105 107   < > 106   < > 93*   < > 96*  --  98 100   < > 103 102   CO2 20* 21* 26 22   < > 22 23   < > 17*   < > 16*   < > 16*  --  17* 16*   < > 23 24   ANIONGAP 11 10 9 10   < > 10 9   < > 11   < > 16*   < > 13  --  13 14   < > 14 12   * 104* 98 100*   < > 101* 102*   < > 161*   < > 131*   < > 85  --  87 115*   < > 89 104   BUN 21.5* 22.1* 21.7* 22.2*   < > 32.4* 36.1*   < > 18.0   < > 36.4*   < > 45.5*  --  40.0* 41.4*   < > 10 12   CR 1.62* 1.68* 1.83* 1.73*   < > 1.87* 1.98*   < > 1.81*   < > 2.26*   < > 4.34*  --  3.44* 3.72*   < > 1.11 1.23   GFRESTIMATED 53* 51* 46* 49*   < > 45* 42*   < > 47*   < > 36*   < > 16*  --  22* 20*   < > >60 >60   GFRESTBLACK  --   --   --   --   --   --   --   --   --   --   --   --   --   --   --   --   --  >60 >60   ENEDINA 9.3 9.2 9.6 9.3   < > 9.4 9.2   < > 9.2   < > 9.1   < > 9.0  --  8.9 9.1   < > 9.6 10.1   MAG  --   --   --   --   --   --   --   --   --   --   1.9  --  1.8  --  1.8 1.9   < >  --   --    PHOS  --   --   --   --   --  3.8 5.1*  --  3.1  --   --   --   --  3.5  --  3.4   < >  --   --    PROTTOTAL 7.5 7.4 7.5 7.4   < > 7.6  --    < > 7.5   < > 7.5   < >  --   --   --   --    < > 7.7 7.6   ALBUMIN 4.1 3.9 4.0 4.1   < > 3.7 3.6   < > 3.3*   < > 3.1*  3.1*   < > 3.0*  --  3.3* 3.4*   < > 4.4 4.2   BILITOTAL 0.6 0.8 0.9 1.0   < > 0.8  --    < > 2.3*   < > 15.1*   < > 33.6*  --  34.3* 34.9*   < > 0.6 0.4   ALKPHOS 90 91 94 82   < > 96  --    < > 195*   < > 256*   < > 125  --  122 110   < > 54 59   AST 35 34 42 46*   < > 61*  --    < > 103*   < > 86*   < > 184*  --  196* 179*   < > 83* 91*   ALT 25 26 33 31   < > 36  --    < > 38   < > 57   < > 52  --  57 57   < > 122* 133*    < > = values in this interval not displayed.     CBC  Recent Labs   Lab Test 03/03/25  1537 01/08/25 0818 12/05/24  0710 09/09/24  0939   HGB 13.8 13.0* 13.3 12.1*   WBC 6.4 6.2 5.9 4.4   RBC 4.84 4.54 4.57 4.07*   HCT 40.6 38.1* 39.6* 36.0*   MCV 84 84 87 89   MCH 28.5 28.6 29.1 29.7   MCHC 34.0 34.1 33.6 33.6   RDW 14.2 14.5 14.3 14.7   PLT 94* 94* 102* 84*     INR  Recent Labs   Lab Test 01/08/25  0818 12/05/24  0710 09/09/24  0939 06/26/24  0648 09/03/23  0644 09/02/23  2111 08/27/23  0745 08/24/23  0618   INR 1.15 1.11 1.21* 1.26*   < > 1.46*   < > 1.72*   PTT  --   --   --  32  --  42*  --  37    < > = values in this interval not displayed.     ABG  Recent Labs   Lab Test 09/06/23  2045   O2PER 21      URINE STUDIES  Recent Labs   Lab Test 12/05/24  0742 04/16/24  0952 03/08/24  1000 11/24/23  0902 10/14/23  1636 10/03/23  1953 09/10/23  2356   COLOR Yellow Yellow Yellow Yellow Dark Yellow* Dark Yellow* Dark Yellow*   APPEARANCE Clear Clear Clear Clear Clear Clear Clear   URINEGLC Negative Negative Negative Negative Negative Negative Negative   URINEBILI Negative Negative Negative Small* Small* Moderate* Moderate*   URINEKETONE Negative Negative Negative Negative Negative Negative  Negative   SG >=1.030 1.021 1.020 1.020 1.018 1.020 1.015   UBLD Negative Negative Negative Negative Negative Negative Negative   URINEPH 5.5 6.5 6.0 6.5 5.5 5.5 5.5   PROTEIN Negative Negative Negative 30* 10* 10* Negative   UROBILINOGEN 0.2  --  0.2 0.2  --   --   --    NITRITE Negative Negative Negative Negative Negative Negative Negative   LEUKEST Negative Negative Negative Negative Negative Negative Negative   RBCU  --   --   --  2-5* 1 <1 0   WBCU  --   --   --  5-10* 1 3 1     No lab results found.    ASSESSMENT AND PLAN:   #CKD stage 3 b mostly secondary to residual fibrosis after hepatorenal syndrome and multiple episodes of JOCELYN with no significant proteinuria and unremarkable kidneys on imaging. His current risk factors for progression include obesity and untreated hypertension that the patient wants to address with lifestyle modifications only at this stage. Therefore, I will readdress the need to start medications at his next appointment in 6 months. The patient was also instructed keep the sodium intake around 2300 mg /day, follow a plant-based diet and to avoid NSAIDs     #HTN  Primary and secondary to CKD and obesity. Management as per above.    #Blood count  Hemoglobin 10.3 -> 10.6-> 11.3-> 12.1 -> 13.8  improved and no need for any intervention    #Acid-base status  CO2 level 17 -> 23 -> 22 -> 20 acceptable    #Electrolytes  Na 134 -> 140   K 3.5 -> 4.6 -> 5 -> 4.7 -> 4.2  normalized     #BMD  Calcium 9.2          Phosphorus 3.1    albumin 3.3  Vitamin D level 28 in April 2024, repeat level is pending    #CKD journey/transplant not a candidate at this point due to low risk of progression    The total time of this encounter amounted to 30 minutes on the day of the encounter. This time included time spent with the patient, reviewing records, ordering tests, and performing post visit documentation.       The patient will return to follow up in 6 months with repeat labs    Didi Fisher MD  Division of  Renal Disease and Hypertension

## 2025-03-03 NOTE — NURSING NOTE
"Chief Complaint   Patient presents with    RECHECK     Follow up.      Vitals:    03/03/25 1600 03/03/25 1603 03/03/25 1604   BP: (!) 131/96 (!) 150/90 (!) 150/92   BP Location: Right arm Right arm Right arm   Patient Position: Sitting Sitting Sitting   Cuff Size: Adult Regular Adult Regular Adult Regular   Pulse: 75     Temp: 98.5  F (36.9  C)     TempSrc: Oral     SpO2: 98%     Weight: 119.9 kg (264 lb 6.4 oz)     Height: 1.753 m (5' 9\")         BP Readings from Last 3 Encounters:   03/03/25 (!) 150/92   01/08/25 (!) 150/107   09/09/24 134/89       BP (!) 150/92 (BP Location: Right arm, Patient Position: Sitting, Cuff Size: Adult Regular)   Pulse 75   Temp 98.5  F (36.9  C) (Oral)   Ht 1.753 m (5' 9\")   Wt 119.9 kg (264 lb 6.4 oz)   SpO2 98%   BMI 39.05 kg/m       Sandra Duff    "

## 2025-03-03 NOTE — LETTER
3/3/2025       RE: Elroy Morel Sr.  [address] [place] [address]     Dear Colleague,    Thank you for referring your patient, Elroy Morel Sr., to the [facility]OLOG[facility] [place] [facility]. Please see a copy of my visit note below.    Nephrology Clinic    Elroy Morel Sr. MRN:[id number] YOB: 1980  Date of Service: 03/03/2025  Primary care provider: Latosha Baires  Requesting physician: Elroy Rankin MD      REASON FOR CONSULT: CKD    HISTORY OF PRESENT ILLNESS:   Elroy Morel Sr. is a 44 year old male who first presented for evaluation of CKD post-JOCELYN in the setting of alcoholic liver disease in November 2023.  The past medical history is significant for alcohol dependence leading to cirrhosis and multiple hospitalizations over several months, last from October 13 th till October 16th 2023. His hospitalizations have been complicated by episodes of JOCELYN with the most severe episode occurring early September 2023 with a creatinine level that peaked at 7.7 mg/dL. He had been needing therapeutic paracentesis twice a week initially however his alcoholic hepatitis has improved and at the time of this visit his last paracentesis was done in January 2024. He reports that he has been abstinent since mid-August 2023. Over the past months his creatinine level has been fluctuating between 1.6 and 2.2 mg/dL and it is 1.62 mg/dL on 3/3/2025. He has no evidence of any significant albuminuria with a negative uACR on 11/24/2023 and on 12/5/2024.  The uPCR is 0.06 mg/mg Cr on 4/8/2024. A kidney ultrasound done early September 2023 shows normal size kidneys. On his first visit, he was started on diuretics that were progressively increased and was on furosemide 20 mg daily and spironolactone 100 mg daily up to August 2024 when he decided to stop all his medications to see how his body react to this.  His blood  pressure is 134/89 in clinic.   He underwent a preliminary evaluation for liver transplant.  CT angiogram with calcium score results of 354 showed severe proximal LAD. Coronary angiogram on 6/26/24 showed mild non-obstructive CAD.  He was deemed a candidate for BB, statins if his liver status allows it and aspirin. He was taken off the liver transplant list in January 2025 as his MELD score has improved to 13. On his last visit, the patient was prescribed metoprolol to address his CAD and high blood pressure but he has been reluctant to take it as he doesn't want to take any chemicals given his history of addiction. His blood pressure however remains high in the context of a 30 lbs weight gain since July 2024.    The patient denies any dysuria, any pollakiuria, any nocturia, any LE edema, any dyspnea on exertion .  The patient denies ever having kidney stones, urinary tract infections, gross hematuria. There is no family history of CKD.  The following portions of the patient's history were reviewed and updated as appropriate: allergies, current medications, past family history, past medical history, past social history, past surgical history and problem list.  Endoscopy  EGD 6/6/2024  - Small (< 5 mm) esophageal varices with no bleeding                          and no stigmata of recent bleeding.                          - A single gastric polyp. Complete resection. Polyp                          tissue not retrieved. Clips (MR conditional) were                          placed.                          - A single gastric polyp. Resected and retrieved. Clip                          (MR conditional) was placed.                          - A single gastric polyp. Resected and retrieved. Clip                          (MR conditional) was placed.                          - A single gastric polyp. Clips were placed.                          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                          -  Normal examined duodenum.   Path: A. STOMACH, POLYP  - Hyperplastic/inflammatory polyp  - No evidence of dysplasia or malignancy   PAST MEDICAL HISTORY:  Past Medical History:   Diagnosis Date     Alcohol use disorder, severe, dependence (H)      Alcohol withdrawal seizure (H)      Alcoholic cirrhosis of liver with ascites (H)      Chronic kidney disease      Coronary artery disease      Esophageal varices (H)      Essential hypertension      Gastroesophageal reflux disease without esophagitis      History of methamphetamine use     Sustained remission since 2003     Hypercholesterolemia      Hyperlipidemia      Tobacco abuse      PAST SURGICAL HISTORY:  Past Surgical History:   Procedure Laterality Date     COLONOSCOPY N/A 8/28/2023    Procedure: COLONOSCOPY, WITH POLYPECTOMY via bx forceps;  Surgeon: Alyssa Tsai MD;  Location: [facility]     CV CORONARY ANGIOGRAM N/A 6/26/2024    Procedure: Coronary Angiogram;  Surgeon: Tyree Quinteros MD;  Location:  [facility]     CV PCI N/A 6/26/2024    Procedure: Percutaneous Coronary Intervention;  Surgeon: Tyree Quinteros MD;  Location: [facility] CARDIAC CATH LAB     ESOPHAGOSCOPY, GASTROSCOPY, DUODENOSCOPY (EGD), COMBINED N/A 6/6/2024    Procedure: Esophagoscopy, gastroscopy, duodenoscopy (EGD), combined;  Surgeon: Leventhal, Thomas Michael, MD;  Location: [facility]     IR PARACENTESIS  8/31/2023     IR PARACENTESIS  9/15/2023     IR PARACENTESIS  9/19/2023     IR PARACENTESIS  9/22/2023     IR PARACENTESIS  9/26/2023     IR PARACENTESIS  9/29/2023     IR PARACENTESIS  10/10/2023     IR PARACENTESIS  10/17/2023     IR PARACENTESIS  10/20/2023     IR PARACENTESIS  10/24/2023     IR PARACENTESIS  10/27/2023     IR PARACENTESIS  10/31/2023     IR PARACENTESIS  11/3/2023     IR PARACENTESIS  11/7/2023     IR PARACENTESIS  11/10/2023     IR PARACENTESIS  11/14/2023     IR PARACENTESIS  11/17/2023     IR PARACENTESIS  11/21/2023     IR  PARACENTESIS  2023     IR PARACENTESIS  2023     IR PARACENTESIS  2023     IR PARACENTESIS  2023     IR PARACENTESIS  2023     IR PARACENTESIS  12/15/2023     IR PARACENTESIS  2024     IR PARACENTESIS  2023     IR PARACENTESIS  2023     IR PARACENTESIS  2023     IR PARACENTESIS  2024     IR PARACENTESIS  2024     MEDICATIONS:       ALLERGIES:    Allergies   Allergen Reactions     Dust Mites      Pollen Extract      Metronidazole Dizziness, Other (See Comments) and Unknown     Other Reaction(s): Other (see comments)    Feels foggy on this medication     Omeprazole Other (See Comments) and Unknown     Irritability (tolerates Protonix well)    Other Reaction(s): Other (see comments)     REVIEW OF SYSTEMS:    A comprehensive review of systems was performed and found to be negative except as described here or above.  SOCIAL HISTORY:   Social History     Socioeconomic History     Marital status:      Spouse name: Not on file     Number of children: Not on file     Years of education: Not on file     Highest education level: Not on file   Occupational History     Not on file   Tobacco Use     Smoking status: Former     Current packs/day: 0.00     Types: Cigarettes     Quit date:      Years since quittin.1     Smokeless tobacco: Never   Substance and Sexual Activity     Alcohol use: Not Currently     Comment: last drink 2023     Drug use: Not Currently     Types: Amphetamines     Comment: Sustained remission     Sexual activity: Not on file   Other Topics Concern     Not on file   Social History Narrative    Pt is , 2 children. Employed.      Social Drivers of Health     Financial Resource Strain: Not on file   Food Insecurity: No Food Insecurity (2023)    [facility]    Hunger Vital Sign      Worried About Running Out of Food in the Last Year: Never true      Ran Out of Food in the Last Year: Never true    Transportation Needs: Unmet Transportation Needs (12/23/2023)    Received from [facility]    PRAPARE - Transportation      Lack of Transportation (Medical): No      Lack of Transportation (Non-Medical): Yes   Physical Activity: Insufficiently Active (12/23/2023)    Received from [facility]    Exercise Vital Sign      Days of Exercise per Week: 3 days      Minutes of Exercise per Session: 30 min   Stress: Not on file   Social Connections: Unknown (8/13/2023)    Received from [facility], [facility]    Social Connections      Frequency of Communication with Friends and Family: Not on file   Interpersonal Safety: Not on file   Housing Stability: Low Risk  (12/23/2023)    Received from [facility]    Housing Stability      What is your living situation today?: I have a steady place to live     FAMILY MEDICAL HISTORY:   Family History   Problem Relation Age of Onset     Substance Abuse Mother      Coronary Artery Disease Mother      Cerebrovascular Disease Mother      Substance Abuse Father      Substance Abuse Brother      No Known Problems Son      No Known Problems Son      Liver Cancer No family hx of      Liver Disease No family hx of      PHYSICAL EXAM:   There were no vitals taken for this visit.  GENERAL APPEARANCE: alert and no distress  EYES: nonicteric  HENT: mouth without ulcers or lesions  NECK: supple, no adenopathy  RESP: lungs clear to auscultation   CV: regular rhythm, normal rate, no rub  ABDOMEN: soft, nontender, normal bowel sounds, no HSM   Extremities: no clubbing, cyanosis, or edema  MS: no evidence of inflammation in joints, no muscle tenderness  SKIN: no rash  NEURO: mentation intact and speech normal  PSYCH: affect normal/bright   LABS:   Recent Results (from the past 4 weeks)   CBC with platelets    Collection Time: 03/03/25  3:37 PM   Result Value Ref Range    WBC Count 6.4 4.0 - 11.0  10e3/uL    RBC Count 4.84 4.40 - 5.90 10e6/uL    Hemoglobin 13.8 13.3 - 17.7 g/dL    Hematocrit 40.6 40.0 - 53.0 %    MCV 84 78 - 100 fL    MCH 28.5 26.5 - 33.0 pg    MCHC 34.0 31.5 - 36.5 g/dL    RDW 14.2 10.0 - 15.0 %    Platelet Count 94 (L) 150 - 450 10e3/uL   Comprehensive metabolic panel    Collection Time: 03/03/25  3:37 PM   Result Value Ref Range    Sodium 138 135 - 145 mmol/L    Potassium 4.2 3.4 - 5.3 mmol/L    Carbon Dioxide (CO2) 20 (L) 22 - 29 mmol/L    Anion Gap 11 7 - 15 mmol/L    Urea Nitrogen 21.5 (H) 6.0 - 20.0 mg/dL    Creatinine 1.62 (H) 0.67 - 1.17 mg/dL    GFR Estimate 53 (L) >60 mL/min/1.73m2    Calcium 9.3 8.8 - 10.4 mg/dL    Chloride 107 98 - 107 mmol/L    Glucose 117 (H) 70 - 99 mg/dL    Alkaline Phosphatase 90 40 - 150 U/L    AST 35 0 - 45 U/L    ALT 25 0 - 70 U/L    Protein Total 7.5 6.4 - 8.3 g/dL    Albumin 4.1 3.5 - 5.2 g/dL    Bilirubin Total 0.6 <=1.2 mg/dL     CMP  Recent Labs   Lab Test 03/03/25  1537 01/08/25  0818 12/05/24  0710 09/09/24  0939 06/18/24  0803 04/16/24  0926 04/08/24  0704 12/04/23  1022 11/24/23  0859 10/14/23  0938 10/13/23  1320 09/02/23  2031 08/30/23  0533 08/29/23  0803 08/29/23  0606 08/28/23  0645 09/15/21  1235 08/27/20  1401 06/25/20  1501    138 141 141   < > 137 139   < > 134*   < > 125*   < > 125*  --  128* 130*   < > 140 138   POTASSIUM 4.2 3.9 5.3 4.8   < > 4.8 4.7   < > 3.5   < > 3.8   < > 4.2  --  4.2 4.1   < > 4.5 4.4   CHLORIDE 107 107 106 109*   < > 105 107   < > 106   < > 93*   < > 96*  --  98 100   < > 103 102   CO2 20* 21* 26 22   < > 22 23   < > 17*   < > 16*   < > 16*  --  17* 16*   < > 23 24   ANIONGAP 11 10 9 10   < > 10 9   < > 11   < > 16*   < > 13  --  13 14   < > 14 12   * 104* 98 100*   < > 101* 102*   < > 161*   < > 131*   < > 85  --  87 115*   < > 89 104   BUN 21.5* 22.1* 21.7* 22.2*   < > 32.4* 36.1*   < > 18.0   < > 36.4*   < > 45.5*  --  40.0* 41.4*   < > 10 12   CR 1.62* 1.68* 1.83* 1.73*   < > 1.87* 1.98*   < >  1.81*   < > 2.26*   < > 4.34*  --  3.44* 3.72*   < > 1.11 1.23   GFRESTIMATED 53* 51* 46* 49*   < > 45* 42*   < > 47*   < > 36*   < > 16*  --  22* 20*   < > >60 >60   GFRESTBLACK  --   --   --   --   --   --   --   --   --   --   --   --   --   --   --   --   --  >60 >60   ENEDINA 9.3 9.2 9.6 9.3   < > 9.4 9.2   < > 9.2   < > 9.1   < > 9.0  --  8.9 9.1   < > 9.6 10.1   MAG  --   --   --   --   --   --   --   --   --   --  1.9  --  1.8  --  1.8 1.9   < >  --   --    PHOS  --   --   --   --   --  3.8 5.1*  --  3.1  --   --   --   --  3.5  --  3.4   < >  --   --    PROTTOTAL 7.5 7.4 7.5 7.4   < > 7.6  --    < > 7.5   < > 7.5   < >  --   --   --   --    < > 7.7 7.6   ALBUMIN 4.1 3.9 4.0 4.1   < > 3.7 3.6   < > 3.3*   < > 3.1*  3.1*   < > 3.0*  --  3.3* 3.4*   < > 4.4 4.2   BILITOTAL 0.6 0.8 0.9 1.0   < > 0.8  --    < > 2.3*   < > 15.1*   < > 33.6*  --  34.3* 34.9*   < > 0.6 0.4   ALKPHOS 90 91 94 82   < > 96  --    < > 195*   < > 256*   < > 125  --  122 110   < > 54 59   AST 35 34 42 46*   < > 61*  --    < > 103*   < > 86*   < > 184*  --  196* 179*   < > 83* 91*   ALT 25 26 33 31   < > 36  --    < > 38   < > 57   < > 52  --  57 57   < > 122* 133*    < > = values in this interval not displayed.     CBC  Recent Labs   Lab Test 03/03/25  1537 01/08/25  0818 12/05/24  0710 09/09/24  0939   HGB 13.8 13.0* 13.3 12.1*   WBC 6.4 6.2 5.9 4.4   RBC 4.84 4.54 4.57 4.07*   HCT 40.6 38.1* 39.6* 36.0*   MCV 84 84 87 89   MCH 28.5 28.6 29.1 29.7   MCHC 34.0 34.1 33.6 33.6   RDW 14.2 14.5 14.3 14.7   PLT 94* 94* 102* 84*     INR  Recent Labs   Lab Test 01/08/25  0818 12/05/24  0710 09/09/24  0939 06/26/24  0648 09/03/23  0644 09/02/23  2111 08/27/23  0745 08/24/23  0618   INR 1.15 1.11 1.21* 1.26*   < > 1.46*   < > 1.72*   PTT  --   --   --  32  --  42*  --  37    < > = values in this interval not displayed.     ABG  Recent Labs   Lab Test 09/06/23 2045   O2PER 21      URINE STUDIES  Recent Labs   Lab Test 12/05/24  0742 04/16/24  0952  03/08/24  1000 11/24/23  0902 10/14/23  1636 10/03/23  1953 09/10/23  9666   COLOR Yellow Yellow Yellow Yellow Dark Yellow* Dark Yellow* Dark Yellow*   APPEARANCE Clear Clear Clear Clear Clear Clear Clear   URINEGLC Negative Negative Negative Negative Negative Negative Negative   URINEBILI Negative Negative Negative Small* Small* Moderate* Moderate*   URINEKETONE Negative Negative Negative Negative Negative Negative Negative   SG >=1.030 1.021 1.020 1.020 1.018 1.020 1.015   UBLD Negative Negative Negative Negative Negative Negative Negative   URINEPH 5.5 6.5 6.0 6.5 5.5 5.5 5.5   PROTEIN Negative Negative Negative 30* 10* 10* Negative   UROBILINOGEN 0.2  --  0.2 0.2  --   --   --    NITRITE Negative Negative Negative Negative Negative Negative Negative   LEUKEST Negative Negative Negative Negative Negative Negative Negative   RBCU  --   --   --  2-5* 1 <1 0   WBCU  --   --   --  5-10* 1 3 1     No lab results found.    ASSESSMENT AND PLAN:   #CKD stage 3 b mostly secondary to residual fibrosis after hepatorenal syndrome and multiple episodes of JOCELYN with no significant proteinuria and unremarkable kidneys on imaging. His current risk factors for progression include obesity and untreated hypertension that the patient wants to address with lifestyle modifications only at this stage. Therefore, I will readdress the need to start medications at his next appointment in 6 months. The patient was also instructed keep the sodium intake around 2300 mg /day, follow a plant-based diet and to avoid NSAIDs     #HTN  Primary and secondary to CKD and obesity. Management as per above.    #Blood count  Hemoglobin 10.3 -> 10.6-> 11.3-> 12.1 -> 13.8  improved and no need for any intervention    #Acid-base status  CO2 level 17 -> 23 -> 22 -> 20 acceptable    #Electrolytes  Na 134 -> 140   K 3.5 -> 4.6 -> 5 -> 4.7 -> 4.2  normalized     #BMD  Calcium 9.2          Phosphorus 3.1    albumin 3.3  Vitamin D level 28 in April 2024, repeat  level is pending    #CKD journey/transplant not a candidate at this point due to low risk of progression    The total time of this encounter amounted to 30 minutes on the day of the encounter. This time included time spent with the patient, reviewing records, ordering tests, and performing post visit documentation.       The patient will return to follow up in 6 months with repeat labs    Didi Fisher MD  Division of Renal Disease and Hypertension      Again, thank you for allowing me to participate in the care of your patient.      Sincerely,    Didi Fisher MD

## 2025-04-10 ENCOUNTER — TELEPHONE (OUTPATIENT)
Dept: GASTROENTEROLOGY | Facility: CLINIC | Age: 45
End: 2025-04-10
Payer: COMMERCIAL

## 2025-04-10 NOTE — CONFIDENTIAL NOTE
Caller: Ad Morel    Reason for Reschedule/Cancellation (please be detailed, any staff messages or encounters to note?):   Provider template change.  Case should be scheduled with a Hepatologist    Did you cancel or rescheduled an EUS procedure? No.    Is screening questionnaire older than 3 months from the reschedule date.   If Yes, please complete screening questionnaire. No    Prior to reschedule please review:  Ordering Provider: Dr. Diego  Sedation Determined: MAC, per Order  Does patient have any ASC Exclusions, please identify?: Yes, RN clinical review determined Hospital due varices.    Notes on Cancelled Procedure:  Procedure: Upper Endoscopy [EGD]   Date: 4/29/2025  Location: [facility]; [address], [address] [place] [address]   Surgeon: Dr. Parr    Rescheduled: Yes,   Procedure: Upper Endoscopy [EGD]    Date: 5/22/2025   Location: [facility]; [address] [facility], [place] [address]    Surgeon: Dr. Strange   Sedation Level Scheduled  MAC ,  Reason for Sedation Level Per ORder   Instructions updated and sent: Yes     Does patient need PAC or Pre -Op Rescheduled? : No

## 2025-05-08 ENCOUNTER — TELEPHONE (OUTPATIENT)
Dept: GASTROENTEROLOGY | Facility: CLINIC | Age: 45
End: 2025-05-08
Payer: COMMERCIAL

## 2025-05-08 NOTE — TELEPHONE ENCOUNTER
Pre visit planning completed.    Procedure details:    Patient scheduled for Upper endoscopy (EGD) on 05/22/2025.     Arrival time: 0845. Procedure time 1015    Facility location: [facility]; [address] [place] [address], [address] Floor, [place] [address]. Check in location: Main entrance at registration desk.  *Disclaimer: Drivers are to check in with patient and stay on campus during procedure.     Sedation type: MAC    Pre op exam needed? No.    Indication for procedure:   K70.31 (ICD-10-CM) - Alcoholic cirrhosis of liver with ascites (H)      Chart review:     Electronic implanted devices? No    Recent diagnosis of diverticulitis within the last 6 weeks? No    Medication review:    Diabetic? Yes. Diabetic injectables: Wegovy (Semaglutide). Weekly dosing of medication.  HOLD 7 days before procedure.  Follow up with managing provider.     Anticoagulants? No    Weight loss medication/injectable? No. Patient is on GLP-1 medication but for DM (see above).    Other medication HOLDING recommendations:  N/A    Prep for procedure:     Bowel prep recommendation: N/A  Due to: EGD    Procedure information and instructions sent via [employer]       Victorina Charles RN  Endoscopy Procedure Pre Assessment   [phone] option 3

## 2025-05-08 NOTE — TELEPHONE ENCOUNTER
Pre assessment completed for upcoming procedure.   (Please see previous telephone encounter notes for complete details)      Procedure details:    Arrival time and facility location reviewed.    Pre op exam needed? No.    Designated  policy reviewed and that site requests drivers to check in and stay on campus. Instructed to have someone stay 24  hours post procedure.   *Disclaimer - please notify the [facility] Op Manager with any  issues/concerns.    Medication review:    Medications reviewed. Please see supporting documentation below. Holding recommendations discussed (if applicable).       Prep for procedure:    Procedure prep instructions reviewed.        Any additional information needed:  N/A      Patient verbalized understanding and had no questions or concerns at this time.      Candelaria Reddy LPN  Endoscopy Procedure Pre Assessment   [phone] option 3

## 2025-06-23 ENCOUNTER — VIRTUAL VISIT (OUTPATIENT)
Facility: CLINIC | Age: 45
End: 2025-06-23
Attending: INTERNAL MEDICINE
Payer: COMMERCIAL

## 2025-06-23 ENCOUNTER — MYC MEDICAL ADVICE (OUTPATIENT)
Facility: CLINIC | Age: 45
End: 2025-06-23
Payer: COMMERCIAL

## 2025-06-23 DIAGNOSIS — E66.01 CLASS 2 SEVERE OBESITY DUE TO EXCESS CALORIES WITH SERIOUS COMORBIDITY IN ADULT (H): Primary | ICD-10-CM

## 2025-06-23 DIAGNOSIS — I25.10 CORONARY ARTERY DISEASE INVOLVING NATIVE CORONARY ARTERY OF NATIVE HEART WITHOUT ANGINA PECTORIS: ICD-10-CM

## 2025-06-23 DIAGNOSIS — E66.812 CLASS 2 SEVERE OBESITY DUE TO EXCESS CALORIES WITH SERIOUS COMORBIDITY IN ADULT (H): Primary | ICD-10-CM

## 2025-06-23 NOTE — Clinical Note
Chance WATSON, I spoke with Ad today to help him get started on a GLP-1 agonist, we are going to proceed with Zepbound.  I will keep you posted as he is able to get started if we are able to circumvent cost barriers.  Please let me know if you have any questions.  Thank you, Malcik

## 2025-06-23 NOTE — PATIENT INSTRUCTIONS
"Recommendations from today's MTM visit:                                                    MTM (medication therapy management) is a service provided by a clinical pharmacist designed to help you get the most of out of your medicines.      Start Zepbound 2.5 mg once weekly.     You can use the following link to sign up for a manufacture co-pay card which should reduce the cost of the medication by $150 per month and can be Co-billed along your insurance.  Once you sign up for the co-pay card, bring the card itself to the pharmacy.     https://[email or web address].[email or web address]/enroll/checkEnrollment     Follow-up 4 weeks after initiation of medication    It was great speaking with you today.  I value your experience and would be very thankful for your time in providing feedback in our clinic survey. In the next few days, you may receive an email or text message from [employer] with a link to a survey related to your  clinical pharmacist.\"     To schedule another MTM appointment, please call the clinic directly or you may call the MTM scheduling line at [phone].    My Clinical Pharmacist's contact information:                                                      Please feel free to contact me with any questions or concerns you have.      Wilfred Ojeda, PharmD, BCACP  Medication Therapy Management Pharmacist  [facility]    "

## 2025-06-23 NOTE — PROGRESS NOTES
Medication Therapy Management (MTM) Encounter    ASSESSMENT:                            Medication Adherence/Access: See below for considerations.    Weight Management /CKD: Unchanged.  Patient had been prescribed previously GLP-1 agonist therapies inclusive of both Wegovy and Zepbound both of which were too costly despite insurance coverage.  Utilization of manufacture co-pay card would likely meaningfully improve cost to an affordable amount.  He is an appropriate/compelling candidate for utilization of GLP-1/GIP agonist therapy, he has no overt safety concerns or contraindications.  Reviewed mechanism, adverse effects, monitoring, safety, administration of the use of Zepbound.    PLAN:                            Start Zepbound 2.5 mg once weekly.    You can use the following link to sign up for a manufacture co-pay card which should reduce the cost of the medication by $150 per month and can be Co-billed along your insurance.  Once you sign up for the co-pay card, bring the card itself to the pharmacy.    [email or web address]    Follow-up 4 weeks after initiation of medication    SUBJECTIVE/OBJECTIVE:                          Ad Morel is a 45 year old male seen for an initial visit. He was referred to me from Didi Fisher .      Reason for visit: Discuss GLP-1 agonist.    Allergies/ADRs: Reviewed in chart  Past Medical History: Reviewed in chart  Tobacco: He reports that he quit smoking about 13 years ago. His smoking use included cigarettes. He has never used smokeless tobacco.  Alcohol: None   Medication Adherence/Access: no issues reported.    Weight Management /CKD  Was prescribed previously GLP-1s, both Zepbound and Wegovy, both of which were covered but too expensive. Copays were about ~$300 per his recollection. Would not be close to meeting deductible. Weight loss in the past has consisted in the past has consisted of intermittent fasting and physical activity.  Maintaining 250 lbs currently, generally fluctuates to 244-250 lbs. Having a hard time losing past that. Would like to reach 210-220 lbs. Feels calorie intake is relatively low, eats about 1 meal/day. Estimates 8505-5395 calories/day.     Physical activity: Varies, at least 1 mile-2 walking/day. Various physical jobs, patios, decks, landscaping, etc.   Negative history of pancreatitis, medullary thyroid cancer and multiple endocrine neoplasia type 2.    Diet: Rice/Noodles, Chicken, Fish, Pork, Beef. Lots of pork and chicken.        ----------------      I spent 40 minutes with this patient today. I offer these suggestions for consideration by Didi Fisher . A copy of the visit note was provided to the patient's provider(s).    A summary of these recommendations was sent via [employer].    Wilfred Ojeda, PharmD, BCACP  Medication Therapy Management Pharmacist  [facility]     Telemedicine Visit Details  The patient's medications can be safely assessed via a telemedicine encounter.  Type of service:  Telephone visit  Originating Location (pt. Location): Home    Distant Location (provider location):  Off-site  Start Time: 930am  End Time: 1010am     Medication Therapy Recommendations  No medication therapy recommendations to display

## 2025-07-02 NOTE — PROGRESS NOTES
[facility]    Assessment  45 year old male with past medical history of decompensated alcohol-related cirrhosis complicated by hepatic encephalopathy, ascites and variceal bleeding (8/2023) and alcohol use disorder. PMhx also includes obesity.     #. Decompensated alcohol-related cirrhosis   #. Hx of hepatic encephalopathy  #. Hx of ascites   #. Hx of variceal bleeding (8/2023)   MELD 3.0: 13    Patient with a history of decompensated alcohol-related cirrhosis.  His disease was previously complicated by hepatic encephalopathy, variceal bleeding and ascites.  He has not been on any medications for hepatic encephalopathy ascites or any beta-blockers for some time.  Denies any signs or symptoms of decompensation.  At this time I think he has recompensated in the setting of alcohol cessation.    He is up-to-date on liver cancer screening with an ultrasound today.  Pending results.    Upper endoscopy was performed within the last month for variceal screening; will work on obtaining these records from [facility].    #. Alcohol use disorder; hx of alcohol withdrawal seizures   With regards to the patient's alcohol use disorder he has completed intensive outpatient programming.  He reports ongoing sobriety    #. Obesity  Patient with ongoing obesity.  Patient will continue to discuss lifestyle and dietary changes to promote a healthy weight.  Patient to start Zepbound in the coming weeks.    Plan  -- HCC Screening: US + AFP every 6 months [enrolled in [facility] - standard of care]  -- Variceal Screening: Pending records from [facility], done within the last month  -- Labs every 6 months: CBC, CMP, INR and AFP    Health Maintenance:  -- Continue complete alcohol cessation  -- Recommend lifestyle and dietary changes to promote a healthy weight which includes portion control, reducing carbohydrates, sugars and processed foods.  Recommend increasing exercise/mobility with his wife  -- Recommend yearly influenza  vaccination  -- Colonoscopy: last 8/2023 with hyperplastic polyp  -- Low sodium (< 2 grams per day) + high protein diet    RTC: 12 months    Nimco Diego MD (Lizzie)  Advanced & Transplant Hepatology  [facility]    I spent 40 minutes on this encounter performing the following: reviewing the patient's medical record (clinic visits, hospital records, lab results, imaging and procedural documentation), history taking, physical exam and documentation on the date of the encounter. I also spent part of the time in coordination of care and counseling.    The longitudinal plan of care for the diagnosis(es)/condition(s) as documented were addressed during this visit. Due to the added complexity in care, I will continue to support Ad in the subsequent management and with ongoing continuity of care.    HPI:  ALD related Cirrhosis  - hx HE  - hx ascites  - hx variceal bleed (8/2023)  - last EGD 6/2024: small EV, no bleeding  - HCC screening - US 1/2025: no hepatic lesions    Presented alone    Reports doing well without any acute concerns or complaints.  He denies any recent hospital stays, ER visits or illnesses.    He has been working on weight loss through eating less and walking more.  He has lost about 15 pounds with these lifestyle and dietary changes.  He is interested in losing about another 40 pounds to get to a goal of 200 pounds.  He was prescribed Zepbound by his primary care doctor but has not yet started this.    He denies any alcohol use.    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  No bands were placed    Medical hx Surgical hx   Past Medical History:   Diagnosis Date    Alcohol use disorder, severe,  dependence (H)     Alcohol withdrawal seizure (H)     Alcoholic cirrhosis of liver with ascites (H)     Chronic kidney disease     Coronary artery disease     Esophageal varices (H)     Essential hypertension     Gastroesophageal reflux disease without esophagitis     History of methamphetamine use     Sustained remission since 2003    Hypercholesterolemia     Hyperlipidemia     Tobacco abuse       Past Surgical History:   Procedure Laterality Date    COLONOSCOPY N/A 8/28/2023    Procedure: COLONOSCOPY, WITH POLYPECTOMY via bx forceps;  Surgeon: Alyssa Tsai MD;  Location: [facility]    CV CORONARY ANGIOGRAM N/A 6/26/2024    Procedure: Coronary Angiogram;  Surgeon: Tyree Quinteros MD;  Location:  [facility] CARDIAC CATH LAB    CV PCI N/A 6/26/2024    Procedure: Percutaneous Coronary Intervention;  Surgeon: Tyree Quinteros MD;  Location: [facility] CARDIAC CATH LAB    ESOPHAGOSCOPY, GASTROSCOPY, DUODENOSCOPY (EGD), COMBINED N/A 6/6/2024    Procedure: Esophagoscopy, gastroscopy, duodenoscopy (EGD), combined;  Surgeon: Leventhal, Thomas Michael, MD;  Location: [facility]    IR PARACENTESIS  8/31/2023    IR PARACENTESIS  9/15/2023    IR PARACENTESIS  9/19/2023    IR PARACENTESIS  9/22/2023    IR PARACENTESIS  9/26/2023    IR PARACENTESIS  9/29/2023    IR PARACENTESIS  10/10/2023    IR PARACENTESIS  10/17/2023    IR PARACENTESIS  10/20/2023    IR PARACENTESIS  10/24/2023    IR PARACENTESIS  10/27/2023    IR PARACENTESIS  10/31/2023    IR PARACENTESIS  11/3/2023    IR PARACENTESIS  11/7/2023    IR PARACENTESIS  11/10/2023    IR PARACENTESIS  11/14/2023    IR PARACENTESIS  11/17/2023    IR PARACENTESIS  11/21/2023    IR PARACENTESIS  11/24/2023    IR PARACENTESIS  11/28/2023    IR PARACENTESIS  12/1/2023    IR PARACENTESIS  12/5/2023    IR PARACENTESIS  12/8/2023    IR PARACENTESIS  12/15/2023    IR PARACENTESIS  1/4/2024    IR PARACENTESIS  12/29/2023    IR PARACENTESIS  12/22/2023    IR PARACENTESIS   2023    IR PARACENTESIS  2024    IR PARACENTESIS  2024          Medications  Current Outpatient Medications   Medication Sig Dispense Refill    tirzepatide-Weight Management (ZEPBOUND) 2.5 MG/0.5ML prefilled pen Inject 0.5 mLs (2.5 mg) subcutaneously every 7 days. 2 mL 0       Allergies  Allergies   Allergen Reactions    Dust Mites     Pollen Extract     Metronidazole Dizziness, Other (See Comments) and Unknown     Other Reaction(s): Other (see comments)    Feels foggy on this medication    Omeprazole Other (See Comments) and Unknown     Irritability (tolerates Protonix well)    Other Reaction(s): Other (see comments)       Family hx Social hx   Family History   Problem Relation Age of Onset    Substance Abuse Mother     Coronary Artery Disease Mother     Cerebrovascular Disease Mother     Substance Abuse Father     Substance Abuse Brother     No Known Problems Son     No Known Problems Son     Liver Cancer No family hx of     Liver Disease No family hx of      Positive for a first degree relative with drug or alcohol problems.  Social History     Tobacco Use    Smoking status: Former     Current packs/day: 0.00     Types: Cigarettes     Quit date:      Years since quittin.5    Smokeless tobacco: Never   Substance Use Topics    Alcohol use: Not Currently     Comment: last drink 2023    Drug use: Not Currently     Types: Amphetamines     Comment: Sustained remission     - Lives with wife Malissa. They have 2 kids (born in  and )  - Alcohol: Prior heavy use. H/o alcohol withdrawal seizures (last ). H/o lodging plus x several days; not completed.   After last use, he completed [place].  - Tobacco: Former tobacco use, quit .  - Drug use: history of amphetamine use. History of prior treatment for amphetamine use.      Review of systems  A 10-point review of systems was negative.    Examination  There were no vitals taken for this visit.  There is no height or weight on file  to calculate BMI.    Gen-NAD  Eye-no icterus  CVS- RRR, no murmurs  RS- CTA bilaterally  Abd-obese, soft, non-tender  Extr- 2+ radial pulses bilaterally, no lower extremity edema bilaterally  MS- hands without clubbing  Skin- no jaundice. Terrys nails  Psych- normal mood    Laboratory  BMP  Recent Labs   Lab Test 07/09/25  0614 03/03/25  1537 01/08/25  0818 12/05/24  0710    138 138 141   POTASSIUM 4.4 4.2 3.9 5.3   CHLORIDE 110* 107 107 106   ENEDINA 9.3 9.3 9.2 9.6   CO2 20* 20* 21* 26   BUN 28.5* 21.5* 22.1* 21.7*   CR 1.75* 1.62* 1.68* 1.83*   GLC 95 117* 104* 98     CBC  Recent Labs   Lab Test 07/09/25  0614 03/03/25  1537 01/08/25  0818 12/05/24  0710   WBC 4.5 6.4 6.2 5.9   RBC 4.94 4.84 4.54 4.57   HGB 13.8 13.8 13.0* 13.3   HCT 41.8 40.6 38.1* 39.6*   MCV 85 84 84 87   MCH 27.9 28.5 28.6 29.1   MCHC 33.0 34.0 34.1 33.6   RDW 14.4 14.2 14.5 14.3   * 94* 94* 102*     Liver Enzymes   Recent Labs   Lab Test 07/09/25  0614   PROTTOTAL 7.0   ALBUMIN 4.0   BILITOTAL 0.7   ALKPHOS 93   AST 35   ALT 26      INR   INR   Date Value Ref Range Status   07/09/2025 1.04 0.85 - 1.15 Final      Radiology  Abdominal US 1/9/2025  1.  Cirrhosis with manifestations of portal hypertension.  2.  No suspicious hepatic observations.    Endoscopy  EGD 6/6/2024  - Small (< 5 mm) esophageal varices with no bleeding                          and no stigmata of recent bleeding.                          - A single gastric polyp. Complete resection. Polyp                          tissue not retrieved. Clips (MR conditional) were                          placed.                          - A single gastric polyp. Resected and retrieved. Clip                          (MR conditional) was placed.                          - A single gastric polyp. Resected and retrieved. Clip                          (MR conditional) was placed.                          - A single gastric polyp. Clips were placed.                          - Gastric  antral vascular ectasia without bleeding.                          - Normal examined duodenum.   Path: A. STOMACH, POLYP  - Hyperplastic/inflammatory polyp  - No evidence of dysplasia or malignancy     Colonoscopy 8/2023  - Two diminutive polyps in the sigmoid colon, removed                          with a cold biopsy forceps. Resected and retrieved.                          - Rectal varices.                          - Erythematous mucosa in the sigmoid colon and rectum                          with come erythem, consistent with portal colopathy.                          This was the likely cause of hematochezia.                          No blood was seen.                          Terminal ileum was normal.     A. SIGMOID COLON POLYP, POLYPECTOMY:  -Hyperplastic polyp

## 2025-07-09 ENCOUNTER — OFFICE VISIT (OUTPATIENT)
Dept: GASTROENTEROLOGY | Facility: CLINIC | Age: 45
End: 2025-07-09
Attending: INTERNAL MEDICINE
Payer: COMMERCIAL

## 2025-07-09 ENCOUNTER — LAB (OUTPATIENT)
Dept: LAB | Facility: CLINIC | Age: 45
End: 2025-07-09
Attending: INTERNAL MEDICINE
Payer: COMMERCIAL

## 2025-07-09 ENCOUNTER — ANCILLARY PROCEDURE (OUTPATIENT)
Dept: ULTRASOUND IMAGING | Facility: CLINIC | Age: 45
End: 2025-07-09
Attending: INTERNAL MEDICINE
Payer: COMMERCIAL

## 2025-07-09 DIAGNOSIS — K70.31 ALCOHOLIC CIRRHOSIS OF LIVER WITH ASCITES (H): ICD-10-CM

## 2025-07-09 DIAGNOSIS — K70.31 ALCOHOLIC CIRRHOSIS OF LIVER WITH ASCITES (H): Primary | ICD-10-CM

## 2025-07-09 DIAGNOSIS — K70.30 ALCOHOLIC CIRRHOSIS (H): ICD-10-CM

## 2025-07-09 LAB
AFP SERPL-MCNC: 2.6 NG/ML
ALBUMIN SERPL BCG-MCNC: 4 G/DL (ref 3.5–5.2)
ALP SERPL-CCNC: 93 U/L (ref 40–150)
ALT SERPL W P-5'-P-CCNC: 26 U/L (ref 0–70)
ANION GAP SERPL CALCULATED.3IONS-SCNC: 10 MMOL/L (ref 7–15)
AST SERPL W P-5'-P-CCNC: 35 U/L (ref 0–45)
BILIRUB SERPL-MCNC: 0.7 MG/DL
BILIRUBIN DIRECT (ROCHE PRO & PURE): 0.32 MG/DL (ref 0–0.45)
BUN SERPL-MCNC: 28.5 MG/DL (ref 6–20)
CALCIUM SERPL-MCNC: 9.3 MG/DL (ref 8.8–10.4)
CHLORIDE SERPL-SCNC: 110 MMOL/L (ref 98–107)
CREAT SERPL-MCNC: 1.75 MG/DL (ref 0.67–1.17)
EGFRCR SERPLBLD CKD-EPI 2021: 48 ML/MIN/1.73M2
ERYTHROCYTE [DISTWIDTH] IN BLOOD BY AUTOMATED COUNT: 14.4 % (ref 10–15)
GLUCOSE SERPL-MCNC: 95 MG/DL (ref 70–99)
HCO3 SERPL-SCNC: 20 MMOL/L (ref 22–29)
HCT VFR BLD AUTO: 41.8 % (ref 40–53)
HGB BLD-MCNC: 13.8 G/DL (ref 13.3–17.7)
INR PPP: 1.04 (ref 0.85–1.15)
MCH RBC QN AUTO: 27.9 PG (ref 26.5–33)
MCHC RBC AUTO-ENTMCNC: 33 G/DL (ref 31.5–36.5)
MCV RBC AUTO: 85 FL (ref 78–100)
PLATELET # BLD AUTO: 100 10E3/UL (ref 150–450)
POTASSIUM SERPL-SCNC: 4.4 MMOL/L (ref 3.4–5.3)
PROT SERPL-MCNC: 7 G/DL (ref 6.4–8.3)
PROTHROMBIN TIME: 13.8 SECONDS (ref 11.8–14.8)
RBC # BLD AUTO: 4.94 10E6/UL (ref 4.4–5.9)
SODIUM SERPL-SCNC: 140 MMOL/L (ref 135–145)
WBC # BLD AUTO: 4.5 10E3/UL (ref 4–11)

## 2025-07-09 PROCEDURE — 85610 PROTHROMBIN TIME: CPT | Performed by: PATHOLOGY

## 2025-07-09 PROCEDURE — 82105 ALPHA-FETOPROTEIN SERUM: CPT | Performed by: INTERNAL MEDICINE

## 2025-07-09 PROCEDURE — 85027 COMPLETE CBC AUTOMATED: CPT | Performed by: PATHOLOGY

## 2025-07-09 PROCEDURE — 36415 COLL VENOUS BLD VENIPUNCTURE: CPT | Performed by: PATHOLOGY

## 2025-07-09 PROCEDURE — 80053 COMPREHEN METABOLIC PANEL: CPT | Performed by: PATHOLOGY

## 2025-07-09 PROCEDURE — 82248 BILIRUBIN DIRECT: CPT | Performed by: PATHOLOGY

## 2025-07-09 PROCEDURE — 99000 SPECIMEN HANDLING OFFICE-LAB: CPT | Performed by: PATHOLOGY

## 2025-07-09 NOTE — LETTER
7/9/2025      Elroy Morel Sr.  [address] [place] [address]      Dear Colleague,    Thank you for referring your patient, Elroy Morel Sr., to the [facility] [place]. Please see a copy of my visit note below.    [facility] Hepatology    Assessment  45 year old male with past medical history of decompensated alcohol-related cirrhosis complicated by hepatic encephalopathy, ascites and variceal bleeding (8/2023) and alcohol use disorder. PMhx also includes obesity.     #. Decompensated alcohol-related cirrhosis   #. Hx of hepatic encephalopathy  #. Hx of ascites   #. Hx of variceal bleeding (8/2023)   MELD 3.0: 13    Patient with a history of decompensated alcohol-related cirrhosis.  His disease was previously complicated by hepatic encephalopathy, variceal bleeding and ascites.  He has not been on any medications for hepatic encephalopathy ascites or any beta-blockers for some time.  Denies any signs or symptoms of decompensation.  At this time I think he has recompensated in the setting of alcohol cessation.    He is up-to-date on liver cancer screening with an ultrasound today.  Pending results.    Upper endoscopy was performed within the last month for variceal screening; will work on obtaining these records from [facility].    #. Alcohol use disorder; hx of alcohol withdrawal seizures   With regards to the patient's alcohol use disorder he has completed intensive outpatient programming.  He reports ongoing sobriety    #. Obesity  Patient with ongoing obesity.  Patient will continue to discuss lifestyle and dietary changes to promote a healthy weight.  Patient to start Zepbound in the coming weeks.    Plan  -- HCC Screening: US + AFP every 6 months  -- Variceal Screening: Pending records from [facility], done within the last month  -- Labs every 6 months: CBC, CMP, INR and AFP    Health Maintenance:  -- Continue complete alcohol cessation  -- Recommend  lifestyle and dietary changes to promote a healthy weight which includes portion control, reducing carbohydrates, sugars and processed foods.  Recommend increasing exercise/mobility with his wife  -- Recommend yearly influenza vaccination  -- Colonoscopy: last 8/2023 with hyperplastic polyp  -- Low sodium (< 2 grams per day) + high protein diet    RTC: 12 months    Nimco Diego MD (Lizzie)  Advanced & Transplant Hepatology  [facility]    I spent 40 minutes on this encounter performing the following: reviewing the patient's medical record (clinic visits, hospital records, lab results, imaging and procedural documentation), history taking, physical exam and documentation on the date of the encounter. I also spent part of the time in coordination of care and counseling.    The longitudinal plan of care for the diagnosis(es)/condition(s) as documented were addressed during this visit. Due to the added complexity in care, I will continue to support Ad in the subsequent management and with ongoing continuity of care.    HPI:  ALD related Cirrhosis  - hx HE  - hx ascites  - hx variceal bleed (8/2023)  - last EGD 6/2024: small EV, no bleeding  - HCC screening - US 1/2025: no hepatic lesions    Presented alone    Reports doing well without any acute concerns or complaints.  He denies any recent hospital stays, ER visits or illnesses.    He has been working on weight loss through eating less and walking more.  He has lost about 15 pounds with these lifestyle and dietary changes.  He is interested in losing about another 40 pounds to get to a goal of 200 pounds.  He was prescribed Zepbound by his primary care doctor but has not yet started this.    He denies any alcohol use.    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  No bands were placed    Medical hx Surgical hx   Past Medical History:   Diagnosis Date     Alcohol use disorder, severe, dependence (H)      Alcohol withdrawal seizure (H)      Alcoholic cirrhosis of liver with ascites (H)      Chronic kidney disease      Coronary artery disease      Esophageal varices (H)      Essential hypertension      Gastroesophageal reflux disease without esophagitis      History of methamphetamine use     Sustained remission since 2003     Hypercholesterolemia      Hyperlipidemia      Tobacco abuse       Past Surgical History:   Procedure Laterality Date     COLONOSCOPY N/A 8/28/2023    Procedure: COLONOSCOPY, WITH POLYPECTOMY via bx forceps;  Surgeon: Alyssa Tsai MD;  Location: [facility]     CV CORONARY ANGIOGRAM N/A 6/26/2024    Procedure: Coronary Angiogram;  Surgeon: Tyree Quinteros MD;  Location:  HEART CARDIAC CATH LAB     CV PCI N/A 6/26/2024    Procedure: Percutaneous Coronary Intervention;  Surgeon: Tyree Quinteros MD;  Location: [facility] CARDIAC CATH LAB     ESOPHAGOSCOPY, GASTROSCOPY, DUODENOSCOPY (EGD), COMBINED N/A 6/6/2024    Procedure: Esophagoscopy, gastroscopy, duodenoscopy (EGD), combined;  Surgeon: Leventhal, Thomas Michael, MD;  Location: [facility] GI     IR PARACENTESIS  8/31/2023     IR PARACENTESIS  9/15/2023     IR PARACENTESIS  9/19/2023     IR PARACENTESIS  9/22/2023     IR PARACENTESIS  9/26/2023     IR PARACENTESIS  9/29/2023     IR PARACENTESIS  10/10/2023     IR PARACENTESIS  10/17/2023     IR PARACENTESIS  10/20/2023     IR PARACENTESIS  10/24/2023     IR PARACENTESIS  10/27/2023     IR PARACENTESIS  10/31/2023     IR PARACENTESIS  11/3/2023     IR PARACENTESIS  11/7/2023     IR PARACENTESIS  11/10/2023     IR PARACENTESIS  11/14/2023     IR PARACENTESIS  11/17/2023     IR PARACENTESIS  11/21/2023     IR PARACENTESIS  11/24/2023     IR  PARACENTESIS  2023     IR PARACENTESIS  2023     IR PARACENTESIS  2023     IR PARACENTESIS  2023     IR PARACENTESIS  12/15/2023     IR PARACENTESIS  2024     IR PARACENTESIS  2023     IR PARACENTESIS  2023     IR PARACENTESIS  2023     IR PARACENTESIS  2024     IR PARACENTESIS  2024          Medications  Current Outpatient Medications   Medication Sig Dispense Refill     tirzepatide-Weight Management (ZEPBOUND) 2.5 MG/0.5ML prefilled pen Inject 0.5 mLs (2.5 mg) subcutaneously every 7 days. 2 mL 0       Allergies  Allergies   Allergen Reactions     Dust Mites      Pollen Extract      Metronidazole Dizziness, Other (See Comments) and Unknown     Other Reaction(s): Other (see comments)    Feels foggy on this medication     Omeprazole Other (See Comments) and Unknown     Irritability (tolerates Protonix well)    Other Reaction(s): Other (see comments)       Family hx Social hx   Family History   Problem Relation Age of Onset     Substance Abuse Mother      Coronary Artery Disease Mother      Cerebrovascular Disease Mother      Substance Abuse Father      Substance Abuse Brother      No Known Problems Son      No Known Problems Son      Liver Cancer No family hx of      Liver Disease No family hx of      Positive for a first degree relative with drug or alcohol problems.  Social History     Tobacco Use     Smoking status: Former     Current packs/day: 0.00     Types: Cigarettes     Quit date:      Years since quittin.5     Smokeless tobacco: Never   Substance Use Topics     Alcohol use: Not Currently     Comment: last drink 2023     Drug use: Not Currently     Types: Amphetamines     Comment: Sustained remission     - Lives with wife Malissa. They have 2 kids (born in  and )  - Alcohol: Prior heavy use. H/o alcohol withdrawal seizures (last ). H/o lodging plus x several days; not completed.   After last use, he completed [employer].  - Tobacco:  Former tobacco use, quit 2020.  - Drug use: history of amphetamine use. History of prior treatment for amphetamine use.      Review of systems  A 10-point review of systems was negative.    Examination  There were no vitals taken for this visit.  There is no height or weight on file to calculate BMI.    Gen-NAD  Eye-no icterus  CVS- RRR, no murmurs  RS- CTA bilaterally  Abd-obese, soft, non-tender  Extr- 2+ radial pulses bilaterally, no lower extremity edema bilaterally  MS- hands without clubbing  Skin- no jaundice. Terrys nails  Psych- normal mood    Laboratory  BMP  Recent Labs   Lab Test 07/09/25  0614 03/03/25  1537 01/08/25  0818 12/05/24  0710    138 138 141   POTASSIUM 4.4 4.2 3.9 5.3   CHLORIDE 110* 107 107 106   ENEDINA 9.3 9.3 9.2 9.6   CO2 20* 20* 21* 26   BUN 28.5* 21.5* 22.1* 21.7*   CR 1.75* 1.62* 1.68* 1.83*   GLC 95 117* 104* 98     CBC  Recent Labs   Lab Test 07/09/25  0614 03/03/25  1537 01/08/25  0818 12/05/24  0710   WBC 4.5 6.4 6.2 5.9   RBC 4.94 4.84 4.54 4.57   HGB 13.8 13.8 13.0* 13.3   HCT 41.8 40.6 38.1* 39.6*   MCV 85 84 84 87   MCH 27.9 28.5 28.6 29.1   MCHC 33.0 34.0 34.1 33.6   RDW 14.4 14.2 14.5 14.3   * 94* 94* 102*     Liver Enzymes   Recent Labs   Lab Test 07/09/25  0614   PROTTOTAL 7.0   ALBUMIN 4.0   BILITOTAL 0.7   ALKPHOS 93   AST 35   ALT 26      INR   INR   Date Value Ref Range Status   07/09/2025 1.04 0.85 - 1.15 Final      Radiology  Abdominal US 1/9/2025  1.  Cirrhosis with manifestations of portal hypertension.  2.  No suspicious hepatic observations.    Endoscopy  EGD 6/6/2024  - Small (< 5 mm) esophageal varices with no bleeding                          and no stigmata of recent bleeding.                          - A single gastric polyp. Complete resection. Polyp                          tissue not retrieved. Clips (MR conditional) were                          placed.                          - A single gastric polyp. Resected and retrieved. Clip                           (MR conditional) was placed.                          - A single gastric polyp. Resected and retrieved. Clip                          (MR conditional) was placed.                          - A single gastric polyp. Clips were placed.                          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                          - Normal examined duodenum.   Path: A. STOMACH, POLYP  - Hyperplastic/inflammatory polyp  - No evidence of dysplasia or malignancy     Colonoscopy 8/2023  - Two diminutive polyps in the sigmoid colon, removed                          with a cold biopsy forceps. Resected and retrieved.                          - Rectal varices.                          - Erythematous mucosa in the sigmoid colon and rectum                          with come erythem, consistent with portal colopathy.                          This was the likely cause of hematochezia.                          No blood was seen.                          Terminal ileum was normal.     A. SIGMOID COLON POLYP, POLYPECTOMY:  -Hyperplastic polyp    Again, thank you for allowing me to participate in the care of your patient.        Sincerely,        Nimco Diego MD    Electronically signed

## 2025-07-09 NOTE — PATIENT INSTRUCTIONS
- Labs every 6 months  - Imaging every 6 months  - Return to clinic in 12 months    [email or web address]

## 2025-07-31 ENCOUNTER — MYC MEDICAL ADVICE (OUTPATIENT)
Facility: CLINIC | Age: 45
End: 2025-07-31
Payer: COMMERCIAL

## 2025-08-20 ENCOUNTER — TELEPHONE (OUTPATIENT)
Dept: GASTROENTEROLOGY | Facility: CLINIC | Age: 45
End: 2025-08-20
Payer: COMMERCIAL

## (undated) DEVICE — Device

## (undated) DEVICE — FASTENER CATH BALLOON CLAMPX2 STATLOCK 0684-00-493

## (undated) DEVICE — TUBING PRESSURE 30"

## (undated) DEVICE — MANIFOLD KIT ANGIO AUTOMATED 014613

## (undated) DEVICE — PACK HEART LEFT CUSTOM

## (undated) DEVICE — SHTH INTRO 0.021IN ID 6FR DIA

## (undated) DEVICE — KIT HAND CONTROL ACIST 016795

## (undated) DEVICE — SLEEVE TR BAND RADIAL COMPRESSION DEVICE 24CM TRB24-REG

## (undated) RX ORDER — ASPIRIN 325 MG
TABLET ORAL
Status: DISPENSED
Start: 2024-06-26

## (undated) RX ORDER — FENTANYL CITRATE 50 UG/ML
INJECTION, SOLUTION INTRAMUSCULAR; INTRAVENOUS
Status: DISPENSED
Start: 2023-08-28

## (undated) RX ORDER — METOPROLOL TARTRATE 50 MG
TABLET ORAL
Status: DISPENSED
Start: 2024-06-03

## (undated) RX ORDER — WATER 10 ML/10ML
INJECTION INTRAMUSCULAR; INTRAVENOUS; SUBCUTANEOUS
Status: DISPENSED
Start: 2022-06-20

## (undated) RX ORDER — PROPOFOL 10 MG/ML
INJECTION, EMULSION INTRAVENOUS
Status: DISPENSED
Start: 2024-06-06

## (undated) RX ORDER — NITROGLYCERIN 5 MG/ML
VIAL (ML) INTRAVENOUS
Status: DISPENSED
Start: 2024-06-26

## (undated) RX ORDER — FENTANYL CITRATE-0.9 % NACL/PF 10 MCG/ML
PLASTIC BAG, INJECTION (ML) INTRAVENOUS
Status: DISPENSED
Start: 2024-06-06

## (undated) RX ORDER — LIDOCAINE HYDROCHLORIDE 10 MG/ML
INJECTION, SOLUTION EPIDURAL; INFILTRATION; INTRACAUDAL; PERINEURAL
Status: DISPENSED
Start: 2023-09-15

## (undated) RX ORDER — GLYCOPYRROLATE 0.2 MG/ML
INJECTION, SOLUTION INTRAMUSCULAR; INTRAVENOUS
Status: DISPENSED
Start: 2024-06-06

## (undated) RX ORDER — EPINEPHRINE 0.1 MG/ML
INJECTION INTRAVENOUS
Status: DISPENSED
Start: 2024-06-06

## (undated) RX ORDER — SODIUM CHLORIDE 9 MG/ML
INJECTION, SOLUTION INTRAVENOUS
Status: DISPENSED
Start: 2024-06-26

## (undated) RX ORDER — FENTANYL CITRATE 50 UG/ML
INJECTION, SOLUTION INTRAMUSCULAR; INTRAVENOUS
Status: DISPENSED
Start: 2024-06-26

## (undated) RX ORDER — NITROGLYCERIN 0.4 MG/1
TABLET SUBLINGUAL
Status: DISPENSED
Start: 2024-06-03

## (undated) RX ORDER — NICARDIPINE HCL-0.9% SOD CHLOR 1 MG/10 ML
SYRINGE (ML) INTRAVENOUS
Status: DISPENSED
Start: 2024-06-26

## (undated) RX ORDER — SIMETHICONE 40MG/0.6ML
SUSPENSION, DROPS(FINAL DOSAGE FORM)(ML) ORAL
Status: DISPENSED
Start: 2023-08-28